# Patient Record
Sex: MALE | Race: WHITE | Employment: OTHER | URBAN - METROPOLITAN AREA
[De-identification: names, ages, dates, MRNs, and addresses within clinical notes are randomized per-mention and may not be internally consistent; named-entity substitution may affect disease eponyms.]

---

## 2017-02-01 ENCOUNTER — TRANSFERRED RECORDS (OUTPATIENT)
Dept: HEALTH INFORMATION MANAGEMENT | Facility: CLINIC | Age: 82
End: 2017-02-01

## 2017-04-02 ENCOUNTER — APPOINTMENT (OUTPATIENT)
Dept: GENERAL RADIOLOGY | Facility: CLINIC | Age: 82
DRG: 193 | End: 2017-04-02
Attending: EMERGENCY MEDICINE
Payer: MEDICARE

## 2017-04-02 ENCOUNTER — APPOINTMENT (OUTPATIENT)
Dept: CT IMAGING | Facility: CLINIC | Age: 82
DRG: 193 | End: 2017-04-02
Attending: EMERGENCY MEDICINE
Payer: MEDICARE

## 2017-04-02 ENCOUNTER — HOSPITAL ENCOUNTER (INPATIENT)
Facility: CLINIC | Age: 82
LOS: 4 days | Discharge: NURSING, REHAB OR LONG-TERM CARE | DRG: 193 | End: 2017-04-06
Attending: EMERGENCY MEDICINE | Admitting: HOSPITALIST
Payer: MEDICARE

## 2017-04-02 DIAGNOSIS — J18.9 PNEUMONIA OF BOTH LOWER LOBES DUE TO INFECTIOUS ORGANISM: Primary | ICD-10-CM

## 2017-04-02 DIAGNOSIS — N28.9 ACUTE RENAL INSUFFICIENCY: ICD-10-CM

## 2017-04-02 DIAGNOSIS — E87.6 HYPOKALEMIA: ICD-10-CM

## 2017-04-02 DIAGNOSIS — M62.81 GENERALIZED MUSCLE WEAKNESS: ICD-10-CM

## 2017-04-02 DIAGNOSIS — E86.0 DEHYDRATION: ICD-10-CM

## 2017-04-02 DIAGNOSIS — M25.571 ACUTE RIGHT ANKLE PAIN: ICD-10-CM

## 2017-04-02 DIAGNOSIS — R79.89 ELEVATED TROPONIN: ICD-10-CM

## 2017-04-02 LAB
ALBUMIN SERPL-MCNC: 2.8 G/DL (ref 3.4–5)
ALBUMIN UR-MCNC: 30 MG/DL
ALP SERPL-CCNC: 53 U/L (ref 40–150)
ALT SERPL W P-5'-P-CCNC: 25 U/L (ref 0–70)
ANION GAP SERPL CALCULATED.3IONS-SCNC: 9 MMOL/L (ref 3–14)
APPEARANCE UR: ABNORMAL
AST SERPL W P-5'-P-CCNC: 43 U/L (ref 0–45)
BASOPHILS # BLD AUTO: 0 10E9/L (ref 0–0.2)
BASOPHILS NFR BLD AUTO: 0.3 %
BILIRUB SERPL-MCNC: 0.9 MG/DL (ref 0.2–1.3)
BILIRUB UR QL STRIP: NEGATIVE
BUN SERPL-MCNC: 29 MG/DL (ref 7–30)
CALCIUM SERPL-MCNC: 8.9 MG/DL (ref 8.5–10.1)
CHLORIDE SERPL-SCNC: 96 MMOL/L (ref 94–109)
CK SERPL-CCNC: 383 U/L (ref 30–300)
CO2 SERPL-SCNC: 32 MMOL/L (ref 20–32)
COLOR UR AUTO: YELLOW
CREAT SERPL-MCNC: 1.39 MG/DL (ref 0.66–1.25)
DIFFERENTIAL METHOD BLD: ABNORMAL
EOSINOPHIL # BLD AUTO: 0 10E9/L (ref 0–0.7)
EOSINOPHIL NFR BLD AUTO: 0.1 %
ERYTHROCYTE [DISTWIDTH] IN BLOOD BY AUTOMATED COUNT: 13.8 % (ref 10–15)
GFR SERPL CREATININE-BSD FRML MDRD: 48 ML/MIN/1.7M2
GLUCOSE SERPL-MCNC: 106 MG/DL (ref 70–99)
GLUCOSE UR STRIP-MCNC: NEGATIVE MG/DL
HCT VFR BLD AUTO: 37.6 % (ref 40–53)
HGB BLD-MCNC: 12.4 G/DL (ref 13.3–17.7)
HGB UR QL STRIP: ABNORMAL
IMM GRANULOCYTES # BLD: 0.1 10E9/L (ref 0–0.4)
IMM GRANULOCYTES NFR BLD: 0.8 %
INTERPRETATION ECG - MUSE: NORMAL
KETONES UR STRIP-MCNC: 5 MG/DL
LACTATE BLD-SCNC: 2 MMOL/L (ref 0.7–2.1)
LEUKOCYTE ESTERASE UR QL STRIP: ABNORMAL
LYMPHOCYTES # BLD AUTO: 0.5 10E9/L (ref 0.8–5.3)
LYMPHOCYTES NFR BLD AUTO: 6.6 %
MCH RBC QN AUTO: 31.6 PG (ref 26.5–33)
MCHC RBC AUTO-ENTMCNC: 33 G/DL (ref 31.5–36.5)
MCV RBC AUTO: 96 FL (ref 78–100)
MONOCYTES # BLD AUTO: 1.1 10E9/L (ref 0–1.3)
MONOCYTES NFR BLD AUTO: 14 %
MUCOUS THREADS #/AREA URNS LPF: PRESENT /LPF
NEUTROPHILS # BLD AUTO: 6.3 10E9/L (ref 1.6–8.3)
NEUTROPHILS NFR BLD AUTO: 78.2 %
NITRATE UR QL: POSITIVE
NRBC # BLD AUTO: 0 10*3/UL
NRBC BLD AUTO-RTO: 0 /100
NT-PROBNP SERPL-MCNC: 2864 PG/ML (ref 0–1800)
PH UR STRIP: 7 PH (ref 5–7)
PLATELET # BLD AUTO: 275 10E9/L (ref 150–450)
PLATELET # BLD AUTO: 303 10E9/L (ref 150–450)
POTASSIUM SERPL-SCNC: 2.9 MMOL/L (ref 3.4–5.3)
PROCALCITONIN SERPL-MCNC: 0.14 NG/ML
PROT SERPL-MCNC: 7.2 G/DL (ref 6.8–8.8)
RBC # BLD AUTO: 3.93 10E12/L (ref 4.4–5.9)
RBC #/AREA URNS AUTO: 21 /HPF (ref 0–2)
SODIUM SERPL-SCNC: 137 MMOL/L (ref 133–144)
SP GR UR STRIP: 1.01 (ref 1–1.03)
T4 FREE SERPL-MCNC: 1.09 NG/DL (ref 0.76–1.46)
TROPONIN I BLD-MCNC: 0.06 UG/L (ref 0–0.1)
TROPONIN I SERPL-MCNC: 0.06 UG/L (ref 0–0.04)
TSH SERPL DL<=0.005 MIU/L-ACNC: 4.62 MU/L (ref 0.4–4)
URN SPEC COLLECT METH UR: ABNORMAL
UROBILINOGEN UR STRIP-MCNC: 0 MG/DL (ref 0–2)
WBC # BLD AUTO: 8 10E9/L (ref 4–11)
WBC #/AREA URNS AUTO: 7 /HPF (ref 0–2)

## 2017-04-02 PROCEDURE — 84145 PROCALCITONIN (PCT): CPT | Performed by: HOSPITALIST

## 2017-04-02 PROCEDURE — 99285 EMERGENCY DEPT VISIT HI MDM: CPT | Mod: 25

## 2017-04-02 PROCEDURE — 82550 ASSAY OF CK (CPK): CPT | Performed by: HOSPITALIST

## 2017-04-02 PROCEDURE — 84484 ASSAY OF TROPONIN QUANT: CPT

## 2017-04-02 PROCEDURE — 83880 ASSAY OF NATRIURETIC PEPTIDE: CPT | Performed by: EMERGENCY MEDICINE

## 2017-04-02 PROCEDURE — 83605 ASSAY OF LACTIC ACID: CPT | Performed by: HOSPITALIST

## 2017-04-02 PROCEDURE — 25000132 ZZH RX MED GY IP 250 OP 250 PS 637: Mod: GY | Performed by: HOSPITALIST

## 2017-04-02 PROCEDURE — 70450 CT HEAD/BRAIN W/O DYE: CPT

## 2017-04-02 PROCEDURE — 25000128 H RX IP 250 OP 636: Performed by: HOSPITALIST

## 2017-04-02 PROCEDURE — A9270 NON-COVERED ITEM OR SERVICE: HCPCS | Mod: GY | Performed by: EMERGENCY MEDICINE

## 2017-04-02 PROCEDURE — A9270 NON-COVERED ITEM OR SERVICE: HCPCS | Mod: GY | Performed by: HOSPITALIST

## 2017-04-02 PROCEDURE — 25000132 ZZH RX MED GY IP 250 OP 250 PS 637: Mod: GY | Performed by: EMERGENCY MEDICINE

## 2017-04-02 PROCEDURE — 93005 ELECTROCARDIOGRAM TRACING: CPT

## 2017-04-02 PROCEDURE — 73502 X-RAY EXAM HIP UNI 2-3 VIEWS: CPT

## 2017-04-02 PROCEDURE — 94640 AIRWAY INHALATION TREATMENT: CPT

## 2017-04-02 PROCEDURE — 73610 X-RAY EXAM OF ANKLE: CPT | Mod: RT

## 2017-04-02 PROCEDURE — 85025 COMPLETE CBC W/AUTO DIFF WBC: CPT | Performed by: EMERGENCY MEDICINE

## 2017-04-02 PROCEDURE — 87086 URINE CULTURE/COLONY COUNT: CPT | Performed by: HOSPITALIST

## 2017-04-02 PROCEDURE — 40000275 ZZH STATISTIC RCP TIME EA 10 MIN

## 2017-04-02 PROCEDURE — 84439 ASSAY OF FREE THYROXINE: CPT | Performed by: EMERGENCY MEDICINE

## 2017-04-02 PROCEDURE — 87040 BLOOD CULTURE FOR BACTERIA: CPT | Performed by: HOSPITALIST

## 2017-04-02 PROCEDURE — 85049 AUTOMATED PLATELET COUNT: CPT | Performed by: HOSPITALIST

## 2017-04-02 PROCEDURE — 25000132 ZZH RX MED GY IP 250 OP 250 PS 637: Mod: GY

## 2017-04-02 PROCEDURE — 96361 HYDRATE IV INFUSION ADD-ON: CPT

## 2017-04-02 PROCEDURE — 72100 X-RAY EXAM L-S SPINE 2/3 VWS: CPT

## 2017-04-02 PROCEDURE — 36415 COLL VENOUS BLD VENIPUNCTURE: CPT | Performed by: HOSPITALIST

## 2017-04-02 PROCEDURE — 81001 URINALYSIS AUTO W/SCOPE: CPT | Performed by: EMERGENCY MEDICINE

## 2017-04-02 PROCEDURE — 80053 COMPREHEN METABOLIC PANEL: CPT | Performed by: EMERGENCY MEDICINE

## 2017-04-02 PROCEDURE — 25000128 H RX IP 250 OP 636: Performed by: EMERGENCY MEDICINE

## 2017-04-02 PROCEDURE — 25000125 ZZHC RX 250: Performed by: EMERGENCY MEDICINE

## 2017-04-02 PROCEDURE — 96360 HYDRATION IV INFUSION INIT: CPT

## 2017-04-02 PROCEDURE — 12000007 ZZH R&B INTERMEDIATE

## 2017-04-02 PROCEDURE — 99223 1ST HOSP IP/OBS HIGH 75: CPT | Mod: AI | Performed by: HOSPITALIST

## 2017-04-02 PROCEDURE — 72125 CT NECK SPINE W/O DYE: CPT

## 2017-04-02 PROCEDURE — 71010 XR CHEST 1 VW: CPT

## 2017-04-02 PROCEDURE — 84443 ASSAY THYROID STIM HORMONE: CPT | Performed by: EMERGENCY MEDICINE

## 2017-04-02 PROCEDURE — 84484 ASSAY OF TROPONIN QUANT: CPT | Performed by: EMERGENCY MEDICINE

## 2017-04-02 PROCEDURE — A9270 NON-COVERED ITEM OR SERVICE: HCPCS | Mod: GY

## 2017-04-02 RX ORDER — SODIUM CHLORIDE 9 MG/ML
1000 INJECTION, SOLUTION INTRAVENOUS CONTINUOUS
Status: DISCONTINUED | OUTPATIENT
Start: 2017-04-02 | End: 2017-04-02

## 2017-04-02 RX ORDER — ACETAMINOPHEN 10 MG/ML
1000 INJECTION, SOLUTION INTRAVENOUS ONCE
Status: DISCONTINUED | OUTPATIENT
Start: 2017-04-02 | End: 2017-04-02

## 2017-04-02 RX ORDER — GABAPENTIN 100 MG/1
100 CAPSULE ORAL AT BEDTIME
Status: DISCONTINUED | OUTPATIENT
Start: 2017-04-02 | End: 2017-04-06 | Stop reason: HOSPADM

## 2017-04-02 RX ORDER — TAMSULOSIN HYDROCHLORIDE 0.4 MG/1
0.4 CAPSULE ORAL AT BEDTIME
Status: DISCONTINUED | OUTPATIENT
Start: 2017-04-02 | End: 2017-04-06 | Stop reason: HOSPADM

## 2017-04-02 RX ORDER — ACETAMINOPHEN 500 MG
TABLET ORAL
Status: COMPLETED
Start: 2017-04-02 | End: 2017-04-02

## 2017-04-02 RX ORDER — ONDANSETRON 2 MG/ML
4 INJECTION INTRAMUSCULAR; INTRAVENOUS EVERY 6 HOURS PRN
Status: DISCONTINUED | OUTPATIENT
Start: 2017-04-02 | End: 2017-04-06 | Stop reason: HOSPADM

## 2017-04-02 RX ORDER — LATANOPROST 50 UG/ML
1 SOLUTION/ DROPS OPHTHALMIC DAILY
Status: DISCONTINUED | OUTPATIENT
Start: 2017-04-02 | End: 2017-04-06 | Stop reason: HOSPADM

## 2017-04-02 RX ORDER — ASPIRIN 81 MG/1
324 TABLET, CHEWABLE ORAL ONCE
Status: COMPLETED | OUTPATIENT
Start: 2017-04-02 | End: 2017-04-02

## 2017-04-02 RX ORDER — ACETAMINOPHEN 325 MG/1
650 TABLET ORAL EVERY 4 HOURS PRN
Status: DISCONTINUED | OUTPATIENT
Start: 2017-04-02 | End: 2017-04-06 | Stop reason: HOSPADM

## 2017-04-02 RX ORDER — POTASSIUM CHLORIDE 1500 MG/1
20-40 TABLET, EXTENDED RELEASE ORAL
Status: DISCONTINUED | OUTPATIENT
Start: 2017-04-02 | End: 2017-04-06 | Stop reason: HOSPADM

## 2017-04-02 RX ORDER — FOLIC ACID 1 MG/1
1 TABLET ORAL DAILY
Status: DISCONTINUED | OUTPATIENT
Start: 2017-04-02 | End: 2017-04-06 | Stop reason: HOSPADM

## 2017-04-02 RX ORDER — MAGNESIUM SULFATE HEPTAHYDRATE 40 MG/ML
4 INJECTION, SOLUTION INTRAVENOUS EVERY 4 HOURS PRN
Status: DISCONTINUED | OUTPATIENT
Start: 2017-04-02 | End: 2017-04-06 | Stop reason: HOSPADM

## 2017-04-02 RX ORDER — LEVOTHYROXINE SODIUM 50 UG/1
50 TABLET ORAL DAILY
Status: DISCONTINUED | OUTPATIENT
Start: 2017-04-02 | End: 2017-04-06 | Stop reason: HOSPADM

## 2017-04-02 RX ORDER — LIDOCAINE 40 MG/G
CREAM TOPICAL
Status: DISCONTINUED | OUTPATIENT
Start: 2017-04-02 | End: 2017-04-06 | Stop reason: HOSPADM

## 2017-04-02 RX ORDER — ONDANSETRON 4 MG/1
4 TABLET, ORALLY DISINTEGRATING ORAL EVERY 6 HOURS PRN
Status: DISCONTINUED | OUTPATIENT
Start: 2017-04-02 | End: 2017-04-06 | Stop reason: HOSPADM

## 2017-04-02 RX ORDER — ASPIRIN 81 MG/1
81 TABLET ORAL DAILY
Status: DISCONTINUED | OUTPATIENT
Start: 2017-04-03 | End: 2017-04-06 | Stop reason: HOSPADM

## 2017-04-02 RX ORDER — HEPARIN SODIUM 5000 [USP'U]/.5ML
5000 INJECTION, SOLUTION INTRAVENOUS; SUBCUTANEOUS 2 TIMES DAILY
Status: DISCONTINUED | OUTPATIENT
Start: 2017-04-02 | End: 2017-04-06 | Stop reason: HOSPADM

## 2017-04-02 RX ORDER — AMOXICILLIN 250 MG
1 CAPSULE ORAL 2 TIMES DAILY PRN
Status: DISCONTINUED | OUTPATIENT
Start: 2017-04-02 | End: 2017-04-02

## 2017-04-02 RX ORDER — IPRATROPIUM BROMIDE AND ALBUTEROL SULFATE 2.5; .5 MG/3ML; MG/3ML
3 SOLUTION RESPIRATORY (INHALATION) ONCE
Status: COMPLETED | OUTPATIENT
Start: 2017-04-02 | End: 2017-04-02

## 2017-04-02 RX ORDER — NALOXONE HYDROCHLORIDE 0.4 MG/ML
.1-.4 INJECTION, SOLUTION INTRAMUSCULAR; INTRAVENOUS; SUBCUTANEOUS
Status: DISCONTINUED | OUTPATIENT
Start: 2017-04-02 | End: 2017-04-06 | Stop reason: HOSPADM

## 2017-04-02 RX ORDER — POTASSIUM CHLORIDE 7.45 MG/ML
10 INJECTION INTRAVENOUS
Status: DISCONTINUED | OUTPATIENT
Start: 2017-04-02 | End: 2017-04-06 | Stop reason: HOSPADM

## 2017-04-02 RX ORDER — ACETAMINOPHEN 500 MG
500 TABLET ORAL 4 TIMES DAILY PRN
Status: DISCONTINUED | OUTPATIENT
Start: 2017-04-02 | End: 2017-04-02

## 2017-04-02 RX ORDER — POTASSIUM CHLORIDE 1.5 G/1.58G
20-40 POWDER, FOR SOLUTION ORAL
Status: DISCONTINUED | OUTPATIENT
Start: 2017-04-02 | End: 2017-04-06 | Stop reason: HOSPADM

## 2017-04-02 RX ORDER — POTASSIUM CHLORIDE 29.8 MG/ML
20 INJECTION INTRAVENOUS
Status: DISCONTINUED | OUTPATIENT
Start: 2017-04-02 | End: 2017-04-06 | Stop reason: HOSPADM

## 2017-04-02 RX ORDER — AMOXICILLIN 250 MG
1-2 CAPSULE ORAL 2 TIMES DAILY PRN
Status: DISCONTINUED | OUTPATIENT
Start: 2017-04-02 | End: 2017-04-06 | Stop reason: HOSPADM

## 2017-04-02 RX ADMIN — METOPROLOL TARTRATE 12.5 MG: 25 TABLET, FILM COATED ORAL at 21:27

## 2017-04-02 RX ADMIN — TAMSULOSIN HYDROCHLORIDE 0.4 MG: 0.4 CAPSULE ORAL at 21:27

## 2017-04-02 RX ADMIN — HEPARIN SODIUM 5000 UNITS: 10000 INJECTION, SOLUTION INTRAVENOUS; SUBCUTANEOUS at 21:28

## 2017-04-02 RX ADMIN — IPRATROPIUM BROMIDE AND ALBUTEROL SULFATE 3 ML: .5; 3 SOLUTION RESPIRATORY (INHALATION) at 13:51

## 2017-04-02 RX ADMIN — GABAPENTIN 100 MG: 100 CAPSULE ORAL at 21:27

## 2017-04-02 RX ADMIN — ACETAMINOPHEN 1000 MG: 500 TABLET, FILM COATED ORAL at 12:27

## 2017-04-02 RX ADMIN — SODIUM CHLORIDE 1000 ML: 9 INJECTION, SOLUTION INTRAVENOUS at 19:28

## 2017-04-02 RX ADMIN — FOLIC ACID 1 MG: 1 TABLET ORAL at 19:24

## 2017-04-02 RX ADMIN — LEVOTHYROXINE SODIUM 50 MCG: 50 TABLET ORAL at 19:24

## 2017-04-02 RX ADMIN — SODIUM CHLORIDE 500 ML: 9 INJECTION, SOLUTION INTRAVENOUS at 12:27

## 2017-04-02 RX ADMIN — LATANOPROST 1 DROP: 50 SOLUTION/ DROPS OPHTHALMIC at 19:25

## 2017-04-02 RX ADMIN — ASPIRIN 81 MG CHEWABLE TABLET 324 MG: 81 TABLET CHEWABLE at 14:24

## 2017-04-02 ASSESSMENT — ENCOUNTER SYMPTOMS
DIARRHEA: 0
VOMITING: 0
ABDOMINAL PAIN: 0
COUGH: 0
HEADACHES: 0
NAUSEA: 0
FEVER: 0

## 2017-04-02 NOTE — IP AVS SNAPSHOT
"          [place] [address] MEDICAL SURGICAL: [phone]                                              INTERAGENCY TRANSFER FORM - LAB / IMAGING / EKG / EMG RESULTS   2017                    Hospital Admission Date: 2017  BENEDICTO BOLAÑOS   : 1929  Sex: Male        Attending Provider: Yolanda Hayes MD     Allergies:  Advil [Ibuprofen], Influenza Virus Vaccine H5n1, Orange Juice [Orange Oil]    Infection:  None   Service:  GENERAL MEDI    Ht:  1.88 m (6' 2\")   Wt:  92.4 kg (203 lb 12.8 oz)   Admission Wt:  93.2 kg (205 lb 8 oz)    BMI:  26.17 kg/m 2   BSA:  2.2 m 2            Patient PCP Information     Provider PCP Type    AYANNA CROWLEY MD General         Lab Results - 3 Days      Basic metabolic panel [[id number]] (Abnormal)  Resulted: 17 0930, Result status: Final result    Ordering provider: Yolanda Hayes MD  17 0001 Resulting lab: [facility]    Specimen Information    Type Source Collected On   Blood  17 0900          Components       Value Reference Range Flag Lab   Sodium 138 133 - 144 mmol/L  FrRdHs   Potassium 4.5 3.4 - 5.3 mmol/L  FrRdHs   Chloride 103 94 - 109 mmol/L  FrRdHs   Carbon Dioxide 25 20 - 32 mmol/L  FrRdHs   Anion Gap 10 3 - 14 mmol/L  FrRdHs   Glucose 130 70 - 99 mg/dL H FrRdHs   Urea Nitrogen 33 7 - 30 mg/dL H FrRdHs   Creatinine 1.20 0.66 - 1.25 mg/dL  FrRdHs   GFR Estimate 57 >60 mL/min/1.7m2 L FrRdHs   Comment:  Non  GFR Calc   GFR Estimate If Black 69 >60 mL/min/1.7m2  FrRdHs   Comment:  African American GFR Calc   Calcium 8.8 8.5 - 10.1 mg/dL  FrRdHs            CBC with platelets [[id number]] (Abnormal)  Resulted: 17 0915, Result status: Final result    Ordering provider: Yolanda Hayes MD  17 0001 Resulting lab: [facility]    Specimen Information    Type Source Collected On   Blood  17 0900          Components       Value Reference Range Flag Lab   WBC 8.5 4.0 - " 11.0 10e9/L  FrRdHs   RBC Count 3.74 4.4 - 5.9 10e12/L L FrRdHs   Hemoglobin 11.8 13.3 - 17.7 g/dL L FrRdHs   Hematocrit 36.6 40.0 - 53.0 % L FrRdHs   MCV 98 78 - 100 fl  FrRdHs   MCH 31.6 26.5 - 33.0 pg  FrRdHs   MCHC 32.2 31.5 - 36.5 g/dL  FrRdHs   RDW 14.1 10.0 - 15.0 %  FrRdHs   Platelet Count 325 150 - 450 10e9/L  FrRdHs            Blood culture [[id number]]  Resulted: 04/06/17 0555, Result status: Preliminary result    Ordering provider: Yolanda Hayes MD  04/02/17 1944 Resulting lab: [facility]    Specimen Information    Type Source Collected On   Blood  04/02/17 2040          Components       Value Reference Range Flag Lab   Specimen Description Blood Left Arm   75   Special Requests Aerobic and anaerobic bottles received   FrRdHs   Culture Micro No growth after 4 days   225   Micro Report Status Pending   225            Blood culture [[id number]]  Resulted: 04/06/17 0555, Result status: Preliminary result    Ordering provider: Yolanda Hayes MD  04/02/17 1944 Resulting lab: [facility]    Specimen Information    Type Source Collected On   Blood  04/02/17 2030          Components       Value Reference Range Flag Lab   Specimen Description Blood Right Arm   75   Special Requests Aerobic and anaerobic bottles received   FrRdHs   Culture Micro No growth after 4 days   225   Micro Report Status Pending   225            Legionella pneumonia antigen urine [[id number]]  Resulted: 04/05/17 1835, Result status: Final result    Ordering provider: Yolanda Hayes MD  04/05/17 1235 Resulting lab: [facility]    Specimen Information    Type Source Collected On   Urine Urine catheter 04/05/17 1620          Components       Value Reference Range Flag Lab   Specimen Description Urine   Rd   L Pneumo Urine Antigen --   75   Result:         Presumptive negative for Legionella pneumophilia serogroup 1 antigen in  urine,   suggesting no recent or current infection.  Infection due to Legionella cannot   be ruled out, since other serogroups and species may cause disease, antigen may   not be present in urine in early infection, and the level of antigen present in   the urine may be below detectable limits of the test.     Micro Report Status FINAL 04/05/2017   75   Result:              Influenza A and B and RSV PCR [[id number]]  Resulted: 04/05/17 1730, Result status: Final result    Ordering provider: Yolanda Hayes MD  04/05/17 1206 Resulting lab: [facility]    Specimen Information    Type Source Collected On    Nasal Swab 04/05/17 1300          Components       Value Reference Range Flag Lab   Specimen Description Nasopharyngeal   FrRdHs   Influenza A PCR -- NEG  75   Result:         Negative   Flu A target RNA not detected, presumed negative for Influenza A or the viral   load is below the limit of detection.     Influenza B PCR -- NEG  75   Result:         Negative   Flu B target RNA not detected, presumed negative for Influenza B or the viral   load is below the limit of detection.     Resp Syncytial Virus -- NEG  75   Result:         Negative   RSV target RNA not detected, presumed negative for Respiratory Syncitial Virus   or the viral load is below the limit of detection.   FDA approved assay performed using [employer] GeneXpert(R) real-time PCR.              Basic metabolic panel [[id number]] (Abnormal)  Resulted: 04/05/17 0734, Result status: Final result    Ordering provider: Yolanda Hayes MD  04/05/17 0000 Resulting lab: [facility]    Specimen Information    Type Source Collected On   Blood  04/05/17 0655          Components       Value Reference Range Flag Lab   Sodium 136 133 - 144 mmol/L  FrRdHs   Potassium 3.9 3.4 - 5.3 mmol/L  FrRdHs   Chloride 101 94 - 109 mmol/L  FrRdHs   Carbon Dioxide 27 20 - 32 mmol/L  FrRdHs   Anion Gap 8 3 - 14 mmol/L  FrRdHs    Glucose 122 70 - 99 mg/dL H FrRdHs   Urea Nitrogen 28 7 - 30 mg/dL  FrRdHs   Creatinine 1.21 0.66 - 1.25 mg/dL  FrRdHs   GFR Estimate 57 >60 mL/min/1.7m2 L FrRdHs   Comment:  Non  GFR Calc   GFR Estimate If Black 69 >60 mL/min/1.7m2  FrRdHs   Comment:  African American GFR Calc   Calcium 8.2 8.5 - 10.1 mg/dL L FrRdHs            Magnesium [[id number]]  Resulted: 04/05/17 0729, Result status: Final result    Ordering provider: Yolanda Hayes MD  04/05/17 0431 Resulting lab: [facility]    Specimen Information    Type Source Collected On   Blood  04/05/17 0655          Components       Value Reference Range Flag Lab   Magnesium 2.2 1.6 - 2.3 mg/dL  FrRdHs            CBC with platelets [[id number]] (Abnormal)  Resulted: 04/05/17 0713, Result status: Final result    Ordering provider: Yolanda Hayes MD  04/05/17 0000 Resulting lab: [facility]    Specimen Information    Type Source Collected On   Blood  04/05/17 0655          Components       Value Reference Range Flag Lab   WBC 7.6 4.0 - 11.0 10e9/L  FrRdHs   RBC Count 3.91 4.4 - 5.9 10e12/L L FrRdHs   Hemoglobin 12.4 13.3 - 17.7 g/dL L FrRdHs   Hematocrit 37.6 40.0 - 53.0 % L FrRdHs   MCV 96 78 - 100 fl  FrRdHs   MCH 31.7 26.5 - 33.0 pg  FrRdHs   MCHC 33.0 31.5 - 36.5 g/dL  FrRdHs   RDW 14.2 10.0 - 15.0 %  FrRdHs   Platelet Count 332 150 - 450 10e9/L  FrRdHs            Basic metabolic panel [[id number]] (Abnormal)  Resulted: 04/04/17 0727, Result status: Final result    Ordering provider: Yolanda Hayes MD  04/04/17 0001 Resulting lab: [facility]    Specimen Information    Type Source Collected On   Blood  04/04/17 0604          Components       Value Reference Range Flag Lab   Sodium 137 133 - 144 mmol/L  FrRdHs   Potassium 3.8 3.4 - 5.3 mmol/L  FrRdHs   Chloride 101 94 - 109 mmol/L  FrRdHs   Carbon Dioxide 27 20 - 32 mmol/L  FrRdHs   Anion Gap 9 3 - 14 mmol/L  FrRdHs   Glucose 112 70 -  99 mg/dL H FrRdHs   Urea Nitrogen 29 7 - 30 mg/dL  FrRdHs   Creatinine 1.20 0.66 - 1.25 mg/dL  FrRdHs   GFR Estimate 57 >60 mL/min/1.7m2 L FrRdHs   Comment:  Non  GFR Calc   GFR Estimate If Black 69 >60 mL/min/1.7m2  FrRdHs   Comment:  African American GFR Calc   Calcium 8.5 8.5 - 10.1 mg/dL  FrRdHs            CBC with platelets [[id number]] (Abnormal)  Resulted: 04/04/17 0712, Result status: Final result    Ordering provider: Yolanda Hayes MD  04/04/17 0001 Resulting lab: [facility]    Specimen Information    Type Source Collected On   Blood  04/04/17 0604          Components       Value Reference Range Flag Lab   WBC 6.7 4.0 - 11.0 10e9/L  FrRdHs   RBC Count 3.64 4.4 - 5.9 10e12/L L FrRdHs   Hemoglobin 11.3 13.3 - 17.7 g/dL L FrRdHs   Hematocrit 35.7 40.0 - 53.0 % L FrRdHs   MCV 98 78 - 100 fl  FrRdHs   MCH 31.0 26.5 - 33.0 pg  FrRdHs   MCHC 31.7 31.5 - 36.5 g/dL  FrRdHs   RDW 14.1 10.0 - 15.0 %  FrRdHs   Platelet Count 310 150 - 450 10e9/L  FrRdHs            Urine Culture Aerobic Bacterial [[id number]]  Resulted: 04/03/17 2104, Result status: Final result    Ordering provider: Yolanda Hayes MD  04/02/17 1941 Resulting lab: [facility]    Specimen Information    Type Source Collected On   Urine  04/02/17 1911          Components       Value Reference Range Flag Lab   Specimen Description Catheterized Urine   FrRdHs   Special Requests Specimen received in preservative   75   Culture Micro No growth   225   Micro Report Status FINAL 04/03/2017   225            Potassium [[id number]]  Resulted: 04/03/17 1724, Result status: Final result    Ordering provider: Yolanda Hayes MD  04/03/17 1344 Resulting lab: [facility]    Specimen Information    Type Source Collected On   Blood  04/03/17 1642          Components       Value Reference Range Flag Lab   Potassium 3.7 3.4 - 5.3 mmol/L  [facility]            Basic metabolic panel  [[id number]] (Abnormal)  Resulted: 04/03/17 0722, Result status: Final result    Ordering provider: Yolanda Hayes MD  04/03/17 0000 Resulting lab: [facility]    Specimen Information    Type Source Collected On   Blood  04/03/17 0651          Components       Value Reference Range Flag Lab   Sodium 139 133 - 144 mmol/L  FrRdHs   Potassium 3.2 3.4 - 5.3 mmol/L L FrRdHs   Chloride 100 94 - 109 mmol/L  FrRdHs   Carbon Dioxide 30 20 - 32 mmol/L  FrRdHs   Anion Gap 9 3 - 14 mmol/L  FrRdHs   Glucose 118 70 - 99 mg/dL H FrRdHs   Urea Nitrogen 27 7 - 30 mg/dL  FrRdHs   Creatinine 1.27 0.66 - 1.25 mg/dL H FrRdHs   GFR Estimate 54 >60 mL/min/1.7m2 L FrRdHs   Comment:  Non  GFR Calc   GFR Estimate If Black 65 >60 mL/min/1.7m2  FrRdHs   Comment:  African American GFR Calc   Calcium 8.6 8.5 - 10.1 mg/dL  FrRdHs            Hepatic panel [[id number]] (Abnormal)  Resulted: 04/03/17 0722, Result status: Final result    Ordering provider: Yolanda Hayes MD  04/03/17 0000 Resulting lab: [facility]    Specimen Information    Type Source Collected On   Blood  04/03/17 0651          Components       Value Reference Range Flag Lab   Bilirubin Direct 0.2 0.0 - 0.2 mg/dL  FrRdHs   Bilirubin Total 0.8 0.2 - 1.3 mg/dL  FrRdHs   Albumin 2.6 3.4 - 5.0 g/dL L FrRdHs   Protein Total 6.6 6.8 - 8.8 g/dL L FrRdHs   Alkaline Phosphatase 53 40 - 150 U/L  FrRdHs   ALT 23 0 - 70 U/L  FrRdHs   AST 37 0 - 45 U/L  FrRdHs            Magnesium [[id number]]  Resulted: 04/03/17 0722, Result status: Final result    Ordering provider: Yolanda Hayes MD  04/03/17 0253 Resulting lab: [facility]    Specimen Information    Type Source Collected On   Blood  04/03/17 0651          Components       Value Reference Range Flag Lab   Magnesium 2.3 1.6 - 2.3 mg/dL  [facility]            CBC with platelets differential [[id number]] (Abnormal)  Resulted: 04/03/17 0702, Result status: Final result     Ordering provider: Yolanda Hayes MD  04/03/17 0000 Resulting lab: [facility]    Specimen Information    Type Source Collected On   Blood  04/03/17 0651          Components       Value Reference Range Flag Lab   WBC 8.9 4.0 - 11.0 10e9/L  FrRdHs   RBC Count 3.91 4.4 - 5.9 10e12/L L FrRdHs   Hemoglobin 12.2 13.3 - 17.7 g/dL L FrRdHs   Hematocrit 37.8 40.0 - 53.0 % L FrRdHs   MCV 97 78 - 100 fl  FrRdHs   MCH 31.2 26.5 - 33.0 pg  FrRdHs   MCHC 32.3 31.5 - 36.5 g/dL  FrRdHs   RDW 14.1 10.0 - 15.0 %  FrRdHs   Platelet Count 293 150 - 450 10e9/L  FrRdHs   Diff Method Automated Method   FrRdHs   % Neutrophils 78.9 %  FrRdHs   % Lymphocytes 7.1 %  FrRdHs   % Monocytes 11.4 %  FrRdHs   % Eosinophils 1.5 %  FrRdHs   % Basophils 0.4 %  FrRdHs   % Immature Granulocytes 0.7 %  FrRdHs   Nucleated RBCs 0 0 /100  FrRdHs   Absolute Neutrophil 7.1 1.6 - 8.3 10e9/L  FrRdHs   Absolute Lymphocytes 0.6 0.8 - 5.3 10e9/L L FrRdHs   Absolute Monocytes 1.0 0.0 - 1.3 10e9/L  FrRdHs   Absolute Eosinophils 0.1 0.0 - 0.7 10e9/L  FrRdHs   Absolute Basophils 0.0 0.0 - 0.2 10e9/L  FrRdHs   Abs Immature Granulocytes 0.1 0 - 0.4 10e9/L  FrRdHs   Absolute Nucleated RBC 0.0   FrRdHs            Testing Performed By     Lab - Abbreviation Name Director Address Valid Date Range    12 - FrRdHs [facility] Unknown [address]  [facility] [address] 05/08/15 1057 - Present    75 - Unknown [facility] Unknown [address] [facility] [address] 01/15/15 1019 - Present    225 - Unknown [facility] Unknown [address] [facility] [address] 12/19/14 0954 - Present            Unresulted Labs (24h ago through future)    Start       Ordered    04/05/17 1700  Sputum Culture Aerobic Bacterial  (Sputum Culture)  ROUTINE,   Routine     Comments:  For EXPECTORATED SPUTUM, a Gram Stain ([id number]) must be ordered.    04/05/17 1654    04/05/17 1700  Gram stain   "(Sputum Culture)  ROUTINE,   Routine      04/05/17 1654    04/05/17 0600  Platelet count  (Pharmacological Prophylaxis- heparin (If CrCl less than 30 mL/min)- UU,UR,UA,SH,RH,PH,WY)  EVERY THREE DAYS,   Routine     Comments:  Repeat every 3 days while on VTE prophylaxis. If no result is listed, this lab has not been done the past 365 days. LATEST LAB RESULT: Platelet Count (10e9/L)       Date                     Value                 04/02/2017               303              ----------      04/02/17 1628    Unscheduled  Potassium  (Potassium Replacement - \"Standard\" - For K levels less than 3.4 mmol/L - UU,UR,UA,RH,SH,PH,WY )  CONDITIONAL (SPECIFY),   Routine     Comments:  Obtain Potassium Level for these conditions:  *IF no potassium result within 24 hours before initiation of order set, draw potassium level with next lab collect.    *2 HOURS AFTER last IV potassium replacement dose and 4 hours after an oral replacement dose.  *Next morning after potassium dose.     Repeat Potassium Replacement if necessary.    04/02/17 1628    Unscheduled  Magnesium  (Magnesium Replacement - Adult - \"High\" - Replacement for all levels less than or equal to 2 mg/dL)  CONDITIONAL (SPECIFY),   Routine     Comments:  Obtain Magnesium Level for these conditions:  *IF no magnesium result within 24 hrs before initiation of order set, draw magnesium level with next lab collect.    *2 HOURS AFTER last magnesium replacement dose when magnesium replacement given for level less than 1.6  *Next morning after magnesium dose.     Repeat Magnesium Replacement if necessary.    04/02/17 1628         Imaging Results - 3 Days      CT Chest w/o Contrast [[id number]]  Resulted: 04/05/17 1219, Result status: Final result    Ordering provider: Yolanda Hayes MD  04/05/17 0919 Resulted by: Martell Bailey MD    Performed: 04/05/17 1046 - 04/05/17 1105 Resulting lab: RADIOLOGY RESULTS    Narrative:       CT CHEST WITHOUT CONTRAST  4/5/2017 " 11:05 AM     HISTORY: Shortness of breath.    COMPARISON: Chest x-ray 4/2/2017. CT chest, abdomen and pelvis  11/16/2015.    TECHNIQUE: Axial images from the thoracic inlet to the lung bases are  performed with additional coronal reformatted images. No contrast is  utilized.  Radiation dose for this scan was reduced using automated  exposure control, adjustment of the mA and/or kV according to patient  size, or iterative reconstruction technique.    FINDINGS:  Calcified granulomas are noted within each lung. Other  areas of cystic change are noted throughout both lungs possibly  reflecting underlying emphysema. Mild groundglass opacities in the mid  to lower lungs could represent underlying alveolitis. Airways appear  patent. Findings could reflect underlying lymphangiomyomatosis as many  of these cysts appear well formed with a thin surrounding rim of  tissue. No wall nodularity is appreciated within the cysts. No pleural  or pericardial fluid. The heart is normal in size. Aorta demonstrates  calcified plaque without evidence of aneurysm. Coronary artery  calcification is also noted. The esophagus is unremarkable. Thyroid  gland appears normal in size. Small mediastinal and hilar lymph nodes  are likely reactive in nature. A larger subcarinal lymph node measures  3.0 x 1.6 cm on series 2, image 36. Limited images upper abdomen  demonstrate mild perihepatic fluid. Bone window examination is within  normal limits.      Impression:       IMPRESSION:  1. Persistent cystic changes throughout both lungs raising the  possibility of emphysema or underlying interstitial lung disease.  Groundglass opacities within the lungs is new since the prior exam  suggesting underlying acute alveolitis which may account for the  patient's shortness of breath. No discrete infiltrate or consolidation  to indicate typical pneumonia.  2. No significant pleural or pericardial fluid. Heart is normal in  size. Probable reactive lymph nodes  which are new since prior exam.    YSABEL CULP MD      Testing Performed By     Lab - Abbreviation Name Director Address Valid Date Range    104 - Rad Rslts RADIOLOGY RESULTS Unknown Unknown 05 1553 - Present               ECG/EMG Results      Echocardiogram Complete [[id number]]  Resulted: 17, Result status: Edited Result - FINAL    Ordering provider: Abad Dalton MD  17 08 Resulted by: Sky Philippe MD    Performed: 1747 - 17 Resulting lab: RADIOLOGY RESULTS    Narrative:       [id number]  [id number]  [id number]  [id number]^SHA^ABAD^PATRICIA           [facility]  Echocardiography Laboratory  [address] [place] [address]        Name: BENEDICTO BOLAÑOS  MRN: [id number]  : 1929  Study Date: 2017 09:12 AM  Age: 87 yrs  Gender: Male  Patient Location: [facility]  Reason For Study: CHF  Ordering Physician: ABAD DALTON  Referring Physician: AYANNA CROWLEY  Performed By: Paul Parker RDCS     BSA: 2.2 m2  Height: 74 in  Weight: 205 lb  HR: 85  BP: 123/61 mmHg  _____________________________________________________________________________  __        Procedure  Complete Portable Echo Adult. Contrast Lumason.  _____________________________________________________________________________  __        Interpretation Summary     The visual ejection fraction is estimated at 55-60%.  Normal left ventricular wall motion  The right ventricle is normal in size and function.  The mean AoV pressure gradient is 19mmHg.  There is sclerosis, calcification, and restriction of the aortic valve opening  compatible with mild aortic stenosis.  The ascending aorta is Mildly dilated.  There is no pericardial effusion.  The study was technically difficult. Contrast was used without apparent  complications.  _____________________________________________________________________________  __        Left Ventricle  The left ventricle is normal in size.  There is normal left ventricular wall  thickness. The visual ejection fraction is estimated at 55-60%. Grade I or  early diastolic dysfunction. E by E prime ratio is between 8 and 15, which is  indeterminate for assessment of left ventricular filling pressures. Normal  left ventricular wall motion.     Right Ventricle  The right ventricle is normal in size and function.     Atria  Normal left atrial size. Right atrial size is normal. There is no color  Doppler evidence of an atrial shunt.     Mitral Valve  Thickened mitral valve anterior leaflet. There is no mitral regurgitation  noted. There is no mitral valve stenosis.        Tricuspid Valve  The tricuspid valve is normal in structure and function. There is physiologic  tricuspid regurgitation. The right ventricular systolic pressure is  approximated at 28.9 mmHg plus the right atrial pressure. Small IVC (<1.5cm)  with normal respiratory collapse; right atrial pressure is estimated at 0-  5mmHg. Right ventricle systolic pressure estimate normal.     Aortic Valve  No aortic regurgitation is present. There is sclerosis, calcification, and  restriction of the aortic valve opening compatible with mild aortic stenosis.  The mean AoV pressure gradient is 19mmHg. Right coronary cusp leaflet very  thickened and immobile. Left and non-coronary cusp leaflets moderately  thickened and calcified with moderately restricted moition.     Pulmonic Valve  The pulmonic valve is not well visualized. There is no pulmonic valvular  regurgitation. Increased pulmonic valve velocity.     Vessels  The aortic root is normal size. The ascending aorta is Mildly dilated. The IVC  is normal in size and reactivity with respiration, suggesting normal central  venous pressure.     Pericardium  There is no pericardial effusion.        Rhythm  The rhythm was normal sinus.  _____________________________________________________________________________  __  MMode/2D Measurements & Calculations  IVSd:  0.94 cm     LVIDd: 5.0 cm  LVIDs: 2.9 cm  LVPWd: 0.99 cm  FS: 42.8 %  EDV(Teich): 119.5 ml  ESV(Teich): 31.5 ml  LV mass(C)d: 175.7 grams  Ao root diam: 3.5 cm  LA dimension: 4.4 cm  asc Aorta Diam: 3.9 cm  LA/Ao: 1.2  LVOT diam: 2.3 cm  LVOT area: 4.0 cm2  LA Volume (BP): 44.8 ml  LA Volume Index (BP): 20.4 ml/m2           Doppler Measurements & Calculations  MV E max malika: 70.6 cm/sec  MV A max malika: 96.3 cm/sec  MV E/A: 0.73  MV dec time: 0.30 sec  Ao V2 max: 285.0 cm/sec  Ao max P.0 mmHg  Ao V2 mean: 210.6 cm/sec  Ao mean P.4 mmHg  Ao V2 VTI: 62.3 cm  ULISSES(I,D): 1.3 cm2  ULISSES(V,D): 1.4 cm2  LV V1 max P.4 mmHg  LV V1 max: 99.5 cm/sec  LV V1 VTI: 19.9 cm  SV(LVOT): 80.6 ml  PA V2 max: 192.7 cm/sec  PA max P.9 mmHg  PA acc time: 0.13 sec  TR max malika: 268.8 cm/sec  TR max P.9 mmHg  ULISSES Index (cm2/m2): 0.59  Lateral E/e': 7.9  Medial E/e': 9.8              _____________________________________________________________________________  __        Report approved by: Ori Sotomayor 2017 11:11 AM       1    Type Source Collected On     17 0912          View Image (below)              Encounter-Level Documents:     There are no encounter-level documents.      Order-Level Documents:     There are no order-level documents.

## 2017-04-02 NOTE — ED PROVIDER NOTES
History     Chief Complaint:  Ankle Pain      HPI   Jacob Easton is a 87 year old male who presents with ankle and hip pain. In the morning yesterday, the patient was feeling generally week. In the afternoon, he was in his bedroom walking with his walker when he fell forward. The patient subsequently has had bilateral hip and ankle pain, but notes that it is worse on the right side. After the fall, the patient was unable to get up on his own and required the help of two neighbors to get into his bed. He has not taken any medications for his pain.  The patient has had slight shortness of breath recently but denies any fevers, cough, chest pain, abdominal pain, nausea, vomiting, diarrhea, or headaches.     Allergies:  Influenza Virus Vaccine H5n1     Medications:    Levothyroxine  Norvasc  Hydrochlorothiazide  Xalatan   Multiple vitamins  Gabapentin  Tylenol   Senna-Docusate   Lopressor   Methotrexate  Aspirin   Folvite   Fish oil  Flomax     Past Medical History:    Arthritis   Hypertension   Hypocalcemia  Neuromuscular disorder  Thyroid disease    Past Surgical History:    Right hip arthroplasty     Family History:    History reviewed. No pertinent family history.     Social History:  Marital Status:   Presents to the ED with wife  Tobacco Use: Never Used  Alcohol Use: No  PCP: AYANNA CROWLEY      Review of Systems   Constitutional: Negative for fever.   Respiratory: Negative for cough.    Gastrointestinal: Negative for abdominal pain, diarrhea, nausea and vomiting.   Musculoskeletal:        Positive for ankle pain  Positive for hip pain.    Neurological: Negative for headaches.   All other systems reviewed and are negative.      Physical Exam     Patient Vitals for the past 24 hrs:   BP Temp Temp src Heart Rate Resp SpO2   04/02/17 1415 120/67 - - 85 - 92 %   04/02/17 1400 123/63 - - 82 - 94 %   04/02/17 1351 - - - 84 18 97 %   04/02/17 1345 123/73 - - 87 - 96 %   04/02/17 1245 132/77 - - 94 - 97 %    04/02/17 1230 (!) 140/103 - - 94 - 99 %   04/02/17 1215 125/82 - - 101 - -   04/02/17 1145 148/74 - - 97 - 92 %   04/02/17 1130 139/78 - - 98 - 91 %   04/02/17 1119 151/78 98  F (36.7  C) Oral 100 - 91 %      Physical Exam  Nursing note and vitals reviewed.    Constitutional: Pleasant and well groomed.          HENT:    Ears: TM's clear   Mouth/Throat: Oropharynx is without swelling or erythema. Oral mucosa dry.    Eyes: Conjunctivae normal are normal. No scleral icterus.    Neck: Neck supple.   Cardiovascular: Normal rate, regular rhythm and intact distal pulses.    Pulmonary/Chest: Effort normal. Rales at the right base.   Abdominal: Soft.  No distension. There is no tenderness.   Musculoskeletal:  No edema, No calf tenderness. Lateral malleolar tenderness on the right. Proximal fifth metatarsal tenderness. No malleolar, fifth metatarsal or navicular tenderness on the left. No hip pain with internal or external rotation of bilateral lower extremities.   Genitourinary:Deferred.   Neurological:Alert and answering questions appropriately.  Coordination normal. Cranial nerves II- XII intact. Unable to flex his hips to lift legs independently but has 5/5 strength with knee flexion and extension. Upper extremity strength intact.   Skin: Skin is warm and dry.   Psychiatric: Normal mood and affect.      Emergency Department Course   ECG:  @ 1121  Indication: Fall  Vent. Rate 102 bpm. VT interval 184 ms. QRS duration 144 ms. QT/QTc 398/518 ms. P-R-T axis 74 -60 77.   Sinus tachycardia with occasional premature ventricular complexes. Left axis deviation. Left bundle branch block.  Abnormal ECG.  No significant change when compared to previous ECG from 12/20/15   Read @ 1135 by Dr. Young.     Imaging:  Head CT without contrast:   No evidence of acute intracranial hemorrhage or mass  effect. Cerebral atrophy and periventricular white matter disease.  Preliminary radiology read.       Cervical spine without contrast:   No  evidence of cervical spine fracture or subluxation.  Degenerative changes as above.  Preliminary radiology read.       Chest x-ray, 1 view:   Diffuse interstitial, somewhat nodular pattern is noted  within both lungs.  This is similar if not unchanged when given  differences in technique compared to 12/20/2015. No new consolidation  is identified. No apparent pleural effusion.  Preliminary radiology read.       Ankle x-ray, right, 3 views:   No apparent fracture. The ankle mortise appears congruent.  Small vessel arterial calcification is noted.  Preliminary radiology read.       Lumbar spine x-ray 2-3 views:   Vertebral body heights, sagittal alignment, and disc  heights appear within normal limits. Aortic calcification is noted.  Bilateral hip arthroplasty is also noted.  Preliminary radiology read.       Pelvis and hip right x-ray, 1 view:   Bilateral hip arthroplasties without apparent  complication. The osseous pelvis appears intact.  Preliminary radiology read.     Radiographic findings were communicated with the patient who voiced understanding of the findings.    Laboratory:  TSH 4.62 (H)   T4: 1.09  (1155) Troponin I: 0.062 (H)   (1208) Troponin POCT: 0.06  CMP: Potassium 2.0 (L), Glucose 106 (H), Creatinine 1.39 (H), GFR 48 (L), Albuterol 2.8 (L), otherwise WNL   CBC:  WBC 8.0, HGB 12.4 (L),     Interventions:  Normal Saline, 500 mL, IV bolus    Tylenol, 1000 mg, IV  Albuterol-Ipratropium inhalation solution, 2.5 mg-0.5 mg/3 ml; 3 mL, inhalation     Emergency Department Course:  Nursing notes and vitals reviewed.  I performed an exam of the patient as documented above.   EKG was done, interpretation as above.    A peripheral IV was established. Blood was drawn from the patient. This was sent for laboratory testing, findings above.    The patient was sent for a CT head, CT cervical spine, chest x-ray, pelvis x-ray, ankle x-ray, and lumbar spine x-ray while in the emergency department, findings above.     Findings and plan explained to the patient who consents to admission.    I discussed the patient with Dr. Esparza of the hospitalist service, who will admit the patient to a telemetry bed for further monitoring, evaluation, and treatment.   Dr. Chen has graciously agreed to follow up on Urine analysis once available.     Impression & Plan    Medical Decision Making:  Jacob Easton is a 87 year old male who presents with global weakness for the past couple of days and then a fall. He is here reporting ankle pain. Patient and his wife are not great historians. He appears to be short of breath, however, he is unable to provide a history of when he became short of breath and initially describes this in December. Differential diagnosis was broad and included but was not limited to dehydration, anemia, electrolyte abnormality, urinary tract infection, pneumonia, hip fracture, lumbar fracture, ankle fracture, intracranial hemorrhage. ED evaluation is as noted above. I had concern for pneumonia on lung exam, however, it appears that he has underlying process that is stable and not clearly explained through his records. The CT scan of his head and neck is negative for acute findings. His chest x-ray reveals chronic changes that have previously been described as well. He does have an elevated trop but does not endorse any symptoms specifically consistent with acute coronary syndrome. Labs are otherwise remarkable for some mild hypokalemia which it does seem that he has had in the past and may be related to his hydrochlorothiazide. We are still waiting for a urine at this point. He has refused a cath and did not have significant urine on bladder scan therefore we are adding additional gentle rehydration to the initial 500 ccs that was provided on arrival. I trialled a nebulizer treatment which seemed to improve the subtle increased work of breathing he reported. However there is no significant change in his lung exam. Plan  to admit for weakness and possible occult infection as we get a urine specimen back, serial troponin's to evaluate for acute coronary syndrome, and symptom management. I have spoken with Dr. Esparza who has accepted ongoing care of the patient. Dr. Chen will continue to care for the patient and follow up on the UA once available.     Diagnosis:    ICD-10-CM    1. Generalized muscle weakness M62.81    2. Acute renal insufficiency N28.9    3. Elevated troponin R79.89    4. Dehydration E86.0    5. Hypokalemia E87.6     mild   6. Acute right ankle pain M25.571        Disposition:  Admitted to Dr. Esparza of the Hospitalist Service.       I, Laurie Peña, am serving as a scribe on 4/2/2017 at 11:33 AM to personally document services performed by Dr. Missy Young based on my observations and the provider's statements to me.    4/2/2017   [facility] EMERGENCY DEPARTMENT       Missy Young MD  04/04/17 0252

## 2017-04-02 NOTE — IP AVS SNAPSHOT
"` `           [place] [address] MEDICAL SURGICAL: [phone]                                              INTERAGENCY TRANSFER FORM - NURSING   2017                    Hospital Admission Date: 2017  BENEDICTO BOLAÑOS   : 1929  Sex: Male        Attending Provider: Yolanda Hayes MD     Allergies:  Advil [Ibuprofen], Influenza Virus Vaccine H5n1, Orange Juice [Orange Oil]    Infection:  None   Service:  GENERAL MEDI    Ht:  1.88 m (6' 2\")   Wt:  92.4 kg (203 lb 12.8 oz)   Admission Wt:  93.2 kg (205 lb 8 oz)    BMI:  26.17 kg/m 2   BSA:  2.2 m 2            Patient PCP Information     Provider PCP Type    AYANNA CROWLEY MD General      Current Code Status     Date Active Code Status Order ID Comments User Context       Prior      Code Status History     Date Active Date Inactive Code Status Order ID Comments User Context    2017 11:46 AM  DNR/DNI [id number]  Frieda Beasley MD Outpatient    2017  4:28 PM 2017 11:46 AM DNR/DNI [id number]  Yolanda Hayes MD Inpatient    2015  9:59 AM 2017  4:28 PM Full Code [id number]  Elaina Boles DO Outpatient    2015 12:14 AM 2015  9:59 AM Full Code [id number]  Karina Reese MD ED    2015 12:58 PM 2015 12:14 AM DNR/DNI [id number]  Silvia Esparza MD Outpatient    2015 10:03 AM 2015 12:58 PM DNR [id number]  Kyree Ortiz MD Inpatient    2015  8:48 PM 2015 10:03 AM Full Code [id number]  Julio Cesar Osborn DO Inpatient    2015  9:24 PM 2015  8:52 PM Full Code [id number]  Blanca Vanessa PA-C ED    2015  3:22 AM 2015  6:45 PM DNR/DNI [id number]  Kodi Donato MD Inpatient    2012  1:43 PM 2012  6:49 PM Full Code [id number]  Shania Suresh MD Inpatient      Advance Directives        Does patient have a scanned Advance Directive/ACP document in EPIC?           No        Hospital Problems as of 2017     "          Priority Class Noted POA    Fall   2/19/2015 Yes      Non-Hospital Problems as of 4/6/2017              Priority Class Noted    Pain in joint, pelvic region and thigh   10/27/2010    Contusion of hip   10/27/2010    Jaw pain   6/30/2012    Weakness   2/16/2015    Hypokalemia due to hydrochlorothiazide   2/19/2015    Arthritis   2/22/2015    BPH (benign prostatic hyperplasia)   2/22/2015    HTN (hypertension)   2/22/2015    Physical deconditioning   2/25/2015    Neuropathic pain of both feet (H) Medium  11/1/2015    Pulmonary vascular congestion Medium  11/3/2015    Benign essential hypertension Medium  11/3/2015    Acquired hypothyroidism Medium  11/3/2015    Acute kidney failure (H) Medium  11/3/2015    Rheumatoid arthritis involving both hands (H) Medium  11/3/2015    Acute renal failure (ARF) (H) Medium  11/4/2015    Hypercalcemia Medium  11/9/2015    Dysphagia Medium  11/23/2015    Urinary tract infection without hematuria, site unspecified Medium  12/1/2015    Recurrent cold sores Medium  12/3/2015    Elevated temperature Medium  12/8/2015    TIA (transient ischemic attack) Medium  12/20/2015    Right wrist drop Medium  12/22/2015      Immunizations     None         END      ASSESSMENT     Discharge Profile Flowsheet     EXPECTED DISCHARGE     Resources List  Skilled Nursing Facility 02/17/15 1426    Expected Discharge Date  04/06/17 04/06/17 1403   PAS Number  [id number] 04/06/17 1323    DISCHARGE NEEDS ASSESSMENT     Senior Linkage Line Referral Placed  02/17/15 02/17/15 1426    Concerns To Be Addressed  discharge planning concerns 04/03/17 1542   SKIN      Patient/family verbalizes understanding of discharge plan recommendations?  Yes 04/06/17 1403   Inspection  Partial (identify areas NOT inspected) 04/06/17 0212    Equipment Currently Used at Home  shower chair;walker, rolling 04/04/17 1357   Skin areas NOT inspected  Buttock, left;Buttock, right;Sacrum;Coccyx 04/06/17 0212    Transportation  "Available  car;family or friend will provide 04/04/17 1357   Skin WDL  ex 04/06/17 1041    Equipment Used at Home  grab bar;walker, rolling 11/14/15 1155   Skin Temperature  warm 04/06/17 1041    GASTROINTESTINAL (ADULT,PEDIATRIC,OB)     Skin Moisture  dry 04/06/17 1041    GI WDL  WDL 04/06/17 1041   Skin Elasticity  quick return to original state 04/06/17 0212    Last Bowel Movement  -- (smear) 04/05/17 1050   Skin Integrity  bruise(s) 04/06/17 1041    Passing flatus  yes 04/06/17 1041   SAFETY      COMMUNICATION ASSESSMENT     Safety WDL  WDL;safety factors 04/06/17 1041    Patient's communication style  spoken language (English or Bilingual) 04/02/17 1116   Safety Factors  bed in low position;wheels locked;call light in reach;upper side rails raised x 2;ID band on 04/06/17 1041    FINAL RESOURCES                        Assessment WDL (Within Defined Limits) Definitions           Safety WDL     Effective: 09/28/15    Row Information: <b>WDL Definition:</b> Bed in low position, wheels locked; call light in reach; upper side rails up x 2; ID band on<br> <font color=\"gray\"><i>Item=AS safety wdl>>List=AS safety wdl>>Version=F14</i></font>      Skin WDL     Effective: 09/28/15    Row Information: <b>WDL Definition:</b> Warm; dry; intact; elastic; without discoloration; pressure points without redness<br> <font color=\"gray\"><i>Item=AS skin wdl>>List=AS skin wdl>>Version=F14</i></font>      Vitals     Vital Signs Flowsheet     VITAL SIGNS     Weight  92.4 kg (203 lb 12.8 oz) 04/06/17 0359    Temp  96.7  F (35.9  C) 04/06/17 0838   BSA (Calculated - sq m)  2.21 04/02/17 1643    Temp src  Oral 04/06/17 0838   BMI (Calculated)  26.44 04/02/17 1643    Resp  20 04/06/17 0838   EKG MONITORING      Heart Rate  69 04/06/17 0838   Cardiac Regularity  Regular 04/02/17 1604    Pulse/Heart Rate Source  Monitor 04/06/17 0838   Cardiac Rhythm  NSR (nsr with pvc) 04/02/17 1604    BP  133/64 04/06/17 0838   NADIA COMA SCALE      BP " "Location  Right arm 04/06/17 0838   Best Eye Response  4-->(E4) spontaneous 04/02/17 1251    OXYGEN THERAPY     Best Motor Response  6-->(M6) obeys commands 04/02/17 1251    SpO2  91 % 04/06/17 1341   Best Verbal Response  5-->(V5) oriented 04/02/17 1251    O2 Device  None (Room air) 04/06/17 1341   [place] Coma Scale Score  15 04/02/17 1251    FiO2 (%)  3 % 04/02/17 1421   POSITIONING      Oxygen Delivery  2 LPM 04/06/17 1014   Body Position  independently positioning 04/06/17 1041    PAIN/COMFORT     Head of Bed (HOB)  HOB at 20 degrees;HOB at 20-30 degrees 04/06/17 0647    Patient Currently in Pain  denies 04/06/17 0838   Chair  Upright in chair 04/03/17 1620    Preferred Pain Scale  number (Numeric Rating Pain Scale) 04/04/17 0124   DAILY CARE      Patient's Stated Pain Goal  No pain 04/04/17 0124   Activity Type  activity adjusted per tolerance;activity encouraged 04/06/17 1041    0-10 Pain Scale  0 04/06/17 0057   Activity Level of Assistance  assistance, 2 people 04/06/17 1041    HEIGHT AND WEIGHT     Activity Assistive Device  walker 04/06/17 1041    Height  1.88 m (6' 2\") 04/02/17 1643   Additional Documentation  Activity Device Assistance (Row);Ambulation Distance (Row) 04/03/17 0903            Patient Lines/Drains/Airways Status    Active LINES/DRAINS/AIRWAYS     Name: Placement date: Placement time: Site: Days: Last dressing change:    External Catheter 04/05/17 1400 04/05/17   1400    1     Peripheral IV 04/02/17 Left Upper forearm 04/02/17   1208   Upper forearm   4             Patient Lines/Drains/Airways Status    Active PICC/CVC     None            Intake/Output Detail Report     Date Intake     Output Net    Shift P.O. I.V. IV Piggyback Total Urine Total       Day 04/05/17 0700 - 04/05/17 1459 1080 250 -- 1330 -- -- 1330    Ronna 04/05/17 1500 - 04/05/17 2259 -- -- -- -- 650 650 -650    Noc 04/05/17 2300 - 04/06/17 0659 -- 6 -- 6 650 650 -644    Day 04/06/17 0700 - 04/06/17 1459 -- -- -- -- -- -- 0 "    Ronna 04/06/17 1500 - 04/06/17 2259 -- -- -- -- -- -- 0      Last Void/BM       Most Recent Value    Urine Occurrence 1 at 04/05/2017 1300    Stool Occurrence 1 at 04/05/2017 2134      Case Management/Discharge Planning     Case Management/Discharge Planning Flowsheet     REFERRAL INFORMATION     Support Assessment  Adequate family and caregiver support 04/03/17 1542    Did the Initial Social Work Assessment result in a Social Work Case?  Yes 04/03/17 1542   Quality of Family Relationships  supportive;involved;evident 04/03/17 1542    Admission Type  inpatient 04/03/17 1542   COPING/STRESS      Arrived From  home or self-care 04/03/17 1542   Major Change/Loss/Stressor  none 04/04/17 1944    Referral Source  physician;nursing 04/03/17 1542   Patient Personal Strengths  expressive of needs;self-reliant;strong support system 02/17/15 1426    New Steerage to  Clinics?  No 04/03/17 1542   EXPECTED DISCHARGE      Reason For Consult  discharge planning 04/03/17 1542   Expected Discharge Date  04/06/17 04/06/17 1403    Record Reviewed  clinical discipline documentation;history and physical 04/03/17 1542   ASSESSMENT/CONCERNS TO BE ADDRESSED       Assigned to Case  Carrie Avendano 04/03/17 1542   Concerns To Be Addressed  discharge planning concerns 04/03/17 1542    Primary Care Clinic Name  [facility] 04/03/17 1542   DISCHARGE PLANNING      Primary Care MD Name  Rob 04/03/17 1542   Patient/family verbalizes understanding of discharge plan recommendations?  Yes 04/06/17 1403    LIVING ENVIRONMENT     Transportation Available  car;family or friend will provide 04/04/17 1357    Lives With  spouse 04/04/17 1352   Equipment Used at Home  grab bar;walker, rolling 11/14/15 1155    Living Arrangements  house 04/04/17 1352   FINAL RESOURCES      Provides Primary Care For  no one, unable/limited ability to care for self 04/03/17 1542   Equipment Currently Used at Home  shower chair;walker,  rolling 04/04/17 1357    Primary Care Provided By  spouse/significant other 04/03/17 1542   Resources List  Skilled Nursing Facility 02/17/15 1426    Quality Of Family Relationships  supportive;helpful;involved 04/03/17 1542   PAS Number  [id number] 04/06/17 1323    Able to Return to Prior Living Arrangements  yes 04/03/17 1542   Senior Linkage Line Referral Placed  02/17/15 02/17/15 1426    HOME SAFETY     ABUSE RISK SCREEN      Patient Feels Safe Living in Home?  yes 04/03/17 1542   QUESTION TO PATIENT:  Has a member of your family or a partner(now or in the past) intimidated, hurt, manipulated, or controlled you in any way?  no 04/02/17 1118    ASSESSMENT OF FAMILY/SOCIAL SUPPORT     QUESTION TO PATIENT: Do you feel safe going back to the place where you are living?  yes 04/02/17 1118    Marital Status   04/03/17 1542   OBSERVATION: Is there reason to believe there has been maltreatment of a vulnerable adult (ie. Physical/Sexual/Emotional abuse, self neglect, lack of adequate food, shelter, medical care, or financial exploitation)?  no 04/02/17 1118    Who is your support system?  Wife 04/03/17 1542   (R) MENTAL HEALTH SUICIDE RISK      Spouse's Name  Aneta 04/03/17 1542   Are you depressed or being treated for depression?  No 04/04/17 1944    Description of Support System  Supportive;Involved 04/03/17 1542

## 2017-04-02 NOTE — IP AVS SNAPSHOT
"[place] [address] [facility]: [phone]                                              INTERAGENCY TRANSFER FORM - PHYSICIAN ORDERS   2017                    Hospital Admission Date: 2017  BENEDICTO BOLAÑOS   : 1929  Sex: Male        Attending Provider: Yolanda Hayes MD     Allergies:  Advil [Ibuprofen], Influenza Virus Vaccine H5n1, Orange Juice [Orange Oil]    Infection:  None   Service:  GENERAL MEDI    Ht:  1.88 m (6' 2\")   Wt:  92.4 kg (203 lb 12.8 oz)   Admission Wt:  93.2 kg (205 lb 8 oz)    BMI:  26.17 kg/m 2   BSA:  2.2 m 2            Patient PCP Information     Provider PCP Type    AYANNA CROWLEY MD General      ED Clinical Impression     Diagnosis Description Comment Added By Time Added    Generalized muscle weakness [M62.81] Generalized muscle weakness [M62.81]  Missy Young MD 2017  2:13 PM    Acute renal insufficiency [N28.9] Acute renal insufficiency [N28.9]  Missy Young MD 2017  2:14 PM    Elevated troponin [R79.89] Elevated troponin [R79.89]  Missy Young MD 2017  2:14 PM    Dehydration [E86.0] Dehydration [E86.0]  Missy Young MD 2017  2:14 PM    Hypokalemia [E87.6] Hypokalemia [E87.6] mild Missy Young MD 2017  2:21 PM    Acute right ankle pain [M25.571] Acute right ankle pain [M25.571]  Missy Young MD 2017  2:21 PM      Hospital Problems as of 2017              Priority Class Noted POA    Fall   2015 Yes      Non-Hospital Problems as of 2017              Priority Class Noted    Pain in joint, pelvic region and thigh   10/27/2010    Contusion of hip   10/27/2010    Jaw pain   2012    Weakness   2015    Hypokalemia due to hydrochlorothiazide   2015    Arthritis   2015    BPH (benign prostatic hyperplasia)   2015    HTN (hypertension)   2015    Physical deconditioning   2015    Neuropathic pain of both feet (H) " Medium  11/1/2015    Pulmonary vascular congestion Medium  11/3/2015    Benign essential hypertension Medium  11/3/2015    Acquired hypothyroidism Medium  11/3/2015    Acute kidney failure (H) Medium  11/3/2015    Rheumatoid arthritis involving both hands (H) Medium  11/3/2015    Acute renal failure (ARF) (H) Medium  11/4/2015    Hypercalcemia Medium  11/9/2015    Dysphagia Medium  11/23/2015    Urinary tract infection without hematuria, site unspecified Medium  12/1/2015    Recurrent cold sores Medium  12/3/2015    Elevated temperature Medium  12/8/2015    TIA (transient ischemic attack) Medium  12/20/2015    Right wrist drop Medium  12/22/2015      Code Status History     Date Active Date Inactive Code Status Order ID Comments User Context    4/6/2017 11:46 AM  DNR/DNI [id number]  Frieda Beasley MD Outpatient    4/2/2017  4:28 PM 4/6/2017 11:46 AM DNR/DNI [id number]  Yolanda Hayes MD Inpatient    12/21/2015  9:59 AM 4/2/2017  4:28 PM Full Code [id number]  Elaina Boles DO Outpatient    12/21/2015 12:14 AM 12/21/2015  9:59 AM Full Code [id number]  Karina Reese MD ED    11/20/2015 12:58 PM 12/21/2015 12:14 AM DNR/DNI [id number]  Silvia Esparza MD Outpatient    11/16/2015 10:03 AM 11/20/2015 12:58 PM DNR [id number]  Kyree Ortiz MD Inpatient    11/12/2015  8:48 PM 11/16/2015 10:03 AM Full Code [id number]  Julio Cesar Osborn DO Inpatient    11/1/2015  9:24 PM 11/2/2015  8:52 PM Full Code [id number]  Blanca Vanessa PA-C ED    2/16/2015  3:22 AM 2/17/2015  6:45 PM DNR/DNI [id number]  Kodi Donato MD Inpatient    6/30/2012  1:43 PM 7/1/2012  6:49 PM Full Code [id number]  Shania Suresh MD Inpatient         Medication Review      START taking        Dose / Directions Comments    azithromycin 250 MG tablet   Commonly known as:  ZITHROMAX   Indication:  Community Acquired Pneumonia   Used for:  Pneumonia of both lower lobes due to infectious organism         Dose:  250 mg   Start taking on:  4/7/2017   Take 1 tablet (250 mg) by mouth daily   Quantity:  3 tablet   Refills:  0        cefuroxime 500 MG tablet   Commonly known as:  CEFTIN   Indication:  Infection of Blood or Tissues affecting the Whole Body   Used for:  Pneumonia of both lower lobes due to infectious organism        Dose:  500 mg   Take 1 tablet (500 mg) by mouth every 12 hours   Quantity:  7 tablet   Refills:  0        ipratropium - albuterol 0.5 mg/2.5 mg/3 mL 0.5-2.5 (3) MG/3ML neb solution   Commonly known as:  DUONEB   Used for:  Pneumonia of both lower lobes due to infectious organism        Dose:  3 mL   Take 1 vial (3 mLs) by nebulization every 4 hours as needed for wheezing   Quantity:  360 mL   Refills:  0        predniSONE 20 MG tablet   Commonly known as:  DELTASONE   Used for:  Pneumonia of both lower lobes due to infectious organism        Dose:  40 mg   Start taking on:  4/7/2017   Take 2 tablets (40 mg) by mouth every morning   Quantity:  6 tablet   Refills:  0          CONTINUE these medications which have NOT CHANGED        Dose / Directions Comments    acetaminophen 500 MG tablet   Commonly known as:  TYLENOL        Dose:  500 mg   Take 500 mg by mouth 4 times daily as needed for mild pain   Refills:  0        APLISOL 5 UNIT/0.1ML injection   Generic drug:  tuberculin        Dose:  5 Units   Inject 5 Units into the skin once   Refills:  0        ASPIRIN EC PO        Dose:  81 mg   Take 81 mg by mouth daily.   Refills:  0        CERTAVITE SENIOR/ANTIOXIDANT Tabs        Dose:  1 tablet   Take 1 tablet by mouth daily   Refills:  0        ferrous sulfate 325 (65 FE) MG tablet   Commonly known as:  IRON        Dose:  325 mg   Take 325 mg by mouth daily (with breakfast)   Refills:  0        folic acid 1 MG tablet   Commonly known as:  FOLVITE        Dose:  1 mg   Take 1 mg by mouth daily.   Refills:  0        GABAPENTIN PO        Dose:  100 mg   Take 100 mg by mouth At Bedtime   Refills:  0         hydrochlorothiazide 25 MG tablet   Commonly known as:  HYDRODIURIL        Dose:  50 mg   Take 50 mg by mouth daily   Refills:  0        latanoprost 0.005 % ophthalmic solution   Commonly known as:  XALATAN        Dose:  1 drop   Place 1 drop into the right eye At Bedtime   Refills:  0        levothyroxine 50 MCG tablet   Commonly known as:  SYNTHROID/LEVOTHROID        Dose:  1 tablet   Take 1 tablet by mouth daily   Refills:  0        methotrexate 2.5 MG tablet CHEMO        Dose:  10 mg   Take 10 mg by mouth once a week. Takes on Mondays   Refills:  0        metoprolol 12.5 MG Tabs half-tab   Commonly known as:  LOPRESSOR        Dose:  25 mg   Take 25 mg by mouth 2 times daily If AP <60 or SBP <100, hold and call MD   Refills:  0        omega-3 fatty acids 1200 MG capsule        Dose:  1 capsule   Take 1 capsule by mouth daily.   Refills:  0        senna-docusate 8.6-50 MG per tablet   Commonly known as:  SENOKOT-S;PERICOLACE        Dose:  1 tablet   Take 1 tablet by mouth 2 times daily as needed for constipation   Refills:  0        tamsulosin 0.4 MG capsule   Commonly known as:  FLOMAX        Dose:  0.4 mg   Take 0.4 mg by mouth At Bedtime.   Refills:  0                  Further instructions from your care team       1. Please hold Methotrexate on 4/10/17.  Methotrexate can be resumed on 4/17/17 per regular scheduled.    Summary of Visit     Reason for your hospital stay       You were hospitalized for weakness and were found to have a pneumonia.             After Care     Activity - Up with nursing assistance           Advance Diet as Tolerated       Follow this diet upon discharge: Orders Placed This Encounter      Room Service      Combination Diet Low Saturated Fat Na <2400mg Diet, No Caffeine Diet       Fall precautions           General info for SNF       Length of Stay Estimate: Short Term Care: Estimated # of Days <30  Condition at Discharge: Improving  Level of care:skilled   Rehabilitation Potential:  Good  Admission H&P remains valid and up-to-date: Yes  Recent Chemotherapy: N/A  Use Nursing Home Standing Orders: Yes       Mantoux instructions       Give two-step Mantoux (PPD) Per Facility Policy Yes       Patient care order       Please hold Methotrexate on 4/10/17.  Methotrexate can be resumed on 4/17/17 per regular scheduled.             Procedures     Oxygen - Nasal cannula       2 Lpm by nasal cannula to keep O2 sats 92% or greater.  Wean as tolerated.             Referrals     Occupational Therapy Adult Consult       Evaluate and treat as clinically indicated.    Reason:  Deconditioning, diffuse weakness       Physical Therapy Adult Consult       Evaluate and treat as clinically indicated.    Reason:  Deconditioning, diffuse weakness             Your next 10 appointments already scheduled     Apr 07, 2017  7:30 AM CDT   Nursing Home with SUNDAY Craven CNP   [facility] ([facility])    [address] [place] [id number] [phone]              Follow-Up Appointment Instructions     Future Labs/Procedures    Follow Up and recommended labs and tests     Comments:    Follow up with primary care doctor within one week of discharge from rehab.      Follow-Up Appointment Instructions     Follow Up and recommended labs and tests       Follow up with primary care doctor within one week of discharge from rehab.             Statement of Approval     Ordered          04/06/17 1206  I have reviewed and agree with all the recommendations and orders detailed in this document.  EFFECTIVE NOW     Approved and electronically signed by:  Frieda Beasley MD

## 2017-04-02 NOTE — ED NOTES
Patient arrives via EMS for evaluation of right ankle pain that started after fall with walker. Patient had been in bed for 8 hours with urine filled depends per EMS. Lives with wife at residential house. Patient is AOX4, ABC's intact

## 2017-04-02 NOTE — IP AVS SNAPSHOT
MRN:[id number]                      After Visit Summary   4/2/2017    Jacob Easton    MRN: [id number]           Thank you!     Thank you for choosing [facility] for your care. Our goal is always to provide you with excellent care. Hearing back from our patients is one way we can continue to improve our services. Please take a few minutes to complete the written survey that you may receive in the mail after you visit. If you would like to speak to someone directly about your visit please contact Patient Relations at [phone]. Thank you!          Patient Information     Date Of Birth          5/16/1929        Designated Caregiver       Most Recent Value    Caregiver    Will someone help with your care after discharge? yes    Name of designated caregiver  Aneta Wife    Phone number of caregiver [phone]    Caregiver address [address] [place] [id number] [[address] [place] [address] ]      About your hospital stay     You were admitted on:  April 2, 2017 You last received care in the:  [place] [address] [facility]    You were discharged on:  April 6, 2017        Reason for your hospital stay       You were hospitalized for weakness and were found to have a pneumonia.                  Who to Call     For medical emergencies, please call 911.  For non-urgent questions about your medical care, please call your primary care provider or clinic, [phone]          Attending Provider     Provider Specialty    Missy Young MD Emergency Medicine    Yolanda Hayes MD Internal Medicine       Primary Care Provider Office Phone # Fax #    Bernabe Rivas [phone]       [facility] [address]  [facility] [address]        After Care Instructions     Activity - Up with nursing assistance           Advance Diet as Tolerated       Follow this diet upon discharge: Orders Placed This  Encounter      Room Service      Combination Diet Low Saturated Fat Na <2400mg Diet, No Caffeine Diet            Fall precautions           General info for SNF       Length of Stay Estimate: Short Term Care: Estimated # of Days <30  Condition at Discharge: Improving  Level of care:skilled   Rehabilitation Potential: Good  Admission H&P remains valid and up-to-date: Yes  Recent Chemotherapy: N/A  Use Nursing Home Standing Orders: Yes            Mantoux instructions       Give two-step Mantoux (PPD) Per Facility Policy Yes            Oxygen - Nasal cannula       2 Lpm by nasal cannula to keep O2 sats 92% or greater.  Wean as tolerated.            Patient care order       Please hold Methotrexate on 4/10/17.  Methotrexate can be resumed on 4/17/17 per regular scheduled.                  Follow-up Appointments     Follow Up and recommended labs and tests       Follow up with primary care doctor within one week of discharge from rehab.                  Additional Services     Occupational Therapy Adult Consult       Evaluate and treat as clinically indicated.    Reason:  Deconditioning, diffuse weakness            Physical Therapy Adult Consult       Evaluate and treat as clinically indicated.    Reason:  Deconditioning, diffuse weakness                  Further instructions from your care team       1. Please hold Methotrexate on 4/10/17.  Methotrexate can be resumed on 4/17/17 per regular scheduled.    Pending Results     Date and Time Order Name Status Description    4/2/2017 1944 Blood culture Preliminary     4/2/2017 1944 Blood culture Preliminary             Statement of Approval     Ordered          04/06/17 1206  I have reviewed and agree with all the recommendations and orders detailed in this document.  EFFECTIVE NOW     Approved and electronically signed by:  Frieda Beasley MD             Admission Information     Date & Time Provider Department Dept. Phone    4/2/2017 Yolanda Hayes MD [facility]  "[place] [address] [facility] [phone]      Your Vitals Were     Blood Pressure Temperature Respirations Height Weight Pulse Oximetry    133/64 (BP Location: Right arm) 96.7  F (35.9  C) (Oral) 20 1.88 m (6' 2\") 92.4 kg (203 lb 12.8 oz) 91%    BMI (Body Mass Index)                   26.17 kg/m2           [employer] Information     [employer] lets you send messages to your doctor, view your test results, renew your prescriptions, schedule appointments and more. To sign up, go to [email or web address] . Click on \"Log in\" on the left side of the screen, which will take you to the Welcome page. Then click on \"Sign up Now\" on the right side of the page.     You will be asked to enter the access code listed below, as well as some personal information. Please follow the directions to create your username and password.     Your access code is: [email or web address]  Expires: 2017  4:28 PM     Your access code will  in 90 days. If you need help or a new code, please call your [facility] clinic or [phone].        Care EveryWhere ID     This is your Care EveryWhere ID. This could be used by other organizations to access your [facility] medical records  [id number]           Review of your medicines      START taking        Dose / Directions    azithromycin 250 MG tablet   Commonly known as:  ZITHROMAX   Indication:  Community Acquired Pneumonia   Used for:  Pneumonia of both lower lobes due to infectious organism        Dose:  250 mg   Start taking on:  2017   Take 1 tablet (250 mg) by mouth daily   Quantity:  3 tablet   Refills:  0       cefuroxime 500 MG tablet   Commonly known as:  CEFTIN   Indication:  Infection of Blood or Tissues affecting the Whole Body   Used for:  Pneumonia of both lower lobes due to infectious organism        Dose:  500 mg   Take 1 tablet (500 mg) by mouth every 12 hours   Quantity:  7 tablet   Refills:  0       ipratropium - albuterol 0.5 mg/2.5 mg/3 mL 0.5-2.5 (3) MG/3ML neb solution   Commonly " known as:  DUONEB   Used for:  Pneumonia of both lower lobes due to infectious organism        Dose:  3 mL   Take 1 vial (3 mLs) by nebulization every 4 hours as needed for wheezing   Quantity:  360 mL   Refills:  0       predniSONE 20 MG tablet   Commonly known as:  DELTASONE   Used for:  Pneumonia of both lower lobes due to infectious organism        Dose:  40 mg   Start taking on:  4/7/2017   Take 2 tablets (40 mg) by mouth every morning   Quantity:  6 tablet   Refills:  0         CONTINUE these medicines which have NOT CHANGED        Dose / Directions    acetaminophen 500 MG tablet   Commonly known as:  TYLENOL        Dose:  500 mg   Take 500 mg by mouth 4 times daily as needed for mild pain   Refills:  0       APLISOL 5 UNIT/0.1ML injection   Generic drug:  tuberculin        Dose:  5 Units   Inject 5 Units into the skin once   Refills:  0       ASPIRIN EC PO        Dose:  81 mg   Take 81 mg by mouth daily.   Refills:  0       CERTAVITE SENIOR/ANTIOXIDANT Tabs        Dose:  1 tablet   Take 1 tablet by mouth daily   Refills:  0       ferrous sulfate 325 (65 FE) MG tablet   Commonly known as:  IRON        Dose:  325 mg   Take 325 mg by mouth daily (with breakfast)   Refills:  0       folic acid 1 MG tablet   Commonly known as:  FOLVITE        Dose:  1 mg   Take 1 mg by mouth daily.   Refills:  0       GABAPENTIN PO        Dose:  100 mg   Take 100 mg by mouth At Bedtime   Refills:  0       hydrochlorothiazide 25 MG tablet   Commonly known as:  HYDRODIURIL        Dose:  50 mg   Take 50 mg by mouth daily   Refills:  0       latanoprost 0.005 % ophthalmic solution   Commonly known as:  XALATAN        Dose:  1 drop   Place 1 drop into the right eye At Bedtime   Refills:  0       levothyroxine 50 MCG tablet   Commonly known as:  SYNTHROID/LEVOTHROID        Dose:  1 tablet   Take 1 tablet by mouth daily   Refills:  0       methotrexate 2.5 MG tablet CHEMO        Dose:  10 mg   Take 10 mg by mouth once a week. Takes on  Mondays   Refills:  0       metoprolol 12.5 MG Tabs half-tab   Commonly known as:  LOPRESSOR        Dose:  25 mg   Take 25 mg by mouth 2 times daily If AP <60 or SBP <100, hold and call MD   Refills:  0       omega-3 fatty acids 1200 MG capsule        Dose:  1 capsule   Take 1 capsule by mouth daily.   Refills:  0       senna-docusate 8.6-50 MG per tablet   Commonly known as:  SENOKOT-S;PERICOLACE        Dose:  1 tablet   Take 1 tablet by mouth 2 times daily as needed for constipation   Refills:  0       tamsulosin 0.4 MG capsule   Commonly known as:  FLOMAX        Dose:  0.4 mg   Take 0.4 mg by mouth At Bedtime.   Refills:  0            Where to get your medicines      Some of these will need a paper prescription and others can be bought over the counter. Ask your nurse if you have questions.     You don't need a prescription for these medications     azithromycin 250 MG tablet    cefuroxime 500 MG tablet    ipratropium - albuterol 0.5 mg/2.5 mg/3 mL 0.5-2.5 (3) MG/3ML neb solution    predniSONE 20 MG tablet                Protect others around you: Learn how to safely use, store and throw away your medicines at www.disposemymeds.org.             Medication List: This is a list of all your medications and when to take them. Check marks below indicate your daily home schedule. Keep this list as a reference.      Medications           Morning Afternoon Evening Bedtime As Needed    acetaminophen 500 MG tablet   Commonly known as:  TYLENOL   Take 500 mg by mouth 4 times daily as needed for mild pain   Last time this was given:  650 mg on 4/3/2017 12:44 PM                                   APLISOL 5 UNIT/0.1ML injection   Inject 5 Units into the skin once   Generic drug:  tuberculin                                ASPIRIN EC PO   Take 81 mg by mouth daily.   Last time this was given:  81 mg on 4/6/2017  8:49 AM            9am                       azithromycin 250 MG tablet   Commonly known as:  ZITHROMAX   Take 1 tablet  (250 mg) by mouth daily   Start taking on:  4/7/2017   Last time this was given:  250 mg on 4/6/2017  8:49 AM            9am                       cefuroxime 500 MG tablet   Commonly known as:  CEFTIN   Take 1 tablet (500 mg) by mouth every 12 hours   Last time this was given:  500 mg on 4/6/2017  8:49 AM            9am           9pm               CERTAVITE SENIOR/ANTIOXIDANT Tabs   Take 1 tablet by mouth daily            9am resume after discharge                       ferrous sulfate 325 (65 FE) MG tablet   Commonly known as:  IRON   Take 325 mg by mouth daily (with breakfast)            8am  Resume at dischg                       folic acid 1 MG tablet   Commonly known as:  FOLVITE   Take 1 mg by mouth daily.   Last time this was given:  1 mg on 4/6/2017  8:49 AM            9am                       GABAPENTIN PO   Take 100 mg by mouth At Bedtime   Last time this was given:  100 mg on 4/5/2017  9:10 PM                        10pm           hydrochlorothiazide 25 MG tablet   Commonly known as:  HYDRODIURIL   Take 50 mg by mouth daily            9am  Resume at dischg                       ipratropium - albuterol 0.5 mg/2.5 mg/3 mL 0.5-2.5 (3) MG/3ML neb solution   Commonly known as:  DUONEB   Take 1 vial (3 mLs) by nebulization every 4 hours as needed for wheezing   Last time this was given:  3 mLs on 4/2/2017  1:51 PM                                   latanoprost 0.005 % ophthalmic solution   Commonly known as:  XALATAN   Place 1 drop into the right eye At Bedtime   Last time this was given:  1 drop on 4/5/2017  9:10 PM                        10pm           levothyroxine 50 MCG tablet   Commonly known as:  SYNTHROID/LEVOTHROID   Take 1 tablet by mouth daily   Last time this was given:  50 mcg on 4/6/2017  8:49 AM            8am                       methotrexate 2.5 MG tablet CHEMO   Take 10 mg by mouth once a week. Takes on Mondays                                metoprolol 12.5 MG Tabs half-tab   Commonly known  as:  LOPRESSOR   Take 25 mg by mouth 2 times daily If AP <60 or SBP <100, hold and call MD   Last time this was given:  25 mg on 4/6/2017  8:49 AM            9am  As instructed           9pm  As instructed               omega-3 fatty acids 1200 MG capsule   Take 1 capsule by mouth daily.            9am  Resume at dischg                       predniSONE 20 MG tablet   Commonly known as:  DELTASONE   Take 2 tablets (40 mg) by mouth every morning   Start taking on:  4/7/2017   Last time this was given:  40 mg on 4/6/2017  8:49 AM            9am                       senna-docusate 8.6-50 MG per tablet   Commonly known as:  SENOKOT-S;PERICOLACE   Take 1 tablet by mouth 2 times daily as needed for constipation            9am  As instructed           9pm  As instructed               tamsulosin 0.4 MG capsule   Commonly known as:  FLOMAX   Take 0.4 mg by mouth At Bedtime.   Last time this was given:  0.4 mg on 4/5/2017  9:10 PM                    9pm

## 2017-04-02 NOTE — IP AVS SNAPSHOT
[place] [address] [facility]    [address]    [facility] [address]    Phone:  [phone]    Fax:  [phone]                                       After Visit Summary   4/2/2017    Jacob Easton    MRN: [id number]           After Visit Summary Signature Page     I have received my discharge instructions, and my questions have been answered. I have discussed any challenges I see with this plan with the nurse or doctor.    ..........................................................................................................................................  Patient/Patient Representative Signature      ..........................................................................................................................................  Patient Representative Print Name and Relationship to Patient    ..................................................               ................................................  Date                                            Time    ..........................................................................................................................................  Reviewed by Signature/Title    ...................................................              ..............................................  Date                                                            Time

## 2017-04-02 NOTE — IP AVS SNAPSHOT
` ` Patient Information     Patient Name Sex     Jacob Boalños ([id number]) Male 1929       Room Bed    0345 0345-01      Patient Demographics     Address Phone    [address] [address] [phone] (Home) *Preferred*  NONE (Work)  NONE (Mobile)      Patient Ethnicity & Race     Ethnic Group Patient Race    American White      Emergency Contact(s)     Name Relation Home Work Mobile    ANETA BOLAÑOS Spouse [phone] NONE NONE    DOMINIQUE CRISTINA Son [phone] NONE [phone]      Documents on File        Status Date Received Description       Documents for the Patient    Privacy Notice - [place] Received 10/27/10     Insurance Card Received 02/16/15 MEDICARE    External Medication Information Consent       Patient ID Received 12/20/15 exp 18    Consent for Services - Hospital/Clinic Received () 10/27/10     Insurance Card Received 10/27/10     Consent for Services - Hospital/Clinic Received () 11     Insurance Card Received 11     Consent for EHR Access  13 Copied from existing Consent for services - C/HOD collected on 2011    [facility] Specified Other       Insurance Card Received 02/16/15 MARSH    Consent for Services - Hospital/Clinic Received () 02/16/15     Occupational Therapy Certification Received 02/18/15 2/16/15 eval only    Physical Therapy Certification Received 11/05/15 11/2/15 eval only Michael Max    Advance Directives and Living Will Received 11/23/15 POLST 2015    Insurance Card Received 16 MEDICARE    Insurance Card Received 16 MARSH    External Medication Information Consent Accepted 16     Consent for Services/Privacy Notice - Hospital/Clinic Received 17        Documents for the Encounter    CMS IM for Patient Signature Received 17 spoke to Aneta VELAZQUEZ Point of Care Auth Received        Admission Information     Attending Provider Admitting Provider Admission  Type Admission Date/Time    Yolanda Hayes MD Sajwani, Rubina Abuali, MD Emergency 04/02/17  1112    Discharge Date Hospital Service Auth/Cert Status Service Area     General Medicine [facility]    Unit Room/Bed Admission Status        3 MEDICAL SURGICAL 0345/0345-01 Admission (Confirmed)       Admission     Complaint    Fall      Hospital Account     Name Acct ID Class Status Primary Coverage    Jacob Easton [id number] Inpatient Open MEDICARE - MEDICARE            Guarantor Account (for Hospital Account #[id number])     Name Relation to Pt Service Area Active? Acct Type    Jacob Easton Self FCS Yes Personal/Family    Address Phone          [address] [place] [address] [phone](H)  NONE(O)              Coverage Information (for Hospital Account #[id number])     1. MEDICARE/MEDICARE     F/O Payor/Plan Precert #    MEDICARE/MEDICARE     Subscriber Subscriber #    Jacob Easton [id number]    Address Phone    [employer]  [address]  [place] [address] [phone]          2. COMMERCIAL/OTHER     F/O Payor/Plan Precert #    COMMERCIAL/OTHER     Subscriber Subscriber #    Jacob Easton [id number]    Address Phone    [address] [address]  [facility] [address] [phone]

## 2017-04-02 NOTE — IP AVS SNAPSHOT
` `     [place] [address] MEDICAL SURGICAL: [phone]            Medication Administration Report for Jacob Easton as of 04/06/17 1654   Legend:    Given Hold Not Given Due Canceled Entry Other Actions    Time Time (Time) Time  Time-Action       Inactive    Active    Linked        Medications 03/31/17 04/01/17 04/02/17 04/03/17 04/04/17 04/05/17 04/06/17    acetaminophen (TYLENOL) tablet 650 mg  Dose: 650 mg Freq: EVERY 4 HOURS PRN Route: PO  PRN Reason: mild pain  Start: 04/02/17 1628   Admin Instructions: Alternate ibuprofen (if ordered) with acetaminophen.  Maximum acetaminophen dose from all sources = 75 mg/kg/day not to exceed 4 grams/day.        0046 (650 mg)-Given       1244 (650 mg)-Given              aspirin EC EC tablet 81 mg  Dose: 81 mg Freq: DAILY Route: PO  Start: 04/03/17 0900       0853 (81 mg)-Given        0815 (81 mg)-Given        0800 (81 mg)-Given        0849 (81 mg)-Given           azithromycin (ZITHROMAX) tablet 250 mg  Dose: 250 mg Freq: DAILY Route: PO  Indications of Use: COMMUNITY ACQUIRED PNEUMONIA  Start: 04/06/17 0900   End: 04/10/17 0859          0849 (250 mg)-Given           cefuroxime (CEFTIN) tablet 500 mg  Dose: 500 mg Freq: EVERY 12 HOURS SCHEDULED Route: PO  Indications of Use: SEPSIS  Start: 04/04/17 2000        2152 (500 mg)-Given        0800 (500 mg)-Given       2110 (500 mg)-Given        0849 (500 mg)-Given       [ ] 2000           folic acid (FOLVITE) tablet 1 mg  Dose: 1 mg Freq: DAILY Route: PO  Start: 04/02/17 1630      1924 (1 mg)-Given        0853 (1 mg)-Given        0815 (1 mg)-Given        0800 (1 mg)-Given        0849 (1 mg)-Given           gabapentin (NEURONTIN) capsule 100 mg  Dose: 100 mg Freq: AT BEDTIME Route: PO  Start: 04/02/17 2200      2127 (100 mg)-Given        2141 (100 mg)-Given        2152 (100 mg)-Given        2110 (100 mg)-Given        [ ] 2200           heparin sodium PF injection 5,000 Units  Dose: 5,000 Units Freq: 2 TIMES DAILY Route:  "SC  Start: 04/02/17 2100   Admin Instructions: HOLD heparin IF platelet count falls below 50% baseline or less than 100,000 /  L and notify provider. Use this product If CrCl less than 30 mL/min.       2128 (5,000 Units)-Given        0853 (5,000 Units)-Given       2142 (5,000 Units)-Given        0815 (5,000 Units)-Given       2151 (5,000 Units)-Given        0800 (5,000 Units)-Given       2109 (5,000 Units)-Given        0849 (5,000 Units)-Given       [ ] 2100           ipratropium - albuterol 0.5 mg/2.5 mg/3 mL (DUONEB) neb solution 3 mL  Dose: 3 mL Freq: EVERY 4 HOURS PRN Route: NEBULIZATION  PRN Reason: wheezing  Start: 04/03/17 0142              latanoprost (XALATAN) 0.005 % ophthalmic solution 1 drop  Dose: 1 drop Freq: DAILY Route: RIGHT EYE  Start: 04/02/17 1700   Admin Instructions: Refrigerated product.       1925 (1 drop)-Given        2142 (1 drop)-Given        2205 (1 drop)-Given        2110 (1 drop)-Given        [ ] 2100           levothyroxine (SYNTHROID/LEVOTHROID) tablet 50 mcg  Dose: 50 mcg Freq: DAILY Route: PO  Start: 04/02/17 1630      1924 (50 mcg)-Given        0853 (50 mcg)-Given        0816 (50 mcg)-Given        0800 (50 mcg)-Given        0849 (50 mcg)-Given           lidocaine (LMX4) kit  Freq: EVERY 1 HOUR PRN Route: Top  PRN Reason: pain  PRN Comment: with VAD insertion or accessing implanted port.  Start: 04/02/17 1201   Admin Instructions: Do NOT give if patient has a history of allergy to any local anesthetic or any \"viri\" product.   Apply 30 minutes prior to VAD insertion or port access.  MAX Dose:  2.5 g (  of 5 g tube)               lidocaine 1 % 1 mL  Dose: 1 mL Freq: EVERY 1 HOUR PRN Route: OTHER  PRN Comment: mild pain with VAD insertion or accessing implanted port  Start: 04/02/17 1201   Admin Instructions: Do NOT give if patient has a history of allergy to any local anesthetic or any \"viri\" product. MAX dose 1 mL subcutaneous OR intradermal in divided doses.               " magnesium sulfate 2 g in NS intermittent infusion ([employer]ar[employer] or [facility] Cmpd)  Dose: 2 g Freq: DAILY PRN Route: IV  PRN Reason: magnesium supplementation  Start: 04/02/17 1628   Admin Instructions: For Serum Mg++ 1.6 - 2 mg/dL  Give 2 g and recheck magnesium level next AM.               magnesium sulfate 4 g in 100 mL sterile water (premade)  Dose: 4 g Freq: EVERY 4 HOURS PRN Route: IV  PRN Reason: magnesium supplementation  Start: 04/02/17 1628   Admin Instructions: For serum Mg++ less than 1.6 mg/dL  Give 4 g and recheck magnesium level 2 hours after dose, and next AM.               metoprolol (LOPRESSOR) tablet 25 mg  Dose: 25 mg Freq: 2 TIMES DAILY Route: PO  Start: 04/03/17 2100       2142 (25 mg)-Given        0815 (25 mg)-Given       2152 (25 mg)-Given        0800 (25 mg)-Given       2110 (25 mg)-Given        0849 (25 mg)-Given       [ ] 2100           naloxone (NARCAN) injection 0.1-0.4 mg  Dose: 0.1-0.4 mg Freq: EVERY 2 MIN PRN Route: IV  PRN Reason: opioid reversal  Start: 04/02/17 1628   Admin Instructions: For respiratory rate LESS than or EQUAL to 8.  Partial reversal dose:  0.1 mg titrated q 2 minutes for Analgesia Side Effects Monitoring Sedation Level of 3 (frequently drowsy, arousable, drifts to sleep during conversation).Full reversal dose:  0.4 mg bolus for Analgesia Side Effects Monitoring Sedation Level of 4 (somnolent, minimal or no response to stimulation).               ondansetron (ZOFRAN-ODT) ODT tab 4 mg  Dose: 4 mg Freq: EVERY 6 HOURS PRN Route: PO  PRN Reason: nausea  Start: 04/02/17 1628   Admin Instructions: This is Step 1 of nausea and vomiting management.  If nausea not resolved in 15 minutes, go to Step 2 prochlorperazine (COMPAZINE). Do not push through foil backing. Peel back foil and gently remove. Place on tongue immediately. Administration with liquid unnecessary              Or  ondansetron (ZOFRAN) injection 4 mg  Dose: 4 mg Freq: EVERY 6 HOURS PRN Route: IV  PRN Reasons:  nausea,vomiting  Start: 04/02/17 1628   Admin Instructions: This is Step 1 of nausea and vomiting management.  If nausea not resolved in 15 minutes, go to Step 2 prochlorperazine (COMPAZINE).  Irritant.               potassium chloride (KLOR-CON) Packet 20-40 mEq  Dose: 20-40 mEq Freq: EVERY 2 HOURS PRN Route: ORAL OR FEED  PRN Reason: potassium supplementation  Start: 04/02/17 1628   Admin Instructions: Use if unable to tolerate tablets.  If Serum K+ 3.0-3.3, dose = 60 mEq po total dose (40 mEq x1 followed in 2 hours by 20 mEq x1). Recheck K+ level 4 hours after dose and the next AM.  If Serum K+ 2.5-2.9, dose = 80 mEq po total dose (40 mEq Q2H x2). Recheck K+ level 4 hours after dose and the next AM.  If Serum K+ less than 2.5, See IV order.  Dissolve packet contents in 4-8 ounces of cold water or juice.               potassium chloride 10 mEq in 100 mL intermittent infusion  Dose: 10 mEq Freq: EVERY 1 HOUR PRN Route: IV  PRN Reason: potassium supplementation  Start: 04/02/17 1628   Admin Instructions: Infuse via PERIPHERAL LINE or CENTRAL LINE. Use for central line replacement if patient weight less than 65 kg, if patient is on TPN with high potassium content or if unit does not stock 20 mEq bags.   If Serum K+ 3.0-3.3, dose = 10 mEq/hr x4 doses (40 mEq IV total dose). Recheck K+ level 2 hours after dose and the next AM.   If Serum K+ less than 3.0, dose = 10 mEq/hr x6 doses (60 mEq IV total dose). Recheck K+ level 2 hours after dose and the next AM.               potassium chloride 10 mEq in 100 mL intermittent infusion with 10 mg lidocaine  Dose: 10 mEq Freq: EVERY 1 HOUR PRN Route: IV  PRN Reason: potassium supplementation  Start: 04/02/17 1628   Admin Instructions: Infuse via PERIPHERAL LINE. Use potassium with lidocaine for pain with peripheral administration.  If Serum K+ 3.0-3.3, dose = 10 mEq/hr x4 doses (40 mEq IV total dose). Recheck K+ level 2 hours after dose and the next AM.  If Serum K+ less than  3.0, dose = 10 mEq/hr x6 doses (60 mEq IV total dose). Recheck K+ level 2 hours after dose and the next AM.               potassium chloride 20 mEq in 50 mL intermittent infusion  Dose: 20 mEq Freq: EVERY 1 HOUR PRN Route: IV  PRN Reason: potassium supplementation  Start: 04/02/17 1628   Admin Instructions: Infuse via CENTRAL LINE Only. May need EKG if less than 65 kg or on TPN - Max rate is 0.3 mEq/kg/hr for patients not on EKG monitoring.   If Serum K+ 3.0-3.3, dose = 20 mEq/hr x2 doses (40 mEq IV total dose). Recheck K+ level 2 hours after dose and the next AM.  If Serum K+ less than 3.0, dose = 20 mEq/hr x3 doses (60 mEq IV total dose). Recheck K+ level 2 hours after dose and the next AM.               potassium chloride SA (K-DUR/KLOR-CON M) CR tablet 20-40 mEq  Dose: 20-40 mEq Freq: EVERY 2 HOURS PRN Route: PO  PRN Reason: potassium supplementation  Start: 04/02/17 1628   Admin Instructions: Use if able to take PO.   If Serum K+ 3.0-3.3, dose = 60 mEq po total dose (40 mEq x1 followed in 2 hours by 20 mEq x1). Recheck K+ level 4 hours after dose and the next AM.  If Serum K+ 2.5-2.9, dose = 80 mEq po total dose (40 mEq Q2H x2). Recheck K+ level 4 hours after dose and the next AM.  If Serum K+ less than 2.5, See IV order.  DO NOT CRUSH        0853 (40 mEq)-Given       1343 (20 mEq)-Given              predniSONE (DELTASONE) tablet 40 mg  Dose: 40 mg Freq: DAILY Route: PO  Start: 04/05/17 1345         1430 (40 mg)-Given        0849 (40 mg)-Given           senna-docusate (SENOKOT-S;PERICOLACE) 8.6-50 MG per tablet 1-2 tablet  Dose: 1-2 tablet Freq: 2 TIMES DAILY PRN Route: PO  PRN Comment: constipation   Start: 04/02/17 1628   Admin Instructions: If no bowel movement in 24 hours, increase to 2 tablets PO BID.  Hold for loose stools.   This is the first step of a three step constipation treatment protocol.               sodium chloride (PF) 0.9% PF flush 3 mL  Dose: 3 mL Freq: EVERY 8 HOURS Route: IK  Start:  04/02/17 1204   Admin Instructions: And Q1H PRN, to lock peripheral IV dormant line.       (1932)-Not Given               0405 (3 mL)-Given       1142 (3 mL)-Given       1921 (3 mL)-Given        0125 (3 mL)-Given       0817 (3 mL)-Given       1612 (3 mL)-Given       2316 (3 mL)-Given        0801 (3 mL)-Given       1615 (3 mL)-Given        0127 (3 mL)-Given       0850 (3 mL)-Given       [ ] 1600           sodium chloride (PF) 0.9% PF flush 3 mL  Dose: 3 mL Freq: EVERY 1 HOUR PRN Route: IK  PRN Reason: line flush  PRN Comment: for peripheral IV flush post IV meds  Start: 04/02/17 1201              tamsulosin (FLOMAX) capsule 0.4 mg  Dose: 0.4 mg Freq: AT BEDTIME Route: PO  Start: 04/02/17 2200   Admin Instructions: Administer 30 minutes after the same meal each day.  Capsules should be swallowed whole; do not crush chew or open.       2127 (0.4 mg)-Given        2141 (0.4 mg)-Given        2152 (0.4 mg)-Given        2110 (0.4 mg)-Given        [ ] 2200          Completed Medications  Medications 03/31/17 04/01/17 04/02/17 04/03/17 04/04/17 04/05/17 04/06/17         Dose: 500 mg Freq: ONCE Route: IV  Indications of Use: COMMUNITY ACQUIRED PNEUMONIA  Last Dose: 500 mg (04/05/17 1408)  Start: 04/05/17 1245   End: 04/05/17 1508         1408 (500 mg)-New Bag              Dose: 20 mg Freq: ONCE Route: IV  Start: 04/05/17 0930   End: 04/05/17 1002         1002 (20 mg)-Given              Dose: 3 mL Freq: ONCE Route: IV  Start: 04/04/17 1000   End: 04/04/17 0946        0946 (3 mL)-Given            Discontinued Medications  Medications 03/31/17 04/01/17 04/02/17 04/03/17 04/04/17 04/05/17 04/06/17         Dose: 1 g Freq: EVERY 24 HOURS Route: IV  Indications of Use: SEPSIS  Last Dose: 1 g (04/04/17 0814)  Start: 04/03/17 0800   End: 04/04/17 1540       0839 (1 g)-New Bag        0814 (1 g)-New Bag       1540-Med Discontinued

## 2017-04-02 NOTE — IP AVS SNAPSHOT
` `     [facility] 3 MEDICAL SURGICAL: [phone]                 INTERAGENCY TRANSFER FORM - NOTES (H&P, Discharge Summary, Consults, Procedures, Therapies)   2017                    Hospital Admission Date: 2017  JACOB BOLAÑOS   : 1929  Sex: Male        Patient PCP Information     Provider PCP Type    AYANNA CROWLEY MD General         History & Physicals      H&P by Yolanda Hayes MD at 2017  7:51 PM     Author:  Yolanda Hayes MD Service:  Hospitalist Author Type:  Physician    Filed:  2017  7:52 PM Date of Service:  2017  7:51 PM Note Created:  2017  7:33 PM    Status:  Signed :  Yolanda Hayes MD (Physician)         [facility]  Hospitalist Admission Note  Name: Jacob Bolaños    MRN: [id number]  YOB: 1929    Age: 87 year old  Date of admission: 2017              Brief patient summary: Pt is a 87 yr old with h/o RA on mtx, CKD, HTN, dementia who presented on 2017 with weakness and fall.          Assessment and Plan:   Weakness and falls: unclear cause but he is weaker than his baseline for 1-2 days resulting in fall. He was unable to get up so his wife called neighbors who raised him to a bed however pt called EMS because he was unable to get out of bed. He denies any symptoms. CXR in the ER shows b/l infiltrates but these have been present for many years.  --UA pending, blood cx pending  --TTE in the AM to evaluate valves, EF, volm   --PT/OT eval    HTN: resume metoprolol , hctz, amlodipine     RA: hold MTX for now until infection is ruled out    Neuropathy: gabapentin     DVT Prophylaxis: Heparin SQ  Discharge Dispo: will likely need TCU  Estimated Disch Date / # of Days until Disch: 2-3 days  DNR / DNI    Chief Complaint: falls, weakness         History of Present Illness:   Discussed with ER physician : Dr Hannah Hardinsher Bolaños is a 87 year old male who presents with weakness x2 days and fall.     In the morning yesterday, the patient was feeling generally weak. In the afternoon, he was in his bedroom walking with his walker when he fell forward. The patient subsequently has had bilateral hip and ankle pain, but notes that it is worse on the right side. After the fall, the patient was unable to get up on his own and required the help of two neighbors to get into his bed. He has not taken any medications for his pain. The patient has had slight shortness of breath recently but denies any fevers, cough, chest pain, abdominal pain, nausea, vomiting, diarrhea, or headaches.            Past Medical History:     Patient Active Problem List   Diagnosis     Pain in joint, pelvic region and thigh     Contusion of hip     Jaw pain     Weakness     Hypokalemia due to hydrochlorothiazide     Fall     Arthritis     BPH (benign prostatic hyperplasia)     HTN (hypertension)     Physical deconditioning     Neuropathic pain of both feet (H)     Pulmonary vascular congestion     Benign essential hypertension     Acquired hypothyroidism     Acute kidney failure (H)     Rheumatoid arthritis involving both hands (H)     Acute renal failure (ARF) (H)     Hypercalcemia     Dysphagia     Urinary tract infection without hematuria, site unspecified     Recurrent cold sores     Elevated temperature     TIA (transient ischemic attack)     Right wrist drop      Past Medical History:   Diagnosis Date     Arthritis      Constipation      Hypertension      Hypocalcemia      Neuromuscular disorder (H)      Thyroid disease                 Past Surgical History:     Past Surgical History:   Procedure Laterality Date     C TOTAL HIP ARTHROPLASTY  9/24/11    RT               Home Medications:     Prior to Admission medications    Medication Sig Last Dose Taking? Auth Provider   levothyroxine (SYNTHROID, LEVOTHROID) 50 MCG tablet Take 1 tablet by mouth daily   Reported, Patient   amLODIPine (NORVASC) 5 MG tablet Take 1 tablet by mouth daily    Reported, Patient   hydrochlorothiazide (HYDRODIURIL) 25 MG tablet Take 1 tablet by mouth daily   Reported, Patient   latanoprost (XALATAN) 0.005 % ophthalmic solution Place 1 drop into the right eye daily   Reported, Patient   Multiple Vitamins-Minerals (CERTAVITE SENIOR/ANTIOXIDANT) TABS Take 1 tablet by mouth daily   Reported, Patient   tuberculin (APLISOL) 5 UNIT/0.1ML injection Inject 5 Units into the skin once   Reported, Patient   GABAPENTIN PO Take 100 mg by mouth At Bedtime   Reported, Patient   acetaminophen (TYLENOL) 500 MG tablet Take 500 mg by mouth 4 times daily as needed for mild pain   Reported, Patient   senna-docusate (SENOKOT-S;PERICOLACE) 8.6-50 MG per tablet Take 1 tablet by mouth 2 times daily as needed for constipation   Reported, Patient   metoprolol (LOPRESSOR) 12.5 MG TABS Take 12.5 mg by mouth 2 times daily If AP <60 or SBP <100, hold and call MD   Reported, Patient   methotrexate 2.5 MG tablet Take 10 mg by mouth once a week. Takes on Mondays    Unknown, Entered By History   ASPIRIN EC PO Take 81 mg by mouth daily.     Unknown, Entered By History   folic acid (FOLVITE) 1 MG tablet Take 1 mg by mouth daily.     Unknown, Entered By History   omega-3 fatty acids (FISH OIL) 1200 MG capsule Take 1 capsule by mouth daily.     Unknown, Entered By History   tamsulosin (FLOMAX) 0.4 MG 24 hr capsule Take 0.4 mg by mouth At Bedtime.     Unknown, Entered By History            Allergies:     Allergies   Allergen Reactions     Advil [Ibuprofen]      Doctor told him not to take     Influenza Virus Vaccine H5n1      Does not ever want to have a flu shot - also doesn't want pneumovax     Orange Juice [Orange Oil] Nausea            Social History:     Social History     Social History     Marital status:      Spouse name: N/A     Number of children: N/A     Years of education: N/A     Occupational History     Not on file.     Social History Main Topics     Smoking status: Never Smoker     Smokeless  "tobacco: Never Used     Alcohol use No     Drug use: Not on file     Sexual activity: Not on file     Other Topics Concern     Not on file     Social History Narrative    The patient is . He denies a history of smoking, drinking or drug use. He was a  for 46 years. Living at [facility] currently as he was suffering from falls at home.                  Family History:   reviewed and noncontributory             Review of Systems:   The 10 point Review of Systems is negative other than noted in the HPI.             Physical Exam:     Heart Rate: 78, Blood pressure 121/59, temperature 96.7  F (35.9  C), temperature source Oral, resp. rate 18, height 1.88 m (6' 2\"), weight 93.2 kg (205 lb 8 oz), SpO2 93 %.  205 lbs 8 oz    General: Alert, awake, no acute distress. Craig  HEENT: NC/AT, eyes anicteric, external occular movements intact, face symmetric.  Dentition WNL, MM moist.  Cardiac: RRR, S1, S2.  No murmurs appreciated.  Pulmonary: b/l crackles Normal chest rise, normal work of breathing.   Abdomen: soft, non-tender, non-distended.  Bowel Sounds Present.  No guarding.  Extremities: no deformities.  Warm, well perfused.  Skin: no rashes or lesions noted.  Warm and Dry.  Neuro: No focal deficits noted.  Speech clear.  Coordination and strength grossly normal.  Psych: Appropriate affect.         Data:   All new lab and imaging data was reviewed.[RS1.1]    Recent Labs  Lab 04/02/17  1818 04/02/17  1155   WBC  --  8.0   HGB  --  12.4*   HCT  --  37.6*   MCV  --  96    303       Recent Labs  Lab 04/02/17  1155      POTASSIUM 2.9*   CHLORIDE 96   CO2 32   ANIONGAP 9   *   BUN 29   CR 1.39*   GFRESTIMATED 48*   GFRESTBLACK 58*   MERCEDES 8.9[RS1.2]         Imaging:[RS1.1]  Results for orders placed or performed during the hospital encounter of 04/02/17   CT Head w/o Contrast    Narrative    CT HEAD WITHOUT CONTRAST  4/2/2017 1:04 PM    HISTORY: Fall. Weakness.     Radiation dose for this scan was " reduced using automated exposure  control, adjustment of the mA and/or kV according to patient size, or  iterative reconstruction technique.    FINDINGS: Prominent cerebral atrophy and periventricular white matter  hypoattenuation. There is no evidence of acute intracranial  hemorrhage. There is no mass effect or shift of the midline. There is  no definite CT evidence of acute stroke. The visualized portions of  the paranasal sinuses are clear. The osseous calvarium appears within  normal limits as imaged transversely.       Impression    IMPRESSION: No evidence of acute intracranial hemorrhage or mass  effect. Cerebral atrophy and periventricular white matter disease.    OMEGA MARTINEZ MD   CT Cervical Spine w/o Contrast    Narrative    CT CERVICAL SPINE WITHOUT CONTRAST 4/2/2017 1:06 PM     HISTORY: Neck pain after trauma.    Radiation dose for this scan was reduced using automated exposure  control, adjustment of the mA and/or kV according to patient size, or  iterative reconstruction technique.    FINDINGS: There is no CT evidence of acute cervical spine fracture or  subluxation. Degenerative posterior osteophytic spurring is noted at  C5-6. No osseous central stenosis is demonstrated. Mild apophyseal  joint degenerative changes are noted. Uncinate process spurring  results in mild to moderate foraminal stenosis bilaterally at C5-6 and  on the right at C6-7.      Impression    IMPRESSION: No evidence of cervical spine fracture or subluxation.  Degenerative changes as above.    OMEGA MARTINEZ MD   Ankle XR, G/E 3 views, right    Narrative    RIGHT ANKLE THREE OR MORE VIEWS  4/2/2017 1:34 PM     HISTORY: Pain.      Impression    IMPRESSION: No apparent fracture. The ankle mortise appears congruent.  Small vessel arterial calcification is noted.    OMEGA MARTINEZ MD   Lumbar spine XR, 2-3 views    Narrative    LUMBAR SPINE TWO TO THREE VIEWS 4/2/2017 1:34 PM     HISTORY: Pain      Impression    IMPRESSION:  Vertebral body heights, sagittal alignment, and disc  heights appear within normal limits. Aortic calcification is noted.  Bilateral hip arthroplasty is also noted.    OMEGA MARTINEZ MD   XR Pelvis and Hip Right 1 View    Narrative    PELVIS AND RIGHT HIP ONE VIEW  4/2/2017 1:34 PM     HISTORY: Pain. Fall.       Impression    IMPRESSION: Bilateral hip arthroplasties without apparent  complication. The osseous pelvis appears intact.    OMEGA MARTINEZ MD   XR Chest 1 View    Narrative    CHEST ONE VIEW  4/2/2017 1:33 PM     HISTORY: Weakness. Shortness of breath.      Impression    IMPRESSION: Diffuse interstitial, somewhat nodular pattern is noted  within both lungs.  This is similar if not unchanged when given  differences in technique compared to 12/20/2015. No new consolidation  is identified. No apparent pleural effusion.    OMEGA MARTINEZ MD[RS1.2]            Yolanda Hayes MD  Hospitalist  [facility] [address] [place] [address]  [phone][RS1.1]             Revision History        User Key Date/Time User Provider Type Action    > RS1.2 4/2/2017  7:52 PM Yolanda Hayes MD Physician Sign     RS1.1 4/2/2017  7:33 PM Yolanda Hayes MD Physician                      Discharge Summaries      Discharge Summaries by Frieda Beasley MD at 4/6/2017 12:06 PM     Author:  Frieda Beasley MD Service:  Hospitalist Author Type:  Physician    Filed:  4/6/2017 12:06 PM Date of Service:  4/6/2017 12:06 PM Note Created:  4/6/2017 11:46 AM    Status:  Signed :  Frieda Beasley MD (Physician)         [facility]  Discharge Summary  Name: Jacob Easton    MRN: [id number]  YOB: 1929    Age: 87 year old  Date of Discharge:  4/6/2017  Date of Admission: 4/2/2017  Primary Care Provider: Bernabe Rivas  Discharge Physician:  Frieda Beasley MD  Discharging Service:  Hospitalist      Discharge Diagnoses:  1. Weakness and Falls  2. Atrial  Fibrillation with RVR  3. Hypoxic Respiratory Failure with likely CAP and underlying alveolitis  4. Abnormal UA  5. HTN  6. RA  7. Neuropathy     Hospital Course:[AC1.1]  Jacob Easton[AC1.2] is an 87 yr old with h/o RA on Methotrexate, CKD, HTN, dementia who presented on 4/2/2017 with weakness and fall. He was apparently down for ~17 hrs prior to having neighbors called to put him up on the bed, then he was unable to move from the bed so EMS was called. He had extensive w/u with the ER was negative for infectious etiology however pt did develop LGF, new A fib w/ RVR and appeared to have dirty urine so was started on IV ceftriaxone for developing sepsis syndrome.  He developed worsening SOB, on 4/5 CT chest done showed groundglass opacities in the lungs (new from prior test) consistent with acute alveolitis.  Antibiotics changed to Cefepime and Azithromycin added for atypical coverage in addition to starting Prednisone.  Today respiratory status has improved significantly.  Will discharge to TCU for further rehab.    Weakness and falls: Patient was weaker than his baseline for 1-2 days PTA resulting in fall. He was unable to get up so his wife called neighbors who raised him to a bed however pt called EMS because he was unable to get out of bed. He initially denied any specific symptoms. CXR in the ER shows b/l infiltrates but these have been present for many years. TTE with no new findings except for mild AS. UA appeared infected and he developed fever after admission so was started on IV ceftriaxone with some improvement.  Subsequently he worsened and developed SOB so CT chest was done on 4/5 which showed groundglass opacities in the lungs (new from prior test) consistent with acute alveolitis in addition to cystic changes (not new).  He did have an abnormal CT even back in 2012 (care everywhere).  His antibiotics were changed from Ceftriaxone to Cefepime and Azithromycin was added.  See below for further  details.  His diffuse weakness is improving but he will need further PT/OT at TCU.    Acute Hypoxic Respiratory Failure: Patient developed fever and SOB which prompted CT which showed groundglass opacities in the lungs (new from prior test) consistent with acute alveolitis in addition to cystic changes (not new).  He did have an abnormal CT even back in 2012 (care everywhere).  His antibiotics were switched from Ceftriaxone to Cefepime and Azithromycin was added.  He was also started on Prednisone.  He has improved significantly from a respiratory standpoint, requiring minimal oxygen.  Expect that he will be able to wean from oxygen in the next day or two.  He will complete a 7 day course of antibiotics.  He will also complete 3 more days of Prednisone.  If he has recurrent respiratory symptoms it would be reasonable for the patient to see pulmonology given his abnormal CT results (possible ILD?).      A fib w/ RVR: no prior h/o A fib per chart review. He was given a dose of IV metoprolol and resumed on PTA PO metoprolol with good control. Possibly driven by underlying infectious process. He has high [id number] score however is also high fall risk so will avoid anticoagulation.       Abnormal UA: pt developed LGF, A fib w/ RVR, weakness and urine appeared dirty. Was On IV ceftriaxone initially. His UC was negative.     HTN: resumed metoprolol , HCTZ, amlodipine.       RA: hold MTX for now until infection is treated.  Will recommend holding on 4/10 and resuming on 4/17 per his regular schedule.    Neuropathy: gabapentin resumed.        Discharge Disposition:  Discharged to short-term care facility     Allergies:  Allergies   Allergen Reactions     Advil [Ibuprofen]      Doctor told him not to take     Influenza Virus Vaccine H5n1      Does not ever want to have a flu shot - also doesn't want pneumovax     Orange Juice [Orange Oil] Nausea        Condition on Discharge:  Discharge condition: Good   Discharge vitals:  "Blood pressure 133/64, temperature 96.7  F (35.9  C), temperature source Oral, resp. rate 20, height 1.88 m (6' 2\"), weight 92.4 kg (203 lb 12.8 oz), SpO2 93 %.   Code status on discharge: DNR / DNI     History of Illness:  See detailed admission note for full details.    Physical Exam:  Blood pressure 133/64, temperature 96.7  F (35.9  C), temperature source Oral, resp. rate 20, height 1.88 m (6' 2\"), weight 92.4 kg (203 lb 12.8 oz), SpO2 93 %.  Wt Readings from Last 1 Encounters:   04/06/17 92.4 kg (203 lb 12.8 oz)     General: Alert, awake, no acute distress.  Sitting up in a chair.  HEENT: Normocephalic, atraumatic, eyes anicteric and without scleral injection, EOMI, MMM.  Cardiac: RRR, normal S1, S2.  No m/g/r. No LE edema.  Pulmonary: Normal chest rise, normal work of breathing.  Lungs CTAB with very faint crackles in the bilateral lower lobes otherwise clear.  Abdomen: soft, non-tender, non-distended.  Normoactive BS.  No guarding or rebound tenderness.  Extremities: no deformities.  Warm, well perfused.  Skin: no rashes or lesions noted.  Warm and Dry.  Neuro: No focal deficits noted.  Speech clear.  Coordination and strength grossly normal but diffusely weak.  Psych: Appropriate affect. Alert and oriented x3.    Procedures other than Imaging:  IV antibiotics     Imaging:[AC1.1]  Results for orders placed or performed during the hospital encounter of 04/02/17   CT Head w/o Contrast    Narrative    CT HEAD WITHOUT CONTRAST  4/2/2017 1:04 PM    HISTORY: Fall. Weakness.     Radiation dose for this scan was reduced using automated exposure  control, adjustment of the mA and/or kV according to patient size, or  iterative reconstruction technique.    FINDINGS: Prominent cerebral atrophy and periventricular white matter  hypoattenuation. There is no evidence of acute intracranial  hemorrhage. There is no mass effect or shift of the midline. There is  no definite CT evidence of acute stroke. The visualized portions " of  the paranasal sinuses are clear. The osseous calvarium appears within  normal limits as imaged transversely.       Impression    IMPRESSION: No evidence of acute intracranial hemorrhage or mass  effect. Cerebral atrophy and periventricular white matter disease.    OMEGA MARTINEZ MD   CT Cervical Spine w/o Contrast    Narrative    CT CERVICAL SPINE WITHOUT CONTRAST 4/2/2017 1:06 PM     HISTORY: Neck pain after trauma.    Radiation dose for this scan was reduced using automated exposure  control, adjustment of the mA and/or kV according to patient size, or  iterative reconstruction technique.    FINDINGS: There is no CT evidence of acute cervical spine fracture or  subluxation. Degenerative posterior osteophytic spurring is noted at  C5-6. No osseous central stenosis is demonstrated. Mild apophyseal  joint degenerative changes are noted. Uncinate process spurring  results in mild to moderate foraminal stenosis bilaterally at C5-6 and  on the right at C6-7.      Impression    IMPRESSION: No evidence of cervical spine fracture or subluxation.  Degenerative changes as above.    OMEGA MARTINEZ MD   Ankle XR, G/E 3 views, right    Narrative    RIGHT ANKLE THREE OR MORE VIEWS  4/2/2017 1:34 PM     HISTORY: Pain.      Impression    IMPRESSION: No apparent fracture. The ankle mortise appears congruent.  Small vessel arterial calcification is noted.    OMEGA MARTINEZ MD   Lumbar spine XR, 2-3 views    Narrative    LUMBAR SPINE TWO TO THREE VIEWS 4/2/2017 1:34 PM     HISTORY: Pain      Impression    IMPRESSION: Vertebral body heights, sagittal alignment, and disc  heights appear within normal limits. Aortic calcification is noted.  Bilateral hip arthroplasty is also noted.    OMEGA MARTINEZ MD   XR Pelvis and Hip Right 1 View    Narrative    PELVIS AND RIGHT HIP ONE VIEW  4/2/2017 1:34 PM     HISTORY: Pain. Fall.       Impression    IMPRESSION: Bilateral hip arthroplasties without apparent  complication. The osseous pelvis  appears intact.    OMEGA MARTINEZ MD   XR Chest 1 View    Narrative    CHEST ONE VIEW  4/2/2017 1:33 PM     HISTORY: Weakness. Shortness of breath.      Impression    IMPRESSION: Diffuse interstitial, somewhat nodular pattern is noted  within both lungs.  This is similar if not unchanged when given  differences in technique compared to 12/20/2015. No new consolidation  is identified. No apparent pleural effusion.    OMEGA MARTINEZ MD   CT Chest w/o Contrast    Narrative    CT CHEST WITHOUT CONTRAST  4/5/2017 11:05 AM     HISTORY: Shortness of breath.    COMPARISON: Chest x-ray 4/2/2017. CT chest, abdomen and pelvis  11/16/2015.    TECHNIQUE: Axial images from the thoracic inlet to the lung bases are  performed with additional coronal reformatted images. No contrast is  utilized.  Radiation dose for this scan was reduced using automated  exposure control, adjustment of the mA and/or kV according to patient  size, or iterative reconstruction technique.    FINDINGS:  Calcified granulomas are noted within each lung. Other  areas of cystic change are noted throughout both lungs possibly  reflecting underlying emphysema. Mild groundglass opacities in the mid  to lower lungs could represent underlying alveolitis. Airways appear  patent. Findings could reflect underlying lymphangiomyomatosis as many  of these cysts appear well formed with a thin surrounding rim of  tissue. No wall nodularity is appreciated within the cysts. No pleural  or pericardial fluid. The heart is normal in size. Aorta demonstrates  calcified plaque without evidence of aneurysm. Coronary artery  calcification is also noted. The esophagus is unremarkable. Thyroid  gland appears normal in size. Small mediastinal and hilar lymph nodes  are likely reactive in nature. A larger subcarinal lymph node measures  3.0 x 1.6 cm on series 2, image 36. Limited images upper abdomen  demonstrate mild perihepatic fluid. Bone window examination is within  normal  limits.      Impression    IMPRESSION:  1. Persistent cystic changes throughout both lungs raising the  possibility of emphysema or underlying interstitial lung disease.  Groundglass opacities within the lungs is new since the prior exam  suggesting underlying acute alveolitis which may account for the  patient's shortness of breath. No discrete infiltrate or consolidation  to indicate typical pneumonia.  2. No significant pleural or pericardial fluid. Heart is normal in  size. Probable reactive lymph nodes which are new since prior exam.    YSABEL CULP MD[AC1.3]        Consultations:  Consultations This Hospital Stay   PHYSICAL THERAPY ADULT IP CONSULT  OCCUPATIONAL THERAPY ADULT IP CONSULT  SOCIAL WORK IP CONSULT  PHYSICAL THERAPY ADULT IP CONSULT  OCCUPATIONAL THERAPY ADULT IP CONSULT     Recent Lab Results:[AC1.1]    Recent Labs  Lab 04/06/17  0900 04/05/17  0655 04/04/17  0604   WBC 8.5 7.6 6.7   HGB 11.8* 12.4* 11.3*   HCT 36.6* 37.6* 35.7*   MCV 98 96 98    332 310       Recent Labs  Lab 04/06/17  0900 04/05/17  0655 04/04/17  0604    136 137   POTASSIUM 4.5 3.9 3.8   CHLORIDE 103 101 101   CO2 25 27 27   ANIONGAP 10 8 9   * 122* 112*   BUN 33* 28 29   CR 1.20 1.21 1.20   GFRESTIMATED 57* 57* 57*   GFRESTBLACK 69 69 69   MERCEDES 8.8 8.2* 8.5[AC1.4]          Pending Results:    Unresulted Labs Ordered in the Past 30 Days of this Admission     Date and Time Order Name Status Description    4/2/2017 1944 Blood culture Preliminary     4/2/2017 1944 Blood culture Preliminary            Discharge Instructions and Follow-Up:   Discharge Orders[AC1.1]     General info for SNF   Length of Stay Estimate: Short Term Care: Estimated # of Days <30  Condition at Discharge: Improving  Level of care:skilled   Rehabilitation Potential: Good  Admission H&P remains valid and up-to-date: Yes  Recent Chemotherapy: N/A  Use Nursing Home Standing Orders: Yes     Mantoux instructions   Give two-step Mantoux (PPD) Per  Facility Policy Yes     Reason for your hospital stay   You were hospitalized for weakness and were found to have a pneumonia.     Follow Up and recommended labs and tests   Follow up with primary care doctor within one week of discharge from rehab.     Activity - Up with nursing assistance     Patient care order   Please hold Methotrexate on 4/10/17.  Methotrexate can be resumed on 4/17/17 per regular scheduled.     DNR/DNI     Physical Therapy Adult Consult   Evaluate and treat as clinically indicated.    Reason:  Deconditioning, diffuse weakness     Occupational Therapy Adult Consult   Evaluate and treat as clinically indicated.    Reason:  Deconditioning, diffuse weakness     Oxygen - Nasal cannula   2 Lpm by nasal cannula to keep O2 sats 92% or greater.  Wean as tolerated.     Fall precautions     Advance Diet as Tolerated   Follow this diet upon discharge: Orders Placed This Encounter     Room Service     Combination Diet Low Saturated Fat Na <2400mg Diet, No Caffeine Diet[AC1.4]       Discharge Medications[AC1.1]   Current Discharge Medication List      START taking these medications    Details   ipratropium - albuterol 0.5 mg/2.5 mg/3 mL (DUONEB) 0.5-2.5 (3) MG/3ML neb solution Take 1 vial (3 mLs) by nebulization every 4 hours as needed for wheezing  Qty: 360 mL, Refills: 0    Associated Diagnoses: Pneumonia of both lower lobes due to infectious organism      cefuroxime (CEFTIN) 500 MG tablet Take 1 tablet (500 mg) by mouth every 12 hours  Qty: 7 tablet, Refills: 0    Associated Diagnoses: Pneumonia of both lower lobes due to infectious organism      predniSONE (DELTASONE) 20 MG tablet Take 2 tablets (40 mg) by mouth every morning  Qty: 6 tablet, Refills: 0    Associated Diagnoses: Pneumonia of both lower lobes due to infectious organism      azithromycin (ZITHROMAX) 250 MG tablet Take 1 tablet (250 mg) by mouth daily  Qty: 3 tablet, Refills: 0    Associated Diagnoses: Pneumonia of both lower lobes due to  infectious organism         CONTINUE these medications which have NOT CHANGED    Details   ferrous sulfate (IRON) 325 (65 FE) MG tablet Take 325 mg by mouth daily (with breakfast)      levothyroxine (SYNTHROID, LEVOTHROID) 50 MCG tablet Take 1 tablet by mouth daily      hydrochlorothiazide (HYDRODIURIL) 25 MG tablet Take 50 mg by mouth daily       latanoprost (XALATAN) 0.005 % ophthalmic solution Place 1 drop into the right eye At Bedtime       Multiple Vitamins-Minerals (CERTAVITE SENIOR/ANTIOXIDANT) TABS Take 1 tablet by mouth daily      GABAPENTIN PO Take 100 mg by mouth At Bedtime      acetaminophen (TYLENOL) 500 MG tablet Take 500 mg by mouth 4 times daily as needed for mild pain      senna-docusate (SENOKOT-S;PERICOLACE) 8.6-50 MG per tablet Take 1 tablet by mouth 2 times daily as needed for constipation      metoprolol (LOPRESSOR) 12.5 MG TABS Take 25 mg by mouth 2 times daily If AP <60 or SBP <100, hold and call MD      methotrexate 2.5 MG tablet Take 10 mg by mouth once a week. Takes on Mondays       ASPIRIN EC PO Take 81 mg by mouth daily.        folic acid (FOLVITE) 1 MG tablet Take 1 mg by mouth daily.        omega-3 fatty acids (FISH OIL) 1200 MG capsule Take 1 capsule by mouth daily.        tamsulosin (FLOMAX) 0.4 MG 24 hr capsule Take 0.4 mg by mouth At Bedtime.        tuberculin (APLISOL) 5 UNIT/0.1ML injection Inject 5 Units into the skin once[AC1.4]             Time Spent on this Encounter   I, Frieda Beasley, personally saw the patient today and spent greater than 30 minutes discharging this patient.    Frieda Beasley MD[AC1.1]       Revision History        User Key Date/Time User Provider Type Action    > AC1.4 4/6/2017 12:06 PM Frieda Beasley MD Physician Sign     AC1.3 4/6/2017 12:04 PM Frieda Beasley MD Physician      AC1.2 4/6/2017 11:53 AM Frieda Beasley MD Physician      AC1.1 4/6/2017 11:46 AM Frieda Beasley MD Physician                      Consult Notes      Consults by  Carrie Avendano LSW at 4/3/2017  3:53 PM     Author:  Carrie Avendano LSW Service:  (none) Author Type:      Filed:  4/3/2017  3:53 PM Date of Service:  4/3/2017  3:53 PM Note Created:  4/3/2017  3:42 PM    Status:  Signed :  Carrie Avendano LSW ()     Consult Orders:    1. Social Work IP Consult [[id number]] ordered by Yolanda Hayes MD at 04/03/17 0216                Care Transition Initial Assessment -   Reason For Consult: discharge planning  Spoke with wife[LH1.1]   Active Problems:    Fall[LH1.2]         DATA  Lives With: spouse  Living Arrangements: house  Description of Support System: Supportive, Involved  Who is your support system?: Wife  Support Assessment: Adequate family and caregiver support.     Quality Of Family Relationships: supportive, helpful, involved       ASSESSMENT  Concerns to be addressed: SW aware that pt may need TCU placement at [place]. SW spoke with pt wife who is agreeable to TCU and would like SW to send referral to [facility].  Wife reports that pt was at [facility] 2 years ago and that she has been caring for him at home since his dc.  SW sent TCU referral.     PLAN  Patient/family given options and choices for discharge: yes.  Patient/family is agreeable to the plan?  Yes   Patient/Family Goals and Preferences: Return Home.  Patient/Family anticipates discharging to:  TCU.[LH1.1]         Revision History        User Key Date/Time User Provider Type Action    > LH1.2 4/3/2017  3:53 PM Carrie Avendano LSW  Sign     LH1.1 4/3/2017  3:42 PM Carrie Avendano LSW                       Progress Notes - Physician (Notes from 04/03/17 through 04/06/17)      Progress Notes by Kath De La Cruz at 4/6/2017  1:22 PM     Author:  Kath De La Cruz Service:  Care Coordinator Author Type:      Filed:  4/6/2017  1:22 PM Date of Service:  4/6/2017  1:22 PM Note Created:  4/6/2017  1:22 PM    Status:  Signed :  Kath De La Cruz  ()         Your information has been submitted on April 06th, 2017 at 01:22:34 PM CDT. The confirmation number is [id number][NM1.1]       Revision History        User Key Date/Time User Provider Type Action    > NM1.1 4/6/2017  1:22 PM Kath De La Cruz  Sign            Progress Notes by Carrie Avendano LSW at 4/6/2017 12:16 PM     Author:  Carrie Avendano LSW Service:  (none) Author Type:      Filed:  4/6/2017 12:18 PM Date of Service:  4/6/2017 12:16 PM Note Created:  4/6/2017 12:16 PM    Status:  Signed :  Carrie Avendano LSW ()         SWS:  SW notified that pt can dc today. SW spoke with wife and she is requesting wc transport.Wife agreeable to private cost of transport.  Wife requesting private room and is aware and agreeable to private cost. SW contacted [facility] and they are able to transport at 5:00pm. RN, [facility] and wife aware.  Orders faxed.[LH1.1]       Revision History        User Key Date/Time User Provider Type Action    > LH1.1 4/6/2017 12:18 PM Carrie Avendano LSW  Sign            Progress Notes by Blanca Santiago RN at 4/6/2017  4:47 AM     Author:  Blanca Santiago RN Service:  (none) Author Type:  Registered Nurse    Filed:  4/6/2017  5:19 AM Date of Service:  4/6/2017  4:47 AM Note Created:  4/6/2017  4:47 AM    Status:  Addendum :  Blanca Santiago RN (Registered Nurse)         Pt a/o x 3. VSS, afebrile. Tele; SR[AM1.1] with 1st degree AV block, BBB[AM1.2]. Denies pain. Condom cath patent with good urine return. Incontinent of BM. Assist of 2 with lift. Addressed all questions and concerns. Possible d/c to TCU today.[AM1.1]     Revision History        User Key Date/Time User Provider Type Action    > AM1.2 4/6/2017  5:19 AM Mangowi, Blanca, RN Registered Nurse Addend     AM1.1 4/6/2017  4:52 AM Blanca Santiago RN Registered Nurse Sign            Progress Notes by Yolanda Hayes MD at 4/5/2017  4:53 PM      Author:  Yolanda Hayes MD Service:  Hospitalist Author Type:  Physician    Filed:  4/5/2017  9:32 PM Date of Service:  4/5/2017  4:53 PM Note Created:  4/5/2017  4:53 PM    Status:  Signed :  Yolanda Hayes MD (Physician)         [facility]  Hospitalist Progress Note  Patient Name: Jacob Easton    MRN: [id number]  Provider:  Yolanda Hayes MD  Date:04/05/2017      Initial presenting complaint/issue to hospital (Diagnosis): weakness, fall     Summary of Stay:Pt is a 87 yr old with h/o RA on mtx, CKD, HTN, dementia who presented on 4/2/2017 with weakness and fall. He was apparently down for ~17 hrs prior to having neighbours called to put him up on the bed then he was unable to move from the bed so EMS was called. extensive w/u with the ER was -ve for infectious etiology however pt did develop LGF, new A fib w/ RVR and appeared to have dirty urine so was started on IV ceftriaxone for developing sepsis syndrome.   Working with Pt/OT, will need to dc to TCU  Pt developed worsening SOB on 4/5-CT chest done showed acute alveolitis ?cause[RS1.1]-viral vs ILD[RS1.2]. Will need to discuss with pulmonology[RS1.1] tomorrow[RS1.2]. Started on prednisone[RS1.1] given no improvement with abx alone[RS1.2]          Assessment and Plan:      Weakness and falls: unclear cause but he is weaker than his baseline for 1-2 days PTA resulting in fall. He was unable to get up so his wife called neighbors who raised him to a bed however pt called EMS because he was unable to get out of bed. He denies any symptoms. CXR in the ER shows b/l infiltrates but these have been present for many years. TTe with no new findings except for mild AS.  UA appeared infected and he developed fever after admission so was started on IV ceftriaxone with[RS1.1] some[RS1.2] improvement[RS1.1] then worsened[RS1.2].[RS1.1] He developed SOB, CT chest on 4/5 showed acute alveolitis- ?viral vs ILD. Pt has had abnormal CT even  "back in 2012 (care everywhere)[RS1.2]   He is making progress with PT/OT but would need TCU on dc  --blood cx pending[RS1.1], sputum cx not collected yet[RS1.2]  --PT/OT eval  --change ceftriaxone to ceftin for UTI[RS1.1] and add azithromycin for atypical coverage  --start prednisone[RS1.2]      A fib w/ RVR: no prior h/o A fib per chart review. He was given a dose of IV metoprolol and resumed on PTA PO metoprolol with good control. Possibly driven by underlying infectious process. He has high zunsc4ctom score however is also high fall risk so will avoid anticoagulation.   --cont card monitoring  --cont PO metoprolol 25 mg BID  --treat underlying infection[RS1.1]    SOB:[RS1.2] Pt developed worsening SOB on 4/5-CT chest done showed acute alveolitis ?cause[RS1.1]-viral vs ILD[RS1.2].[RS1.1]Pt has had abnormal CT even back in 2012 (care everywhere)  --[RS1.2] Will need to discuss with pulmonology[RS1.1] tomorrow[RS1.2]. Started on prednisone[RS1.1] given no improvement with abx alone[RS1.2]    Possible UTI: pt developed LGF, A fib w/ RVR, weakness and urine appeared dirty.[RS1.1] Was[RS1.2] On IV ceftriaxone[RS1.1] now on ceftin and azithro to cover for UTI and PNA[RS1.2]    HTN: resume metoprolol , hctz, amlodipine      RA: hold MTX for now until infection is treated   Neuropathy: gabapentin     DVT Prophylaxis: heparin SQ  Code Status: DNR / DNI  Discharge Dispo: TCU  Estimated Disch Date / # of Days until Discharge: tomorrow to TCU        Interval History:      Feels better today     Per RN pt is needing less assist for movement     Data reviewed today: I reviewed all new labs and imaging reports over the last 24 hours. I personally reviewed no images or EKG's today.         Physical Exam:      Last Vital Signs:  /67 (BP Location: Right arm)  Temp 98.2  F (36.8  C) (Oral)  Resp 24  Ht 1.88 m (6' 2\")  Wt 93.1 kg (205 lb 4.8 oz)  SpO2 93%  BMI 26.36 kg/m2   GEN:  Alert, oriented x 2, appears comfortable, " NAD.  HEENT:  Normocephalic/atraumatic, no scleral icterus, no nasal discharge, mouth moist.  CV:  Regular rate and rhythm, no murmur or JVD.  S1 + S2 noted, no S3 or S4.  LUNGS:  Rales b/l  ABD:  Active bowel sounds, soft, non-tender/non-distended.  No rebound/guarding/rigidity.  EXT:  No edema.  No cyanosis.  No joint synovitis noted.  SKIN:  Dry to touch, no exanthems noted in the visualized areas.           Medications:      All current medications were reviewed.         Data:      All new lab and imaging data was reviewed.   Data       Recent Labs  Lab 04/05/17  0655 04/04/17  0604 04/03/17  0651   WBC 7.6 6.7 8.9   HGB 12.4* 11.3* 12.2*   HCT 37.6* 35.7* 37.8*   MCV 96 98 97    310 293       Recent Labs  Lab 04/05/17  0655 04/04/17  0604 04/03/17  1642 04/03/17  0651    137  --  139   POTASSIUM 3.9 3.8 3.7 3.2*   CHLORIDE 101 101  --  100   CO2 27 27  --  30   ANIONGAP 8 9  --  9   * 112*  --  118*   BUN 28 29  --  27   CR 1.21 1.20  --  1.27*   GFRESTIMATED 57* 57*  --  54*   GFRESTBLACK 69 69  --  65   MERCEDES 8.2* 8.5  --  8.6       Recent Results (from the past 24 hour(s))   CT Chest w/o Contrast    Narrative    CT CHEST WITHOUT CONTRAST  4/5/2017 11:05 AM     HISTORY: Shortness of breath.    COMPARISON: Chest x-ray 4/2/2017. CT chest, abdomen and pelvis  11/16/2015.    TECHNIQUE: Axial images from the thoracic inlet to the lung bases are  performed with additional coronal reformatted images. No contrast is  utilized.  Radiation dose for this scan was reduced using automated  exposure control, adjustment of the mA and/or kV according to patient  size, or iterative reconstruction technique.    FINDINGS:  Calcified granulomas are noted within each lung. Other  areas of cystic change are noted throughout both lungs possibly  reflecting underlying emphysema. Mild groundglass opacities in the mid  to lower lungs could represent underlying alveolitis. Airways appear  patent. Findings could  reflect underlying lymphangiomyomatosis as many  of these cysts appear well formed with a thin surrounding rim of  tissue. No wall nodularity is appreciated within the cysts. No pleural  or pericardial fluid. The heart is normal in size. Aorta demonstrates  calcified plaque without evidence of aneurysm. Coronary artery  calcification is also noted. The esophagus is unremarkable. Thyroid  gland appears normal in size. Small mediastinal and hilar lymph nodes  are likely reactive in nature. A larger subcarinal lymph node measures  3.0 x 1.6 cm on series 2, image 36. Limited images upper abdomen  demonstrate mild perihepatic fluid. Bone window examination is within  normal limits.      Impression    IMPRESSION:  1. Persistent cystic changes throughout both lungs raising the  possibility of emphysema or underlying interstitial lung disease.  Groundglass opacities within the lungs is new since the prior exam  suggesting underlying acute alveolitis which may account for the  patient's shortness of breath. No discrete infiltrate or consolidation  to indicate typical pneumonia.  2. No significant pleural or pericardial fluid. Heart is normal in  size. Probable reactive lymph nodes which are new since prior exam.    MD Yolanda SOLORIO MD  Hospitalist  [facility] [address] [place] [address][RS1.1]       Revision History        User Key Date/Time User Provider Type Action    > RS1.2 4/5/2017  9:32 PM Yolanda Hayes MD Physician Sign     RS1.1 4/5/2017  4:53 PM Yolanda Hayes MD Physician             Progress Notes by Blanca Santiago RN at 4/5/2017  5:08 AM     Author:  Blanca Santiago RN Service:  (none) Author Type:  Registered Nurse    Filed:  4/5/2017  5:12 AM Date of Service:  4/5/2017  5:08 AM Note Created:  4/5/2017  5:08 AM    Status:  Signed :  Blanca Santiago RN (Registered Nurse)         Pt is alert but forgetful. VSS, afebrile. Tele is  SR with 1st degree AV block, BBB. Denies pain. Right foot wrapped with ace bandage with 3 + pitting edema. Incontinent. Up with assist of 2 and walker. Plan is to d/c to [facility] today.[AM1.1]      Revision History        User Key Date/Time User Provider Type Action    > AM1.1 4/5/2017  5:12 AM Blanca Santiago, RN Registered Nurse Sign            ED Provider Notes by Missy Young MD at 4/2/2017 11:11 AM     Author:  Missy Young MD Service:  Emergency Medicine Author Type:  Physician    Filed:  4/4/2017 10:02 PM Date of Service:  4/2/2017 11:11 AM Note Created:  4/2/2017 11:33 AM    Status:  Signed :  Missy Young MD (Physician)           History     Chief Complaint:  Ankle Pain      SAM Easton is a 87 year old male who presents with ankle and hip pain. In the morning yesterday, the patient was feeling generally week. In the afternoon, he was in his bedroom walking with his walker when he fell forward. The patient subsequently has had bilateral hip and ankle pain, but notes that it is worse on the right side. After the fall, the patient was unable to get up on his own and required the help of two neighbors to get into his bed. He has not taken any medications for his pain.  The patient has had slight shortness of breath recently but denies any fevers, cough, chest pain, abdominal pain, nausea, vomiting, diarrhea, or headaches.     Allergies:  Influenza Virus Vaccine H5n1     Medications:    Levothyroxine  Norvasc  Hydrochlorothiazide  Xalatan   Multiple vitamins  Gabapentin  Tylenol   Senna-Docusate   Lopressor   Methotrexate  Aspirin   Folvite   Fish oil  Flomax     Past Medical History:    Arthritis   Hypertension   Hypocalcemia  Neuromuscular disorder  Thyroid disease    Past Surgical History:    Right hip arthroplasty     Family History:    History reviewed. No pertinent family history.     Social History:  Marital Status:   Presents to the ED  with wife  Tobacco Use: Never Used  Alcohol Use: No  PCP:[SE1.1] AYANNA CROWLEY[SE1.2]      Review of Systems   Constitutional: Negative for fever.   Respiratory: Negative for cough.    Gastrointestinal: Negative for abdominal pain, diarrhea, nausea and vomiting.   Musculoskeletal:        Positive for ankle pain  Positive for hip pain.    Neurological: Negative for headaches.   All other systems reviewed and are negative.      Physical Exam[SE1.1]     Patient Vitals for the past 24 hrs:   BP Temp Temp src Heart Rate Resp SpO2   04/02/17 1415 120/67 - - 85 - 92 %   04/02/17 1400 123/63 - - 82 - 94 %   04/02/17 1351 - - - 84 18 97 %   04/02/17 1345 123/73 - - 87 - 96 %   04/02/17 1245 132/77 - - 94 - 97 %   04/02/17 1230 (!) 140/103 - - 94 - 99 %   04/02/17 1215 125/82 - - 101 - -   04/02/17 1145 148/74 - - 97 - 92 %   04/02/17 1130 139/78 - - 98 - 91 %   04/02/17 1119 151/78 98  F (36.7  C) Oral 100 - 91 %[SE1.2]      Physical Exam  Nursing note and vitals reviewed.    Constitutional: Pleasant and well groomed.          HENT:    Ears: TM's clear   Mouth/Throat: Oropharynx is without swelling or erythema. Oral mucosa dry.    Eyes: Conjunctivae normal are normal. No scleral icterus.    Neck: Neck supple.   Cardiovascular: Normal rate, regular rhythm and intact distal pulses.    Pulmonary/Chest: Effort normal. Rales at the right base.   Abdominal: Soft.  No distension. There is no tenderness.   Musculoskeletal:  No edema, No calf tenderness. Lateral malleolar tenderness on the right. Proximal fifth metatarsal tenderness. No malleolar, fifth metatarsal or navicular tenderness on the left. No hip pain with internal or external rotation of bilateral lower extremities.   Genitourinary:[SE1.1]Deferred.[CK1.1]   Neurological:Aler[SE1.1]t and answering questions appropriately.[CK1.1]  Coordination normal. Cranial nerves II- XII intact[SE1.1].[CK1.1] Unable to flex his hips to lift legs independently but has 5/5 strength with  knee flexion and extension.[SE1.1] Upper extremity strength intact.[CK1.1]   Skin: Skin is warm and dry.   Psychiatric: Normal mood and affect.      Emergency Department Course   ECG:  @ 1121  Indication: Fall  Vent. Rate 102 bpm. MA interval 184 ms. QRS duration 144 ms. QT/QTc 398/518 ms. P-R-T axis 74 -60 77.   Sinus tachycardia with occasional premature ventricular complexes. Left axis deviation. Left bundle branch block.  Abnormal ECG.  No significant change when compared to previous ECG from 12/20/15   Read @ 1135 by Dr. Young.     Imaging:[SE1.1]  Head CT without contrast:   No evidence of acute intracranial hemorrhage or mass  effect. Cerebral atrophy and periventricular white matter disease.  Preliminary radiology read.       Cervical spine without contrast:   No evidence of cervical spine fracture or subluxation.  Degenerative changes as above.  Preliminary radiology read.       Chest x-ray, 1 view:   Diffuse interstitial, somewhat nodular pattern is noted  within both lungs.  This is similar if not unchanged when given  differences in technique compared to 12/20/2015. No new consolidation  is identified. No apparent pleural effusion.  Preliminary radiology read.       Ankle x-ray, right, 3 views:   No apparent fracture. The ankle mortise appears congruent.  Small vessel arterial calcification is noted.  Preliminary radiology read.       Lumbar spine x-ray 2-3 views:   Vertebral body heights, sagittal alignment, and disc  heights appear within normal limits. Aortic calcification is noted.  Bilateral hip arthroplasty is also noted.  Preliminary radiology read.       Pelvis and hip right x-ray, 1 view:   Bilateral hip arthroplasties without apparent  complication. The osseous pelvis appears intact.  Preliminary radiology read.     Radiographic findings were communicated with the patient who voiced understanding of the findings.[SE1.3]    Laboratory:[SE1.1]  TSH 4.62 (H)   T4: 1.09  (1155) Troponin I: 0.062  (H)   (1208) Troponin POCT: 0.06  CMP: Potassium 2.0 (L), Glucose 106 (H), Creatinine 1.39 (H), GFR 48 (L), Albuterol 2.8 (L), otherwise WNL   CBC:  WBC 8.0, HGB 12.4 (L), [SE1.3]    Interventions:[SE1.1]  Normal Saline, 500 mL, IV bolus    Tylenol, 1000 mg, IV  Albuterol-Ipratropium inhalation solution, 2.5 mg-0.5 mg/3 ml; 3 mL, inhalation[SE1.3]     Emergency Department Course:[SE1.1]  Nursing notes and vitals reviewed.  I performed an exam of the patient as documented above.   EKG was done, interpretation as above.    A peripheral IV was established. Blood was drawn from the patient. This was sent for laboratory testing, findings above.    The patient was sent for a CT head, CT cervical spine, chest x-ray, pelvis x-ray, ankle x-ray, and lumbar spine x-ray while in the emergency department, findings above.    Findings and plan explained to the patient who consents to admission.    I discussed the patient with [SE1.3] Isaias[CK1.1] of the hospitalist service, who will admit the patient to a telemetry bed for further monitoring, evaluation, and treatment.[SE1.3]   Dr. Chen has graciously agreed to follow up on Urine analysis once available.[CK1.1]     Impression & Plan    Medical Decision Making:[SE1.1]  Jacob Easton is a 87 year old male who presents with global weakness for the past couple of days and then a fall. He is here reporting ankle pain. Patient and his wife are not great historians. He appears to be short of breath, however, he is unable to provide a history of when he became short of breath and initially describes this in December. Differential diagnosis was broad and included but was not limited to dehydration, anemia, electrolyte abnormality, urinary tract infection, pneumonia, hip fracture, lumbar fracture, ankle fracture, intracranial hemorrhage. ED evaluation is as noted above. I had concern for pneumonia on lung exam, however, it appears that he has underlying process that is stable  and not clearly explained through his records. The CT scan of his head and neck is negative for acute findings. His chest x-ray reveals chronic changes that have previously been described as well. He does have an elevated trop but does not endorse any symptoms specifically consistent with acute coronary syndrome. Labs are otherwise remarkable for some mild hypokalemia which it does seem that he has had in the past and may be related to his hydrochlorothiazide. We are still waiting for a urine at this point. He has refused a cath and did not have significant urine on bladder scan therefore we are adding additional gentle rehydration to the initial 500 ccs that was provided on arrival. I trialled a nebulizer treatment which seemed to improve the subtle increased work of breathing he reported. However there is no significant change in his lung exam. Plan to admit for weakness and possible[SE1.3] occult[CK1.1] infection as we get a urine specimen back, serial troponin's to evaluate for acute coronary syndrome, and symptom management. I have spoken with [SE1.3] Isaias[CK1.1] who has accepted ongoing care of the patient.[SE1.3] Dr. Chen will continue to care for the patient and follow up on the UA once available.[CK1.1]     Diagnosis:    ICD-10-CM    1. Generalized muscle weakness M62.81    2. Acute renal insufficiency N28.9    3. Elevated troponin R79.89    4. Dehydration E86.0    5. Hypokalemia E87.6     mild   6. Acute right ankle pain M25.571[SE1.2]        Disposition:[SE1.1]  Admitted to[SE1.3] Dr. Esparza of the Hospitalist Service.[CK1.1]       I, Laurie Peña, am serving as a scribe on 4/2/2017 at 11:33 AM to personally document services performed by Dr. Missy Young based on my observations and the provider's statements to me.    4/2/2017   [facility] EMERGENCY DEPARTMENT[SE1.1]       Missy Young MD  04/04/17 1723  [CK1.2]     Revision History        User Key Date/Time  User Provider Type Action    > CK1.2 4/4/2017 10:02 PM Missy Young MD Physician Sign     CK1.1 4/4/2017  9:59 PM Missy Young MD Physician      SE1.2 4/2/2017  2:31 PM Eswine, Laurie Scribe Share     SE1.3 4/2/2017  2:25 PM Eswine, Laurie Scribe Share     SE1.1 4/2/2017 12:25 PM Eswine, Laurie Scribe Share            Progress Notes by Yolanda Hayes MD at 4/4/2017  3:39 PM     Author:  Yolanda Hayes MD Service:  Hospitalist Author Type:  Physician    Filed:  4/4/2017  3:43 PM Date of Service:  4/4/2017  3:39 PM Note Created:  4/4/2017  3:39 PM    Status:  Addendum :  Yolanda Hayes MD (Physician)         [facility]  Hospitalist Progress Note  Patient Name: Jacob Easton    MRN: [id number]  Provider:  Yolanda Hayse MD  Date:04/04/2017      Initial presenting complaint/issue to hospital (Diagnosis): weakness, fall     Summary of Stay:Pt is a 87 yr old with h/o RA on mtx, CKD, HTN, dementia who presented on 4/2/2017 with weakness and fall. He was apparently down for ~17 hrs prior to having neighbours called to put him up on the bed then he was unable to move from the bed so EMS was called. extensive w/u with the ER was -ve for infectious etiology however pt did develop LGF, new A fib w/ RVR and appeared to have dirty urine so was started on IV ceftriaxone for developing sepsis syndrome.   Working with Pt/OT, will likely need TCU on dc tomorrow          Assessment and Plan:      Weakness and falls: unclear cause but he is weaker than his baseline for 1-2 days PTA resulting in fall. He was unable to get up so his wife called neighbors who raised him to a bed however pt called EMS because he was unable to get out of bed. He denies any symptoms. CXR in the ER shows b/l infiltrates but these have been present for many years. TTe with no new findings except for mild AS.  UA appeared infected and he developed fever after admission so was  "started on IV ceftriaxone with improvement. He is making progress with PT/OT but would need TCU on dc  --blood cx pending  --PT/OT eval  --change ceftriaxone to ceftin for UTI     A fib w/ RVR: no prior h/o A fib per chart review. He was given a dose of IV metoprolol and resumed on PTA PO metoprolol with good control. Possibly driven by underlying infectious process. He has high [id number] score however is also high fall risk so will avoid anticoagulation.   --cont card monitoring  --cont PO metoprolol 25 mg BID  --treat underlying infection    Possible UTI: pt developed LGF, A fib w/ RVR, weakness and urine appeared dirty. On IV ceftriaxone until Ucx returns now changed to ceftin     HTN: resume metoprolol , hctz, amlodipine      RA: hold MTX for now until infection is treated   Neuropathy: gabapentin     DVT Prophylaxis: heparin SQ  Code Status: DNR / DNI  Discharge Dispo: TCU  Estimated Disch Date / # of Days until Discharge:[RS1.1] tomorrow to TCU[RS1.2]        Interval History:      Feels better today     Per RN pt is needing less assist for movement     Data reviewed today: I reviewed all new labs and imaging reports over the last 24 hours. I personally reviewed no images or EKG's today.         Physical Exam:      Last Vital Signs:  /73 (BP Location: Right arm)  Temp 97.6  F (36.4  C) (Oral)  Resp 20  Ht 1.88 m (6' 2\")  Wt 93.1 kg (205 lb 4.8 oz)  SpO2 94%  BMI 26.36 kg/m2   GEN:  Alert, oriented x 2, appears comfortable, NAD.  HEENT:  Normocephalic/atraumatic, no scleral icterus, no nasal discharge, mouth moist.  CV:  Regular rate and rhythm, no murmur or JVD.  S1 + S2 noted, no S3 or S4.  LUNGS:  Rales b/l  ABD:  Active bowel sounds, soft, non-tender/non-distended.  No rebound/guarding/rigidity.  EXT:  No edema.  No cyanosis.  No joint synovitis noted.  SKIN:  Dry to touch, no exanthems noted in the visualized areas.           Medications:      All current medications were reviewed.         " Data:      All new lab and imaging data was reviewed.   Data       Recent Labs  Lab 04/04/17  0604 04/03/17  0651 04/02/17  1818 04/02/17  1155   WBC 6.7 8.9  --  8.0   HGB 11.3* 12.2*  --  12.4*   HCT 35.7* 37.8*  --  37.6*   MCV 98 97  --  96    293 275 303       Recent Labs  Lab 04/04/17  0604 04/03/17  1642 04/03/17  0651 04/02/17  1155     --  139 137   POTASSIUM 3.8 3.7 3.2* 2.9*   CHLORIDE 101  --  100 96   CO2 27  --  30 32   ANIONGAP 9  --  9 9   *  --  118* 106*   BUN 29  --  27 29   CR 1.20  --  1.27* 1.39*   GFRESTIMATED 57*  --  54* 48*   GFRESTBLACK 69  --  65 58*   MERCEDES 8.5  --  8.6 8.9       No results found for this or any previous visit (from the past 24 hour(s)).    Yolanda Hayes MD  Hospitalist  [facility] [address] [place] [address][RS1.1]       Revision History        User Key Date/Time User Provider Type Action    > RS1.2 4/4/2017  3:43 PM Yolanda Hayes MD Physician Addend     RS1.1 4/4/2017  3:42 PM Yolanda Hayes MD Physician Sign            Progress Notes by Rehana Garcia PT at 4/4/2017  2:12 PM     Author:  Rehana Garcia PT Service:  (none) Author Type:  Physical Therapist    Filed:  4/4/2017  2:12 PM Date of Service:  4/4/2017  2:12 PM Note Created:  4/4/2017  2:12 PM    Status:  Signed :  Rehana Garcia PT (Physical Therapist)          04/04/17 1300   Quick Adds   Type of Visit Initial PT Evaluation   Living Environment   Lives With spouse   Living Arrangements house   Home Accessibility bed and bath are not on the first floor;stairs to enter home   Stair Railings at Home inside, present at both sides   Transportation Available car;family or friend will provide   Self-Care   Usual Activity Tolerance moderate   Current Activity Tolerance fair   Regular Exercise no   Equipment Currently Used at Home shower chair;walker, rolling   Activity/Exercise/Self-Care Comment Pt reported that he was independent with  "all ADL's, used a FWW to ambulate with, reports his wife does all the cooking, cleaning, grocery shopping   Functional Level Prior   Ambulation 1-->assistive equipment   Transferring 1-->assistive equipment   Toileting 1-->assistive equipment   Bathing 1-->assistive equipment   Dressing 0-->independent   Eating 0-->independent   Communication 0-->understands/communicates without difficulty   Swallowing 0-->swallows foods/liquids without difficulty   Cognition 1 - attention or memory deficits   Fall history within last six months yes   Number of times patient has fallen within last six months 1   Which of the above functional risks had a recent onset or change? ambulation;transferring;toileting;bathing;dressing   Prior Functional Level Comment Pt's wife not present, question pt's accuracy on his PLOF   General Information   Onset of Illness/Injury or Date of Surgery - Date 04/02/17   Referring Physician Yolanda Hayes   Patient/Family Goals Statement \"To get stronger so I can go home\"   Pertinent History of Current Problem (include personal factors and/or comorbidities that impact the POC) Pt is a 87 year old male admitted to hospital after fallling at home and with possible UTI. PMH includes RA, CKD, HTN, and dementia.    Precautions/Limitations fall precautions   General Observations Pt resting in bed upon arrival, agreeable to work with PT. Pt initially was very sleepy initiallly, kept eyes closed, did not become more alert until after sitting EOB.   General Info Comments Pt's SAO2 after PT session was 90 percent on RA, pt SOB.   Cognitive Status Examination   Orientation person;place   Level of Consciousness alert   Follows Commands and Answers Questions 100% of the time   Personal Safety and Judgment impaired   Memory impaired   Pain Assessment   Patient Currently in Pain (Pt reported 5/10 pain R ankle in stance)   Posture    Posture Forward head position;Protracted shoulders   Range of Motion (ROM)   ROM " "Comment Pt's B UE and B LE ROM WFL.   Strength   Strength Comments PT's B UE and B LE presented with grossly 4-/5 strength in all major muscle groups.   Bed Mobility   Bed Mobility Comments Pt required moderate assist of one person to transfer supine to sit at EOB wit HOB raised and use of bedrails.    Transfer Skills   Transfer Comments Pt required mod A x 2 for sit to and from stand transfer    Gait   Gait Comments Pt required mod A x 1 to ambulate 8 steps from bed to chair with use of FWW.   Balance   Balance Comments Pt has poor static and dynamic standing balance, required mod A x 2 to maintain upright due to very forward flexed trunk.   General Therapy Interventions   Planned Therapy Interventions balance training;bed mobility training;gait training;strengthening;transfer training;progressive activity/exercise   Clinical Impression   Criteria for Skilled Therapeutic Intervention yes, treatment indicated   PT Diagnosis difficulty with gait   Influenced by the following impairments weakness, decreased balance   Functional limitations due to impairments need for assist with all functional mobility and gait   Clinical Presentation Evolving/Changing   Clinical Presentation Rationale Pt is a 87 year old male s/p fall at home, demonstrates extremity weakness, decreased balance and requires 2 person mod assist with transfers out of bed and very short distance gait.    Clinical Decision Making (Complexity) Moderate complexity   Therapy Frequency` daily   Predicted Duration of Therapy Intervention (days/wks) 3 days   Anticipated Discharge Disposition Transitional Care Facility   Risk & Benefits of therapy have been explained Yes   Patient, Family & other staff in agreement with plan of care Yes   [facility] AM-PAC TM \"6 Clicks\"   2016, Trustees of [facility], under license to [employer].  All rights reserved.   6 Clicks Short Forms Basic Mobility Inpatient Short Form   [facility] AM-PAC  \"6 " "Clicks\" V.2 Basic Mobility Inpatient Short Form   1. Turning from your back to your side while in a flat bed without using bedrails? 3 - A Little   2. Moving from lying on your back to sitting on the side of a flat bed without using bedrails? 2 - A Lot   3. Moving to and from a bed to a chair (including a wheelchair)? 2 - A Lot   4. Standing up from a chair using your arms (e.g., wheelchair, or bedside chair)? 2 - A Lot   5. To walk in hospital room? 2 - A Lot   6. Climbing 3-5 steps with a railing? 1 - Total   Basic Mobility Raw Score (Score out of 24.Lower scores equate to lower levels of function) 12   Total Evaluation Time   Total Evaluation Time (Minutes) 10[KM1.1]        Revision History        User Key Date/Time User Provider Type Action    > KM1.1 4/4/2017  2:12 PM Rehana Garcia, PT Physical Therapist Sign            Progress Notes by Sujata Gillette RN at 4/4/2017 11:53 AM     Author:  Sujata Gillette, RN Service:  (none) Author Type:      Filed:  4/4/2017 11:54 AM Date of Service:  4/4/2017 11:53 AM Note Created:  4/4/2017 11:53 AM    Status:  Addendum :  Sujata Gillette, RN ()         Care Coordination:  Per MD pt will be ready for dc tomorrow to [facility]. Pt has been accepted at [facility].[AH1.1] [facility] updated on plan for dc tomorrow.[AH1.2]    Sujata Gillette RN CTS[AH1.1]     Revision History        User Key Date/Time User Provider Type Action    > [N/A] 4/4/2017 11:54 AM Sujata Gillette, RN Case Manager Addend     AH1.2 4/4/2017 11:54 AM Sujata Gillette, RN Case Manager Sign     AH1.1 4/4/2017 11:53 AM Sujata Gillette, RN Case Manager             Progress Notes by Taylor Castellon OT at 4/4/2017  9:14 AM     Author:  Taylor Castellon OT Service:  (none) Author Type:  Occupational Therapist    Filed:  4/4/2017  9:14 AM Date of Service:  4/4/2017  9:14 AM Note Created:  4/4/2017  9:14 AM    Status:  Signed :  Taylor Castellon OT (Occupational Therapist)          04/04/17 0901   Quick Adds   Type of " Visit Initial Occupational Therapy Evaluation   Living Environment   Lives With spouse   Living Arrangements house   Home Accessibility stairs to enter home;stairs within home;bed and bath are not on the first floor   Transportation Available car;family or friend will provide   Living Environment Comment Pt reported to live in 4 level home using rolling walker for mobility.  Wife does cooking, homemaking and driving.   Self-Care   Dominant Hand right   Usual Activity Tolerance moderate   Current Activity Tolerance fair   Regular Exercise no   Equipment Currently Used at Home walker, rolling;grab bar;raised toilet   Activity/Exercise/Self-Care Comment Pt reported to be independent in ADLs at baseline.   Functional Level Prior   Ambulation 1-->assistive equipment   Transferring 1-->assistive equipment   Toileting 1-->assistive equipment   Bathing 1-->assistive equipment   Dressing 1-->assistive equipment   Eating 1-->assistive equipment   Communication 0-->understands/communicates without difficulty   Swallowing 0-->swallows foods/liquids without difficulty   Cognition 1 - attention or memory deficits   Fall history within last six months yes   Which of the above functional risks had a recent onset or change? ambulation;transferring;toileting;bathing;fall history;cognition       Present no   General Information   Onset of Illness/Injury or Date of Surgery - Date 04/02/17   Referring Physician Dr. Yolanda Manjarrez   Patient/Family Goals Statement Pt would like to be stronger.   Additional Occupational Profile Info/Pertinent History of Current Problem Pt admitted s/p fall with weakness.  Pt found to be in A fib with RVR now converted to SR.  Possible UTI.  PMH: HTN, RA, neuropathy, CKD, dementia.   Precautions/Limitations fall precautions   General Info Comments activity:  up with assist   Cognitive Status Examination   Orientation orientation to person, place and time   Level of Consciousness alert    Able to Follow Commands WNL/WFL   Personal Safety (Cognitive) WNL/WFL   Memory impaired   Organization/Problem Solving Sequencing impaired;Problem solving impaired   Executive Function Working memory impaired, decreased storage of information for performing tasks;Self awareness/monitoring impaired   Sensory Examination   Sensory Quick Adds No deficits were identified   Pain Assessment   Patient Currently in Pain Yes, see Vital Sign flowsheet  (neck, B knees and ankles)   Integumentary/Edema   Integumentary/Edema no deficits were identifed   Posture   Posture forward head position   Range of Motion (ROM)   ROM Quick Adds No deficits were identified   ROM Comment B UE AROM WFL   Strength   Manual Muscle Testing Quick Adds Other   Strength Comments B UE strength grossly 4/5   Hand Strength   Hand Strength Comments WFL   Muscle Tone Assessment   Muscle Tone Quick Adds No deficits were identified   Coordination   Upper Extremity Coordination No deficits were identified   Mobility   Bed Mobility Comments max A   Transfer Skill: Bed to Chair/Chair to Bed   Level of [place]: Bed to Chair moderate assist (50% patients effort)   Physical Assist/Nonphysical Assist: Bed to Chair verbal cues   Assistive Device - Transfer Skill Bed to Chair Chair to Bed Rehab Eval rolling walker   Transfer Skill: Sit to Stand   Level of [place]: Sit/Stand moderate assist (50% patients effort)   Physical Assist/Nonphysical Assist: Sit/Stand verbal cues   Assistive Device for Transfer: Sit/Stand rolling walker   Lower Body Dressing   Level of [place]: Dress Lower Body maximum assist (25% patients effort)   Physical Assist/Nonphysical Assist: Dress Lower Body set-up required   Grooming   Level of [place]: Grooming minimum assist (75% patients effort)   Physical Assist/Nonphysical Assist: Grooming set-up required  (sitting up in chair)   Eating/Self Feeding   Level of [place]: Eating independent   Physical  "Assist/Nonphysical Assist: Eating set-up required   Instrumental Activities of Daily Living (IADL)   Previous Responsibilities finances   Activities of Daily Living Analysis   Impairments Contributing to Impaired Activities of Daily Living balance impaired;cognition impaired;pain;strength decreased   General Therapy Interventions   Planned Therapy Interventions ADL retraining;strengthening;transfer training   Clinical Impression   Criteria for Skilled Therapeutic Interventions Met yes, treatment indicated   OT Diagnosis decreased ADL performance   Influenced by the following impairments decreased strength, balance, cognition   Assessment of Occupational Performance 1-3 Performance Deficits   Identified Performance Deficits Pt with decreased ability to manage dressing, toileting, bathing   Clinical Decision Making (Complexity) Low complexity   Therapy Frequency daily   Predicted Duration of Therapy Intervention (days/wks) 3 days   Anticipated Discharge Disposition Transitional Care Facility   Risks and Benefits of Treatment have been explained. Yes   Patient, Family & other staff in agreement with plan of care Yes   [facility] TM \"6 Clicks\"   2016, Trustees of [facility], under license to [employer].  All rights reserved.   6 Clicks Short Forms Daily Activity Inpatient Short Form   [facility]  \"6 Clicks\" Daily Activity Inpatient Short Form   1. Putting on and taking off regular lower body clothing? 2 - A Lot   2. Bathing (including washing, rinsing, drying)? 2 - A Lot   3. Toileting, which includes using toilet, bedpan or urinal? 2 - A Lot   4. Putting on and taking off regular upper body clothing? 3 - A Little   5. Taking care of personal grooming such as brushing teeth? 3 - A Little   6. Eating meals? 4 - None   Daily Activity Raw Score (Score out of 24.Lower scores equate to lower levels of function) 16   Total Evaluation Time   Total Evaluation Time (Minutes) 10[KJ1.1]        " Revision History        User Key Date/Time User Provider Type Action    > KJ1.1 4/4/2017  9:14 AM Tayolr Castellon OT Occupational Therapist Sign            Progress Notes by Yolanda Hayes MD at 4/3/2017  1:46 PM     Author:  Yolanda Hayes MD Service:  Hospitalist Author Type:  Physician    Filed:  4/3/2017  2:40 PM Date of Service:  4/3/2017  1:46 PM Note Created:  4/3/2017  1:46 PM    Status:  Signed :  Yolanda Hayes MD (Physician)         [facility]  Hospitalist Progress Note  Patient Name: Jacob Easton    MRN: [id number]  Provider:  Yolanda Hayes MD  Date:04/03/2017      Initial presenting complaint/issue to hospital (Diagnosis): weakness, fall     Summary of Stay:Pt is a 87 yr old with h/o RA on mtx, CKD, HTN, dementia who presented on 4/2/2017 with weakness and fall.[RS1.1] He was apparently down for ~17 hrs prior to having neighbours called to put him up on the bed then he was unable to move from the bed so EMS was called.[RS1.2] extensive w/u with the ER was -ve for infectious etiology however pt did develop LGF, new A fib w/ RVR and appeared to have dirty urine so was started on IV ceftriaxone so developing sepsis syndrome.   Working with Pt/OT, will likely need TCU on dc[RS1.3]         Assessment and Plan:      Weakness and falls: unclear cause but he is weaker than his baseline for 1-2 days resulting in fall. He was unable to get up so his wife called neighbors who raised him to a bed however pt called EMS because he was unable to get out of bed. He denies any symptoms. CXR in the ER shows b/l infiltrates but these have been present for many years.  --blood cx pending  --TTE[RS1.1] today[RS1.2] to evaluate valves, EF, volm   --PT/OT eval[RS1.1]  --UA appears infected so started on IV rocephin[RS1.2]      A fib w/ RVR:[RS1.1] no prior h/o A fib per chart review. He was given a dose of IV metoprolol and resumed on PTA PO metoprolol with good control.  "Possibly driven by underlying infectious process  --cont card monitoring  --cont PO metoprolol  --treat underlying infection    Possible UTI: pt developed LGF, weakness and urine appeared dirty. On IV ceftriaxone until Ucx returns[RS1.2]     HTN: resume metoprolol , hctz, amlodipine      RA: hold MTX for now until infection is ruled out     Neuropathy: gabapentin     DVT Prophylaxis: heparin SQ  Code Status: DNR / DNI  Discharge Dispo: TCU  Estimated Disch Date / # of Days until Discharge: 1-2 more days         Interval History:      Feels better today[RS1.1]     Per RN pt is still needing significant assist with movement[RS1.2]     Data reviewed today: I reviewed all new labs and imaging reports over the last 24 hours. I personally reviewed[RS1.1] no images or EKG's today[RS1.2].         Physical Exam:      Last Vital Signs:  /69 (BP Location: Right arm)  Temp 96.5  F (35.8  C) (Oral)  Resp 20  Ht 1.88 m (6' 2\")  Wt 93.2 kg (205 lb 8 oz)  SpO2 92%  BMI 26.38 kg/m2[RS1.1]   GEN:  Alert, oriented x 2, appears comfortable, NAD.  HEENT:  Normocephalic/atraumatic, no scleral icterus, no nasal discharge, mouth moist.  CV:  Regular rate and rhythm, no murmur or JVD.  S1 + S2 noted, no S3 or S4.  LUNGS:  Rales b/l  ABD:  Active bowel sounds, soft, non-tender/non-distended.  No rebound/guarding/rigidity.  EXT:  No edema.  No cyanosis.  No joint synovitis noted.  SKIN:  Dry to touch, no exanthems noted in the visualized areas.[RS1.2]           Medications:      All current medications were reviewed.         Data:      All new lab and imaging data was reviewed.   Data[RS1.1]       Recent Labs  Lab 04/03/17  0651 04/02/17  1818 04/02/17  1155   WBC 8.9  --  8.0   HGB 12.2*  --  12.4*   HCT 37.8*  --  37.6*   MCV 97  --  96    275 303       Recent Labs  Lab 04/03/17  0651 04/02/17  1155    137   POTASSIUM 3.2* 2.9*   CHLORIDE 100 96   CO2 30 32   ANIONGAP 9 9   * 106*   BUN 27 29   CR 1.27* 1.39* " "  GFRESTIMATED 54* 48*   GFRESTBLACK 65 58*   MERCEDES 8.6 8.9[RS1.4]       No results found for this or any previous visit (from the past 24 hour(s)).    Yolanda Hayes MD  Hospitalist  [facility]  [address] [place] [address][RS1.1]       Revision History        User Key Date/Time User Provider Type Action    > RS1.3 4/3/2017  2:40 PM Yolanda Hayes MD Physician Sign     RS1.4 4/3/2017  2:35 PM Yolanda Hayes MD Physician      RS1.2 4/3/2017  2:31 PM Yolanda Hayes MD Physician      RS1.1 4/3/2017  1:46 PM Yolanda Hayes MD Physician             Progress Notes by Kaylen Lizama RT at 4/3/2017  1:39 AM     Author:  Kaylen Lizama RT Service:  Respiratory Therapy Author Type:  Respiratory Therapist    Filed:  4/3/2017  1:41 AM Date of Service:  4/3/2017  1:39 AM Note Created:  4/3/2017  1:39 AM    Status:  Signed :  Kaylen Lizama RT (Respiratory Therapist)         Date:[KT1.1] 4/3/2017[KT1.2]  Admission Dx: Weakness and falls, hypertension, RA  Pulmonary History: Pulmonary Vascular Congestion  Home Nebulizer/MDI Use: None  Home Oxygen: None  Acuity Level (RCAT flow sheet): Level 4/ prn    Aerosol Therapy initiated: Duoneb Q4 prn    Pulmonary Hygiene initiated: Deep breathe and cough    Volume Expansion initiated: Incentive Spirometer     Current Oxygen Requirements: 2LPM Nc    Current SpO2: 95%    Re-evaluation date: 6 April 2017    Patient Education: Patient informed of medication benefits and possible side effects.    See \"RT Assessments\" flow sheet for patient assessment scoring and Acuity Level Details.[KT1.1]       Kaylen Lizama[KT1.3] RRT[KT1.1]  4/3/2017[KT1.3]         Revision History        User Key Date/Time User Provider Type Action    > KT1.3 4/3/2017  1:41 AM Lizama, Kaylen E, RT Respiratory Therapist Sign     KT1.2 4/3/2017  1:40 AM Kaylen Lizama, RT Respiratory Therapist      KT1.1 4/3/2017  1:39 " Kaylen Kinsey, RT Respiratory Therapist                   Procedure Notes     No notes of this type exist for this encounter.         Progress Notes - Therapies (Notes from 04/03/17 through 04/06/17)      Progress Notes by Rehana Garcia PT at 4/4/2017  2:12 PM     Author:  Rehana Garcia PT Service:  (none) Author Type:  Physical Therapist    Filed:  4/4/2017  2:12 PM Date of Service:  4/4/2017  2:12 PM Note Created:  4/4/2017  2:12 PM    Status:  Signed :  Rehana Garcia PT (Physical Therapist)          04/04/17 1300   Quick Adds   Type of Visit Initial PT Evaluation   Living Environment   Lives With spouse   Living Arrangements house   Home Accessibility bed and bath are not on the first floor;stairs to enter home   Stair Railings at Home inside, present at both sides   Transportation Available car;family or friend will provide   Self-Care   Usual Activity Tolerance moderate   Current Activity Tolerance fair   Regular Exercise no   Equipment Currently Used at Home shower chair;walker, rolling   Activity/Exercise/Self-Care Comment Pt reported that he was independent with all ADL's, used a FWW to ambulate with, reports his wife does all the cooking, cleaning, grocery shopping   Functional Level Prior   Ambulation 1-->assistive equipment   Transferring 1-->assistive equipment   Toileting 1-->assistive equipment   Bathing 1-->assistive equipment   Dressing 0-->independent   Eating 0-->independent   Communication 0-->understands/communicates without difficulty   Swallowing 0-->swallows foods/liquids without difficulty   Cognition 1 - attention or memory deficits   Fall history within last six months yes   Number of times patient has fallen within last six months 1   Which of the above functional risks had a recent onset or change? ambulation;transferring;toileting;bathing;dressing   Prior Functional Level Comment Pt's wife not present, question pt's accuracy on his PLOF   General Information   Onset of  "Illness/Injury or Date of Surgery - Date 04/02/17   Referring Physician Yolanda Hayes   Patient/Family Goals Statement \"To get stronger so I can go home\"   Pertinent History of Current Problem (include personal factors and/or comorbidities that impact the POC) Pt is a 87 year old male admitted to hospital after fallling at home and with possible UTI. PMH includes RA, CKD, HTN, and dementia.    Precautions/Limitations fall precautions   General Observations Pt resting in bed upon arrival, agreeable to work with PT. Pt initially was very sleepy initiallly, kept eyes closed, did not become more alert until after sitting EOB.   General Info Comments Pt's SAO2 after PT session was 90 percent on RA, pt SOB.   Cognitive Status Examination   Orientation person;place   Level of Consciousness alert   Follows Commands and Answers Questions 100% of the time   Personal Safety and Judgment impaired   Memory impaired   Pain Assessment   Patient Currently in Pain (Pt reported 5/10 pain R ankle in stance)   Posture    Posture Forward head position;Protracted shoulders   Range of Motion (ROM)   ROM Comment Pt's B UE and B LE ROM WFL.   Strength   Strength Comments PT's B UE and B LE presented with grossly 4-/5 strength in all major muscle groups.   Bed Mobility   Bed Mobility Comments Pt required moderate assist of one person to transfer supine to sit at EOB wit HOB raised and use of bedrails.    Transfer Skills   Transfer Comments Pt required mod A x 2 for sit to and from stand transfer    Gait   Gait Comments Pt required mod A x 1 to ambulate 8 steps from bed to chair with use of FWW.   Balance   Balance Comments Pt has poor static and dynamic standing balance, required mod A x 2 to maintain upright due to very forward flexed trunk.   General Therapy Interventions   Planned Therapy Interventions balance training;bed mobility training;gait training;strengthening;transfer training;progressive activity/exercise   Clinical Impression " "  Criteria for Skilled Therapeutic Intervention yes, treatment indicated   PT Diagnosis difficulty with gait   Influenced by the following impairments weakness, decreased balance   Functional limitations due to impairments need for assist with all functional mobility and gait   Clinical Presentation Evolving/Changing   Clinical Presentation Rationale Pt is a 87 year old male s/p fall at home, demonstrates extremity weakness, decreased balance and requires 2 person mod assist with transfers out of bed and very short distance gait.    Clinical Decision Making (Complexity) Moderate complexity   Therapy Frequency` daily   Predicted Duration of Therapy Intervention (days/wks) 3 days   Anticipated Discharge Disposition Transitional Care Facility   Risk & Benefits of therapy have been explained Yes   Patient, Family & other staff in agreement with plan of care Yes   [facility] AM-PAC TM \"6 Clicks\"   2016, Trustees of [facility], under license to [employer].  All rights reserved.   6 Clicks Short Forms Basic Mobility Inpatient Short Form   [facility] AM-PAC  \"6 Clicks\" V.2 Basic Mobility Inpatient Short Form   1. Turning from your back to your side while in a flat bed without using bedrails? 3 - A Little   2. Moving from lying on your back to sitting on the side of a flat bed without using bedrails? 2 - A Lot   3. Moving to and from a bed to a chair (including a wheelchair)? 2 - A Lot   4. Standing up from a chair using your arms (e.g., wheelchair, or bedside chair)? 2 - A Lot   5. To walk in hospital room? 2 - A Lot   6. Climbing 3-5 steps with a railing? 1 - Total   Basic Mobility Raw Score (Score out of 24.Lower scores equate to lower levels of function) 12   Total Evaluation Time   Total Evaluation Time (Minutes) 10[KM1.1]        Revision History        User Key Date/Time User Provider Type Action    > KM1.1 4/4/2017  2:12 PM Rehana Garcia, PT Physical Therapist Sign            Progress Notes by " Taylor Castellon OT at 4/4/2017  9:14 AM     Author:  Taylor Castellon OT Service:  (none) Author Type:  Occupational Therapist    Filed:  4/4/2017  9:14 AM Date of Service:  4/4/2017  9:14 AM Note Created:  4/4/2017  9:14 AM    Status:  Signed :  Taylor Castellon OT (Occupational Therapist)          04/04/17 0901   Quick Adds   Type of Visit Initial Occupational Therapy Evaluation   Living Environment   Lives With spouse   Living Arrangements house   Home Accessibility stairs to enter home;stairs within home;bed and bath are not on the first floor   Transportation Available car;family or friend will provide   Living Environment Comment Pt reported to live in 4 level home using rolling walker for mobility.  Wife does cooking, homemaking and driving.   Self-Care   Dominant Hand right   Usual Activity Tolerance moderate   Current Activity Tolerance fair   Regular Exercise no   Equipment Currently Used at Home walker, rolling;grab bar;raised toilet   Activity/Exercise/Self-Care Comment Pt reported to be independent in ADLs at baseline.   Functional Level Prior   Ambulation 1-->assistive equipment   Transferring 1-->assistive equipment   Toileting 1-->assistive equipment   Bathing 1-->assistive equipment   Dressing 1-->assistive equipment   Eating 1-->assistive equipment   Communication 0-->understands/communicates without difficulty   Swallowing 0-->swallows foods/liquids without difficulty   Cognition 1 - attention or memory deficits   Fall history within last six months yes   Which of the above functional risks had a recent onset or change? ambulation;transferring;toileting;bathing;fall history;cognition       Present no   General Information   Onset of Illness/Injury or Date of Surgery - Date 04/02/17   Referring Physician Dr. Yolanda Manjarrez   Patient/Family Goals Statement Pt would like to be stronger.   Additional Occupational Profile Info/Pertinent History of Current Problem Pt admitted s/p  fall with weakness.  Pt found to be in A fib with RVR now converted to SR.  Possible UTI.  PMH: HTN, RA, neuropathy, CKD, dementia.   Precautions/Limitations fall precautions   General Info Comments activity:  up with assist   Cognitive Status Examination   Orientation orientation to person, place and time   Level of Consciousness alert   Able to Follow Commands WNL/WFL   Personal Safety (Cognitive) WNL/WFL   Memory impaired   Organization/Problem Solving Sequencing impaired;Problem solving impaired   Executive Function Working memory impaired, decreased storage of information for performing tasks;Self awareness/monitoring impaired   Sensory Examination   Sensory Quick Adds No deficits were identified   Pain Assessment   Patient Currently in Pain Yes, see Vital Sign flowsheet  (neck, B knees and ankles)   Integumentary/Edema   Integumentary/Edema no deficits were identifed   Posture   Posture forward head position   Range of Motion (ROM)   ROM Quick Adds No deficits were identified   ROM Comment B UE AROM WFL   Strength   Manual Muscle Testing Quick Adds Other   Strength Comments B UE strength grossly 4/5   Hand Strength   Hand Strength Comments WFL   Muscle Tone Assessment   Muscle Tone Quick Adds No deficits were identified   Coordination   Upper Extremity Coordination No deficits were identified   Mobility   Bed Mobility Comments max A   Transfer Skill: Bed to Chair/Chair to Bed   Level of [place]: Bed to Chair moderate assist (50% patients effort)   Physical Assist/Nonphysical Assist: Bed to Chair verbal cues   Assistive Device - Transfer Skill Bed to Chair Chair to Bed Rehab Eval rolling walker   Transfer Skill: Sit to Stand   Level of [place]: Sit/Stand moderate assist (50% patients effort)   Physical Assist/Nonphysical Assist: Sit/Stand verbal cues   Assistive Device for Transfer: Sit/Stand rolling walker   Lower Body Dressing   Level of [place]: Dress Lower Body maximum assist (25% patients  "effort)   Physical Assist/Nonphysical Assist: Dress Lower Body set-up required   Grooming   Level of [place]: Grooming minimum assist (75% patients effort)   Physical Assist/Nonphysical Assist: Grooming set-up required  (sitting up in chair)   Eating/Self Feeding   Level of [place]: Eating independent   Physical Assist/Nonphysical Assist: Eating set-up required   Instrumental Activities of Daily Living (IADL)   Previous Responsibilities finances   Activities of Daily Living Analysis   Impairments Contributing to Impaired Activities of Daily Living balance impaired;cognition impaired;pain;strength decreased   General Therapy Interventions   Planned Therapy Interventions ADL retraining;strengthening;transfer training   Clinical Impression   Criteria for Skilled Therapeutic Interventions Met yes, treatment indicated   OT Diagnosis decreased ADL performance   Influenced by the following impairments decreased strength, balance, cognition   Assessment of Occupational Performance 1-3 Performance Deficits   Identified Performance Deficits Pt with decreased ability to manage dressing, toileting, bathing   Clinical Decision Making (Complexity) Low complexity   Therapy Frequency daily   Predicted Duration of Therapy Intervention (days/wks) 3 days   Anticipated Discharge Disposition Transitional Care Facility   Risks and Benefits of Treatment have been explained. Yes   Patient, Family & other staff in agreement with plan of care Yes   [facility] TM \"6 Clicks\"   2016, Trustees of [facility], under license to [employer].  All rights reserved.   6 Clicks Short Forms Daily Activity Inpatient Short Form   [facility]-PAC  \"6 Clicks\" Daily Activity Inpatient Short Form   1. Putting on and taking off regular lower body clothing? 2 - A Lot   2. Bathing (including washing, rinsing, drying)? 2 - A Lot   3. Toileting, which includes using toilet, bedpan or urinal? 2 - A Lot   4. Putting on and " "taking off regular upper body clothing? 3 - A Little   5. Taking care of personal grooming such as brushing teeth? 3 - A Little   6. Eating meals? 4 - None   Daily Activity Raw Score (Score out of 24.Lower scores equate to lower levels of function) 16   Total Evaluation Time   Total Evaluation Time (Minutes) 10[KJ1.1]        Revision History        User Key Date/Time User Provider Type Action    > KJ1.1 4/4/2017  9:14 AM Taylor Castellon OT Occupational Therapist Sign            Progress Notes by Kaylen Lizama RT at 4/3/2017  1:39 AM     Author:  Kaylen Lizama RT Service:  Respiratory Therapy Author Type:  Respiratory Therapist    Filed:  4/3/2017  1:41 AM Date of Service:  4/3/2017  1:39 AM Note Created:  4/3/2017  1:39 AM    Status:  Signed :  Kaylen Lizama RT (Respiratory Therapist)         Date:[KT1.1] 4/3/2017[KT1.2]  Admission Dx: Weakness and falls, hypertension, RA  Pulmonary History: Pulmonary Vascular Congestion  Home Nebulizer/MDI Use: None  Home Oxygen: None  Acuity Level (RCAT flow sheet): Level 4/ prn    Aerosol Therapy initiated: Duoneb Q4 prn    Pulmonary Hygiene initiated: Deep breathe and cough    Volume Expansion initiated: Incentive Spirometer     Current Oxygen Requirements: 2LPM Nc    Current SpO2: 95%    Re-evaluation date: 6 April 2017    Patient Education: Patient informed of medication benefits and possible side effects.    See \"RT Assessments\" flow sheet for patient assessment scoring and Acuity Level Details.[KT1.1]       Kaylen Lizama[KT1.3] RRT[KT1.1]  4/3/2017[KT1.3]         Revision History        User Key Date/Time User Provider Type Action    > KT1.3 4/3/2017  1:41 AM Kaylen Lizama RT Respiratory Therapist Sign     KT1.2 4/3/2017  1:40 AM Kaylen Lizama RT Respiratory Therapist      KT1.1 4/3/2017  1:39 AM Kaylen Lizama RT Respiratory Therapist             "

## 2017-04-03 LAB
ALBUMIN SERPL-MCNC: 2.6 G/DL (ref 3.4–5)
ALP SERPL-CCNC: 53 U/L (ref 40–150)
ALT SERPL W P-5'-P-CCNC: 23 U/L (ref 0–70)
ANION GAP SERPL CALCULATED.3IONS-SCNC: 9 MMOL/L (ref 3–14)
AST SERPL W P-5'-P-CCNC: 37 U/L (ref 0–45)
BACTERIA SPEC CULT: NO GROWTH
BASOPHILS # BLD AUTO: 0 10E9/L (ref 0–0.2)
BASOPHILS NFR BLD AUTO: 0.4 %
BILIRUB DIRECT SERPL-MCNC: 0.2 MG/DL (ref 0–0.2)
BILIRUB SERPL-MCNC: 0.8 MG/DL (ref 0.2–1.3)
BUN SERPL-MCNC: 27 MG/DL (ref 7–30)
CALCIUM SERPL-MCNC: 8.6 MG/DL (ref 8.5–10.1)
CHLORIDE SERPL-SCNC: 100 MMOL/L (ref 94–109)
CO2 SERPL-SCNC: 30 MMOL/L (ref 20–32)
CREAT SERPL-MCNC: 1.27 MG/DL (ref 0.66–1.25)
DIFFERENTIAL METHOD BLD: ABNORMAL
EOSINOPHIL # BLD AUTO: 0.1 10E9/L (ref 0–0.7)
EOSINOPHIL NFR BLD AUTO: 1.5 %
ERYTHROCYTE [DISTWIDTH] IN BLOOD BY AUTOMATED COUNT: 14.1 % (ref 10–15)
FLUAV+FLUBV AG SPEC QL: NEGATIVE
FLUAV+FLUBV AG SPEC QL: NORMAL
GFR SERPL CREATININE-BSD FRML MDRD: 54 ML/MIN/1.7M2
GLUCOSE SERPL-MCNC: 118 MG/DL (ref 70–99)
HCT VFR BLD AUTO: 37.8 % (ref 40–53)
HGB BLD-MCNC: 12.2 G/DL (ref 13.3–17.7)
IMM GRANULOCYTES # BLD: 0.1 10E9/L (ref 0–0.4)
IMM GRANULOCYTES NFR BLD: 0.7 %
INTERPRETATION ECG - MUSE: NORMAL
INTERPRETATION ECG - MUSE: NORMAL
LYMPHOCYTES # BLD AUTO: 0.6 10E9/L (ref 0.8–5.3)
LYMPHOCYTES NFR BLD AUTO: 7.1 %
Lab: NORMAL
MAGNESIUM SERPL-MCNC: 2.3 MG/DL (ref 1.6–2.3)
MCH RBC QN AUTO: 31.2 PG (ref 26.5–33)
MCHC RBC AUTO-ENTMCNC: 32.3 G/DL (ref 31.5–36.5)
MCV RBC AUTO: 97 FL (ref 78–100)
MICRO REPORT STATUS: NORMAL
MONOCYTES # BLD AUTO: 1 10E9/L (ref 0–1.3)
MONOCYTES NFR BLD AUTO: 11.4 %
NEUTROPHILS # BLD AUTO: 7.1 10E9/L (ref 1.6–8.3)
NEUTROPHILS NFR BLD AUTO: 78.9 %
NRBC # BLD AUTO: 0 10*3/UL
NRBC BLD AUTO-RTO: 0 /100
PLATELET # BLD AUTO: 293 10E9/L (ref 150–450)
POTASSIUM SERPL-SCNC: 3.2 MMOL/L (ref 3.4–5.3)
POTASSIUM SERPL-SCNC: 3.7 MMOL/L (ref 3.4–5.3)
PROT SERPL-MCNC: 6.6 G/DL (ref 6.8–8.8)
RBC # BLD AUTO: 3.91 10E12/L (ref 4.4–5.9)
SODIUM SERPL-SCNC: 139 MMOL/L (ref 133–144)
SPECIMEN SOURCE: NORMAL
SPECIMEN SOURCE: NORMAL
WBC # BLD AUTO: 8.9 10E9/L (ref 4–11)

## 2017-04-03 PROCEDURE — 80048 BASIC METABOLIC PNL TOTAL CA: CPT | Performed by: HOSPITALIST

## 2017-04-03 PROCEDURE — 80076 HEPATIC FUNCTION PANEL: CPT | Performed by: HOSPITALIST

## 2017-04-03 PROCEDURE — 93010 ELECTROCARDIOGRAM REPORT: CPT | Mod: 76 | Performed by: INTERNAL MEDICINE

## 2017-04-03 PROCEDURE — 40000274 ZZH STATISTIC RCP CONSULT EA 30 MIN

## 2017-04-03 PROCEDURE — 83735 ASSAY OF MAGNESIUM: CPT | Performed by: HOSPITALIST

## 2017-04-03 PROCEDURE — 40000275 ZZH STATISTIC RCP TIME EA 10 MIN

## 2017-04-03 PROCEDURE — 99233 SBSQ HOSP IP/OBS HIGH 50: CPT | Performed by: HOSPITALIST

## 2017-04-03 PROCEDURE — A9270 NON-COVERED ITEM OR SERVICE: HCPCS | Mod: GY | Performed by: HOSPITALIST

## 2017-04-03 PROCEDURE — 25000132 ZZH RX MED GY IP 250 OP 250 PS 637: Mod: GY | Performed by: HOSPITALIST

## 2017-04-03 PROCEDURE — 36415 COLL VENOUS BLD VENIPUNCTURE: CPT | Performed by: HOSPITALIST

## 2017-04-03 PROCEDURE — 25000128 H RX IP 250 OP 636: Performed by: HOSPITALIST

## 2017-04-03 PROCEDURE — 25000125 ZZHC RX 250

## 2017-04-03 PROCEDURE — 87804 INFLUENZA ASSAY W/OPTIC: CPT | Performed by: HOSPITALIST

## 2017-04-03 PROCEDURE — 85025 COMPLETE CBC W/AUTO DIFF WBC: CPT | Performed by: HOSPITALIST

## 2017-04-03 PROCEDURE — 12000007 ZZH R&B INTERMEDIATE

## 2017-04-03 PROCEDURE — 93005 ELECTROCARDIOGRAM TRACING: CPT

## 2017-04-03 PROCEDURE — 84132 ASSAY OF SERUM POTASSIUM: CPT | Performed by: HOSPITALIST

## 2017-04-03 RX ORDER — IPRATROPIUM BROMIDE AND ALBUTEROL SULFATE 2.5; .5 MG/3ML; MG/3ML
3 SOLUTION RESPIRATORY (INHALATION)
Status: DISCONTINUED | OUTPATIENT
Start: 2017-04-03 | End: 2017-04-03

## 2017-04-03 RX ORDER — IPRATROPIUM BROMIDE AND ALBUTEROL SULFATE 2.5; .5 MG/3ML; MG/3ML
3 SOLUTION RESPIRATORY (INHALATION) EVERY 4 HOURS PRN
Status: DISCONTINUED | OUTPATIENT
Start: 2017-04-03 | End: 2017-04-06 | Stop reason: HOSPADM

## 2017-04-03 RX ORDER — CEFTRIAXONE 1 G/1
1 INJECTION, POWDER, FOR SOLUTION INTRAMUSCULAR; INTRAVENOUS EVERY 24 HOURS
Status: DISCONTINUED | OUTPATIENT
Start: 2017-04-03 | End: 2017-04-04

## 2017-04-03 RX ORDER — METOPROLOL TARTRATE 25 MG/1
25 TABLET, FILM COATED ORAL 2 TIMES DAILY
Status: DISCONTINUED | OUTPATIENT
Start: 2017-04-03 | End: 2017-04-06 | Stop reason: HOSPADM

## 2017-04-03 RX ORDER — FERROUS SULFATE 325(65) MG
325 TABLET ORAL
COMMUNITY
End: 2017-06-01

## 2017-04-03 RX ADMIN — HEPARIN SODIUM 5000 UNITS: 10000 INJECTION, SOLUTION INTRAVENOUS; SUBCUTANEOUS at 21:42

## 2017-04-03 RX ADMIN — ACETAMINOPHEN 650 MG: 325 TABLET, FILM COATED ORAL at 12:44

## 2017-04-03 RX ADMIN — ACETAMINOPHEN 650 MG: 325 TABLET, FILM COATED ORAL at 00:46

## 2017-04-03 RX ADMIN — HEPARIN SODIUM 5000 UNITS: 10000 INJECTION, SOLUTION INTRAVENOUS; SUBCUTANEOUS at 08:53

## 2017-04-03 RX ADMIN — METOPROLOL TARTRATE 12.5 MG: 25 TABLET, FILM COATED ORAL at 08:53

## 2017-04-03 RX ADMIN — METOPROLOL TARTRATE 25 MG: 25 TABLET ORAL at 21:42

## 2017-04-03 RX ADMIN — CEFTRIAXONE 1 G: 1 INJECTION, POWDER, FOR SOLUTION INTRAMUSCULAR; INTRAVENOUS at 08:39

## 2017-04-03 RX ADMIN — FOLIC ACID 1 MG: 1 TABLET ORAL at 08:53

## 2017-04-03 RX ADMIN — GABAPENTIN 100 MG: 100 CAPSULE ORAL at 21:41

## 2017-04-03 RX ADMIN — ASPIRIN 81 MG: 81 TABLET, COATED ORAL at 08:53

## 2017-04-03 RX ADMIN — POTASSIUM CHLORIDE 20 MEQ: 1500 TABLET, EXTENDED RELEASE ORAL at 13:43

## 2017-04-03 RX ADMIN — LEVOTHYROXINE SODIUM 50 MCG: 50 TABLET ORAL at 08:53

## 2017-04-03 RX ADMIN — TAMSULOSIN HYDROCHLORIDE 0.4 MG: 0.4 CAPSULE ORAL at 21:41

## 2017-04-03 RX ADMIN — LATANOPROST 1 DROP: 50 SOLUTION/ DROPS OPHTHALMIC at 21:42

## 2017-04-03 RX ADMIN — METOPROLOL TARTRATE 5 MG: 5 INJECTION INTRAVENOUS at 09:53

## 2017-04-03 RX ADMIN — POTASSIUM CHLORIDE 40 MEQ: 1500 TABLET, EXTENDED RELEASE ORAL at 08:53

## 2017-04-03 NOTE — H&P
[facility]  Hospitalist Admission Note  Name: Jacob Easton    MRN: [id number]  YOB: 1929    Age: 87 year old  Date of admission: 4/2/2017              Brief patient summary: Pt is a 87 yr old with h/o RA on mtx, CKD, HTN, dementia who presented on 4/2/2017 with weakness and fall.          Assessment and Plan:   Weakness and falls: unclear cause but he is weaker than his baseline for 1-2 days resulting in fall. He was unable to get up so his wife called neighbors who raised him to a bed however pt called EMS because he was unable to get out of bed. He denies any symptoms. CXR in the ER shows b/l infiltrates but these have been present for many years.  --UA pending, blood cx pending  --TTE in the AM to evaluate valves, EF, volm   --PT/OT eval    HTN: resume metoprolol , hctz, amlodipine     RA: hold MTX for now until infection is ruled out    Neuropathy: gabapentin     DVT Prophylaxis: Heparin SQ  Discharge Dispo: will likely need TCU  Estimated Disch Date / # of Days until Disch: 2-3 days  DNR / DNI    Chief Complaint: falls, weakness         History of Present Illness:   Discussed with ER physician : Dr Young    Jacob Easton is a 87 year old male who presents with weakness x2 days and fall.    In the morning yesterday, the patient was feeling generally weak. In the afternoon, he was in his bedroom walking with his walker when he fell forward. The patient subsequently has had bilateral hip and ankle pain, but notes that it is worse on the right side. After the fall, the patient was unable to get up on his own and required the help of two neighbors to get into his bed. He has not taken any medications for his pain. The patient has had slight shortness of breath recently but denies any fevers, cough, chest pain, abdominal pain, nausea, vomiting, diarrhea, or headaches.            Past Medical History:     Patient Active Problem List   Diagnosis     Pain in joint, pelvic region and  thigh     Contusion of hip     Jaw pain     Weakness     Hypokalemia due to hydrochlorothiazide     Fall     Arthritis     BPH (benign prostatic hyperplasia)     HTN (hypertension)     Physical deconditioning     Neuropathic pain of both feet (H)     Pulmonary vascular congestion     Benign essential hypertension     Acquired hypothyroidism     Acute kidney failure (H)     Rheumatoid arthritis involving both hands (H)     Acute renal failure (ARF) (H)     Hypercalcemia     Dysphagia     Urinary tract infection without hematuria, site unspecified     Recurrent cold sores     Elevated temperature     TIA (transient ischemic attack)     Right wrist drop      Past Medical History:   Diagnosis Date     Arthritis      Constipation      Hypertension      Hypocalcemia      Neuromuscular disorder (H)      Thyroid disease                 Past Surgical History:     Past Surgical History:   Procedure Laterality Date     C TOTAL HIP ARTHROPLASTY  9/24/11    RT               Home Medications:     Prior to Admission medications    Medication Sig Last Dose Taking? Auth Provider   levothyroxine (SYNTHROID, LEVOTHROID) 50 MCG tablet Take 1 tablet by mouth daily   Reported, Patient   amLODIPine (NORVASC) 5 MG tablet Take 1 tablet by mouth daily   Reported, Patient   hydrochlorothiazide (HYDRODIURIL) 25 MG tablet Take 1 tablet by mouth daily   Reported, Patient   latanoprost (XALATAN) 0.005 % ophthalmic solution Place 1 drop into the right eye daily   Reported, Patient   Multiple Vitamins-Minerals (CERTAVITE SENIOR/ANTIOXIDANT) TABS Take 1 tablet by mouth daily   Reported, Patient   tuberculin (APLISOL) 5 UNIT/0.1ML injection Inject 5 Units into the skin once   Reported, Patient   GABAPENTIN PO Take 100 mg by mouth At Bedtime   Reported, Patient   acetaminophen (TYLENOL) 500 MG tablet Take 500 mg by mouth 4 times daily as needed for mild pain   Reported, Patient   senna-docusate (SENOKOT-S;PERICOLACE) 8.6-50 MG per tablet Take 1 tablet  "by mouth 2 times daily as needed for constipation   Reported, Patient   metoprolol (LOPRESSOR) 12.5 MG TABS Take 12.5 mg by mouth 2 times daily If AP <60 or SBP <100, hold and call MD   Reported, Patient   methotrexate 2.5 MG tablet Take 10 mg by mouth once a week. Takes on Mondays    Unknown, Entered By History   ASPIRIN EC PO Take 81 mg by mouth daily.     Unknown, Entered By History   folic acid (FOLVITE) 1 MG tablet Take 1 mg by mouth daily.     Unknown, Entered By History   omega-3 fatty acids (FISH OIL) 1200 MG capsule Take 1 capsule by mouth daily.     Unknown, Entered By History   tamsulosin (FLOMAX) 0.4 MG 24 hr capsule Take 0.4 mg by mouth At Bedtime.     Unknown, Entered By History            Allergies:     Allergies   Allergen Reactions     Advil [Ibuprofen]      Doctor told him not to take     Influenza Virus Vaccine H5n1      Does not ever want to have a flu shot - also doesn't want pneumovax     Orange Juice [Orange Oil] Nausea            Social History:     Social History     Social History     Marital status:      Spouse name: N/A     Number of children: N/A     Years of education: N/A     Occupational History     Not on file.     Social History Main Topics     Smoking status: Never Smoker     Smokeless tobacco: Never Used     Alcohol use No     Drug use: Not on file     Sexual activity: Not on file     Other Topics Concern     Not on file     Social History Narrative    The patient is . He denies a history of smoking, drinking or drug use. He was a  for 46 years. Living at [facility] currently as he was suffering from falls at home.                  Family History:   reviewed and noncontributory             Review of Systems:   The 10 point Review of Systems is negative other than noted in the HPI.             Physical Exam:     Heart Rate: 78, Blood pressure 121/59, temperature 96.7  F (35.9  C), temperature source Oral, resp. rate 18, height 1.88 m (6' 2\"), weight 93.2 kg " (205 lb 8 oz), SpO2 93 %.  205 lbs 8 oz    General: Alert, awake, no acute distress. [place]  HEENT: NC/AT, eyes anicteric, external occular movements intact, face symmetric.  Dentition WNL, MM moist.  Cardiac: RRR, S1, S2.  No murmurs appreciated.  Pulmonary: b/l crackles Normal chest rise, normal work of breathing.   Abdomen: soft, non-tender, non-distended.  Bowel Sounds Present.  No guarding.  Extremities: no deformities.  Warm, well perfused.  Skin: no rashes or lesions noted.  Warm and Dry.  Neuro: No focal deficits noted.  Speech clear.  Coordination and strength grossly normal.  Psych: Appropriate affect.         Data:   All new lab and imaging data was reviewed.    Recent Labs  Lab 04/02/17  1818 04/02/17  1155   WBC  --  8.0   HGB  --  12.4*   HCT  --  37.6*   MCV  --  96    303       Recent Labs  Lab 04/02/17  1155      POTASSIUM 2.9*   CHLORIDE 96   CO2 32   ANIONGAP 9   *   BUN 29   CR 1.39*   GFRESTIMATED 48*   GFRESTBLACK 58*   MERCEDES 8.9         Imaging:  Results for orders placed or performed during the hospital encounter of 04/02/17   CT Head w/o Contrast    Narrative    CT HEAD WITHOUT CONTRAST  4/2/2017 1:04 PM    HISTORY: Fall. Weakness.     Radiation dose for this scan was reduced using automated exposure  control, adjustment of the mA and/or kV according to patient size, or  iterative reconstruction technique.    FINDINGS: Prominent cerebral atrophy and periventricular white matter  hypoattenuation. There is no evidence of acute intracranial  hemorrhage. There is no mass effect or shift of the midline. There is  no definite CT evidence of acute stroke. The visualized portions of  the paranasal sinuses are clear. The osseous calvarium appears within  normal limits as imaged transversely.       Impression    IMPRESSION: No evidence of acute intracranial hemorrhage or mass  effect. Cerebral atrophy and periventricular white matter disease.    OMEGA MARTINEZ MD   CT Cervical Spine w/o  Contrast    Narrative    CT CERVICAL SPINE WITHOUT CONTRAST 4/2/2017 1:06 PM     HISTORY: Neck pain after trauma.    Radiation dose for this scan was reduced using automated exposure  control, adjustment of the mA and/or kV according to patient size, or  iterative reconstruction technique.    FINDINGS: There is no CT evidence of acute cervical spine fracture or  subluxation. Degenerative posterior osteophytic spurring is noted at  C5-6. No osseous central stenosis is demonstrated. Mild apophyseal  joint degenerative changes are noted. Uncinate process spurring  results in mild to moderate foraminal stenosis bilaterally at C5-6 and  on the right at C6-7.      Impression    IMPRESSION: No evidence of cervical spine fracture or subluxation.  Degenerative changes as above.    OMEGA MARTINEZ MD   Ankle XR, G/E 3 views, right    Narrative    RIGHT ANKLE THREE OR MORE VIEWS  4/2/2017 1:34 PM     HISTORY: Pain.      Impression    IMPRESSION: No apparent fracture. The ankle mortise appears congruent.  Small vessel arterial calcification is noted.    OMEGA MARTINEZ MD   Lumbar spine XR, 2-3 views    Narrative    LUMBAR SPINE TWO TO THREE VIEWS 4/2/2017 1:34 PM     HISTORY: Pain      Impression    IMPRESSION: Vertebral body heights, sagittal alignment, and disc  heights appear within normal limits. Aortic calcification is noted.  Bilateral hip arthroplasty is also noted.    OMEGA MARTINEZ MD   XR Pelvis and Hip Right 1 View    Narrative    PELVIS AND RIGHT HIP ONE VIEW  4/2/2017 1:34 PM     HISTORY: Pain. Fall.       Impression    IMPRESSION: Bilateral hip arthroplasties without apparent  complication. The osseous pelvis appears intact.    OMEGA MARTINEZ MD   XR Chest 1 View    Narrative    CHEST ONE VIEW  4/2/2017 1:33 PM     HISTORY: Weakness. Shortness of breath.      Impression    IMPRESSION: Diffuse interstitial, somewhat nodular pattern is noted  within both lungs.  This is similar if not unchanged when  given  differences in technique compared to 12/20/2015. No new consolidation  is identified. No apparent pleural effusion.    MD Yolanda BLACK MD  Hospitalist  [facility] [address] [place] [address] [phone]

## 2017-04-03 NOTE — PLAN OF CARE
Problem: Goal Outcome Summary  Goal: Goal Outcome Summary  PT: Per RN hold therapy for today, will evaluate pt tomorrow.

## 2017-04-03 NOTE — PLAN OF CARE
Problem: Infection, Risk/Actual (Adult)  Goal: Identify Related Risk Factors and Signs and Symptoms  Related risk factors and signs and symptoms are identified upon initiation of Human Response Clinical Practice Guideline (CPG)  Outcome: Improving  VSS afebrile. Except tele Afib md KAREL aware. On PO metoprolol. Added x1 dose IV metoprolol, with HR improved 70-90's.  IV Rocephin started for UTI. Condom cath in place. Pt A&Ox2. Very weak, Falls Risk. Up with heavy a2,gait belt and walker. Will use [place] steady. PT/OT following. Pt will need TCU at [place]. K+3.2 on protocol. Metoprolol increased 25mg BID.

## 2017-04-03 NOTE — PLAN OF CARE
RN- VSS, afeb. Alert and oriented, but forgetful. Some pain when repositioning but otherwise denies pain. Straight cath X 1 for UA. Reliafit condom cath applied and intact. Small amount of urine from condom cath. NSR/BBB. Plan for PT/OT,ECHO tomorrow. Bed alarm in place. Continue current POC.

## 2017-04-03 NOTE — PLAN OF CARE
Problem: Goal Outcome Summary  Goal: Goal Outcome Summary  OT: Orders received. Per chart, pt admitted with weakness/falls. Pt lives with spouse in residential home. At this time, nursing requested OT hold on eval at this time due to RVR and check back later as schedule allows.

## 2017-04-03 NOTE — PLAN OF CARE
Problem: Goal Outcome Summary  Goal: Goal Outcome Summary  Outcome: No Change  Patient Progress:  No Change  Outcome Summary:  Temp: 99  F (37.2  C) Temp src: Temporal BP: 135/67   Heart Rate: 80 Resp: 20 SpO2: 92 % O2 Device: Nasal cannula Oxygen Delivery: 2 LPM     Low-grade temp, BP slightly elevated, all other VSS. Fever reduction interventions in place. Pt received tylenol, room temp decreased and one blanket removed. RR was high at 36 during the night. LS expiratory wheezes. Pt has frequent dry nonproductive cough. MD paged and influenza test ordered which resulted as negative. MD also ordered duonebs. Pt A&O to self, and up with assist of 1; however, Pt did not get out of bed during the evening and night shifts. Pt receiving heparin injection. Condom catheter was placed as patient is incontinent. Cath is intact and patent. Tele is SR with 1st degree AVB and BBB.

## 2017-04-03 NOTE — PROVIDER NOTIFICATION
Page to hospitalist:    RK room 351, Patient has a fever, is coughing and wheezing. Can you order an influenza test please?

## 2017-04-03 NOTE — PROGRESS NOTES
[facility]  Hospitalist Progress Note  Patient Name: Jacob Easton    MRN: [id number]  Provider:  Yolanda Hayes MD  Date:04/03/2017      Initial presenting complaint/issue to hospital (Diagnosis): weakness, fall     Summary of Stay:Pt is a 87 yr old with h/o RA on mtx, CKD, HTN, dementia who presented on 4/2/2017 with weakness and fall. He was apparently down for ~17 hrs prior to having neighbours called to put him up on the bed then he was unable to move from the bed so EMS was called. extensive w/u with the ER was -ve for infectious etiology however pt did develop LGF, new A fib w/ RVR and appeared to have dirty urine so was started on IV ceftriaxone so developing sepsis syndrome.   Working with Pt/OT, will likely need TCU on dc         Assessment and Plan:      Weakness and falls: unclear cause but he is weaker than his baseline for 1-2 days resulting in fall. He was unable to get up so his wife called neighbors who raised him to a bed however pt called EMS because he was unable to get out of bed. He denies any symptoms. CXR in the ER shows b/l infiltrates but these have been present for many years.  --blood cx pending  --TTE today to evaluate valves, EF, volm   --PT/OT eval  --UA appears infected so started on IV rocephin      A fib w/ RVR: no prior h/o A fib per chart review. He was given a dose of IV metoprolol and resumed on PTA PO metoprolol with good control. Possibly driven by underlying infectious process  --cont card monitoring  --cont PO metoprolol  --treat underlying infection    Possible UTI: pt developed LGF, weakness and urine appeared dirty. On IV ceftriaxone until Ucx returns     HTN: resume metoprolol , hctz, amlodipine      RA: hold MTX for now until infection is ruled out     Neuropathy: gabapentin     DVT Prophylaxis: heparin SQ  Code Status: DNR / DNI  Discharge Dispo: TCU  Estimated Disch Date / # of Days until Discharge: 1-2 more days         Interval History:      Feels  "better today     Per RN pt is still needing significant assist with movement     Data reviewed today: I reviewed all new labs and imaging reports over the last 24 hours. I personally reviewed no images or EKG's today.         Physical Exam:      Last Vital Signs:  /69 (BP Location: Right arm)  Temp 96.5  F (35.8  C) (Oral)  Resp 20  Ht 1.88 m (6' 2\")  Wt 93.2 kg (205 lb 8 oz)  SpO2 92%  BMI 26.38 kg/m2   GEN:  Alert, oriented x 2, appears comfortable, NAD.  HEENT:  Normocephalic/atraumatic, no scleral icterus, no nasal discharge, mouth moist.  CV:  Regular rate and rhythm, no murmur or JVD.  S1 + S2 noted, no S3 or S4.  LUNGS:  Rales b/l  ABD:  Active bowel sounds, soft, non-tender/non-distended.  No rebound/guarding/rigidity.  EXT:  No edema.  No cyanosis.  No joint synovitis noted.  SKIN:  Dry to touch, no exanthems noted in the visualized areas.           Medications:      All current medications were reviewed.         Data:      All new lab and imaging data was reviewed.   Data       Recent Labs  Lab 04/03/17  0651 04/02/17  1818 04/02/17  1155   WBC 8.9  --  8.0   HGB 12.2*  --  12.4*   HCT 37.8*  --  37.6*   MCV 97  --  96    275 303       Recent Labs  Lab 04/03/17  0651 04/02/17  1155    137   POTASSIUM 3.2* 2.9*   CHLORIDE 100 96   CO2 30 32   ANIONGAP 9 9   * 106*   BUN 27 29   CR 1.27* 1.39*   GFRESTIMATED 54* 48*   GFRESTBLACK 65 58*   MERCEDES 8.6 8.9       No results found for this or any previous visit (from the past 24 hour(s)).    Yolanda Hayes MD  Hospitalist  [facility] [address] [place] [address]    "

## 2017-04-03 NOTE — PHARMACY-ADMISSION MEDICATION HISTORY
Admission medication history interview status for this patient is complete. See [facility] admission navigator for allergy information, prior to admission medications and immunization status.     Medication history interview source(s): Pharmacy   Medication history resources (including written lists, pill bottles, clinic record):medication list  Primary pharmacy:[facility] in [place]    Changes made to PTA medication list:  Added: Ferrous Sulfate 325 mg po once daily  Deleted: Amlodipine  Changed:   1. HCTZ to 50 mg once daily  2. Metoprolol increased to 25 mg twice daily    Actions taken by pharmacist (provider contacted, etc): Paged MD about changes     Additional medication history information: Per pharmacy, patient had not had amlodipine filled since 2014.  The patient also wasn't sure if he was still on Amlodipine    Medication reconciliation/reorder completed by provider prior to medication history? Yes      Prior to Admission medications    Medication Sig Last Dose Taking? Auth Provider   ferrous sulfate (IRON) 325 (65 FE) MG tablet Take 325 mg by mouth daily (with breakfast) Past Week at Unknown time Yes Unknown, Entered By History   levothyroxine (SYNTHROID, LEVOTHROID) 50 MCG tablet Take 1 tablet by mouth daily 4/2/2017 at Unknown time Yes Reported, Patient   hydrochlorothiazide (HYDRODIURIL) 25 MG tablet Take 50 mg by mouth daily  4/2/2017 at Unknown time Yes Reported, Patient   latanoprost (XALATAN) 0.005 % ophthalmic solution Place 1 drop into the right eye At Bedtime  4/2/2017 at Unknown time Yes Reported, Patient   Multiple Vitamins-Minerals (CERTAVITE SENIOR/ANTIOXIDANT) TABS Take 1 tablet by mouth daily 4/2/2017 at Unknown time Yes Reported, Patient   GABAPENTIN PO Take 100 mg by mouth At Bedtime 4/2/2017 at Unknown time Yes Reported, Patient   acetaminophen (TYLENOL) 500 MG tablet Take 500 mg by mouth 4 times daily as needed for mild pain Past Week at Unknown time Yes Reported, Patient    senna-docusate (SENOKOT-S;PERICOLACE) 8.6-50 MG per tablet Take 1 tablet by mouth 2 times daily as needed for constipation  Yes Reported, Patient   metoprolol (LOPRESSOR) 12.5 MG TABS Take 25 mg by mouth 2 times daily If AP <60 or SBP <100, hold and call MD 4/2/2017 at Unknown time Yes Reported, Patient   methotrexate 2.5 MG tablet Take 10 mg by mouth once a week. Takes on Mondays  3/27/2017 Yes Unknown, Entered By History   ASPIRIN EC PO Take 81 mg by mouth daily.   4/2/2017 at Unknown time Yes Unknown, Entered By History   folic acid (FOLVITE) 1 MG tablet Take 1 mg by mouth daily.   4/2/2017 at Unknown time Yes Unknown, Entered By History   omega-3 fatty acids (FISH OIL) 1200 MG capsule Take 1 capsule by mouth daily.   4/2/2017 at Unknown time Yes Unknown, Entered By History   tamsulosin (FLOMAX) 0.4 MG 24 hr capsule Take 0.4 mg by mouth At Bedtime.   4/2/2017 at Unknown time Yes Unknown, Entered By History   tuberculin (APLISOL) 5 UNIT/0.1ML injection Inject 5 Units into the skin once Unknown at Unknown time  Reported, Patient     Blanca Coulter, PharmD

## 2017-04-04 ENCOUNTER — APPOINTMENT (OUTPATIENT)
Dept: OCCUPATIONAL THERAPY | Facility: CLINIC | Age: 82
DRG: 193 | End: 2017-04-04
Payer: MEDICARE

## 2017-04-04 ENCOUNTER — APPOINTMENT (OUTPATIENT)
Dept: CARDIOLOGY | Facility: CLINIC | Age: 82
DRG: 193 | End: 2017-04-04
Attending: HOSPITALIST
Payer: MEDICARE

## 2017-04-04 ENCOUNTER — APPOINTMENT (OUTPATIENT)
Dept: PHYSICAL THERAPY | Facility: CLINIC | Age: 82
DRG: 193 | End: 2017-04-04
Attending: HOSPITALIST
Payer: MEDICARE

## 2017-04-04 LAB
ANION GAP SERPL CALCULATED.3IONS-SCNC: 9 MMOL/L (ref 3–14)
BUN SERPL-MCNC: 29 MG/DL (ref 7–30)
CALCIUM SERPL-MCNC: 8.5 MG/DL (ref 8.5–10.1)
CHLORIDE SERPL-SCNC: 101 MMOL/L (ref 94–109)
CO2 SERPL-SCNC: 27 MMOL/L (ref 20–32)
CREAT SERPL-MCNC: 1.2 MG/DL (ref 0.66–1.25)
ERYTHROCYTE [DISTWIDTH] IN BLOOD BY AUTOMATED COUNT: 14.1 % (ref 10–15)
GFR SERPL CREATININE-BSD FRML MDRD: 57 ML/MIN/1.7M2
GLUCOSE SERPL-MCNC: 112 MG/DL (ref 70–99)
HCT VFR BLD AUTO: 35.7 % (ref 40–53)
HGB BLD-MCNC: 11.3 G/DL (ref 13.3–17.7)
MCH RBC QN AUTO: 31 PG (ref 26.5–33)
MCHC RBC AUTO-ENTMCNC: 31.7 G/DL (ref 31.5–36.5)
MCV RBC AUTO: 98 FL (ref 78–100)
PLATELET # BLD AUTO: 310 10E9/L (ref 150–450)
POTASSIUM SERPL-SCNC: 3.8 MMOL/L (ref 3.4–5.3)
RBC # BLD AUTO: 3.64 10E12/L (ref 4.4–5.9)
SODIUM SERPL-SCNC: 137 MMOL/L (ref 133–144)
WBC # BLD AUTO: 6.7 10E9/L (ref 4–11)

## 2017-04-04 PROCEDURE — A9270 NON-COVERED ITEM OR SERVICE: HCPCS | Mod: GY | Performed by: HOSPITALIST

## 2017-04-04 PROCEDURE — 93306 TTE W/DOPPLER COMPLETE: CPT | Mod: 26 | Performed by: INTERNAL MEDICINE

## 2017-04-04 PROCEDURE — 25000132 ZZH RX MED GY IP 250 OP 250 PS 637: Mod: GY | Performed by: HOSPITALIST

## 2017-04-04 PROCEDURE — 97162 PT EVAL MOD COMPLEX 30 MIN: CPT | Mod: GP

## 2017-04-04 PROCEDURE — 12000007 ZZH R&B INTERMEDIATE

## 2017-04-04 PROCEDURE — 40000133 ZZH STATISTIC OT WARD VISIT: Performed by: REHABILITATION PRACTITIONER

## 2017-04-04 PROCEDURE — 85027 COMPLETE CBC AUTOMATED: CPT | Performed by: HOSPITALIST

## 2017-04-04 PROCEDURE — 25000128 H RX IP 250 OP 636: Performed by: HOSPITALIST

## 2017-04-04 PROCEDURE — 97535 SELF CARE MNGMENT TRAINING: CPT | Mod: GO | Performed by: REHABILITATION PRACTITIONER

## 2017-04-04 PROCEDURE — 93306 TTE W/DOPPLER COMPLETE: CPT

## 2017-04-04 PROCEDURE — 25500064 ZZH RX 255 OP 636: Performed by: HOSPITALIST

## 2017-04-04 PROCEDURE — 97110 THERAPEUTIC EXERCISES: CPT | Mod: GP

## 2017-04-04 PROCEDURE — 40000193 ZZH STATISTIC PT WARD VISIT

## 2017-04-04 PROCEDURE — 99233 SBSQ HOSP IP/OBS HIGH 50: CPT | Performed by: HOSPITALIST

## 2017-04-04 PROCEDURE — 36415 COLL VENOUS BLD VENIPUNCTURE: CPT | Performed by: HOSPITALIST

## 2017-04-04 PROCEDURE — 80048 BASIC METABOLIC PNL TOTAL CA: CPT | Performed by: HOSPITALIST

## 2017-04-04 PROCEDURE — 97530 THERAPEUTIC ACTIVITIES: CPT | Mod: GP

## 2017-04-04 PROCEDURE — 97165 OT EVAL LOW COMPLEX 30 MIN: CPT | Mod: GO | Performed by: REHABILITATION PRACTITIONER

## 2017-04-04 RX ORDER — CEFUROXIME AXETIL 250 MG/1
500 TABLET ORAL EVERY 12 HOURS SCHEDULED
Status: DISCONTINUED | OUTPATIENT
Start: 2017-04-04 | End: 2017-04-06 | Stop reason: HOSPADM

## 2017-04-04 RX ADMIN — FOLIC ACID 1 MG: 1 TABLET ORAL at 08:15

## 2017-04-04 RX ADMIN — CEFUROXIME AXETIL 500 MG: 250 TABLET ORAL at 21:52

## 2017-04-04 RX ADMIN — SULFUR HEXAFLUORIDE 3 ML: KIT at 09:46

## 2017-04-04 RX ADMIN — GABAPENTIN 100 MG: 100 CAPSULE ORAL at 21:52

## 2017-04-04 RX ADMIN — ASPIRIN 81 MG: 81 TABLET, COATED ORAL at 08:15

## 2017-04-04 RX ADMIN — METOPROLOL TARTRATE 25 MG: 25 TABLET ORAL at 21:52

## 2017-04-04 RX ADMIN — LATANOPROST 1 DROP: 50 SOLUTION/ DROPS OPHTHALMIC at 22:05

## 2017-04-04 RX ADMIN — HEPARIN SODIUM 5000 UNITS: 10000 INJECTION, SOLUTION INTRAVENOUS; SUBCUTANEOUS at 08:15

## 2017-04-04 RX ADMIN — TAMSULOSIN HYDROCHLORIDE 0.4 MG: 0.4 CAPSULE ORAL at 21:52

## 2017-04-04 RX ADMIN — METOPROLOL TARTRATE 25 MG: 25 TABLET ORAL at 08:15

## 2017-04-04 RX ADMIN — CEFTRIAXONE 1 G: 1 INJECTION, POWDER, FOR SOLUTION INTRAMUSCULAR; INTRAVENOUS at 08:14

## 2017-04-04 RX ADMIN — LEVOTHYROXINE SODIUM 50 MCG: 50 TABLET ORAL at 08:16

## 2017-04-04 RX ADMIN — HEPARIN SODIUM 5000 UNITS: 10000 INJECTION, SOLUTION INTRAVENOUS; SUBCUTANEOUS at 21:51

## 2017-04-04 ASSESSMENT — ACTIVITIES OF DAILY LIVING (ADL): PREVIOUS_RESPONSIBILITIES: FINANCES

## 2017-04-04 NOTE — PROGRESS NOTES
" 04/04/17 1300   Quick Adds   Type of Visit Initial PT Evaluation   Living Environment   Lives With spouse   Living Arrangements house   Home Accessibility bed and bath are not on the first floor;stairs to enter home   Stair Railings at Home inside, present at both sides   Transportation Available car;family or friend will provide   Self-Care   Usual Activity Tolerance moderate   Current Activity Tolerance fair   Regular Exercise no   Equipment Currently Used at Home shower chair;walker, rolling   Activity/Exercise/Self-Care Comment Pt reported that he was independent with all ADL's, used a FWW to ambulate with, reports his wife does all the cooking, cleaning, grocery shopping   Functional Level Prior   Ambulation 1-->assistive equipment   Transferring 1-->assistive equipment   Toileting 1-->assistive equipment   Bathing 1-->assistive equipment   Dressing 0-->independent   Eating 0-->independent   Communication 0-->understands/communicates without difficulty   Swallowing 0-->swallows foods/liquids without difficulty   Cognition 1 - attention or memory deficits   Fall history within last six months yes   Number of times patient has fallen within last six months 1   Which of the above functional risks had a recent onset or change? ambulation;transferring;toileting;bathing;dressing   Prior Functional Level Comment Pt's wife not present, question pt's accuracy on his PLOF   General Information   Onset of Illness/Injury or Date of Surgery - Date 04/02/17   Referring Physician Yolanda Hayes   Patient/Family Goals Statement \"To get stronger so I can go home\"   Pertinent History of Current Problem (include personal factors and/or comorbidities that impact the POC) Pt is a 87 year old male admitted to hospital after fallling at home and with possible UTI. PMH includes RA, CKD, HTN, and dementia.    Precautions/Limitations fall precautions   General Observations Pt resting in bed upon arrival, agreeable to work with PT. Pt " initially was very sleepy initiallly, kept eyes closed, did not become more alert until after sitting EOB.   General Info Comments Pt's SAO2 after PT session was 90 percent on RA, pt SOB.   Cognitive Status Examination   Orientation person;place   Level of Consciousness alert   Follows Commands and Answers Questions 100% of the time   Personal Safety and Judgment impaired   Memory impaired   Pain Assessment   Patient Currently in Pain (Pt reported 5/10 pain R ankle in stance)   Posture    Posture Forward head position;Protracted shoulders   Range of Motion (ROM)   ROM Comment Pt's B UE and B LE ROM WFL.   Strength   Strength Comments PT's B UE and B LE presented with grossly 4-/5 strength in all major muscle groups.   Bed Mobility   Bed Mobility Comments Pt required moderate assist of one person to transfer supine to sit at EOB wit HOB raised and use of bedrails.    Transfer Skills   Transfer Comments Pt required mod A x 2 for sit to and from stand transfer    Gait   Gait Comments Pt required mod A x 1 to ambulate 8 steps from bed to chair with use of FWW.   Balance   Balance Comments Pt has poor static and dynamic standing balance, required mod A x 2 to maintain upright due to very forward flexed trunk.   General Therapy Interventions   Planned Therapy Interventions balance training;bed mobility training;gait training;strengthening;transfer training;progressive activity/exercise   Clinical Impression   Criteria for Skilled Therapeutic Intervention yes, treatment indicated   PT Diagnosis difficulty with gait   Influenced by the following impairments weakness, decreased balance   Functional limitations due to impairments need for assist with all functional mobility and gait   Clinical Presentation Evolving/Changing   Clinical Presentation Rationale Pt is a 87 year old male s/p fall at home, demonstrates extremity weakness, decreased balance and requires 2 person mod assist with transfers out of bed and very short  "distance gait.    Clinical Decision Making (Complexity) Moderate complexity   Therapy Frequency` daily   Predicted Duration of Therapy Intervention (days/wks) 3 days   Anticipated Discharge Disposition Transitional Care Facility   Risk & Benefits of therapy have been explained Yes   Patient, Family & other staff in agreement with plan of care Yes   [facility] TM \"6 Clicks\"   2016, Trustees of [facility], under license to [employer].  All rights reserved.   6 Clicks Short Forms Basic Mobility Inpatient Short Form   [facility]  \"6 Clicks\" V.2 Basic Mobility Inpatient Short Form   1. Turning from your back to your side while in a flat bed without using bedrails? 3 - A Little   2. Moving from lying on your back to sitting on the side of a flat bed without using bedrails? 2 - A Lot   3. Moving to and from a bed to a chair (including a wheelchair)? 2 - A Lot   4. Standing up from a chair using your arms (e.g., wheelchair, or bedside chair)? 2 - A Lot   5. To walk in hospital room? 2 - A Lot   6. Climbing 3-5 steps with a railing? 1 - Total   Basic Mobility Raw Score (Score out of 24.Lower scores equate to lower levels of function) 12   Total Evaluation Time   Total Evaluation Time (Minutes) 10     "

## 2017-04-04 NOTE — PLAN OF CARE
Problem: Goal Outcome Summary  Goal: Goal Outcome Summary  PT: PT eval ordered, chart reviewed, eval completed, please see PT eval for details. Pt is a 87 year old male admitted s/p fall at home.  Pt presents with moderate extremity weakness, required mod A x 1 for bed mobility, 2 person moderate assist to transfer sit to stand and to take 8 small shuffling steps from the bed to a chair with use of a FWW. Pt lived with his wife in a multi-level home with steps to enter prior to admit. Pt will required further rehab at the TCU setting prior to return home with his wife as prior.

## 2017-04-04 NOTE — PLAN OF CARE
Problem: Goal Outcome Summary  Goal: Goal Outcome Summary  OT:  eval complete and treatment initiated.  Pt admitted s/p fall with weakness. Pt found to be in A fib with RVR now converted to SR. Possible UTI. PMH: HTN, RA, neuropathy, CKD, dementia. He reported to be independent in ADLs and mobility using FWW at baseline.  Wife does cooking, homemaking and driving.  On this date Pt alert, oriented and able to follow commands.  Supine>sit with max A for sit<>stand and bed>chair with mod A using FWW.  Pt required extra time and cueing to initiate, sequence and follow through.  Grooming/hygiene completed while sitting up in chair with set-up and min A.  Given level of weakness and amount of assist required for transfers and ADLs, recommend TCU upon D/C.

## 2017-04-04 NOTE — PROGRESS NOTES
04/04/17 0901   Quick Adds   Type of Visit Initial Occupational Therapy Evaluation   Living Environment   Lives With spouse   Living Arrangements house   Home Accessibility stairs to enter home;stairs within home;bed and bath are not on the first floor   Transportation Available car;family or friend will provide   Living Environment Comment Pt reported to live in 4 level home using rolling walker for mobility.  Wife does cooking, homemaking and driving.   Self-Care   Dominant Hand right   Usual Activity Tolerance moderate   Current Activity Tolerance fair   Regular Exercise no   Equipment Currently Used at Home walker, rolling;grab bar;raised toilet   Activity/Exercise/Self-Care Comment Pt reported to be independent in ADLs at baseline.   Functional Level Prior   Ambulation 1-->assistive equipment   Transferring 1-->assistive equipment   Toileting 1-->assistive equipment   Bathing 1-->assistive equipment   Dressing 1-->assistive equipment   Eating 1-->assistive equipment   Communication 0-->understands/communicates without difficulty   Swallowing 0-->swallows foods/liquids without difficulty   Cognition 1 - attention or memory deficits   Fall history within last six months yes   Which of the above functional risks had a recent onset or change? ambulation;transferring;toileting;bathing;fall history;cognition       Present no   General Information   Onset of Illness/Injury or Date of Surgery - Date 04/02/17   Referring Physician Dr. Yolanda Manjarrez   Patient/Family Goals Statement Pt would like to be stronger.   Additional Occupational Profile Info/Pertinent History of Current Problem Pt admitted s/p fall with weakness.  Pt found to be in A fib with RVR now converted to SR.  Possible UTI.  PMH: HTN, RA, neuropathy, CKD, dementia.   Precautions/Limitations fall precautions   General Info Comments activity:  up with assist   Cognitive Status Examination   Orientation orientation to person, place  and time   Level of Consciousness alert   Able to Follow Commands WNL/WFL   Personal Safety (Cognitive) WNL/WFL   Memory impaired   Organization/Problem Solving Sequencing impaired;Problem solving impaired   Executive Function Working memory impaired, decreased storage of information for performing tasks;Self awareness/monitoring impaired   Sensory Examination   Sensory Quick Adds No deficits were identified   Pain Assessment   Patient Currently in Pain Yes, see Vital Sign flowsheet  (neck, B knees and ankles)   Integumentary/Edema   Integumentary/Edema no deficits were identifed   Posture   Posture forward head position   Range of Motion (ROM)   ROM Quick Adds No deficits were identified   ROM Comment B UE AROM WFL   Strength   Manual Muscle Testing Quick Adds Other   Strength Comments B UE strength grossly 4/5   Hand Strength   Hand Strength Comments WFL   Muscle Tone Assessment   Muscle Tone Quick Adds No deficits were identified   Coordination   Upper Extremity Coordination No deficits were identified   Mobility   Bed Mobility Comments max A   Transfer Skill: Bed to Chair/Chair to Bed   Level of Lohn: Bed to Chair moderate assist (50% patients effort)   Physical Assist/Nonphysical Assist: Bed to Chair verbal cues   Assistive Device - Transfer Skill Bed to Chair Chair to Bed Rehab Eval rolling walker   Transfer Skill: Sit to Stand   Level of Lohn: Sit/Stand moderate assist (50% patients effort)   Physical Assist/Nonphysical Assist: Sit/Stand verbal cues   Assistive Device for Transfer: Sit/Stand rolling walker   Lower Body Dressing   Level of Lohn: Dress Lower Body maximum assist (25% patients effort)   Physical Assist/Nonphysical Assist: Dress Lower Body set-up required   Grooming   Level of Lohn: Grooming minimum assist (75% patients effort)   Physical Assist/Nonphysical Assist: Grooming set-up required  (sitting up in chair)   Eating/Self Feeding   Level of Lohn: Eating  "independent   Physical Assist/Nonphysical Assist: Eating set-up required   Instrumental Activities of Daily Living (IADL)   Previous Responsibilities finances   Activities of Daily Living Analysis   Impairments Contributing to Impaired Activities of Daily Living balance impaired;cognition impaired;pain;strength decreased   General Therapy Interventions   Planned Therapy Interventions ADL retraining;strengthening;transfer training   Clinical Impression   Criteria for Skilled Therapeutic Interventions Met yes, treatment indicated   OT Diagnosis decreased ADL performance   Influenced by the following impairments decreased strength, balance, cognition   Assessment of Occupational Performance 1-3 Performance Deficits   Identified Performance Deficits Pt with decreased ability to manage dressing, toileting, bathing   Clinical Decision Making (Complexity) Low complexity   Therapy Frequency daily   Predicted Duration of Therapy Intervention (days/wks) 3 days   Anticipated Discharge Disposition Transitional Care Facility   Risks and Benefits of Treatment have been explained. Yes   Patient, Family & other staff in agreement with plan of care Yes   [facility] TM \"6 Clicks\"   2016, Trustees of [facility], under license to [employer].  All rights reserved.   6 Clicks Short Forms Daily Activity Inpatient Short Form   [facility]  \"6 Clicks\" Daily Activity Inpatient Short Form   1. Putting on and taking off regular lower body clothing? 2 - A Lot   2. Bathing (including washing, rinsing, drying)? 2 - A Lot   3. Toileting, which includes using toilet, bedpan or urinal? 2 - A Lot   4. Putting on and taking off regular upper body clothing? 3 - A Little   5. Taking care of personal grooming such as brushing teeth? 3 - A Little   6. Eating meals? 4 - None   Daily Activity Raw Score (Score out of 24.Lower scores equate to lower levels of function) 16   Total Evaluation Time   Total Evaluation Time " (Minutes) 10

## 2017-04-04 NOTE — PLAN OF CARE
Problem: Goal Outcome Summary  Goal: Goal Outcome Summary  Outcome: No Change  Pt A&O, forgetful at times. VSS, afebrile, O2 2lpm via NC. Transfers with arturo rowan.  Abx- Rocephin for UTI. PO metoprolol for Afib hx. Tele- SR. Incontinent of bowel and bladder. K and mag protocol. Plan for d/c 2-3 days to TCU.

## 2017-04-04 NOTE — PLAN OF CARE
"Problem: Goal Outcome Summary  Goal: Goal Outcome Summary  Outcome: No Change  /73 (BP Location: Right arm)  Temp 98.7  F (37.1  C) (Oral)  Resp 20  Ht 1.88 m (6' 2\")  Wt 93.2 kg (205 lb 8 oz)  SpO2 95%  BMI 26.38 kg/m2  Neuro: A&O, some forgetfulness noted  Pain: denies pain at rest, c/o ome pain when transferring  Resp: LS diminished, continues on 2L O2, sating low 90s  Cardiac: WDL, started the shift in A fib CVC then spontaneously converted to SR at 5:26pm and remained for the duration of the shift.  GI/: bowel sounds positive, incontinent of bowel and bladder  Diet: good appetite, ate 100% of dinner  Skin/mobility: bruising to arm, ACE wrap on right ankle, up with arturo steady.  PT/OT following, plan for TCU at discharge.  Will continue to monitor and provide supportive care.             "

## 2017-04-04 NOTE — PROGRESS NOTES
[facility]  Hospitalist Progress Note  Patient Name: Jacob Easton    MRN: [id number]  Provider:  Yolanda Hayes MD  Date:04/04/2017      Initial presenting complaint/issue to hospital (Diagnosis): weakness, fall     Summary of Stay:Pt is a 87 yr old with h/o RA on mtx, CKD, HTN, dementia who presented on 4/2/2017 with weakness and fall. He was apparently down for ~17 hrs prior to having neighbours called to put him up on the bed then he was unable to move from the bed so EMS was called. extensive w/u with the ER was -ve for infectious etiology however pt did develop LGF, new A fib w/ RVR and appeared to have dirty urine so was started on IV ceftriaxone for developing sepsis syndrome.   Working with Pt/OT, will likely need TCU on dc tomorrow          Assessment and Plan:      Weakness and falls: unclear cause but he is weaker than his baseline for 1-2 days PTA resulting in fall. He was unable to get up so his wife called neighbors who raised him to a bed however pt called EMS because he was unable to get out of bed. He denies any symptoms. CXR in the ER shows b/l infiltrates but these have been present for many years. TTe with no new findings except for mild AS.  UA appeared infected and he developed fever after admission so was started on IV ceftriaxone with improvement. He is making progress with PT/OT but would need TCU on dc  --blood cx pending  --PT/OT eval  --change ceftriaxone to ceftin for UTI     A fib w/ RVR: no prior h/o A fib per chart review. He was given a dose of IV metoprolol and resumed on PTA PO metoprolol with good control. Possibly driven by underlying infectious process. He has high [id number] score however is also high fall risk so will avoid anticoagulation.   --cont card monitoring  --cont PO metoprolol 25 mg BID  --treat underlying infection    Possible UTI: pt developed LGF, A fib w/ RVR, weakness and urine appeared dirty. On IV ceftriaxone until Ucx returns now changed to  "ceftin     HTN: resume metoprolol , hctz, amlodipine      RA: hold MTX for now until infection is treated   Neuropathy: gabapentin     DVT Prophylaxis: heparin SQ  Code Status: DNR / DNI  Discharge Dispo: TCU  Estimated Disch Date / # of Days until Discharge: tomorrow to TCU        Interval History:      Feels better today     Per RN pt is needing less assist for movement     Data reviewed today: I reviewed all new labs and imaging reports over the last 24 hours. I personally reviewed no images or EKG's today.         Physical Exam:      Last Vital Signs:  /73 (BP Location: Right arm)  Temp 97.6  F (36.4  C) (Oral)  Resp 20  Ht 1.88 m (6' 2\")  Wt 93.1 kg (205 lb 4.8 oz)  SpO2 94%  BMI 26.36 kg/m2   GEN:  Alert, oriented x 2, appears comfortable, NAD.  HEENT:  Normocephalic/atraumatic, no scleral icterus, no nasal discharge, mouth moist.  CV:  Regular rate and rhythm, no murmur or JVD.  S1 + S2 noted, no S3 or S4.  LUNGS:  Rales b/l  ABD:  Active bowel sounds, soft, non-tender/non-distended.  No rebound/guarding/rigidity.  EXT:  No edema.  No cyanosis.  No joint synovitis noted.  SKIN:  Dry to touch, no exanthems noted in the visualized areas.           Medications:      All current medications were reviewed.         Data:      All new lab and imaging data was reviewed.   Data       Recent Labs  Lab 04/04/17  0604 04/03/17  0651 04/02/17  1818 04/02/17  1155   WBC 6.7 8.9  --  8.0   HGB 11.3* 12.2*  --  12.4*   HCT 35.7* 37.8*  --  37.6*   MCV 98 97  --  96    293 275 303       Recent Labs  Lab 04/04/17  0604 04/03/17  1642 04/03/17  0651 04/02/17  1155     --  139 137   POTASSIUM 3.8 3.7 3.2* 2.9*   CHLORIDE 101  --  100 96   CO2 27  --  30 32   ANIONGAP 9  --  9 9   *  --  118* 106*   BUN 29  --  27 29   CR 1.20  --  1.27* 1.39*   GFRESTIMATED 57*  --  54* 48*   GFRESTBLACK 69  --  65 58*   MERCEDES 8.5  --  8.6 8.9       No results found for this or any previous visit (from the past 24 " hour(s)).    Yolanda Hayes MD  Hospitalist  [facility] [address] [place] [address]

## 2017-04-04 NOTE — PLAN OF CARE
Problem: Infection, Risk/Actual (Adult)  Goal: Identify Related Risk Factors and Signs and Symptoms  Related risk factors and signs and symptoms are identified upon initiation of Human Response Clinical Practice Guideline (CPG)   Outcome: Improving  VSS afebrile. Pt's tele SR, spontaneously went back into SR yesterday evenings. On Po metoprolol, which was increased yesterday. Echo one, results pending. On IV Rocephin for UTI. Pt had fall at home, Right ankle brace air brace in place. RLE edema, painful with movement.,PRN meds. Pt up with A1-2,  PT/OT following.  EF 55-60% see report for further details.

## 2017-04-04 NOTE — PROGRESS NOTES
Care Coordination:  Per MD pt will be ready for dc tomorrow to TCU. Pt has been accepted at [facility]. [facility] updated on plan for dc tomorrow.    Sujata Gillette RN CTS

## 2017-04-04 NOTE — PLAN OF CARE
Problem: Infection, Risk/Actual (Adult)  Goal: Identify Related Risk Factors and Signs and Symptoms  Related risk factors and signs and symptoms are identified upon initiation of Human Response Clinical Practice Guideline (CPG)   Outcome: Improving  VS elevated, on BP meds. Pt felling better this morning. Nausea resolved. Pt more interactive today. On IV Rocephin for UTI. On IVF's. Up with Mechanical lift. Pt left side flaccid from previous stroke. PT/OT following.

## 2017-04-04 NOTE — PLAN OF CARE
Problem: Individualization  Goal: Patient Preferences  Outcome: Improving  Up in chair with 2 assist and walker. Inc of urine. Denies pain. Dyspnea with exertion. Lungs diminished. Oxygen saturation is 92% on room air. Tele SR with BBB. Bilateral lower extremity edema +2. Heels elevated.   Denies pain. D/c tomorrow to TCU.

## 2017-04-05 ENCOUNTER — APPOINTMENT (OUTPATIENT)
Dept: CT IMAGING | Facility: CLINIC | Age: 82
DRG: 193 | End: 2017-04-05
Attending: HOSPITALIST
Payer: MEDICARE

## 2017-04-05 LAB
ANION GAP SERPL CALCULATED.3IONS-SCNC: 8 MMOL/L (ref 3–14)
BUN SERPL-MCNC: 28 MG/DL (ref 7–30)
CALCIUM SERPL-MCNC: 8.2 MG/DL (ref 8.5–10.1)
CHLORIDE SERPL-SCNC: 101 MMOL/L (ref 94–109)
CO2 SERPL-SCNC: 27 MMOL/L (ref 20–32)
CREAT SERPL-MCNC: 1.21 MG/DL (ref 0.66–1.25)
ERYTHROCYTE [DISTWIDTH] IN BLOOD BY AUTOMATED COUNT: 14.2 % (ref 10–15)
FLUAV+FLUBV RNA SPEC QL NAA+PROBE: NORMAL
FLUAV+FLUBV RNA SPEC QL NAA+PROBE: NORMAL
GFR SERPL CREATININE-BSD FRML MDRD: 57 ML/MIN/1.7M2
GLUCOSE SERPL-MCNC: 122 MG/DL (ref 70–99)
HCT VFR BLD AUTO: 37.6 % (ref 40–53)
HGB BLD-MCNC: 12.4 G/DL (ref 13.3–17.7)
L PNEUMO1 AG UR QL IA: NORMAL
MAGNESIUM SERPL-MCNC: 2.2 MG/DL (ref 1.6–2.3)
MCH RBC QN AUTO: 31.7 PG (ref 26.5–33)
MCHC RBC AUTO-ENTMCNC: 33 G/DL (ref 31.5–36.5)
MCV RBC AUTO: 96 FL (ref 78–100)
MICRO REPORT STATUS: NORMAL
PLATELET # BLD AUTO: 332 10E9/L (ref 150–450)
POTASSIUM SERPL-SCNC: 3.9 MMOL/L (ref 3.4–5.3)
RBC # BLD AUTO: 3.91 10E12/L (ref 4.4–5.9)
RSV RNA SPEC NAA+PROBE: NORMAL
SODIUM SERPL-SCNC: 136 MMOL/L (ref 133–144)
SPECIMEN SOURCE: NORMAL
SPECIMEN SOURCE: NORMAL
WBC # BLD AUTO: 7.6 10E9/L (ref 4–11)

## 2017-04-05 PROCEDURE — 71250 CT THORAX DX C-: CPT

## 2017-04-05 PROCEDURE — 80048 BASIC METABOLIC PNL TOTAL CA: CPT | Performed by: HOSPITALIST

## 2017-04-05 PROCEDURE — 25000128 H RX IP 250 OP 636: Performed by: HOSPITALIST

## 2017-04-05 PROCEDURE — 85027 COMPLETE CBC AUTOMATED: CPT | Performed by: HOSPITALIST

## 2017-04-05 PROCEDURE — 99233 SBSQ HOSP IP/OBS HIGH 50: CPT | Performed by: HOSPITALIST

## 2017-04-05 PROCEDURE — 25000132 ZZH RX MED GY IP 250 OP 250 PS 637: Mod: GY | Performed by: HOSPITALIST

## 2017-04-05 PROCEDURE — 83735 ASSAY OF MAGNESIUM: CPT | Performed by: HOSPITALIST

## 2017-04-05 PROCEDURE — A9270 NON-COVERED ITEM OR SERVICE: HCPCS | Mod: GY | Performed by: HOSPITALIST

## 2017-04-05 PROCEDURE — 87899 AGENT NOS ASSAY W/OPTIC: CPT | Performed by: HOSPITALIST

## 2017-04-05 PROCEDURE — 36415 COLL VENOUS BLD VENIPUNCTURE: CPT | Performed by: HOSPITALIST

## 2017-04-05 PROCEDURE — 25000125 ZZHC RX 250: Performed by: HOSPITALIST

## 2017-04-05 PROCEDURE — 12000007 ZZH R&B INTERMEDIATE

## 2017-04-05 PROCEDURE — 87631 RESP VIRUS 3-5 TARGETS: CPT | Performed by: HOSPITALIST

## 2017-04-05 RX ORDER — FUROSEMIDE 10 MG/ML
20 INJECTION INTRAMUSCULAR; INTRAVENOUS ONCE
Status: COMPLETED | OUTPATIENT
Start: 2017-04-05 | End: 2017-04-05

## 2017-04-05 RX ORDER — PREDNISONE 20 MG/1
40 TABLET ORAL DAILY
Status: DISCONTINUED | OUTPATIENT
Start: 2017-04-05 | End: 2017-04-06 | Stop reason: HOSPADM

## 2017-04-05 RX ORDER — AZITHROMYCIN 250 MG/1
250 TABLET, FILM COATED ORAL DAILY
Status: DISCONTINUED | OUTPATIENT
Start: 2017-04-06 | End: 2017-04-06 | Stop reason: HOSPADM

## 2017-04-05 RX ADMIN — TAMSULOSIN HYDROCHLORIDE 0.4 MG: 0.4 CAPSULE ORAL at 21:10

## 2017-04-05 RX ADMIN — FOLIC ACID 1 MG: 1 TABLET ORAL at 08:00

## 2017-04-05 RX ADMIN — METOPROLOL TARTRATE 25 MG: 25 TABLET ORAL at 21:10

## 2017-04-05 RX ADMIN — HEPARIN SODIUM 5000 UNITS: 10000 INJECTION, SOLUTION INTRAVENOUS; SUBCUTANEOUS at 21:09

## 2017-04-05 RX ADMIN — PREDNISONE 40 MG: 20 TABLET ORAL at 14:30

## 2017-04-05 RX ADMIN — GABAPENTIN 100 MG: 100 CAPSULE ORAL at 21:10

## 2017-04-05 RX ADMIN — CEFUROXIME AXETIL 500 MG: 250 TABLET ORAL at 21:10

## 2017-04-05 RX ADMIN — LATANOPROST 1 DROP: 50 SOLUTION/ DROPS OPHTHALMIC at 21:10

## 2017-04-05 RX ADMIN — LEVOTHYROXINE SODIUM 50 MCG: 50 TABLET ORAL at 08:00

## 2017-04-05 RX ADMIN — HEPARIN SODIUM 5000 UNITS: 10000 INJECTION, SOLUTION INTRAVENOUS; SUBCUTANEOUS at 08:00

## 2017-04-05 RX ADMIN — CEFUROXIME AXETIL 500 MG: 250 TABLET ORAL at 08:00

## 2017-04-05 RX ADMIN — METOPROLOL TARTRATE 25 MG: 25 TABLET ORAL at 08:00

## 2017-04-05 RX ADMIN — AZITHROMYCIN MONOHYDRATE 500 MG: 500 INJECTION, POWDER, LYOPHILIZED, FOR SOLUTION INTRAVENOUS at 14:08

## 2017-04-05 RX ADMIN — FUROSEMIDE 20 MG: 10 INJECTION, SOLUTION INTRAVENOUS at 10:02

## 2017-04-05 RX ADMIN — ASPIRIN 81 MG: 81 TABLET, COATED ORAL at 08:00

## 2017-04-05 NOTE — PLAN OF CARE
Problem: Goal Outcome Summary  Goal: Goal Outcome Summary  Outcome: No Change  1720 pm notified pt's wife Aneta [phone] regarding moving pt to new room and given pt's new room # [id number].     VSS  Neuro: AOX3, forgetful  Respiratory: LS diminished on RA  Cardiac: SR  GI:  BM this shift not observed, incontinent  : condom cath   Skin: bruises  Plan: continue POC, abx, PT/OT, possible d/c to TCU tomorrow  Pt calls appropriately, up with A X 2, transferred to lift room, bed alarm on for safety, slept on and off during the shift

## 2017-04-05 NOTE — PROGRESS NOTES
[facility]  Hospitalist Progress Note  Patient Name: Jacob Easton    MRN: [id number]  Provider:  Yolanda Hayes MD  Date:04/05/2017      Initial presenting complaint/issue to hospital (Diagnosis): weakness, fall     Summary of Stay:Pt is a 87 yr old with h/o RA on mtx, CKD, HTN, dementia who presented on 4/2/2017 with weakness and fall. He was apparently down for ~17 hrs prior to having neighbours called to put him up on the bed then he was unable to move from the bed so EMS was called. extensive w/u with the ER was -ve for infectious etiology however pt did develop LGF, new A fib w/ RVR and appeared to have dirty urine so was started on IV ceftriaxone for developing sepsis syndrome.   Working with Pt/OT, will need to dc to TCU  Pt developed worsening SOB on 4/5-CT chest done showed acute alveolitis ?cause-viral vs ILD. Will need to discuss with pulmonology tomorrow. Started on prednisone given no improvement with abx alone          Assessment and Plan:      Weakness and falls: unclear cause but he is weaker than his baseline for 1-2 days PTA resulting in fall. He was unable to get up so his wife called neighbors who raised him to a bed however pt called EMS because he was unable to get out of bed. He denies any symptoms. CXR in the ER shows b/l infiltrates but these have been present for many years. TTe with no new findings except for mild AS.  UA appeared infected and he developed fever after admission so was started on IV ceftriaxone with some improvement then worsened. He developed SOB, CT chest on 4/5 showed acute alveolitis- ?viral vs ILD. Pt has had abnormal CT even back in 2012 (care everywhere)   He is making progress with PT/OT but would need TCU on dc  --blood cx pending, sputum cx not collected yet  --PT/OT eval  --change ceftriaxone to ceftin for UTI and add azithromycin for atypical coverage  --start prednisone      A fib w/ RVR: no prior h/o A fib per chart review. He was given a dose  "of IV metoprolol and resumed on PTA PO metoprolol with good control. Possibly driven by underlying infectious process. He has high [id number] score however is also high fall risk so will avoid anticoagulation.   --cont card monitoring  --cont PO metoprolol 25 mg BID  --treat underlying infection    SOB: Pt developed worsening SOB on 4/5-CT chest done showed acute alveolitis ?cause-viral vs ILD.Pt has had abnormal CT even back in 2012 (care everywhere)  -- Will need to discuss with pulmonology tomorrow. Started on prednisone given no improvement with abx alone    Possible UTI: pt developed LGF, A fib w/ RVR, weakness and urine appeared dirty. Was On IV ceftriaxone now on ceftin and azithro to cover for UTI and PNA    HTN: resume metoprolol , hctz, amlodipine      RA: hold MTX for now until infection is treated   Neuropathy: gabapentin     DVT Prophylaxis: heparin SQ  Code Status: DNR / DNI  Discharge Dispo: TCU  Estimated Disch Date / # of Days until Discharge: tomorrow to TCU        Interval History:      Feels better today     Per RN pt is needing less assist for movement     Data reviewed today: I reviewed all new labs and imaging reports over the last 24 hours. I personally reviewed no images or EKG's today.         Physical Exam:      Last Vital Signs:  /67 (BP Location: Right arm)  Temp 98.2  F (36.8  C) (Oral)  Resp 24  Ht 1.88 m (6' 2\")  Wt 93.1 kg (205 lb 4.8 oz)  SpO2 93%  BMI 26.36 kg/m2   GEN:  Alert, oriented x 2, appears comfortable, NAD.  HEENT:  Normocephalic/atraumatic, no scleral icterus, no nasal discharge, mouth moist.  CV:  Regular rate and rhythm, no murmur or JVD.  S1 + S2 noted, no S3 or S4.  LUNGS:  Rales b/l  ABD:  Active bowel sounds, soft, non-tender/non-distended.  No rebound/guarding/rigidity.  EXT:  No edema.  No cyanosis.  No joint synovitis noted.  SKIN:  Dry to touch, no exanthems noted in the visualized areas.           Medications:      All current medications were " reviewed.         Data:      All new lab and imaging data was reviewed.   Data       Recent Labs  Lab 04/05/17  0655 04/04/17  0604 04/03/17  0651   WBC 7.6 6.7 8.9   HGB 12.4* 11.3* 12.2*   HCT 37.6* 35.7* 37.8*   MCV 96 98 97    310 293       Recent Labs  Lab 04/05/17  0655 04/04/17  0604 04/03/17  1642 04/03/17  0651    137  --  139   POTASSIUM 3.9 3.8 3.7 3.2*   CHLORIDE 101 101  --  100   CO2 27 27  --  30   ANIONGAP 8 9  --  9   * 112*  --  118*   BUN 28 29  --  27   CR 1.21 1.20  --  1.27*   GFRESTIMATED 57* 57*  --  54*   GFRESTBLACK 69 69  --  65   MERCEDES 8.2* 8.5  --  8.6       Recent Results (from the past 24 hour(s))   CT Chest w/o Contrast    Narrative    CT CHEST WITHOUT CONTRAST  4/5/2017 11:05 AM     HISTORY: Shortness of breath.    COMPARISON: Chest x-ray 4/2/2017. CT chest, abdomen and pelvis  11/16/2015.    TECHNIQUE: Axial images from the thoracic inlet to the lung bases are  performed with additional coronal reformatted images. No contrast is  utilized.  Radiation dose for this scan was reduced using automated  exposure control, adjustment of the mA and/or kV according to patient  size, or iterative reconstruction technique.    FINDINGS:  Calcified granulomas are noted within each lung. Other  areas of cystic change are noted throughout both lungs possibly  reflecting underlying emphysema. Mild groundglass opacities in the mid  to lower lungs could represent underlying alveolitis. Airways appear  patent. Findings could reflect underlying lymphangiomyomatosis as many  of these cysts appear well formed with a thin surrounding rim of  tissue. No wall nodularity is appreciated within the cysts. No pleural  or pericardial fluid. The heart is normal in size. Aorta demonstrates  calcified plaque without evidence of aneurysm. Coronary artery  calcification is also noted. The esophagus is unremarkable. Thyroid  gland appears normal in size. Small mediastinal and hilar lymph nodes  are  likely reactive in nature. A larger subcarinal lymph node measures  3.0 x 1.6 cm on series 2, image 36. Limited images upper abdomen  demonstrate mild perihepatic fluid. Bone window examination is within  normal limits.      Impression    IMPRESSION:  1. Persistent cystic changes throughout both lungs raising the  possibility of emphysema or underlying interstitial lung disease.  Groundglass opacities within the lungs is new since the prior exam  suggesting underlying acute alveolitis which may account for the  patient's shortness of breath. No discrete infiltrate or consolidation  to indicate typical pneumonia.  2. No significant pleural or pericardial fluid. Heart is normal in  size. Probable reactive lymph nodes which are new since prior exam.    MD Yolanda SOLORIO MD  Hospitalist  [facility] [address] [place] [address]

## 2017-04-05 NOTE — PLAN OF CARE
Problem: Goal Outcome Summary  Goal: Goal Outcome Summary  PT - Nursing reports pt just returned from CT and now is ordering his lunch.  Pt does not want to get up until lunch arrives. Will attempt to see later today for PT.

## 2017-04-05 NOTE — PROGRESS NOTES
Pt is alert but forgetful. VSS, afebrile. Tele is SR with 1st degree AV block, BBB. Denies pain. Right foot wrapped with ace bandage with 3 + pitting edema. Incontinent. Up with assist of 2 and walker. Plan is to d/c to [facility] today.

## 2017-04-05 NOTE — PLAN OF CARE
Problem: Goal Outcome Summary  Goal: Goal Outcome Summary  Outcome: No Change  Alert & oriented. The patient has been in afib since start of shift. R base crackles. RR 20-26 at times. CT chest complete, pending. Nasal swab for influenza pending. Start IV azithromycin. Condom cath placed for frequent incontinence & sample needed. Small soft stool, incontinent. BLE edema +2 w/R ankle ace wrap & soreness per pt.  Heavy assist of 2 to get into chair in a.m., Bethany Steady used to return to bed.  PT following. Lift room recommended. IV lasix x1 given this morning. Written/verbal education provided for afib & common side effects meds. Plan TCU at dc

## 2017-04-06 VITALS
DIASTOLIC BLOOD PRESSURE: 64 MMHG | OXYGEN SATURATION: 91 % | TEMPERATURE: 96.7 F | BODY MASS INDEX: 26.15 KG/M2 | RESPIRATION RATE: 20 BRPM | SYSTOLIC BLOOD PRESSURE: 133 MMHG | HEIGHT: 74 IN | WEIGHT: 203.8 LBS

## 2017-04-06 LAB
ANION GAP SERPL CALCULATED.3IONS-SCNC: 10 MMOL/L (ref 3–14)
BUN SERPL-MCNC: 33 MG/DL (ref 7–30)
CALCIUM SERPL-MCNC: 8.8 MG/DL (ref 8.5–10.1)
CHLORIDE SERPL-SCNC: 103 MMOL/L (ref 94–109)
CO2 SERPL-SCNC: 25 MMOL/L (ref 20–32)
CREAT SERPL-MCNC: 1.2 MG/DL (ref 0.66–1.25)
ERYTHROCYTE [DISTWIDTH] IN BLOOD BY AUTOMATED COUNT: 14.1 % (ref 10–15)
GFR SERPL CREATININE-BSD FRML MDRD: 57 ML/MIN/1.7M2
GLUCOSE SERPL-MCNC: 130 MG/DL (ref 70–99)
HCT VFR BLD AUTO: 36.6 % (ref 40–53)
HGB BLD-MCNC: 11.8 G/DL (ref 13.3–17.7)
MCH RBC QN AUTO: 31.6 PG (ref 26.5–33)
MCHC RBC AUTO-ENTMCNC: 32.2 G/DL (ref 31.5–36.5)
MCV RBC AUTO: 98 FL (ref 78–100)
PLATELET # BLD AUTO: 325 10E9/L (ref 150–450)
POTASSIUM SERPL-SCNC: 4.5 MMOL/L (ref 3.4–5.3)
RBC # BLD AUTO: 3.74 10E12/L (ref 4.4–5.9)
SODIUM SERPL-SCNC: 138 MMOL/L (ref 133–144)
WBC # BLD AUTO: 8.5 10E9/L (ref 4–11)

## 2017-04-06 PROCEDURE — 99239 HOSP IP/OBS DSCHRG MGMT >30: CPT | Performed by: INTERNAL MEDICINE

## 2017-04-06 PROCEDURE — 36415 COLL VENOUS BLD VENIPUNCTURE: CPT | Performed by: HOSPITALIST

## 2017-04-06 PROCEDURE — 25000132 ZZH RX MED GY IP 250 OP 250 PS 637: Mod: GY | Performed by: HOSPITALIST

## 2017-04-06 PROCEDURE — A9270 NON-COVERED ITEM OR SERVICE: HCPCS | Mod: GY | Performed by: HOSPITALIST

## 2017-04-06 PROCEDURE — 85027 COMPLETE CBC AUTOMATED: CPT | Performed by: HOSPITALIST

## 2017-04-06 PROCEDURE — 25000128 H RX IP 250 OP 636: Performed by: HOSPITALIST

## 2017-04-06 PROCEDURE — 80048 BASIC METABOLIC PNL TOTAL CA: CPT | Performed by: HOSPITALIST

## 2017-04-06 PROCEDURE — 25000125 ZZHC RX 250: Performed by: HOSPITALIST

## 2017-04-06 RX ORDER — AZITHROMYCIN 250 MG/1
250 TABLET, FILM COATED ORAL DAILY
Qty: 3 TABLET | Refills: 0 | DISCHARGE
Start: 2017-04-07 | End: 2017-04-10

## 2017-04-06 RX ORDER — PREDNISONE 20 MG/1
40 TABLET ORAL EVERY MORNING
Qty: 6 TABLET | Refills: 0 | DISCHARGE
Start: 2017-04-07 | End: 2017-04-10

## 2017-04-06 RX ORDER — IPRATROPIUM BROMIDE AND ALBUTEROL SULFATE 2.5; .5 MG/3ML; MG/3ML
3 SOLUTION RESPIRATORY (INHALATION) EVERY 4 HOURS PRN
Qty: 360 ML | Refills: 0 | DISCHARGE
Start: 2017-04-06 | End: 2017-05-09

## 2017-04-06 RX ORDER — CEFUROXIME AXETIL 500 MG/1
500 TABLET ORAL EVERY 12 HOURS
Qty: 7 TABLET | Refills: 0 | DISCHARGE
Start: 2017-04-06 | End: 2017-04-07

## 2017-04-06 RX ADMIN — ASPIRIN 81 MG: 81 TABLET, COATED ORAL at 08:49

## 2017-04-06 RX ADMIN — PREDNISONE 40 MG: 20 TABLET ORAL at 08:49

## 2017-04-06 RX ADMIN — METOPROLOL TARTRATE 25 MG: 25 TABLET ORAL at 08:49

## 2017-04-06 RX ADMIN — CEFUROXIME AXETIL 500 MG: 250 TABLET ORAL at 08:49

## 2017-04-06 RX ADMIN — LEVOTHYROXINE SODIUM 50 MCG: 50 TABLET ORAL at 08:49

## 2017-04-06 RX ADMIN — AZITHROMYCIN 250 MG: 250 TABLET, FILM COATED ORAL at 08:49

## 2017-04-06 RX ADMIN — HEPARIN SODIUM 5000 UNITS: 10000 INJECTION, SOLUTION INTRAVENOUS; SUBCUTANEOUS at 08:49

## 2017-04-06 RX ADMIN — FOLIC ACID 1 MG: 1 TABLET ORAL at 08:49

## 2017-04-06 NOTE — PLAN OF CARE
Problem: Goal Outcome Summary  Goal: Goal Outcome Summary  Occupational Therapy Discharge Summary     Reason for therapy discharge:    Discharged to transitional care facility.     Progress towards therapy goal(s). See goals on Care Plan in [facility] health record for goal details.  Goals not met.  Barriers to achieving goals:   discharge from facility.     Therapy recommendation(s):    Continued therapy is recommended.  Rationale/Recommendations:  Rec.continued OT to maximize safety/indep.w/ADl's/safe transfers. [facility] ..

## 2017-04-06 NOTE — PLAN OF CARE
Problem: Goal Outcome Summary  Goal: Goal Outcome Summary  OT:Attempted intervention--pt.declined. Noted pt. discharging later today to TCU. See discharge summary.

## 2017-04-06 NOTE — PROGRESS NOTES
Your information has been submitted on April 06th, 2017 at 01:22:34 PM CDT. The confirmation number is [id number]

## 2017-04-06 NOTE — DISCHARGE INSTRUCTIONS
1. Please hold Methotrexate on 4/10/17.  Methotrexate can be resumed on 4/17/17 per regular scheduled.

## 2017-04-06 NOTE — PROGRESS NOTES
17:03 pm HE transport at bedside, attempted to do d/c instructions with pt, he declined stated nursing staff at [facility] will review. Copy of AVS given to pt.Reminded to hold Methotrixiate on 4/10/2017 until 04/17/2017. Pt changed and brittanie care done, Mepilex dsg CDI on bottom, pt refused for condom cath to be d/c'd, wants it on for transport. Pt d/c'd with all personal  belongings.

## 2017-04-06 NOTE — PROGRESS NOTES
SWS:  SW notified that pt can dc today. SW spoke with wife and she is requesting wc transport.Wife agreeable to private cost of transport.  Wife requesting private room and is aware and agreeable to private cost. SW contacted [facility] and they are able to transport at 5:00pm. RN, [facility] and wife aware.  Orders faxed.

## 2017-04-06 NOTE — PROGRESS NOTES
Pt a/o x 3. VSS, afebrile. Tele; SR with 1st degree AV block, BBB. Denies pain. Condom cath patent with good urine return. Incontinent of BM. Assist of 2 with lift. Addressed all questions and concerns. Possible d/c to TCU today.

## 2017-04-06 NOTE — PLAN OF CARE
Plan for [facility] today with transport via w/c at 5pm..  Lungs clear but a bit decreased with IS given and encouraged.  Gets up to 1000.  Continue to use condom cath.  Surrounding skin normal, no edema or redness.  Pevits to chair with 2+walker with a lot of cues and support  Rt ankle aced and splinted for sprain.  Appetite good.  Incontinent x2 stool x2

## 2017-04-06 NOTE — DISCHARGE SUMMARY
[facility]  Discharge Summary  Name: Jacob Easton    MRN: [id number]  YOB: 1929    Age: 87 year old  Date of Discharge:  4/6/2017  Date of Admission: 4/2/2017  Primary Care Provider: Bernabe Rivas  Discharge Physician:  Frieda Beasley MD  Discharging Service:  Hospitalist      Discharge Diagnoses:  1. Weakness and Falls  2. Atrial Fibrillation with RVR  3. Hypoxic Respiratory Failure with likely CAP and underlying alveolitis  4. Abnormal UA  5. HTN  6. RA  7. Neuropathy     Hospital Course:  Jacob Easton is an 87 yr old with h/o RA on Methotrexate, CKD, HTN, dementia who presented on 4/2/2017 with weakness and fall. He was apparently down for ~17 hrs prior to having neighbors called to put him up on the bed, then he was unable to move from the bed so EMS was called. He had extensive w/u with the ER was negative for infectious etiology however pt did develop LGF, new A fib w/ RVR and appeared to have dirty urine so was started on IV ceftriaxone for developing sepsis syndrome.  He developed worsening SOB, on 4/5 CT chest done showed groundglass opacities in the lungs (new from prior test) consistent with acute alveolitis.  Antibiotics changed to Cefepime and Azithromycin added for atypical coverage in addition to starting Prednisone.  Today respiratory status has improved significantly.  Will discharge to TCU for further rehab.    Weakness and falls: Patient was weaker than his baseline for 1-2 days PTA resulting in fall. He was unable to get up so his wife called neighbors who raised him to a bed however pt called EMS because he was unable to get out of bed. He initially denied any specific symptoms. CXR in the ER shows b/l infiltrates but these have been present for many years. TTE with no new findings except for mild AS. UA appeared infected and he developed fever after admission so was started on IV ceftriaxone with some improvement.  Subsequently he worsened and developed SOB so CT  chest was done on 4/5 which showed groundglass opacities in the lungs (new from prior test) consistent with acute alveolitis in addition to cystic changes (not new).  He did have an abnormal CT even back in 2012 (care everywhere).  His antibiotics were changed from Ceftriaxone to Cefepime and Azithromycin was added.  See below for further details.  His diffuse weakness is improving but he will need further PT/OT at TCU.    Acute Hypoxic Respiratory Failure: Patient developed fever and SOB which prompted CT which showed groundglass opacities in the lungs (new from prior test) consistent with acute alveolitis in addition to cystic changes (not new).  He did have an abnormal CT even back in 2012 (care everywhere).  His antibiotics were switched from Ceftriaxone to Cefepime and Azithromycin was added.  He was also started on Prednisone.  He has improved significantly from a respiratory standpoint, requiring minimal oxygen.  Expect that he will be able to wean from oxygen in the next day or two.  He will complete a 7 day course of antibiotics.  He will also complete 3 more days of Prednisone.  If he has recurrent respiratory symptoms it would be reasonable for the patient to see pulmonology given his abnormal CT results (possible ILD?).      A fib w/ RVR: no prior h/o A fib per chart review. He was given a dose of IV metoprolol and resumed on PTA PO metoprolol with good control. Possibly driven by underlying infectious process. He has high [id number] score however is also high fall risk so will avoid anticoagulation.       Abnormal UA: pt developed LGF, A fib w/ RVR, weakness and urine appeared dirty. Was On IV ceftriaxone initially. His UC was negative.     HTN: resumed metoprolol , HCTZ, amlodipine.       RA: hold MTX for now until infection is treated.  Will recommend holding on 4/10 and resuming on 4/17 per his regular schedule.    Neuropathy: gabapentin resumed.        Discharge Disposition:  Discharged to  "short-term care facility     Allergies:  Allergies   Allergen Reactions     Advil [Ibuprofen]      Doctor told him not to take     Influenza Virus Vaccine [id number]      Does not ever want to have a flu shot - also doesn't want pneumovax     Orange Juice [Orange Oil] Nausea        Condition on Discharge:  Discharge condition: Good   Discharge vitals: Blood pressure 133/64, temperature 96.7  F (35.9  C), temperature source Oral, resp. rate 20, height 1.88 m (6' 2\"), weight 92.4 kg (203 lb 12.8 oz), SpO2 93 %.   Code status on discharge: DNR / DNI     History of Illness:  See detailed admission note for full details.    Physical Exam:  Blood pressure 133/64, temperature 96.7  F (35.9  C), temperature source Oral, resp. rate 20, height 1.88 m (6' 2\"), weight 92.4 kg (203 lb 12.8 oz), SpO2 93 %.  Wt Readings from Last 1 Encounters:   04/06/17 92.4 kg (203 lb 12.8 oz)     General: Alert, awake, no acute distress.  Sitting up in a chair.  HEENT: Normocephalic, atraumatic, eyes anicteric and without scleral injection, EOMI, MMM.  Cardiac: RRR, normal S1, S2.  No m/g/r. No LE edema.  Pulmonary: Normal chest rise, normal work of breathing.  Lungs CTAB with very faint crackles in the bilateral lower lobes otherwise clear.  Abdomen: soft, non-tender, non-distended.  Normoactive BS.  No guarding or rebound tenderness.  Extremities: no deformities.  Warm, well perfused.  Skin: no rashes or lesions noted.  Warm and Dry.  Neuro: No focal deficits noted.  Speech clear.  Coordination and strength grossly normal but diffusely weak.  Psych: Appropriate affect. Alert and oriented x3.    Procedures other than Imaging:  IV antibiotics     Imaging:  Results for orders placed or performed during the hospital encounter of 04/02/17   CT Head w/o Contrast    Narrative    CT HEAD WITHOUT CONTRAST  4/2/2017 1:04 PM    HISTORY: Fall. Weakness.     Radiation dose for this scan was reduced using automated exposure  control, adjustment of the mA and/or " kV according to patient size, or  iterative reconstruction technique.    FINDINGS: Prominent cerebral atrophy and periventricular white matter  hypoattenuation. There is no evidence of acute intracranial  hemorrhage. There is no mass effect or shift of the midline. There is  no definite CT evidence of acute stroke. The visualized portions of  the paranasal sinuses are clear. The osseous calvarium appears within  normal limits as imaged transversely.       Impression    IMPRESSION: No evidence of acute intracranial hemorrhage or mass  effect. Cerebral atrophy and periventricular white matter disease.    OMEGA MARTINEZ MD   CT Cervical Spine w/o Contrast    Narrative    CT CERVICAL SPINE WITHOUT CONTRAST 4/2/2017 1:06 PM     HISTORY: Neck pain after trauma.    Radiation dose for this scan was reduced using automated exposure  control, adjustment of the mA and/or kV according to patient size, or  iterative reconstruction technique.    FINDINGS: There is no CT evidence of acute cervical spine fracture or  subluxation. Degenerative posterior osteophytic spurring is noted at  C5-6. No osseous central stenosis is demonstrated. Mild apophyseal  joint degenerative changes are noted. Uncinate process spurring  results in mild to moderate foraminal stenosis bilaterally at C5-6 and  on the right at C6-7.      Impression    IMPRESSION: No evidence of cervical spine fracture or subluxation.  Degenerative changes as above.    OMEGA MARTINEZ MD   Ankle XR, G/E 3 views, right    Narrative    RIGHT ANKLE THREE OR MORE VIEWS  4/2/2017 1:34 PM     HISTORY: Pain.      Impression    IMPRESSION: No apparent fracture. The ankle mortise appears congruent.  Small vessel arterial calcification is noted.    OMEGA MARTINEZ MD   Lumbar spine XR, 2-3 views    Narrative    LUMBAR SPINE TWO TO THREE VIEWS 4/2/2017 1:34 PM     HISTORY: Pain      Impression    IMPRESSION: Vertebral body heights, sagittal alignment, and disc  heights appear within  normal limits. Aortic calcification is noted.  Bilateral hip arthroplasty is also noted.    OMEGA MARTINEZ MD   XR Pelvis and Hip Right 1 View    Narrative    PELVIS AND RIGHT HIP ONE VIEW  4/2/2017 1:34 PM     HISTORY: Pain. Fall.       Impression    IMPRESSION: Bilateral hip arthroplasties without apparent  complication. The osseous pelvis appears intact.    OMEGA MARTINEZ MD   XR Chest 1 View    Narrative    CHEST ONE VIEW  4/2/2017 1:33 PM     HISTORY: Weakness. Shortness of breath.      Impression    IMPRESSION: Diffuse interstitial, somewhat nodular pattern is noted  within both lungs.  This is similar if not unchanged when given  differences in technique compared to 12/20/2015. No new consolidation  is identified. No apparent pleural effusion.    OMEGA MARTINEZ MD   CT Chest w/o Contrast    Narrative    CT CHEST WITHOUT CONTRAST  4/5/2017 11:05 AM     HISTORY: Shortness of breath.    COMPARISON: Chest x-ray 4/2/2017. CT chest, abdomen and pelvis  11/16/2015.    TECHNIQUE: Axial images from the thoracic inlet to the lung bases are  performed with additional coronal reformatted images. No contrast is  utilized.  Radiation dose for this scan was reduced using automated  exposure control, adjustment of the mA and/or kV according to patient  size, or iterative reconstruction technique.    FINDINGS:  Calcified granulomas are noted within each lung. Other  areas of cystic change are noted throughout both lungs possibly  reflecting underlying emphysema. Mild groundglass opacities in the mid  to lower lungs could represent underlying alveolitis. Airways appear  patent. Findings could reflect underlying lymphangiomyomatosis as many  of these cysts appear well formed with a thin surrounding rim of  tissue. No wall nodularity is appreciated within the cysts. No pleural  or pericardial fluid. The heart is normal in size. Aorta demonstrates  calcified plaque without evidence of aneurysm. Coronary artery  calcification is  also noted. The esophagus is unremarkable. Thyroid  gland appears normal in size. Small mediastinal and hilar lymph nodes  are likely reactive in nature. A larger subcarinal lymph node measures  3.0 x 1.6 cm on series 2, image 36. Limited images upper abdomen  demonstrate mild perihepatic fluid. Bone window examination is within  normal limits.      Impression    IMPRESSION:  1. Persistent cystic changes throughout both lungs raising the  possibility of emphysema or underlying interstitial lung disease.  Groundglass opacities within the lungs is new since the prior exam  suggesting underlying acute alveolitis which may account for the  patient's shortness of breath. No discrete infiltrate or consolidation  to indicate typical pneumonia.  2. No significant pleural or pericardial fluid. Heart is normal in  size. Probable reactive lymph nodes which are new since prior exam.    YSABEL CULP MD        Consultations:  Consultations This Hospital Stay   PHYSICAL THERAPY ADULT IP CONSULT  OCCUPATIONAL THERAPY ADULT IP CONSULT  SOCIAL WORK IP CONSULT  PHYSICAL THERAPY ADULT IP CONSULT  OCCUPATIONAL THERAPY ADULT IP CONSULT     Recent Lab Results:    Recent Labs  Lab 04/06/17  0900 04/05/17  0655 04/04/17  0604   WBC 8.5 7.6 6.7   HGB 11.8* 12.4* 11.3*   HCT 36.6* 37.6* 35.7*   MCV 98 96 98    332 310       Recent Labs  Lab 04/06/17  0900 04/05/17  0655 04/04/17  0604    136 137   POTASSIUM 4.5 3.9 3.8   CHLORIDE 103 101 101   CO2 25 27 27   ANIONGAP 10 8 9   * 122* 112*   BUN 33* 28 29   CR 1.20 1.21 1.20   GFRESTIMATED 57* 57* 57*   GFRESTBLACK 69 69 69   MERCEDES 8.8 8.2* 8.5          Pending Results:    Unresulted Labs Ordered in the Past 30 Days of this Admission     Date and Time Order Name Status Description    4/2/2017 1944 Blood culture Preliminary     4/2/2017 1944 Blood culture Preliminary            Discharge Instructions and Follow-Up:   Discharge Orders     General info for SNF   Length of Stay  Estimate: Short Term Care: Estimated # of Days <30  Condition at Discharge: Improving  Level of care:skilled   Rehabilitation Potential: Good  Admission H&P remains valid and up-to-date: Yes  Recent Chemotherapy: N/A  Use Nursing Home Standing Orders: Yes     Mantoux instructions   Give two-step Mantoux (PPD) Per Facility Policy Yes     Reason for your hospital stay   You were hospitalized for weakness and were found to have a pneumonia.     Follow Up and recommended labs and tests   Follow up with primary care doctor within one week of discharge from rehab.     Activity - Up with nursing assistance     Patient care order   Please hold Methotrexate on 4/10/17.  Methotrexate can be resumed on 4/17/17 per regular scheduled.     DNR/DNI     Physical Therapy Adult Consult   Evaluate and treat as clinically indicated.    Reason:  Deconditioning, diffuse weakness     Occupational Therapy Adult Consult   Evaluate and treat as clinically indicated.    Reason:  Deconditioning, diffuse weakness     Oxygen - Nasal cannula   2 Lpm by nasal cannula to keep O2 sats 92% or greater.  Wean as tolerated.     Fall precautions     Advance Diet as Tolerated   Follow this diet upon discharge: Orders Placed This Encounter     Room Service     Combination Diet Low Saturated Fat Na <2400mg Diet, No Caffeine Diet       Discharge Medications   Current Discharge Medication List      START taking these medications    Details   ipratropium - albuterol 0.5 mg/2.5 mg/3 mL (DUONEB) 0.5-2.5 (3) MG/3ML neb solution Take 1 vial (3 mLs) by nebulization every 4 hours as needed for wheezing  Qty: 360 mL, Refills: 0    Associated Diagnoses: Pneumonia of both lower lobes due to infectious organism      cefuroxime (CEFTIN) 500 MG tablet Take 1 tablet (500 mg) by mouth every 12 hours  Qty: 7 tablet, Refills: 0    Associated Diagnoses: Pneumonia of both lower lobes due to infectious organism      predniSONE (DELTASONE) 20 MG tablet Take 2 tablets (40 mg) by  mouth every morning  Qty: 6 tablet, Refills: 0    Associated Diagnoses: Pneumonia of both lower lobes due to infectious organism      azithromycin (ZITHROMAX) 250 MG tablet Take 1 tablet (250 mg) by mouth daily  Qty: 3 tablet, Refills: 0    Associated Diagnoses: Pneumonia of both lower lobes due to infectious organism         CONTINUE these medications which have NOT CHANGED    Details   ferrous sulfate (IRON) 325 (65 FE) MG tablet Take 325 mg by mouth daily (with breakfast)      levothyroxine (SYNTHROID, LEVOTHROID) 50 MCG tablet Take 1 tablet by mouth daily      hydrochlorothiazide (HYDRODIURIL) 25 MG tablet Take 50 mg by mouth daily       latanoprost (XALATAN) 0.005 % ophthalmic solution Place 1 drop into the right eye At Bedtime       Multiple Vitamins-Minerals (CERTAVITE SENIOR/ANTIOXIDANT) TABS Take 1 tablet by mouth daily      GABAPENTIN PO Take 100 mg by mouth At Bedtime      acetaminophen (TYLENOL) 500 MG tablet Take 500 mg by mouth 4 times daily as needed for mild pain      senna-docusate (SENOKOT-S;PERICOLACE) 8.6-50 MG per tablet Take 1 tablet by mouth 2 times daily as needed for constipation      metoprolol (LOPRESSOR) 12.5 MG TABS Take 25 mg by mouth 2 times daily If AP <60 or SBP <100, hold and call MD      methotrexate 2.5 MG tablet Take 10 mg by mouth once a week. Takes on Mondays       ASPIRIN EC PO Take 81 mg by mouth daily.        folic acid (FOLVITE) 1 MG tablet Take 1 mg by mouth daily.        omega-3 fatty acids (FISH OIL) 1200 MG capsule Take 1 capsule by mouth daily.        tamsulosin (FLOMAX) 0.4 MG 24 hr capsule Take 0.4 mg by mouth At Bedtime.        tuberculin (APLISOL) 5 UNIT/0.1ML injection Inject 5 Units into the skin once             Time Spent on this Encounter   I, Frieda Beasley, personally saw the patient today and spent greater than 30 minutes discharging this patient.    Frieda Beasley MD

## 2017-04-07 ENCOUNTER — NURSING HOME VISIT (OUTPATIENT)
Dept: GERIATRICS | Facility: CLINIC | Age: 82
End: 2017-04-07
Payer: MEDICARE

## 2017-04-07 VITALS
SYSTOLIC BLOOD PRESSURE: 130 MMHG | HEART RATE: 74 BPM | DIASTOLIC BLOOD PRESSURE: 75 MMHG | WEIGHT: 196.1 LBS | HEIGHT: 74 IN | OXYGEN SATURATION: 92 % | BODY MASS INDEX: 25.17 KG/M2 | TEMPERATURE: 97.8 F | RESPIRATION RATE: 14 BRPM

## 2017-04-07 DIAGNOSIS — R53.81 PHYSICAL DECONDITIONING: ICD-10-CM

## 2017-04-07 DIAGNOSIS — I48.91 ATRIAL FIBRILLATION, UNSPECIFIED TYPE (H): ICD-10-CM

## 2017-04-07 DIAGNOSIS — N18.30 CKD (CHRONIC KIDNEY DISEASE) STAGE 3, GFR 30-59 ML/MIN (H): ICD-10-CM

## 2017-04-07 DIAGNOSIS — J18.9 CAP (COMMUNITY ACQUIRED PNEUMONIA): ICD-10-CM

## 2017-04-07 DIAGNOSIS — R53.1 WEAKNESS: Primary | ICD-10-CM

## 2017-04-07 DIAGNOSIS — R82.90 ABNORMAL URINALYSIS: ICD-10-CM

## 2017-04-07 DIAGNOSIS — M06.9 RHEUMATOID ARTHRITIS INVOLVING BOTH HANDS, UNSPECIFIED RHEUMATOID FACTOR PRESENCE: ICD-10-CM

## 2017-04-07 DIAGNOSIS — N40.0 BENIGN PROSTATIC HYPERPLASIA, PRESENCE OF LOWER URINARY TRACT SYMPTOMS UNSPECIFIED, UNSPECIFIED MORPHOLOGY: ICD-10-CM

## 2017-04-07 DIAGNOSIS — G57.93 NEUROPATHIC PAIN OF BOTH FEET: ICD-10-CM

## 2017-04-07 DIAGNOSIS — E03.9 HYPOTHYROIDISM, UNSPECIFIED TYPE: ICD-10-CM

## 2017-04-07 DIAGNOSIS — J67.9 PULMONARY ALVEOLITIS (H): ICD-10-CM

## 2017-04-07 DIAGNOSIS — N39.45 CONTINUOUS LEAKAGE OF URINE: ICD-10-CM
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
## 2017-04-07 DIAGNOSIS — I10 BENIGN ESSENTIAL HYPERTENSION: ICD-10-CM

## 2017-04-07 PROCEDURE — 99207 ZZC CDG-CORRECTLY CODED, REVIEWED AND AGREE: CPT | Performed by: NURSE PRACTITIONER

## 2017-04-07 PROCEDURE — 99310 SBSQ NF CARE HIGH MDM 45: CPT | Performed by: NURSE PRACTITIONER

## 2017-04-07 RX ORDER — CEFUROXIME AXETIL 500 MG/1
500 TABLET ORAL EVERY 12 HOURS
COMMUNITY
End: 2017-04-10

## 2017-04-07 NOTE — PROGRESS NOTES
[place] [facility]  PRIMARY CARE PROVIDER AND CLINIC:  Bernabe Rivas [facility] [id number] [address] / [facility]  Chief Complaint   Patient presents with     Hospital F/U       HPI:    Jacob Easton is a 87 year old  (5/16/1929),admitted to the [facility]  [facility].  Hospital stay 4/2/17 through 4/6/17.  Admitted to this facility for  rehab, medical management and nursing care. Current issues are:        Jacob Easton is an 87 yr old with h/o RA on Methotrexate, CKD, HTN, dementia who presented on 4/2/2017 with weakness and fall at home. He called neighbors to help him up, and assist him to bed. He spent about 17 hours in bed as he was unable to get up , and then called EMS. Had CXR in ED which showed bilat infiltrates, but these are chronic and have been present for many years. UA was abnormal, so he was started on ceftriaxone, however UC eventually came back negative. He had TTE which showed mild aortic stenosis.  He developed increased SOB, and CT was done on 4/5 showing ground glass opacities consistent with acute alveolitis. He was changed to cefepime and azithromycin. Prednisone was also started. He had improvement in respiratory status. He was evaluated by PT and OT and they recommended discharge to TCU for further rehab.    Weakness  Fall, subsequent encounter  Had fall at home. Denies LOC, but reports generalized weakness 1-2 days prior to fall. Initial infectious work up was negative, but he did become increasingly SOB, and CT scan showed ground glass opacities consistent with acute alveolitis as well as chronic cystic changes (previous on CT in 2012). His abx were changed and he had improvement in his weakness, but was not strong enough to go home. He is working with PT and OT in TCU. Does use a walker at baseline.      CAP (community acquired pneumonia)  Pulmonary alveolitis (H)  Does have chronic bilat  infiltrates on CXR. Did develop low grade fever and SOB requiring supplemental oxygen, and chest CT was done showing ground glass opacities consistent with acute alveolitis as well as chronic cystic changes (previous on CT in 2012). His abx were switched from ceftriaxone to cefepime and azithromycin. He was also started on prednisone. His respiratory status improve and he is currently on RA in TCU. He reports occasional cough that produces phelgm, which is not new. Denies any SOB, fevers, or chills. His last day of prednisone and abx is 4/9.    Abnormal urinalysis  UA positive for nitrates, LE, WBC, and RBC. He did have low grade fever, and was started on ceftriaxone. However UC had no growth, and abx were changed due to concerns for pneumonia.     Atrial fibrillation, unspecified type (H)  No previous history noted, but had episode of Afib with RVR. He was given a does of IV metoprolol and PTA PO metoprolol continued with good control of rate. He was noted to be in NSR on Echo. He does have high [id number] score, but due to falls risk, decided against anticoagulation. Is on ASA 81mg daily.     Rheumatoid arthritis involving both hands, unspecified rheumatoid factor presence (H)  Uses MTX for control. However, this was placed on hold until infection resolved. He takes weekly on Monday, and will resume on 4/17. He denies any joint pain on exam today.     Neuropathic pain of both feet (H)  On PTA gabapentin. Reports symptoms are currently at baseline.     Benign essential hypertension   Echo on 4/4 showed Ef 55-60%, mild aortic stenosis. Currently on PTA metoprolol, HCTZ, amlodipine. Denies any headaches, lightheadedness, dizziness, chest pain, or SOB.   Last 3 BPs:128/72, 132/74, 126/70.  Admission Weight: 203lbs    Benign prostatic hyperplasia, presence of lower urinary tract symptoms unspecified, unspecified morphology  Continuous leakage of urine  Has issues with chronic urinary leakage. He is on PTA Tamsulosin.  He uses Depends at home, but did use a condom catheter while IP, and is requesting to use one in TCU. Denies any hematuria, dysuria, or increased in incontinence.    CKD (chronic kidney disease) stage 3, GFR 30-59 ml/min  [facility] creatinine appears to be 1.2-1.3, and was stable during more recent hospitalization. Denies any fatigue. Has had good UOP.     Hypothyroidism, unspecified type  Currently on PTA levothyroxine. Most recent TSH slightly elevated at 4.62    Physical deconditioning  Had generalized weakness resulting in fall at home. He lives with his spouse in a house with stairs. He does use a walker at baseline. He does have some memory loss and history of dementia. He is is working with PT and OT while in TCU.       CODE STATUS/ADVANCE DIRECTIVES DISCUSSION:   DNR / DNI  Patient's living condition: lives with spouse    ALLERGIES:Advil [ibuprofen]; Influenza virus vaccine h5n1; and Orange juice [orange oil]  PAST MEDICAL HISTORY:  has a past medical history of Arthritis; Constipation; Hypertension; Hypocalcemia; Neuromuscular disorder (H); and Thyroid disease.  PAST SURGICAL HISTORY:  has a past surgical history that includes TOTAL HIP ARTHROPLASTY (9/24/11).  FAMILY HISTORY: family history is not on file.  SOCIAL HISTORY:  reports that he has never smoked. He has never used smokeless tobacco. He reports that he does not drink alcohol.    Post Discharge Medication Reconciliation Status: discharge medications reconciled, continue medications without change.  Current Outpatient Prescriptions   Medication Sig Dispense Refill     cefuroxime (CEFTIN) 500 MG tablet Take 500 mg by mouth every 12 hours       ipratropium - albuterol 0.5 mg/2.5 mg/3 mL (DUONEB) 0.5-2.5 (3) MG/3ML neb solution Take 1 vial (3 mLs) by nebulization every 4 hours as needed for wheezing 360 mL 0     predniSONE (DELTASONE) 20 MG tablet Take 2 tablets (40 mg) by mouth every morning 6 tablet 0     azithromycin (ZITHROMAX) 250 MG tablet Take  "1 tablet (250 mg) by mouth daily 3 tablet 0     ferrous sulfate (IRON) 325 (65 FE) MG tablet Take 325 mg by mouth daily (with breakfast)       levothyroxine (SYNTHROID, LEVOTHROID) 50 MCG tablet Take 1 tablet by mouth daily       hydrochlorothiazide (HYDRODIURIL) 25 MG tablet Take 50 mg by mouth daily        latanoprost (XALATAN) 0.005 % ophthalmic solution Place 1 drop into the right eye At Bedtime        tuberculin (APLISOL) 5 UNIT/0.1ML injection Inject 5 Units into the skin once       GABAPENTIN PO Take 100 mg by mouth At Bedtime       acetaminophen (TYLENOL) 500 MG tablet Take 500 mg by mouth 4 times daily as needed for mild pain       senna-docusate (SENOKOT-S;PERICOLACE) 8.6-50 MG per tablet Take 1 tablet by mouth 2 times daily as needed for constipation       metoprolol (LOPRESSOR) 12.5 MG TABS Take 25 mg by mouth 2 times daily If AP <60 or SBP <100, hold and call MD       methotrexate 2.5 MG tablet Take 10 mg by mouth once a week. Takes on Mondays        ASPIRIN EC PO Take 81 mg by mouth daily.         folic acid (FOLVITE) 1 MG tablet Take 1 mg by mouth daily.         tamsulosin (FLOMAX) 0.4 MG 24 hr capsule Take 0.4 mg by mouth At Bedtime.           ROS:  10 point ROS of systems including Constitutional, Eyes, Respiratory, Cardiovascular, Gastroenterology, Genitourinary, Integumentary, Muscularskeletal, Psychiatric were all negative except for pertinent positives noted in my HPI.    Exam:  /75  Pulse 74  Temp 97.8  F (36.6  C)  Resp 14  Ht 6' 2\" (1.88 m)  Wt 196 lb 1.6 oz (89 kg)  SpO2 92%  BMI 25.18 kg/m2  GENERAL APPEARANCE:  Alert, in no distress, cooperative  ENT:  Mouth and posterior oropharynx normal, moist mucous membranes, [place]  EYES:  EOM, conjunctivae, lids, pupils and irises normal, PERRL  NECK:  No adenopathy,masses or thyromegaly  RESP:  respiratory effort and palpation of chest normal, lungs clear to auscultation , no respiratory distress, diminished breath sounds bilat bases  CV: "  Palpation and auscultation of heart done , regular rate and rhythm, no murmur, rub, or gallop, +2 pedal pulses, peripheral edema 1+ in BLE  ABDOMEN:  normal bowel sounds, soft, nontender, no hepatosplenomegaly or other masses, no guarding or rebound  M/S:   Gait and station abnormal generalized weakness, ambulates with walker, BLE strength 4/5  SKIN:  Inspection of skin and subcutaneous tissue baseline, Palpation of skin and subcutaneous tissue baseline, no rashes or wounds noted  NEURO:   Cranial nerves 2-12 are normal tested and grossly at patient's baseline, Examination of sensation by touch normal  PSYCH:  oriented X 3, insight and judgement impaired, memory impaired , affect and mood normal    Lab/Diagnostic data:     CBC RESULTS:   Recent Labs   Lab Test  04/06/17   0900  04/05/17   0655   WBC  8.5  7.6   RBC  3.74*  3.91*   HGB  11.8*  12.4*   HCT  36.6*  37.6*   MCV  98  96   MCH  31.6  31.7   MCHC  32.2  33.0   RDW  14.1  14.2   PLT  325  332       Last Basic Metabolic Panel:  Recent Labs   Lab Test  04/06/17   0900  04/05/17   0655   NA  138  136   POTASSIUM  4.5  3.9   CHLORIDE  103  101   MERCEDES  8.8  8.2*   CO2  25  27   BUN  33*  28   CR  1.20  1.21   GLC  130*  122*       Liver Function Studies -   Recent Labs   Lab Test  04/03/17   0651  04/02/17   1155   PROTTOTAL  6.6*  7.2   ALBUMIN  2.6*  2.8*   BILITOTAL  0.8  0.9   ALKPHOS  53  53   AST  37  43   ALT  23  25       TSH   Date Value Ref Range Status   04/02/2017 4.62 (H) 0.40 - 4.00 mU/L Final   01/05/2016 5.47 (H) 0.40 - 4.00 mU/L Final     ASSESSMENT/PLAN:  (R53.1) Weakness  (primary encounter diagnosis)  (W19.XXXD) Fall, subsequent encounter  Comment: Improved with treatment of PNA, but remains weaker than baseline  Plan: PT/OT, falls precautions, complete ABX, safety checks.     (M06.9) Rheumatoid arthritis involving both hands, unspecified rheumatoid factor presence (H)  Comment: On MTX, pain controlled  Plan: Resume MTX on 4/17, PRN APAP for  pain. Continue folic acid. Monitor and adjust as needed.    (G62.9) Neuropathic pain of both feet (H)  Comment:At baseline symptoms  Plan: Continue gabapentin. Monitor.     (I10) Benign essential hypertension  Comment: Controlled, asymptomatic. To avoid risk of hypotension, falls, dizziness and tissue hypoperfusion, recommended BP goal is < 140/80mmHg in patients with CKD.   Plan: Continue HCTZ, metoprolol, amlodipine, ASA. Monitor VS and adjust as needed.    (N40.0) Benign prostatic hyperplasia, presence of lower urinary tract symptoms unspecified, unspecified morphology  (N39.45) Continuous leakage of urine  Comment: Chronic, at baseline symptoms  Plan: Condom catheter per patient request, change daily and PRN, continue tamsulosin, monitor,     (J18.9) CAP (community acquired pneumonia)  (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)  Comment: Improving, on RA, denies symptoms  Plan: Complete abx and steroids, CBC & BMP on 4/11. Oxygen PRN, Monitor VS, PRN duonebs.    (R82.90) Abnormal urinalysis  Comment: UC negative, baseline urinary symptoms.   Plan: Monitor    (I48.91) Atrial fibrillation, unspecified type (H)  Comment: Rate controlled, asymptomatic. No anticoagulation.   Plan: Continue metoprolol, ASA. Monitor VS and adjust as needed.    (N18.3) CKD (chronic kidney disease) stage 3, GFR 30-59 ml/min  Comment: Stable, see labs  Plan: Control BP. Avoid nephrotoxic medications. BMP on 4/11    (E03.9) Hypothyroidism, unspecified type  Comment: TSH slightly elevated but checked during acute illness  Plan: Continue levothyroxine at current dose, recommend follow up with PCP for recheck TSH    (R53.81) Physical deconditioning  Comment: R/t fall, acute illness. Goal is to discharge home.   Plan: PT/OT eval and treat. Discharge planning per their recommendation. Care conference with IDT to discuss goals of care and discharge planning.           Information reviewed:  Medications, vital signs, orders, nursing notes, problem list,  hospital information. Total time spent with patient visit was 45 min including patient visit and review of past records. Greater than 50% of total time spent with counseling and coordinating care.    Electronically signed by:  SUNDAY Jenkins CNP

## 2017-04-08 LAB
BACTERIA SPEC CULT: NO GROWTH
BACTERIA SPEC CULT: NO GROWTH
Lab: NORMAL
Lab: NORMAL
MICRO REPORT STATUS: NORMAL
MICRO REPORT STATUS: NORMAL
SPECIMEN SOURCE: NORMAL
SPECIMEN SOURCE: NORMAL

## 2017-04-10 VITALS
SYSTOLIC BLOOD PRESSURE: 158 MMHG | WEIGHT: 198.2 LBS | OXYGEN SATURATION: 93 % | RESPIRATION RATE: 16 BRPM | BODY MASS INDEX: 25.44 KG/M2 | TEMPERATURE: 98.8 F | HEIGHT: 74 IN | DIASTOLIC BLOOD PRESSURE: 83 MMHG | HEART RATE: 84 BPM

## 2017-04-11 ENCOUNTER — NURSING HOME VISIT (OUTPATIENT)
Dept: GERIATRICS | Facility: CLINIC | Age: 82
End: 2017-04-11
Payer: MEDICARE

## 2017-04-11 ENCOUNTER — TRANSFERRED RECORDS (OUTPATIENT)
Dept: HEALTH INFORMATION MANAGEMENT | Facility: CLINIC | Age: 82
End: 2017-04-11

## 2017-04-11 DIAGNOSIS — J18.9 CAP (COMMUNITY ACQUIRED PNEUMONIA): ICD-10-CM

## 2017-04-11 DIAGNOSIS — N18.30 CKD (CHRONIC KIDNEY DISEASE) STAGE 3, GFR 30-59 ML/MIN (H): ICD-10-CM

## 2017-04-11 DIAGNOSIS — I10 BENIGN ESSENTIAL HYPERTENSION: ICD-10-CM

## 2017-04-11 DIAGNOSIS — M06.9 RHEUMATOID ARTHRITIS INVOLVING BOTH HANDS, UNSPECIFIED RHEUMATOID FACTOR PRESENCE: ICD-10-CM

## 2017-04-11 DIAGNOSIS — J67.9 PULMONARY ALVEOLITIS (H): ICD-10-CM

## 2017-04-11 DIAGNOSIS — R53.1 WEAKNESS: Primary | ICD-10-CM

## 2017-04-11 DIAGNOSIS — R53.81 PHYSICAL DECONDITIONING: ICD-10-CM

## 2017-04-11 LAB
ANION GAP SERPL CALCULATED.3IONS-SCNC: 10 MMOL/L (ref 5–18)
BUN SERPL-MCNC: 36 MG/DL (ref 8–28)
CALCIUM SERPL-MCNC: 9.2 MG/DL (ref 8.5–10.5)
CHLORIDE SERPLBLD-SCNC: 100 MMOL/L (ref 98–107)
CO2 SERPL-SCNC: 25 MMOL/L (ref 22–31)
CREAT SERPL-MCNC: 1.37 MG/DL (ref 0.7–1.3)
ERYTHROCYTE [DISTWIDTH] IN BLOOD BY AUTOMATED COUNT: 14.5 % (ref 11–14.5)
GFR SERPL CREATININE-BSD FRML MDRD: 49 ML/MIN/1.73M2
GLUCOSE SERPL-MCNC: 109 MG/DL (ref 70–125)
HCT VFR BLD AUTO: 40.3 % (ref 40–54)
HEMOGLOBIN: 12.8 G/DL (ref 14–18)
MCH RBC QN AUTO: 31.4 PG (ref 27–34)
MCHC RBC AUTO-ENTMCNC: 31.8 G/DL (ref 32–36)
MCV RBC AUTO: 99 FL (ref 80–100)
PLATELET # BLD AUTO: 395 THOU/UL (ref 140–440)
POTASSIUM SERPL-SCNC: 3.7 MMOL/L (ref 3.5–5)
RBC # BLD AUTO: 4.08 10^12/L (ref 4.4–5.2)
SODIUM SERPL-SCNC: 135 MMOL/L (ref 136–145)
WBC # BLD AUTO: 9.1 THOU/UL (ref 4–11)

## 2017-04-11 PROCEDURE — 99207 ZZC CDG-CORRECTLY CODED, REVIEWED AND AGREE: CPT | Performed by: NURSE PRACTITIONER

## 2017-04-11 PROCEDURE — 99309 SBSQ NF CARE MODERATE MDM 30: CPT | Performed by: NURSE PRACTITIONER

## 2017-04-11 NOTE — PROGRESS NOTES
[place] [facility]    Chief Complaint   Patient presents with     Nursing Home Acute       HPI:    Jacob Easton is a 87 year old  (5/16/1929), who is being seen today for an episodic care visit at [facility]. Today's concern is:  Weakness  Physical deconditioning  S/p fall at home and had significant weakness and was unable to get. He was found to have pneumonia and alveolitis and was treated with antibiotics and weakness improve, but he remained deconditioned and was discharged to TCU. He is currently working with PT and OT. He does use a walker at baseline. He lives in a house with steps with his spouse.    CAP (community acquired pneumonia)  Pulmonary alveolitis (H)  Does have chronic bilat infiltrates on CXR. Developed low grade fever and SOB requiring supplemental oxygen, and chest CT was done showing ground glass opacities consistent with acute alveolitis as well as chronic cystic changes (previous on CT in 2012). His abx were switched from ceftriaxone to cefepime and azithromycin. He was also started on prednisone. He is currently on RA. Cough is at baseline. Denies any SOB, fevers, or chills. His last day of prednisone and abx was 4/9.    Rheumatoid arthritis involving both hands, unspecified rheumatoid factor presence (H)  Uses MTX for control. However, this was placed on hold until infection resolved. He takes weekly on Monday, and will resume on 4/17. He denies any joint pain on exam today. No joint edema or tenderness noted.    Benign essential hypertension  Echo on 4/4 showed Ef 55-60%, mild aortic stenosis. Currently on PTA metoprolol, HCTZ, amlodipine. Denies any headaches, lightheadedness, dizziness, chest pain, or SOB.   Last 3 BPs:144/64, 154/80, 132/71.  Admission Weight: 198.2lbs  Current Weight: 198.2lbs    CKD (chronic kidney disease) stage 3, GFR 30-59 ml/min  Baseline creatinine appears to be 1.2-1.3, and was stable during more recent  hospitalization. Denies any fatigue. Has had good UOP, using a condom catheter. Creatinine on recheck on was 1.37, BUN 36, K 3.7.    ALLERGIES: Advil [ibuprofen]; Influenza virus vaccine h5n1; and Orange juice [orange oil]  Past Medical, Surgical, Family and Social History reviewed and updated in [facility].    Current Outpatient Prescriptions   Medication Sig Dispense Refill     ipratropium - albuterol 0.5 mg/2.5 mg/3 mL (DUONEB) 0.5-2.5 (3) MG/3ML neb solution Take 1 vial (3 mLs) by nebulization every 4 hours as needed for wheezing 360 mL 0     ferrous sulfate (IRON) 325 (65 FE) MG tablet Take 325 mg by mouth daily (with breakfast)       levothyroxine (SYNTHROID, LEVOTHROID) 50 MCG tablet Take 1 tablet by mouth daily       hydrochlorothiazide (HYDRODIURIL) 25 MG tablet Take 50 mg by mouth daily        latanoprost (XALATAN) 0.005 % ophthalmic solution Place 1 drop into the right eye At Bedtime        tuberculin (APLISOL) 5 UNIT/0.1ML injection Inject 5 Units into the skin once       GABAPENTIN PO Take 100 mg by mouth At Bedtime       acetaminophen (TYLENOL) 500 MG tablet Take 500 mg by mouth 4 times daily as needed for mild pain       senna-docusate (SENOKOT-S;PERICOLACE) 8.6-50 MG per tablet Take 1 tablet by mouth 2 times daily as needed for constipation       metoprolol (LOPRESSOR) 12.5 MG TABS Take 25 mg by mouth 2 times daily If AP <60 or SBP <100, hold and call MD       methotrexate 2.5 MG tablet Take 10 mg by mouth once a week. Takes on Mondays        ASPIRIN EC PO Take 81 mg by mouth daily.         folic acid (FOLVITE) 1 MG tablet Take 1 mg by mouth daily.         tamsulosin (FLOMAX) 0.4 MG 24 hr capsule Take 0.4 mg by mouth At Bedtime.         Medications reviewed:  Medications reconciled to facility chart and changes were made to reflect current medications as identified as above med list. Below are the changes that were made:   Medications stopped since last EPIC medication reconciliation:   Medications  "Discontinued During This Encounter   Medication Reason     azithromycin (ZITHROMAX) 250 MG tablet Medication Reconciliation Clean Up     cefuroxime (CEFTIN) 500 MG tablet Medication Reconciliation Clean Up     predniSONE (DELTASONE) 20 MG tablet Medication Reconciliation Clean Up       Medications started since last [facility] medication reconciliation:  No orders of the defined types were placed in this encounter.        REVIEW OF SYSTEMS:  10 point ROS of systems including Constitutional, Eyes, Respiratory, Cardiovascular, Gastroenterology, Genitourinary, Integumentary, Muscularskeletal, Psychiatric were all negative except for pertinent positives noted in my HPI.    Physical Exam:  /83  Pulse 84  Temp 98.8  F (37.1  C)  Resp 16  Ht 6' 2\" (1.88 m)  Wt 198 lb 3.2 oz (89.9 kg)  SpO2 93%  BMI 25.45 kg/m2  GENERAL APPEARANCE:  Alert, in no distress, cooperative  ENT:  Mouth and posterior oropharynx normal, moist mucous membranes, [place]  EYES:  EOM, conjunctivae, lids, pupils and irises normal, PERRL  NECK:  No adenopathy,masses or thyromegaly  RESP:  respiratory effort and palpation of chest normal, lungs clear to auscultation , no respiratory distress, diminished breath sounds bilat bases, cough absent  CV:  Palpation and auscultation of heart done , regular rate and rhythm, no murmur, rub, or gallop, +2 pedal pulses, peripheral edema 1+ in bilat ankles  ABDOMEN:  normal bowel sounds, soft, nontender, no hepatosplenomegaly or other masses, no guarding or rebound  M/S:   Gait and station abnormal generalized weakness, ambulates with walker, BLE strength 4/5, BUE 4/5  SKIN:  Inspection of skin and subcutaneous tissue baseline, Palpation of skin and subcutaneous tissue baseline, no rashes or wounds noted  NEURO:   Cranial nerves 2-12 are normal tested and grossly at patient's baseline, Examination of sensation by touch normal  PSYCH:  oriented X 3, insight and judgement impaired, memory impaired , affect and mood " normal    Recent Labs:    CBC RESULTS:   Recent Labs   Lab Test  04/06/17   0900  04/05/17   0655   WBC  8.5  7.6   RBC  3.74*  3.91*   HGB  11.8*  12.4*   HCT  36.6*  37.6*   MCV  98  96   MCH  31.6  31.7   MCHC  32.2  33.0   RDW  14.1  14.2   PLT  325  332       Last Basic Metabolic Panel:  Recent Labs   Lab Test  04/06/17   0900  04/05/17   0655   NA  138  136   POTASSIUM  4.5  3.9   CHLORIDE  103  101   MERCEDES  8.8  8.2*   CO2  25  27   BUN  33*  28   CR  1.20  1.21   GLC  130*  122*       Liver Function Studies -   Recent Labs   Lab Test  04/03/17   0651  04/02/17   1155   PROTTOTAL  6.6*  7.2   ALBUMIN  2.6*  2.8*   BILITOTAL  0.8  0.9   ALKPHOS  53  53   AST  37  43   ALT  23  25       TSH   Date Value Ref Range Status   04/02/2017 4.62 (H) 0.40 - 4.00 mU/L Final   01/05/2016 5.47 (H) 0.40 - 4.00 mU/L Final       Assessment/Plan:  (R53.1) Weakness  (primary encounter diagnosis)  (R53.81) Physical deconditioning  Comment: Improved with treatment of PNA, but remains weaker than baseline  Plan: PT/OT, discharge planning per their evaluation,  falls precautions, safety checks.     (J18.9) CAP (community acquired pneumonia)  (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)  Comment: Improving, on RA, denies symptoms, Completed abx and steroids,   Plan: CBC & BMP PRN. Oxygen PRN, Monitor VS, PRN duonebs. Consider follow up with pulmonary if respiratory status worsens.    (M06.9) Rheumatoid arthritis involving both hands, unspecified rheumatoid factor presence (H)  Comment: MTX on hold, pain controlled  Plan: Resume MTX on 4/17, PRN APAP for pain. Continue folic acid. Monitor and adjust as needed.    (I10) Benign essential hypertension  Comment: Controlled, asymptomatic. To avoid risk of hypotension, falls, dizziness and tissue hypoperfusion, recommended BP goal is < 140/80mmHg in patients with CKD.   Plan: Continue HCTZ, metoprolol, amlodipine, ASA. Monitor VS and adjust as needed.    (N18.3) CKD (chronic kidney disease) stage 3, GFR  30-59 ml/min  Comment: Stable, see labs. Good UOP  Plan: Control BP. Avoid nephrotoxic medications. BMP PRN      Electronically signed by  SUNDAY Jenkins CNP

## 2017-04-12 ENCOUNTER — NURSING HOME VISIT (OUTPATIENT)
Dept: GERIATRICS | Facility: CLINIC | Age: 82
End: 2017-04-12
Payer: MEDICARE

## 2017-04-12 VITALS
HEIGHT: 74 IN | TEMPERATURE: 97 F | HEART RATE: 91 BPM | SYSTOLIC BLOOD PRESSURE: 143 MMHG | WEIGHT: 198.2 LBS | BODY MASS INDEX: 25.44 KG/M2 | RESPIRATION RATE: 16 BRPM | OXYGEN SATURATION: 92 % | DIASTOLIC BLOOD PRESSURE: 78 MMHG

## 2017-04-12 DIAGNOSIS — I10 BENIGN ESSENTIAL HYPERTENSION: ICD-10-CM

## 2017-04-12 DIAGNOSIS — E03.9 ACQUIRED HYPOTHYROIDISM: ICD-10-CM

## 2017-04-12 DIAGNOSIS — M06.9 RHEUMATOID ARTHRITIS INVOLVING BOTH HANDS, UNSPECIFIED RHEUMATOID FACTOR PRESENCE: ICD-10-CM

## 2017-04-12 DIAGNOSIS — J67.9 PULMONARY ALVEOLITIS (H): Primary | ICD-10-CM

## 2017-04-12 DIAGNOSIS — R53.81 PHYSICAL DECONDITIONING: ICD-10-CM

## 2017-04-12 PROCEDURE — 99306 1ST NF CARE HIGH MDM 50: CPT | Performed by: INTERNAL MEDICINE

## 2017-04-12 PROCEDURE — 99207 ZZC CDG-CORRECTLY CODED, REVIEWED AND AGREE: CPT | Performed by: INTERNAL MEDICINE

## 2017-04-12 NOTE — PROGRESS NOTES
PRIMARY CARE PROVIDER AND CLINIC RESPONSIBLE:  Bernabe Rivas, [facility] [id number] / [place]        ADMISSION HISTORY AND PHYSICAL EXAMINATION     No chief complaint on file.        HISTORY OF PRESENT ILLNESS:  87 year old male, (5/16/1929), [facility] of medical care and rehab.    Pt admitted [facility] 4/2 to 4/6 for weakness and fall. Noted to have bilateral infiltrates. CT chest showed acute alveolitis. Rx with Abx and prednisone.    Pt feels weak. No cough/worsening SOB/fever/chills.    Patient is seen and examined by me with Puja Dumont CNP. Please see Puja Dumont 's admit noted dated 4/7  for details of admission, past medical history, family history, allergies, medication list, social history and other details pertinent with this admission. Hospital admission and dc summary reviewed.      Past Medical History:   Diagnosis Date     Arthritis      Constipation      Hypertension      Hypocalcemia      Neuromuscular disorder (H)      Thyroid disease        Past Surgical History:   Procedure Laterality Date     C TOTAL HIP ARTHROPLASTY  9/24/11    RT       Current Outpatient Prescriptions   Medication Sig     ipratropium - albuterol 0.5 mg/2.5 mg/3 mL (DUONEB) 0.5-2.5 (3) MG/3ML neb solution Take 1 vial (3 mLs) by nebulization every 4 hours as needed for wheezing     ferrous sulfate (IRON) 325 (65 FE) MG tablet Take 325 mg by mouth daily (with breakfast)     levothyroxine (SYNTHROID, LEVOTHROID) 50 MCG tablet Take 1 tablet by mouth daily     hydrochlorothiazide (HYDRODIURIL) 25 MG tablet Take 50 mg by mouth daily      latanoprost (XALATAN) 0.005 % ophthalmic solution Place 1 drop into the right eye At Bedtime      tuberculin (APLISOL) 5 UNIT/0.1ML injection Inject 5 Units into the skin once     GABAPENTIN PO Take 100 mg by mouth At Bedtime     acetaminophen (TYLENOL) 500 MG tablet Take 500 mg by mouth 4 times daily as needed for mild pain  "    senna-docusate (SENOKOT-S;PERICOLACE) 8.6-50 MG per tablet Take 1 tablet by mouth 2 times daily as needed for constipation     metoprolol (LOPRESSOR) 12.5 MG TABS Take 25 mg by mouth 2 times daily If AP <60 or SBP <100, hold and call MD     methotrexate 2.5 MG tablet Take 10 mg by mouth once a week. Takes on Mondays      ASPIRIN EC PO Take 81 mg by mouth daily.       folic acid (FOLVITE) 1 MG tablet Take 1 mg by mouth daily.       tamsulosin (FLOMAX) 0.4 MG 24 hr capsule Take 0.4 mg by mouth At Bedtime.       No current facility-administered medications for this visit.        Allergies   Allergen Reactions     Advil [Ibuprofen]      Doctor told him not to take     Influenza Virus Vaccine H5n1      Does not ever want to have a flu shot - also doesn't want pneumovax     Orange Juice [Orange Oil] Nausea       Social History     Social History     Marital status:      Spouse name: N/A     Number of children: N/A     Years of education: N/A     Occupational History     Not on file.     Social History Main Topics     Smoking status: Never Smoker     Smokeless tobacco: Never Used     Alcohol use No     Drug use: Not on file     Sexual activity: Not on file     Other Topics Concern     Not on file     Social History Narrative    The patient is . He denies a history of smoking, drinking or drug use. He was a  for 46 years. Living at [facility] currently as he was suffering from falls at home.           Information reviewed:  Medications, vital signs, orders, nursing notes, problem list, hospital information.     ROS: All 10 point review of system completed, those pertinent positive, please see H&P, the remaining ROS is negative.    /78  Pulse 91  Temp 97  F (36.1  C)  Resp 16  Ht 6' 2\" (1.88 m)  Wt 198 lb 3.2 oz (89.9 kg)  SpO2 92%  BMI 25.45 kg/m2    PHYSICAL EXAMINATION:   GENERAL:  No acute distress. Sitting in a chair, wife visiting.  SKIN:  Dry and warm.  There is no rash, lesions, " ulcers or juandice at area of skin examined.  HEENT:  Head without trauma.  Pupils round, reactive. Exam of conjunctiva and lids are normal. Sclera without icterus. There is no oral thrush.  NECK:  Supple.  There is no cervical adenopathy, no thyromegaly. No jugular venous distension.  CHEST: No reproducible chest tenderness.   LUNGS:  Normal respiratory effort. Diminished BS on the L.  HEART:  Regular rate and rhythm.  No murmur, gallops or rubs auscultated.  ABDOMEN:  Soft, bowel sounds positive.  There is no tenderness or guarding.   EXTREMITIES: No edema.   NEUROLOGIC:  Alert and oriented to self and follows commands.    Lab/Diagnostic data:  Reviewed    Lab Results   Component Value Date    WBC 9.1 04/11/2017     Lab Results   Component Value Date    RBC 4.08 04/11/2017     Lab Results   Component Value Date    HGB 12.8 04/11/2017     Lab Results   Component Value Date    HCT 40.3 04/11/2017     Lab Results   Component Value Date    MCV 99 04/11/2017     Lab Results   Component Value Date    MCH 31.4 04/11/2017     Lab Results   Component Value Date    MCHC 31.8 04/11/2017     Lab Results   Component Value Date    RDW 14.5 04/11/2017     Lab Results   Component Value Date     04/11/2017       Last Basic Metabolic Panel:  Lab Results   Component Value Date     04/11/2017      Lab Results   Component Value Date    POTASSIUM 3.7 04/11/2017     Lab Results   Component Value Date    CHLORIDE 100 04/11/2017     Lab Results   Component Value Date    MERCEDES 9.2 04/11/2017     Lab Results   Component Value Date    CO2 25 04/11/2017     Lab Results   Component Value Date    BUN 36 04/11/2017     Lab Results   Component Value Date    CR 1.37 04/11/2017     Lab Results   Component Value Date     04/11/2017           ASSESSMENT / PLAN:     Pulmonary alveolitis (H)  - CT chest 4/2017 showed ILD/emphysema and GGO suggestive of acute alveolitis.  - Treated with cefepime, rocephin, azithromycin and  prednisone.  - MTX could cause ILD, hence, hold MTX.  - Should f/u with rheumatology and pulmonology regarding lung findings.    Rheumatoid arthritis involving both hands, unspecified rheumatoid factor presence (H)  - NO active synovitis/joint stiffness.  - May use prednisone for now as needed.    Acquired hypothyroidism  - On synthroid.    Benign essential hypertension  - On HCT and metoprolol.    BPH.  - On flomax.    Physical deconditioning  -Plan: PT/OT, fall precautions. Care conference with patient and family for the progress of rehab and disposition issues will be discussed as planned. Rehab evaluation and other evaluations including CPT are at rehab logs, to be reviewed separately.  Fall risk assessment as well as cognitive evaluation will be formed during rehab stay if indicated.      Other problems with same care. Primary care doctor and other specialists to address those chronic problems in next clinic appointment to be scheduled upon discharge from the TCU.    Total time spent with patient visit was 45 min including patient visit, review of past records, patients counseling and coordinating care.

## 2017-04-13 ENCOUNTER — NURSING HOME VISIT (OUTPATIENT)
Dept: GERIATRICS | Facility: CLINIC | Age: 82
End: 2017-04-13
Payer: MEDICARE

## 2017-04-13 VITALS
OXYGEN SATURATION: 92 % | TEMPERATURE: 97.9 F | SYSTOLIC BLOOD PRESSURE: 139 MMHG | BODY MASS INDEX: 25.44 KG/M2 | HEART RATE: 83 BPM | HEIGHT: 74 IN | RESPIRATION RATE: 16 BRPM | DIASTOLIC BLOOD PRESSURE: 76 MMHG | WEIGHT: 198.2 LBS

## 2017-04-13 DIAGNOSIS — R53.81 PHYSICAL DECONDITIONING: ICD-10-CM

## 2017-04-13 DIAGNOSIS — J67.9 PULMONARY ALVEOLITIS (H): Primary | ICD-10-CM

## 2017-04-13 DIAGNOSIS — M06.9 RHEUMATOID ARTHRITIS INVOLVING BOTH HANDS, UNSPECIFIED RHEUMATOID FACTOR PRESENCE: ICD-10-CM

## 2017-04-13 DIAGNOSIS — R53.1 WEAKNESS: ICD-10-CM

## 2017-04-13 PROCEDURE — 99309 SBSQ NF CARE MODERATE MDM 30: CPT | Performed by: NURSE PRACTITIONER

## 2017-04-13 NOTE — PROGRESS NOTES
[facility]    Chief Complaint   Patient presents with     Nursing Home Acute       HPI:    Jacob Easton is a 87 year old  (5/16/1929), who is being seen today for an episodic care visit at [facility]. Today's concern is:    Pulmonary alveolitis (H)  Does have chronic bilat infiltrates on CXR. Developed low grade fever and SOB requiring supplemental oxygen, and chest CT was done showing ground glass opacities consistent with acute alveolitis as well as chronic cystic changes (previously seen on CT in 2012). His abx were switched from ceftriaxone to cefepime and azithromycin. He was also started on prednisone, which were all completed on 4/9. He is currently on RA. Cough is at baseline. Does have occasional SOB, which responds well to duoneb. CT was reviewed by Dr. Stevenson, and due to concerns for interstitial lung disease he recommends continuing to hold methotrexate and following up with pulmonology. This was discussed with both patient and spouse and they were agreeable.     Rheumatoid arthritis involving both hands, unspecified rheumatoid factor presence (H)  Has been using MTX for control. He takes it once weekly on Mondays. It was placed on hold during hospitalization. Due to alveolitis and ILD seen on CT scan, there are concerns that his MTX could be causing lung injury. Discussed this with patient and spouse and they were agreeable to discontinuing MTX and following up with rheumatology and pulmonology for further treatment.  He denies any joint pain on exam today. No joint edema, warmth, or tenderness noted.    Physical Deconditioning  Weakness  S/p fall at home and had significant weakness and was unable to get. He was found to have pneumonia and alveolitis and was treated with antibiotics and weakness improved, but he remained deconditioned and was discharged to TCU. He is currently working with PT and OT. He does use a walker at baseline. He lives in a  house with steps with his spouse. He is currently SBA/min assist with UB dressing, max assist with LE dressing, toileting; mod/max assist with bed mobility, transfers with CGA, ambulates 25-55ft with 2WW and CGA. SLUMS 18/30, CPT pending.      Last 3 BPs:139/76, 145/75, 143/78.  Admission Weight:198.2lbs  Current Weight: 198.2lbs    ALLERGIES: Advil [ibuprofen]; Influenza virus vaccine h5n1; and Orange juice [orange oil]  Past Medical, Surgical, Family and Social History reviewed and updated in [facility].    Current Outpatient Prescriptions   Medication Sig Dispense Refill     ipratropium - albuterol 0.5 mg/2.5 mg/3 mL (DUONEB) 0.5-2.5 (3) MG/3ML neb solution Take 1 vial (3 mLs) by nebulization every 4 hours as needed for wheezing 360 mL 0     ferrous sulfate (IRON) 325 (65 FE) MG tablet Take 325 mg by mouth daily (with breakfast)       levothyroxine (SYNTHROID, LEVOTHROID) 50 MCG tablet Take 1 tablet by mouth daily       hydrochlorothiazide (HYDRODIURIL) 25 MG tablet Take 50 mg by mouth daily        latanoprost (XALATAN) 0.005 % ophthalmic solution Place 1 drop into the right eye At Bedtime        tuberculin (APLISOL) 5 UNIT/0.1ML injection Inject 5 Units into the skin once       GABAPENTIN PO Take 100 mg by mouth At Bedtime       acetaminophen (TYLENOL) 500 MG tablet Take 500 mg by mouth 4 times daily as needed for mild pain       senna-docusate (SENOKOT-S;PERICOLACE) 8.6-50 MG per tablet Take 1 tablet by mouth 2 times daily as needed for constipation       metoprolol (LOPRESSOR) 12.5 MG TABS Take 25 mg by mouth 2 times daily If AP <60 or SBP <100, hold and call MD       methotrexate 2.5 MG tablet Take 10 mg by mouth once a week. Takes on Mondays        ASPIRIN EC PO Take 81 mg by mouth daily.         folic acid (FOLVITE) 1 MG tablet Take 1 mg by mouth daily.         tamsulosin (FLOMAX) 0.4 MG 24 hr capsule Take 0.4 mg by mouth At Bedtime.         Medications reviewed:  Medications reconciled to facility chart and  "changes were made to reflect current medications as identified as above med list. Below are the changes that were made:   Medications stopped since last EPIC medication reconciliation:   There are no discontinued medications.    Medications started since last [facility] medication reconciliation:  No orders of the defined types were placed in this encounter.      REVIEW OF SYSTEMS:  10 point ROS of systems including Constitutional, Eyes, Respiratory, Cardiovascular, Gastroenterology, Genitourinary, Integumentary, Muscularskeletal, Psychiatric were all negative except for pertinent positives noted in my HPI.    Physical Exam:  /76  Pulse 83  Temp 97.9  F (36.6  C)  Resp 16  Ht 6' 2\" (1.88 m)  Wt 198 lb 3.2 oz (89.9 kg)  SpO2 92%  BMI 25.45 kg/m2  GENERAL APPEARANCE:  Alert, in no distress, cooperative  ENT:  Mouth and posterior oropharynx normal, moist mucous membranes, [place], moderate amount of clear, thin nasal drainage  EYES:  EOM, conjunctivae, lids, pupils and irises normal, PERRL  NECK:  No adenopathy,masses or thyromegaly  RESP:  respiratory effort and palpation of chest normal, lungs clear to auscultation , no respiratory distress, diminished breath sounds left base, cough absent  CV:  Palpation and auscultation of heart done , regular rate and rhythm, no murmur, rub, or gallop, +2 pedal pulses, peripheral edema trace in bilat ankles  ABDOMEN:  normal bowel sounds, soft, nontender, no hepatosplenomegaly or other masses, no guarding or rebound  M/S:   Gait and station abnormal generalized weakness, ambulates with walker, BLE strength 4/5, BUE 4/5  SKIN:  Inspection of skin and subcutaneous tissue baseline, Palpation of skin and subcutaneous tissue baseline, no rashes or wounds noted  NEURO:   Cranial nerves 2-12 are normal tested and grossly at patient's baseline, Examination of sensation by touch normal  PSYCH:  oriented X 3, insight and judgement impaired, memory impaired , affect and mood " normal    Recent Labs:    CBC RESULTS:   Recent Labs   Lab Test 04/11/17 04/06/17   0900   WBC  9.1  8.5   RBC  4.08*  3.74*   HGB  12.8*  11.8*   HCT  40.3  36.6*   MCV  99  98   MCH  31.4  31.6   MCHC  31.8*  32.2   RDW  14.5  14.1   PLT  395  325       Last Basic Metabolic Panel:  Recent Labs   Lab Test 04/11/17 04/06/17   0900   NA  135*  138   POTASSIUM  3.7  4.5   CHLORIDE  100  103   MERCEDES  9.2  8.8   CO2  25  25   BUN  36*  33*   CR  1.37*  1.20   GLC  109  130*       Liver Function Studies -   Recent Labs   Lab Test  04/03/17   0651  04/02/17   1155   PROTTOTAL  6.6*  7.2   ALBUMIN  2.6*  2.8*   BILITOTAL  0.8  0.9   ALKPHOS  53  53   AST  37  43   ALT  23  25       TSH   Date Value Ref Range Status   04/02/2017 4.62 (H) 0.40 - 4.00 mU/L Final   01/05/2016 5.47 (H) 0.40 - 4.00 mU/L Final       Assessment/Plan:  (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)  (primary encounter diagnosis)  Comment: Respiratory status improved, on RA  Plan: CBC & BMP PRN. Oxygen PRN, Monitor VS, PRN duonebs. Discontinue MTC, schedule follow up with pulmonology.    (M06.9) Rheumatoid arthritis involving both hands, unspecified rheumatoid factor presence (H)  Comment: No s/s of flare-up currently  Plan: Discontinue MTX, schedule f/u with rheumatology (arthritis and rheumatology consultants, [place] office), prednisone PRN if concerns of flare-up    (R53.81) Physical deconditioning  (R53.1) Weakness  Comment: Improving but remains weaker than baseline. Goal is discharge back home with spouse.   Plan: PT/OT, discharge planning per their evaluation,  falls precautions, safety checks.    Electronically signed by  SUNDAY Jenkins CNP

## 2017-04-18 ENCOUNTER — NURSING HOME VISIT (OUTPATIENT)
Dept: GERIATRICS | Facility: CLINIC | Age: 82
End: 2017-04-18
Payer: MEDICARE

## 2017-04-18 VITALS
SYSTOLIC BLOOD PRESSURE: 139 MMHG | DIASTOLIC BLOOD PRESSURE: 73 MMHG | OXYGEN SATURATION: 94 % | RESPIRATION RATE: 18 BRPM | HEART RATE: 75 BPM | BODY MASS INDEX: 24.38 KG/M2 | TEMPERATURE: 97.7 F | HEIGHT: 74 IN | WEIGHT: 190 LBS

## 2017-04-18 DIAGNOSIS — I10 BENIGN ESSENTIAL HYPERTENSION: ICD-10-CM

## 2017-04-18 DIAGNOSIS — M06.9 RHEUMATOID ARTHRITIS INVOLVING BOTH HANDS, UNSPECIFIED RHEUMATOID FACTOR PRESENCE: ICD-10-CM

## 2017-04-18 DIAGNOSIS — R53.1 WEAKNESS: ICD-10-CM

## 2017-04-18 DIAGNOSIS — R53.81 PHYSICAL DECONDITIONING: ICD-10-CM

## 2017-04-18 DIAGNOSIS — J67.9 PULMONARY ALVEOLITIS (H): Primary | ICD-10-CM

## 2017-04-18 PROCEDURE — 99309 SBSQ NF CARE MODERATE MDM 30: CPT | Performed by: NURSE PRACTITIONER

## 2017-04-18 NOTE — PROGRESS NOTES
[place]    Chief Complaint   Patient presents with     Nursing Home Acute       HPI:    Jacob Easton is a 87 year old  (5/16/1929), who is being seen today for an episodic care visit at [facility]. Today's concern is:  Pulmonary alveolitis (H)  Does have chronic bilat infiltrates on CXR. Developed low grade fever and SOB requiring supplemental oxygen, and chest CT was done showing ground glass opacities consistent with acute alveolitis as well as chronic cystic changes (previously seen on CT in 2012). His abx were switched from ceftriaxone to cefepime and azithromycin. He was also started on prednisone, which were all completed on 4/9. He is currently on RA. Cough is at baseline. Continues to have occasional SOB, which responds well to duoneb. Methotrexate on hold due to concerns of ILD. He has apt with pulmonology in May.     Rheumatoid arthritis involving both hands, unspecified rheumatoid factor presence (H)  Has been using MTX for control. He takes it once weekly on Mondays. It was placed on hold during hospitalization. Due to alveolitis and ILD seen on CT scan, there are concerns that his MTX could be causing lung injury. He currently denies any joint pain, and no swelling, or warmth to suggest flare up. Has used PRN tylenol for chronic low back pain. Has follow up with rheumatology 8/3/2017, but will attempt to reschedule apt to closer date as have stopped MTX therapy.    Benign essential hypertension  Echo on 4/4 showed Ef 55-60%, mild aortic stenosis. Currently on PTA metoprolol, HCTZ, ASA. BP slightly elevated on admission to TCU, but has improved. Denies any headaches, lightheadedness, dizziness, chest pain, or SOB.   Last 3 BPs:139/73, 145/85, 126/67.  Admission Weight: 198.2lbs  Current Weight: 190.0lbs    Weakness  Physical deconditioning  S/p fall at home and had significant weakness and was unable to get. He was found to have pneumonia and  alveolitis and was treated with antibiotics and weakness improved, but he remained deconditioned and was discharged to TCU. He is currently working with PT and OT. He does use a walker at baseline. He lives in a house with steps with his spouse, though he reports that wife approached him today and theyhave decided to look into moving into [facility]. He report he continues to fatigue easily. He is currently SBA/min assist with UB dressing, max assist with LE dressing, toileting; mod/max assist with bed mobility, transfers with CGA, ambulates 25-55ft with 2WW and CGA. SLUMS 18/30, CPT pending.        ALLERGIES: Advil [ibuprofen]; Influenza virus vaccine h5n1; and Orange juice [orange oil]  Past Medical, Surgical, Family and Social History reviewed and updated in [facility].    Current Outpatient Prescriptions   Medication Sig Dispense Refill     ipratropium - albuterol 0.5 mg/2.5 mg/3 mL (DUONEB) 0.5-2.5 (3) MG/3ML neb solution Take 1 vial (3 mLs) by nebulization every 4 hours as needed for wheezing 360 mL 0     ferrous sulfate (IRON) 325 (65 FE) MG tablet Take 325 mg by mouth daily (with breakfast)       levothyroxine (SYNTHROID, LEVOTHROID) 50 MCG tablet Take 1 tablet by mouth daily       hydrochlorothiazide (HYDRODIURIL) 25 MG tablet Take 50 mg by mouth daily        latanoprost (XALATAN) 0.005 % ophthalmic solution Place 1 drop into the right eye At Bedtime        GABAPENTIN PO Take 100 mg by mouth At Bedtime       acetaminophen (TYLENOL) 500 MG tablet Take 500 mg by mouth 4 times daily as needed for mild pain       senna-docusate (SENOKOT-S;PERICOLACE) 8.6-50 MG per tablet Take 1 tablet by mouth 2 times daily as needed for constipation       metoprolol (LOPRESSOR) 12.5 MG TABS Take 25 mg by mouth 2 times daily If AP <60 or SBP <100, hold and call MD       ASPIRIN EC PO Take 81 mg by mouth daily.         folic acid (FOLVITE) 1 MG tablet Take 1 mg by mouth daily.         tamsulosin (FLOMAX) 0.4 MG 24 hr capsule Take 0.4 mg by  "mouth At Bedtime.         Medications reviewed:  Medications reconciled to facility chart and changes were made to reflect current medications as identified as above med list. Below are the changes that were made:   Medications stopped since last EPIC medication reconciliation:   There are no discontinued medications.    Medications started since last [facility] medication reconciliation:  No orders of the defined types were placed in this encounter.        REVIEW OF SYSTEMS:  10 point ROS of systems including Constitutional, Eyes, Respiratory, Cardiovascular, Gastroenterology, Genitourinary, Integumentary, Muscularskeletal, Psychiatric were all negative except for pertinent positives noted in my HPI.    Physical Exam:  /73  Pulse 75  Temp 97.7  F (36.5  C)  Resp 18  Ht 6' 2\" (1.88 m)  Wt 190 lb (86.2 kg)  SpO2 94%  BMI 24.39 kg/m2  GENERAL APPEARANCE:  Alert, in no distress, cooperative, pale  ENT:  Mouth and posterior oropharynx normal, moist mucous membranes, [place]  EYES:  EOM, conjunctivae, lids, pupils and irises normal, PERRL  NECK:  No adenopathy,masses or thyromegaly  RESP:  respiratory effort and palpation of chest normal, lungs clear to auscultation , no respiratory distress, diminished breath sounds bilat bases  CV:  Palpation and auscultation of heart done , regular rate and rhythm, no murmur, rub, or gallop, no edema, +2 pedal pulses  ABDOMEN:  normal bowel sounds, soft, nontender, no hepatosplenomegaly or other masses, no guarding or rebound  M/S:   Gait and station abnormal generalized weakness, ambulates with walker, no joint tendnerness or swelling  SKIN:  no rashes or wounds noted, but exam limited as patient is fully clothed  NEURO:   Cranial nerves 2-12 are normal tested and grossly at patient's baseline, Examination of sensation by touch normal, clear speech,  PSYCH:  oriented X 3, insight and judgement impaired, memory impaired , affect and mood normal, forgetful, repetitive " stories    Recent Labs:    CBC RESULTS:   Recent Labs   Lab Test 04/11/17 04/06/17   0900   WBC  9.1  8.5   RBC  4.08*  3.74*   HGB  12.8*  11.8*   HCT  40.3  36.6*   MCV  99  98   MCH  31.4  31.6   MCHC  31.8*  32.2   RDW  14.5  14.1   PLT  395  325       Last Basic Metabolic Panel:  Recent Labs   Lab Test 04/11/17 04/06/17   0900   NA  135*  138   POTASSIUM  3.7  4.5   CHLORIDE  100  103   MERCEDES  9.2  8.8   CO2  25  25   BUN  36*  33*   CR  1.37*  1.20   GLC  109  130*       Liver Function Studies -   Recent Labs   Lab Test  04/03/17   0651  04/02/17   1155   PROTTOTAL  6.6*  7.2   ALBUMIN  2.6*  2.8*   BILITOTAL  0.8  0.9   ALKPHOS  53  53   AST  37  43   ALT  23  25       TSH   Date Value Ref Range Status   04/02/2017 4.62 (H) 0.40 - 4.00 mU/L Final   01/05/2016 5.47 (H) 0.40 - 4.00 mU/L Final     Assessment/Plan:  (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)  (primary encounter diagnosis)  Comment: Respiratory status improved, on RA  Plan: CBC & BMP 4/19. Oxygen PRN, Monitor VS, PRN duonebs. Discontinue MTC, s follow up with pulmonology 5/12.    (M06.9) Rheumatoid arthritis involving both hands, unspecified rheumatoid factor presence (H)  Comment: No s/s of flare-up currently  Plan: Discontinue MTX, reschedule f/u with rheumatology (arthritis and rheumatology consultants, [place] office), prednisone PRN if concerns of flare-up    (I10) Benign essential hypertension  Comment: Controlled  Plan: Continue with current plan of care with no change. Adjust as needed.    (R53.1) Weakness  (R53.81) Physical deconditioning  Comment: R/t recent acute illness in the setting of chronic deconditioning. Will likely discharge to [facility].   Plan: PT/OT eval and treat. Discharge planning per their recommendation.       Electronically signed by  SUNDAY Jenkins CNP

## 2017-04-19 ENCOUNTER — TRANSFERRED RECORDS (OUTPATIENT)
Dept: HEALTH INFORMATION MANAGEMENT | Facility: CLINIC | Age: 82
End: 2017-04-19

## 2017-04-19 LAB
ANION GAP SERPL CALCULATED.3IONS-SCNC: 10 MMOL/L (ref 5–18)
BUN SERPL-MCNC: 27 MG/DL (ref 8–28)
CALCIUM SERPL-MCNC: 9 MG/DL (ref 8.5–10.5)
CHLORIDE SERPLBLD-SCNC: 98 MMOL/L (ref 98–107)
CO2 SERPL-SCNC: 27 MMOL/L (ref 22–31)
CREAT SERPL-MCNC: 1.27 MG/DL (ref 0.7–1.3)
ERYTHROCYTE [DISTWIDTH] IN BLOOD BY AUTOMATED COUNT: 14.6 % (ref 11–14.5)
GFR SERPL CREATININE-BSD FRML MDRD: 54 ML/MIN/1.73M2
GLUCOSE SERPL-MCNC: 99 MG/DL (ref 70–125)
HCT VFR BLD AUTO: 38.2 % (ref 40–54)
HEMOGLOBIN: 12.3 G/DL (ref 14–18)
MCH RBC QN AUTO: 31.4 PG (ref 27–34)
MCHC RBC AUTO-ENTMCNC: 32.2 G/DL (ref 32–36)
MCV RBC AUTO: 97 FL (ref 80–100)
PLATELET # BLD AUTO: 299 THOU/UL (ref 140–440)
POTASSIUM SERPL-SCNC: 2.6 MMOL/L (ref 3.5–5)
RBC # BLD AUTO: 3.92 MILL/UL (ref 4.4–6.2)
SODIUM SERPL-SCNC: 135 MMOL/L (ref 136–145)
WBC # BLD AUTO: 7 THOU/UL (ref 4–11)

## 2017-04-20 ENCOUNTER — TRANSFERRED RECORDS (OUTPATIENT)
Dept: HEALTH INFORMATION MANAGEMENT | Facility: CLINIC | Age: 82
End: 2017-04-20

## 2017-04-20 ENCOUNTER — NURSING HOME VISIT (OUTPATIENT)
Dept: GERIATRICS | Facility: CLINIC | Age: 82
End: 2017-04-20
Payer: MEDICARE

## 2017-04-20 VITALS
DIASTOLIC BLOOD PRESSURE: 70 MMHG | RESPIRATION RATE: 16 BRPM | TEMPERATURE: 96.4 F | HEIGHT: 74 IN | BODY MASS INDEX: 24.38 KG/M2 | OXYGEN SATURATION: 95 % | WEIGHT: 190 LBS | HEART RATE: 83 BPM | SYSTOLIC BLOOD PRESSURE: 119 MMHG

## 2017-04-20 DIAGNOSIS — R53.1 WEAKNESS: ICD-10-CM

## 2017-04-20 DIAGNOSIS — I10 BENIGN ESSENTIAL HYPERTENSION: ICD-10-CM

## 2017-04-20 DIAGNOSIS — J67.9 PULMONARY ALVEOLITIS (H): Primary | ICD-10-CM

## 2017-04-20 DIAGNOSIS — R53.81 PHYSICAL DECONDITIONING: ICD-10-CM

## 2017-04-20 DIAGNOSIS — M06.9 RHEUMATOID ARTHRITIS INVOLVING BOTH HANDS, UNSPECIFIED RHEUMATOID FACTOR PRESENCE: ICD-10-CM

## 2017-04-20 DIAGNOSIS — E87.6 HYPOKALEMIA: ICD-10-CM

## 2017-04-20 LAB — POTASSIUM SERPL-SCNC: 3.7 MMOL/L (ref 3.5–5)

## 2017-04-20 PROCEDURE — 99309 SBSQ NF CARE MODERATE MDM 30: CPT | Performed by: NURSE PRACTITIONER

## 2017-04-20 RX ORDER — NYSTATIN 100000 [USP'U]/G
POWDER TOPICAL 2 TIMES DAILY
COMMUNITY
End: 2020-01-01 | Stop reason: CLARIF

## 2017-04-20 NOTE — PROGRESS NOTES
[facility]    Chief Complaint   Patient presents with     Nursing Home Acute       HPI:    Jacob Easton is a 87 year old  (5/16/1929), who is being seen today for an episodic care visit at [facility]. Today's concern is:  Pulmonary alveolitis (H)  Does have chronic bilat infiltrates on CXR. Developed low grade fever and SOB requiring supplemental oxygen, and chest CT was done showing ground glass opacities consistent with acute alveolitis as well as chronic cystic changes (previously seen on CT in 2012). His was treated with abx and prednisone, all completed on 4/9. He is currently on RA. Cough is at baseline. Continues to have occasional SOB, which responds well to duoneb. Methotrexate on hold due to concerns of ILD. He has apt with pulmonology in May.     Rheumatoid arthritis involving both hands, unspecified rheumatoid factor presence (H)  Has been using MTX for control. He takes it once weekly on Mondays. It was placed on hold during hospitalization. Due to alveolitis and ILD seen on CT scan, there are concerns that his MTX could be causing lung injury so did not resume in TCU. He currently denies any joint pain, and no swelling, or warmth to suggest flare up. Has used PRN tylenol for chronic low back pain. Has follow up with rheumatology 8/3/2017, but will attempt to reschedule apt to closer date as have stopped MTX therapy.    Benign essential hypertension  Echo on 4/4 showed Ef 55-60%, mild aortic stenosis. Currently on PTA metoprolol, HCTZ, ASA. BP has been fairly stable. Denies any headaches, lightheadedness, dizziness, chest pain, or SOB  Last 3 BPs:135/73, 130/76, 128/83.  Admission Weight: 198.2lbs  Current Weight: 190 lbs    Hypokalemia  Patient had critically low K+ of 2.6 on 4/19. He was given K-dur 40meq x3. K+ recheck today and was 3.7. He is not on any diuretics. Denies any palpitations, racing racing or abnormal heart beats.  VSS    Weakness  Physical deconditioning  S/p fall at home and had significant weakness and was unable to get. He is currently working with PT and OT. He does use a walker at baseline. He lives in a house with steps with his spouse, but is planning to move to [facility] after discharge from TCU. He report he continues to fatigue easily. He is currently SBA/min assist with UB dressing, mod/max assist with LE dressing, max assist with toileting for clothing management; mod/max assist with bed mobility, transfers with CGA, ambulates 45-50ft with 2WW with SBA/CGA ad wheelchair follow. SLUMS 18/30, CPT 4.5      ALLERGIES: Advil [ibuprofen]; Influenza virus vaccine h5n1; and Orange juice [orange oil]  Past Medical, Surgical, Family and Social History reviewed and updated in [facility].    Current Outpatient Prescriptions   Medication Sig Dispense Refill     nystatin (MYCOSTATIN) 232623 UNIT/GM POWD Apply topically 2 times daily       ipratropium - albuterol 0.5 mg/2.5 mg/3 mL (DUONEB) 0.5-2.5 (3) MG/3ML neb solution Take 1 vial (3 mLs) by nebulization every 4 hours as needed for wheezing 360 mL 0     ferrous sulfate (IRON) 325 (65 FE) MG tablet Take 325 mg by mouth daily (with breakfast)       levothyroxine (SYNTHROID, LEVOTHROID) 50 MCG tablet Take 1 tablet by mouth daily       hydrochlorothiazide (HYDRODIURIL) 25 MG tablet Take 50 mg by mouth daily        latanoprost (XALATAN) 0.005 % ophthalmic solution Place 1 drop into the right eye At Bedtime        GABAPENTIN PO Take 100 mg by mouth At Bedtime       acetaminophen (TYLENOL) 500 MG tablet Take 500 mg by mouth 4 times daily as needed for mild pain       senna-docusate (SENOKOT-S;PERICOLACE) 8.6-50 MG per tablet Take 1 tablet by mouth 2 times daily as needed for constipation       metoprolol (LOPRESSOR) 12.5 MG TABS Take 25 mg by mouth 2 times daily If AP <60 or SBP <100, hold and call MD       ASPIRIN EC PO Take 81 mg by mouth daily.         folic acid (FOLVITE) 1 MG tablet Take 1  "mg by mouth daily.         tamsulosin (FLOMAX) 0.4 MG 24 hr capsule Take 0.4 mg by mouth At Bedtime.         Medications reviewed:  Medications reconciled to facility chart and changes were made to reflect current medications as identified as above med list. Below are the changes that were made:   Medications stopped since last EPIC medication reconciliation:   There are no discontinued medications.    Medications started since last [facility] medication reconciliation:  Orders Placed This Encounter   Medications     nystatin (MYCOSTATIN) [id number] UNIT/GM POWD     Sig: Apply topically 2 times daily       REVIEW OF SYSTEMS:  10 point ROS of systems including Constitutional, Eyes, Respiratory, Cardiovascular, Gastroenterology, Genitourinary, Integumentary, Muscularskeletal, Psychiatric were all negative except for pertinent positives noted in my HPI.    Physical Exam:  /70  Pulse 83  Temp 96.4  F (35.8  C)  Resp 16  Ht 6' 2\" (1.88 m)  Wt 190 lb (86.2 kg)  SpO2 95%  BMI 24.39 kg/m2  GENERAL APPEARANCE:  Alert, in no distress, cooperative, pale  ENT:  Mouth and posterior oropharynx normal, moist mucous membranes, [place]  EYES:  EOM, conjunctivae, lids, pupils and irises normal, PERRL  NECK:  No adenopathy,masses or thyromegaly  RESP:  respiratory effort and palpation of chest normal, lungs clear to auscultation , no respiratory distress, diminished breath sounds bilat bases  CV:  Palpation and auscultation of heart done, regular rate and rhythm, no murmur, rub, or gallop, trace edema BLE, +2 pedal pulses  ABDOMEN:  normal bowel sounds, soft, nontender, no hepatosplenomegaly or other masses, no guarding or rebound  M/S:   Gait and station abnormal generalized weakness, ambulates with walker, no joint tendnerness or swelling  SKIN:  no rashes or wounds noted, but exam limited as patient is fully clothed, staff reports rash to groin condom catheter in place  NEURO:   Cranial nerves 2-12 are normal tested and grossly at " patient's baseline, Examination of sensation by touch normal, clear speech,  PSYCH:  oriented X 3, insight and judgement impaired, memory impaired , affect and mood normal, forgetful, repetitive stories    Recent Labs:    CBC RESULTS:   Recent Labs   Lab Test 04/19/17 04/11/17   WBC  7.0  9.1   RBC  3.92*  4.08*   HGB  12.3*  12.8*   HCT  38.2*  40.3   MCV  97  99   MCH  31.4  31.4   MCHC  32.2  31.8*   RDW  14.6*  14.5   PLT  299  395       Last Basic Metabolic Panel:  Recent Labs   Lab Test 04/19/17 04/11/17   NA  135*  135*   POTASSIUM  2.6*  3.7   CHLORIDE  98  100   MERCEDES  9.0  9.2   CO2  27  25   BUN  27  36*   CR  1.27  1.37*   GLC  99  109       Liver Function Studies -   Recent Labs   Lab Test  04/03/17   0651  04/02/17   1155   PROTTOTAL  6.6*  7.2   ALBUMIN  2.6*  2.8*   BILITOTAL  0.8  0.9   ALKPHOS  53  53   AST  37  43   ALT  23  25       TSH   Date Value Ref Range Status   04/02/2017 4.62 (H) 0.40 - 4.00 mU/L Final   01/05/2016 5.47 (H) 0.40 - 4.00 mU/L Final     Assessment/Plan:  (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)  (primary encounter diagnosis)  Comment: Respiratory status improved, on RA  Plan:Oxygen PRN, Monitor VS, PRN duonebs. Discontinue MTX follow up with pulmonology 5/12, staff to assist patient on getting copy of CT scan.    (M06.9) Rheumatoid arthritis involving both hands, unspecified rheumatoid factor presence (H)  Comment: No s/s of flare-up currently  Plan: Discontinue MTX, reschedule f/u with rheumatology ([facility], [place] office), prednisone PRN if concerns of flare-up    (I10) Benign essential hypertension  Comment: Controlled  Plan: Continue with current plan of care with no change. Adjust as needed.    (R53.1) Weakness  (R53.81) Physical deconditioning  Comment: R/t recent acute illness in the setting of chronic deconditioning. Will likely discharge to [facility].   Plan: PT/OT eval and treat. Discharge planning per their recommendation.     Electronically signed  by  SUNDAY Jenikns CNP

## 2017-04-25 ENCOUNTER — NURSING HOME VISIT (OUTPATIENT)
Dept: GERIATRICS | Facility: CLINIC | Age: 82
End: 2017-04-25
Payer: MEDICARE

## 2017-04-25 ENCOUNTER — TRANSFERRED RECORDS (OUTPATIENT)
Dept: HEALTH INFORMATION MANAGEMENT | Facility: CLINIC | Age: 82
End: 2017-04-25

## 2017-04-25 VITALS
HEIGHT: 74 IN | RESPIRATION RATE: 16 BRPM | SYSTOLIC BLOOD PRESSURE: 116 MMHG | TEMPERATURE: 97 F | BODY MASS INDEX: 24.2 KG/M2 | WEIGHT: 188.6 LBS | HEART RATE: 83 BPM | DIASTOLIC BLOOD PRESSURE: 75 MMHG | OXYGEN SATURATION: 93 %

## 2017-04-25 DIAGNOSIS — N18.9 ACUTE KIDNEY INJURY SUPERIMPOSED ON CKD (H): ICD-10-CM

## 2017-04-25 DIAGNOSIS — I10 BENIGN ESSENTIAL HYPERTENSION: ICD-10-CM

## 2017-04-25 DIAGNOSIS — E87.6 HYPOKALEMIA: ICD-10-CM

## 2017-04-25 DIAGNOSIS — R53.81 PHYSICAL DECONDITIONING: ICD-10-CM

## 2017-04-25 DIAGNOSIS — N17.9 ACUTE KIDNEY INJURY SUPERIMPOSED ON CKD (H): ICD-10-CM

## 2017-04-25 DIAGNOSIS — J67.9 PULMONARY ALVEOLITIS (H): Primary | ICD-10-CM

## 2017-04-25 LAB
ANION GAP SERPL CALCULATED.3IONS-SCNC: 11 MMOL/L (ref 5–18)
BUN SERPL-MCNC: 35 MG/DL (ref 8–28)
CALCIUM SERPL-MCNC: 8.9 MG/DL (ref 8.5–10.5)
CHLORIDE SERPLBLD-SCNC: 99 MMOL/L (ref 98–107)
CO2 SERPL-SCNC: 27 MMOL/L (ref 22–31)
CREAT SERPL-MCNC: 1.55 MG/DL (ref 0.7–1.3)
GFR SERPL CREATININE-BSD FRML MDRD: 43 ML/MIN/1.73M2
GLUCOSE SERPL-MCNC: 111 MG/DL (ref 70–125)
POTASSIUM SERPL-SCNC: 2.8 MMOL/L (ref 3.5–5)
SODIUM SERPL-SCNC: 137 MMOL/L (ref 136–145)

## 2017-04-25 PROCEDURE — 99309 SBSQ NF CARE MODERATE MDM 30: CPT | Performed by: NURSE PRACTITIONER

## 2017-04-25 NOTE — PROGRESS NOTES
[facility]    Chief Complaint   Patient presents with     Nursing Home Acute       HPI:    Jacob Easton is a 87 year old  (5/16/1929), who is being seen today for an episodic care visit at [facility]. Today's concern is:  Pulmonary alveolitis (H)  Does have chronic bilat infiltrates on CXR. Developed low grade fever and SOB requiring supplemental oxygen, and chest CT was done showing ground glass opacities consistent with acute alveolitis as well as chronic cystic changes (previously seen on CT in 2012). His was treated with abx and prednisone, all completed on 4/9. He is currently on RA. Cough is at baseline.  Methotrexate on hold due to concerns of ILD. He has apt with pulmonology in May. Continues to have occasional SOB, which responds well to duoneb. Overall feels that his breathing is improved.    Hypokalemia  K+ low again today at 2.8. Suspect may be related to HCTZ use. Denies any palpitations, chest pain, lightheadedness or dizziness.     Acute kidney injury superimposed on CKD  Baseline creatinine appears to be 1.2-1.3, and was stable during more recent hospitalization. Increased today at 1.55. He is on HCTZ 50mg daily.  Denies any fatigue. Has had good UOP.       Benign essential hypertension  Echo on 4/4 showed Ef 55-60%, mild aortic stenosis. Currently on PTA metoprolol, HCTZ, ASA. BP has been fairly stable, however creatinine is elevated today and suspect related to HCTZ use. Denies any headaches, lightheadedness, dizziness, chest pain, or SOB  Last 3 BPs:111/69, 111/69, 124/74.  Admission Weight: 198.2lbs  Current Weight: 188.6lbs    Physical deconditioning  S/p fall at home and had significant weakness and was unable to get. He is currently working with PT and OT. He does use a walker at baseline. He lives in a house with steps with his spouse, but is planning to move to [facility] after discharge from TCU. He report he continues to fatigue easily, but he  feels that he is getting stronger. He is currently SBA/min assist with UB dressing, mod/max assist with LE dressing, max assist with toileting for clothing management; mod/max assist with bed mobility, transfers with CGA, ambulates 45-50ft with 2WW with SBA/CGA ad wheelchair follow. SLUMS 18/30, CPT 4.5        ALLERGIES: Advil [ibuprofen]; Influenza virus vaccine h5n1; and Orange juice [orange oil]  Past Medical, Surgical, Family and Social History reviewed and updated in [facility].    Current Outpatient Prescriptions   Medication Sig Dispense Refill     nystatin (MYCOSTATIN) [id number] UNIT/GM POWD Apply topically 2 times daily       ipratropium - albuterol 0.5 mg/2.5 mg/3 mL (DUONEB) 0.5-2.5 (3) MG/3ML neb solution Take 1 vial (3 mLs) by nebulization every 4 hours as needed for wheezing 360 mL 0     ferrous sulfate (IRON) 325 (65 FE) MG tablet Take 325 mg by mouth daily (with breakfast)       levothyroxine (SYNTHROID, LEVOTHROID) 50 MCG tablet Take 1 tablet by mouth daily       latanoprost (XALATAN) 0.005 % ophthalmic solution Place 1 drop into the right eye At Bedtime        GABAPENTIN PO Take 100 mg by mouth At Bedtime       acetaminophen (TYLENOL) 500 MG tablet Take 500 mg by mouth 4 times daily as needed for mild pain       senna-docusate (SENOKOT-S;PERICOLACE) 8.6-50 MG per tablet Take 1 tablet by mouth 2 times daily as needed for constipation       metoprolol (LOPRESSOR) 12.5 MG TABS Take 25 mg by mouth 2 times daily If AP <60 or SBP <100, hold and call MD       ASPIRIN EC PO Take 81 mg by mouth daily.         folic acid (FOLVITE) 1 MG tablet Take 1 mg by mouth daily.         tamsulosin (FLOMAX) 0.4 MG 24 hr capsule Take 0.4 mg by mouth At Bedtime.         Medications reviewed:  Medications reconciled to facility chart and changes were made to reflect current medications as identified as above med list. Below are the changes that were made:   Medications stopped since last EPIC medication reconciliation:   There are  "no discontinued medications.    Medications started since last [facility] medication reconciliation:  No orders of the defined types were placed in this encounter.        REVIEW OF SYSTEMS:  10 point ROS of systems including Constitutional, Eyes, Respiratory, Cardiovascular, Gastroenterology, Genitourinary, Integumentary, Muscularskeletal, Psychiatric were all negative except for pertinent positives noted in my HPI.    Physical Exam:  /75  Pulse 83  Temp 97  F (36.1  C)  Resp 16  Ht 6' 2\" (1.88 m)  Wt 188 lb 9.6 oz (85.5 kg)  SpO2 93%  BMI 24.21 kg/m2  GENERAL APPEARANCE:  Alert, in no distress, cooperative, pale  ENT:  Mouth and posterior oropharynx normal, moist mucous membranes, [place]  EYES:  EOM, conjunctivae, lids, pupils and irises normal, PERRL  NECK:  No adenopathy,masses or thyromegaly  RESP:  respiratory effort and palpation of chest normal, lungs clear to auscultation , no respiratory distress, diminished breath sounds bilat bases  CV:  Palpation and auscultation of heart done, regular rate and rhythm, no murmur, rub, or gallop, 1+ edema bilat ankles, +2 pedal pulses  ABDOMEN:  normal bowel sounds, soft, nontender, no hepatosplenomegaly or other masses, no guarding or rebound  M/S:   Gait and station abnormal generalized weakness, small steps with ambulation ambulates with walker, no joint tendnerness or swelling  SKIN:  no rashes or wounds noted, but exam limited as patient is fully clothed, condom catheter in place  NEURO:   Cranial nerves 2-12 are normal tested and grossly at patient's baseline, Examination of sensation by touch normal, clear speech,  PSYCH:  oriented X 3, insight and judgement impaired, memory impaired , affect and mood normal, forgetful, repetitive stories    Recent Labs:    CBC RESULTS:   Recent Labs   Lab Test 04/19/17 04/11/17   WBC  7.0  9.1   RBC  3.92*  4.08*   HGB  12.3*  12.8*   HCT  38.2*  40.3   MCV  97  99   MCH  31.4  31.4   MCHC  32.2  31.8*   RDW  14.6*  14.5   PLT "  299  395       Last Basic Metabolic Panel:  Recent Labs   Lab Test 04/20/17 04/19/17 04/11/17   NA   --   135*  135*   POTASSIUM  3.7  2.6*  3.7   CHLORIDE   --   98  100   MERCEDES   --   9.0  9.2   CO2   --   27  25   BUN   --   27  36*   CR   --   1.27  1.37*   GLC   --   99  109       Liver Function Studies -   Recent Labs   Lab Test  04/03/17   0651  04/02/17   1155   PROTTOTAL  6.6*  7.2   ALBUMIN  2.6*  2.8*   BILITOTAL  0.8  0.9   ALKPHOS  53  53   AST  37  43   ALT  23  25       TSH   Date Value Ref Range Status   04/02/2017 4.62 (H) 0.40 - 4.00 mU/L Final   01/05/2016 5.47 (H) 0.40 - 4.00 mU/L Final       Assessment/Plan:  (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)  (primary encounter diagnosis)  Comment: Respiratory status improved, on RA  Plan:Oxygen PRN, Monitor VS, PRN duonebs. Continue to hold MTX follow up with pulmonology 5/12, staff to assist patient on getting copy of CT scan    (E87.6) Hypokalemia  Comment: Suspect related to HCTZ use  Plan: K-dur 40meq q4hr x3, then 20 Meq tomorrow. Recheck K on 4/26. Discontinue HCTZ due to HAILEY    (N17.9,  N18.9) Acute kidney injury superimposed on CKD  Comment: Suspected r/t HCTZ use,   Plan: D/C HCTZ, recheck BMP on 4/27 Control BP. Avoid nephrotoxic medications.      (I10) Benign essential hypertension  Comment: BP well controlled, To avoid risk of hypotension, falls, dizziness and tissue hypoperfusion, recommend BP goal is < 150/80mmHg.   Plan: DC HCTZ due HAILEY, continue metoprolol, ASA. Monitor and adjust as needed. If BP remains at goal will not resume HCTZ,    (R53.81) Physical deconditioning  Comment: R/t recent acute illness in the setting of chronic deconditioning. Will likely discharge to [facility].   Plan: PT/OT eval and treat. Discharge planning per their recommendation.       Electronically signed by  SUNDAY Jenkins CNP

## 2017-04-26 ENCOUNTER — TRANSFERRED RECORDS (OUTPATIENT)
Dept: HEALTH INFORMATION MANAGEMENT | Facility: CLINIC | Age: 82
End: 2017-04-26

## 2017-04-26 LAB — POTASSIUM SERPL-SCNC: 3.7 MMOL/L (ref 3.5–6)

## 2017-04-27 ENCOUNTER — TRANSFERRED RECORDS (OUTPATIENT)
Dept: HEALTH INFORMATION MANAGEMENT | Facility: CLINIC | Age: 82
End: 2017-04-27

## 2017-04-27 LAB
ANION GAP SERPL CALCULATED.3IONS-SCNC: 10 MMOL/L (ref 5–18)
BUN SERPL-MCNC: 33 MG/DL (ref 8–28)
CALCIUM SERPL-MCNC: 9.2 MG/DL (ref 8.5–10.5)
CHLORIDE SERPLBLD-SCNC: 101 MMOL/L (ref 98–107)
CO2 SERPL-SCNC: 28 MMOL/L (ref 22–31)
CREAT SERPL-MCNC: 1.36 MG/DL (ref 0.7–1.3)
ERYTHROCYTE [DISTWIDTH] IN BLOOD BY AUTOMATED COUNT: 14.5 % (ref 11–14.5)
GFR SERPL CREATININE-BSD FRML MDRD: 50 ML/MIN/1.73M2
GLUCOSE SERPL-MCNC: 101 MG/DL (ref 70–125)
HCT VFR BLD AUTO: 41.6 % (ref 40–54)
HEMOGLOBIN: 12.7 G/DL (ref 14–18)
MCH RBC QN AUTO: 30.8 PG (ref 27–34)
MCHC RBC AUTO-ENTMCNC: 30.5 G/DL (ref 32–36)
MCV RBC AUTO: 101 FL (ref 80–100)
PLATELET # BLD AUTO: 262 THOU/UL (ref 140–440)
POTASSIUM SERPL-SCNC: 3.4 MMOL/L (ref 3.5–5)
RBC # BLD AUTO: 4.13 MILL/UL (ref 4.4–6.2)
SODIUM SERPL-SCNC: 139 MMOL/L (ref 136–145)
WBC # BLD AUTO: 5.6 THOU/UL (ref 4–11)

## 2017-04-28 ENCOUNTER — NURSING HOME VISIT (OUTPATIENT)
Dept: GERIATRICS | Facility: CLINIC | Age: 82
End: 2017-04-28
Payer: MEDICARE

## 2017-04-28 ENCOUNTER — TRANSFERRED RECORDS (OUTPATIENT)
Dept: HEALTH INFORMATION MANAGEMENT | Facility: CLINIC | Age: 82
End: 2017-04-28

## 2017-04-28 VITALS
BODY MASS INDEX: 24.05 KG/M2 | SYSTOLIC BLOOD PRESSURE: 121 MMHG | HEART RATE: 76 BPM | DIASTOLIC BLOOD PRESSURE: 74 MMHG | TEMPERATURE: 98.4 F | RESPIRATION RATE: 16 BRPM | WEIGHT: 187.4 LBS | HEIGHT: 74 IN | OXYGEN SATURATION: 95 %
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
## 2017-04-28 DIAGNOSIS — E87.6 HYPOKALEMIA: ICD-10-CM

## 2017-04-28 DIAGNOSIS — J67.9 PULMONARY ALVEOLITIS (H): ICD-10-CM

## 2017-04-28 DIAGNOSIS — N18.30 CKD (CHRONIC KIDNEY DISEASE) STAGE 3, GFR 30-59 ML/MIN (H): ICD-10-CM

## 2017-04-28 DIAGNOSIS — R53.1 WEAKNESS: ICD-10-CM

## 2017-04-28 DIAGNOSIS — R53.81 PHYSICAL DECONDITIONING: ICD-10-CM

## 2017-04-28 DIAGNOSIS — M06.9 RHEUMATOID ARTHRITIS INVOLVING BOTH HANDS, UNSPECIFIED RHEUMATOID FACTOR PRESENCE: Primary | ICD-10-CM

## 2017-04-28 LAB — POTASSIUM SERPL-SCNC: 3.6 MMOL/L (ref 3.5–5)

## 2017-04-28 PROCEDURE — 99309 SBSQ NF CARE MODERATE MDM 30: CPT | Performed by: NURSE PRACTITIONER

## 2017-04-28 NOTE — PROGRESS NOTES
[place] [facility]    Chief Complaint   Patient presents with     Nursing Home Acute       HPI:    Jacob Easton is a 87 year old  (5/16/1929), who is being seen today for an episodic care visit at [facility]. Today's concern is:  Rheumatoid arthritis involving both hands, unspecified rheumatoid factor presence (H)  Has been using MTX for control. He takes it once weekly on Mondays. It was placed on hold during hospitalization. Due to alveolitis and ILD seen on CT scan, there are concerns that his MTX could be causing lung injury so did not resume in TCU. He currently denies any joint pain, and no swelling, or warmth to suggest flare up. Has used PRN tylenol for chronic low back pain. Has follow up with rheumatology 8/3/2017. This provider spoke with Rheumatologist Dr Mercado on 4/27. Unless there are concerns of a flare up, no need to move up apt. If flare up occurs plan to treat with steroids. Would likely not restart MTX given concern for lung injury. He did request that records from upcoming pulmonology apt be sent to his office if patient agreeable.     Pulmonary alveolitis (H)  Continues to have some SOB, worse with activity. Remains on RA. Does get some relief with nebulizer. Occasional cough which is productive, but patient feels it is close to baseline. No fevers, chills or night sweats.     Hypokalemia  Multiple episodes of hypokalemia. Suspect may be related to HCTZ use. Was replaced with K-dur and started daily supplement, HCTZ stopped due to elevated creatinine. K+ was 3.4 yesterday K-dur 20 meq given x2, recheck today still pending. Denies any palpitations, chest pain, lightheadedness or dizziness.    CKD (chronic kidney disease) stage 3, GFR 30-59 ml/min  Creatinine increased to 1.55, HCTZ was stopped. BP has been stable, creatinine improved to 1.36. No increase in edema, no SOB.     Weakness  Physical deconditioning  Fall, subsequent encounter  S/p fall at  home and had significant weakness and was unable to get. He is currently working with PT and OT. He does use a walker at baseline. He lives in a house with steps with his spouse, but is planning to move to [facility] after discharge from TCU. He report he continues to fatigue easily, but he feels that he is getting stronger. After discussion with therapy and evaluation of [facility], he would benefit from hospital bed and wheelchair.  He is currently SBA/min assist with UB dressing, mod assist with LE dressing, CGA with toileting, mod/max assist for clothing management; mod/max assist with bed mobility, transfers with CGA, ambulates 50-125ft with 2WW with CGA and wheelchair follow. SLUMS 18/30, CPT 4.5    Last 3 BPs:121/74, 131/77, 110/68.  Admission Weight: 198.2lbs  Current Weight: 187.4lbs      ALLERGIES: Advil [ibuprofen]; Influenza virus vaccine h5n1; and Orange juice [orange oil]  Past Medical, Surgical, Family and Social History reviewed and updated in [employer].    Current Outpatient Prescriptions   Medication Sig Dispense Refill     HYDRALAZINE HCL PO Take 10 mg by mouth every 8 hours as needed for high blood pressure SBP>160, DBP>100       potassium chloride kyle er (K-DUR) Take 20 mEq by mouth daily       nystatin (MYCOSTATIN) 824496 UNIT/GM POWD Apply topically 2 times daily       ipratropium - albuterol 0.5 mg/2.5 mg/3 mL (DUONEB) 0.5-2.5 (3) MG/3ML neb solution Take 1 vial (3 mLs) by nebulization every 4 hours as needed for wheezing 360 mL 0     ferrous sulfate (IRON) 325 (65 FE) MG tablet Take 325 mg by mouth daily (with breakfast)       levothyroxine (SYNTHROID, LEVOTHROID) 50 MCG tablet Take 1 tablet by mouth daily       latanoprost (XALATAN) 0.005 % ophthalmic solution Place 1 drop into the right eye At Bedtime        GABAPENTIN PO Take 100 mg by mouth At Bedtime       acetaminophen (TYLENOL) 500 MG tablet Take 500 mg by mouth 4 times daily as needed for mild pain       senna-docusate (SENOKOT-S;PERICOLACE) 8.6-50 MG  "per tablet Take 1 tablet by mouth 2 times daily as needed for constipation       metoprolol (LOPRESSOR) 12.5 MG TABS Take 25 mg by mouth 2 times daily If AP <60 or SBP <100, hold and call MD       ASPIRIN EC PO Take 81 mg by mouth daily.         folic acid (FOLVITE) 1 MG tablet Take 1 mg by mouth daily.         tamsulosin (FLOMAX) 0.4 MG 24 hr capsule Take 0.4 mg by mouth At Bedtime.         Medications reviewed:  Medications reconciled to facility chart and changes were made to reflect current medications as identified as above med list. Below are the changes that were made:   Medications stopped since last EPIC medication reconciliation:   There are no discontinued medications.    Medications started since last [facility] medication reconciliation:  Orders Placed This Encounter   Medications     HYDRALAZINE HCL PO     Sig: Take 10 mg by mouth every 8 hours as needed for high blood pressure SBP>160, DBP>100     potassium chloride kyle er (K-DUR)     Sig: Take 20 mEq by mouth daily         REVIEW OF SYSTEMS:  10 point ROS of systems including Constitutional, Eyes, Respiratory, Cardiovascular, Gastroenterology, Genitourinary, Integumentary, Muscularskeletal, Psychiatric were all negative except for pertinent positives noted in my HPI.    Physical Exam:  /74  Pulse 76  Temp 98.4  F (36.9  C)  Resp 16  Ht 6' 2\" (1.88 m)  Wt 187 lb 6.4 oz (85 kg)  SpO2 95%  BMI 24.06 kg/m2  GENERAL APPEARANCE: Alert, in no distress, cooperative, pale  ENT: Mouth and posterior oropharynx normal, moist mucous membranes, [place]  EYES: EOM, conjunctivae, lids, pupils and irises normal, PERRL  NECK: No adenopathy,masses or thyromegaly  RESP: respiratory effort and palpation of chest normal, lungs clear to auscultation , no respiratory distress, diminished breath sounds bilat bases, intermittent cough produces clear thin sputum  CV: Palpation and auscultation of heart done, regular rate and rhythm, no murmur, rub, or gallop, 1+ edema " bilat ankles, +2 pedal pulses  ABDOMEN: normal bowel sounds, soft, nontender, no hepatosplenomegaly or other masses, no guarding or rebound  M/S: Gait and station abnormal generalized weakness, small steps with ambulation ambulates with walker, no joint tendnerness or swelling  SKIN: no rashes or wounds noted, but exam limited as patient is fully clothed, condom catheter in place  NEURO: Cranial nerves 2-12 are normal tested and grossly at patient's baseline, Examination of sensation by touch normal, clear speech,  PSYCH: oriented X 3, insight and judgement impaired, memory impaired , affect and mood normal, forgetful, repetitive stories    Recent Labs:    CBC RESULTS:   Recent Labs   Lab Test 04/27/17 04/19/17   WBC  5.6  7.0   RBC  4.13*  3.92*   HGB  12.7*  12.3*   HCT  41.6  38.2*   MCV  101*  97   MCH  30.8  31.4   MCHC  30.5*  32.2   RDW  14.5  14.6*   PLT  262  299       Last Basic Metabolic Panel:  Recent Labs   Lab Test 04/27/17 04/26/17 04/25/17   NA  139   --   137   POTASSIUM  3.4*  3.7  2.8*   CHLORIDE  101   --   99   MERCEDES  9.2   --   8.9   CO2  28   --   27   BUN  33*   --   35*   CR  1.36*   --   1.55*   GLC  101   --   111       Liver Function Studies -   Recent Labs   Lab Test  04/03/17   0651  04/02/17   1155   PROTTOTAL  6.6*  7.2   ALBUMIN  2.6*  2.8*   BILITOTAL  0.8  0.9   ALKPHOS  53  53   AST  37  43   ALT  23  25       TSH   Date Value Ref Range Status   04/02/2017 4.62 (H) 0.40 - 4.00 mU/L Final   01/05/2016 5.47 (H) 0.40 - 4.00 mU/L Final       Assessment/Plan:  (M06.9) Rheumatoid arthritis involving both hands, unspecified rheumatoid factor presence (H)  (primary encounter diagnosis)  Comment: No exacerbation currently  Plan: F/u with rheumatology on 8/3, treat any flare-ups with steroids if agreeable have patient send records to rheumatology office after pulmonology apt.     (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)  Comment: Respiratory status improving  Plan: Continue PRN nebs, follow up with  pulmonology on .     (E87.6) Hypokalemia  Comment: Improving, but remains low off HCTZ  Plan: K level today still pending, continue daily K-dur supplement, BMP on     (N18.3) CKD (chronic kidney disease) stage 3, GFR 30-59 ml/min  Comment: Improving, see labs  Plan: Control BP. Avoid nephrotoxic medications. BMP       (R53.1) Weakness  (R53.81) Physical deconditioning  (W19.XXXD) Fall, subsequent encounter  Comment: R/t recent acute illness in the setting of chronic deconditioning. Will likely discharge to [facility].   Plan: PT/OT eval and treat. Discharge planning per their recommendation. Ordering hospital bed and wheelchair        Electronically signed by  SUNDAY Jenkins CNP          MEDICAL NECESSITY STATEMENT FOR DME    Demographic Information on Jacob Easton:    Jacob Easton  Gender: male  : 1929  [address] [facility] [address] [phone] (home) NONE (work)    Medical Record: [id number]  Social Security Number: [id number]  Primary Care Provider: Bernabe Rivas  Insurance: Payor: MEDICARE / Plan: MEDICARE / Product Type: Medicare /       Rheumatoid arthritis involving both hands, unspecified rheumatoid factor presence (H) [M06.9]  R53.1 Weakness  R29.6 Frequent Falls    DME:  WHEELCHAIR: 18, Long Arm Rest, standard foot rest and pedals (TYPE, CUSHION)   Standard foot rest Yes   Elevating leg rest NO   Dose patient use oxygen No   Able to propel w/c YES (if no why? And is there someone who can?)   Mobility related ADL that are affected in the home WEAKNESS, FALLS, ALTERED GAIT    The wheelchair is suitable and necessary for use in the patient's home and the     Reason why a cane or walker will not meet the patient's needs.ie: balance, Tolerance, level of assistance: POOR BALANCE, POOR  ACTIVITY TOLERANCE   Home/rooms can accommodate the wheelchair YES   The patient has expressed willingness to use the wheelchair in the home and    Does have the physical and  "cognitive ability to maneuver the equipment or has a    Caregiver who is available, willing, and able to provide assistance in the home    With the wheelchair. YES    HOSPITAL BED: Variable height manual bed   Does the patient require positioning of the body in ways no feasible with an ordinary bed due to the medical condition that is expected to last at least 1 month? Yes  Does the patient require for the alleviation of pain, positioning of the body in ways not feasible with an ordinary bed? No  Does the patient require the head of the bed to be elevated more than 30 degrees most of the time due to CHF, chronic pulmonary disease or aspiration? Yes  Does the patient require traction that can only be attached to a hospital bed? No  Does the patient require a bed height different than a fixed height hospital bed to permit transfers to a chair, wheelchair, or standing position? Yes  Does the patient require frequent changes in body position and/or have an immediate need for change in body position? No   With railings: Yes      VITAL SIGNS:  Vitals: /74  Pulse 76  Temp 98.4  F (36.9  C)  Resp 16  Ht 6' 2\" (1.88 m)  Wt 187 lb 6.4 oz (85 kg)  SpO2 95%  BMI 24.06 kg/m2  BMI= Body mass index is 24.06 kg/(m^2).         MEDICAL NECESSITY STATEMENT (describe reason to support DME here including length of need)  My patient requires body positioning not feasible in an ordinary bed due alleviate shortness of breath and requires HOB to be elevated >30 degress due to pulmonary alveolits (J67.9) as well as variable height adjustments to assist with transfers from bed to chair and would benefit from variable height manual bed.    Due to weakness and falls the patient has the inability to perform MRADL's and cannot walk with assistive device such as a walker. His home has adequate space between rooms for maneuvering a manual wheelchair between, and the patient and caregiver are able to safely use a manual wheelchair, and " patient will use the wheelchair daily and it will improve participation in MRADL's.      R53.1 Weakness  R29.6 Frequent Falls    ELECTRONICALLY SIGNED BY ALICIA CERTIFIED PROVIDER:  SUNDAY Jenkins CNP   NPI: [id number]  [facility]  [address] [place] [address], [address]  [place] [address]

## 2017-05-01 ENCOUNTER — TRANSFERRED RECORDS (OUTPATIENT)
Dept: HEALTH INFORMATION MANAGEMENT | Facility: CLINIC | Age: 82
End: 2017-05-01

## 2017-05-01 LAB
ANION GAP SERPL CALCULATED.3IONS-SCNC: 9 MMOL/L (ref 5–18)
BUN SERPL-MCNC: 24 MG/DL (ref 8–28)
CALCIUM SERPL-MCNC: 9 MG/DL (ref 8.5–10.5)
CHLORIDE SERPLBLD-SCNC: 106 MMOL/L (ref 98–107)
CO2 SERPL-SCNC: 25 MMOL/L (ref 22–31)
CREAT SERPL-MCNC: 1.24 MG/DL (ref 0.7–1.3)
GFR SERPL CREATININE-BSD FRML MDRD: 55 ML/MIN/1.73M2
GLUCOSE SERPL-MCNC: 96 MG/DL (ref 70–125)
POTASSIUM SERPL-SCNC: 4 MMOL/L (ref 3.5–5)
SODIUM SERPL-SCNC: 140 MMOL/L (ref 136–145)

## 2017-05-03 ENCOUNTER — NURSING HOME VISIT (OUTPATIENT)
Dept: GERIATRICS | Facility: CLINIC | Age: 82
End: 2017-05-03
Payer: MEDICARE

## 2017-05-03 ENCOUNTER — TRANSFERRED RECORDS (OUTPATIENT)
Dept: HEALTH INFORMATION MANAGEMENT | Facility: CLINIC | Age: 82
End: 2017-05-03

## 2017-05-03 VITALS
DIASTOLIC BLOOD PRESSURE: 84 MMHG | HEIGHT: 74 IN | RESPIRATION RATE: 16 BRPM | OXYGEN SATURATION: 93 % | SYSTOLIC BLOOD PRESSURE: 153 MMHG | TEMPERATURE: 97.2 F | WEIGHT: 188.8 LBS | BODY MASS INDEX: 24.23 KG/M2 | HEART RATE: 89 BPM

## 2017-05-03 DIAGNOSIS — I10 BENIGN ESSENTIAL HYPERTENSION: ICD-10-CM

## 2017-05-03 DIAGNOSIS — E87.6 HYPOKALEMIA: ICD-10-CM

## 2017-05-03 DIAGNOSIS — R53.81 PHYSICAL DECONDITIONING: ICD-10-CM

## 2017-05-03 DIAGNOSIS — J67.9 PULMONARY ALVEOLITIS (H): Primary | ICD-10-CM

## 2017-05-03 DIAGNOSIS — N18.30 CKD (CHRONIC KIDNEY DISEASE) STAGE 3, GFR 30-59 ML/MIN (H): ICD-10-CM

## 2017-05-03 LAB — POTASSIUM SERPL-SCNC: 3.9 MMOL/L (ref 3.5–5)

## 2017-05-03 PROCEDURE — 99207 ZZC CDG-CORRECTLY CODED, REVIEWED AND AGREE: CPT | Performed by: NURSE PRACTITIONER

## 2017-05-03 PROCEDURE — 99309 SBSQ NF CARE MODERATE MDM 30: CPT | Performed by: NURSE PRACTITIONER

## 2017-05-03 NOTE — PROGRESS NOTES
[place] [facility]    Chief Complaint   Patient presents with     Nursing Home Acute       HPI:    Jacob Easton is a 87 year old  (5/16/1929), who is being seen today for an episodic care visit at [facility]. Today's concern is:  Pulmonary alveolitis (H)  Does have history of chronic bilat infiltrates on CXR. Developed low grade fever and SOB requiring supplemental oxygen. Chest CT was done showing ground glass opacities consistent with acute alveolitis as well as chronic cystic changes (previously seen on CT in 2012). His was treated with abx and prednisone, all completed on 4/9. Respiratory status improved and He was weaned  RA. Cough is at baseline. Methotrexate discontinued due to concerns of ILD. He has apt with pulmonology in May. Continues to have occasional SOB, which responds well to duoneb, which he is requiring less frequently. Feels breathing is close to baseline. Overall feels that his breathing is improved. No fevers, chills or night sweats    Hypokalemia  Multiple episodes of hypokalemia. Suspected may be related to HCTZ use. Was replaced with K-dur and started daily supplement, HCTZ stopped due to elevated creatinine. Continued K-dur as K+ was 3.4, now stable at 3.9.     CKD (chronic kidney disease) stage 3, GFR 30-59 ml/min  Creatinine increased to 1.55, HCTZ was stopped. BP has been stable, creatinine improved to 1.24. BLE edema remains at baseline.     Benign essential hypertension  Echo on 4/4 showed EF 55-60%, mild aortic stenosis. Currently on PTA metoprolol, ASA.  HCTZ stopped due to elevated creatinine. Blood pressure has been fairly well controlled.  Denies any headaches, lightheadedness, dizziness, chest pain, or SOB  Last 3 BPs:153/84, 131/74, 139/88.  Admission Weight: 198.7lbs  Current Weight: 188.8lbs    Physical deconditioning  S/p fall at home and had significant weakness and was unable to get. He is currently working with PT and OT. He  does use a walker at baseline. He lives in a house with steps with his spouse, but is planning to move to [facility] after discharge from TCU. They will evaluate him next week. He is currently SBA/min assist with UB dressing, mod assist with LE dressing, CGA with toileting, mod/max assist for clothing management; mod/max assist with bed mobility, transfers with CGA, ambulates 50-125ft with 2WW with CGA and wheelchair follow. SLUMS 18/30, CPT 4.5        ALLERGIES: Advil [ibuprofen]; Influenza virus vaccine h5n1; and Orange juice [orange oil]  Past Medical, Surgical, Family and Social History reviewed and updated in [facility].    Current Outpatient Prescriptions   Medication Sig Dispense Refill     HYDRALAZINE HCL PO Take 10 mg by mouth every 8 hours as needed for high blood pressure SBP>160, DBP>100       potassium chloride kyle er (K-DUR) Take 20 mEq by mouth daily       nystatin (MYCOSTATIN) 912079 UNIT/GM POWD Apply topically 2 times daily       ipratropium - albuterol 0.5 mg/2.5 mg/3 mL (DUONEB) 0.5-2.5 (3) MG/3ML neb solution Take 1 vial (3 mLs) by nebulization every 4 hours as needed for wheezing 360 mL 0     ferrous sulfate (IRON) 325 (65 FE) MG tablet Take 325 mg by mouth daily (with breakfast)       levothyroxine (SYNTHROID, LEVOTHROID) 50 MCG tablet Take 1 tablet by mouth daily       latanoprost (XALATAN) 0.005 % ophthalmic solution Place 1 drop into the right eye At Bedtime        GABAPENTIN PO Take 100 mg by mouth At Bedtime       acetaminophen (TYLENOL) 500 MG tablet Take 500 mg by mouth 4 times daily as needed for mild pain       senna-docusate (SENOKOT-S;PERICOLACE) 8.6-50 MG per tablet Take 1 tablet by mouth 2 times daily as needed for constipation       metoprolol (LOPRESSOR) 12.5 MG TABS Take 25 mg by mouth 2 times daily If AP <60 or SBP <100, hold and call MD       ASPIRIN EC PO Take 81 mg by mouth daily.         folic acid (FOLVITE) 1 MG tablet Take 1 mg by mouth daily.         tamsulosin (FLOMAX) 0.4 MG 24  "hr capsule Take 0.4 mg by mouth At Bedtime.         Medications reviewed:  Medications reconciled to facility chart and changes were made to reflect current medications as identified as above med list. Below are the changes that were made:   Medications stopped since last EPIC medication reconciliation:   There are no discontinued medications.    Medications started since last [facility] medication reconciliation:  No orders of the defined types were placed in this encounter.        REVIEW OF SYSTEMS:  10 point ROS of systems including Constitutional, Eyes, Respiratory, Cardiovascular, Gastroenterology, Genitourinary, Integumentary, Muscularskeletal, Psychiatric were all negative except for pertinent positives noted in my HPI.    Physical Exam:  /84  Pulse 89  Temp 97.2  F (36.2  C)  Resp 16  Ht 6' 2\" (1.88 m)  Wt 188 lb 12.8 oz (85.6 kg)  SpO2 93%  BMI 24.24 kg/m2  GENERAL APPEARANCE: Alert, in no distress, cooperative, pale  ENT: Mouth and posterior oropharynx normal, moist mucous membranes, [place]  EYES: EOM, conjunctivae, lids, pupils and irises normal, PERRL  NECK: No adenopathy,masses or thyromegaly  RESP: respiratory effort and palpation of chest normal, lungs clear to auscultation , no respiratory distress, diminished breath sounds bilat bases, intermittent cough produces clear thin sputum  CV: Palpation and auscultation of heart done, regular rate and rhythm, no murmur, rub, or gallop, 1+ edema bilat ankles, +2 pedal pulses  ABDOMEN: normal bowel sounds, soft, nontender, no hepatosplenomegaly or other masses, no guarding or rebound  M/S: Gait and station abnormal generalized weakness, small steps with ambulation ambulates with walker, no joint tendnerness or swelling  SKIN: no rashes or wounds noted, but exam limited as patient is fully clothed, condom catheter in place  NEURO: Cranial nerves 2-12 are normal tested and grossly at patient's baseline, Examination of sensation by touch normal, clear " speech,  PSYCH: oriented X 3, insight and judgement impaired, memory impaired , affect and mood normal, forgetful    Recent Labs:    CBC RESULTS:   Recent Labs   Lab Test 04/27/17 04/19/17   WBC  5.6  7.0   RBC  4.13*  3.92*   HGB  12.7*  12.3*   HCT  41.6  38.2*   MCV  101*  97   MCH  30.8  31.4   MCHC  30.5*  32.2   RDW  14.5  14.6*   PLT  262  299       Last Basic Metabolic Panel:  Recent Labs   Lab Test 05/01/17 04/28/17 04/27/17   NA  140   --   139   POTASSIUM  4.0  3.6  3.4*   CHLORIDE  106   --   101   MERCEDES  9.0   --   9.2   CO2  25   --   28   BUN  24   --   33*   CR  1.24   --   1.36*   GLC  96   --   101       Liver Function Studies -   Recent Labs   Lab Test  04/03/17   0651  04/02/17   1155   PROTTOTAL  6.6*  7.2   ALBUMIN  2.6*  2.8*   BILITOTAL  0.8  0.9   ALKPHOS  53  53   AST  37  43   ALT  23  25       TSH   Date Value Ref Range Status   04/02/2017 4.62 (H) 0.40 - 4.00 mU/L Final   01/05/2016 5.47 (H) 0.40 - 4.00 mU/L Final       Assessment/Plan:  (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)  (primary encounter diagnosis)  Comment: Respiratory status improving  Plan: Continue PRN nebs, follow up with pulmonology on 5/12. Monitor respiratory status.    (E87.6) Hypokalemia  Comment: Stable, see labs  Plan: Continue K-dur supplement, will not resume HCTZ, BMP PRN    (N18.3) CKD (chronic kidney disease) stage 3, GFR 30-59 ml/min  Comment: Improving, see labs  Plan: Control BP. Avoid nephrotoxic medications. BMP PRN    (I10) Benign essential hypertension  Comment: Controlled, asymptomatic  Plan: Continue metoprolol, ASA. Monitor VS and adjust as needed    (R53.81) Physical deconditioning  Comment: R/t recent acute illness in the setting of chronic deconditioning. Will likely discharge to [facility].   Plan: PT/OT eval and treat. Discharge planning per their recommendation. [facility] evaluation next week.       Electronically signed by  SUNDAY Jenkins CNP

## 2017-05-04 PROBLEM — J67.9: Status: ACTIVE | Noted: 2017-05-04

## 2017-05-09 ENCOUNTER — DISCHARGE SUMMARY NURSING HOME (OUTPATIENT)
Dept: GERIATRICS | Facility: CLINIC | Age: 82
End: 2017-05-09
Payer: MEDICARE

## 2017-05-09 VITALS
HEIGHT: 74 IN | SYSTOLIC BLOOD PRESSURE: 130 MMHG | DIASTOLIC BLOOD PRESSURE: 75 MMHG | HEART RATE: 77 BPM | BODY MASS INDEX: 25.46 KG/M2 | TEMPERATURE: 98.1 F | OXYGEN SATURATION: 93 % | WEIGHT: 198.4 LBS | RESPIRATION RATE: 18 BRPM

## 2017-05-09 DIAGNOSIS — I10 BENIGN ESSENTIAL HYPERTENSION: ICD-10-CM

## 2017-05-09 DIAGNOSIS — N18.30 CKD (CHRONIC KIDNEY DISEASE) STAGE 3, GFR 30-59 ML/MIN (H): ICD-10-CM

## 2017-05-09 DIAGNOSIS — M06.9 RHEUMATOID ARTHRITIS INVOLVING BOTH HANDS, UNSPECIFIED RHEUMATOID FACTOR PRESENCE: ICD-10-CM

## 2017-05-09 DIAGNOSIS — N40.0 BENIGN PROSTATIC HYPERPLASIA, PRESENCE OF LOWER URINARY TRACT SYMPTOMS UNSPECIFIED, UNSPECIFIED MORPHOLOGY: ICD-10-CM
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
## 2017-05-09 DIAGNOSIS — R82.90 ABNORMAL URINALYSIS: ICD-10-CM

## 2017-05-09 DIAGNOSIS — R53.81 PHYSICAL DECONDITIONING: ICD-10-CM

## 2017-05-09 DIAGNOSIS — R53.1 WEAKNESS: ICD-10-CM

## 2017-05-09 DIAGNOSIS — J67.9 PULMONARY ALVEOLITIS (H): Primary | ICD-10-CM

## 2017-05-09 DIAGNOSIS — I48.91 ATRIAL FIBRILLATION, UNSPECIFIED TYPE (H): ICD-10-CM

## 2017-05-09 DIAGNOSIS — E87.6 HYPOKALEMIA: ICD-10-CM

## 2017-05-09 PROCEDURE — 99309 SBSQ NF CARE MODERATE MDM 30: CPT | Performed by: NURSE PRACTITIONER

## 2017-05-09 PROCEDURE — 99207 ZZC CDG-CORRECTLY CODED, REVIEWED AND AGREE: CPT | Performed by: NURSE PRACTITIONER

## 2017-05-09 RX ORDER — ALBUTEROL SULFATE 90 UG/1
2 AEROSOL, METERED RESPIRATORY (INHALATION) EVERY 4 HOURS PRN
COMMUNITY
End: 2018-11-09

## 2017-05-09 NOTE — PROGRESS NOTES
[place] [facility] DISCHARGE SUMMARY    PATIENT'S NAME: Jacob Easton  YOB: 1929  MEDICAL RECORD NUMBER:  [id number]    PRIMARY CARE PROVIDER AND CLINIC RESPONSIBLE AFTER TRANSFER: [facility] [id number] / [facility]    CODE STATUS/ADVANCE DIRECTIVES DISCUSSION:   DNR / DNI       Allergies   Allergen Reactions     Advil [Ibuprofen]      Doctor told him not to take     Influenza Virus Vaccine H5n1      Does not ever want to have a flu shot - also doesn't want pneumovax     Orange Juice [Orange Oil] Nausea       TRANSFERRING PROVIDERS: SUNDAY Jenkins CNP, Dr. Alisia Stevenson MD  DATE OF SNF ADMISSION:    DATE OF SNF (anticipated) DISCHARGE: May / 11 / 2017  DISCHARGE DISPOSITION: [facility]   Nursing Facility: [facility] Hospital stay 17 to 17.     Condition on Discharge:  Improving.  Function:  UE Drsg: min   LE Drsg:min-mod   Bed Mobility: min-mod  Transfers: cga  Walkin-55 tww cga   Toileting: mod   Cognitive Scores: SLUMS 18/30 and CPT 4.5/5.6    Equipment: walker, wheelchair and hospital bed    DISCHARGE DIAGNOSIS:   1. Pulmonary alveolitis (H)    2. Hypokalemia    3. CKD (chronic kidney disease) stage 3, GFR 30-59 ml/min    4. Benign essential hypertension    5. Rheumatoid arthritis involving both hands, unspecified rheumatoid factor presence (H)    6. Benign prostatic hyperplasia, presence of lower urinary tract symptoms unspecified, unspecified morphology    7. Abnormal urinalysis    8. Atrial fibrillation, unspecified type (H)    9. Weakness    10. Fall, subsequent encounter    11. Physical deconditioning        [facility] Nursing Facility Course:  Jacob Easton is an 87 yr old with h/o RA on Methotrexate, CKD, HTN, dementia who presented on 2017 with weakness and fall at home. He called neighbors to help him up, and assist him to bed. He  spent about 17 hours in bed as he was unable to get up , and then called EMS. Had CXR in ED which showed bilat infiltrates, but these are chronic and have been present for many years. UA was abnormal, so he was started on ceftriaxone, however UC eventually came back negative. He had TTE which showed mild aortic stenosis. He developed increased SOB, and CT was done on 4/5 showing ground glass opacities consistent with acute alveolitis. He was changed to cefepime and azithromycin. Prednisone was also started. He had improvement in respiratory status. He was evaluated by PT and OT and they recommended discharge to TCU for further rehab.    TCU stay fairly uncomplicated. Respiratory status improved, but due to concerns that MTX was contributing to lung injury it was not resumed. Has f/u with pulmonology on 5/12. Did have episodes of hypokalemia, thought likely related to HCTZ use. This was discontinued, and K remained low, continued on K-dur supplement. He had improvement with mobilty and strength, but continues to require increased cares and wife did not feel she could care for him at home. He is discharging to an [facility] with Am and PM cares, safety checks, toileting, med administration, and meals. He will also have home PT, OT, RN and HHA through [facility].    Pulmonary alveolitis (H)  Does have history of chronic bilat infiltrates on CXR. Developed low grade fever and SOB requiring supplemental oxygen. Chest CT was done showing ground glass opacities consistent with acute alveolitis as well as chronic cystic changes (previously seen on CT in 2012). His was treated with abx and prednisone, all completed on 4/9. Respiratory status improved and he was weaned  RA. Cough is at baseline. Methotrexate discontinued due to concerns of ILD. He has apt with pulmonology on May 12. Continues to have occasional SOB, has not required duonebs recently so will DC and discharge home no albuterol inhaler.   - HOME CARE NURSING  REFERRAL    Hypokalemia  Multiple episodes of hypokalemia during TCU stay, as low as 2.7. Suspected may be related to HCTZ use. Was replaced with K-dur and started daily supplement, HCTZ stopped due to elevated creatinine. However, continued to have low potassium after medication stopped, so will discharge home on daily K-dur supplement.     CKD (chronic kidney disease) stage 3, GFR 30-59 ml/min  Creatinine increased to 1.55, HCTZ was stopped. BP has been stable, creatinine improved to 1.24. BLE edema remains at baseline.    Benign essential hypertension  Echo on 4/4 showed EF 55-60%, mild aortic stenosis. Currently on PTA metoprolol, ASA. HCTZ stopped due to elevated creatinine. Blood pressure has been fairly well controlled with SBP typically <140.  - HOME CARE NURSING REFERRAL  Last 3 BPs:130/75, 148/82, 151/82, 133/76  Admission Weight: 190lbs  Current Weight: 198.4lbs    Rheumatoid arthritis involving both hands, unspecified rheumatoid factor presence (H)  Has been using MTX for control. He takes it once weekly on Mondays. It was placed on hold during hospitalization. Due to alveolitis and ILD seen on CT scan, there are concerns that his MTX could be causing lung injury so did not resume in TCU. No concerns of flare up during TCU stay.  Discussed case with Rheumatologist Dr Mercado on 4/27. Unless there are concerns of a flare up, no need to move up apt scheduled for 8/3. If flare up occurs, plan to treat with steroids. Would likely not restart MTX given concern for lung injury. He did request that records from upcoming pulmonology apt be sent to his office if patient agreeable.     Benign prostatic hyperplasia, presence of lower urinary tract symptoms unspecified, unspecified morphology  Has issues with chronic urinary leakage. He is on PTA Tamsulosin. He uses Depends at home, but did use condom catheter frequently during TCU stay.   - HOME CARE NURSING REFERRAL    Abnormal urinalysis  UA positive for nitrates,  LE, WBC, and RBC while IP. He did have low grade fever, and was started on ceftriaxone. However UC had no growth, and abx were changed due to concerns for pneumonia. No urinary concerns during TCU stay.     Atrial fibrillation, unspecified type (H)  No previous history noted, but had episode of Afib with RVR while IP. He was given a does of IV metoprolol and PTA PO metoprolol continued with good control of rate. He was noted to be in NSR on Echo. He does have high [id number] score, but due to falls risk, decided against anticoagulation. Is on ASA 81mg daily.     Weakness  Fall, subsequent encounter  Physical deconditioning  Admitted to the hospital after falls and weakness at home. He has had improvement with mobility, but continues to require significant care assistance and wife does not feel she can safely care for him at home, so will be discharging to [facility] with services. He will also have home PT, OT, RN, and HHA.   - HOME CARE NURSING REFERRAL    PAST MEDICAL HISTORY:  has a past medical history of Arthritis; Constipation; Hypertension; Hypocalcemia; Neuromuscular disorder (H); and Thyroid disease.    DISCHARGE MEDICATIONS:  Current Outpatient Prescriptions   Medication Sig Dispense Refill     albuterol (PROAIR HFA/PROVENTIL HFA/VENTOLIN HFA) 108 (90 BASE) MCG/ACT Inhaler Inhale 2 puffs into the lungs every 4 hours as needed for shortness of breath / dyspnea or wheezing       potassium chloride kyle er (K-DUR) Take 20 mEq by mouth daily       nystatin (MYCOSTATIN) 484546 UNIT/GM POWD Apply topically 2 times daily       ferrous sulfate (IRON) 325 (65 FE) MG tablet Take 325 mg by mouth daily (with breakfast)       levothyroxine (SYNTHROID, LEVOTHROID) 50 MCG tablet Take 1 tablet by mouth daily       latanoprost (XALATAN) 0.005 % ophthalmic solution Place 1 drop into the right eye At Bedtime        GABAPENTIN PO Take 100 mg by mouth At Bedtime       acetaminophen (TYLENOL) 500 MG tablet Take 500 mg by mouth 4 times  "daily as needed for mild pain       senna-docusate (SENOKOT-S;PERICOLACE) 8.6-50 MG per tablet Take 1 tablet by mouth 2 times daily as needed for constipation       metoprolol (LOPRESSOR) 12.5 MG TABS Take 25 mg by mouth 2 times daily If AP <60 or SBP <100, hold and call MD       ASPIRIN EC PO Take 81 mg by mouth daily.         folic acid (FOLVITE) 1 MG tablet Take 1 mg by mouth daily.         tamsulosin (FLOMAX) 0.4 MG 24 hr capsule Take 0.4 mg by mouth At Bedtime.           MEDICATION CHANGES/RATIONALE:   HCTZ discontinued due to elevated creatinine  K-dur started for hypokalemia.       Controlled medications sent with patient: not applicable/none     ROS:    10 point ROS of systems including Constitutional, Eyes, Respiratory, Cardiovascular, Gastroenterology, Genitourinary, Integumentary, Muscularskeletal, Psychiatric were all negative except for pertinent positives noted in my HPI.    Physical Exam:   Vitals: /75  Pulse 77  Temp 98.1  F (36.7  C)  Resp 18  Ht 6' 2\" (1.88 m)  Wt 198 lb 6.4 oz (90 kg)  SpO2 93%  BMI 25.47 kg/m2  BMI= Body mass index is 25.47 kg/(m^2).  GENERAL APPEARANCE: Alert, in no distress, cooperative, pale  ENT: Mouth and posterior oropharynx normal, moist mucous membranes, [place]  EYES: EOM, conjunctivae, lids, pupils and irises normal, PERRL  NECK: No adenopathy,masses or thyromegaly  RESP: respiratory effort and palpation of chest normal, lungs clear to auscultation , no respiratory distress, diminished breath sounds bilat bases, intermittent cough produces clear thin sputum  CV: Palpation and auscultation of heart done, regular rate and rhythm, no murmur, rub, or gallop, 1+ edema bilat ankles, +2 pedal pulses  ABDOMEN: normal bowel sounds, soft, nontender, no hepatosplenomegaly or other masses, no guarding or rebound  M/S: Gait and station abnormal generalized weakness, small steps with ambulation ambulates with walker, no joint tendnerness or swelling, BLE strength 4/5, BUE " 4/5  SKIN: no rashes or wounds noted, but exam limited as patient is fully clothed, condom catheter in place  NEURO: Cranial nerves 2-12 are normal tested and grossly at patient's baseline, Examination of sensation by touch normal, clear speech,  PSYCH: oriented X 3, insight and judgement impaired, memory impaired , affect and mood normal,       DISCHARGE PLAN:  Occupational Therapy, Physical Therapy, Registered Nurse, Home Health Aide and From:  [facility]  Patient instructed to follow-up with:  PCP in 7 days      Current [facility] scheduled appointments:  No future appointments.    MTM referral needed and placed by this provider: No    Pending labs: None  SNF labs   CBC RESULTS:   Recent Labs   Lab Test 04/27/17 04/19/17   WBC  5.6  7.0   RBC  4.13*  3.92*   HGB  12.7*  12.3*   HCT  41.6  38.2*   MCV  101*  97   MCH  30.8  31.4   MCHC  30.5*  32.2   RDW  14.5  14.6*   PLT  262  299       Last Basic Metabolic Panel:  Recent Labs   Lab Test 05/03/17 05/01/17 04/27/17   NA   --   140   --   139   POTASSIUM  3.9  4.0   < >  3.4*   CHLORIDE   --   106   --   101   MERCEDES   --   9.0   --   9.2   CO2   --   25   --   28   BUN   --   24   --   33*   CR   --   1.24   --   1.36*   GLC   --   96   --   101    < > = values in this interval not displayed.       Liver Function Studies -   Recent Labs   Lab Test  04/03/17   0651  04/02/17   1155   PROTTOTAL  6.6*  7.2   ALBUMIN  2.6*  2.8*   BILITOTAL  0.8  0.9   ALKPHOS  53  53   AST  37  43   ALT  23  25       TSH   Date Value Ref Range Status   04/02/2017 4.62 (H) 0.40 - 4.00 mU/L Final   01/05/2016 5.47 (H) 0.40 - 4.00 mU/L Final     Discharge Treatments:  Services per [facility]  Home PT, OT, RN, HHA through [facility]  Follow up with PCP in 7-10 days    TOTAL DISCHARGE TIME:   Greater than 30 minutes  Electronically signed by:  SUNDAY Jenkins CNP

## 2017-05-18 ENCOUNTER — ASSISTED LIVING VISIT (OUTPATIENT)
Dept: GERIATRICS | Facility: CLINIC | Age: 82
End: 2017-05-18
Payer: MEDICARE

## 2017-05-18 DIAGNOSIS — N40.1 BENIGN PROSTATIC HYPERPLASIA WITH LOWER URINARY TRACT SYMPTOMS, UNSPECIFIED MORPHOLOGY: ICD-10-CM

## 2017-05-18 DIAGNOSIS — E87.6 HYPOKALEMIA: ICD-10-CM

## 2017-05-18 DIAGNOSIS — I10 BENIGN ESSENTIAL HYPERTENSION: ICD-10-CM

## 2017-05-18 DIAGNOSIS — R53.81 PHYSICAL DECONDITIONING: ICD-10-CM

## 2017-05-18 DIAGNOSIS — I48.91 ATRIAL FIBRILLATION, UNSPECIFIED TYPE (H): ICD-10-CM

## 2017-05-18 DIAGNOSIS — F02.80 ALZHEIMER'S DISEASE OF OTHER ONSET: ICD-10-CM

## 2017-05-18 DIAGNOSIS — E03.9 ACQUIRED HYPOTHYROIDISM: ICD-10-CM

## 2017-05-18 DIAGNOSIS — G57.93 NEUROPATHIC PAIN OF BOTH FEET: ICD-10-CM

## 2017-05-18 DIAGNOSIS — N18.30 CKD (CHRONIC KIDNEY DISEASE) STAGE 3, GFR 30-59 ML/MIN (H): ICD-10-CM

## 2017-05-18 DIAGNOSIS — M06.9 RHEUMATOID ARTHRITIS INVOLVING BOTH HANDS, UNSPECIFIED RHEUMATOID FACTOR PRESENCE: ICD-10-CM

## 2017-05-18 DIAGNOSIS — R09.89 PULMONARY VASCULAR CONGESTION: ICD-10-CM

## 2017-05-18 DIAGNOSIS — G30.8 ALZHEIMER'S DISEASE OF OTHER ONSET: ICD-10-CM

## 2017-05-18 DIAGNOSIS — J67.9 PULMONARY ALVEOLITIS (H): Primary | ICD-10-CM

## 2017-05-18 PROCEDURE — 99207 ZZC CDG-CODE CATEGORY CHANGED: CPT | Performed by: NURSE PRACTITIONER

## 2017-05-18 NOTE — PROGRESS NOTES
[place] [facility]  PRIMARY CARE PROVIDER AND CLINIC:  [facility] [place] [facility] [address] / [place] *  Chief Complaint   Patient presents with     [facility] Care       HPI:    Jacob Easton is a 88 year old  (5/16/1929),admitted to the [facility] from [facility], stay 4/6/17 through 5/11/17.  Admitted to [facility] for  medical management and nursing care. Current issues are:        Pulmonary Alveolitis  Pulmonary Vascular congestion  Hx of chronic bilateral infiltrates on CXR. Follows with pulmonology last appointment was  on 5/15/17. Was on methotrexate for RA but med was discontinued as possible contributor to lung injury. Today, no complaints of SOB. Use of albuterol prn    Atrial fibrillation, unspecified type (H)  Benign essential hypertension   Echo on 4/4 showed EF 55-60%, mild aortic stenosis. No previous hx of Afib but had an episode earlier this month in TCU of Afib with RVR. Good results with IV metoprolol.  Currently on po metoprolol, ASA. HCTZ stopped due to elevated creatinine.     CKD (chronic kidney disease) stage 3, GFR 30-59 ml/min  BLE at baseline in feet and ankles. Creatinine baseline at 1.2 +. On 5/1/17 was 1.24.    Hypokalemia  Even with discontinuing HCTZ continued to have low K+ so potassium supplement 20 mEq daily.     Benign prostatic hyperplasia, presence of lower urinary symptoms  Hx of condition and takes tamsulosin 0.4mg daily. Began use of condom catheter at TCU and continues to use for chronic leakage issues.    Rheumatoid arthritis involving both hands, unspecified rheumatoid factor presence (H)  Followed by rheumatologist Dr. Mercado. Next visit is scheduled for 8/3/17. Current plan of care is to treat RA flares with steroids since methotrexate has been stopped. Today has no s/s of flare or pain. Has tylenol 500 mg po prn daily.    Acquired hypothyroidism  TSH elevated at 4.62 but T4 is 1.09. Takes  levothyroxine 50 mcg daily    Dementia  SLUMS score of 18/30. He continues to discuss that his wife is back at their large home while he is stuck here. Conversation about his recent birthday and his children help to redirect our visit in a more positive direction.    Neuropathic pain of both feet (H)  Physical deconditioning  No complaints today but preoccupied with attendance to mens prayer meeting on the third floor. Gabapentin 100 mg at HS. Evaluation and treatment plan with [facility]. Admission to hospital began with increased weakness and fall. Admission to AL as wife did not feel she could safely care for her  in the home. He believes that with time returning to their home is a possibility. Witnessed assist of one for bed to wheelchair transfer. Has a walker but mostly relies currently on wheelchair.      CODE STATUS/ADVANCE DIRECTIVES DISCUSSION:   DNR / DNI  Patient's living condition: lives in Assisted Living    ALLERGIES:Advil [ibuprofen]; Influenza virus vaccine h5n1; and Orange juice [orange oil]  PAST MEDICAL HISTORY:  has a past medical history of Arthritis; Constipation; Hypertension; Hypocalcemia; Neuromuscular disorder (H); and Thyroid disease.  PAST SURGICAL HISTORY:  has a past surgical history that includes TOTAL HIP ARTHROPLASTY (9/24/11).  FAMILY HISTORY: family history is not on file.  SOCIAL HISTORY:  reports that he has never smoked. He has never used smokeless tobacco. He reports that he does not drink alcohol.    Post Discharge Medication Reconciliation Status: discharge medications reconciled, continue medications without change.  Current Outpatient Prescriptions   Medication Sig Dispense Refill     albuterol (PROAIR HFA/PROVENTIL HFA/VENTOLIN HFA) 108 (90 BASE) MCG/ACT Inhaler Inhale 2 puffs into the lungs every 4 hours as needed for shortness of breath / dyspnea or wheezing       potassium chloride kyle er (K-DUR) Take 20 mEq by mouth daily       nystatin (MYCOSTATIN) 946016 UNIT/GM  POWD Apply topically 2 times daily       ferrous sulfate (IRON) 325 (65 FE) MG tablet Take 325 mg by mouth daily (with breakfast)       levothyroxine (SYNTHROID, LEVOTHROID) 50 MCG tablet Take 1 tablet by mouth daily       latanoprost (XALATAN) 0.005 % ophthalmic solution Place 1 drop into the right eye At Bedtime        GABAPENTIN PO Take 100 mg by mouth At Bedtime       acetaminophen (TYLENOL) 500 MG tablet Take 500 mg by mouth 4 times daily as needed for mild pain       senna-docusate (SENOKOT-S;PERICOLACE) 8.6-50 MG per tablet Take 1 tablet by mouth 2 times daily as needed for constipation       metoprolol (LOPRESSOR) 12.5 MG TABS Take 25 mg by mouth 2 times daily If AP <60 or SBP <100, hold and call MD       ASPIRIN EC PO Take 81 mg by mouth daily.         folic acid (FOLVITE) 1 MG tablet Take 1 mg by mouth daily.         tamsulosin (FLOMAX) 0.4 MG 24 hr capsule Take 0.4 mg by mouth At Bedtime.         Patient Active Problem List   Diagnosis     Pain in joint, pelvic region and thigh     Contusion of hip     Jaw pain     Weakness     Hypokalemia due to hydrochlorothiazide     Fall     Arthritis     BPH (benign prostatic hyperplasia)     Physical deconditioning     Neuropathic pain of both feet (H)     Pulmonary vascular congestion     Benign essential hypertension     Acquired hypothyroidism     Acute kidney failure (H)     Rheumatoid arthritis involving both hands (H)     Acute renal failure (ARF) (H)     Hypercalcemia     Dysphagia     Urinary tract infection without hematuria, site unspecified     Recurrent cold sores     TIA (transient ischemic attack)     Right wrist drop     Pulmonary alveolitis (H)     Atrial fibrillation, unspecified type (H)     CKD (chronic kidney disease) stage 3, GFR 30-59 ml/min     Alzheimer's disease of other onset       ROS:  4 point ROS including Respiratory, CV, GI and , other than that noted in the HPI,  is negative    Exam:  /80  Pulse 74  Temp 99.2  F (37.3  C)   SpO2 99%  GENERAL APPEARANCE:  Alert, in no distress  ENT:  Mouth and posterior oropharynx normal, moist mucous membranes, [place]  EYES:  EOM, conjunctivae, lids, pupils and irises normal  NECK:  No adenopathy,masses or thyromegaly  RESP:  respiratory effort and palpation of chest normal, lungs clear to auscultation   CV:  Palpation and auscultation of heart done , regular rate and rhythm, no murmur, rub, or gallop, peripheral edema 1+ in ankles and feet  ABDOMEN:  normal bowel sounds, soft, nontender, no hepatosplenomegaly or other masses  M/S:   Gait and station abnormal using wheelchair currently, generalized weakness  SKIN:  Inspection of skin and subcutaneous tissue baseline  NEURO:   Examination of sensation by touch normal  PSYCH:  insight and judgement impaired, memory impaired     Lab/Diagnostic data:  CBC RESULTS:   Recent Labs   Lab Test 04/27/17 04/19/17   WBC  5.6  7.0   RBC  4.13*  3.92*   HGB  12.7*  12.3*   HCT  41.6  38.2*   MCV  101*  97   MCH  30.8  31.4   MCHC  30.5*  32.2   RDW  14.5  14.6*   PLT  262  299       Last Basic Metabolic Panel:  Recent Labs   Lab Test 05/03/17 05/01/17 04/27/17   NA   --   140   --   139   POTASSIUM  3.9  4.0   < >  3.4*   CHLORIDE   --   106   --   101   MERCEDES   --   9.0   --   9.2   CO2   --   25   --   28   BUN   --   24   --   33*   CR   --   1.24   --   1.36*   GLC   --   96   --   101    < > = values in this interval not displayed.       Liver Function Studies -   Recent Labs   Lab Test  04/03/17   0651  04/02/17   1155   PROTTOTAL  6.6*  7.2   ALBUMIN  2.6*  2.8*   BILITOTAL  0.8  0.9   ALKPHOS  53  53   AST  37  43   ALT  23  25       TSH   Date Value Ref Range Status   04/02/2017 4.62 (H) 0.40 - 4.00 mU/L Final   01/05/2016 5.47 (H) 0.40 - 4.00 mU/L Final     ASSESSMENT/PLAN:  (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)  (primary encounter diagnosis)  (R09.89) Pulmonary vascular congestion  Comment: Chronic  Plan: Monitor O2 sats with visits and per facility protocol,  monitor for SOB. Continue with albuterol prn. Obtain notes from pulmonology.    (I48.91) Atrial fibrillation, unspecified type (H)  (I10) Benign essential hypertension  Comment: Acute on Chronic  Plan: Monitor vital signs with visits and during acute changes. Continue with current medication changes and monitor effectiveness.     (N18.3) CKD (chronic kidney disease) stage 3, GFR 30-59 ml/min  Comment: Chronic  Plan: Monitor with labs. Avoid nephrotoxic medications.     (E87.6) Hypokalemia  Comment: Ongoing  Plan: Continue with K+ supplementation and monitor with BMP.     (N40.1) Benign prostatic hyperplasia with lower urinary tract symptoms, unspecified morphology  Comment: Chronic  Plan: Continue with tamsulosin and condom catheters. Tamsulosin increases risk for falls so monitor for benefits versus cons of medication therapy.    (M06.9) Rheumatoid arthritis involving both hands, unspecified rheumatoid factor presence (H)  Comment: Chronic  Plan: Will treat flares with prednisone. Methotrexate is not currently being used as it was thought to contribute to pulmonary issues.     (E03.9) Acquired hypothyroidism  Comment: Chronic  Plan: Continue current plan of care and monitor with labs    (G30.8,  F02.80) Alzheimer's disease of other onset  Comment: Ongoing  Plan: Continue with AL. This is appropriate level of care.    (G62.9) Neuropathic pain of both feet (H)  (R53.81) Physical deconditioning  Comment: Ongoing  Plan: Continue with home care. Gabapentin at HS. Assess ongoing status for generalized weakness.     Information reviewed:  Medications, vital signs, orders, nursing notes, problem list, hospital information. Total time spent with patient visit was 45 including patient visit and review of past records. Greater than 50% of total time spent with counseling and coordinating care.    Electronically signed by:  SUNDAY Lee CNP

## 2017-05-20 VITALS
DIASTOLIC BLOOD PRESSURE: 80 MMHG | HEART RATE: 74 BPM | SYSTOLIC BLOOD PRESSURE: 140 MMHG | TEMPERATURE: 99.2 F | OXYGEN SATURATION: 99 %

## 2017-05-20 PROBLEM — N18.30 CKD (CHRONIC KIDNEY DISEASE) STAGE 3, GFR 30-59 ML/MIN (H): Status: ACTIVE | Noted: 2017-05-20

## 2017-05-20 PROBLEM — I10 ESSENTIAL HYPERTENSION: Status: RESOLVED | Noted: 2017-05-20 | Resolved: 2017-05-20

## 2017-05-20 PROBLEM — F02.80 ALZHEIMER'S DISEASE OF OTHER ONSET: Status: ACTIVE | Noted: 2017-05-20

## 2017-05-20 PROBLEM — G30.8 ALZHEIMER'S DISEASE OF OTHER ONSET: Status: ACTIVE | Noted: 2017-05-20

## 2017-05-20 PROBLEM — I48.91 ATRIAL FIBRILLATION, UNSPECIFIED TYPE (H): Status: ACTIVE | Noted: 2017-05-20

## 2017-05-20 PROBLEM — I10 ESSENTIAL HYPERTENSION: Status: ACTIVE | Noted: 2017-05-20

## 2017-05-22 ENCOUNTER — DOCUMENTATION ONLY (OUTPATIENT)
Dept: CARE COORDINATION | Facility: CLINIC | Age: 82
End: 2017-05-22

## 2017-05-22 NOTE — PROGRESS NOTES
[facility] now requests orders and shares plan of care/discharge summaries for some patients through [employer].  Please REPLY TO THIS MESSAGE in order to give authorization for orders when needed.  This is considered a verbal order, you will still receive a faxed copy of orders for signature.  Thank you for your assistance in improving collaboration for our patients.    Pt. Is at a high risk for falls,  has sig. weakness in B LEs L worse than R, and has been primarily chair bound.  Pt. Can benefit from PT follow up.    ORDER    PT 1W1, 2W3, 1W1 for instruction with gait/transfers and HEP for strenghening/balance. CGer training to DC pt. To walking program.      Ana Villareal, PT  [email or web address]  [phone]

## 2017-05-23 ENCOUNTER — ASSISTED LIVING VISIT (OUTPATIENT)
Dept: GERIATRICS | Facility: CLINIC | Age: 82
End: 2017-05-23
Payer: MEDICARE

## 2017-05-23 VITALS
OXYGEN SATURATION: 98 % | DIASTOLIC BLOOD PRESSURE: 80 MMHG | HEART RATE: 77 BPM | TEMPERATURE: 97.7 F | SYSTOLIC BLOOD PRESSURE: 140 MMHG | RESPIRATION RATE: 18 BRPM

## 2017-05-23 DIAGNOSIS — R21 PERINEAL RASH IN MALE: ICD-10-CM

## 2017-05-23 DIAGNOSIS — L98.491 ULCER OF EXTERNAL EAR, LIMITED TO BREAKDOWN OF SKIN (H): Primary | ICD-10-CM

## 2017-05-23 DIAGNOSIS — Z46.6 FITTING AND ADJUSTMENT OF URINARY DEVICE: ICD-10-CM

## 2017-05-23 DIAGNOSIS — R53.81 PHYSICAL DECONDITIONING: ICD-10-CM

## 2017-05-23 PROCEDURE — 99207 ZZC CDG-CORRECTLY CODED, REVIEWED AND AGREE: CPT | Performed by: NURSE PRACTITIONER

## 2017-05-23 NOTE — PROGRESS NOTES
"[facility]    Chief Complaint   Patient presents with     RECHECK       HPI:    Jacob Easton is a 88 year old  (5/16/1929), who is being seen today for an episodic care visit at [facility].  HPI information obtained from: facility chart records.Today's concern is:    Ulcer of external ear, limited to breakdown of skin (H)  Right ear has scabbed area 0.25 cm on posterior superior edge of helix. Resident and wife report it has been \"there for a long time\".    Fitting and adjustment of urinary device  Staff having a difficult time placing external condom catheter on resident. Resident began using external catheter for incontinence during TCU stay after hospitalization and continues to use. Urine leakage around catheter is common. Adhesive is used on resident to hold in place.     Perineal rash in male  Reports of reddened brittanie area surrounding groin and groin skin folds    Physical deconditioning  Resident reports he is \"walking by himself to the elevator\". Witnessed strong assist of one for wheelchair to toilet transfer.      ALLERGIES: Advil [ibuprofen]; Influenza virus vaccine h5n1; and Orange juice [orange oil]  Past Medical, Surgical, Family and Social History reviewed and updated in [facility].    Current Outpatient Prescriptions   Medication Sig Dispense Refill     albuterol (PROAIR HFA/PROVENTIL HFA/VENTOLIN HFA) 108 (90 BASE) MCG/ACT Inhaler Inhale 2 puffs into the lungs every 4 hours as needed for shortness of breath / dyspnea or wheezing       potassium chloride kyle er (K-DUR) Take 20 mEq by mouth daily       nystatin (MYCOSTATIN) 453400 UNIT/GM POWD Apply topically 2 times daily       ferrous sulfate (IRON) 325 (65 FE) MG tablet Take 325 mg by mouth daily (with breakfast)       levothyroxine (SYNTHROID, LEVOTHROID) 50 MCG tablet Take 1 tablet by mouth daily       latanoprost (XALATAN) 0.005 % ophthalmic solution Place 1 drop into the right eye At Bedtime        GABAPENTIN PO Take 100 mg by " mouth At Bedtime       acetaminophen (TYLENOL) 500 MG tablet Take 500 mg by mouth 4 times daily as needed for mild pain       senna-docusate (SENOKOT-S;PERICOLACE) 8.6-50 MG per tablet Take 1 tablet by mouth 2 times daily as needed for constipation       metoprolol (LOPRESSOR) 12.5 MG TABS Take 25 mg by mouth 2 times daily If AP <60 or SBP <100, hold and call MD       ASPIRIN EC PO Take 81 mg by mouth daily.         folic acid (FOLVITE) 1 MG tablet Take 1 mg by mouth daily.         tamsulosin (FLOMAX) 0.4 MG 24 hr capsule Take 0.4 mg by mouth At Bedtime.         Medications reviewed:  Medications reconciled to facility chart and changes were made to reflect current medications as identified as above med list. Below are the changes that were made:   Medications stopped since last EPIC medication reconciliation:   There are no discontinued medications.    Medications started since last [facility] medication reconciliation:  No orders of the defined types were placed in this encounter.    REVIEW OF SYSTEMS:  4 point ROS including Respiratory, CV, GI and , other than that noted in the HPI,  is negative    Physical Exam:  /80  Pulse 77  Temp 97.7  F (36.5  C)  Resp 18  SpO2 98%  GENERAL APPEARANCE:  Alert, in no distress, anxious  ENT:  Mouth and posterior oropharynx normal, moist mucous membranes, [place]  EYES:  EOM, conjunctivae, lids, pupils and irises normal  NECK:  No adenopathy,masses or thyromegaly  RESP:  respiratory effort and palpation of chest normal, lungs clear to auscultation   CV:  Palpation and auscultation of heart done , regular rate and rhythm, no murmur, rub, or gallop, no edema  ABDOMEN:  normal bowel sounds, soft, nontender, no hepatosplenomegaly or other masses  :    reddened brittanie area, reddened penis, condom cath in place  M/S:   Gait and station unsteady, generalized weakness  SKIN:  wound healing well, no signs of infection on right ear  NEURO:   Cranial nerves 2-12 are normal tested and  grossly at patient's baseline  PSYCH:  anxious    Recent Labs:   CBC RESULTS:   Recent Labs   Lab Test 17   WBC  5.6  7.0   RBC  4.13*  3.92*   HGB  12.7*  12.3*   HCT  41.6  38.2*   MCV  101*  97   MCH  30.8  31.4   MCHC  30.5*  32.2   RDW  14.5  14.6*   PLT  262  299       Last Basic Metabolic Panel:  Recent Labs   Lab Test 17   NA   --   140   --   139   POTASSIUM  3.9  4.0   < >  3.4*   CHLORIDE   --   106   --   101   MERCEDES   --   9.0   --   9.2   CO2   --   25   --   28   BUN   --   24   --   33*   CR   --   1.24   --   1.36*   GLC   --   96   --   101    < > = values in this interval not displayed.       Liver Function Studies -   Recent Labs   Lab Test  17   0651  17   1155   PROTTOTAL  6.6*  7.2   ALBUMIN  2.6*  2.8*   BILITOTAL  0.8  0.9   ALKPHOS  53  53   AST  37  43   ALT  23  25       Assessment/Plan:  (L98.491) Ulcer of external ear, limited to breakdown of skin (H)  (primary encounter diagnosis)  Comment: Acute and Chronic  Plan: Will continue to monitor healing process. Scab in place but would like to assess when scab is gone. Unclear if acute or ongoing skin ulcer.    (Z46.6) Fitting and adjustment of urinary device  Comment: Ongoing  Plan: Resident is full assist for application and removal of device. Urine leakage on skin is causing a rash. Discussed in length men incontinence undergarments without use of condom catheter as he is incontinent of both bowel and bladder. Discussed risk of balantitis and possible skin breakdown.    (R21) Perineal rash in male  Comment: Acute  Plan: Beginning nystatin cream BID and during incontinent episodes to reddened brittanie area.    (R53.81) Physical deconditioning  Comment: Ongoing  Plan: Continue to work with PT/OT for strength and endurance building.      Total time with an established patient visit in the assisted livin minutes including discussions about the POC and care coordination with the patient,  family, Aneta, first contact, and son. Greater than 50% of total time spent with counseling and coordinating care due to ongoing chronic disease management, adjustment to AL, review of [facility].and provider care onsite.    Electronically signed by  SUNDAY Lee CNP

## 2017-05-24 ENCOUNTER — TRANSFERRED RECORDS (OUTPATIENT)
Dept: HEALTH INFORMATION MANAGEMENT | Facility: CLINIC | Age: 82
End: 2017-05-24

## 2017-06-01 DIAGNOSIS — G60.9 IDIOPATHIC PERIPHERAL NEUROPATHY: ICD-10-CM

## 2017-06-01 DIAGNOSIS — E03.8 OTHER SPECIFIED HYPOTHYROIDISM: ICD-10-CM

## 2017-06-01 DIAGNOSIS — E87.6 POTASSIUM (K) DEFICIENCY: ICD-10-CM

## 2017-06-01 DIAGNOSIS — D50.8 OTHER IRON DEFICIENCY ANEMIA: ICD-10-CM

## 2017-06-01 DIAGNOSIS — N40.1 BENIGN NODULAR PROSTATIC HYPERPLASIA WITH LOWER URINARY TRACT SYMPTOMS: ICD-10-CM

## 2017-06-01 DIAGNOSIS — I10 ESSENTIAL HYPERTENSION: ICD-10-CM

## 2017-06-01 DIAGNOSIS — I48.91 ATRIAL FIBRILLATION, UNSPECIFIED TYPE (H): Primary | ICD-10-CM

## 2017-06-01 RX ORDER — LEVOTHYROXINE SODIUM 50 UG/1
50 TABLET ORAL DAILY
Qty: 31 TABLET | Status: SHIPPED | OUTPATIENT
Start: 2017-06-01 | End: 2019-02-10

## 2017-06-01 RX ORDER — TAMSULOSIN HYDROCHLORIDE 0.4 MG/1
0.4 CAPSULE ORAL DAILY
Qty: 31 CAPSULE | Status: SHIPPED | OUTPATIENT
Start: 2017-06-01 | End: 2019-03-12

## 2017-06-01 RX ORDER — GABAPENTIN 100 MG/1
100 CAPSULE ORAL DAILY
Qty: 31 CAPSULE | Status: SHIPPED | OUTPATIENT
Start: 2017-06-01 | End: 2018-09-25

## 2017-06-01 RX ORDER — POTASSIUM CHLORIDE 1500 MG/1
20 TABLET, EXTENDED RELEASE ORAL DAILY
Qty: 31 TABLET | Status: SHIPPED | OUTPATIENT
Start: 2017-06-01 | End: 2018-06-12

## 2017-06-01 RX ORDER — METOPROLOL TARTRATE 25 MG/1
25 TABLET, FILM COATED ORAL 2 TIMES DAILY
Qty: 62 TABLET | Status: SHIPPED | OUTPATIENT
Start: 2017-06-01 | End: 2019-03-12

## 2017-06-01 RX ORDER — ASPIRIN 81 MG/1
81 TABLET ORAL DAILY
Qty: 31 TABLET | Status: SHIPPED | OUTPATIENT
Start: 2017-06-01 | End: 2019-03-12

## 2017-06-01 RX ORDER — FERROUS SULFATE 325(65) MG
325 TABLET ORAL DAILY
Qty: 31 TABLET | Status: SHIPPED | OUTPATIENT
Start: 2017-06-01 | End: 2018-02-12 | Stop reason: ALTCHOICE

## 2017-06-07 ENCOUNTER — TRANSFERRED RECORDS (OUTPATIENT)
Dept: HEALTH INFORMATION MANAGEMENT | Facility: CLINIC | Age: 82
End: 2017-06-07

## 2017-06-07 LAB
ANION GAP SERPL CALCULATED.3IONS-SCNC: 8 MMOL/L (ref 3–14)
BUN SERPL-MCNC: 19 MG/DL (ref 7–30)
CALCIUM SERPL-MCNC: 9.1 MG/DL (ref 8.5–10.1)
CHLORIDE SERPLBLD-SCNC: 99 MMOL/L (ref 94–109)
CO2 SERPL-SCNC: 29 MMOL/L (ref 20–32)
CREAT SERPL-MCNC: 1.23 MG/DL (ref 0.66–1.25)
GFR SERPL CREATININE-BSD FRML MDRD: 56 ML/MIN/1.73M2
GLUCOSE SERPL-MCNC: 111 MG/DL (ref 70–99)
POTASSIUM SERPL-SCNC: 3.3 MMOL/L (ref 3.4–5.3)
SODIUM SERPL-SCNC: 136 MMOL/L (ref 133–144)

## 2017-06-20 ENCOUNTER — ASSISTED LIVING VISIT (OUTPATIENT)
Dept: GERIATRICS | Facility: CLINIC | Age: 82
End: 2017-06-20
Payer: MEDICARE

## 2017-06-20 VITALS
RESPIRATION RATE: 16 BRPM | DIASTOLIC BLOOD PRESSURE: 79 MMHG | OXYGEN SATURATION: 93 % | SYSTOLIC BLOOD PRESSURE: 140 MMHG | TEMPERATURE: 99.3 F | HEART RATE: 77 BPM

## 2017-06-20 DIAGNOSIS — R60.0 BILATERAL EDEMA OF LOWER EXTREMITY: ICD-10-CM

## 2017-06-20 DIAGNOSIS — I48.91 ATRIAL FIBRILLATION, UNSPECIFIED TYPE (H): Primary | ICD-10-CM

## 2017-06-20 DIAGNOSIS — I10 BENIGN ESSENTIAL HYPERTENSION: ICD-10-CM

## 2017-06-20 DIAGNOSIS — N40.1 BENIGN PROSTATIC HYPERPLASIA WITH LOWER URINARY TRACT SYMPTOMS, UNSPECIFIED MORPHOLOGY: ICD-10-CM

## 2017-06-20 DIAGNOSIS — N18.30 CKD (CHRONIC KIDNEY DISEASE) STAGE 3, GFR 30-59 ML/MIN (H): ICD-10-CM

## 2017-06-20 DIAGNOSIS — E87.6 HYPOKALEMIA: ICD-10-CM

## 2017-06-20 DIAGNOSIS — Z75.8: ICD-10-CM

## 2017-06-20 PROCEDURE — 99207 ZZC CDG-UP CODE -MED NECESSITY: CPT | Performed by: NURSE PRACTITIONER

## 2017-06-20 NOTE — PROGRESS NOTES
[facility]    Chief Complaint   Patient presents with     RECHECK     Discharge Summary       HPI:    Jacob Easton is a 88 year old  (5/16/1929), who is being seen today for an episodic care visit at [facility].  HPI information obtained from: facility chart records, patient report and [facility] chart review.Today's concern is:    Atrial fibrillation, unspecified type (H)  Benign essential hypertension  Bilateral edema of lower extremity  Echo on 4/4 showed EF 55-60%, mild aortic stenosis. No previous hx of Afib but had an episode earlier this month in TCU of Afib with RVR. Good results with IV metoprolol.  Currently on po metoprolol, ASA. HCTZ stopped due to elevated creatinine. Hx of bilateral LE edema. Improving with use of Chang hose.    Hypokalemia due to hydrochlorothiazide  Continue to have subtherapeutic K+ level but stopped taking potassium at some point is the AL.     Benign prostatic hyperplasia with lower urinary tract symptoms, unspecified morphology  Wears incontinent brief. Tamsulosin 0.4 mg po daily.    CKD (chronic kidney disease) stage 3, GFR 30-59 ml/min  Creatinine baseline at 1.2.    Care Plan  Ongoing issue with medication management and provider care. At today's visit reports that he has had the same MD for years and would like to stay with him.       ALLERGIES: Advil [ibuprofen]; Influenza virus vaccine h5n1; and Orange juice [orange oil]  Past Medical, Surgical, Family and Social History reviewed and updated in [facility].    Current Outpatient Prescriptions   Medication Sig Dispense Refill     aspirin EC 81 MG EC tablet Take 1 tablet (81 mg) by mouth daily 31 tablet PRN     ferrous sulfate (IRON) 325 (65 FE) MG tablet Take 1 tablet (325 mg) by mouth daily 31 tablet PRN     gabapentin (NEURONTIN) 100 MG capsule Take 1 capsule (100 mg) by mouth daily 31 capsule PRN     levothyroxine (SYNTHROID/LEVOTHROID) 50 MCG tablet Take 1 tablet (50 mcg) by mouth daily 31 tablet PRN      metoprolol (LOPRESSOR) 25 MG tablet Take 1 tablet (25 mg) by mouth 2 times daily 62 tablet PRN     potassium chloride SA (K-DUR/KLOR-CON M) 20 MEQ CR tablet Take 1 tablet (20 mEq) by mouth daily 31 tablet PRN     tamsulosin (FLOMAX) 0.4 MG capsule Take 1 capsule (0.4 mg) by mouth daily 31 capsule PRN     albuterol (PROAIR HFA/PROVENTIL HFA/VENTOLIN HFA) 108 (90 BASE) MCG/ACT Inhaler Inhale 2 puffs into the lungs every 4 hours as needed for shortness of breath / dyspnea or wheezing       nystatin (MYCOSTATIN) 117834 UNIT/GM POWD Apply topically 2 times daily as needed        latanoprost (XALATAN) 0.005 % ophthalmic solution Place 1 drop into the right eye At Bedtime        acetaminophen (TYLENOL) 500 MG tablet Take 500 mg by mouth as needed for mild pain        senna-docusate (SENOKOT-S;PERICOLACE) 8.6-50 MG per tablet Take 1 tablet by mouth 2 times daily as needed for constipation       folic acid (FOLVITE) 1 MG tablet Take 1 mg by mouth daily.         [DISCONTINUED] metoprolol (LOPRESSOR) 12.5 MG TABS Take 25 mg by mouth 2 times daily If AP <60 or SBP <100, hold and call MD       Medications reviewed:  Medications reconciled to facility chart and changes were made to reflect current medications as identified as above med list. Below are the changes that were made:   Medications stopped since last EPIC medication reconciliation:   Medications Discontinued During This Encounter   Medication Reason     metoprolol (LOPRESSOR) 12.5 MG TABS Medication Reconciliation Clean Up     Medications started since last [facility] medication reconciliation:  No orders of the defined types were placed in this encounter.    REVIEW OF SYSTEMS:  4 point ROS including Respiratory, CV, GI and , other than that noted in the HPI,  is negative    Physical Exam:  /79  Pulse 77  Temp 99.3  F (37.4  C)  Resp 16  SpO2 93%  GENERAL APPEARANCE:  Alert, in no distress  ENT:  Mouth and posterior oropharynx normal, moist mucous  membranes  EYES:  EOM, conjunctivae, lids, pupils and irises normal  NECK:  No adenopathy,masses or thyromegaly  RESP:  respiratory effort and palpation of chest normal, lungs clear to auscultation   CV:  Palpation and auscultation of heart done , regular rate and rhythm, no murmur, rub, or gallop, peripheral edema 1+ in ankles and feet  ABDOMEN:  normal bowel sounds, soft, nontender, no hepatosplenomegaly   M/S:   Digits and nails abnormal seeing podiatry today, generalized weakness  SKIN:  Inspection of skin and subcutaneous tissue baseline  NEURO:   Cranial nerves 2-12 are normal tested and grossly at patient's baseline  PSYCH:  oriented X 3, forgetful     Recent Labs:    CBC RESULTS:   Recent Labs   Lab Test 04/27/17 04/19/17   WBC  5.6  7.0   RBC  4.13*  3.92*   HGB  12.7*  12.3*   HCT  41.6  38.2*   MCV  101*  97   MCH  30.8  31.4   MCHC  30.5*  32.2   RDW  14.5  14.6*   PLT  262  299       Last Basic Metabolic Panel:  Recent Labs   Lab Test 06/07/17 05/03/17 05/01/17   NA  136   --   140   POTASSIUM  3.3*  3.9  4.0   CHLORIDE  99   --   106   MERCEDES  9.1   --   9.0   CO2  29   --   25   BUN  19   --   24   CR  1.23   --   1.24   GLC  111*   --   96       Liver Function Studies -   Recent Labs   Lab Test  04/03/17   0651  04/02/17   1155   PROTTOTAL  6.6*  7.2   ALBUMIN  2.6*  2.8*   BILITOTAL  0.8  0.9   ALKPHOS  53  53   AST  37  43   ALT  23  25       TSH   Date Value Ref Range Status   04/02/2017 4.62 (H) 0.40 - 4.00 mU/L Final   01/05/2016 5.47 (H) 0.40 - 4.00 mU/L Final     T4 Free   Date Value Ref Range Status   04/02/2017 1.09 0.76 - 1.46 ng/dL Final     Assessment/Plan:  (I48.91) Atrial fibrillation, unspecified type (H)  (primary encounter diagnosis)  (I10) Benign essential hypertension  (R60.0) Bilateral edema of lower extremity  Comment: Chronic   Plan: Continue plan of care, monitor with labs, amy dangelo bilateral to lower extremities    (E87.6) Hypokalemia due to hydrochlorothiazide  Comment:  Ongoing  Plan: Will begin K+ supplementation again     (N40.1) Benign prostatic hyperplasia with lower urinary tract symptoms, unspecified morphology  Comment: Chronic  Plan: Continue plan of care    (N18.3) CKD (chronic kidney disease) stage 3, GFR 30-59 ml/min  Comment: Chronic  Plan: Avoid nephrotoxic medications and renal dose adjustment of medications. Monitor with labs.    (Z53.29) Does not agree with care plan  Comment: Ongoing  Plan: Resident and wife want to manage his medications and this is not the standard of medical practice for this provider from [facility]. Additionally resident wants to continue care with his primary provider, Dr. Bernabe Rivas. Reviewed that he can not have two primary providers, that this provider does not work for [employer] and is an employee of [employer]. Plan is to discharge resident from services with [facility] and he will continue with Dr. Rivas for his PCP.      Electronically signed by  SUNDAY Lee CNP

## 2017-09-06 ENCOUNTER — DOCUMENTATION ONLY (OUTPATIENT)
Dept: OTHER | Facility: CLINIC | Age: 82
End: 2017-09-06

## 2017-09-06 PROBLEM — Z71.89 ADVANCED DIRECTIVES, COUNSELING/DISCUSSION: Chronic | Status: ACTIVE | Noted: 2017-09-06

## 2018-01-25 ENCOUNTER — ASSISTED LIVING VISIT (OUTPATIENT)
Dept: GERIATRICS | Facility: CLINIC | Age: 83
End: 2018-01-25
Payer: MEDICARE

## 2018-01-25 VITALS
HEART RATE: 53 BPM | RESPIRATION RATE: 16 BRPM | DIASTOLIC BLOOD PRESSURE: 70 MMHG | SYSTOLIC BLOOD PRESSURE: 139 MMHG | OXYGEN SATURATION: 97 % | TEMPERATURE: 98.5 F

## 2018-01-25 DIAGNOSIS — M06.9 RHEUMATOID ARTHRITIS INVOLVING BOTH HANDS, UNSPECIFIED RHEUMATOID FACTOR PRESENCE: ICD-10-CM

## 2018-01-25 DIAGNOSIS — N40.1 BENIGN PROSTATIC HYPERPLASIA WITH LOWER URINARY TRACT SYMPTOMS, SYMPTOM DETAILS UNSPECIFIED: ICD-10-CM

## 2018-01-25 DIAGNOSIS — I10 BENIGN ESSENTIAL HYPERTENSION: ICD-10-CM

## 2018-01-25 DIAGNOSIS — N18.30 CKD (CHRONIC KIDNEY DISEASE) STAGE 3, GFR 30-59 ML/MIN (H): ICD-10-CM

## 2018-01-25 DIAGNOSIS — R09.89 PULMONARY VASCULAR CONGESTION: ICD-10-CM

## 2018-01-25 DIAGNOSIS — E03.9 ACQUIRED HYPOTHYROIDISM: ICD-10-CM

## 2018-01-25 DIAGNOSIS — E87.6 HYPOKALEMIA: ICD-10-CM

## 2018-01-25 DIAGNOSIS — I48.91 ATRIAL FIBRILLATION, UNSPECIFIED TYPE (H): ICD-10-CM

## 2018-01-25 DIAGNOSIS — J67.9 PULMONARY ALVEOLITIS (H): Primary | ICD-10-CM

## 2018-01-25 DIAGNOSIS — R53.81 PHYSICAL DECONDITIONING: ICD-10-CM

## 2018-01-25 RX ORDER — VITAMIN E 268 MG
1 CAPSULE ORAL DAILY
COMMUNITY
End: 2018-02-12 | Stop reason: ALTCHOICE

## 2018-01-25 RX ORDER — MULTIPLE VITAMINS W/ MINERALS TAB 9MG-400MCG
1 TAB ORAL DAILY
COMMUNITY
End: 2018-02-12 | Stop reason: ALTCHOICE

## 2018-01-25 NOTE — PROGRESS NOTES
[facility]  PRIMARY CARE PROVIDER AND CLINIC:  PRIMARY CARE PROVIDER:  Orestes Clement [address] / [place] [address]    Chief Complaint   Patient presents with     [facility]       HPI:    Jacob Easton is a 88 year old  (5/16/1929),[facility]/2017. He was briefly followed by onsite [facility] but wanted to continue with his PCP of over 32 years Dr. Bernabe Rivas. Unfortunately it has become to difficult for both himself and his wife to commute for medical services and so he will return to onsite medical care from [facility]. HPI information obtained from: facility chart records.  Current issues are:        Pulmonary alveolitis (H)  Pulmonary vascular congestion  Hx of chronic bilateral infiltrates on CXR. Previously followed with pulmonology. Today denies SOB. Methotrexate was d/c'd as possible contributor to lung disease but appears he is back to taking the medication.    Benign essential hypertension  Atrial fibrillation, unspecified type (H)  Echo on 4/4/17 showed EF 55-60% and mild aortic stenosis. No previous hx of Afib but episode last year with TCU admission of Afib with RVR. Denies chest pain and SOB. LE edema is present.    Hypokalemia due to hydrochlorothiazide  Currently taking along with potassium supplement.     CKD (chronic kidney disease) stage 3, GFR 30-59 ml/min  Creatinine 1.24-->1.23 GFR 56.    Acquired hypothyroidism  Levothyroxine 50 mcg po daily. TSH is within goal range for age.    Rheumatoid arthritis involving both hands, unspecified rheumatoid factor presence (H)  Methotrexate has been resumed. No complaints of hand pain today. Followed with rheumatologist Dr. Mercado. Previous plan of care was to treat RA flares with steroids.    Benign prostatic hyperplasia with lower urinary tract symptoms, symptom details unspecified  Incontinent of urine and frequently wet with chronic leakage issues.  Has failed use of condom catheters in the past. Concern for skin breakdown.     Physical deconditioning  Mostly ambulates in wheelchair. SBA for ambulation in halls from staff.       CODE STATUS/ADVANCE DIRECTIVES DISCUSSION:   DNR / DNI  Patient's living condition: lives in an assisted living facility    ALLERGIES:Advil [ibuprofen]; Influenza virus vaccine h5n1; and Orange juice [orange oil]  PAST MEDICAL HISTORY:  has a past medical history of Arthritis; Constipation; Hypertension; Hypocalcemia; Neuromuscular disorder (H); and Thyroid disease.  PAST SURGICAL HISTORY:  has a past surgical history that includes TOTAL HIP ARTHROPLASTY (9/24/11).  FAMILY HISTORY: family history is not on file.  SOCIAL HISTORY:  reports that he has never smoked. He has never used smokeless tobacco. He reports that he does not drink alcohol.      Current Outpatient Prescriptions   Medication Sig Dispense Refill     AMLODIPINE BESYLATE PO Take 10 mg by mouth daily       Multiple Vitamins-Minerals (EYE VITAMINS & MINERALS) TABS Take 1 tablet by mouth daily       Flaxseed, Linseed, (FLAXSEED OIL) 1000 MG CAPS Take 1 capsule by mouth daily       HYDROCHLOROTHIAZIDE PO Take 50 mg by mouth daily       METHOTREXATE PO Take 10 mg by mouth once a week on Fridays       multivitamin, therapeutic with minerals (MULTI-VITAMIN) TABS tablet Take 1 tablet by mouth daily       aspirin EC 81 MG EC tablet Take 1 tablet (81 mg) by mouth daily 31 tablet PRN     ferrous sulfate (IRON) 325 (65 FE) MG tablet Take 1 tablet (325 mg) by mouth daily 31 tablet PRN     gabapentin (NEURONTIN) 100 MG capsule Take 1 capsule (100 mg) by mouth daily 31 capsule PRN     levothyroxine (SYNTHROID/LEVOTHROID) 50 MCG tablet Take 1 tablet (50 mcg) by mouth daily 31 tablet PRN     metoprolol (LOPRESSOR) 25 MG tablet Take 1 tablet (25 mg) by mouth 2 times daily 62 tablet PRN     potassium chloride SA (K-DUR/KLOR-CON M) 20 MEQ CR tablet Take 1 tablet (20 mEq) by mouth daily 31  tablet PRN     tamsulosin (FLOMAX) 0.4 MG capsule Take 1 capsule (0.4 mg) by mouth daily 31 capsule PRN     albuterol (PROAIR HFA/PROVENTIL HFA/VENTOLIN HFA) 108 (90 BASE) MCG/ACT Inhaler Inhale 2 puffs into the lungs every 4 hours as needed for shortness of breath / dyspnea or wheezing       nystatin (MYCOSTATIN) 174634 UNIT/GM POWD Apply topically 2 times daily as needed        latanoprost (XALATAN) 0.005 % ophthalmic solution Place 1 drop into the right eye At Bedtime        acetaminophen (TYLENOL) 500 MG tablet Take 500 mg by mouth 4 times daily as needed for mild pain        senna-docusate (SENOKOT-S;PERICOLACE) 8.6-50 MG per tablet Take 1 tablet by mouth 2 times daily as needed for constipation       folic acid (FOLVITE) 1 MG tablet Take 1 mg by mouth daily.           ROS:  4 point ROS including Respiratory, CV, GI and , other than that noted in the HPI,  is negative    Exam:  /70  Pulse 53  Temp 98.5  F (36.9  C)  Resp 16  SpO2 97%  GENERAL APPEARANCE:  Alert, in no distress  ENT:  Mouth and posterior oropharynx normal, moist mucous membranes, [place]  EYES:  EOM, conjunctivae, lids, pupils and irises normal  NECK:  No adenopathy,masses or thyromegaly  RESP:  respiratory effort and palpation of chest normal, lungs clear to auscultation   CV:  Palpation and auscultation of heart done, bradycardic and irregular rhythm, no murmur, rub, or gallop, peripheral edema 1+ in ankles and feet. Chang Hose on.  ABDOMEN:  normal bowel sounds, soft, nontender, no hepatosplenomegaly   M/S:   Gait and station abnormal using wheelchair currently, generalized weakness  SKIN:  Inspection of skin and subcutaneous tissue baseline  NEURO:   Examination of sensation by touch normal  PSYCH:  insight and judgement impaired, memory impaired        Lab/Diagnostic data:    CBC RESULTS:   Recent Labs   Lab Test 04/27/17 04/19/17   WBC  5.6  7.0   RBC  4.13*  3.92*   HGB  12.7*  12.3*   HCT  41.6  38.2*   MCV  101*  97   MCH  30.8   31.4   MCHC  30.5*  32.2   RDW  14.5  14.6*   PLT  262  299       Last Basic Metabolic Panel:  Recent Labs   Lab Test 06/07/17 05/03/17 05/01/17   NA  136   --   140   POTASSIUM  3.3*  3.9  4.0   CHLORIDE  99   --   106   MERCEDES  9.1   --   9.0   CO2  29   --   25   BUN  19   --   24   CR  1.23   --   1.24   GLC  111*   --   96       Liver Function Studies -   Recent Labs   Lab Test  04/03/17   0651  04/02/17   1155   PROTTOTAL  6.6*  7.2   ALBUMIN  2.6*  2.8*   BILITOTAL  0.8  0.9   ALKPHOS  53  53   AST  37  43   ALT  23  25       TSH   Date Value Ref Range Status   04/02/2017 4.62 (H) 0.40 - 4.00 mU/L Final   01/05/2016 5.47 (H) 0.40 - 4.00 mU/L Final       ASSESSMENT/PLAN:  (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)  (primary encounter diagnosis)  (R09.89) Pulmonary vascular congestion  Comment: Chronic  Plan:   -Albuterol prn  -Monitor O2 sats with visits  -Lasix for vascular congestion    (I10) Benign essential hypertension  (I48.91) Atrial fibrillation, unspecified type (H)  Comment: Ongoing  Plan:   -Norvasc 10 mg po daily  -HCTZ 50 mg po daily  -ASA 81 mg po daily  -Metoprolol 25 mg po BID  -BMP    (E87.6) Hypokalemia due to hydrochlorothiazide  Comment: Ongoing problem  Plan:  -Potassium 20 mg po daily    (N18.3) CKD (chronic kidney disease) stage 3, GFR 30-59 ml/min  Comment: Ongoing  Plan:  -Avoid nephrotoxic medications   -Renal dose medications  -CBC    (E03.9) Acquired hypothyroidism  Comment: Ongoing  Plan:   -levothyroxine 50 mcg po daily  -TSH annually     (M06.9) Rheumatoid arthritis involving both hands, unspecified rheumatoid factor presence (H)  Comment: Chronic  Plan:   -Methotrexate 10 mg po daily    (N40.1) Benign prostatic hyperplasia with lower urinary tract symptoms, symptom details unspecified  Comment: Chronic  Plan:   -Flomax 0.4 mg po daily    (R53.81) Physical deconditioning  Comment: Generalized   Plan:   -Encouraged to walker daily increasing distance weekly        Electronically signed  by:  SUNDAY Lee CNP

## 2018-01-25 NOTE — LETTER
1/25/2018        RE: Jacob Boalños  [facility]  [address]  [facility] [address]        [facility]  PRIMARY CARE PROVIDER AND CLINIC:  PRIMARY CARE PROVIDER:  Orestes Clement [address] [facility] [address] / [place] [address]    Chief Complaint   Patient presents with     Establish Care       HPI:    Jacob Bolaños is a 88 year old  (5/16/1929),admitted to the [facility] from the community last year 5/2017. He was briefly followed by onsite [facility] but wanted to continue with his PCP of over 32 years Dr. Bernabe Rvias. Unfortunately it has become to difficult for both himself and his wife to commute for medical services and so he will return to onsite medical care from [facility]. HPI information obtained from: facility chart records.  Current issues are:        Pulmonary alveolitis (H)  Pulmonary vascular congestion  Hx of chronic bilateral infiltrates on CXR. Previously followed with pulmonology. Today denies SOB. Methotrexate was d/c'd as possible contributor to lung disease but appears he is back to taking the medication.    Benign essential hypertension  Atrial fibrillation, unspecified type (H)  Echo on 4/4/17 showed EF 55-60% and mild aortic stenosis. No previous hx of Afib but episode last year with TCU admission of Afib with RVR. Denies chest pain and SOB. LE edema is present.    Hypokalemia due to hydrochlorothiazide  Currently taking along with potassium supplement.     CKD (chronic kidney disease) stage 3, GFR 30-59 ml/min  Creatinine 1.24-->1.23 GFR 56.    Acquired hypothyroidism  Levothyroxine 50 mcg po daily. TSH is within goal range for age.    Rheumatoid arthritis involving both hands, unspecified rheumatoid factor presence (H)  Methotrexate has been resumed. No complaints of hand pain today. Followed with rheumatologist Dr. Mercado. Previous plan of care was to treat RA flares with steroids.    Benign prostatic hyperplasia with lower  urinary tract symptoms, symptom details unspecified  Incontinent of urine and frequently wet with chronic leakage issues. Has failed use of condom catheters in the past. Concern for skin breakdown.     Physical deconditioning  Mostly ambulates in wheelchair. SBA for ambulation in halls from staff.       CODE STATUS/ADVANCE DIRECTIVES DISCUSSION:   DNR / DNI  Patient's living condition: lives in an assisted living facility    ALLERGIES:Advil [ibuprofen]; Influenza virus vaccine h5n1; and Orange juice [orange oil]  PAST MEDICAL HISTORY:  has a past medical history of Arthritis; Constipation; Hypertension; Hypocalcemia; Neuromuscular disorder (H); and Thyroid disease.  PAST SURGICAL HISTORY:  has a past surgical history that includes TOTAL HIP ARTHROPLASTY (9/24/11).  FAMILY HISTORY: family history is not on file.  SOCIAL HISTORY:  reports that he has never smoked. He has never used smokeless tobacco. He reports that he does not drink alcohol.      Current Outpatient Prescriptions   Medication Sig Dispense Refill     AMLODIPINE BESYLATE PO Take 10 mg by mouth daily       Multiple Vitamins-Minerals (EYE VITAMINS & MINERALS) TABS Take 1 tablet by mouth daily       Flaxseed, Linseed, (FLAXSEED OIL) 1000 MG CAPS Take 1 capsule by mouth daily       HYDROCHLOROTHIAZIDE PO Take 50 mg by mouth daily       METHOTREXATE PO Take 10 mg by mouth once a week on Fridays       multivitamin, therapeutic with minerals (MULTI-VITAMIN) TABS tablet Take 1 tablet by mouth daily       aspirin EC 81 MG EC tablet Take 1 tablet (81 mg) by mouth daily 31 tablet PRN     ferrous sulfate (IRON) 325 (65 FE) MG tablet Take 1 tablet (325 mg) by mouth daily 31 tablet PRN     gabapentin (NEURONTIN) 100 MG capsule Take 1 capsule (100 mg) by mouth daily 31 capsule PRN     levothyroxine (SYNTHROID/LEVOTHROID) 50 MCG tablet Take 1 tablet (50 mcg) by mouth daily 31 tablet PRN     metoprolol (LOPRESSOR) 25 MG tablet Take 1 tablet (25 mg) by mouth 2 times  daily 62 tablet PRN     potassium chloride SA (K-DUR/KLOR-CON M) 20 MEQ CR tablet Take 1 tablet (20 mEq) by mouth daily 31 tablet PRN     tamsulosin (FLOMAX) 0.4 MG capsule Take 1 capsule (0.4 mg) by mouth daily 31 capsule PRN     albuterol (PROAIR HFA/PROVENTIL HFA/VENTOLIN HFA) 108 (90 BASE) MCG/ACT Inhaler Inhale 2 puffs into the lungs every 4 hours as needed for shortness of breath / dyspnea or wheezing       nystatin (MYCOSTATIN) 278975 UNIT/GM POWD Apply topically 2 times daily as needed        latanoprost (XALATAN) 0.005 % ophthalmic solution Place 1 drop into the right eye At Bedtime        acetaminophen (TYLENOL) 500 MG tablet Take 500 mg by mouth 4 times daily as needed for mild pain        senna-docusate (SENOKOT-S;PERICOLACE) 8.6-50 MG per tablet Take 1 tablet by mouth 2 times daily as needed for constipation       folic acid (FOLVITE) 1 MG tablet Take 1 mg by mouth daily.           ROS:  4 point ROS including Respiratory, CV, GI and , other than that noted in the HPI,  is negative    Exam:  /70  Pulse 53  Temp 98.5  F (36.9  C)  Resp 16  SpO2 97%  GENERAL APPEARANCE:  Alert, in no distress  ENT:  Mouth and posterior oropharynx normal, moist mucous membranes, [place] De[place]  EYES:  EOM, conjunctivae, lids, pupils and irises normal  NECK:  No adenopathy,masses or thyromegaly  RESP:  respiratory effort and palpation of chest normal, lungs clear to auscultation   CV:  Palpation and auscultation of heart done, bradycardic and irregular rhythm, no murmur, rub, or gallop, peripheral edema 1+ in ankles and feet. Chang Hose on.  ABDOMEN:  normal bowel sounds, soft, nontender, no hepatosplenomegaly   M/S:   Gait and station abnormal using wheelchair currently, generalized weakness  SKIN:  Inspection of skin and subcutaneous tissue baseline  NEURO:   Examination of sensation by touch normal  PSYCH:  insight and judgement impaired, memory impaired        Lab/Diagnostic data:    CBC RESULTS:   Recent Labs   Lab Test  04/27/17 04/19/17   WBC  5.6  7.0   RBC  4.13*  3.92*   HGB  12.7*  12.3*   HCT  41.6  38.2*   MCV  101*  97   MCH  30.8  31.4   MCHC  30.5*  32.2   RDW  14.5  14.6*   PLT  262  299       Last Basic Metabolic Panel:  Recent Labs   Lab Test 06/07/17 05/03/17 05/01/17   NA  136   --   140   POTASSIUM  3.3*  3.9  4.0   CHLORIDE  99   --   106   MERCEDES  9.1   --   9.0   CO2  29   --   25   BUN  19   --   24   CR  1.23   --   1.24   GLC  111*   --   96       Liver Function Studies -   Recent Labs   Lab Test  04/03/17   0651  04/02/17   1155   PROTTOTAL  6.6*  7.2   ALBUMIN  2.6*  2.8*   BILITOTAL  0.8  0.9   ALKPHOS  53  53   AST  37  43   ALT  23  25       TSH   Date Value Ref Range Status   04/02/2017 4.62 (H) 0.40 - 4.00 mU/L Final   01/05/2016 5.47 (H) 0.40 - 4.00 mU/L Final       ASSESSMENT/PLAN:  (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)  (primary encounter diagnosis)  (R09.89) Pulmonary vascular congestion  Comment: Chronic  Plan:   -Albuterol prn  -Monitor O2 sats with visits  -Lasix for vascular congestion    (I10) Benign essential hypertension  (I48.91) Atrial fibrillation, unspecified type (H)  Comment: Ongoing  Plan:   -Norvasc 10 mg po daily  -HCTZ 50 mg po daily  -ASA 81 mg po daily  -Metoprolol 25 mg po BID  -BMP    (E87.6) Hypokalemia due to hydrochlorothiazide  Comment: Ongoing problem  Plan:  -Potassium 20 mg po daily    (N18.3) CKD (chronic kidney disease) stage 3, GFR 30-59 ml/min  Comment: Ongoing  Plan:  -Avoid nephrotoxic medications   -Renal dose medications  -CBC    (E03.9) Acquired hypothyroidism  Comment: Ongoing  Plan:   -levothyroxine 50 mcg po daily  -TSH annually     (M06.9) Rheumatoid arthritis involving both hands, unspecified rheumatoid factor presence (H)  Comment: Chronic  Plan:   -Methotrexate 10 mg po daily    (N40.1) Benign prostatic hyperplasia with lower urinary tract symptoms, symptom details unspecified  Comment: Chronic  Plan:   -Flomax 0.4 mg po daily    (R53.81) Physical  deconditioning  Comment: Generalized   Plan:   -Encouraged to walker daily increasing distance weekly        Electronically signed by:  SUNDAY Lee CNP                    Sincerely,        SUNDAY Lee CNP

## 2018-01-25 NOTE — LETTER
1/25/2018        RE: Jacob Bolaños  [facility]  [address]  [facility] [address]        [facility]  PRIMARY CARE PROVIDER AND CLINIC:  PRIMARY CARE PROVIDER:  Orestes Clement [address] [facility] [address] / [place] [address]    Chief Complaint   Patient presents with     Establish Care       HPI:    Jacob Bolaños is a 88 year old  (5/16/1929),admitted to the [facility] from the community last year 5/2017. He was briefly followed by onsite [facility] but wanted to continue with his PCP of over 32 years Dr. Bernabe Rivas. Unfortunately it has become to difficult for both himself and his wife to commute for medical services and so he will return to onsite medical care from [facility]. HPI information obtained from: facility chart records.  Current issues are:        Pulmonary alveolitis (H)  Pulmonary vascular congestion  Hx of chronic bilateral infiltrates on CXR. Previously followed with pulmonology. Today denies SOB. Methotrexate was d/c'd as possible contributor to lung disease but appears he is back to taking the medication.    Benign essential hypertension  Atrial fibrillation, unspecified type (H)  Echo on 4/4/17 showed EF 55-60% and mild aortic stenosis. No previous hx of Afib but episode last year with TCU admission of Afib with RVR. Denies chest pain and SOB. LE edema is present.    Hypokalemia due to hydrochlorothiazide  Currently taking along with potassium supplement.     CKD (chronic kidney disease) stage 3, GFR 30-59 ml/min  Creatinine 1.24-->1.23 GFR 56.    Acquired hypothyroidism  Levothyroxine 50 mcg po daily. TSH is within goal range for age.    Rheumatoid arthritis involving both hands, unspecified rheumatoid factor presence (H)  Methotrexate has been resumed. No complaints of hand pain today. Followed with rheumatologist Dr. Mercado. Previous plan of care was to treat RA flares with steroids.    Benign prostatic hyperplasia with lower  urinary tract symptoms, symptom details unspecified  Incontinent of urine and frequently wet with chronic leakage issues. Has failed use of condom catheters in the past. Concern for skin breakdown.     Physical deconditioning  Mostly ambulates in wheelchair. SBA for ambulation in halls from staff.       CODE STATUS/ADVANCE DIRECTIVES DISCUSSION:   DNR / DNI  Patient's living condition: lives in an assisted living facility    ALLERGIES:Advil [ibuprofen]; Influenza virus vaccine h5n1; and Orange juice [orange oil]  PAST MEDICAL HISTORY:  has a past medical history of Arthritis; Constipation; Hypertension; Hypocalcemia; Neuromuscular disorder (H); and Thyroid disease.  PAST SURGICAL HISTORY:  has a past surgical history that includes TOTAL HIP ARTHROPLASTY (9/24/11).  FAMILY HISTORY: family history is not on file.  SOCIAL HISTORY:  reports that he has never smoked. He has never used smokeless tobacco. He reports that he does not drink alcohol.      Current Outpatient Prescriptions   Medication Sig Dispense Refill     AMLODIPINE BESYLATE PO Take 10 mg by mouth daily       Multiple Vitamins-Minerals (EYE VITAMINS & MINERALS) TABS Take 1 tablet by mouth daily       Flaxseed, Linseed, (FLAXSEED OIL) 1000 MG CAPS Take 1 capsule by mouth daily       HYDROCHLOROTHIAZIDE PO Take 50 mg by mouth daily       METHOTREXATE PO Take 10 mg by mouth once a week on Fridays       multivitamin, therapeutic with minerals (MULTI-VITAMIN) TABS tablet Take 1 tablet by mouth daily       aspirin EC 81 MG EC tablet Take 1 tablet (81 mg) by mouth daily 31 tablet PRN     ferrous sulfate (IRON) 325 (65 FE) MG tablet Take 1 tablet (325 mg) by mouth daily 31 tablet PRN     gabapentin (NEURONTIN) 100 MG capsule Take 1 capsule (100 mg) by mouth daily 31 capsule PRN     levothyroxine (SYNTHROID/LEVOTHROID) 50 MCG tablet Take 1 tablet (50 mcg) by mouth daily 31 tablet PRN     metoprolol (LOPRESSOR) 25 MG tablet Take 1 tablet (25 mg) by mouth 2 times  daily 62 tablet PRN     potassium chloride SA (K-DUR/KLOR-CON M) 20 MEQ CR tablet Take 1 tablet (20 mEq) by mouth daily 31 tablet PRN     tamsulosin (FLOMAX) 0.4 MG capsule Take 1 capsule (0.4 mg) by mouth daily 31 capsule PRN     albuterol (PROAIR HFA/PROVENTIL HFA/VENTOLIN HFA) 108 (90 BASE) MCG/ACT Inhaler Inhale 2 puffs into the lungs every 4 hours as needed for shortness of breath / dyspnea or wheezing       nystatin (MYCOSTATIN) 727005 UNIT/GM POWD Apply topically 2 times daily as needed        latanoprost (XALATAN) 0.005 % ophthalmic solution Place 1 drop into the right eye At Bedtime        acetaminophen (TYLENOL) 500 MG tablet Take 500 mg by mouth 4 times daily as needed for mild pain        senna-docusate (SENOKOT-S;PERICOLACE) 8.6-50 MG per tablet Take 1 tablet by mouth 2 times daily as needed for constipation       folic acid (FOLVITE) 1 MG tablet Take 1 mg by mouth daily.           ROS:  4 point ROS including Respiratory, CV, GI and , other than that noted in the HPI,  is negative    Exam:  /70  Pulse 53  Temp 98.5  F (36.9  C)  Resp 16  SpO2 97%  GENERAL APPEARANCE:  Alert, in no distress  ENT:  Mouth and posterior oropharynx normal, moist mucous membranes, [place]  EYES:  EOM, conjunctivae, lids, pupils and irises normal  NECK:  No adenopathy,masses or thyromegaly  RESP:  respiratory effort and palpation of chest normal, lungs clear to auscultation   CV:  Palpation and auscultation of heart done, bradycardic and irregular rhythm, no murmur, rub, or gallop, peripheral edema 1+ in ankles and feet. Chang Hose on.  ABDOMEN:  normal bowel sounds, soft, nontender, no hepatosplenomegaly   M/S:   Gait and station abnormal using wheelchair currently, generalized weakness  SKIN:  Inspection of skin and subcutaneous tissue baseline  NEURO:   Examination of sensation by touch normal  PSYCH:  insight and judgement impaired, memory impaired        Lab/Diagnostic data:    CBC RESULTS:   Recent Labs   Lab Test  04/27/17 04/19/17   WBC  5.6  7.0   RBC  4.13*  3.92*   HGB  12.7*  12.3*   HCT  41.6  38.2*   MCV  101*  97   MCH  30.8  31.4   MCHC  30.5*  32.2   RDW  14.5  14.6*   PLT  262  299       Last Basic Metabolic Panel:  Recent Labs   Lab Test 06/07/17 05/03/17 05/01/17   NA  136   --   140   POTASSIUM  3.3*  3.9  4.0   CHLORIDE  99   --   106   MERCEDES  9.1   --   9.0   CO2  29   --   25   BUN  19   --   24   CR  1.23   --   1.24   GLC  111*   --   96       Liver Function Studies -   Recent Labs   Lab Test  04/03/17   0651  04/02/17   1155   PROTTOTAL  6.6*  7.2   ALBUMIN  2.6*  2.8*   BILITOTAL  0.8  0.9   ALKPHOS  53  53   AST  37  43   ALT  23  25       TSH   Date Value Ref Range Status   04/02/2017 4.62 (H) 0.40 - 4.00 mU/L Final   01/05/2016 5.47 (H) 0.40 - 4.00 mU/L Final       ASSESSMENT/PLAN:  (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)  (primary encounter diagnosis)  (R09.89) Pulmonary vascular congestion  Comment: Chronic  Plan:   -Albuterol prn  -Monitor O2 sats with visits  -Lasix for vascular congestion    (I10) Benign essential hypertension  (I48.91) Atrial fibrillation, unspecified type (H)  Comment: Ongoing  Plan:   -Norvasc 10 mg po daily  -HCTZ 50 mg po daily  -ASA 81 mg po daily  -Metoprolol 25 mg po BID  -BMP    (E87.6) Hypokalemia due to hydrochlorothiazide  Comment: ***  Plan:  -Potassium 20 mg po daily    (N18.3) CKD (chronic kidney disease) stage 3, GFR 30-59 ml/min  Comment: Ongoing  Plan:  -Avoid nephrotoxic medications   -Renal dose medications  -CBC    (E03.9) Acquired hypothyroidism  Comment: Ongoing  Plan:   -levothyroxine 50 mcg po daily  -TSH annually     (M06.9) Rheumatoid arthritis involving both hands, unspecified rheumatoid factor presence (H)  Comment: Chronic  Plan:   -Methotrexate 10 mg po daily    (N40.1) Benign prostatic hyperplasia with lower urinary tract symptoms, symptom details unspecified  Comment: Chronic  Plan:   -Flomax 0.4 mg po daily    (R53.81) Physical deconditioning  Comment:  Generalized   Plan:   -Encouraged to walker daily increasing distance weekly        Electronically signed by:  SUNDAY Lee CNP                    Sincerely,        USNDAY Lee CNP

## 2018-02-07 ENCOUNTER — ASSISTED LIVING VISIT (OUTPATIENT)
Dept: GERIATRICS | Facility: CLINIC | Age: 83
End: 2018-02-07
Payer: MEDICARE

## 2018-02-07 DIAGNOSIS — I48.91 ATRIAL FIBRILLATION, UNSPECIFIED TYPE (H): ICD-10-CM

## 2018-02-07 DIAGNOSIS — I10 BENIGN ESSENTIAL HYPERTENSION: ICD-10-CM

## 2018-02-07 DIAGNOSIS — N40.0 BENIGN PROSTATIC HYPERPLASIA, UNSPECIFIED WHETHER LOWER URINARY TRACT SYMPTOMS PRESENT: ICD-10-CM

## 2018-02-07 DIAGNOSIS — J67.9 PULMONARY ALVEOLITIS (H): Primary | ICD-10-CM

## 2018-02-07 DIAGNOSIS — Z79.899 POLYPHARMACY: ICD-10-CM

## 2018-02-07 DIAGNOSIS — N18.30 CKD (CHRONIC KIDNEY DISEASE) STAGE 3, GFR 30-59 ML/MIN (H): ICD-10-CM

## 2018-02-07 DIAGNOSIS — E03.9 HYPOTHYROIDISM, UNSPECIFIED TYPE: ICD-10-CM

## 2018-02-07 DIAGNOSIS — M06.09 RHEUMATOID ARTHRITIS OF MULTIPLE SITES WITHOUT RHEUMATOID FACTOR (H): ICD-10-CM

## 2018-02-07 NOTE — PROGRESS NOTES
Jacob Easton is a 88 year old male seen February 7, 2018 at [facility] where he has resided for 9 months (admit 5/2017) seen to follow up pulmonary alveolitis, HTN and atrial fib.    Patient is seen in his apartment, up to .   Reports he is feeling well, pleasant and conversational, able to give accurate history and rest obtained from chart review.   He has longstanding RA, has been on MTX for 10 years.   It was stopped in last hospitalization because of respiratory changes with CT showing ground glass opacities c/w acute alveolitis and persistent cystic changes throughout both lungs.   Sometime in the intervening months MTX was restarted, cannot find documentation related to that    Patient is no longer on supplemental O2, uses nebs just prn.       Patient had multiple hospitalizations and TCU stays over the past 3 years, with falls and weakness, unable to get up.    Finally became more than his wife could manage at home and he moved [place] for permanent placement.      Past Medical History:   Diagnosis Date     Arthritis      Constipation      Hypertension      Hypocalcemia      Neuromuscular disorder (H)      Thyroid disease    Pulmonary alveolitis  CKD stage 3  RA  BPH  Atrial fib  LE weakness    Past Surgical History:   Procedure Laterality Date     C TOTAL HIP ARTHROPLASTY  9/24/11    RT     Social History   Substance Use Topics     Smoking status: Never Smoker     Smokeless tobacco: Never Used     Alcohol use No      SH:  Retired [employer] .   His wife Aneta lives in their house in [place].     Review Of Systems  Skin: negative   Eyes: impaired vision, glasses  Ears/Nose/Throat: hearing loss  Respiratory: as above  Cardiovascular: dyspnea on exertion, lower extremity edema and exercise intolerance  Gastrointestinal: negative  Genitourinary: negative  Musculoskeletal: LE weakness, walks in hallways daily with nursing staff assistance, min-mod assist with ADLs, and transfer to  which he uses  for all destinations.    Neurologic: SLUMS 18/30   CPT 4.5    Psychiatric: negative  Hematologic/Lymphatic/Immunologic: negative  Endocrine: negative      GENERAL APPEARANCE: alert and no distress  /70  Pulse 53  Temp 98.5  F (36.9  C)  Resp 16  SpO2 97%  HEENT: normocephalic, no lesion or abnormalities  NECK: no adenopathy, no asymmetry, masses, or scars and thyroid normal to palpation  RESP: decreased BS, fine rales in the periphery bilaterally  CV: regular rate and rhythm, normal S1 S2, 2/6SEM  ABDOMEN:  soft, nontender, no HSM or masses and bowel sounds normal  MS:  no evidence of inflammation in joints, FROM in all extremities.  1-2+ LE edema  SKIN: no suspicious lesions or rashes  NEURO: Normal strength and tone, sensory exam grossly normal, and speech normal  PSYCH: affect okay  LYMPHATICS: No cervical,  or supraclavicular nodes    LAB 2/1/2018  WBC 7.0   hgb 13.8   MCV 97  plts 240k  Na 137   K 3.4  BUN/Cr 31/1.5  GFR 44 glucose 101     Ca 9.2  TSH   Date Value Ref Range Status   04/02/2017 4.62 (H) 0.40 - 4.00 mU/L Final      ECHO 4/2017 Interpretation Summary  The visual ejection fraction is estimated at 55-60%.  Normal left ventricular wall motion  The right ventricle is normal in size and function.  The mean AoV pressure gradient is 19mmHg.  There is sclerosis, calcification, and restriction of the aortic valve opening  compatible with mild aortic stenosis.  The ascending aorta is Mildly dilated.  There is no pericardial effusion.    IMP/PLAN:   (M06.09) 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 (H)  Comment: with very limited mobility     Plan: patient follows with Rheumatologist, but do not have access to those notes.   He is back on MTX 10 mg/week.   Need to follow labs q3-4 months while he is on MTX; change folic acid to Monday-Thursday only.         (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)   Comment: and chronic cystic changes, dyspnea, hypoxia     Plan: improved symptoms,  follow up Pulmonology    Nebs prn.      (N18.3) CKD (chronic kidney disease) stage 3, GFR 30-59 ml/min  Comment: secondary to HTN     Plan: follow BMP, consider decrease in HCTZ dose.      (I10) Benign essential hypertension  Comment:   BP Readings from Last 3 Encounters:   01/25/18 139/70   06/20/17 140/79   05/23/17 140/80      Plan: remains on amlodipine, HCTZ and metoprolol    (N40.0) Benign prostatic hyperplasia, unspecified whether lower urinary tract symptoms present  Comment: longstanding  Plan: continue tamsulosin       (E03.9) Hypothyroidism, unspecified type  Comment: on replacement  Plan: yearly TSH, goal 3-5       (I48.91) Atrial fibrillation, unspecified type (H)  Comment: controlled VR on metoprolol  Plan: daily ASA.   Not otherwise anticoagulated secondary to number of falls he has had        Polypharmacy  Comment: patient requests shorter med list  Plan: d/c flaxseed oil, MVI and Fe (hgb 13.8)   He also takes gabapentin for an ill-defined peripheral neuropathy.       AD: DNR/DNI    Tiffani Vigil MD

## 2018-02-13 PROBLEM — G30.8 ALZHEIMER'S DISEASE OF OTHER ONSET: Status: RESOLVED | Noted: 2017-05-20 | Resolved: 2018-02-13

## 2018-02-13 PROBLEM — F02.80 ALZHEIMER'S DISEASE OF OTHER ONSET: Status: RESOLVED | Noted: 2017-05-20 | Resolved: 2018-02-13

## 2018-02-13 PROBLEM — E03.9 HYPOTHYROIDISM, UNSPECIFIED TYPE: Status: ACTIVE | Noted: 2018-02-13

## 2018-04-17 ENCOUNTER — ASSISTED LIVING VISIT (OUTPATIENT)
Dept: GERIATRICS | Facility: CLINIC | Age: 83
End: 2018-04-17
Payer: MEDICARE

## 2018-04-17 VITALS
DIASTOLIC BLOOD PRESSURE: 79 MMHG | OXYGEN SATURATION: 97 % | HEART RATE: 60 BPM | SYSTOLIC BLOOD PRESSURE: 140 MMHG | TEMPERATURE: 97.9 F | RESPIRATION RATE: 16 BRPM

## 2018-04-17 DIAGNOSIS — J67.9 PULMONARY ALVEOLITIS (H): ICD-10-CM

## 2018-04-17 DIAGNOSIS — I48.91 ATRIAL FIBRILLATION, UNSPECIFIED TYPE (H): ICD-10-CM

## 2018-04-17 DIAGNOSIS — M62.81 GENERALIZED MUSCLE WEAKNESS: ICD-10-CM

## 2018-04-17 DIAGNOSIS — N40.0 BENIGN PROSTATIC HYPERPLASIA, UNSPECIFIED WHETHER LOWER URINARY TRACT SYMPTOMS PRESENT: Primary | ICD-10-CM

## 2018-04-17 DIAGNOSIS — H61.23 BILATERAL IMPACTED CERUMEN: ICD-10-CM

## 2018-04-17 DIAGNOSIS — I10 BENIGN ESSENTIAL HYPERTENSION: ICD-10-CM

## 2018-04-17 DIAGNOSIS — M06.09 RHEUMATOID ARTHRITIS OF MULTIPLE SITES WITHOUT RHEUMATOID FACTOR (H): ICD-10-CM

## 2018-04-17 DIAGNOSIS — R41.89 COGNITIVE DECLINE: ICD-10-CM

## 2018-04-17 DIAGNOSIS — R53.81 DECLINING FUNCTIONAL STATUS: ICD-10-CM

## 2018-04-17 DIAGNOSIS — E03.9 HYPOTHYROIDISM, UNSPECIFIED TYPE: ICD-10-CM

## 2018-04-17 PROCEDURE — 69210 REMOVE IMPACTED EAR WAX UNI: CPT | Performed by: NURSE PRACTITIONER

## 2018-04-17 NOTE — LETTER
4/17/2018        RE: Jacob Easton  [id number]    [facility] [address]        [place] [facility]  Chief Complaint   Patient presents with     Annual Comprehensive Exam Assisted Living       HPI:    Jacob Easton is a 88 year old  (5/16/1929), who is being seen today for an annual comprehensive visit at [facility].  HPI information obtained from: facility chart records.  Today's concerns are:    Benign prostatic hyperplasia, unspecified whether lower urinary tract symptoms present  Denies s/s today.     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 presence (H)  Unclear if he will continue to follow with Rheumatology. Denies pain or swelling in hands today.    Atrial fibrillation, unspecified type (H)  Benign essential hypertension  Not on coumadin related to falls. Denies chest pain or shortness of breath. BP average is 132/74 HR 78.     Hypothyroidism, unspecified type  Continues on replacement.    Cognitive decline  Repetitive in statement and forgetful. Pills are set up in med strip and laying on bed. Additional medications in dresser draw.      Bilateral impacted cerumen  He is self treating today with drops and ear wax removal kit. In looking down both ear canals they are impacted with wax L>R.      Based on JNC-8 goals,  patients age of 88 year old, presence of diabetes or CKD, and goals of care goal BP is  <140/90 mm Hg. Patient is stable with current plan of care and routine assessment..      ALLERGIES: Advil [ibuprofen]; Influenza virus vaccine h5n1; and Orange juice [orange oil]  PROBLEM LIST:  Patient Active Problem List   Diagnosis     Pain in joint, pelvic region and thigh     Hypokalemia due to hydrochlorothiazide     BPH (benign prostatic hyperplasia)     Physical deconditioning     Neuropathic pain of both feet     Pulmonary vascular congestion     Benign essential hypertension     Acquired hypothyroidism     Acute kidney failure (H)     Rheumatoid  arthritis involving both hands (H)     Acute renal failure (ARF) (H)     Hypercalcemia     Dysphagia     Urinary tract infection without hematuria, site unspecified     Recurrent cold sores     TIA (transient ischemic attack)     Right wrist drop     Pulmonary alveolitis (H)     Atrial fibrillation, unspecified type (H)     CKD (chronic kidney disease) stage 3, GFR 30-59 ml/min     Advance Care Planning     Hypothyroidism, unspecified type     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 (H)     PAST MEDICAL HISTORY:  has a past medical history of Arthritis; Constipation; Hypertension; Hypocalcemia; Neuromuscular disorder (H); and Thyroid disease.  PAST SURGICAL HISTORY:  has a past surgical history that includes TOTAL HIP ARTHROPLASTY (9/24/11).  FAMILY HISTORY: family history is not on file.  SOCIAL HISTORY:  reports that he has never smoked. He has never used smokeless tobacco. He reports that he does not drink alcohol.  IMMUNIZATIONS:  There is no immunization history for the selected administration types on file for this patient.   Above immunizations pulled from [employer]. [facility] and facility records also reconciled. Outstanding information sent to  to update [employer].  Future immunizations: unclear. Will contact prior PCP. Not in Care Everywhere.  MEDICATIONS:  Current Outpatient Prescriptions   Medication Sig Dispense Refill     traMADol-acetaminophen (ULTRACET) 37.5-325 MG per tablet Take 1 tablet by mouth every 6 hours as needed for severe pain       AMLODIPINE BESYLATE PO Take 10 mg by mouth daily       Multiple Vitamins-Minerals (EYE VITAMINS & MINERALS) TABS Take 1 tablet by mouth daily       HYDROCHLOROTHIAZIDE PO Take 50 mg by mouth daily       METHOTREXATE PO Take 10 mg by mouth once a week on Fridays       MELATONIN PO Take 5 mg by mouth At Bedtime        aspirin EC 81 MG EC tablet Take 1 tablet (81 mg) by mouth daily 31 tablet PRN     gabapentin  (NEURONTIN) 100 MG capsule Take 1 capsule (100 mg) by mouth daily 31 capsule PRN     levothyroxine (SYNTHROID/LEVOTHROID) 50 MCG tablet Take 1 tablet (50 mcg) by mouth daily 31 tablet PRN     metoprolol (LOPRESSOR) 25 MG tablet Take 1 tablet (25 mg) by mouth 2 times daily 62 tablet PRN     potassium chloride SA (K-DUR/KLOR-CON M) 20 MEQ CR tablet Take 1 tablet (20 mEq) by mouth daily 31 tablet PRN     tamsulosin (FLOMAX) 0.4 MG capsule Take 1 capsule (0.4 mg) by mouth daily 31 capsule PRN     albuterol (PROAIR HFA/PROVENTIL HFA/VENTOLIN HFA) 108 (90 BASE) MCG/ACT Inhaler Inhale 2 puffs into the lungs every 4 hours as needed for shortness of breath / dyspnea or wheezing       nystatin (MYCOSTATIN) 660929 UNIT/GM POWD Apply topically 2 times daily as needed        latanoprost (XALATAN) 0.005 % ophthalmic solution Place 1 drop into the right eye At Bedtime        acetaminophen (TYLENOL) 500 MG tablet Take 650 mg by mouth 2 times daily as needed for mild pain        senna-docusate (SENOKOT-S;PERICOLACE) 8.6-50 MG per tablet Take 1 tablet by mouth 2 times daily as needed for constipation       folic acid (FOLVITE) 1 MG tablet Take 1 mg by mouth four times a week        Medications reviewed:  Medications reconciled to facility chart and changes were made to reflect current medications as identified as above med list. Below are the changes that were made:   Medications stopped since last EPIC medication reconciliation:   There are no discontinued medications.    Medications started since last [facility] medication reconciliation:  Orders Placed This Encounter   Medications     traMADol-acetaminophen (ULTRACET) 37.5-325 MG per tablet     Sig: Take 1 tablet by mouth every 6 hours as needed for severe pain     Case Management:   I have reviewed the Assisted Living care plan, current immunizations and preventive care/cancer screening..Future cancer screening is not clinically indicated secondary to age/goals of care Patient's desire  to return to the community is present, but is not able due to care needs . Current Level of Care is appropriate.    Advance Directive Discussion:    I reviewed the current advanced directives as reflected in EPIC, the POLST and the facility chart, and verified the congruency of orders 4/17/18. I contacted the first party the resident and discussed the plan of Care.  I did review the advance directives with the resident as he continues to make his own needs for healthcare.    Team Discussion:  I communicated with the appropriate disciplines involved with the Plan of Care:   Nursing      Patient Goal:  Patient's goal is pain control and comfort.    Information reviewed:  Medications, vital signs, orders, and nursing notes.    ROS:  4 point ROS including Respiratory, CV, GI and , other than that noted in the HPI,  is negative    Exam:  /79  Pulse 60  Temp 97.9  F (36.6  C)  Resp 16  SpO2 97%  GENERAL APPEARANCE:  Alert, in no distress  ENT:  Mouth and posterior oropharynx normal, moist mucous membranes, [place], bilateral cerumen impaction   EYES:  EOM, conjunctivae, lids, pupils and irises normal  NECK:  No adenopathy,masses or thyromegaly  RESP:  respiratory effort and palpation of chest normal, lungs clear to auscultation   CV:  Palpation and auscultation of heart done, bradycardic and irregular rhythm, no murmur, rub, or gallop, peripheral edema 1+ in ankles and feet. Chang Hose on.  ABDOMEN:  normal bowel sounds, soft, nontender, no hepatosplenomegaly   M/S:   Gait and station abnormal using wheelchair currently, generalized weakness  SKIN:  Inspection of skin and subcutaneous tissue baseline  NEURO:   Examination of sensation by touch normal  PSYCH:  insight and judgement impaired, memory impaired        Lab/Diagnostic data: BMP and CBC from 2/1/18 not uploaded to Epic    Last Basic Metabolic Panel:  Recent Labs   Lab Test 06/07/17 05/03/17 05/01/17   NA  136   --   140   POTASSIUM  3.3*  3.9  4.0    CHLORIDE  99   --   106   MERCEDES  9.1   --   9.0   CO2  29   --   25   BUN  19   --   24   CR  1.23   --   1.24   GLC  111*   --   96       ASSESSMENT/PLAN  (N40.0) Benign prostatic hyperplasia, unspecified whether lower urinary tract symptoms present  (primary encounter diagnosis)  Comment: Chronic and stable  Plan:   -Flomax 0.4 mg po daily    (M06.09) 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 (H)  Comment: Chronic and stable  Plan:   -Methotrexate 10 mg once weekly  -Folic acid 1 mg 4 times weekly  -Follow labs every 3-4 months  -CBC w/differential, BMP, LFTs    (I48.91) Atrial fibrillation, unspecified type (H)  (I10) Benign essential hypertension  Comment: Chronic and Stable  Plan:   -Norvasc 10 mg po daily  -HCTZ 50 mg po daily (consider to reduce)  -ASA 81 mg po daily  -Metoprolol 25 mg po BID  -Potassium 20 mEq po daily  -VS with visits,weights monthly by facility  -Chang Hose on am and off HS    (E03.9) Hypothyroidism, unspecified type  Comment: Chronic and stable  Plan:   -levothyroxine 50 mcg po daily  -TSH    (R41.89) Cognitive decline  Comment: Ongoing  Plan:   -Monitor for added assistance with ADLs  -Concern for medication administration in med strip for him to administer    (H61.23) Bilateral impacted cerumen  Comment: Hx of and ongoing issue  Plan:  -Wax removed from left ear. Information given on ENT across from [place] per request. Will assist with wax removal of right ear but declined today.        Electronically signed by:  SUNDAY Lee CNP      Sincerely,        SUNDAY Lee CNP

## 2018-04-17 NOTE — PROGRESS NOTES
"[place] [facility]  Chief Complaint   Patient presents with     Annual Comprehensive Exam Assisted Living       HPI:    Jacob Easton is a 88 year old  (5/16/1929), who is being seen today for an annual comprehensive visit at [facility].  HPI information obtained from: facility chart records.  Today's concerns are:    Benign prostatic hyperplasia, unspecified whether lower urinary tract symptoms present  Denies s/s today.     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 presence (H)  Unclear if he will continue to follow with Rheumatology. Denies pain or swelling in hands today.    Atrial fibrillation, unspecified type (H)  Benign essential hypertension  Not on coumadin related to falls. Denies chest pain or shortness of breath. BP average is 132/74 HR 78.     Hypothyroidism, unspecified type  Continues on replacement.    Cognitive decline  Repetitive in statement and forgetful. Pills are set up in med strip and laying on bed. Additional medications in dresser draw.      Bilateral impacted cerumen  He is self treating today with drops and ear wax removal kit. In looking down both ear canals they are impacted with wax L>R.    Pulmonary Alveolitis (H)  Functional decline  Generalized Weakness  Hx of chronic and progressing bilateral infiltrates on CXR. Previously followed by pulmonology. Does become short of breath at times and does now require the head of bed to be elevated more than 30 degrees most of the time due to pulmonary alveolitis.  Overall functional decline and mostly ambulating in wheelchair. Having more difficulty with repositioning himself in bed and needing frequent changes in body position to alleviate shortness of breath with pulmonary alveolitis. His overall functional decline has lead to generalized weakness and he will now need a fully electric bed with variable height adjustments to assist with transfers from bed to wheelchair. He is 6'2\" and weights 203 lbs so will " need a bed extenders.    Based on JNC-8 goals,  patients age of 88 year old, presence of diabetes or CKD, and goals of care goal BP is  <140/90 mm Hg. Patient is stable with current plan of care and routine assessment..      ALLERGIES: Advil [ibuprofen]; Influenza virus vaccine h5n1; and Orange juice [orange oil]  PROBLEM LIST:  Patient Active Problem List   Diagnosis     Pain in joint, pelvic region and thigh     Hypokalemia due to hydrochlorothiazide     BPH (benign prostatic hyperplasia)     Physical deconditioning     Neuropathic pain of both feet     Pulmonary vascular congestion     Benign essential hypertension     Acquired hypothyroidism     Acute kidney failure (H)     Rheumatoid arthritis involving both hands (H)     Acute renal failure (ARF) (H)     Hypercalcemia     Dysphagia     Urinary tract infection without hematuria, site unspecified     Recurrent cold sores     TIA (transient ischemic attack)     Right wrist drop     Pulmonary alveolitis (H)     Atrial fibrillation, unspecified type (H)     CKD (chronic kidney disease) stage 3, GFR 30-59 ml/min     Advance Care Planning     Hypothyroidism, unspecified type     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 (H)     PAST MEDICAL HISTORY:  has a past medical history of Arthritis; Constipation; Hypertension; Hypocalcemia; Neuromuscular disorder (H); and Thyroid disease.  PAST SURGICAL HISTORY:  has a past surgical history that includes TOTAL HIP ARTHROPLASTY (9/24/11).  FAMILY HISTORY: family history is not on file.  SOCIAL HISTORY:  reports that he has never smoked. He has never used smokeless tobacco. He reports that he does not drink alcohol.  IMMUNIZATIONS:  There is no immunization history for the selected administration types on file for this patient.   Above immunizations pulled from [employer]. [facility]IC and facility records also reconciled. Outstanding information sent to  to update [employer].  Future  immunizations: unclear. Will contact prior PCP. Not in Care Everywhere.  MEDICATIONS:  Current Outpatient Prescriptions   Medication Sig Dispense Refill     traMADol-acetaminophen (ULTRACET) 37.5-325 MG per tablet Take 1 tablet by mouth every 6 hours as needed for severe pain       AMLODIPINE BESYLATE PO Take 10 mg by mouth daily       Multiple Vitamins-Minerals (EYE VITAMINS & MINERALS) TABS Take 1 tablet by mouth daily       HYDROCHLOROTHIAZIDE PO Take 50 mg by mouth daily       METHOTREXATE PO Take 10 mg by mouth once a week on Fridays       MELATONIN PO Take 5 mg by mouth At Bedtime        aspirin EC 81 MG EC tablet Take 1 tablet (81 mg) by mouth daily 31 tablet PRN     gabapentin (NEURONTIN) 100 MG capsule Take 1 capsule (100 mg) by mouth daily 31 capsule PRN     levothyroxine (SYNTHROID/LEVOTHROID) 50 MCG tablet Take 1 tablet (50 mcg) by mouth daily 31 tablet PRN     metoprolol (LOPRESSOR) 25 MG tablet Take 1 tablet (25 mg) by mouth 2 times daily 62 tablet PRN     potassium chloride SA (K-DUR/KLOR-CON M) 20 MEQ CR tablet Take 1 tablet (20 mEq) by mouth daily 31 tablet PRN     tamsulosin (FLOMAX) 0.4 MG capsule Take 1 capsule (0.4 mg) by mouth daily 31 capsule PRN     albuterol (PROAIR HFA/PROVENTIL HFA/VENTOLIN HFA) 108 (90 BASE) MCG/ACT Inhaler Inhale 2 puffs into the lungs every 4 hours as needed for shortness of breath / dyspnea or wheezing       nystatin (MYCOSTATIN) 675654 UNIT/GM POWD Apply topically 2 times daily as needed        latanoprost (XALATAN) 0.005 % ophthalmic solution Place 1 drop into the right eye At Bedtime        acetaminophen (TYLENOL) 500 MG tablet Take 650 mg by mouth 2 times daily as needed for mild pain        senna-docusate (SENOKOT-S;PERICOLACE) 8.6-50 MG per tablet Take 1 tablet by mouth 2 times daily as needed for constipation       folic acid (FOLVITE) 1 MG tablet Take 1 mg by mouth four times a week        Medications reviewed:  Medications reconciled to facility chart and  changes were made to reflect current medications as identified as above med list. Below are the changes that were made:   Medications stopped since last EPIC medication reconciliation:   There are no discontinued medications.    Medications started since last [facility] medication reconciliation:  Orders Placed This Encounter   Medications     traMADol-acetaminophen (ULTRACET) 37.5-325 MG per tablet     Sig: Take 1 tablet by mouth every 6 hours as needed for severe pain     Case Management:   I have reviewed the Assisted Living care plan, current immunizations and preventive care/cancer screening..Future cancer screening is not clinically indicated secondary to age/goals of care Patient's desire to return to the community is present, but is not able due to care needs . Current Level of Care is appropriate.    Advance Directive Discussion:    I reviewed the current advanced directives as reflected in EPIC, the POLST and the facility chart, and verified the congruency of orders 4/17/18. I contacted the first party the resident and discussed the plan of Care.  I did review the advance directives with the resident as he continues to make his own needs for healthcare.    Team Discussion:  I communicated with the appropriate disciplines involved with the Plan of Care:   Nursing      Patient Goal:  Patient's goal is pain control and comfort.    Information reviewed:  Medications, vital signs, orders, and nursing notes.    ROS:  4 point ROS including Respiratory, CV, GI and , other than that noted in the HPI,  is negative    Exam:  /79  Pulse 60  Temp 97.9  F (36.6  C)  Resp 16  SpO2 97%  GENERAL APPEARANCE:  Alert, in no distress  ENT:  Mouth and posterior oropharynx normal, moist mucous membranes, [place], bilateral cerumen impaction   EYES:  EOM, conjunctivae, lids, pupils and irises normal  NECK:  No adenopathy,masses or thyromegaly  RESP:  respiratory effort and palpation of chest normal, lungs clear to auscultation    CV:  Palpation and auscultation of heart done, bradycardic and irregular rhythm, no murmur, rub, or gallop, peripheral edema 1+ in ankles and feet. Chang De Oliveirae on.  ABDOMEN:  normal bowel sounds, soft, nontender, no hepatosplenomegaly   M/S:   Gait and station abnormal using wheelchair currently, generalized weakness  SKIN:  Inspection of skin and subcutaneous tissue baseline  NEURO:   Examination of sensation by touch normal  PSYCH:  insight and judgement impaired, memory impaired        Lab/Diagnostic data: BMP and CBC from 2/1/18 not uploaded to Epic    Last Basic Metabolic Panel:  Recent Labs   Lab Test 06/07/17 05/03/17 05/01/17   NA  136   --   140   POTASSIUM  3.3*  3.9  4.0   CHLORIDE  99   --   106   MERCEDES  9.1   --   9.0   CO2  29   --   25   BUN  19   --   24   CR  1.23   --   1.24   GLC  111*   --   96       ASSESSMENT/PLAN  (N40.0) Benign prostatic hyperplasia, unspecified whether lower urinary tract symptoms present  (primary encounter diagnosis)  Comment: Chronic and stable  Plan:   -Flomax 0.4 mg po daily    (M06.09) 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 (H)  Comment: Chronic and stable  Plan:   -Methotrexate 10 mg once weekly  -Folic acid 1 mg 4 times weekly  -Follow labs every 3-4 months  -CBC w/differential, BMP, LFTs    (I48.91) Atrial fibrillation, unspecified type (H)  (I10) Benign essential hypertension  Comment: Chronic and Stable  Plan:   -Norvasc 10 mg po daily  -HCTZ 50 mg po daily (consider to reduce)  -ASA 81 mg po daily  -Metoprolol 25 mg po BID  -Potassium 20 mEq po daily  -VS with visits,weights monthly by facility  -Chang De Oliveirae on am and off HS    (E03.9) Hypothyroidism, unspecified type  Comment: Chronic and stable  Plan:   -levothyroxine 50 mcg po daily  -TSH    (R41.89) Cognitive decline  Comment: Ongoing  Plan:   -Monitor for added assistance with ADLs  -Concern for medication administration in med strip for him to administer    (H61.23) Bilateral impacted  amarilys  Comment: Hx of and ongoing issue  Plan:  -Wax removed from left ear. Use of otoscope and lighted curette for procedure. Information given on ENT across from [place] per request. Will assist with wax removal of right ear but declined today.    (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)  (R53.81) Declining functional status  (M62.81) Generalized muscle weakness  Comment: Progressing  Plan:  -DME order for fully electric bed with extenders related to height  -Ongoing assessment for increased assistance with ADLs        Electronically signed by:  SUNDAY Lee CNP

## 2018-04-19 ENCOUNTER — TRANSFERRED RECORDS (OUTPATIENT)
Dept: HEALTH INFORMATION MANAGEMENT | Facility: CLINIC | Age: 83
End: 2018-04-19

## 2018-04-19 LAB
ALBUMIN SERPL-MCNC: 3.4 G/DL (ref 3.4–5)
ALP SERPL-CCNC: 78 U/L (ref 40–150)
ALT SERPL-CCNC: 23 U/L (ref 0–70)
ANION GAP SERPL CALCULATED.3IONS-SCNC: 6 MMOL/L (ref 3–14)
AST SERPL-CCNC: 19 U/L (ref 0–45)
BILIRUB SERPL-MCNC: 0.6 MG/DL (ref 0.2–1.3)
BILIRUBIN DIRECT: 0.2 MG/DL (ref 0–0.2)
BUN SERPL-MCNC: 28 MG/DL (ref 7–30)
CALCIUM SERPL-MCNC: 8.9 MG/DL (ref 8.5–10.1)
CHLORIDE SERPLBLD-SCNC: 99 MMOL/L (ref 94–109)
CO2 SERPL-SCNC: 32 MMOL/L (ref 20–32)
CREAT SERPL-MCNC: 1.45 MG/DL (ref 0.66–1.25)
DIFFERENTIAL: ABNORMAL
ERYTHROCYTE [DISTWIDTH] IN BLOOD BY AUTOMATED COUNT: 14.4 % (ref 10–15)
GFR SERPL CREATININE-BSD FRML MDRD: 46 ML/MIN/1.73M2
GLUCOSE SERPL-MCNC: 93 MG/DL (ref 70–99)
HCT VFR BLD AUTO: 40.4 % (ref 40–53)
HEMOGLOBIN: 13 G/DL (ref 13.3–17.7)
MCH RBC QN AUTO: 31.3 PG (ref 26.5–33)
MCHC RBC AUTO-ENTMCNC: 32.2 G/DL (ref 31.5–36.5)
MCV RBC AUTO: 97 FL (ref 78–100)
PLATELET # BLD AUTO: 226 10E9/L (ref 150–450)
POTASSIUM SERPL-SCNC: 3.3 MMOL/L (ref 3.4–5.3)
PROT SERPL-MCNC: 7.9 G/DL (ref 6.8–8.8)
RBC # BLD AUTO: 4.16 10E12/L (ref 4.4–5.9)
SODIUM SERPL-SCNC: 137 MMOL/L (ref 133–144)
TSH SERPL-ACNC: 5.05 MU/L (ref 0.4–4)
WBC # BLD AUTO: 7 10E9/L (ref 4–11)

## 2018-05-01 ENCOUNTER — DOCUMENTATION ONLY (OUTPATIENT)
Dept: OTHER | Facility: CLINIC | Age: 83
End: 2018-05-01

## 2018-06-12 ENCOUNTER — ASSISTED LIVING VISIT (OUTPATIENT)
Dept: GERIATRICS | Facility: CLINIC | Age: 83
End: 2018-06-12
Payer: MEDICARE

## 2018-06-12 VITALS
TEMPERATURE: 98.4 F | DIASTOLIC BLOOD PRESSURE: 78 MMHG | RESPIRATION RATE: 16 BRPM | OXYGEN SATURATION: 97 % | SYSTOLIC BLOOD PRESSURE: 138 MMHG | HEART RATE: 58 BPM

## 2018-06-12 DIAGNOSIS — I48.91 ATRIAL FIBRILLATION, UNSPECIFIED TYPE (H): ICD-10-CM

## 2018-06-12 DIAGNOSIS — E03.9 ACQUIRED HYPOTHYROIDISM: ICD-10-CM

## 2018-06-12 DIAGNOSIS — J67.9 PULMONARY ALVEOLITIS (H): ICD-10-CM

## 2018-06-12 DIAGNOSIS — M06.9 RHEUMATOID ARTHRITIS INVOLVING BOTH HANDS, UNSPECIFIED RHEUMATOID FACTOR PRESENCE: Primary | ICD-10-CM

## 2018-06-12 DIAGNOSIS — I10 BENIGN ESSENTIAL HYPERTENSION: ICD-10-CM

## 2018-06-12 DIAGNOSIS — R53.81 PHYSICAL DECONDITIONING: ICD-10-CM

## 2018-06-12 RX ORDER — POTASSIUM CHLORIDE 750 MG/1
30 CAPSULE, EXTENDED RELEASE ORAL DAILY
COMMUNITY
End: 2018-08-27

## 2018-06-12 NOTE — LETTER
6/12/2018        RE: Jacob Easton  [address]  [address]  [facility] [address]        [place] [facility]    Chief Complaint   Patient presents with     RECHECK       [facility] Medical Record Number:  [id number]    HPI:    Jacob Easton is a 89 year old  (5/16/1929), who is being seen today for an episodic care visit at [facility].  HPI information obtained from: facility chart records.Today's concern is:    Rheumatoid arthritis involving both hands, unspecified rheumatoid factor presence (H)  Denies pain or swelling in hands today. Wear gloves to help with wheelchair ambulation which were removed for exam. Good hand/ strength.     Acquired hypothyroidism  On replacement therapy.    Atrial fibrillation, unspecified type (H)  Benign essential hypertension  Not on anticoagulation related to falls risk. Denies chest pain or shortness of breath. Baseline lower extremity edema in legs, ankles and feet. Chang Hose on. Need updated weight and by appearance he does look to be gaining.    BP Readings from Last 3 Encounters:   06/12/18 138/78   04/17/18 140/79   01/25/18 139/70     Pulse Readings from Last 4 Encounters:   06/12/18 58   04/17/18 60   01/25/18 53   06/20/17 77       Physical deconditioning  Reports to walking daily. Not witnessed today. 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 Previously followed by pulmonology. Does become short of breath at times and does now require the head of bed to be elevated more than 30 degrees most of the time due to pulmonary alveolitis.       ALLERGIES: Advil [ibuprofen]; Influenza virus vaccine h5n1; and Orange juice [orange oil]  Past Medical, Surgical, Family and Social History reviewed and updated in [facility].    Current Outpatient Prescriptions   Medication Sig Dispense Refill     acetaminophen (TYLENOL) 500 MG tablet Take 650 mg by mouth 2 times daily as  needed for mild pain        albuterol (PROAIR HFA/PROVENTIL HFA/VENTOLIN HFA) 108 (90 BASE) MCG/ACT Inhaler Inhale 2 puffs into the lungs every 4 hours as needed for shortness of breath / dyspnea or wheezing       AMLODIPINE BESYLATE PO Take 10 mg by mouth daily       aspirin EC 81 MG EC tablet Take 1 tablet (81 mg) by mouth daily 31 tablet PRN     folic acid (FOLVITE) 1 MG tablet Take 1 mg by mouth four times a week        gabapentin (NEURONTIN) 100 MG capsule Take 1 capsule (100 mg) by mouth daily 31 capsule PRN     HYDROCHLOROTHIAZIDE PO Take 50 mg by mouth daily       latanoprost (XALATAN) 0.005 % ophthalmic solution Place 1 drop into the right eye At Bedtime        levothyroxine (SYNTHROID/LEVOTHROID) 50 MCG tablet Take 1 tablet (50 mcg) by mouth daily 31 tablet PRN     MELATONIN PO Take 5 mg by mouth At Bedtime        METHOTREXATE PO Take 10 mg by mouth once a week on Fridays       metoprolol (LOPRESSOR) 25 MG tablet Take 1 tablet (25 mg) by mouth 2 times daily 62 tablet PRN     Multiple Vitamins-Minerals (EYE VITAMINS & MINERALS) TABS Take 1 tablet by mouth daily       nystatin (MYCOSTATIN) 739388 UNIT/GM POWD Apply topically 2 times daily as needed        potassium chloride (MICRO-K) 10 MEQ CR capsule Take 30 mEq by mouth daily       senna-docusate (SENOKOT-S;PERICOLACE) 8.6-50 MG per tablet Take 1 tablet by mouth 2 times daily as needed for constipation       tamsulosin (FLOMAX) 0.4 MG capsule Take 1 capsule (0.4 mg) by mouth daily 31 capsule PRN     Patient Active Problem List   Diagnosis     Pain in joint, pelvic region and thigh     Hypokalemia due to hydrochlorothiazide     BPH (benign prostatic hyperplasia)     Physical deconditioning     Neuropathic pain of both feet     Pulmonary vascular congestion     Benign essential hypertension     Acquired hypothyroidism     Acute kidney failure (H)     Rheumatoid arthritis involving both hands (H)     Acute renal failure (ARF) (H)     Hypercalcemia      Dysphagia     Urinary tract infection without hematuria, site unspecified     Recurrent cold sores     TIA (transient ischemic attack)     Right wrist drop     Pulmonary alveolitis (H)     Atrial fibrillation, unspecified type (H)     CKD (chronic kidney disease) stage 3, GFR 30-59 ml/min     Advance Care Planning     Hypothyroidism, unspecified type     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 (H)       Medications reviewed:  Medications reconciled to facility chart and changes were made to reflect current medications as identified as above med list. Below are the changes that were made:   Medications stopped since last EPIC medication reconciliation:   Medications Discontinued During This Encounter   Medication Reason     potassium chloride SA (K-DUR/KLOR-CON M) 20 MEQ CR tablet Discontinued by another Health Care Provider     traMADol-acetaminophen (ULTRACET) 37.5-325 MG per tablet Discontinued by another Health Care Provider       Medications started since last [facility] medication reconciliation:  Orders Placed This Encounter   Medications     potassium chloride (MICRO-K) 10 MEQ CR capsule     Sig: Take 30 mEq by mouth daily       REVIEW OF SYSTEMS:  4 point ROS including Respiratory, CV, GI and , other than that noted in the HPI,  is negative    Physical Exam:  /78  Pulse 58  Temp 98.4  F (36.9  C)  Resp 16  SpO2 97%  GENERAL APPEARANCE:  Alert, in no distress  ENT:  Mouth and posterior oropharynx normal, moist mucous membranes, [place], bilateral cerumen impaction ongoing  EYES:  EOM, conjunctivae, lids, pupils and irises normal-glasses  NECK:  No adenopathy,masses or thyromegaly  RESP:  respiratory effort and palpation of chest normal, lungs clear to auscultation   CV:  Palpation and auscultation of heart done, bradycardic and irregular rhythm, no murmur, rub, or gallop, peripheral edema 1+ in ankles and feet. Chang Hose on.  ABDOMEN:  normal bowel sounds, soft, nontender, no  hepatosplenomegaly   M/S:   Gait and station abnormal using wheelchair currently, generalized weakness  SKIN:  Inspection of skin and subcutaneous tissue baseline  NEURO:   Examination of sensation by touch normal  PSYCH:  insight and judgement impaired, memory impaired     Recent Labs:     CBC RESULTS:   Recent Labs   Lab Test 04/19/18 04/27/17   WBC  7.0  5.6   RBC  4.16*  4.13*   HGB  13.0*  12.7*   HCT  40.4  41.6   MCV  97  101*   MCH  31.3  30.8   MCHC  32.2  30.5*   RDW  14.4  14.5   PLT  226  262       Last Basic Metabolic Panel:  Recent Labs   Lab Test 04/19/18 06/07/17   NA  137  136   POTASSIUM  3.3*  3.3*   CHLORIDE  99  99   MERCEDES  8.9  9.1   CO2  32  29   BUN  28  19   CR  1.45*  1.23   GLC  93  111*       Liver Function Studies -   Recent Labs   Lab Test 04/19/18 04/03/17   0651   PROTTOTAL  7.9  6.6*   ALBUMIN  3.4  2.6*   BILITOTAL  0.6  0.8   ALKPHOS  78  53   AST  19  37   ALT  23  23       TSH   Date Value Ref Range Status   04/19/2018 5.05 (H) 0.40 - 4.00 mU/L Final   04/02/2017 4.62 (H) 0.40 - 4.00 mU/L Final     Assessment/Plan:  (M06.9) Rheumatoid arthritis involving both hands, unspecified rheumatoid factor presence (H)  (primary encounter diagnosis)  Comment: Ongoing and back on MTX  Plan:   -Methotrexate 10 mg po once weekly  -Folic acid 1 mg po 4 times weekly  -Follow labs every 3-4 months (CBC w/ diff, BMP LFT)    (E03.9) Acquired hypothyroidism  Comment: Ongoing slightly out high with goal of 3-5  Plan:   -Obtain TSH with next set of labs  -levothyroxine 50 mcg po daily    (I48.91) Atrial fibrillation, unspecified type (H)  (I10) Benign essential hypertension  Comment: Chronic  Plan:   -Norvasc 10 mg po daily  -HCTZ 50 mg po daily (consider to reduce)  -ASA 81 mg po daily  -Metoprolol 25 mg po BID  -Potassium 30 mEq po daily  -VS with visits,weights monthly by facility  -Chang Hose on am and off HS    (R53.81) Physical deconditioning  Comment: Ongoing  Plan:   -Walks daily with  staff  -Continue exercise class twice weekly    (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)  Comment: Hx of   Plan:   -Now with fully electric bed and improvement with sleep and bed mobility        Electronically signed by  SUNDAY Lee CNP                      Sincerely,        SUNDAY Lee CNP

## 2018-06-12 NOTE — PROGRESS NOTES
[place] [facility]    Chief Complaint   Patient presents with     ADDIE       [place] Medical Record Number:  [id number]    HPI:    Jacob Easton is a 89 year old  (5/16/1929), who is being seen today for an episodic care visit at [facility].  HPI information obtained from: facility chart records.Today's concern is:    Rheumatoid arthritis involving both hands, unspecified rheumatoid factor presence (H)  Denies pain or swelling in hands today. Wear gloves to help with wheelchair ambulation which were removed for exam. Good hand/ strength.     Acquired hypothyroidism  On replacement therapy.    Atrial fibrillation, unspecified type (H)  Benign essential hypertension  Not on anticoagulation related to falls risk. Denies chest pain or shortness of breath. Baseline lower extremity edema in legs, ankles and feet. Chang Hose on. Need updated weight and by appearance he does look to be gaining.    BP Readings from Last 3 Encounters:   06/12/18 138/78   04/17/18 140/79   01/25/18 139/70     Pulse Readings from Last 4 Encounters:   06/12/18 58   04/17/18 60   01/25/18 53   06/20/17 77       Physical deconditioning  Reports to walking daily. Not witnessed today. 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 Previously followed by pulmonology. Does become short of breath at times and does now require the head of bed to be elevated more than 30 degrees most of the time due to pulmonary alveolitis.       ALLERGIES: Advil [ibuprofen]; Influenza virus vaccine h5n1; and Orange juice [orange oil]  Past Medical, Surgical, Family and Social History reviewed and updated in [facility].    Current Outpatient Prescriptions   Medication Sig Dispense Refill     acetaminophen (TYLENOL) 500 MG tablet Take 650 mg by mouth 2 times daily as needed for mild pain        albuterol (PROAIR HFA/PROVENTIL HFA/VENTOLIN HFA) 108 (90 BASE) MCG/ACT  Inhaler Inhale 2 puffs into the lungs every 4 hours as needed for shortness of breath / dyspnea or wheezing       AMLODIPINE BESYLATE PO Take 10 mg by mouth daily       aspirin EC 81 MG EC tablet Take 1 tablet (81 mg) by mouth daily 31 tablet PRN     folic acid (FOLVITE) 1 MG tablet Take 1 mg by mouth four times a week        gabapentin (NEURONTIN) 100 MG capsule Take 1 capsule (100 mg) by mouth daily 31 capsule PRN     HYDROCHLOROTHIAZIDE PO Take 50 mg by mouth daily       latanoprost (XALATAN) 0.005 % ophthalmic solution Place 1 drop into the right eye At Bedtime        levothyroxine (SYNTHROID/LEVOTHROID) 50 MCG tablet Take 1 tablet (50 mcg) by mouth daily 31 tablet PRN     MELATONIN PO Take 5 mg by mouth At Bedtime        METHOTREXATE PO Take 10 mg by mouth once a week on Fridays       metoprolol (LOPRESSOR) 25 MG tablet Take 1 tablet (25 mg) by mouth 2 times daily 62 tablet PRN     Multiple Vitamins-Minerals (EYE VITAMINS & MINERALS) TABS Take 1 tablet by mouth daily       nystatin (MYCOSTATIN) 283172 UNIT/GM POWD Apply topically 2 times daily as needed        potassium chloride (MICRO-K) 10 MEQ CR capsule Take 30 mEq by mouth daily       senna-docusate (SENOKOT-S;PERICOLACE) 8.6-50 MG per tablet Take 1 tablet by mouth 2 times daily as needed for constipation       tamsulosin (FLOMAX) 0.4 MG capsule Take 1 capsule (0.4 mg) by mouth daily 31 capsule PRN     Patient Active Problem List   Diagnosis     Pain in joint, pelvic region and thigh     Hypokalemia due to hydrochlorothiazide     BPH (benign prostatic hyperplasia)     Physical deconditioning     Neuropathic pain of both feet     Pulmonary vascular congestion     Benign essential hypertension     Acquired hypothyroidism     Acute kidney failure (H)     Rheumatoid arthritis involving both hands (H)     Acute renal failure (ARF) (H)     Hypercalcemia     Dysphagia     Urinary tract infection without hematuria, site unspecified     Recurrent cold sores     TIA  (transient ischemic attack)     Right wrist drop     Pulmonary alveolitis (H)     Atrial fibrillation, unspecified type (H)     CKD (chronic kidney disease) stage 3, GFR 30-59 ml/min     Advance Care Planning     Hypothyroidism, unspecified type     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 (H)       Medications reviewed:  Medications reconciled to facility chart and changes were made to reflect current medications as identified as above med list. Below are the changes that were made:   Medications stopped since last EPIC medication reconciliation:   Medications Discontinued During This Encounter   Medication Reason     potassium chloride SA (K-DUR/KLOR-CON M) 20 MEQ CR tablet Discontinued by another Health Care Provider     traMADol-acetaminophen (ULTRACET) 37.5-325 MG per tablet Discontinued by another Health Care Provider       Medications started since last [facility] medication reconciliation:  Orders Placed This Encounter   Medications     potassium chloride (MICRO-K) 10 MEQ CR capsule     Sig: Take 30 mEq by mouth daily       REVIEW OF SYSTEMS:  4 point ROS including Respiratory, CV, GI and , other than that noted in the HPI,  is negative    Physical Exam:  /78  Pulse 58  Temp 98.4  F (36.9  C)  Resp 16  SpO2 97%  GENERAL APPEARANCE:  Alert, in no distress  ENT:  Mouth and posterior oropharynx normal, moist mucous membranes, [place], bilateral cerumen impaction ongoing  EYES:  EOM, conjunctivae, lids, pupils and irises normal-glasses  NECK:  No adenopathy,masses or thyromegaly  RESP:  respiratory effort and palpation of chest normal, lungs clear to auscultation   CV:  Palpation and auscultation of heart done, bradycardic and irregular rhythm, no murmur, rub, or gallop, peripheral edema 1+ in ankles and feet. Chang Hose on.  ABDOMEN:  normal bowel sounds, soft, nontender, no hepatosplenomegaly   M/S:   Gait and station abnormal using wheelchair currently, generalized weakness  SKIN:   Inspection of skin and subcutaneous tissue baseline  NEURO:   Examination of sensation by touch normal  PSYCH:  insight and judgement impaired, memory impaired     Recent Labs:     CBC RESULTS:   Recent Labs   Lab Test 04/19/18 04/27/17   WBC  7.0  5.6   RBC  4.16*  4.13*   HGB  13.0*  12.7*   HCT  40.4  41.6   MCV  97  101*   MCH  31.3  30.8   MCHC  32.2  30.5*   RDW  14.4  14.5   PLT  226  262       Last Basic Metabolic Panel:  Recent Labs   Lab Test 04/19/18 06/07/17   NA  137  136   POTASSIUM  3.3*  3.3*   CHLORIDE  99  99   MERCEDES  8.9  9.1   CO2  32  29   BUN  28  19   CR  1.45*  1.23   GLC  93  111*       Liver Function Studies -   Recent Labs   Lab Test 04/19/18 04/03/17   0651   PROTTOTAL  7.9  6.6*   ALBUMIN  3.4  2.6*   BILITOTAL  0.6  0.8   ALKPHOS  78  53   AST  19  37   ALT  23  23       TSH   Date Value Ref Range Status   04/19/2018 5.05 (H) 0.40 - 4.00 mU/L Final   04/02/2017 4.62 (H) 0.40 - 4.00 mU/L Final     Assessment/Plan:  (M06.9) Rheumatoid arthritis involving both hands, unspecified rheumatoid factor presence (H)  (primary encounter diagnosis)  Comment: Ongoing and back on MTX  Plan:   -Methotrexate 10 mg po once weekly  -Folic acid 1 mg po 4 times weekly  -Follow labs every 3-4 months (CBC w/ diff, BMP LFT)    (E03.9) Acquired hypothyroidism  Comment: Ongoing slightly out high with goal of 3-5  Plan:   -Obtain TSH with next set of labs  -levothyroxine 50 mcg po daily    (I48.91) Atrial fibrillation, unspecified type (H)  (I10) Benign essential hypertension  Comment: Chronic  Plan:   -Norvasc 10 mg po daily  -HCTZ 50 mg po daily (consider to reduce)  -ASA 81 mg po daily  -Metoprolol 25 mg po BID  -Potassium 30 mEq po daily  -VS with visits,weights monthly by facility  -Chang Hose on am and off HS    (R53.81) Physical deconditioning  Comment: Ongoing  Plan:   -Walks daily with staff  -Continue exercise class twice weekly    (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)  Comment: Hx of   Plan:   -Now with  fully electric bed and improvement with sleep and bed mobility        Electronically signed by  SUNDAY Lee CNP

## 2018-06-25 DIAGNOSIS — Z78.9 TAKES DIETARY SUPPLEMENTS: ICD-10-CM

## 2018-08-28 ENCOUNTER — ASSISTED LIVING VISIT (OUTPATIENT)
Dept: GERIATRICS | Facility: CLINIC | Age: 83
End: 2018-08-28
Payer: MEDICARE

## 2018-08-28 DIAGNOSIS — N18.30 CKD (CHRONIC KIDNEY DISEASE) STAGE 3, GFR 30-59 ML/MIN (H): ICD-10-CM

## 2018-08-28 DIAGNOSIS — I10 BENIGN ESSENTIAL HYPERTENSION: ICD-10-CM

## 2018-08-28 DIAGNOSIS — M62.81 GENERALIZED MUSCLE WEAKNESS: Primary | ICD-10-CM

## 2018-08-28 DIAGNOSIS — E03.9 ACQUIRED HYPOTHYROIDISM: ICD-10-CM

## 2018-08-28 DIAGNOSIS — G57.93 NEUROPATHIC PAIN OF BOTH FEET: ICD-10-CM

## 2018-08-28 DIAGNOSIS — M06.09 RHEUMATOID ARTHRITIS OF MULTIPLE SITES WITHOUT RHEUMATOID FACTOR (H): ICD-10-CM

## 2018-08-28 NOTE — LETTER
"    8/28/2018        RE: Jacob Easton  [address]  [address]  [facility] [address]        Jacob Easton is a 89 year old male seen August 28, 2018 at [facility] where he has resided for one year (admit 5/2017) seen to follow up HTN and atrial fib   Patient is seen in his apartment, resting abed mid-afternoon.  He is discouraged because of trouble with his mobility  \"I keep sliding off this bed\"   Has new hospital bed to raise HOB for better breathing, but doesn't seem to be working for him.         He has longstanding RA, has been on MTX for 10 years.   It was stopped in last hospitalization because of respiratory changes with CT showing ground glass opacities c/w acute alveolitis and persistent cystic changes throughout both lungs.  MTX was restarted at some point, cannot find documentation related to that    Patient is no longer on supplemental O2, uses nebs just prn.       Patient had multiple hospitalizations and TCU stays over the past 3 years, with falls and weakness, unable to get up.    Finally became more than his wife could manage at home and he moved to [place] for permanent placement.      Past Medical History:   Diagnosis Date     Arthritis      Constipation      Hypertension      Hypocalcemia      Neuromuscular disorder (H)      Thyroid disease    Pulmonary alveolitis  CKD stage 3  RA  BPH  Atrial fib  LE weakness    SH:  Retired Muslim .   His wife Aneta lives in their house in [place].     Review Of Systems  :  LE weakness, walks in hallways daily with nursing staff assistance, min-mod assist with ADLs, and transfer to  which he uses for all destinations.     SLUMS 18/30   CPT 4.5      EXAM:  Lying abed, NAD  /78  Pulse 58  Temp 98.4  F (36.9  C)  Resp 16  SpO2 97%  Neck supple without adenopathy  Lungs clear bilaterally with fair air movement  Heart RRR s1s2, 2/6 ELENA  Abd soft, NT, no distention, +BS  Ext 1+ LE edema to knees    Neuro: generalized weakness  Psych: " affect flat.      Last Comprehensive Metabolic Panel:  Sodium   Date Value Ref Range Status   04/19/2018 137 133 - 144 mmol/L Final     Potassium   Date Value Ref Range Status   04/19/2018 3.3 (L) 3.4 - 5.3 mmol/L Final     Chloride   Date Value Ref Range Status   04/19/2018 99 94 - 109 mmol/L Final     Carbon Dioxide   Date Value Ref Range Status   04/19/2018 32 20 - 32 mmol/L Final     Anion Gap   Date Value Ref Range Status   04/19/2018 6 3 - 14 mmol/L Final     Glucose   Date Value Ref Range Status   04/19/2018 93 70 - 99 mg/dL Final     Urea Nitrogen   Date Value Ref Range Status   04/19/2018 28 7 - 30 mg/dL Final     Creatinine   Date Value Ref Range Status   04/19/2018 1.45 (H) 0.66 - 1.25 mg/dL Final     GFR Estimate   Date Value Ref Range Status   04/19/2018 46 (L) >60 ml/min/1.73m2 Final     Calcium   Date Value Ref Range Status   04/19/2018 8.9 8.5 - 10.1 mg/dL Final         IMP/PLAN:   (M62.81) Generalized muscle weakness  (primary encounter diagnosis)  Comment: falling from bed as above     Plan: [facility] PHYSICAL THERAPY / OCCUPATIONAL THERAPY for strengthening and mobility         (M06.09) 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 (H)  Comment: with very limited mobility     Plan: patient follows with Rheumatologist, but do not have access to those notes.   He is on MTX 10 mg/week.   Need to follow labs while he is on MTX; change folic acid to Monday-Thursday only.         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      (N18.3) CKD (chronic kidney disease) stage 3, GFR 30-59 ml/min  Comment: secondary to HTN    GFR 46   Plan: follow BMP, consider decrease in HCTZ dose if further decrease in renal function.      (I10) Benign essential  hypertension  Comment:   BP Readings from Last 3 Encounters:   06/12/18 138/78   04/17/18 140/79   01/25/18 139/70   Plan: remains on amlodipine, HCTZ and metoprolol    (N40.0) Benign prostatic hyperplasia, unspecified whether lower urinary tract symptoms present  Comment: longstanding  Plan: continue tamsulosin to avoid urinary retention      (E03.9) Hypothyroidism, unspecified type  Comment:   TSH   Date Value Ref Range Status   04/19/2018 5.05 (H) 0.40 - 4.00 mU/L Final    Plan: would continue same dose.        (I48.91) Atrial fibrillation, unspecified type (H)  Comment: controlled VR on metoprolol  HR 53-77     Plan: daily ASA.   Not otherwise anticoagulated secondary to number of falls he has had       AD: DNR/DNI    Tiffani Vigil MD         Sincerely,        Tiffani Vigil MD

## 2018-09-09 NOTE — PROGRESS NOTES
"Jacob Easton is a 89 year old male seen August 28, 2018 at [facility] where he has resided for one year (admit 5/2017) seen to follow up HTN and atrial fib   Patient is seen in his apartment, resting abed mid-afternoon.  He is discouraged because of trouble with his mobility  \"I keep sliding off this bed\"   Has new hospital bed to raise HOB for better breathing, but doesn't seem to be working for him.         He has longstanding RA, has been on MTX for 10 years.   It was stopped in last hospitalization because of respiratory changes with CT showing ground glass opacities c/w acute alveolitis and persistent cystic changes throughout both lungs.  MTX was restarted at some point, cannot find documentation related to that    Patient is no longer on supplemental O2, uses nebs just prn.       Patient had multiple hospitalizations and TCU stays over the past 3 years, with falls and weakness, unable to get up.    Finally became more than his wife could manage at home and [place] moved [place] placement.      Past Medical History:   Diagnosis Date     Arthritis      Constipation      Hypertension      Hypocalcemia      Neuromuscular disorder (H)      Thyroid disease    Pulmonary alveolitis  CKD stage 3  RA  BPH  Atrial fib  LE weakness    SH:  Retired [employer] .   His wife Aneta lives in their house in [place].     Review Of Systems  :  LE weakness, walks in hallways daily with nursing staff assistance, min-mod assist with ADLs, and transfer to  which he uses for all destinations.     [facility] 18/30   CPT 4.5      EXAM:  Lying abed, NAD  /78  Pulse 58  Temp 98.4  F (36.9  C)  Resp 16  SpO2 97%  Neck supple without adenopathy  Lungs clear bilaterally with fair air movement  Heart RRR s1s2, 2/6 ELENA  Abd soft, NT, no distention, +BS  Ext 1+ LE edema to knees    Neuro: generalized weakness  Psych: affect flat.      Last Comprehensive Metabolic Panel:  Sodium   Date Value Ref Range Status "   04/19/2018 137 133 - 144 mmol/L Final     Potassium   Date Value Ref Range Status   04/19/2018 3.3 (L) 3.4 - 5.3 mmol/L Final     Chloride   Date Value Ref Range Status   04/19/2018 99 94 - 109 mmol/L Final     Carbon Dioxide   Date Value Ref Range Status   04/19/2018 32 20 - 32 mmol/L Final     Anion Gap   Date Value Ref Range Status   04/19/2018 6 3 - 14 mmol/L Final     Glucose   Date Value Ref Range Status   04/19/2018 93 70 - 99 mg/dL Final     Urea Nitrogen   Date Value Ref Range Status   04/19/2018 28 7 - 30 mg/dL Final     Creatinine   Date Value Ref Range Status   04/19/2018 1.45 (H) 0.66 - 1.25 mg/dL Final     GFR Estimate   Date Value Ref Range Status   04/19/2018 46 (L) >60 ml/min/1.73m2 Final     Calcium   Date Value Ref Range Status   04/19/2018 8.9 8.5 - 10.1 mg/dL Final         IMP/PLAN:   (M62.81) Generalized muscle weakness  (primary encounter diagnosis)  Comment: falling from bed as above     Plan: [facility] PHYSICAL THERAPY / OCCUPATIONAL THERAPY for strengthening and mobility         (M06.09) 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 (H)  Comment: with very limited mobility     Plan: patient follows with Rheumatologist, but do not have access to those notes.   He is on MTX 10 mg/week.   Need to follow labs while he is on MTX; change folic acid to Monday-Thursday only.         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      (N18.3) CKD (chronic kidney disease) stage 3, GFR 30-59 ml/min  Comment: secondary to HTN    GFR 46   Plan: follow BMP, consider decrease in HCTZ dose if further decrease in renal function.      (I10) Benign essential hypertension  Comment:   BP Readings from Last 3 Encounters:   06/12/18 138/78   04/17/18 140/79    01/25/18 139/70   Plan: remains on amlodipine, HCTZ and metoprolol    (N40.0) Benign prostatic hyperplasia, unspecified whether lower urinary tract symptoms present  Comment: longstanding  Plan: continue tamsulosin to avoid urinary retention      (E03.9) Hypothyroidism, unspecified type  Comment:   TSH   Date Value Ref Range Status   04/19/2018 5.05 (H) 0.40 - 4.00 mU/L Final    Plan: would continue same dose.        (I48.91) Atrial fibrillation, unspecified type (H)  Comment: controlled VR on metoprolol  HR 53-77     Plan: daily ASA.   Not otherwise anticoagulated secondary to number of falls he has had       AD: DNR/DNI    Tiffani Vigil MD

## 2018-09-13 ENCOUNTER — TRANSFERRED RECORDS (OUTPATIENT)
Dept: HEALTH INFORMATION MANAGEMENT | Facility: CLINIC | Age: 83
End: 2018-09-13

## 2018-09-13 LAB
ALT SERPL-CCNC: 19 U/L (ref 0–70)
AST SERPL-CCNC: 20 U/L (ref 0–45)
CREAT SERPL-MCNC: 1.35 MG/DL (ref 0.66–1.25)
GFR SERPL CREATININE-BSD FRML MDRD: 50 ML/MIN/1.73M2
GLUCOSE SERPL-MCNC: 137 MG/DL (ref 70–99)
POTASSIUM SERPL-SCNC: 3.5 MMOL/L (ref 3.4–5.3)
TSH SERPL-ACNC: 3.45 MU/L (ref 0.4–4)

## 2018-09-14 ENCOUNTER — TRANSFERRED RECORDS (OUTPATIENT)
Dept: HEALTH INFORMATION MANAGEMENT | Facility: CLINIC | Age: 83
End: 2018-09-14

## 2018-09-18 ENCOUNTER — ASSISTED LIVING VISIT (OUTPATIENT)
Dept: GERIATRICS | Facility: CLINIC | Age: 83
End: 2018-09-18
Payer: MEDICARE

## 2018-09-18 VITALS
DIASTOLIC BLOOD PRESSURE: 70 MMHG | SYSTOLIC BLOOD PRESSURE: 120 MMHG | RESPIRATION RATE: 18 BRPM | OXYGEN SATURATION: 91 % | WEIGHT: 211 LBS | TEMPERATURE: 99.1 F | HEART RATE: 73 BPM | BODY MASS INDEX: 27.09 KG/M2

## 2018-09-18 DIAGNOSIS — R06.09 DYSPNEA ON EXERTION: Primary | ICD-10-CM

## 2018-09-18 NOTE — LETTER
9/18/2018        RE: Jacob Easton  [address]  [address]  [facility] [address]        [facility]    Chief Complaint   Patient presents with     MCFP Acute       [facility] Medical Record Number:  [id number]    HPI:    Jacob Easton is a 89 year old  (5/16/1929), who is being seen today for an episodic care visit at [facility] .  HPI information obtained from: facility chart records.Today's concern is:    Dyspnea on exertion  With lower oxygen sats, febrile, SOB above baseline, increased weakness and increase need of staff and equipment for transfers.CXR order 9/14 with impression showing interstitial densities in the lung fields may indicate chronic lung changes. Increased opacities in the costophrenic angles may represent mild atelectasis or pneumonia. A 5.3 cm bulla in the left lung base.  Negative for leucocytosis and BMP mostly within limits. He endorses SOB above baseline, reports he is drinking more fluids and continuing to eat all meals.He is dressed today and participating in regular activities.       ALLERGIES: Advil [ibuprofen]; Influenza virus vaccine h5n1; and Orange juice [orange oil]  Past Medical, Surgical, Family and Social History reviewed and updated in [employer].    Current Outpatient Prescriptions   Medication Sig Dispense Refill     acetaminophen (TYLENOL) 500 MG tablet Take 650 mg by mouth 2 times daily as needed for mild pain        albuterol (PROAIR HFA/PROVENTIL HFA/VENTOLIN HFA) 108 (90 BASE) MCG/ACT Inhaler Inhale 2 puffs into the lungs every 4 hours as needed for shortness of breath / dyspnea or wheezing       AMLODIPINE BESYLATE PO Take 10 mg by mouth daily       aspirin EC 81 MG EC tablet Take 1 tablet (81 mg) by mouth daily 31 tablet PRN     folic acid (FOLVITE) 1 MG tablet Take 1 mg by mouth four times a week        gabapentin (NEURONTIN) 100 MG capsule Take 1 capsule (100 mg) by mouth daily 31 capsule PRN      HYDROCHLOROTHIAZIDE PO Take 50 mg by mouth daily       latanoprost (XALATAN) 0.005 % ophthalmic solution Place 1 drop into the right eye At Bedtime        LEVOFLOXACIN PO Take 750 mg by mouth daily       levothyroxine (SYNTHROID/LEVOTHROID) 50 MCG tablet Take 1 tablet (50 mcg) by mouth daily 31 tablet PRN     MELATONIN PO Take 5 mg by mouth At Bedtime        METHOTREXATE PO Take 10 mg by mouth once a week on Fridays       metoprolol (LOPRESSOR) 25 MG tablet Take 1 tablet (25 mg) by mouth 2 times daily 62 tablet PRN     Multiple Vitamins-Minerals (CENTRUM SILVER ULTRA MENS) TABS Take 1 tablet by mouth daily       Multiple Vitamins-Minerals (EYE VITAMINS & MINERALS) TABS Take 1 tablet by mouth daily 60 tablet 11     nystatin (MYCOSTATIN) 354388 UNIT/GM POWD Apply topically 2 times daily as needed        potassium chloride SA (K-DUR/KLOR-CON M) 10 MEQ CR tablet TAKE THREE TABLETS (30 MEQ) BY MOUTH ONCE DAILY  180 tablet 11     senna-docusate (SENOKOT-S;PERICOLACE) 8.6-50 MG per tablet Take 1 tablet by mouth 2 times daily as needed for constipation       tamsulosin (FLOMAX) 0.4 MG capsule Take 1 capsule (0.4 mg) by mouth daily 31 capsule PRN     Patient Active Problem List   Diagnosis     Pain in joint, pelvic region and thigh     Hypokalemia due to hydrochlorothiazide     BPH (benign prostatic hyperplasia)     Physical deconditioning     Neuropathic pain of both feet     Pulmonary vascular congestion     Benign essential hypertension     Acquired hypothyroidism     Acute kidney failure (H)     Rheumatoid arthritis involving both hands (H)     Acute renal failure (ARF) (H)     Hypercalcemia     Dysphagia     Urinary tract infection without hematuria, site unspecified     Recurrent cold sores     TIA (transient ischemic attack)     Right wrist drop     Pulmonary alveolitis (H)     Atrial fibrillation, unspecified type (H)     CKD (chronic kidney disease) stage 3, GFR 30-59 ml/min     Advance Care Planning      Hypothyroidism, unspecified type     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 (H)       Medications reviewed:  Medications reconciled to facility chart and changes were made to reflect current medications as identified as above med list. Below are the changes that were made:   Medications stopped since last EPIC medication reconciliation:   There are no discontinued medications.    Medications started since last [facility] medication reconciliation:  Orders Placed This Encounter   Medications     LEVOFLOXACIN PO     Sig: Take 750 mg by mouth daily       REVIEW OF SYSTEMS:  4 point ROS including Respiratory, CV, GI and , other than that noted in the HPI,  is negative     Physical Exam:  /70  Pulse 73  Temp 99.1  F (37.3  C)  Resp 18  Wt 211 lb (95.7 kg)  SpO2 91%  BMI 27.09 kg/m2  GENERAL APPEARANCE:  Alert, in no distress  ENT:  Mouth and posterior oropharynx normal, moist mucous membranes, [place], bilateral cerumen impaction ongoing  EYES:  EOM, conjunctivae, lids, pupils and irises normal-glasses  RESP:  respiratory effort and palpation of chest normal, lungs diminished to auscultation, no expiratory wheezes   CV:  Palpation and auscultation of heart done, bradycardic and irregular rhythm, no murmur, rub, or gallop, peripheral edema 1+ in ankles and feet. [place] Hose on.  ABDOMEN:  normal bowel sounds, soft, nontender, no hepatosplenomegaly   M/S:   Gait and station abnormal using wheelchair currently, generalized weakness  SKIN:  Inspection of skin and subcutaneous tissue baseline- hypertrophic toenail great toe of left foot  NEURO:   Examination of sensation by touch normal  PSYCH:  insight and judgement impaired, memory impaired     Recent Labs: Labs of 9/13/18 not uploaded for this note    CBC RESULTS:   Recent Labs   Lab Test 04/19/18 04/27/17   WBC  7.0  5.6   RBC  4.16*  4.13*   HGB  13.0*  12.7*   HCT  40.4  41.6   MCV  97  101*   MCH  31.3  30.8   MCHC  32.2  30.5*   RDW  14.4   14.5   PLT  226  262       Last Basic Metabolic Panel:  Recent Labs   Lab Test 04/19/18 06/07/17   NA  137  136   POTASSIUM  3.3*  3.3*   CHLORIDE  99  99   MERCEDES  8.9  9.1   CO2  32  29   BUN  28  19   CR  1.45*  1.23   GLC  93  111*       Liver Function Studies -   Recent Labs   Lab Test 04/19/18 04/03/17   0651   PROTTOTAL  7.9  6.6*   ALBUMIN  3.4  2.6*   BILITOTAL  0.6  0.8   ALKPHOS  78  53   AST  19  37   ALT  23  23       TSH   Date Value Ref Range Status   04/19/2018 5.05 (H) 0.40 - 4.00 mU/L Final   04/02/2017 4.62 (H) 0.40 - 4.00 mU/L Final       Assessment/Plan:  (R06.09) Dyspnea on exertion  (primary encounter diagnosis)  Comment: Acute Change in Condition  Plan:   -CXR STAT (AP & Lateral) only received 1 view  -CBC w/ diff, BMP STAT  -Levofloxacin 750 mg po qd x 5 days for pneumonia (delay in medication therapy)   -Encourage oral intake   - VS and O2 sats with change in condition or s/s of worsening.   - Use his prn Tylenol for fever  -OK to keep beside Ventolin  (90 Base) MCG/ACT AERS 2 puffs PO PRN for SOB      Electronically signed by  SUNDAY Lee CNP                    Sincerely,        SUNDAY Lee CNP

## 2018-09-18 NOTE — LETTER
9/18/2018        RE: Jacob Easton  [address]  [address]  [facility] [address]        [facility]    Chief Complaint   Patient presents with     correction Acute       [facility] Medical Record Number:  [id number]    HPI:    Jacob Easton is a 89 year old  (5/16/1929), who is being seen today for an episodic care visit at [facility] .  HPI information obtained from: facility chart records.Today's concern is:    Dyspnea on exertion  With lower oxygen sats, febrile, SOB above baseline, increased weakness and increase need of staff and equipment for transfers.CXR order 9/14 with impression showing interstitial densities in the lung fields may indicate chronic lung changes. Increased opacities in the costophrenic angles may represent mild atelectasis or pneumonia. A 5.3 cm bulla in the left lung base.  Negative for leucocytosis and BMP mostly within limits. He endorses SOB above baseline, reports he is drinking more fluids and continuing to eat all meals.He is dressed today and participating in regular activities.       ALLERGIES: Advil [ibuprofen]; Influenza virus vaccine h5n1; and Orange juice [orange oil]  Past Medical, Surgical, Family and Social History reviewed and updated in [employer].    Current Outpatient Prescriptions   Medication Sig Dispense Refill     acetaminophen (TYLENOL) 500 MG tablet Take 650 mg by mouth 2 times daily as needed for mild pain        albuterol (PROAIR HFA/PROVENTIL HFA/VENTOLIN HFA) 108 (90 BASE) MCG/ACT Inhaler Inhale 2 puffs into the lungs every 4 hours as needed for shortness of breath / dyspnea or wheezing       AMLODIPINE BESYLATE PO Take 10 mg by mouth daily       aspirin EC 81 MG EC tablet Take 1 tablet (81 mg) by mouth daily 31 tablet PRN     folic acid (FOLVITE) 1 MG tablet Take 1 mg by mouth four times a week        gabapentin (NEURONTIN) 100 MG capsule Take 1 capsule (100 mg) by mouth daily 31 capsule PRN      HYDROCHLOROTHIAZIDE PO Take 50 mg by mouth daily       latanoprost (XALATAN) 0.005 % ophthalmic solution Place 1 drop into the right eye At Bedtime        LEVOFLOXACIN PO Take 750 mg by mouth daily       levothyroxine (SYNTHROID/LEVOTHROID) 50 MCG tablet Take 1 tablet (50 mcg) by mouth daily 31 tablet PRN     MELATONIN PO Take 5 mg by mouth At Bedtime        METHOTREXATE PO Take 10 mg by mouth once a week on Fridays       metoprolol (LOPRESSOR) 25 MG tablet Take 1 tablet (25 mg) by mouth 2 times daily 62 tablet PRN     Multiple Vitamins-Minerals (CENTRUM SILVER ULTRA MENS) TABS Take 1 tablet by mouth daily       Multiple Vitamins-Minerals (EYE VITAMINS & MINERALS) TABS Take 1 tablet by mouth daily 60 tablet 11     nystatin (MYCOSTATIN) 914540 UNIT/GM POWD Apply topically 2 times daily as needed        potassium chloride SA (K-DUR/KLOR-CON M) 10 MEQ CR tablet TAKE THREE TABLETS (30 MEQ) BY MOUTH ONCE DAILY ***NOTE DOSAGE/STRENGTH*** 180 tablet 11     senna-docusate (SENOKOT-S;PERICOLACE) 8.6-50 MG per tablet Take 1 tablet by mouth 2 times daily as needed for constipation       tamsulosin (FLOMAX) 0.4 MG capsule Take 1 capsule (0.4 mg) by mouth daily 31 capsule PRN     Patient Active Problem List   Diagnosis     Pain in joint, pelvic region and thigh     Hypokalemia due to hydrochlorothiazide     BPH (benign prostatic hyperplasia)     Physical deconditioning     Neuropathic pain of both feet     Pulmonary vascular congestion     Benign essential hypertension     Acquired hypothyroidism     Acute kidney failure (H)     Rheumatoid arthritis involving both hands (H)     Acute renal failure (ARF) (H)     Hypercalcemia     Dysphagia     Urinary tract infection without hematuria, site unspecified     Recurrent cold sores     TIA (transient ischemic attack)     Right wrist drop     Pulmonary alveolitis (H)     Atrial fibrillation, unspecified type (H)     CKD (chronic kidney disease) stage 3, GFR 30-59 ml/min     Advance  Care Planning     Hypothyroidism, unspecified type     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 (H)       Medications reviewed:  Medications reconciled to facility chart and changes were made to reflect current medications as identified as above med list. Below are the changes that were made:   Medications stopped since last EPIC medication reconciliation:   There are no discontinued medications.    Medications started since last [facility] medication reconciliation:  Orders Placed This Encounter   Medications     LEVOFLOXACIN PO     Sig: Take 750 mg by mouth daily       REVIEW OF SYSTEMS:  4 point ROS including Respiratory, CV, GI and , other than that noted in the HPI,  is negative     Physical Exam:  /70  Pulse 73  Temp 99.1  F (37.3  C)  Resp 18  Wt 211 lb (95.7 kg)  SpO2 91%  BMI 27.09 kg/m2  GENERAL APPEARANCE:  Alert, in no distress  ENT:  Mouth and posterior oropharynx normal, moist mucous membranes, [place], bilateral cerumen impaction ongoing  EYES:  EOM, conjunctivae, lids, pupils and irises normal-glasses  RESP:  respiratory effort and palpation of chest normal, lungs diminished to auscultation, no expiratory wheezes   CV:  Palpation and auscultation of heart done, bradycardic and irregular rhythm, no murmur, rub, or gallop, peripheral edema 1+ in ankles and feet. [place] Hose on.  ABDOMEN:  normal bowel sounds, soft, nontender, no hepatosplenomegaly   M/S:   Gait and station abnormal using wheelchair currently, generalized weakness  SKIN:  Inspection of skin and subcutaneous tissue baseline- hypertrophic toenail great toe of left foot  NEURO:   Examination of sensation by touch normal  PSYCH:  insight and judgement impaired, memory impaired     Recent Labs: Labs of 9/13/18 not uploaded for this note    CBC RESULTS:   Recent Labs   Lab Test 04/19/18 04/27/17   WBC  7.0  5.6   RBC  4.16*  4.13*   HGB  13.0*  12.7*   HCT  40.4  41.6   MCV  97  101*   MCH  31.3  30.8   MCHC  32.2   30.5*   RDW  14.4  14.5   PLT  226  262       Last Basic Metabolic Panel:  Recent Labs   Lab Test 04/19/18 06/07/17   NA  137  136   POTASSIUM  3.3*  3.3*   CHLORIDE  99  99   MERCEDES  8.9  9.1   CO2  32  29   BUN  28  19   CR  1.45*  1.23   GLC  93  111*       Liver Function Studies -   Recent Labs   Lab Test 04/19/18 04/03/17   0651   PROTTOTAL  7.9  6.6*   ALBUMIN  3.4  2.6*   BILITOTAL  0.6  0.8   ALKPHOS  78  53   AST  19  37   ALT  23  23       TSH   Date Value Ref Range Status   04/19/2018 5.05 (H) 0.40 - 4.00 mU/L Final   04/02/2017 4.62 (H) 0.40 - 4.00 mU/L Final       Assessment/Plan:  (R06.09) Dyspnea on exertion  (primary encounter diagnosis)  Comment: Acute Change in Condition  Plan:   -CXR STAT (AP & Lateral) only received 1 view  -CBC w/ diff, BMP STAT  -Levofloxacin 750 mg po qd x 5 days for pneumonia (delay in medication therapy)   -Encourage oral intake   - VS and O2 sats with change in condition or s/s of worsening.   - Use his prn Tylenol for fever  -OK to keep beside Ventolin  (90 Base) MCG/ACT AERS 2 puffs PO PRN for SOB      Electronically signed by  SUNDAY Lee CNP                    Sincerely,        SUNDAY Lee CNP

## 2018-09-25 ENCOUNTER — ASSISTED LIVING VISIT (OUTPATIENT)
Dept: GERIATRICS | Facility: CLINIC | Age: 83
End: 2018-09-25
Payer: MEDICARE

## 2018-09-25 DIAGNOSIS — R09.89 PULMONARY VASCULAR CONGESTION: ICD-10-CM

## 2018-09-25 DIAGNOSIS — I48.91 ATRIAL FIBRILLATION, UNSPECIFIED TYPE (H): ICD-10-CM

## 2018-09-25 DIAGNOSIS — E03.9 HYPOTHYROIDISM, UNSPECIFIED TYPE: ICD-10-CM

## 2018-09-25 DIAGNOSIS — N18.30 CKD (CHRONIC KIDNEY DISEASE) STAGE 3, GFR 30-59 ML/MIN (H): ICD-10-CM

## 2018-09-25 DIAGNOSIS — I10 BENIGN ESSENTIAL HYPERTENSION: ICD-10-CM

## 2018-09-25 DIAGNOSIS — R09.02 HYPOXIA: ICD-10-CM

## 2018-09-25 DIAGNOSIS — M62.81 GENERALIZED MUSCLE WEAKNESS: Primary | ICD-10-CM

## 2018-09-25 DIAGNOSIS — M06.9 RHEUMATOID ARTHRITIS INVOLVING BOTH HANDS, UNSPECIFIED RHEUMATOID FACTOR PRESENCE: ICD-10-CM

## 2018-09-25 RX ORDER — POTASSIUM CHLORIDE 1.5 G/1.58G
40 POWDER, FOR SOLUTION ORAL DAILY
COMMUNITY
End: 2019-04-09

## 2018-09-25 NOTE — PROGRESS NOTES
"Jacob Easton is a 89 year old male seen September 25, 2018 at [facility] where he has resided for one year (admit 5/2017) seen to follow up recent decline with increased lethargy and weakness.      [place] nursing staff reports he is needing more assist for transfers, is weaker and in bed more.    Patient is seen in his apartment, resting abed mid-afternoon.     He states \"I'm fine.  Don't listen to those girls.\"    Last week patient was treated with levofloxacin for pneumonia, and started on furosemide with K+ replacement.   Gabapentin and hydrochlorothiazide were discontinued.        He has longstanding RA, has been on MTX for 10 years.   It was stopped because of respiratory changes with CT showing ground glass opacities c/w acute alveolitis and persistent cystic changes throughout both lungs.    Patient had multiple hospitalizations and TCU stays over the past 3 years, with falls and weakness, unable to get up.    Finally became more than his wife could manage at home and he moved [place] for [place] placement.      Past Medical History:   Diagnosis Date     Arthritis      Constipation      Hypertension      Hypocalcemia      Neuromuscular disorder (H)      Thyroid disease    Pulmonary alveolitis  CKD stage 3  RA  BPH  Atrial fib  LE weakness    SH:  Retired [employer] .   His wife Aneta lives in their house in [place].     Review Of Systems  :  LE weakness, walks in hallways daily with nursing staff assistance, min-mod assist with ADLs, and transfer to  which he uses for all destinations.     [facility] 18/30   CPT 4.5      EXAM:  Lying abed, NAD  /78  Pulse 83  Temp 98.0  F (36.9  C)  Resp 16  SpO2 91% on room air  Neck supple without adenopathy  Lungs decreased BS, few scattered crackles, no wheeze  Heart RRR s1s2, 2/6 ELENA  Abd soft, NT, no distention, +BS  Ext trace LE edema to knees    Neuro: generalized weakness  Psych: affect flat.      Labs reviewed     IMP/PLAN:   (M62.81) Generalized " muscle weakness  (primary encounter diagnosis)  Comment: multifactorial and related to recent illness, deconditioning, meds    Plan: agree with above changes.   Will recheck labs and follow another week.        (M06.09) 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 (H)  Comment: with very limited mobility     Plan: supportive care, assist with transfers.   Acetaminophen helpful for any pain.            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  Furosemide 40 mg/day and follow     (N18.3) CKD (chronic kidney disease) stage 3, GFR 30-59 ml/min  Comment: secondary to HTN    GFR 50  Plan: follow BMP now off hydrochlorothiazide, and on furosemide.       (I10) Benign essential hypertension  Comment:   BP Readings from Last 3 Encounters:   09/18/18 120/70   06/12/18 138/78   04/17/18 140/79   Plan: remains on amlodipine and metoprolol    (N40.0) Benign prostatic hyperplasia, unspecified whether lower urinary tract symptoms present  Comment: longstanding  Plan: continue tamsulosin to avoid urinary retention      (E03.9) Hypothyroidism, unspecified type  Comment:    TSH   Date Value Ref Range Status   09/13/2018 3.45 0.40 - 4.00 mU/L Final    Plan: continue same dose.        (I48.91) Atrial fibrillation, unspecified type (H)  Comment: controlled VR on metoprolol    Plan: daily ASA.   Not otherwise anticoagulated secondary to number of falls he has had       AD: DNR/DNI    Tiffani Vigil MD

## 2018-09-25 NOTE — LETTER
"    9/25/2018        RE: Jacob Easton  [address]  [address]  [facility] [address]        Jacob Easton is a 89 year old male seen September 25, 2018 at formerly [facility] where he has resided for one year (admit 5/2017) seen to follow up recent decline with increased lethargy and weakness.      [place] nursing staff reports he is needing more assist for transfers, is weaker and in bed more.    Patient is seen in his apartment, resting abed mid-afternoon.     He states \"I'm fine.  Don't listen to those girls.\"    Last week patient was treated with levofloxacin for pneumonia, and started on furosemide with K+ replacement.   Gabapentin and hydrochlorothiazide were discontinued.        He has longstanding RA, has been on MTX for 10 years.   It was stopped because of respiratory changes with CT showing ground glass opacities c/w acute alveolitis and persistent cystic changes throughout both lungs.    Patient had multiple hospitalizations and TCU stays over the past 3 years, with falls and weakness, unable to get up.    Finally became more than his wife could manage at home and he moved to [place] for permanent placement.      Past Medical History:   Diagnosis Date     Arthritis      Constipation      Hypertension      Hypocalcemia      Neuromuscular disorder (H)      Thyroid disease    Pulmonary alveolitis  CKD stage 3  RA  BPH  Atrial fib  LE weakness    SH:  [employer] .   His wife Aneta lives in their house in [place].     Review Of Systems  :  LE weakness, walks in hallways daily with nursing staff assistance, min-mod assist with ADLs, and transfer to  which he uses for all destinations.     [facility] 18/30   CPT 4.5      EXAM:  Lying abed, NAD  /78  Pulse 83  Temp 98.0  F (36.9  C)  Resp 16  SpO2 91% on room air  Neck supple without adenopathy  Lungs decreased BS, few scattered crackles, no wheeze  Heart RRR s1s2, 2/6 ELENA  Abd soft, NT, no distention, +BS  Ext trace LE edema to knees  "   Neuro: generalized weakness  Psych: affect flat.      Labs reviewed     IMP/PLAN:   (M62.81) Generalized muscle weakness  (primary encounter diagnosis)  Comment: multifactorial and related to recent illness, deconditioning, meds    Plan: agree with above changes.   Will recheck labs and follow another week.        (M06.09) 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 (H)  Comment: with very limited mobility     Plan: supportive care, assist with transfers.   Acetaminophen helpful for any pain.            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  Furosemide 40 mg/day and follow     (N18.3) CKD (chronic kidney disease) stage 3, GFR 30-59 ml/min  Comment: secondary to HTN    GFR 50  Plan: follow BMP now off hydrochlorothiazide, and on furosemide.       (I10) Benign essential hypertension  Comment:   BP Readings from Last 3 Encounters:   09/18/18 120/70   06/12/18 138/78   04/17/18 140/79   Plan: remains on amlodipine and metoprolol    (N40.0) Benign prostatic hyperplasia, unspecified whether lower urinary tract symptoms present  Comment: longstanding  Plan: continue tamsulosin to avoid urinary retention      (E03.9) Hypothyroidism, unspecified type  Comment:    TSH   Date Value Ref Range Status   09/13/2018 3.45 0.40 - 4.00 mU/L Final    Plan: continue same dose.        (I48.91) Atrial fibrillation, unspecified type (H)  Comment: controlled VR on metoprolol    Plan: daily ASA.   Not otherwise anticoagulated secondary to number of falls he has had       AD: DNR/DNI    Tiffani Vigil MD         Sincerely,        Tiffani Vigil MD

## 2018-09-27 ENCOUNTER — TRANSFERRED RECORDS (OUTPATIENT)
Dept: HEALTH INFORMATION MANAGEMENT | Facility: CLINIC | Age: 83
End: 2018-09-27

## 2018-09-27 LAB
ANION GAP SERPL CALCULATED.3IONS-SCNC: 9 MMOL/L (ref 3–14)
BUN SERPL-MCNC: 27 MG/DL (ref 7–30)
CALCIUM SERPL-MCNC: 9.4 MG/DL (ref 8.5–10.1)
CHLORIDE SERPLBLD-SCNC: 97 MMOL/L (ref 94–109)
CO2 SERPL-SCNC: 29 MMOL/L (ref 20–32)
CREAT SERPL-MCNC: 1.34 MG/DL (ref 0.66–1.25)
ERYTHROCYTE [DISTWIDTH] IN BLOOD BY AUTOMATED COUNT: 14.8 % (ref 10–15)
GFR SERPL CREATININE-BSD FRML MDRD: 50 ML/MIN/1.73M2
GLUCOSE SERPL-MCNC: 148 MG/DL (ref 70–99)
HCT VFR BLD AUTO: 39 % (ref 40–53)
HEMOGLOBIN: 13 G/DL (ref 13.3–17.7)
MCH RBC QN AUTO: 31.4 PG (ref 26.5–33)
MCHC RBC AUTO-ENTMCNC: 33.3 G/DL (ref 31.5–36.5)
MCV RBC AUTO: 94 FL (ref 78–100)
PLATELET # BLD AUTO: 347 10^9/L (ref 150–450)
POTASSIUM SERPL-SCNC: 3.9 MMOL/L (ref 3.4–5.3)
RBC # BLD AUTO: 4.14 10^12/L (ref 4.4–5.9)
SODIUM SERPL-SCNC: 135 MMOL/L (ref 133–144)
WBC # BLD AUTO: 6.9 10^9/L (ref 4–11)

## 2018-10-02 ENCOUNTER — NURSING HOME VISIT (OUTPATIENT)
Dept: GERIATRICS | Facility: CLINIC | Age: 83
End: 2018-10-02
Payer: MEDICARE

## 2018-10-02 VITALS
DIASTOLIC BLOOD PRESSURE: 70 MMHG | TEMPERATURE: 99.4 F | HEART RATE: 88 BPM | WEIGHT: 210 LBS | OXYGEN SATURATION: 93 % | BODY MASS INDEX: 26.96 KG/M2 | SYSTOLIC BLOOD PRESSURE: 130 MMHG | RESPIRATION RATE: 18 BRPM

## 2018-10-02 DIAGNOSIS — H61.23 BILATERAL IMPACTED CERUMEN: ICD-10-CM

## 2018-10-02 DIAGNOSIS — J67.9 PULMONARY ALVEOLITIS (H): Primary | ICD-10-CM

## 2018-10-02 DIAGNOSIS — R06.09 DYSPNEA ON EXERTION: ICD-10-CM

## 2018-10-02 DIAGNOSIS — I10 BENIGN ESSENTIAL HYPERTENSION: ICD-10-CM

## 2018-10-02 DIAGNOSIS — R09.89 PULMONARY VASCULAR CONGESTION: ICD-10-CM

## 2018-10-02 DIAGNOSIS — N18.30 CKD (CHRONIC KIDNEY DISEASE) STAGE 3, GFR 30-59 ML/MIN (H): ICD-10-CM

## 2018-10-02 PROBLEM — R09.02 HYPOXIA: Status: ACTIVE | Noted: 2018-10-02

## 2018-10-02 PROCEDURE — 69210 REMOVE IMPACTED EAR WAX UNI: CPT | Performed by: NURSE PRACTITIONER

## 2018-10-02 RX ORDER — PREDNISONE 5 MG/1
10 TABLET ORAL DAILY
COMMUNITY
End: 2018-10-30

## 2018-10-02 NOTE — LETTER
"    10/2/2018        RE: Jacob Easton  [address]  [address]  [facility] [address]        [place] [facility]    Chief Complaint   Patient presents with     assisted Acute       [place] Medical Record Number:  [id number]    HPI:    Jacob Easton is a 89 year old  (1929), who is being seen today for an episodic care visit at [facility] .  HPI information obtained from: facility chart records. Today's concerns are:    Pulmonary alveolitis (H)  Dyspnea on exertion  Pulmonary vascular congestion  HTN  Chronic cystic changes worsening s/s. States he is feeling \"ok\" but staff maintains ongoing functional decline.With lower oxygen sats, febrile, SOB above baseline, increased weakness and increase need of staff and equipment for transfers.CXR order  with impression showing interstitial densities in the lung fields may indicate chronic lung changes. Increased opacities in the costophrenic angles may represent mild atelectasis or pneumonia. A 5.3 cm bulla in the left lung base.  Negative for leucocytosis and BMP mostly within limits. He endorses SOB above baseline,   GRE[place] but reports to walking daily with PT. Feels that additional of prednisone burst/taper has helped overall status. Need updated weight. Also is refusing influenza vaccine. Reports \"his neighbors wife got very sick from it and I will never have that shot\".     BP Readin/60  130/70  143/77    HR:  71-96    CKD (chronic kidney disease) stage 3, GFR 30-59 ml/min (H)  Following BMP with change from HCTZ to lasix. History of hypokalemia from HCTZ on K+.    Bilateral impacted cerumen  Viewed with otoscope today. Unable to view entire ear canals bilateral.       ALLERGIES: Advil [ibuprofen]; Influenza virus vaccine h5n1; and Orange juice [orange oil]  Past Medical, Surgical, Family and Social History reviewed and updated in [facility].    Current Outpatient Prescriptions   Medication Sig Dispense Refill     " acetaminophen (TYLENOL) 500 MG tablet Take 650 mg by mouth 2 times daily as needed for mild pain        albuterol (PROAIR HFA/PROVENTIL HFA/VENTOLIN HFA) 108 (90 BASE) MCG/ACT Inhaler Inhale 2 puffs into the lungs every 4 hours as needed for shortness of breath / dyspnea or wheezing       AMLODIPINE BESYLATE PO Take 10 mg by mouth daily       aspirin EC 81 MG EC tablet Take 1 tablet (81 mg) by mouth daily 31 tablet PRN     folic acid (FOLVITE) 1 MG tablet Take 1 mg by mouth four times a week        gabapentin (NEURONTIN) 100 MG capsule Take 1 capsule (100 mg) by mouth daily 31 capsule PRN     HYDROCHLOROTHIAZIDE PO Take 50 mg by mouth daily       latanoprost (XALATAN) 0.005 % ophthalmic solution Place 1 drop into the right eye At Bedtime        LEVOFLOXACIN PO Take 750 mg by mouth daily       levothyroxine (SYNTHROID/LEVOTHROID) 50 MCG tablet Take 1 tablet (50 mcg) by mouth daily 31 tablet PRN     MELATONIN PO Take 5 mg by mouth At Bedtime        METHOTREXATE PO Take 10 mg by mouth once a week on Fridays       metoprolol (LOPRESSOR) 25 MG tablet Take 1 tablet (25 mg) by mouth 2 times daily 62 tablet PRN     Multiple Vitamins-Minerals (CENTRUM SILVER ULTRA MENS) TABS Take 1 tablet by mouth daily       Multiple Vitamins-Minerals (EYE VITAMINS & MINERALS) TABS Take 1 tablet by mouth daily 60 tablet 11     nystatin (MYCOSTATIN) 957875 UNIT/GM POWD Apply topically 2 times daily as needed        potassium chloride SA (K-DUR/KLOR-CON M) 10 MEQ CR tablet TAKE THREE TABLETS (30 MEQ) BY MOUTH ONCE DAILY  180 tablet 11     senna-docusate (SENOKOT-S;PERICOLACE) 8.6-50 MG per tablet Take 1 tablet by mouth 2 times daily as needed for constipation       tamsulosin (FLOMAX) 0.4 MG capsule Take 1 capsule (0.4 mg) by mouth daily 31 capsule PRN     Patient Active Problem List   Diagnosis     Pain in joint, pelvic region and thigh     Hypokalemia due to hydrochlorothiazide     BPH (benign prostatic hyperplasia)     Physical  deconditioning     Neuropathic pain of both feet     Pulmonary vascular congestion     Benign essential hypertension     Acquired hypothyroidism     Acute kidney failure (H)     Rheumatoid arthritis involving both hands (H)     Acute renal failure (ARF) (H)     Hypercalcemia     Dysphagia     Urinary tract infection without hematuria, site unspecified     Recurrent cold sores     TIA (transient ischemic attack)     Right wrist drop     Pulmonary alveolitis (H)     Atrial fibrillation, unspecified type (H)     CKD (chronic kidney disease) stage 3, GFR 30-59 ml/min (H)     Advance Care Planning     Hypothyroidism, unspecified type     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 (H)     Medications reconciled to facility chart and changes were made to reflect current medications as identified as above med list. Below are the changes that were made:   Medications stopped since last EPIC medication reconciliation:   There are no discontinued medications.    Medications started since last [facility] medication reconciliation:  Orders Placed This Encounter   Medications     predniSONE (DELTASONE) 5 MG tablet     Sig: Take 10 mg by mouth daily        REVIEW OF SYSTEMS:  4 point ROS including Respiratory, CV, GI and , other than that noted in the HPI,  is negative     Physical Exam:  /70  Pulse 88  Temp 99.4  F (37.4  C)  Resp 18  Wt 210 lb (95.3 kg)  SpO2 93%  BMI 26.96 kg/m2  GENERAL APPEARANCE:  Alert, in no distress  ENT:  Mouth and posterior oropharynx normal, moist mucous membranes, [place], bilateral cerumen impaction ongoing  EYES:  EOM, conjunctivae, lids, pupils and irises normal-glasses  RESP:  respiratory effort and palpation of chest normal, lungs diminished to auscultation, no expiratory wheezes   CV:  Palpation and auscultation of heart done, bradycardic (not today) and irregular rhythm, no murmur, rub, or gallop, peripheral edema 1+ in ankles and feet. Chang Hose on.  ABDOMEN:  normal  bowel sounds, soft, nontender, no hepatosplenomegaly   M/S:   Gait and station abnormal using wheelchair currently, generalized weakness  SKIN:  Inspection of skin and subcutaneous tissue baseline- hypertrophic toenail great toe not examined today  NEURO:   Examination of sensation by touch normal  PSYCH:  insight and judgement impaired, memory impaired     CBC RESULTS:   Recent Labs   Lab Test 09/27/18 04/19/18   WBC  6.9  7.0   RBC  4.14*  4.16*   HGB  13.0*  13.0*   HCT  39.0*  40.4   MCV  94  97   MCH  31.4  31.3   MCHC  33.3  32.2   RDW  14.8  14.4   PLT  347  226       Last Basic Metabolic Panel:  Recent Labs   Lab Test 09/27/18 09/13/18 04/19/18   NA  135   --   137   POTASSIUM  3.9  3.5  3.3*   CHLORIDE  97   --   99   MERCEDES  9.4   --   8.9   CO2  29   --   32   BUN  27   --   28   CR  1.34*  1.35*  1.45*   GLC  148*  137*  93       Liver Function Studies -   Recent Labs   Lab Test 09/13/18 04/19/18 04/03/17   0651   PROTTOTAL   --   7.9  6.6*   ALBUMIN   --   3.4  2.6*   BILITOTAL   --   0.6  0.8   ALKPHOS   --   78  53   AST  20  19  37   ALT  19  23  23       TSH   Date Value Ref Range Status   09/13/2018 3.45 0.40 - 4.00 mU/L Final   04/19/2018 5.05 (H) 0.40 - 4.00 mU/L Final       ASSESSMENT/PLAN:    (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 Will inquire for order and use  -consider low dose prednisone ongoing for comfort     (N18.3) CKD (chronic kidney disease) stage 3, GFR 30-59 ml/min (H)  Comment:  Chronic  Plan:   -Follow BMP  -Renal dose meds and avoid nephrotoxins     (H61.23) Bilateral impacted cerumen  Comment: Ongoing management- much cerumen removed today  Plan:   -cerumen removal with lighted curette to both ear canals             Electronically signed by  SUNDAY Lee CNP            Sincerely,        SUNDAY Lee CNP

## 2018-10-02 NOTE — PROGRESS NOTES
"[place] [facility]    Chief Complaint   Patient presents with     custodial Acute       [place] Medical Record Number:  [id number]    HPI:    Jacob Easton is a 89 year old  (1929), who is being seen today for an episodic care visit at [facility] .  HPI information obtained from: facility chart records. Today's concerns are:    Pulmonary alveolitis (H)  Dyspnea on exertion  Pulmonary vascular congestion  HTN  Chronic cystic changes worsening s/s. States he is feeling \"ok\" but staff maintains ongoing functional decline.With lower oxygen sats, febrile, SOB above baseline, increased weakness and increase need of staff and equipment for transfers.CXR order  with impression showing interstitial densities in the lung fields may indicate chronic lung changes. Increased opacities in the costophrenic angles may represent mild atelectasis or pneumonia. A 5.3 cm bulla in the left lung base.  Negative for leucocytosis and BMP mostly within limits. He endorses SOB above baseline,   [place] but reports to walking daily with PT. Feels that additional of prednisone burst/taper has helped overall status. Need updated weight. Also is refusing influenza vaccine. Reports \"his neighbors wife got very sick from it and I will never have that shot\".     BP Readin/60  130/70  143/77    HR:  71-96    CKD (chronic kidney disease) stage 3, GFR 30-59 ml/min (H)  Following BMP with change from HCTZ to lasix. History of hypokalemia from HCTZ on K+.    Bilateral impacted cerumen  Viewed with otoscope today. Unable to view entire ear canals bilateral.       ALLERGIES: Advil [ibuprofen]; Influenza virus vaccine h5n1; and Orange juice [orange oil]  Past Medical, Surgical, Family and Social History reviewed and updated in [facility].    Current Outpatient Prescriptions   Medication Sig Dispense Refill     acetaminophen (TYLENOL) 500 MG tablet Take 650 mg by mouth 2 times daily as needed for mild pain        " albuterol (PROAIR HFA/PROVENTIL HFA/VENTOLIN HFA) 108 (90 BASE) MCG/ACT Inhaler Inhale 2 puffs into the lungs every 4 hours as needed for shortness of breath / dyspnea or wheezing       AMLODIPINE BESYLATE PO Take 10 mg by mouth daily       aspirin EC 81 MG EC tablet Take 1 tablet (81 mg) by mouth daily 31 tablet PRN     folic acid (FOLVITE) 1 MG tablet Take 1 mg by mouth four times a week        gabapentin (NEURONTIN) 100 MG capsule Take 1 capsule (100 mg) by mouth daily 31 capsule PRN     HYDROCHLOROTHIAZIDE PO Take 50 mg by mouth daily       latanoprost (XALATAN) 0.005 % ophthalmic solution Place 1 drop into the right eye At Bedtime        LEVOFLOXACIN PO Take 750 mg by mouth daily       levothyroxine (SYNTHROID/LEVOTHROID) 50 MCG tablet Take 1 tablet (50 mcg) by mouth daily 31 tablet PRN     MELATONIN PO Take 5 mg by mouth At Bedtime        METHOTREXATE PO Take 10 mg by mouth once a week on Fridays       metoprolol (LOPRESSOR) 25 MG tablet Take 1 tablet (25 mg) by mouth 2 times daily 62 tablet PRN     Multiple Vitamins-Minerals (CENTRUM SILVER ULTRA MENS) TABS Take 1 tablet by mouth daily       Multiple Vitamins-Minerals (EYE VITAMINS & MINERALS) TABS Take 1 tablet by mouth daily 60 tablet 11     nystatin (MYCOSTATIN) 220952 UNIT/GM POWD Apply topically 2 times daily as needed        potassium chloride SA (K-DUR/KLOR-CON M) 10 MEQ CR tablet TAKE THREE TABLETS (30 MEQ) BY MOUTH ONCE DAILY  180 tablet 11     senna-docusate (SENOKOT-S;PERICOLACE) 8.6-50 MG per tablet Take 1 tablet by mouth 2 times daily as needed for constipation       tamsulosin (FLOMAX) 0.4 MG capsule Take 1 capsule (0.4 mg) by mouth daily 31 capsule PRN     Patient Active Problem List   Diagnosis     Pain in joint, pelvic region and thigh     Hypokalemia due to hydrochlorothiazide     BPH (benign prostatic hyperplasia)     Physical deconditioning     Neuropathic pain of both feet     Pulmonary vascular congestion     Benign essential  hypertension     Acquired hypothyroidism     Acute kidney failure (H)     Rheumatoid arthritis involving both hands (H)     Acute renal failure (ARF) (H)     Hypercalcemia     Dysphagia     Urinary tract infection without hematuria, site unspecified     Recurrent cold sores     TIA (transient ischemic attack)     Right wrist drop     Pulmonary alveolitis (H)     Atrial fibrillation, unspecified type (H)     CKD (chronic kidney disease) stage 3, GFR 30-59 ml/min (H)     Advance Care Planning     Hypothyroidism, unspecified type     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 (H)     Medications reconciled to facility chart and changes were made to reflect current medications as identified as above med list. Below are the changes that were made:   Medications stopped since last EPIC medication reconciliation:   There are no discontinued medications.    Medications started since last [facility] medication reconciliation:  Orders Placed This Encounter   Medications     predniSONE (DELTASONE) 5 MG tablet     Sig: Take 10 mg by mouth daily        REVIEW OF SYSTEMS:  4 point ROS including Respiratory, CV, GI and , other than that noted in the HPI,  is negative     Physical Exam:  /70  Pulse 88  Temp 99.4  F (37.4  C)  Resp 18  Wt 210 lb (95.3 kg)  SpO2 93%  BMI 26.96 kg/m2  GENERAL APPEARANCE:  Alert, in no distress  ENT:  Mouth and posterior oropharynx normal, moist mucous membranes, [place], bilateral cerumen impaction ongoing  EYES:  EOM, conjunctivae, lids, pupils and irises normal-glasses  RESP:  respiratory effort and palpation of chest normal, lungs diminished to auscultation, no expiratory wheezes   CV:  Palpation and auscultation of heart done, bradycardic (not today) and irregular rhythm, no murmur, rub, or gallop, peripheral edema 1+ in ankles and feet. Chang Hose on.  ABDOMEN:  normal bowel sounds, soft, nontender, no hepatosplenomegaly   M/S:   Gait and station abnormal using wheelchair  currently, generalized weakness  SKIN:  Inspection of skin and subcutaneous tissue baseline- hypertrophic toenail great toe not examined today  NEURO:   Examination of sensation by touch normal  PSYCH:  insight and judgement impaired, memory impaired     CBC RESULTS:   Recent Labs   Lab Test 09/27/18 04/19/18   WBC  6.9  7.0   RBC  4.14*  4.16*   HGB  13.0*  13.0*   HCT  39.0*  40.4   MCV  94  97   MCH  31.4  31.3   MCHC  33.3  32.2   RDW  14.8  14.4   PLT  347  226       Last Basic Metabolic Panel:  Recent Labs   Lab Test 09/27/18 09/13/18 04/19/18   NA  135   --   137   POTASSIUM  3.9  3.5  3.3*   CHLORIDE  97   --   99   MERCEDES  9.4   --   8.9   CO2  29   --   32   BUN  27   --   28   CR  1.34*  1.35*  1.45*   GLC  148*  137*  93       Liver Function Studies -   Recent Labs   Lab Test 09/13/18 04/19/18 04/03/17   0651   PROTTOTAL   --   7.9  6.6*   ALBUMIN   --   3.4  2.6*   BILITOTAL   --   0.6  0.8   ALKPHOS   --   78  53   AST  20  19  37   ALT  19  23  23       TSH   Date Value Ref Range Status   09/13/2018 3.45 0.40 - 4.00 mU/L Final   04/19/2018 5.05 (H) 0.40 - 4.00 mU/L Final       ASSESSMENT/PLAN:    (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 Will inquire for order and use  -consider low dose prednisone ongoing for comfort     (N18.3) CKD (chronic kidney disease) stage 3, GFR 30-59 ml/min (H)  Comment: Chronic  Plan:   -Follow BMP  -Renal dose meds and avoid nephrotoxins     (H61.23) Bilateral impacted  cerumen  Comment: Ongoing management- much cerumen removed today  Plan:   -cerumen removal with lighted curette to both ear canals             Electronically signed by  SUNDAY Lee CNP

## 2018-10-12 ENCOUNTER — TRANSFERRED RECORDS (OUTPATIENT)
Dept: HEALTH INFORMATION MANAGEMENT | Facility: CLINIC | Age: 83
End: 2018-10-12

## 2018-10-12 LAB
ANION GAP SERPL CALCULATED.3IONS-SCNC: 5 MMOL/L (ref 3–14)
BUN SERPL-MCNC: 24 MG/DL (ref 7–30)
CALCIUM SERPL-MCNC: 8.9 MG/DL (ref 8.5–10.1)
CHLORIDE SERPLBLD-SCNC: 99 MMOL/L (ref 94–109)
CO2 SERPL-SCNC: 31 MMOL/L (ref 20–32)
CREAT SERPL-MCNC: 1.41 MG/DL (ref 0.66–1.25)
ERYTHROCYTE [DISTWIDTH] IN BLOOD BY AUTOMATED COUNT: 15.4 % (ref 10–15)
GFR SERPL CREATININE-BSD FRML MDRD: 47 ML/MIN/1.73M2
GLUCOSE SERPL-MCNC: 109 MG/DL (ref 70–99)
HCT VFR BLD AUTO: 41.2 % (ref 40–53)
HEMOGLOBIN: 13.1 G/DL (ref 13.3–17.7)
MCH RBC QN AUTO: 31 PG (ref 26.5–33)
MCHC RBC AUTO-ENTMCNC: 31.8 G/DL (ref 31.5–36.5)
MCV RBC AUTO: 97 FL (ref 78–100)
PLATELET # BLD AUTO: 372 10^9/L (ref 150–450)
POTASSIUM SERPL-SCNC: 4 MMOL/L (ref 3.4–5.3)
RBC # BLD AUTO: 4.23 10^12/L (ref 4.4–5.9)
SODIUM SERPL-SCNC: 135 MMOL/L (ref 133–144)
WBC # BLD AUTO: 9.2 10^9/L (ref 4–11)

## 2018-10-28 ENCOUNTER — TRANSFERRED RECORDS (OUTPATIENT)
Dept: HEALTH INFORMATION MANAGEMENT | Facility: CLINIC | Age: 83
End: 2018-10-28

## 2018-10-28 ENCOUNTER — TELEPHONE (OUTPATIENT)
Dept: GERIATRICS | Facility: CLINIC | Age: 83
End: 2018-10-28

## 2018-10-28 NOTE — TELEPHONE ENCOUNTER
Call regarding incontinence and diarrhea. Patient reports he has had sx for 2 weeks. T 99.4 other VSS. Nursing have over to collect UA UC but unable to get clean catch.  Plan: str cath UA uc and update NP in am  LUIS Green  Cell: [phone]

## 2018-10-30 ENCOUNTER — ASSISTED LIVING VISIT (OUTPATIENT)
Dept: GERIATRICS | Facility: CLINIC | Age: 83
End: 2018-10-30
Payer: MEDICARE

## 2018-10-30 VITALS
DIASTOLIC BLOOD PRESSURE: 70 MMHG | SYSTOLIC BLOOD PRESSURE: 115 MMHG | OXYGEN SATURATION: 97 % | TEMPERATURE: 98.4 F | RESPIRATION RATE: 18 BRPM | HEART RATE: 63 BPM

## 2018-10-30 DIAGNOSIS — J47.9 ADULT BRONCHIECTASIS (H): ICD-10-CM

## 2018-10-30 DIAGNOSIS — N30.00 ACUTE CYSTITIS WITHOUT HEMATURIA: Primary | ICD-10-CM

## 2018-10-30 DIAGNOSIS — R21 RASH AND NONSPECIFIC SKIN ERUPTION: ICD-10-CM

## 2018-10-30 NOTE — PROGRESS NOTES
[place] [facility]    Chief Complaint   Patient presents with     [facility] Acute       [place] Medical Record Number:  [id number]  Place of Service where encounter took place:  [facility] [[id number]]    HPI:    Jacob Easton is a 89 year old  (5/16/1929), who is being seen today for an episodic care visit.  HPI information obtained from: facility chart records.Today's concern is:    Acute cystitis without hematuria  Rash and nonspecific skin eruption  Resident with urinary s/s beginning 10/28 (Sunday). Orders for UA/UC. UA appears gross positive and with ongoing pain during urination- 1 gram of Rocephin given 10/29 while cultures pending. Preliminary results are 10,000-50,000 colonies pseudomonas aeruginosa. Incontinent at baseline and pain with urination could be related to urine stasis on reddened skin. Muna area and skin folds with rash.     Adult bronchiectasis (H)  Chronic and ongoing/worsening with dyspnea on exertion, pulmonary alveolitis, SOB. Did respond to prednisone burst and taper but that has ended. Appears did not ever follow up with Pulmonary for scheduled visit (5/12/17)   Does use Proair q4H prn. Audible expiratory wheezing heard with exam. Has benefited from duoneb treatment in past with flare and with ongoing disease progression would like to add for treatment plan.      ALLERGIES: Advil [ibuprofen]; Influenza virus vaccine h5n1; and Orange juice [orange oil]  Past Medical, Surgical, Family and Social History reviewed and updated in [employer].    Current Outpatient Prescriptions   Medication Sig Dispense Refill     acetaminophen (TYLENOL) 500 MG tablet Take 650 mg by mouth 2 times daily as needed for mild pain        albuterol (PROAIR HFA/PROVENTIL HFA/VENTOLIN HFA) 108 (90 BASE) MCG/ACT Inhaler Inhale 2 puffs into the lungs every 4 hours as needed for shortness of breath / dyspnea or wheezing       AMLODIPINE BESYLATE PO Take 10 mg by mouth daily       aspirin  EC 81 MG EC tablet Take 1 tablet (81 mg) by mouth daily 31 tablet PRN     FUROSEMIDE PO Take 40 mg by mouth daily       latanoprost (XALATAN) 0.005 % ophthalmic solution Place 1 drop into the right eye At Bedtime        levothyroxine (SYNTHROID/LEVOTHROID) 50 MCG tablet Take 1 tablet (50 mcg) by mouth daily 31 tablet PRN     MELATONIN PO Take 5 mg by mouth At Bedtime        metoprolol (LOPRESSOR) 25 MG tablet Take 1 tablet (25 mg) by mouth 2 times daily 62 tablet PRN     Multiple Vitamins-Minerals (CENTRUM SILVER ULTRA MENS) TABS Take 1 tablet by mouth daily       Multiple Vitamins-Minerals (EYE VITAMINS & MINERALS) TABS Take 1 tablet by mouth daily 60 tablet 11     nystatin (MYCOSTATIN) 044542 UNIT/GM POWD Apply topically 2 times daily as needed        potassium chloride (KLOR-CON) 20 MEQ Packet Take 40 mEq by mouth daily       senna-docusate (SENOKOT-S;PERICOLACE) 8.6-50 MG per tablet Take 1 tablet by mouth 2 times daily as needed for constipation       tamsulosin (FLOMAX) 0.4 MG capsule Take 1 capsule (0.4 mg) by mouth daily 31 capsule PRN     Medications reviewed:  Medications reconciled to facility chart and changes were made to reflect current medications as identified as above med list. Below are the changes that were made:   Medications stopped since last EPIC medication reconciliation:   Medications Discontinued During This Encounter   Medication Reason     predniSONE (DELTASONE) 5 MG tablet Discontinued by another Health Care Provider       Medications started since last [facility] medication reconciliation:  No orders of the defined types were placed in this encounter.    REVIEW OF SYSTEMS:  4 point ROS including Respiratory, CV, GI and , other than that noted in the HPI,  is negative      Physical Exam:  /70  Pulse 63  Temp 98.4  F (36.9  C)  Resp 18  SpO2 97%  GENERAL APPEARANCE:  Alert, in no distress  ENT:  Mouth and posterior oropharynx normal, moist mucous membranes, [place], history of bilateral  cerumen impaction   EYES:  EOM, conjunctivae, lids, pupils and irises normal-glasses  RESP:  respiratory effort and palpation of chest increased, lungs diminished to auscultation, expiratory wheezes   CV:  Palpation and auscultation of heart done, bradycardic (not today) and irregular rhythm, no murmur, rub, or gallop, peripheral edema 1+ in ankles and feet. [place] Hose on.  ABDOMEN:  normal bowel sounds, soft, nontender, no hepatosplenomegaly   M/S:   Gait and station abnormal using wheelchair currently, generalized weakness  SKIN:  Inspection of skin and subcutaneous tissue baseline- hypertrophic toenail great toe not examined today, reddened brittanie rash   NEURO:   Examination of sensation by touch normal  PSYCH:  insight and judgement impaired, memory impaired      CBC RESULTS:   Recent Labs:     CBC RESULTS:   Recent Labs   Lab Test 10/12/18 09/27/18   WBC  9.2  6.9   RBC  4.23*  4.14*   HGB  13.1*  13.0*   HCT  41.2  39.0*   MCV  97  94   MCH  31.0  31.4   MCHC  31.8  33.3   RDW  15.4*  14.8   PLT  372  347       Last Basic Metabolic Panel:  Recent Labs   Lab Test 10/12/18 09/27/18   NA  135  135   POTASSIUM  4.0  3.9   CHLORIDE  99  97   MERCEDES  8.9  9.4   CO2  31  29   BUN  24  27   CR  1.41*  1.34*   GLC  109*  148*       Liver Function Studies -   Recent Labs   Lab Test 09/13/18 04/19/18 04/03/17   0651   PROTTOTAL   --   7.9  6.6*   ALBUMIN   --   3.4  2.6*   BILITOTAL   --   0.6  0.8   ALKPHOS   --   78  53   AST  20  19  37   ALT  19  23  23       TSH   Date Value Ref Range Status   09/13/2018 3.45 0.40 - 4.00 mU/L Final   04/19/2018 5.05 (H) 0.40 - 4.00 mU/L Final       Assessment/Plan:  (N30.00) Acute cystitis without hematuria  (primary encounter diagnosis)  (R21) Rash and nonspecific skin eruption  Comment: Acute with ongoing skin breakdown and rash  Plan:  -Additional dose of Rocephin 1 gm IM with cultures pending  -Skin care orders for rash and breakdown    (J47.9) Adult bronchiectasis (H)  Comment:  Chronic   Plan:   -Scheduling Proair BID and q4Hprn  -Duonebs BID once nebulizer machine in place  -Consider ongoing Prednisone  -Appears no pulmonary follow up        Electronically signed by  SUNDAY Lee CNP

## 2018-10-30 NOTE — LETTER
10/30/2018        RE: Jacob Easton  [address]  [address]  [facility] [address]        [place] [facility]    Chief Complaint   Patient presents with     [facility] Acute       [place] Medical Record Number:  [id number]  Place of Service where encounter took place:  [facility] [[id number]]    HPI:    Jacob Easton is a 89 year old  (5/16/1929), who is being seen today for an episodic care visit.  HPI information obtained from: facility chart records.Today's concern is:    Acute cystitis without hematuria  Rash and nonspecific skin eruption  Resident with urinary s/s beginning 10/28 (Sunday). Orders for UA/UC. UA appears gross positive and with ongoing pain during urination- 1 gram of Rocephin given 10/29 while cultures pending. Preliminary results are 10,000-50,000 colonies pseudomonas aeruginosa. Incontinent at baseline and pain with urination could be related to urine stasis on reddened skin. Muna area and skin folds with rash.     Adult bronchiectasis (H)  Chronic and ongoing/worsening with dyspnea on exertion, pulmonary alveolitis, SOB. Did respond to prednisone burst and taper but that has ended. Appears did not ever follow up with Pulmonary for scheduled visit (5/12/17)   Does use Proair q4H prn. Audible expiratory wheezing heard with exam. Has benefited from duoneb treatment in past with flare and with ongoing disease progression would like to add for treatment plan.      ALLERGIES: Advil [ibuprofen]; Influenza virus vaccine h5n1; and Orange juice [orange oil]  Past Medical, Surgical, Family and Social History reviewed and updated in [facility].    Current Outpatient Prescriptions   Medication Sig Dispense Refill     acetaminophen (TYLENOL) 500 MG tablet Take 650 mg by mouth 2 times daily as needed for mild pain        albuterol (PROAIR HFA/PROVENTIL HFA/VENTOLIN HFA) 108 (90 BASE) MCG/ACT Inhaler Inhale 2 puffs into the lungs every 4 hours as needed for shortness  of breath / dyspnea or wheezing       AMLODIPINE BESYLATE PO Take 10 mg by mouth daily       aspirin EC 81 MG EC tablet Take 1 tablet (81 mg) by mouth daily 31 tablet PRN     FUROSEMIDE PO Take 40 mg by mouth daily       latanoprost (XALATAN) 0.005 % ophthalmic solution Place 1 drop into the right eye At Bedtime        levothyroxine (SYNTHROID/LEVOTHROID) 50 MCG tablet Take 1 tablet (50 mcg) by mouth daily 31 tablet PRN     MELATONIN PO Take 5 mg by mouth At Bedtime        metoprolol (LOPRESSOR) 25 MG tablet Take 1 tablet (25 mg) by mouth 2 times daily 62 tablet PRN     Multiple Vitamins-Minerals (CENTRUM SILVER ULTRA MENS) TABS Take 1 tablet by mouth daily       Multiple Vitamins-Minerals (EYE VITAMINS & MINERALS) TABS Take 1 tablet by mouth daily 60 tablet 11     nystatin (MYCOSTATIN) 904131 UNIT/GM POWD Apply topically 2 times daily as needed        potassium chloride (KLOR-CON) 20 MEQ Packet Take 40 mEq by mouth daily       senna-docusate (SENOKOT-S;PERICOLACE) 8.6-50 MG per tablet Take 1 tablet by mouth 2 times daily as needed for constipation       tamsulosin (FLOMAX) 0.4 MG capsule Take 1 capsule (0.4 mg) by mouth daily 31 capsule PRN     Medications reviewed:  Medications reconciled to facility chart and changes were made to reflect current medications as identified as above med list. Below are the changes that were made:   Medications stopped since last EPIC medication reconciliation:   Medications Discontinued During This Encounter   Medication Reason     predniSONE (DELTASONE) 5 MG tablet Discontinued by another Health Care Provider       Medications started since last [facility] medication reconciliation:  No orders of the defined types were placed in this encounter.    REVIEW OF SYSTEMS:  4 point ROS including Respiratory, CV, GI and , other than that noted in the HPI,  is negative      Physical Exam:  /70  Pulse 63  Temp 98.4  F (36.9  C)  Resp 18  SpO2 97%  GENERAL APPEARANCE:  Alert, in no  distress  ENT:  Mouth and posterior oropharynx normal, moist mucous membranes, [place], history of bilateral cerumen impaction   EYES:  EOM, conjunctivae, lids, pupils and irises normal-glasses  RESP:  respiratory effort and palpation of chest increased, lungs diminished to auscultation, expiratory wheezes   CV:  Palpation and auscultation of heart done, bradycardic (not today) and irregular rhythm, no murmur, rub, or gallop, peripheral edema 1+ in ankles and feet. Chang Hose on.  ABDOMEN:  normal bowel sounds, soft, nontender, no hepatosplenomegaly   M/S:   Gait and station abnormal using wheelchair currently, generalized weakness  SKIN:  Inspection of skin and subcutaneous tissue baseline- hypertrophic toenail great toe not examined today, reddened brittanie rash   NEURO:   Examination of sensation by touch normal  PSYCH:  insight and judgement impaired, memory impaired      CBC RESULTS:   Recent Labs:     CBC RESULTS:   Recent Labs   Lab Test 10/12/18 09/27/18   WBC  9.2  6.9   RBC  4.23*  4.14*   HGB  13.1*  13.0*   HCT  41.2  39.0*   MCV  97  94   MCH  31.0  31.4   MCHC  31.8  33.3   RDW  15.4*  14.8   PLT  372  347       Last Basic Metabolic Panel:  Recent Labs   Lab Test 10/12/18 09/27/18   NA  135  135   POTASSIUM  4.0  3.9   CHLORIDE  99  97   MERCEDES  8.9  9.4   CO2  31  29   BUN  24  27   CR  1.41*  1.34*   GLC  109*  148*       Liver Function Studies -   Recent Labs   Lab Test 09/13/18 04/19/18 04/03/17   0651   PROTTOTAL   --   7.9  6.6*   ALBUMIN   --   3.4  2.6*   BILITOTAL   --   0.6  0.8   ALKPHOS   --   78  53   AST  20  19  37   ALT  19  23  23       TSH   Date Value Ref Range Status   09/13/2018 3.45 0.40 - 4.00 mU/L Final   04/19/2018 5.05 (H) 0.40 - 4.00 mU/L Final       Assessment/Plan:  (N30.00) Acute cystitis without hematuria  (primary encounter diagnosis)  (R21) Rash and nonspecific skin eruption  Comment: Acute with ongoing skin breakdown and rash  Plan:  -Additional dose of Rocephin 1 gm IM with  cultures pending  -Skin care orders for rash and breakdown    (J47.9) Adult bronchiectasis (H)  Comment: Chronic   Plan:   -Scheduling Proair BID and q4Hprn  -Duonebs BID once nebulizer machine in place  -Consider ongoing Prednisone  -Appears no pulmonary follow up        Electronically signed by  SUNDAY Lee CNP                      Sincerely,        SUNDAY Lee CNP

## 2018-11-09 DIAGNOSIS — J84.10 PULMONARY FIBROSIS (H): Primary | ICD-10-CM

## 2018-11-09 DIAGNOSIS — H40.9 GLAUCOMA OF BOTH EYES, UNSPECIFIED GLAUCOMA TYPE: ICD-10-CM

## 2018-11-09 RX ORDER — LATANOPROST 50 UG/ML
SOLUTION/ DROPS OPHTHALMIC
Qty: 2.5 ML | Refills: 98 | Status: SHIPPED | OUTPATIENT
Start: 2018-11-09 | End: 2019-12-03

## 2018-11-09 RX ORDER — ALBUTEROL SULFATE 90 UG/1
AEROSOL, METERED RESPIRATORY (INHALATION)
Qty: 18 G | Refills: 98 | Status: SHIPPED | OUTPATIENT
Start: 2018-11-09 | End: 2019-07-30

## 2018-11-13 ENCOUNTER — ASSISTED LIVING VISIT (OUTPATIENT)
Dept: GERIATRICS | Facility: CLINIC | Age: 83
End: 2018-11-13
Payer: MEDICARE

## 2018-11-13 VITALS
SYSTOLIC BLOOD PRESSURE: 138 MMHG | OXYGEN SATURATION: 96 % | DIASTOLIC BLOOD PRESSURE: 70 MMHG | HEART RATE: 89 BPM | RESPIRATION RATE: 18 BRPM | TEMPERATURE: 98.8 F

## 2018-11-13 DIAGNOSIS — M62.81 GENERALIZED MUSCLE WEAKNESS: ICD-10-CM

## 2018-11-13 DIAGNOSIS — Z91.89 AT RISK FOR ASPIRATION: ICD-10-CM

## 2018-11-13 DIAGNOSIS — Z71.89 ACP (ADVANCE CARE PLANNING): ICD-10-CM

## 2018-11-13 DIAGNOSIS — R06.09 DOE (DYSPNEA ON EXERTION): Primary | ICD-10-CM

## 2018-11-13 PROCEDURE — 99358 PROLONG SERVICE W/O CONTACT: CPT | Performed by: NURSE PRACTITIONER

## 2018-11-13 RX ORDER — IPRATROPIUM BROMIDE AND ALBUTEROL SULFATE 2.5; .5 MG/3ML; MG/3ML
1 SOLUTION RESPIRATORY (INHALATION) 3 TIMES DAILY
COMMUNITY
End: 2019-06-17

## 2018-11-13 NOTE — LETTER
11/13/2018        RE: Jacob Easton  [address]  [address]  [facility] [address]        [place] [facility]    Chief Complaint   Patient presents with     RECHECK       [place] Medical Record Number:  [id number]  Place of Service where encounter took place:  [facility] [[id number]]    HPI:    Jacob Easton is a 89 year old  (5/16/1929), who is being seen today for an episodic care visit.  HPI information obtained from: facility chart records.Today's concern is:    GREENE (dyspnea on exertion)  Ongoing and chronic. History of pulmonary alveolitis, bronchiectasis, chronic cystic changes, 5.3 cm bulla in left lung base. History of responding to prednisone burst/taper but unclear if using lower dose ongoing would be an option. Currently some improvement with duonebs BID also family has provided Mucinex Allergy BID. Proair bedside.     At risk for aspiration  Difficultly with morning pills and did aspirate at today's exam. Has worked with speech. Is now agreeable to crushing pills. Ongoing risk. Treated for pneumonia 10/31.    Generalized muscle weakness  Multifactorial with overall decline in functional status and family is considering hospice eval. Needing more assistance in AL. Becoming more forgetful and lacks awareness of cognitive decline. Recent SLUMS was 16/30. Tylenol bottle bedside. Unclear use on daily basis.    ACP (advance care planning)  His daughter Justin is in town helping with both parents. Care conference including director of nursing to review options including move to memory care, hospice, medications, review of POLST, review of traditional medication therapy and holistic treatments preferred by some family members.       ALLERGIES: Advil [ibuprofen]; Influenza virus vaccine h5n1; and Orange juice [orange oil]  Past Medical, Surgical, Family and Social History reviewed and updated in [facility].    Current Outpatient Prescriptions   Medication Sig Dispense Refill      acetaminophen (TYLENOL) 500 MG tablet Take 650 mg by mouth 2 times daily as needed for mild pain        AMLODIPINE BESYLATE PO Take 10 mg by mouth daily       aspirin EC 81 MG EC tablet Take 1 tablet (81 mg) by mouth daily 31 tablet PRN     Coloplast barrier cream CREA Apply topically 2 times daily and daily PRN       Fexofenadine HCl (MUCINEX ALLERGY PO) Take 1,200 mg by mouth 2 times daily       FUROSEMIDE PO Take 40 mg by mouth daily       ipratropium - albuterol 0.5 mg/2.5 mg/3 mL (DUONEB) 0.5-2.5 (3) MG/3ML neb solution Take 1 vial by nebulization 2 times daily AND 2 times daily PRN       latanoprost (XALATAN) 0.005 % ophthalmic solution INSTILL ONE DROP IN THE RIGHT EYE EVERY EVENING 2.5 mL 98     levothyroxine (SYNTHROID/LEVOTHROID) 50 MCG tablet Take 1 tablet (50 mcg) by mouth daily 31 tablet PRN     MELATONIN PO Take 5 mg by mouth At Bedtime        metoprolol (LOPRESSOR) 25 MG tablet Take 1 tablet (25 mg) by mouth 2 times daily 62 tablet PRN     Multiple Vitamins-Minerals (CENTRUM SILVER ULTRA MENS) TABS Take 1 tablet by mouth daily       Multiple Vitamins-Minerals (EYE VITAMINS & MINERALS) TABS Take 1 tablet by mouth daily 60 tablet 11     nystatin (MYCOSTATIN) 523501 UNIT/GM POWD Apply topically 2 times daily AND daily PRN       potassium chloride (KLOR-CON) 20 MEQ Packet Take 40 mEq by mouth daily       senna-docusate (SENOKOT-S;PERICOLACE) 8.6-50 MG per tablet Take 1 tablet by mouth 2 times daily as needed for constipation       tamsulosin (FLOMAX) 0.4 MG capsule Take 1 capsule (0.4 mg) by mouth daily 31 capsule PRN     VENTOLIN  (90 Base) MCG/ACT inhaler INHALE 2 PUFFS INTO THE LUNGS TWICE DAILY; & INHALE 2 PUFFS INTO THE LUNGS EVERY 4 HOURS AS NEEDED 18 g 98     Patient Active Problem List   Diagnosis     Pain in joint, pelvic region and thigh     Hypokalemia due to hydrochlorothiazide     BPH (benign prostatic hyperplasia)     Physical deconditioning     Neuropathic pain of both feet      Pulmonary vascular congestion     Benign essential hypertension     Acquired hypothyroidism     Acute kidney failure (H)     Rheumatoid arthritis involving both hands (H)     Acute renal failure (ARF) (H)     Hypercalcemia     Dysphagia     Urinary tract infection without hematuria, site unspecified     Recurrent cold sores     TIA (transient ischemic attack)     Right wrist drop     Pulmonary alveolitis (H)     Atrial fibrillation, unspecified type (H)     CKD (chronic kidney disease) stage 3, GFR 30-59 ml/min (H)     Advance Care Planning     Hypothyroidism, unspecified type     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 (H)     Hypoxia       Medications reviewed:  Medications reconciled to facility chart and changes were made to reflect current medications as identified as above med list. Below are the changes that were made:   Medications stopped since last EPIC medication reconciliation:   There are no discontinued medications.    Medications started since last [facility] medication reconciliation:  Orders Placed This Encounter   Medications     Coloplast barrier cream CREA     Sig: Apply topically 2 times daily and daily PRN     ipratropium - albuterol 0.5 mg/2.5 mg/3 mL (DUONEB) 0.5-2.5 (3) MG/3ML neb solution     Sig: Take 1 vial by nebulization 2 times daily AND 2 times daily PRN     Fexofenadine HCl (MUCINEX ALLERGY PO)     Sig: Take 1,200 mg by mouth 2 times daily       REVIEW OF SYSTEMS:  4 point ROS including Respiratory, CV, GI and , other than that noted in the HPI,  is negative      Physical Exam:  /70  Pulse 89  Temp 98.8  F (37.1  C)  Resp 18  SpO2 96%  GENERAL APPEARANCE:  Alert, in no acute distress  ENT:  Mouth and posterior oropharynx normal, moist mucous membranes, [place], history of bilateral cerumen impaction   EYES:  EOM, conjunctivae, lids, pupils and irises normal-glasses  RESP:  respiratory effort and palpation of chest increased, lungs diminished to  auscultation, expiratory wheezes, distant in posterior fields   CV:  Palpation and auscultation of heart done, bradycardic (not today) and irregular rhythm, no murmur, rub, or gallop, peripheral edema 1+ in ankles and feet. Chang Hose on.  ABDOMEN:  normal bowel sounds, soft, nontender, no hepatosplenomegaly   M/S:   Gait and station abnormal using wheelchair currently, generalized weakness  SKIN:  Inspection of skin and subcutaneous tissue baseline  NEURO:   Examination of sensation by touch normal  PSYCH:  insight and judgement impaired, memory impaired     Recent Labs:     CBC RESULTS:   Recent Labs   Lab Test 10/12/18 09/27/18   WBC  9.2  6.9   RBC  4.23*  4.14*   HGB  13.1*  13.0*   HCT  41.2  39.0*   MCV  97  94   MCH  31.0  31.4   MCHC  31.8  33.3   RDW  15.4*  14.8   PLT  372  347       Last Basic Metabolic Panel:  Recent Labs   Lab Test 10/12/18 09/27/18   NA  135  135   POTASSIUM  4.0  3.9   CHLORIDE  99  97   MERCEDES  8.9  9.4   CO2  31  29   BUN  24  27   CR  1.41*  1.34*   GLC  109*  148*       Liver Function Studies -   Recent Labs   Lab Test 09/13/18 04/19/18 04/03/17   0651   PROTTOTAL   --   7.9  6.6*   ALBUMIN   --   3.4  2.6*   BILITOTAL   --   0.6  0.8   ALKPHOS   --   78  53   AST  20  19  37   ALT  19  23  23       TSH   Date Value Ref Range Status   09/13/2018 3.45 0.40 - 4.00 mU/L Final   04/19/2018 5.05 (H) 0.40 - 4.00 mU/L Final       Assessment/Plan:  (R06.09) GREENE (dyspnea on exertion)  (primary encounter diagnosis)  Comment: Ongoing   Plan:   -Continuing Duonebs BID  -Mucinex 600 mg po every day   -Proair     (Z91.89) At risk for aspiration  Comment: Ongoing  Plan:   -Order to crush pills  -monitor for aspiration related respiratory illness    (M62.81) Generalized muscle weakness  Comment: Ongoing  Plan:   -Has Tylenol bedside so will discontinue prn dosing to reduce overuse  -Staff assist for SBA    (Z71.89) ACP (advance care planning)  Comment: Ongoing  Plan:   -care conference  today    [place] [facility]  NON-FACE TO FACE PROLONGED SERVICE    Jacob Easton 5/16/1929    Date of Related Face to Face Service: 11/13/18    INTENT OF SERVICE  This is an extended evaluation of patient's specific problem.  The service relates to a recent or future E/M visit.  Documentation supports medical necessity, relates to ongoing patient management and documents start times and stop times.    Regarding the following diagnoses:     GREENE (dyspnea on exertion)  At risk for aspiration  Generalized muscle weakness  ACP (advance care planning),     * I discussed with Justin who is daughter of the patient.  (Start time 1300  Stop time 1400)    ASSESSMENT/PLAN- see above        TIME  Total time spent with Non-Face to Face prolonged service 60 minutes.                   Electronically signed by  SUNDAY Lee CNP                      Sincerely,        SUNDAY Lee CNP

## 2018-11-13 NOTE — PROGRESS NOTES
[facility]    Chief Complaint   Patient presents with     ADDIE       [facility] Medical Record Number:  [id number]  Place of Service where encounter took place:  [facility] [[id number]]    HPI:    Jacob Easton is a 89 year old  (5/16/1929), who is being seen today for an episodic care visit.  HPI information obtained from: facility chart records.Today's concern is:    GREENE (dyspnea on exertion)  Ongoing and chronic. History of pulmonary alveolitis, bronchiectasis, chronic cystic changes, 5.3 cm bulla in left lung base. History of responding to prednisone burst/taper but unclear if using lower dose ongoing would be an option. Currently some improvement with duonebs BID also family has provided Mucinex Allergy BID. Proair bedside.     At risk for aspiration  Difficultly with morning pills and did aspirate at today's exam. Has worked with speech. Is now agreeable to crushing pills. Ongoing risk. Treated for pneumonia 10/31.    Generalized muscle weakness  Multifactorial with overall decline in functional status and family is considering hospice eval. Needing more assistance in AL. Becoming more forgetful and lacks awareness of cognitive decline. Recent SLUMS was 16/30. Tylenol bottle bedside. Unclear use on daily basis.    ACP (advance care planning)  His daughter Justin is in town helping with both parents. Care conference including director of nursing to review options including move to memory care, hospice, medications, review of POLST, review of traditional medication therapy and holistic treatments preferred by some family members.       ALLERGIES: Advil [ibuprofen]; Influenza virus vaccine h5n1; and Orange juice [orange oil]  Past Medical, Surgical, Family and Social History reviewed and updated in [facility].    Current Outpatient Prescriptions   Medication Sig Dispense Refill     acetaminophen (TYLENOL) 500 MG tablet Take 650 mg by mouth 2 times daily as needed for mild pain         AMLODIPINE BESYLATE PO Take 10 mg by mouth daily       aspirin EC 81 MG EC tablet Take 1 tablet (81 mg) by mouth daily 31 tablet PRN     Coloplast barrier cream CREA Apply topically 2 times daily and daily PRN       Fexofenadine HCl (MUCINEX ALLERGY PO) Take 1,200 mg by mouth 2 times daily       FUROSEMIDE PO Take 40 mg by mouth daily       ipratropium - albuterol 0.5 mg/2.5 mg/3 mL (DUONEB) 0.5-2.5 (3) MG/3ML neb solution Take 1 vial by nebulization 2 times daily AND 2 times daily PRN       latanoprost (XALATAN) 0.005 % ophthalmic solution INSTILL ONE DROP IN THE RIGHT EYE EVERY EVENING 2.5 mL 98     levothyroxine (SYNTHROID/LEVOTHROID) 50 MCG tablet Take 1 tablet (50 mcg) by mouth daily 31 tablet PRN     MELATONIN PO Take 5 mg by mouth At Bedtime        metoprolol (LOPRESSOR) 25 MG tablet Take 1 tablet (25 mg) by mouth 2 times daily 62 tablet PRN     Multiple Vitamins-Minerals (CENTRUM SILVER ULTRA MENS) TABS Take 1 tablet by mouth daily       Multiple Vitamins-Minerals (EYE VITAMINS & MINERALS) TABS Take 1 tablet by mouth daily 60 tablet 11     nystatin (MYCOSTATIN) 356065 UNIT/GM POWD Apply topically 2 times daily AND daily PRN       potassium chloride (KLOR-CON) 20 MEQ Packet Take 40 mEq by mouth daily       senna-docusate (SENOKOT-S;PERICOLACE) 8.6-50 MG per tablet Take 1 tablet by mouth 2 times daily as needed for constipation       tamsulosin (FLOMAX) 0.4 MG capsule Take 1 capsule (0.4 mg) by mouth daily 31 capsule PRN     VENTOLIN  (90 Base) MCG/ACT inhaler INHALE 2 PUFFS INTO THE LUNGS TWICE DAILY; & INHALE 2 PUFFS INTO THE LUNGS EVERY 4 HOURS AS NEEDED 18 g 98     Patient Active Problem List   Diagnosis     Pain in joint, pelvic region and thigh     Hypokalemia due to hydrochlorothiazide     BPH (benign prostatic hyperplasia)     Physical deconditioning     Neuropathic pain of both feet     Pulmonary vascular congestion     Benign essential hypertension     Acquired hypothyroidism     Acute  kidney failure (H)     Rheumatoid arthritis involving both hands (H)     Acute renal failure (ARF) (H)     Hypercalcemia     Dysphagia     Urinary tract infection without hematuria, site unspecified     Recurrent cold sores     TIA (transient ischemic attack)     Right wrist drop     Pulmonary alveolitis (H)     Atrial fibrillation, unspecified type (H)     CKD (chronic kidney disease) stage 3, GFR 30-59 ml/min (H)     Advance Care Planning     Hypothyroidism, unspecified type     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 (H)     Hypoxia       Medications reviewed:  Medications reconciled to facility chart and changes were made to reflect current medications as identified as above med list. Below are the changes that were made:   Medications stopped since last EPIC medication reconciliation:   There are no discontinued medications.    Medications started since last [facility] medication reconciliation:  Orders Placed This Encounter   Medications     Coloplast barrier cream CREA     Sig: Apply topically 2 times daily and daily PRN     ipratropium - albuterol 0.5 mg/2.5 mg/3 mL (DUONEB) 0.5-2.5 (3) MG/3ML neb solution     Sig: Take 1 vial by nebulization 2 times daily AND 2 times daily PRN     Fexofenadine HCl (MUCINEX ALLERGY PO)     Sig: Take 1,200 mg by mouth 2 times daily       REVIEW OF SYSTEMS:  4 point ROS including Respiratory, CV, GI and , other than that noted in the HPI,  is negative      Physical Exam:  /70  Pulse 89  Temp 98.8  F (37.1  C)  Resp 18  SpO2 96%  GENERAL APPEARANCE:  Alert, in no acute distress  ENT:  Mouth and posterior oropharynx normal, moist mucous membranes, [place], history of bilateral cerumen impaction   EYES:  EOM, conjunctivae, lids, pupils and irises normal-glasses  RESP:  respiratory effort and palpation of chest increased, lungs diminished to auscultation, expiratory wheezes, distant in posterior fields   CV:  Palpation and auscultation of heart done,  bradycardic (not today) and irregular rhythm, no murmur, rub, or gallop, peripheral edema 1+ in ankles and feet. Chang Hose on.  ABDOMEN:  normal bowel sounds, soft, nontender, no hepatosplenomegaly   M/S:   Gait and station abnormal using wheelchair currently, generalized weakness  SKIN:  Inspection of skin and subcutaneous tissue baseline  NEURO:   Examination of sensation by touch normal  PSYCH:  insight and judgement impaired, memory impaired     Recent Labs:     CBC RESULTS:   Recent Labs   Lab Test 10/12/18 09/27/18   WBC  9.2  6.9   RBC  4.23*  4.14*   HGB  13.1*  13.0*   HCT  41.2  39.0*   MCV  97  94   MCH  31.0  31.4   MCHC  31.8  33.3   RDW  15.4*  14.8   PLT  372  347       Last Basic Metabolic Panel:  Recent Labs   Lab Test 10/12/18 09/27/18   NA  135  135   POTASSIUM  4.0  3.9   CHLORIDE  99  97   MERCEDES  8.9  9.4   CO2  31  29   BUN  24  27   CR  1.41*  1.34*   GLC  109*  148*       Liver Function Studies -   Recent Labs   Lab Test 09/13/18 04/19/18 04/03/17   0651   PROTTOTAL   --   7.9  6.6*   ALBUMIN   --   3.4  2.6*   BILITOTAL   --   0.6  0.8   ALKPHOS   --   78  53   AST  20  19  37   ALT  19  23  23       TSH   Date Value Ref Range Status   09/13/2018 3.45 0.40 - 4.00 mU/L Final   04/19/2018 5.05 (H) 0.40 - 4.00 mU/L Final       Assessment/Plan:  (R06.09) GREENE (dyspnea on exertion)  (primary encounter diagnosis)  Comment: Ongoing   Plan:   -Continuing Duonebs BID  -Mucinex 600 mg po every day   -Proair     (Z91.89) At risk for aspiration  Comment: Ongoing  Plan:   -Order to crush pills  -monitor for aspiration related respiratory illness    (M62.81) Generalized muscle weakness  Comment: Ongoing  Plan:   -Has Tylenol bedside so will discontinue prn dosing to reduce overuse  -Staff assist for SBA    (Z71.89) ACP (advance care planning)  Comment: Ongoing  Plan:   -care conference today    [facility]  NON-FACE TO FACE PROLONGED SERVICE    Jacob Easton 5/16/1929    Date of Related Face  to Face Service: 11/13/18    INTENT OF SERVICE  This is an extended evaluation of patient's specific problem.  The service relates to a recent or future E/M visit.  Documentation supports medical necessity, relates to ongoing patient management and documents start times and stop times.    Regarding the following diagnoses:     GREENE (dyspnea on exertion)  At risk for aspiration  Generalized muscle weakness  ACP (advance care planning),     * I discussed with Justin who is daughter of the patient.  (Start time 1300  Stop time 1400)    ASSESSMENT/PLAN- see above        TIME  Total time spent with Non-Face to Face prolonged service 60 minutes.                   Electronically signed by  SUNDAY Lee CNP

## 2018-11-18 DIAGNOSIS — R09.89 CHEST CONGESTION: Primary | ICD-10-CM

## 2018-12-04 ENCOUNTER — DOCUMENTATION ONLY (OUTPATIENT)
Dept: OTHER | Facility: CLINIC | Age: 83
End: 2018-12-04

## 2018-12-04 ENCOUNTER — ASSISTED LIVING VISIT (OUTPATIENT)
Dept: GERIATRICS | Facility: CLINIC | Age: 83
End: 2018-12-04
Payer: MEDICARE

## 2018-12-04 DIAGNOSIS — H61.23 BILATERAL IMPACTED CERUMEN: Primary | ICD-10-CM

## 2018-12-04 PROBLEM — Z71.89 ADVANCED DIRECTIVES, COUNSELING/DISCUSSION: Chronic | Status: RESOLVED | Noted: 2017-09-06 | Resolved: 2018-12-04

## 2018-12-04 RX ORDER — MENTHOL AND ZINC OXIDE .44; 20.625 G/100G; G/100G
OINTMENT TOPICAL 2 TIMES DAILY
COMMUNITY
End: 2019-02-21

## 2018-12-04 NOTE — PROGRESS NOTES
[facility]    Chief Complaint   Patient presents with     residential Acute       [facility] Medical Record Number:  [id number]  Place of Service where encounter took place:  [facility] [[id number]]    HPI:    Jacob Easton is a 89 year old  (5/16/1929), who is being seen today for an episodic care visit.  HPI information obtained from: facility chart records.Today's concern is:    Bilateral impacted cerumen  Wax build-up in both ear canals seen with otoscope. He did have appointment last week to have removal at ENT but transportation issue prevented appointment. Prefers removal with micro suction vacuum.      ALLERGIES: Advil [ibuprofen]; Influenza virus vaccine h5n1; and Orange juice [orange oil]  Past Medical, Surgical, Family and Social History reviewed and updated in [email or web address].    Current Outpatient Prescriptions   Medication Sig Dispense Refill     AMLODIPINE BESYLATE PO Take 10 mg by mouth daily       aspirin EC 81 MG EC tablet Take 1 tablet (81 mg) by mouth daily 31 tablet PRN     Coloplast barrier cream CREA Apply topically 2 times daily and daily PRN       FUROSEMIDE PO Take 40 mg by mouth daily       ipratropium - albuterol 0.5 mg/2.5 mg/3 mL (DUONEB) 0.5-2.5 (3) MG/3ML neb solution Take 1 vial by nebulization 2 times daily AND 2 times daily PRN       latanoprost (XALATAN) 0.005 % ophthalmic solution INSTILL ONE DROP IN THE RIGHT EYE EVERY EVENING 2.5 mL 98     levothyroxine (SYNTHROID/LEVOTHROID) 50 MCG tablet Take 1 tablet (50 mcg) by mouth daily 31 tablet PRN     MELATONIN PO Take 5 mg by mouth At Bedtime        menthol-zinc oxide (CALMOSEPTINE) 0.44-20.625 % OINT ointment Apply topically 2 times daily       metoprolol (LOPRESSOR) 25 MG tablet Take 1 tablet (25 mg) by mouth 2 times daily 62 tablet PRN     MUCINEX 600 MG 12 hr tablet TAKE 1 TABLET BY MOUTH ONCE DAILY 31 tablet 98     Multiple Vitamins-Minerals (CENTRUM SILVER ULTRA MENS) TABS Take 1 tablet by mouth  daily       Multiple Vitamins-Minerals (EYE VITAMINS & MINERALS) TABS Take 1 tablet by mouth daily 60 tablet 11     nystatin (MYCOSTATIN) 915796 UNIT/GM POWD Apply topically 2 times daily AND daily PRN       potassium chloride (KLOR-CON) 20 MEQ Packet Take 40 mEq by mouth daily       senna-docusate (SENOKOT-S;PERICOLACE) 8.6-50 MG per tablet Take 1 tablet by mouth 2 times daily as needed for constipation       tamsulosin (FLOMAX) 0.4 MG capsule Take 1 capsule (0.4 mg) by mouth daily 31 capsule PRN     VENTOLIN  (90 Base) MCG/ACT inhaler INHALE 2 PUFFS INTO THE LUNGS TWICE DAILY; & INHALE 2 PUFFS INTO THE LUNGS EVERY 4 HOURS AS NEEDED 18 g 98     Medications reviewed:  Medications reconciled to facility chart and changes were made to reflect current medications as identified as above med list. Below are the changes that were made:   Medications stopped since last EPIC medication reconciliation:   Medications Discontinued During This Encounter   Medication Reason     acetaminophen (TYLENOL) 500 MG tablet Medication Reconciliation Clean Up     Fexofenadine HCl (MUCINEX ALLERGY PO) Medication Reconciliation Clean Up       Medications started since last [facility] medication reconciliation:  Orders Placed This Encounter   Medications     menthol-zinc oxide (CALMOSEPTINE) 0.44-20.625 % OINT ointment     Sig: Apply topically 2 times daily       REVIEW OF SYSTEMS:  4 point ROS including Respiratory, CV, GI and , other than that noted in the HPI,  is negative    Physical Exam:  Vitals: HR 78 Oxygen Sats 97%  GENERAL APPEARANCE:  Alert,  Some loose cough noted  ENT:  Mouth and posterior oropharynx normal, moist mucous membranes, [place], history of bilateral cerumen impaction   EYES:  EOM, conjunctivae, lids, pupils and irises normal-glasses  RESP:  respiratory effort and palpation of chest increased, lungs diminished to auscultation, expiratory wheezes, distant in posterior fields       Assessment/Plan:  (H61.23) Bilateral  impacted cerumen  (primary encounter diagnosis)  Comment: Ongoing  Plan:   -cerumen removal with lighted curette to both ear canals           Electronically signed by  SUNDAY Lee CNP

## 2018-12-13 ENCOUNTER — ASSISTED LIVING VISIT (OUTPATIENT)
Dept: GERIATRICS | Facility: CLINIC | Age: 83
End: 2018-12-13
Payer: MEDICARE

## 2018-12-13 VITALS
HEART RATE: 75 BPM | SYSTOLIC BLOOD PRESSURE: 120 MMHG | TEMPERATURE: 98.3 F | OXYGEN SATURATION: 96 % | RESPIRATION RATE: 16 BRPM | DIASTOLIC BLOOD PRESSURE: 71 MMHG

## 2018-12-13 DIAGNOSIS — L21.9 SEBORRHEA: ICD-10-CM

## 2018-12-13 DIAGNOSIS — Z71.89 ACP (ADVANCE CARE PLANNING): ICD-10-CM

## 2018-12-13 DIAGNOSIS — R09.89 CHEST CONGESTION: Primary | ICD-10-CM

## 2018-12-13 NOTE — LETTER
12/13/2018        RE: Jacob Easton  [address]  [address]  [facility] [address]        [facility]    Chief Complaint   Patient presents with     group home Acute       [facility] Medical Record Number:  [id number]  Place of Service where encounter took place:  [facility] [[id number]]    HPI:    Jacob Easton is a 89 year old  (5/16/1929), who is being seen today for an episodic care visit.  HPI information obtained from: facility chart records.Today's concern is:    Chest congestion  Staff reports increased cough with mucus and congestion at times. At risk for aspiration, pills are now crushed. He denies pain, chest pain or SOB. Reports cough with mucus at times. Ongoing and chronic. History of pulmonary alveolitis, bronchiectasis, chronic cystic changes, 5.3 cm bulla in left lung base. History of responding to prednisone burst/taper but unclear if using lower dose ongoing would be an option. Currently some improvement with duonebs BID also family has provided Mucinex Allergy BID. Proair bedside.     Seborrhea  Ongoing and his family is concerned. Doesn't appear to bother him.    ACP  Meeting with his daughter and facility nurse. Noted that with goals of care and ongoing general decline would avoid hospitalizations with acute illness and desires eval to hospice next year and when meeting criteria.    ALLERGIES: Advil [ibuprofen]; Influenza virus vaccine h5n1; and Orange juice [orange oil]  Past Medical, Surgical, Family and Social History reviewed and updated in [employer].    Current Outpatient Medications   Medication Sig Dispense Refill     AMLODIPINE BESYLATE PO Take 10 mg by mouth daily       aspirin EC 81 MG EC tablet Take 1 tablet (81 mg) by mouth daily 31 tablet PRN     FUROSEMIDE PO Take 40 mg by mouth daily       ipratropium - albuterol 0.5 mg/2.5 mg/3 mL (DUONEB) 0.5-2.5 (3) MG/3ML neb solution Take 1 vial by nebulization 3 times daily AND once daily PRN        latanoprost (XALATAN) 0.005 % ophthalmic solution INSTILL ONE DROP IN THE RIGHT EYE EVERY EVENING 2.5 mL 98     levothyroxine (SYNTHROID/LEVOTHROID) 50 MCG tablet Take 1 tablet (50 mcg) by mouth daily 31 tablet PRN     MELATONIN PO Take 5 mg by mouth At Bedtime        menthol-zinc oxide (CALMOSEPTINE) 0.44-20.625 % OINT ointment Apply topically 2 times daily AND daily PRN       metoprolol (LOPRESSOR) 25 MG tablet Take 1 tablet (25 mg) by mouth 2 times daily 62 tablet PRN     MUCINEX 600 MG 12 hr tablet TAKE 1 TABLET BY MOUTH ONCE DAILY (Patient taking differently: TAKE 1 TABLET BY MOUTH TWICE DAILY) 31 tablet 98     Multiple Vitamins-Minerals (CENTRUM SILVER ULTRA MENS) TABS Take 1 tablet by mouth daily       Multiple Vitamins-Minerals (EYE VITAMINS & MINERALS) TABS Take 1 tablet by mouth daily 60 tablet 11     nystatin (MYCOSTATIN) 383184 UNIT/GM POWD Apply topically 2 times daily AND daily PRN       potassium chloride (KLOR-CON) 20 MEQ Packet Take 40 mEq by mouth daily       Probiotic Product (PROBIOTIC-10) CHEW Take 2 chew tab by mouth daily       senna-docusate (SENOKOT-S;PERICOLACE) 8.6-50 MG per tablet Take 1 tablet by mouth 2 times daily as needed for constipation       tamsulosin (FLOMAX) 0.4 MG capsule Take 1 capsule (0.4 mg) by mouth daily 31 capsule PRN     VENTOLIN  (90 Base) MCG/ACT inhaler INHALE 2 PUFFS INTO THE LUNGS TWICE DAILY; & INHALE 2 PUFFS INTO THE LUNGS EVERY 4 HOURS AS NEEDED 18 g 98     Patient Active Problem List   Diagnosis     Pain in joint, pelvic region and thigh     Hypokalemia due to hydrochlorothiazide     BPH (benign prostatic hyperplasia)     Physical deconditioning     Neuropathic pain of both feet     Pulmonary vascular congestion     Benign essential hypertension     Acquired hypothyroidism     Acute kidney failure (H)     Rheumatoid arthritis involving both hands (H)     Acute renal failure (ARF) (H)     Hypercalcemia     Dysphagia     Urinary tract infection  without hematuria, site unspecified     Recurrent cold sores     TIA (transient ischemic attack)     Right wrist drop     Pulmonary alveolitis (H)     Atrial fibrillation, unspecified type (H)     CKD (chronic kidney disease) stage 3, GFR 30-59 ml/min (H)     Hypothyroidism, unspecified type     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 (H)     Hypoxia     Seborrhea     Medications reviewed:  Medications reconciled to facility chart and changes were made to reflect current medications as identified as above med list. Below are the changes that were made:   Medications stopped since last EPIC medication reconciliation:   Medications Discontinued During This Encounter   Medication Reason     Coloplast barrier cream CREA        Medications started since last [facility] medication reconciliation:  No orders of the defined types were placed in this encounter.    REVIEW OF SYSTEMS:  4 point ROS including Respiratory, CV, GI and , other than that noted in the HPI,  is negative      Physical Exam:  /71   Pulse 75   Temp 98.3  F (36.8  C)   Resp 16   SpO2 96%   GENERAL APPEARANCE:  Alert, in no acute distress  ENT:  Mouth and posterior oropharynx normal, moist mucous membranes, [place], history of bilateral cerumen impaction- minimal today  EYES:  EOM, conjunctivae, lids, pupils and irises normal-glasses  RESP:  respiratory effort and palpation of chest increased, lungs diminished to auscultation, no expiratory wheezes, distant in posterior fields   CV:  Palpation and auscultation of heart done, bradycardic (not today) and irregular rhythm, no murmur, rub, or gallop, peripheral edema 1+ in ankles and feet. Chang Hose on.  ABDOMEN:  normal bowel sounds, soft, nontender  M/S:   Gait and station abnormal using wheelchair for ambulation, generalized weakness  SKIN:  Inspection of skin and subcutaneous tissue baseline  NEURO:   Examination of sensation by touch normal  PSYCH:  insight and judgement  impaired, memory impaired     Recent Labs:     CBC RESULTS:   Recent Labs   Lab Test 10/12/18 09/27/18   WBC 9.2 6.9   RBC 4.23* 4.14*   HGB 13.1* 13.0*   HCT 41.2 39.0*   MCV 97 94   MCH 31.0 31.4   MCHC 31.8 33.3   RDW 15.4* 14.8    347       Last Basic Metabolic Panel:  Recent Labs   Lab Test 10/12/18 09/27/18    135   POTASSIUM 4.0 3.9   CHLORIDE 99 97   MERCEDES 8.9 9.4   CO2 31 29   BUN 24 27   CR 1.41* 1.34*   * 148*       Liver Function Studies -   Recent Labs   Lab Test 09/13/18 04/19/18 04/03/17  0651   PROTTOTAL  --  7.9 6.6*   ALBUMIN  --  3.4 2.6*   BILITOTAL  --  0.6 0.8   ALKPHOS  --  78 53   AST 20 19 37   ALT 19 23 23       TSH   Date Value Ref Range Status   09/13/2018 3.45 0.40 - 4.00 mU/L Final   04/19/2018 5.05 (H) 0.40 - 4.00 mU/L Final     Assessment/Plan:  (R09.89) Chest congestion  (primary encounter diagnosis)  Comment: Chronic  Plan:   -Increasing Duonebs to TID and will monitor for improvement  -Mucinex 600 mg po daily  -Ventolin BID and q4H prn    (L21.9) Seborrhea  Comment: Ongoing  Plan:   -Family to supply facial cleanser for resident to use BID (cetaphil or like)    (Z71.89) ACP (advance care planning)  Comment: Ongoing  Plan:   -Hospice eval next year     Family would like daily probiotic 20 billion        Electronically signed by  SUNDAY Lee CNP                      Sincerely,        SUNDAY Lee CNP

## 2018-12-13 NOTE — PROGRESS NOTES
[facility]    Chief Complaint   Patient presents with     [facility] Acute       [facility] Record Number:  [id number]  Place of Service where encounter took place:  [facility] [[id number]]    HPI:    Jacob Easton is a 89 year old  (5/16/1929), who is being seen today for an episodic care visit.  HPI information obtained from: facility chart records.Today's concern is:    Chest congestion  Staff reports increased cough with mucus and congestion at times. At risk for aspiration, pills are now crushed. He denies pain, chest pain or SOB. Reports cough with mucus at times. Ongoing and chronic. History of pulmonary alveolitis, bronchiectasis, chronic cystic changes, 5.3 cm bulla in left lung base. History of responding to prednisone burst/taper but unclear if using lower dose ongoing would be an option. Currently some improvement with duonebs BID also family has provided Mucinex Allergy BID. Proair bedside.     Seborrhea  Ongoing and his family is concerned. Doesn't appear to bother him.    ACP  Meeting with his daughter and facility nurse. Noted that with goals of care and ongoing general decline would avoid hospitalizations with acute illness and desires eval to hospice next year and when meeting criteria.    ALLERGIES: Advil [ibuprofen]; Influenza virus vaccine h5n1; and Orange juice [orange oil]  Past Medical, Surgical, Family and Social History reviewed and updated in [employer].    Current Outpatient Medications   Medication Sig Dispense Refill     AMLODIPINE BESYLATE PO Take 10 mg by mouth daily       aspirin EC 81 MG EC tablet Take 1 tablet (81 mg) by mouth daily 31 tablet PRN     FUROSEMIDE PO Take 40 mg by mouth daily       ipratropium - albuterol 0.5 mg/2.5 mg/3 mL (DUONEB) 0.5-2.5 (3) MG/3ML neb solution Take 1 vial by nebulization 3 times daily AND once daily PRN       latanoprost (XALATAN) 0.005 % ophthalmic solution INSTILL ONE DROP IN THE RIGHT EYE EVERY  EVENING 2.5 mL 98     levothyroxine (SYNTHROID/LEVOTHROID) 50 MCG tablet Take 1 tablet (50 mcg) by mouth daily 31 tablet PRN     MELATONIN PO Take 5 mg by mouth At Bedtime        menthol-zinc oxide (CALMOSEPTINE) 0.44-20.625 % OINT ointment Apply topically 2 times daily AND daily PRN       metoprolol (LOPRESSOR) 25 MG tablet Take 1 tablet (25 mg) by mouth 2 times daily 62 tablet PRN     MUCINEX 600 MG 12 hr tablet TAKE 1 TABLET BY MOUTH ONCE DAILY (Patient taking differently: TAKE 1 TABLET BY MOUTH TWICE DAILY) 31 tablet 98     Multiple Vitamins-Minerals (CENTRUM SILVER ULTRA MENS) TABS Take 1 tablet by mouth daily       Multiple Vitamins-Minerals (EYE VITAMINS & MINERALS) TABS Take 1 tablet by mouth daily 60 tablet 11     nystatin (MYCOSTATIN) 024693 UNIT/GM POWD Apply topically 2 times daily AND daily PRN       potassium chloride (KLOR-CON) 20 MEQ Packet Take 40 mEq by mouth daily       Probiotic Product (PROBIOTIC-10) CHEW Take 2 chew tab by mouth daily       senna-docusate (SENOKOT-S;PERICOLACE) 8.6-50 MG per tablet Take 1 tablet by mouth 2 times daily as needed for constipation       tamsulosin (FLOMAX) 0.4 MG capsule Take 1 capsule (0.4 mg) by mouth daily 31 capsule PRN     VENTOLIN  (90 Base) MCG/ACT inhaler INHALE 2 PUFFS INTO THE LUNGS TWICE DAILY; & INHALE 2 PUFFS INTO THE LUNGS EVERY 4 HOURS AS NEEDED 18 g 98     Patient Active Problem List   Diagnosis     Pain in joint, pelvic region and thigh     Hypokalemia due to hydrochlorothiazide     BPH (benign prostatic hyperplasia)     Physical deconditioning     Neuropathic pain of both feet     Pulmonary vascular congestion     Benign essential hypertension     Acquired hypothyroidism     Acute kidney failure (H)     Rheumatoid arthritis involving both hands (H)     Acute renal failure (ARF) (H)     Hypercalcemia     Dysphagia     Urinary tract infection without hematuria, site unspecified     Recurrent cold sores     TIA (transient ischemic attack)      Right wrist drop     Pulmonary alveolitis (H)     Atrial fibrillation, unspecified type (H)     CKD (chronic kidney disease) stage 3, GFR 30-59 ml/min (H)     Hypothyroidism, unspecified type     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 (H)     Hypoxia     Seborrhea     Medications reviewed:  Medications reconciled to facility chart and changes were made to reflect current medications as identified as above med list. Below are the changes that were made:   Medications stopped since last EPIC medication reconciliation:   Medications Discontinued During This Encounter   Medication Reason     Coloplast barrier cream CREA        Medications started since last [facility] medication reconciliation:  No orders of the defined types were placed in this encounter.    REVIEW OF SYSTEMS:  4 point ROS including Respiratory, CV, GI and , other than that noted in the HPI,  is negative      Physical Exam:  /71   Pulse 75   Temp 98.3  F (36.8  C)   Resp 16   SpO2 96%   GENERAL APPEARANCE:  Alert, in no acute distress  ENT:  Mouth and posterior oropharynx normal, moist mucous membranes, [place], history of bilateral cerumen impaction- minimal today  EYES:  EOM, conjunctivae, lids, pupils and irises normal-glasses  RESP:  respiratory effort and palpation of chest increased, lungs diminished to auscultation, no expiratory wheezes, distant in posterior fields   CV:  Palpation and auscultation of heart done, bradycardic (not today) and irregular rhythm, no murmur, rub, or gallop, peripheral edema 1+ in ankles and feet. Chang Hose on.  ABDOMEN:  normal bowel sounds, soft, nontender  M/S:   Gait and station abnormal using wheelchair for ambulation, generalized weakness  SKIN:  Inspection of skin and subcutaneous tissue baseline  NEURO:   Examination of sensation by touch normal  PSYCH:  insight and judgement impaired, memory impaired     Recent Labs:     CBC RESULTS:   Recent Labs   Lab Test 10/12/18 09/27/18   WBC  9.2 6.9   RBC 4.23* 4.14*   HGB 13.1* 13.0*   HCT 41.2 39.0*   MCV 97 94   MCH 31.0 31.4   MCHC 31.8 33.3   RDW 15.4* 14.8    347       Last Basic Metabolic Panel:  Recent Labs   Lab Test 10/12/18 09/27/18    135   POTASSIUM 4.0 3.9   CHLORIDE 99 97   MERCEDES 8.9 9.4   CO2 31 29   BUN 24 27   CR 1.41* 1.34*   * 148*       Liver Function Studies -   Recent Labs   Lab Test 09/13/18 04/19/18 04/03/17  0651   PROTTOTAL  --  7.9 6.6*   ALBUMIN  --  3.4 2.6*   BILITOTAL  --  0.6 0.8   ALKPHOS  --  78 53   AST 20 19 37   ALT 19 23 23       TSH   Date Value Ref Range Status   09/13/2018 3.45 0.40 - 4.00 mU/L Final   04/19/2018 5.05 (H) 0.40 - 4.00 mU/L Final     Assessment/Plan:  (R09.89) Chest congestion  (primary encounter diagnosis)  Comment: Chronic  Plan:   -Increasing Duonebs to TID and will monitor for improvement  -Mucinex 600 mg po daily  -Ventolin BID and q4H prn    (L21.9) Seborrhea  Comment: Ongoing  Plan:   -Family to supply facial cleanser for resident to use BID (cetaphil or like)    (Z71.89) ACP (advance care planning)  Comment: Ongoing  Plan:   -Hospice eval next year     Family would like daily probiotic 20 billion        Electronically signed by  SUNDAY Lee CNP

## 2018-12-26 DIAGNOSIS — Z78.9 TAKES DIETARY SUPPLEMENTS: Primary | ICD-10-CM

## 2018-12-31 RX ORDER — LACTOBACILLUS RHAMNOSUS GG 10B CELL
CAPSULE ORAL
Qty: 60 TABLET | Refills: 97 | Status: SHIPPED | OUTPATIENT
Start: 2018-12-31 | End: 2019-01-10

## 2019-01-10 ENCOUNTER — ASSISTED LIVING VISIT (OUTPATIENT)
Dept: GERIATRICS | Facility: CLINIC | Age: 84
End: 2019-01-10
Payer: COMMERCIAL

## 2019-01-10 VITALS
OXYGEN SATURATION: 90 % | SYSTOLIC BLOOD PRESSURE: 140 MMHG | RESPIRATION RATE: 18 BRPM | DIASTOLIC BLOOD PRESSURE: 70 MMHG | TEMPERATURE: 99.2 F | HEART RATE: 92 BPM

## 2019-01-10 DIAGNOSIS — H61.23 BILATERAL IMPACTED CERUMEN: ICD-10-CM

## 2019-01-10 DIAGNOSIS — I48.91 ATRIAL FIBRILLATION, UNSPECIFIED TYPE (H): ICD-10-CM

## 2019-01-10 DIAGNOSIS — M06.9 RHEUMATOID ARTHRITIS INVOLVING BOTH HANDS, UNSPECIFIED RHEUMATOID FACTOR PRESENCE: ICD-10-CM

## 2019-01-10 DIAGNOSIS — R09.89 CHEST CONGESTION: ICD-10-CM

## 2019-01-10 DIAGNOSIS — L98.491 ULCER OF EXTERNAL EAR, LIMITED TO BREAKDOWN OF SKIN (H): ICD-10-CM

## 2019-01-10 DIAGNOSIS — J47.9 ADULT BRONCHIECTASIS (H): Primary | ICD-10-CM

## 2019-01-10 DIAGNOSIS — R53.81 PHYSICAL DECONDITIONING: ICD-10-CM

## 2019-01-10 PROCEDURE — 69210 REMOVE IMPACTED EAR WAX UNI: CPT | Mod: 50 | Performed by: NURSE PRACTITIONER

## 2019-01-10 PROCEDURE — 99358 PROLONG SERVICE W/O CONTACT: CPT | Performed by: NURSE PRACTITIONER

## 2019-01-10 RX ORDER — LACTOBACILLUS RHAMNOSUS GG 10B CELL
1 CAPSULE ORAL DAILY
COMMUNITY
End: 2019-08-08

## 2019-01-10 RX ORDER — PREDNISONE 10 MG/1
TABLET ORAL
Qty: 40 TABLET | Refills: 0 | Status: SHIPPED | OUTPATIENT
Start: 2019-01-10 | End: 2019-02-21

## 2019-01-10 RX ORDER — PREDNISONE 5 MG/1
5 TABLET ORAL DAILY
Qty: 30 TABLET | Refills: 1 | Status: SHIPPED | OUTPATIENT
Start: 2019-01-27 | End: 2019-02-10

## 2019-01-10 NOTE — PROGRESS NOTES
[facility]    Chief Complaint   Patient presents with     retirement Acute       [facility] Record Number:  [id number]  Place of Service where encounter took place:  [facility] [[id number]]    HPI:    Jacob Easton is a 89 year old  (5/16/1929), who is being seen today for an episodic care visit.  HPI information obtained from: facility chart records.Today's concern is:    Adult bronchiectasis (H)  Chest Congestion  Auditory expiratory wheezes prior to nebulizer treatment and intermittent cough with some chest congestion. Less cough with pill administration as now crushed and appears tolerating better. Denies pain, chest pain or shortness of breath. History of pulmonary alveolitis, bronchiectasis, chronic cystic changes, 5.3 cm bulla in left lung base. History of responding to prednisone burst/taper and family is now willing to consider low dose ongoing for comfort. He is adamant about treatment, has ongoing functional decline. Some improvement with duonebs BID now increased to TID. Mucinex daily and Proair bedside.     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 Reddened. He denies pain or itch and is not interested in treatment.    Rheumatoid arthritis involving both hands, unspecified rheumatoid factor presence (H)  No recent flares. Was on methotrexate (10 years) which was discontinued related to respiratory changes with CT showing ground glass opacities c/w acute alveolitis and persistent cystic changes throughout both lungs.     Atrial fibrillation, unspecified type (H)  RRR today. No previous history noted, but had episode of Afib with RVR (4/2017). He was given a does of IV metoprolol and PTA PO metoprolol continued with good control of rate. He was noted to be in NSR on Echo. He does have high [id number] score, but due to falls risk, decided against anticoagulation. Is on ASA 81mg daily.     Bilateral  Cerumen Impaction  Cerumen in both ear canals L>R viewed with otoscope.    Physical Deconditioning   Ongoing functional decline. Staff reporting difficultly with transfers to shower. Previously was on walking program now struggling with pivot transfers.        ALLERGIES: Advil [ibuprofen]; Influenza virus vaccine h5n1; and Orange juice [orange oil]  Past Medical, Surgical, Family and Social History reviewed and [facility] in [facility].    Current Outpatient Medications   Medication Sig Dispense Refill     AMLODIPINE BESYLATE PO Take 10 mg by mouth daily       aspirin EC 81 MG EC tablet Take 1 tablet (81 mg) by mouth daily 31 tablet PRN     FUROSEMIDE PO Take 40 mg by mouth daily       ipratropium - albuterol 0.5 mg/2.5 mg/3 mL (DUONEB) 0.5-2.5 (3) MG/3ML neb solution Take 1 vial by nebulization 3 times daily AND once daily PRN       lactobacillus rhamnosus, GG, (CULTURELL) capsule Take 1 capsule by mouth daily       latanoprost (XALATAN) 0.005 % ophthalmic solution INSTILL ONE DROP IN THE RIGHT EYE EVERY EVENING 2.5 mL 98     levothyroxine (SYNTHROID/LEVOTHROID) 50 MCG tablet Take 1 tablet (50 mcg) by mouth daily 31 tablet PRN     MELATONIN PO Take 5 mg by mouth At Bedtime        menthol-zinc oxide (CALMOSEPTINE) 0.44-20.625 % OINT ointment Apply topically 2 times daily AND daily PRN       metoprolol (LOPRESSOR) 25 MG tablet Take 1 tablet (25 mg) by mouth 2 times daily 62 tablet PRN     MUCINEX 600 MG 12 hr tablet TAKE 1 TABLET BY MOUTH ONCE DAILY 31 tablet 98     Multiple Vitamins-Minerals (CENTRUM SILVER ULTRA MENS) TABS Take 1 tablet by mouth daily       Multiple Vitamins-Minerals (EYE VITAMINS & MINERALS) TABS Take 1 tablet by mouth daily 60 tablet 11     nystatin (MYCOSTATIN) 356227 UNIT/GM POWD Apply topically 2 times daily AND daily PRN       potassium chloride (KLOR-CON) 20 MEQ Packet Take 40 mEq by mouth daily       senna-docusate (SENOKOT-S;PERICOLACE) 8.6-50 MG per tablet Take 1 tablet by mouth 2 times daily as  needed for constipation       tamsulosin (FLOMAX) 0.4 MG capsule Take 1 capsule (0.4 mg) by mouth daily 31 capsule PRN     VENTOLIN  (90 Base) MCG/ACT inhaler INHALE 2 PUFFS INTO THE LUNGS TWICE DAILY; & INHALE 2 PUFFS INTO THE LUNGS EVERY 4 HOURS AS NEEDED 18 g 98     Patient Active Problem List   Diagnosis     Pain in joint, pelvic region and thigh     Hypokalemia due to hydrochlorothiazide     BPH (benign prostatic hyperplasia)     Physical deconditioning     Neuropathic pain of both feet     Pulmonary vascular congestion     Benign essential hypertension     Acquired hypothyroidism     Acute kidney failure (H)     Rheumatoid arthritis involving both hands (H)     Acute renal failure (ARF) (H)     Hypercalcemia     Dysphagia     Urinary tract infection without hematuria, site unspecified     Recurrent cold sores     TIA (transient ischemic attack)     Right wrist drop     Pulmonary alveolitis (H)     Atrial fibrillation, unspecified type (H)     CKD (chronic kidney disease) stage 3, GFR 30-59 ml/min (H)     Hypothyroidism, unspecified type     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 (H)     Hypoxia     Seborrhea     Ulcer of external ear, limited to breakdown of skin (H)         Medications reviewed:  Medications reconciled to facility chart and changes were made to reflect current medications as identified as above med list. Below are the changes that were made:   Medications stopped since last EPIC medication reconciliation:   Medications Discontinued During This Encounter   Medication Reason     Lactobacillus-Inulin (CULTURELLE DIGESTIVE HEALTH) CHEW Medication Reconciliation Clean Up     Probiotic Product (PROBIOTIC-10) CHEW Medication Reconciliation Clean Up       Medications started since last [facility] medication reconciliation:  Orders Placed This Encounter   Medications     lactobacillus rhamnosus, GG, (CULTURELL) capsule     Sig: Take 1 capsule by mouth daily     REVIEW OF  SYSTEMS:  4 point ROS including Respiratory, CV, GI and , other than that noted in the HPI,  is negative        Physical Exam:  /70   Pulse 92   Temp 99.2  F (37.3  C)   Resp 18   SpO2 90%   GENERAL APPEARANCE:  Alert, in no acute distress  ENT:  Mouth and posterior oropharynx normal, moist mucous membranes, [place], history of bilateral cerumen impaction-wax viewed today  EYES:  EOM, conjunctivae, lids, pupils and irises normal-glasses  RESP:  respiratory effort and palpation of chest increased, lungs diminished to auscultation, no expiratory wheezes, distant in posterior fields   CV:  Palpation and auscultation of heart done, bradycardic (not today) and regular rhythm, 3/6 murmur- not heard on previous exam,  peripheral edema 1+ in ankles and feet. Chang Hose on.  ABDOMEN:  normal bowel sounds, soft, nontender  M/S:   Gait and station abnormal using wheelchair for ambulation, generalized weakness  SKIN:  Inspection of skin and subcutaneous tissue baseline to visualized areas  NEURO:   Examination of sensation by touch normal  PSYCH:  insight and judgement impaired, memory impaired      Recent Labs:     CBC RESULTS:   Recent Labs   Lab Test 10/12/18 09/27/18   WBC 9.2 6.9   RBC 4.23* 4.14*   HGB 13.1* 13.0*   HCT 41.2 39.0*   MCV 97 94   MCH 31.0 31.4   MCHC 31.8 33.3   RDW 15.4* 14.8    347       Last Basic Metabolic Panel:  Recent Labs   Lab Test 10/12/18 09/27/18    135   POTASSIUM 4.0 3.9   CHLORIDE 99 97   MERCEDES 8.9 9.4   CO2 31 29   BUN 24 27   CR 1.41* 1.34*   * 148*       Liver Function Studies -   Recent Labs   Lab Test 09/13/18 04/19/18 04/03/17  0651   PROTTOTAL  --  7.9 6.6*   ALBUMIN  --  3.4 2.6*   BILITOTAL  --  0.6 0.8   ALKPHOS  --  78 53   AST 20 19 37   ALT 19 23 23       TSH   Date Value Ref Range Status   09/13/2018 3.45 0.40 - 4.00 mU/L Final   04/19/2018 5.05 (H) 0.40 - 4.00 mU/L Final       Assessment/Plan:  (J47.9) Adult bronchiectasis (H)  (R09.89) Chest  congestion  Comment: Acute on Chronic  Plan:   -CXR to rule out acute illness  -Prednisone burst/ taper will continue on trial of 5 mg po daily after taper  -Duonebs TID and once daily prn  -Mucinex 600 mg po daily  -Ventolin BID and q4H prn    (L98.491) Ulcer of external ear, 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     (I48.91) Atrial fibrillation, unspecified type (H)  Comment: History of with aortic stenosis  Plan:   -ASA 81 mg po daily  -BMP,CBC next lab day 1/17/19    (H61.23) Bilateral Impacted Cerumen  Comment: Ongoing issue  Plan:  --cerumen removal with lighted curette to both ear canals     (R53.81) Physical deconditioning  Comment: Ongoing functional decline  Plan:  -[facility] PT/OT to eval and treat    [facility]  NON-FACE TO FACE PROLONGED SERVICE    Jacob Easton 5/16/1929    Date of Related Face to Face Service: 1/10/19    INTENT OF SERVICE  This is an extended evaluation of patient's specific problem.  The service relates to a recent or future E/M visit.  Documentation supports medical necessity, relates to ongoing patient management and documents start times and stop times.    Regarding the following diagnoses:     Adult bronchiectasis (H)  Chest congestion  Ulcer of external ear, limited to breakdown of skin (H)  Rheumatoid arthritis involving both hands, unspecified rheumatoid factor presence (H)  Atrial fibrillation, unspecified type (H)  Bilateral impacted cerumen  Physical deconditioning,     * I discussed with Cuca who is daughter of the patient.  (Start time 1640  Stop time 1727).     ASSESSMENT/PLAN    Stated above      TIME  Total time spent with Non-Face to Face prolonged service 47 minutes.     Electronically signed by:  SUNDAY Lee CNP

## 2019-01-10 NOTE — LETTER
1/10/2019        RE: Jacob Easton  [address]  [address]  [facility] [address]        [place] [facility]    Chief Complaint   Patient presents with     skilled nursing Acute       [place] Medical Record Number:  [id number]  Place of Service where encounter took place:  [facility] [[id number]]    HPI:    Jacob Easton is a 89 year old  (5/16/1929), who is being seen today for an episodic care visit.  HPI information obtained from: facility chart records.Today's concern is:    Adult bronchiectasis (H)  Chest Congestion  Auditory expiratory wheezes prior to nebulizer treatment and intermittent cough with some chest congestion. Less cough with pill administration as now crushed and appears tolerating better. Denies pain, chest pain or shortness of breath. History of pulmonary alveolitis, bronchiectasis, chronic cystic changes, 5.3 cm bulla in left lung base. History of responding to prednisone burst/taper and family is now willing to consider low dose ongoing for comfort. He is adamant about treatment, has ongoing functional decline. Some improvement with duonebs BID now increased to TID. Mucinex daily and Proair bedside.     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 Reddened. He denies pain or itch and is not interested in treatment.    Rheumatoid arthritis involving both hands, unspecified rheumatoid factor presence (H)  No recent flares. Was on methotrexate (10 years) which was discontinued related to respiratory changes with CT showing ground glass opacities c/w acute alveolitis and persistent cystic changes throughout both lungs.     Atrial fibrillation, unspecified type (H)  RRR today. No previous history noted, but had episode of Afib with RVR (4/2017). He was given a does of IV metoprolol and PTA PO metoprolol continued with good control of rate. He was noted to be in NSR on Echo. He does have high  viqzb4isty score, but due to falls risk, decided against anticoagulation. Is on ASA 81mg daily.     Bilateral Cerumen Impaction  Cerumen in both ear canals L>R viewed with otoscope.    Physical Deconditioning   Ongoing functional decline. Staff reporting difficultly with transfers to shower. Previously was on walking program now struggling with pivot transfers.        ALLERGIES: Advil [ibuprofen]; Influenza virus vaccine h5n1; and Orange juice [orange oil]  Past Medical, Surgical, Family and Social History reviewed and updated in [facility].    Current Outpatient Medications   Medication Sig Dispense Refill     AMLODIPINE BESYLATE PO Take 10 mg by mouth daily       aspirin EC 81 MG EC tablet Take 1 tablet (81 mg) by mouth daily 31 tablet PRN     FUROSEMIDE PO Take 40 mg by mouth daily       ipratropium - albuterol 0.5 mg/2.5 mg/3 mL (DUONEB) 0.5-2.5 (3) MG/3ML neb solution Take 1 vial by nebulization 3 times daily AND once daily PRN       lactobacillus rhamnosus, GG, (CULTURELL) capsule Take 1 capsule by mouth daily       latanoprost (XALATAN) 0.005 % ophthalmic solution INSTILL ONE DROP IN THE RIGHT EYE EVERY EVENING 2.5 mL 98     levothyroxine (SYNTHROID/LEVOTHROID) 50 MCG tablet Take 1 tablet (50 mcg) by mouth daily 31 tablet PRN     MELATONIN PO Take 5 mg by mouth At Bedtime        menthol-zinc oxide (CALMOSEPTINE) 0.44-20.625 % OINT ointment Apply topically 2 times daily AND daily PRN       metoprolol (LOPRESSOR) 25 MG tablet Take 1 tablet (25 mg) by mouth 2 times daily 62 tablet PRN     MUCINEX 600 MG 12 hr tablet TAKE 1 TABLET BY MOUTH ONCE DAILY 31 tablet 98     Multiple Vitamins-Minerals (CENTRUM SILVER ULTRA MENS) TABS Take 1 tablet by mouth daily       Multiple Vitamins-Minerals (EYE VITAMINS & MINERALS) TABS Take 1 tablet by mouth daily 60 tablet 11     nystatin (MYCOSTATIN) 656928 UNIT/GM POWD Apply topically 2 times daily AND daily PRN       potassium chloride (KLOR-CON) 20 MEQ Packet Take 40 mEq by mouth  daily       senna-docusate (SENOKOT-S;PERICOLACE) 8.6-50 MG per tablet Take 1 tablet by mouth 2 times daily as needed for constipation       tamsulosin (FLOMAX) 0.4 MG capsule Take 1 capsule (0.4 mg) by mouth daily 31 capsule PRN     VENTOLIN  (90 Base) MCG/ACT inhaler INHALE 2 PUFFS INTO THE LUNGS TWICE DAILY; & INHALE 2 PUFFS INTO THE LUNGS EVERY 4 HOURS AS NEEDED 18 g 98     Patient Active Problem List   Diagnosis     Pain in joint, pelvic region and thigh     Hypokalemia due to hydrochlorothiazide     BPH (benign prostatic hyperplasia)     Physical deconditioning     Neuropathic pain of both feet     Pulmonary vascular congestion     Benign essential hypertension     Acquired hypothyroidism     Acute kidney failure (H)     Rheumatoid arthritis involving both hands (H)     Acute renal failure (ARF) (H)     Hypercalcemia     Dysphagia     Urinary tract infection without hematuria, site unspecified     Recurrent cold sores     TIA (transient ischemic attack)     Right wrist drop     Pulmonary alveolitis (H)     Atrial fibrillation, unspecified type (H)     CKD (chronic kidney disease) stage 3, GFR 30-59 ml/min (H)     Hypothyroidism, unspecified type     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 (H)     Hypoxia     Seborrhea     Ulcer of external ear, limited to breakdown of skin (H)         Medications reviewed:  Medications reconciled to facility chart and changes were made to reflect current medications as identified as above med list. Below are the changes that were made:   Medications stopped since last EPIC medication reconciliation:   Medications Discontinued During This Encounter   Medication Reason     Lactobacillus-Inulin ([employer] HEALTH) CHEW Medication Reconciliation Clean Up     Probiotic Product (PROBIOTIC-10) CHEW Medication Reconciliation Clean Up       Medications started since last [facility] medication reconciliation:  Orders Placed This Encounter    Medications     lactobacillus rhamnosus, GG, (CULTURELL) capsule     Sig: Take 1 capsule by mouth daily     REVIEW OF SYSTEMS:  4 point ROS including Respiratory, CV, GI and , other than that noted in the HPI,  is negative        Physical Exam:  /70   Pulse 92   Temp 99.2  F (37.3  C)   Resp 18   SpO2 90%   GENERAL APPEARANCE:  Alert, in no acute distress  ENT:  Mouth and posterior oropharynx normal, moist mucous membranes, [place], history of bilateral cerumen impaction-wax viewed today  EYES:  EOM, conjunctivae, lids, pupils and irises normal-glasses  RESP:  respiratory effort and palpation of chest increased, lungs diminished to auscultation, no expiratory wheezes, distant in posterior fields   CV:  Palpation and auscultation of heart done, bradycardic (not today) and regular rhythm, 3/6 murmur- not heard on previous exam,  peripheral edema 1+ in ankles and feet. Chang Hose on.  ABDOMEN:  normal bowel sounds, soft, nontender  M/S:   Gait and station abnormal using wheelchair for ambulation, generalized weakness  SKIN:  Inspection of skin and subcutaneous tissue baseline to visualized areas  NEURO:   Examination of sensation by touch normal  PSYCH:  insight and judgement impaired, memory impaired      Recent Labs:     CBC RESULTS:   Recent Labs   Lab Test 10/12/18 09/27/18   WBC 9.2 6.9   RBC 4.23* 4.14*   HGB 13.1* 13.0*   HCT 41.2 39.0*   MCV 97 94   MCH 31.0 31.4   MCHC 31.8 33.3   RDW 15.4* 14.8    347       Last Basic Metabolic Panel:  Recent Labs   Lab Test 10/12/18 09/27/18    135   POTASSIUM 4.0 3.9   CHLORIDE 99 97   MERCEDES 8.9 9.4   CO2 31 29   BUN 24 27   CR 1.41* 1.34*   * 148*       Liver Function Studies -   Recent Labs   Lab Test 09/13/18 04/19/18 04/03/17  0651   PROTTOTAL  --  7.9 6.6*   ALBUMIN  --  3.4 2.6*   BILITOTAL  --  0.6 0.8   ALKPHOS  --  78 53   AST 20 19 37   ALT 19 23 23       TSH   Date Value Ref Range Status   09/13/2018 3.45 0.40 - 4.00 mU/L Final    04/19/2018 5.05 (H) 0.40 - 4.00 mU/L Final       Assessment/Plan:  (J47.9) Adult bronchiectasis (H)  (R09.89) Chest congestion  Comment: Acute on Chronic  Plan:   -CXR to rule out acute illness  -Prednisone burst/ taper will continue on trial of 5 mg po daily after taper  -Duonebs TID and once daily prn  -Mucinex 600 mg po daily  -Ventolin BID and q4H prn    (L98.491) Ulcer of external ear, 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     (I48.91) Atrial fibrillation, unspecified type (H)  Comment: History of with aortic stenosis  Plan:   -ASA 81 mg po daily  -BMP,CBC next lab day 1/17/19    (H61.23) Bilateral Impacted Cerumen  Comment: Ongoing issue  Plan:  --cerumen removal with lighted curette to both ear canals     (R53.81) Physical deconditioning  Comment: Ongoing functional decline  Plan:  -[facility] PT[facility] to eval and treat    [place] [facility]  NON-FACE TO FACE PROLONGED SERVICE    Jacob Easton 5/16/1929    Date of Related Face to Face Service: 1/10/19    INTENT OF SERVICE  This is an extended evaluation of patient's specific problem.  The service relates to a recent or future E/M visit.  Documentation supports medical necessity, relates to ongoing patient management and documents start times and stop times.    Regarding the following diagnoses:     Adult bronchiectasis (H)  Chest congestion  Ulcer of external ear, limited to breakdown of skin (H)  Rheumatoid arthritis involving both hands, unspecified rheumatoid factor presence (H)  Atrial fibrillation, unspecified type (H)  Bilateral impacted cerumen  Physical deconditioning,     * I discussed with Cuca who is daughter of the patient.  (Start time 1640  Stop time 1727).     ASSESSMENT/PLAN    Stated above      TIME  Total time spent with Non-Face to Face  prolonged service 47 minutes.     Electronically signed by:  SUNDAY Lee CNP                                    Sincerely,        SUNDAY Lee CNP

## 2019-01-14 ENCOUNTER — DOCUMENTATION ONLY (OUTPATIENT)
Dept: GERIATRICS | Facility: CLINIC | Age: 84
End: 2019-01-14

## 2019-01-14 NOTE — PROGRESS NOTES
Dear Orestes Clement,  Medicare Home Health regulations requires [facility] to provide an initial assessment visit either within 48 hours of the patient's return home, or on the physician ordered Start of Care date.    There will be a delay in the Initial Assessment for Jacob Easton; MRN [id number]  We anticipate the Start of care will be 1/15/19      Sincerely [facility]  Liseth Newton  [phone]

## 2019-01-15 DIAGNOSIS — R21 RASH AND NONSPECIFIC SKIN ERUPTION: Primary | ICD-10-CM

## 2019-01-15 RX ORDER — ANORECTAL OINTMENT 15.7; .44; 24; 20.6 G/100G; G/100G; G/100G; G/100G
OINTMENT TOPICAL
Qty: 113 G | Refills: 10 | Status: SHIPPED | OUTPATIENT
Start: 2019-01-15 | End: 2019-05-15

## 2019-01-17 ENCOUNTER — TRANSFERRED RECORDS (OUTPATIENT)
Dept: HEALTH INFORMATION MANAGEMENT | Facility: CLINIC | Age: 84
End: 2019-01-17

## 2019-01-17 LAB
ANION GAP SERPL CALCULATED.3IONS-SCNC: NORMAL MMOL/L
BUN SERPL-MCNC: NORMAL MG/DL
CALCIUM SERPL-MCNC: NORMAL MG/DL
CHLORIDE SERPLBLD-SCNC: NORMAL MMOL/L
CO2 SERPL-SCNC: NORMAL MMOL/L
CREAT SERPL-MCNC: NORMAL MG/DL
ERYTHROCYTE [DISTWIDTH] IN BLOOD BY AUTOMATED COUNT: 13.9 % (ref 10–15)
GFR SERPL CREATININE-BSD FRML MDRD: NORMAL ML/MIN/1.73M2
GLUCOSE SERPL-MCNC: NORMAL MG/DL (ref 70–99)
HCT VFR BLD AUTO: 39.9 % (ref 40–53)
HEMOGLOBIN: 12.8 G/DL (ref 13.3–17.7)
MCH RBC QN AUTO: 29.2 PG (ref 26.5–33)
MCHC RBC AUTO-ENTMCNC: 32.1 G/DL (ref 31.5–36.5)
MCV RBC AUTO: 91 FL (ref 78–100)
PLATELET # BLD AUTO: 447 10E9/L (ref 150–450)
POTASSIUM SERPL-SCNC: NORMAL MMOL/L
RBC # BLD AUTO: 4.38 10E12/L (ref 4.4–5.9)
SODIUM SERPL-SCNC: NORMAL MMOL/L
WBC # BLD AUTO: 16.4 10E9/L (ref 4–11)

## 2019-01-22 ENCOUNTER — ASSISTED LIVING VISIT (OUTPATIENT)
Dept: GERIATRICS | Facility: CLINIC | Age: 84
End: 2019-01-22
Payer: COMMERCIAL

## 2019-01-22 VITALS
OXYGEN SATURATION: 95 % | RESPIRATION RATE: 16 BRPM | SYSTOLIC BLOOD PRESSURE: 115 MMHG | HEART RATE: 66 BPM | DIASTOLIC BLOOD PRESSURE: 69 MMHG | TEMPERATURE: 97.8 F

## 2019-01-22 DIAGNOSIS — J47.9 ADULT BRONCHIECTASIS (H): Primary | ICD-10-CM

## 2019-01-22 DIAGNOSIS — R09.89 CHEST CONGESTION: ICD-10-CM

## 2019-01-22 NOTE — LETTER
1/22/2019        RE: Jacob Easton  [address]  [address]  [facility] [address]        [place] [facility]    Chief Complaint   Patient presents with     senior living Acute       [place] Medical Record Number:  [id number]  Place of Service where encounter took place:  [facility] [[id number]]    HPI:    Jacob Easton is a 89 year old  (5/16/1929), who is being seen today for an episodic care visit.  HPI information obtained from: facility chart records.Today's concern is:    Adult bronchiectasis (H)  Chest Congestion  Auditory expiratory wheezes prior to nebulizer treatment and intermittent cough with some chest congestion is much improved. Less cough with pill administration as now crushed and appears tolerating better. Denies pain, chest pain or shortness of breath. History of pulmonary alveolitis, bronchiectasis, chronic cystic changes, 5.3 cm bulla in left lung base, chronic lung infiltrates so did not start antibiotics with most recent CXR. WBC elevated but prednisone in use and he is afebrile w/o s/s of infection. History of responding to prednisone burst/taper and family is now willing to consider low dose ongoing for comfort. Currently use of prednisone.He is adamant about treatment onsite and no hospitalizations, has ongoing functional decline. Some improvement with duonebs BID now increased to TID. Mucinex daily and Proair bedside      ALLERGIES: Advil [ibuprofen]; Influenza virus vaccine h5n1; and Orange juice [orange oil]  Past Medical, Surgical, Family and Social History reviewed and updated in [facility].    Current Outpatient Medications   Medication Sig Dispense Refill     AMLODIPINE BESYLATE PO Take 10 mg by mouth daily       aspirin EC 81 MG EC tablet Take 1 tablet (81 mg) by mouth daily 31 tablet PRN     CALMOSEPTINE 0.44-20.6 % OINT ointment APPLY TO CLEANSED REDDENED CHARISSA SKIN FOLDS  AND RASH WITH BREAKDOWN DURING MORNING AND BEDTIME CARES AND INCONTINENT  CHANGES OR MEDICATION P 113 g 10     FUROSEMIDE PO Take 40 mg by mouth daily       ipratropium - albuterol 0.5 mg/2.5 mg/3 mL (DUONEB) 0.5-2.5 (3) MG/3ML neb solution Take 1 vial by nebulization 3 times daily AND once daily PRN       lactobacillus rhamnosus, GG, (CULTURELL) capsule Take 1 capsule by mouth daily       latanoprost (XALATAN) 0.005 % ophthalmic solution INSTILL ONE DROP IN THE RIGHT EYE EVERY EVENING 2.5 mL 98     levothyroxine (SYNTHROID/LEVOTHROID) 50 MCG tablet Take 1 tablet (50 mcg) by mouth daily 31 tablet PRN     MELATONIN PO Take 5 mg by mouth At Bedtime        menthol-zinc oxide (CALMOSEPTINE) 0.44-20.625 % OINT ointment Apply topically 2 times daily AND daily PRN       metoprolol (LOPRESSOR) 25 MG tablet Take 1 tablet (25 mg) by mouth 2 times daily 62 tablet PRN     MUCINEX 600 MG 12 hr tablet TAKE 1 TABLET BY MOUTH ONCE DAILY 31 tablet 98     Multiple Vitamins-Minerals (CENTRUM SILVER ULTRA MENS) TABS Take 1 tablet by mouth daily       Multiple Vitamins-Minerals (EYE VITAMINS & MINERALS) TABS Take 1 tablet by mouth daily 60 tablet 11     nystatin (MYCOSTATIN) 428100 UNIT/GM POWD Apply topically 2 times daily AND daily PRN       potassium chloride (KLOR-CON) 20 MEQ Packet Take 40 mEq by mouth daily       predniSONE (DELTASONE) 10 MG tablet Take 40 mg by mouth daily for 4 days, THEN 30 mg daily for 4 days, THEN 20 mg daily for 4 days, THEN 10 mg daily for 4 days. 40 tablet 0     senna-docusate (SENOKOT-S;PERICOLACE) 8.6-50 MG per tablet Take 1 tablet by mouth 2 times daily as needed for constipation       tamsulosin (FLOMAX) 0.4 MG capsule Take 1 capsule (0.4 mg) by mouth daily 31 capsule PRN     VENTOLIN  (90 Base) MCG/ACT inhaler INHALE 2 PUFFS INTO THE LUNGS TWICE DAILY; & INHALE 2 PUFFS INTO THE LUNGS EVERY 4 HOURS AS NEEDED 18 g 98     [START ON 1/27/2019] predniSONE (DELTASONE) 5 MG tablet Take 5 mg by mouth daily. (Patient not taking: Reported on 1/10/2019.) 30 tablet 1     Patient  Active Problem List   Diagnosis     Pain in joint, pelvic region and thigh     Hypokalemia due to hydrochlorothiazide     BPH (benign prostatic hyperplasia)     Physical deconditioning     Neuropathic pain of both feet     Pulmonary vascular congestion     Benign essential hypertension     Acquired hypothyroidism     Acute kidney failure (H)     Rheumatoid arthritis involving both hands (H)     Acute renal failure (ARF) (H)     Hypercalcemia     Dysphagia     Urinary tract infection without hematuria, site unspecified     Recurrent cold sores     TIA (transient ischemic attack)     Right wrist drop     Pulmonary alveolitis (H)     Atrial fibrillation, unspecified type (H)     CKD (chronic kidney disease) stage 3, GFR 30-59 ml/min (H)     Hypothyroidism, unspecified type     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 (H)     Hypoxia     Seborrhea     Ulcer of external ear, limited to breakdown of skin (H)     Bilateral impacted cerumen       Medications reviewed:  Medications reconciled to facility chart and changes were made to reflect current medications as identified as above med list. Below are the changes that were made:   Medications stopped since last EPIC medication reconciliation:   There are no discontinued medications.    Medications started since last [facility] medication reconciliation:  No orders of the defined types were placed in this encounter.    REVIEW OF SYSTEMS:  4 point ROS including Respiratory, CV, GI and , other than that noted in the HPI,  is negative      Physical Exam:  /69   Pulse 66   Temp 97.8  F (36.6  C)   Resp 16   SpO2 95%   GENERAL APPEARANCE:  Alert, in no acute distress  ENT:  Mouth and posterior oropharynx normal, moist mucous membranes, [place], history of bilateral cerumen impaction-not viewed today  EYES:  EOM, conjunctivae, lids, pupils and irises normal-glasses  RESP:  respiratory effort and palpation of chest increased, lungs diminished to  auscultation, no expiratory wheezes, distant in posterior fields   CV:  Palpation and auscultation of heart done, bradycardic (not today) and regular rhythm, 3/6 murmur- not heard on previous exam,  peripheral edema 1+ in ankles and feet. Chang Hose on.  ABDOMEN:  normal bowel sounds, soft, nontender  M/S:   Gait and station abnormal using wheelchair for ambulation, generalized weakness  SKIN:  Inspection of skin and subcutaneous tissue baseline to visualized areas  NEURO:   Examination of sensation by touch normal  PSYCH:  insight and judgement impaired, memory impaired      Recent Labs:     CBC RESULTS:   Recent Labs   Lab Test 01/17/19 10/12/18   WBC 16.4* 9.2   RBC 4.38* 4.23*   HGB 12.8* 13.1*   HCT 39.9* 41.2   MCV 91 97   MCH 29.2 31.0   MCHC 32.1 31.8   RDW 13.9 15.4*    372       Last Basic Metabolic Panel:  Recent Labs   Lab Test 10/12/18 09/27/18    135   POTASSIUM 4.0 3.9   CHLORIDE 99 97   MERCEDES 8.9 9.4   CO2 31 29   BUN 24 27   CR 1.41* 1.34*   * 148*       Liver Function Studies -   Recent Labs   Lab Test 09/13/18 04/19/18 04/03/17  0651   PROTTOTAL  --  7.9 6.6*   ALBUMIN  --  3.4 2.6*   BILITOTAL  --  0.6 0.8   ALKPHOS  --  78 53   AST 20 19 37   ALT 19 23 23       TSH   Date Value Ref Range Status   09/13/2018 3.45 0.40 - 4.00 mU/L Final   04/19/2018 5.05 (H) 0.40 - 4.00 mU/L Final     Assessment/Plan:  (J47.9) Adult bronchiectasis (H)  (primary encounter diagnosis)  (R09.89) Chest congestion  Comment: Improving  Plan:   -Prednisone burst/ taper will continue on 5 mg po daily after taper could need 10 mg daily  -Duonebs TID and once daily prn  -Mucinex 600 mg po daily  -Ventolin BID and q4H prn      Electronically signed by  SUNDAY Lee CNP                      Sincerely,        SUNDAY Lee CNP

## 2019-01-22 NOTE — PROGRESS NOTES
[place] [facility]    Chief Complaint   Patient presents with     custodial Acute       [place] Medical Record Number:  [id number]  Place of Service where encounter took place:  [facility] [[id number]]    HPI:    Jacob Easton is a 89 year old  (5/16/1929), who is being seen today for an episodic care visit.  HPI information obtained from: facility chart records.Today's concern is:    Adult bronchiectasis (H)  Chest Congestion  Auditory expiratory wheezes prior to nebulizer treatment and intermittent cough with some chest congestion is much improved. Less cough with pill administration as now crushed and appears tolerating better. Denies pain, chest pain or shortness of breath. History of pulmonary alveolitis, bronchiectasis, chronic cystic changes, 5.3 cm bulla in left lung base, chronic lung infiltrates so did not start antibiotics with most recent CXR. WBC elevated but prednisone in use and he is afebrile w/o s/s of infection. History of responding to prednisone burst/taper and family is now willing to consider low dose ongoing for comfort. Currently use of prednisone.He is adamant about treatment onsite and no hospitalizations, has ongoing functional decline. Some improvement with duonebs BID now increased to TID. Mucinex daily and Proair bedside      ALLERGIES: Advil [ibuprofen]; Influenza virus vaccine h5n1; and Orange juice [orange oil]  Past Medical, Surgical, Family and Social History reviewed and updated in [facility].    Current Outpatient Medications   Medication Sig Dispense Refill     AMLODIPINE BESYLATE PO Take 10 mg by mouth daily       aspirin EC 81 MG EC tablet Take 1 tablet (81 mg) by mouth daily 31 tablet PRN     CALMOSEPTINE 0.44-20.6 % OINT ointment APPLY TO CLEANSED REDDENED CHARISSA SKIN FOLDS  AND RASH WITH BREAKDOWN DURING MORNING AND BEDTIME CARES AND INCONTINENT CHANGES OR MEDICATION P 113 g 10     FUROSEMIDE PO Take 40 mg by mouth daily       ipratropium -  albuterol 0.5 mg/2.5 mg/3 mL (DUONEB) 0.5-2.5 (3) MG/3ML neb solution Take 1 vial by nebulization 3 times daily AND once daily PRN       lactobacillus rhamnosus, GG, (CULTURELL) capsule Take 1 capsule by mouth daily       latanoprost (XALATAN) 0.005 % ophthalmic solution INSTILL ONE DROP IN THE RIGHT EYE EVERY EVENING 2.5 mL 98     levothyroxine (SYNTHROID/LEVOTHROID) 50 MCG tablet Take 1 tablet (50 mcg) by mouth daily 31 tablet PRN     MELATONIN PO Take 5 mg by mouth At Bedtime        menthol-zinc oxide (CALMOSEPTINE) 0.44-20.625 % OINT ointment Apply topically 2 times daily AND daily PRN       metoprolol (LOPRESSOR) 25 MG tablet Take 1 tablet (25 mg) by mouth 2 times daily 62 tablet PRN     MUCINEX 600 MG 12 hr tablet TAKE 1 TABLET BY MOUTH ONCE DAILY 31 tablet 98     Multiple Vitamins-Minerals (CENTRUM SILVER ULTRA MENS) TABS Take 1 tablet by mouth daily       Multiple Vitamins-Minerals (EYE VITAMINS & MINERALS) TABS Take 1 tablet by mouth daily 60 tablet 11     nystatin (MYCOSTATIN) 896925 UNIT/GM POWD Apply topically 2 times daily AND daily PRN       potassium chloride (KLOR-CON) 20 MEQ Packet Take 40 mEq by mouth daily       predniSONE (DELTASONE) 10 MG tablet Take 40 mg by mouth daily for 4 days, THEN 30 mg daily for 4 days, THEN 20 mg daily for 4 days, THEN 10 mg daily for 4 days. 40 tablet 0     senna-docusate (SENOKOT-S;PERICOLACE) 8.6-50 MG per tablet Take 1 tablet by mouth 2 times daily as needed for constipation       tamsulosin (FLOMAX) 0.4 MG capsule Take 1 capsule (0.4 mg) by mouth daily 31 capsule PRN     VENTOLIN  (90 Base) MCG/ACT inhaler INHALE 2 PUFFS INTO THE LUNGS TWICE DAILY; & INHALE 2 PUFFS INTO THE LUNGS EVERY 4 HOURS AS NEEDED 18 g 98     [START ON 1/27/2019] predniSONE (DELTASONE) 5 MG tablet Take 5 mg by mouth daily. (Patient not taking: Reported on 1/10/2019.) 30 tablet 1     Patient Active Problem List   Diagnosis     Pain in joint, pelvic region and thigh     Hypokalemia due  to hydrochlorothiazide     BPH (benign prostatic hyperplasia)     Physical deconditioning     Neuropathic pain of both feet     Pulmonary vascular congestion     Benign essential hypertension     Acquired hypothyroidism     Acute kidney failure (H)     Rheumatoid arthritis involving both hands (H)     Acute renal failure (ARF) (H)     Hypercalcemia     Dysphagia     Urinary tract infection without hematuria, site unspecified     Recurrent cold sores     TIA (transient ischemic attack)     Right wrist drop     Pulmonary alveolitis (H)     Atrial fibrillation, unspecified type (H)     CKD (chronic kidney disease) stage 3, GFR 30-59 ml/min (H)     Hypothyroidism, unspecified type     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 (H)     Hypoxia     Seborrhea     Ulcer of external ear, limited to breakdown of skin (H)     Bilateral impacted cerumen       Medications reviewed:  Medications reconciled to facility chart and changes were made to reflect current medications as identified as above med list. Below are the changes that were made:   Medications stopped since last EPIC medication reconciliation:   There are no discontinued medications.    Medications started since last [facility] medication reconciliation:  No orders of the defined types were placed in this encounter.    REVIEW OF SYSTEMS:  4 point ROS including Respiratory, CV, GI and , other than that noted in the HPI,  is negative      Physical Exam:  /69   Pulse 66   Temp 97.8  F (36.6  C)   Resp 16   SpO2 95%   GENERAL APPEARANCE:  Alert, in no acute distress  ENT:  Mouth and posterior oropharynx normal, moist mucous membranes, [place], history of bilateral cerumen impaction-not viewed today  EYES:  EOM, conjunctivae, lids, pupils and irises normal-glasses  RESP:  respiratory effort and palpation of chest increased, lungs diminished to auscultation, no expiratory wheezes, distant in posterior fields   CV:  Palpation and auscultation of  heart done, bradycardic (not today) and regular rhythm, 3/6 murmur- not heard on previous exam,  peripheral edema 1+ in ankles and feet. Chang Hose on.  ABDOMEN:  normal bowel sounds, soft, nontender  M/S:   Gait and station abnormal using wheelchair for ambulation, generalized weakness  SKIN:  Inspection of skin and subcutaneous tissue baseline to visualized areas  NEURO:   Examination of sensation by touch normal  PSYCH:  insight and judgement impaired, memory impaired      Recent Labs:     CBC RESULTS:   Recent Labs   Lab Test 01/17/19 10/12/18   WBC 16.4* 9.2   RBC 4.38* 4.23*   HGB 12.8* 13.1*   HCT 39.9* 41.2   MCV 91 97   MCH 29.2 31.0   MCHC 32.1 31.8   RDW 13.9 15.4*    372       Last Basic Metabolic Panel:  Recent Labs   Lab Test 10/12/18 09/27/18    135   POTASSIUM 4.0 3.9   CHLORIDE 99 97   MERCEDES 8.9 9.4   CO2 31 29   BUN 24 27   CR 1.41* 1.34*   * 148*       Liver Function Studies -   Recent Labs   Lab Test 09/13/18 04/19/18 04/03/17  0651   PROTTOTAL  --  7.9 6.6*   ALBUMIN  --  3.4 2.6*   BILITOTAL  --  0.6 0.8   ALKPHOS  --  78 53   AST 20 19 37   ALT 19 23 23       TSH   Date Value Ref Range Status   09/13/2018 3.45 0.40 - 4.00 mU/L Final   04/19/2018 5.05 (H) 0.40 - 4.00 mU/L Final     Assessment/Plan:  (J47.9) Adult bronchiectasis (H)  (primary encounter diagnosis)  (R09.89) Chest congestion  Comment: Improving  Plan:   -Prednisone burst/ taper will continue on 5 mg po daily after taper could need 10 mg daily  -Duonebs TID and once daily prn  -Mucinex 600 mg po daily  -Ventolin BID and q4H prn      Electronically signed by  Orestes Clement, SUNDAY CNP

## 2019-01-26 ENCOUNTER — TELEPHONE (OUTPATIENT)
Dept: GERIATRICS | Facility: CLINIC | Age: 84
End: 2019-01-26

## 2019-01-26 NOTE — TELEPHONE ENCOUNTER
Patient with temp 101, nausea/emesis and diarrhea. Four other residents with similar symptoms.     PLAN  Monitor status - update provider if declines.   Tylenol 650 mg PO every 4 hrs PRN fever  Zofran 4 mg PO TID PRN nausea  Push fluids.     Electronically signed by SUNDAY Bedoya GNP

## 2019-02-10 DIAGNOSIS — E03.8 OTHER SPECIFIED HYPOTHYROIDISM: ICD-10-CM

## 2019-02-10 DIAGNOSIS — G47.00 INSOMNIA, UNSPECIFIED TYPE: Primary | ICD-10-CM

## 2019-02-10 DIAGNOSIS — J47.9 ADULT BRONCHIECTASIS (H): ICD-10-CM

## 2019-02-10 RX ORDER — LEVOTHYROXINE SODIUM 50 UG/1
TABLET ORAL
Qty: 31 TABLET | Refills: 98 | Status: SHIPPED | OUTPATIENT
Start: 2019-02-10 | End: 2020-02-17

## 2019-02-10 RX ORDER — PREDNISONE 5 MG/1
TABLET ORAL
Qty: 31 TABLET | Refills: 98 | Status: SHIPPED | OUTPATIENT
Start: 2019-02-10 | End: 2019-05-15

## 2019-02-10 RX ORDER — PSYLLIUM HUSK 0.4 G
CAPSULE ORAL
Qty: 31 TABLET | Refills: 98 | Status: SHIPPED | OUTPATIENT
Start: 2019-02-10 | End: 2020-02-17

## 2019-02-19 ENCOUNTER — TRANSFERRED RECORDS (OUTPATIENT)
Dept: HEALTH INFORMATION MANAGEMENT | Facility: CLINIC | Age: 84
End: 2019-02-19

## 2019-02-21 ENCOUNTER — ASSISTED LIVING VISIT (OUTPATIENT)
Dept: GERIATRICS | Facility: CLINIC | Age: 84
End: 2019-02-21
Payer: COMMERCIAL

## 2019-02-21 VITALS
SYSTOLIC BLOOD PRESSURE: 119 MMHG | DIASTOLIC BLOOD PRESSURE: 79 MMHG | OXYGEN SATURATION: 95 % | HEART RATE: 87 BPM | RESPIRATION RATE: 18 BRPM | TEMPERATURE: 98.3 F

## 2019-02-21 DIAGNOSIS — M62.81 GENERALIZED MUSCLE WEAKNESS: ICD-10-CM

## 2019-02-21 DIAGNOSIS — R06.09 DYSPNEA ON EXERTION: ICD-10-CM

## 2019-02-21 DIAGNOSIS — J67.9 PULMONARY ALVEOLITIS (H): Primary | ICD-10-CM

## 2019-02-21 RX ORDER — ONDANSETRON 4 MG/1
4 TABLET, FILM COATED ORAL 3 TIMES DAILY PRN
COMMUNITY
End: 2019-07-30

## 2019-02-21 RX ORDER — ACETAMINOPHEN 325 MG/1
650 TABLET ORAL EVERY 4 HOURS PRN
COMMUNITY
End: 2019-04-14

## 2019-02-21 NOTE — LETTER
"    2/21/2019        RE: Jacob Easton  [address]  [address]  [facility] [address]        [place] [facility]    Chief Complaint   Patient presents with     half-way Acute       [place] Medical Record Number:  [id number]  Place of Service where encounter took place:  [facility] [[id number]]    HPI:    Jacob Easton is a 89 year old  (5/16/1929), who is being seen today for an episodic care visit.  HPI information obtained from: facility chart records, facility staff and [facility] chart review.Today's concern is:    Pulmonary alveolitis (H)  Dyspnea on exertion   Generalized muscle weakness  History of pulmonary alveolitis, bronchiectasis, chronic cystic changes, 5.3 cm bulla in left lung base, chronic lung infiltrates has responded well to prednisone burst. Now on prednisone 5 mg po daily and staff reporting increase in SOB on exertion, auditory expiratory wheezes on 5 versus 10 mg po daily. He is adamant about treatment onsite and no hospitalizations, has ongoing functional decline but did improve. Finishing with PT/OT he is walking the halls again- shorter distances per staff. Seen today he denies pain, chest pain feel overall \"I am doing great\" but staff has noticed decline in functional status with lower dose of prednisone.      ALLERGIES: Advil [ibuprofen]; Influenza virus vaccine h5n1; and Orange juice [orange oil]  Past Medical, Surgical, Family and Social History reviewed and updated in [facility].    Current Outpatient Medications   Medication Sig Dispense Refill     acetaminophen (TYLENOL) 325 MG tablet Take 650 mg by mouth every 4 hours as needed for mild pain       AMLODIPINE BESYLATE PO Take 10 mg by mouth daily       aspirin EC 81 MG EC tablet Take 1 tablet (81 mg) by mouth daily 31 tablet PRN     CALMOSEPTINE 0.44-20.6 % OINT ointment APPLY TO CLEANSED REDDENED CHARISSA SKIN FOLDS  AND RASH WITH BREAKDOWN DURING MORNING AND BEDTIME CARES AND INCONTINENT " CHANGES OR MEDICATION P 113 g 10     FUROSEMIDE PO Take 40 mg by mouth daily       ipratropium - albuterol 0.5 mg/2.5 mg/3 mL (DUONEB) 0.5-2.5 (3) MG/3ML neb solution Take 1 vial by nebulization 3 times daily AND once daily PRN       lactobacillus rhamnosus, GG, (CULTURELL) capsule Take 1 capsule by mouth daily       latanoprost (XALATAN) 0.005 % ophthalmic solution INSTILL ONE DROP IN THE RIGHT EYE EVERY EVENING 2.5 mL 98     levothyroxine (SYNTHROID/LEVOTHROID) 50 MCG tablet TAKE 1 TABLET BY MOUTH ONCE DAILY 31 tablet 98     MELATONIN MAXIMUM STRENGTH 5 MG tablet TAKE 1 TABLET BY MOUTH ONCE DAILY 31 tablet 98     metoprolol (LOPRESSOR) 25 MG tablet Take 1 tablet (25 mg) by mouth 2 times daily 62 tablet PRN     MUCINEX 600 MG 12 hr tablet TAKE 1 TABLET BY MOUTH ONCE DAILY 31 tablet 98     Multiple Vitamins-Minerals (CENTRUM SILVER ULTRA MENS) TABS Take 1 tablet by mouth daily       Multiple Vitamins-Minerals (EYE VITAMINS & MINERALS) TABS Take 1 tablet by mouth daily 60 tablet 11     nystatin (MYCOSTATIN) 377601 UNIT/GM POWD Apply topically 2 times daily AND daily PRN       ondansetron (ZOFRAN) 4 MG tablet Take 4 mg by mouth 3 times daily as needed for nausea       potassium chloride (KLOR-CON) 20 MEQ Packet Take 40 mEq by mouth daily       predniSONE (DELTASONE) 5 MG tablet TAKE 1 TABLET BY MOUTH ONCE DAILY 31 tablet 98     senna-docusate (SENOKOT-S;PERICOLACE) 8.6-50 MG per tablet Take 1 tablet by mouth 2 times daily as needed for constipation       tamsulosin (FLOMAX) 0.4 MG capsule Take 1 capsule (0.4 mg) by mouth daily 31 capsule PRN     VENTOLIN  (90 Base) MCG/ACT inhaler INHALE 2 PUFFS INTO THE LUNGS TWICE DAILY; & INHALE 2 PUFFS INTO THE LUNGS EVERY 4 HOURS AS NEEDED 18 g 98     Patient Active Problem List   Diagnosis     Pain in joint, pelvic region and thigh     Hypokalemia due to hydrochlorothiazide     BPH (benign prostatic hyperplasia)     Physical deconditioning     Neuropathic pain of both  feet     Pulmonary vascular congestion     Benign essential hypertension     Acquired hypothyroidism     Acute kidney failure (H)     Rheumatoid arthritis involving both hands (H)     Acute renal failure (ARF) (H)     Hypercalcemia     Dysphagia     Urinary tract infection without hematuria, site unspecified     Recurrent cold sores     TIA (transient ischemic attack)     Right wrist drop     Pulmonary alveolitis (H)     Atrial fibrillation, unspecified type (H)     CKD (chronic kidney disease) stage 3, GFR 30-59 ml/min (H)     Hypothyroidism, unspecified type     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 (H)     Hypoxia     Seborrhea     Ulcer of external ear, limited to breakdown of skin (H)     Bilateral impacted cerumen       Medications reviewed:  Medications reconciled to facility chart and changes were made to reflect current medications as identified as above med list. Below are the changes that were made:   Medications stopped since last EPIC medication reconciliation:   Medications Discontinued During This Encounter   Medication Reason     menthol-zinc oxide (CALMOSEPTINE) 0.44-20.625 % OINT ointment Discontinued by another Health Care Provider     predniSONE (DELTASONE) 10 MG tablet Discontinued by another Health Care Provider     MELATONIN PO Discontinued by another Health Care Provider       Medications started since last [facility] medication reconciliation:  Orders Placed This Encounter   Medications     acetaminophen (TYLENOL) 325 MG tablet     Sig: Take 650 mg by mouth every 4 hours as needed for mild pain     ondansetron (ZOFRAN) 4 MG tablet     Sig: Take 4 mg by mouth 3 times daily as needed for nausea     REVIEW OF SYSTEMS:  4 point ROS including Respiratory, CV, GI and , other than that noted in the HPI,  is negative    Physical Exam:  /79   Pulse 87   Temp 98.3  F (36.8  C)   Resp 18   SpO2 95%   GENERAL APPEARANCE:  Alert, in no acute distress  ENT:  Mouth and  posterior oropharynx normal, moist mucous membranes, [place], history of bilateral cerumen impaction  EYES:  EOM, conjunctivae, lids, pupils and irises normal-glasses  RESP:  respiratory effort and palpation of chest increased, lungs diminished to auscultation, now with expiratory wheezes greater in posterior fields, fine crackles   CV:  Palpation and auscultation of heart done, bradycardic (not today) and regular rhythm, 3/6 murmur- not heard on previous exam,  peripheral edema 1+ in ankles and feet. Chang Hose on.  ABDOMEN:  normal bowel sounds, soft, nontender  M/S:   Gait and station improved using wheelchair for ambulation, generalized weakness, can walker with SBA of 1 for short distances   SKIN:  Inspection of skin and subcutaneous tissue baseline to visualized areas  NEURO:   Examination of sensation by touch normal  PSYCH:  insight and judgement impaired, memory impaired     Recent Labs:     CBC RESULTS: 1/17/19 was on prednisone burst    Recent Labs   Lab Test 01/17/19 10/12/18   WBC 16.4* 9.2   RBC 4.38* 4.23*   HGB 12.8* 13.1*   HCT 39.9* 41.2   MCV 91 97   MCH 29.2 31.0   MCHC 32.1 31.8   RDW 13.9 15.4*    372       Last Basic Metabolic Panel:  Recent Labs   Lab Test 10/12/18 09/27/18    135   POTASSIUM 4.0 3.9   CHLORIDE 99 97   MERCEDES 8.9 9.4   CO2 31 29   BUN 24 27   CR 1.41* 1.34*   * 148*       Liver Function Studies -   Recent Labs   Lab Test 09/13/18 04/19/18 04/03/17  0651   PROTTOTAL  --  7.9 6.6*   ALBUMIN  --  3.4 2.6*   BILITOTAL  --  0.6 0.8   ALKPHOS  --  78 53   AST 20 19 37   ALT 19 23 23       TSH   Date Value Ref Range Status   09/13/2018 3.45 0.40 - 4.00 mU/L Final   04/19/2018 5.05 (H) 0.40 - 4.00 mU/L Final     Assessment/Plan:  (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)  (primary encounter diagnosis)  (R06.09) Dyspnea on exertion  (M62.81) Generalized muscle weakness  Comment: Some decline on taper (lower) prednisone dose  Plan:   -Ongoing review with family and aware of  benefits/adverse reactions to chronic prednisone use 5 versus 10 mg po daily  -Duonebs TID and once daily prn  -Mucinex 600 mg po daily  -Ventolin BID and q4H prn  -consider montelukast nightly        Electronically signed by  SUNDAY Lee CNP                      Sincerely,        SUNDAY Lee CNP

## 2019-02-21 NOTE — PROGRESS NOTES
"[place] [facility]    Chief Complaint   Patient presents with     detention Acute       [place] Medical Record Number:  [id number]  Place of Service where encounter took place:  [facility] [[id number]]    HPI:    Jacob Easton is a 89 year old  (5/16/1929), who is being seen today for an episodic care visit.  HPI information obtained from: facility chart records, facility staff and [facility] chart review.Today's concern is:    Pulmonary alveolitis (H)  Dyspnea on exertion   Generalized muscle weakness  History of pulmonary alveolitis, bronchiectasis, chronic cystic changes, 5.3 cm bulla in left lung base, chronic lung infiltrates has responded well to prednisone burst. Now on prednisone 5 mg po daily and staff reporting increase in SOB on exertion, auditory expiratory wheezes on 5 versus 10 mg po daily. He is adamant about treatment onsite and no hospitalizations, has ongoing functional decline but did improve. Finishing with PT/OT he is walking the halls again- shorter distances per staff. Seen today he denies pain, chest pain feel overall \"I am doing great\" but staff has noticed decline in functional status with lower dose of prednisone.      ALLERGIES: Advil [ibuprofen]; Influenza virus vaccine h5n1; and Orange juice [orange oil]  Past Medical, Surgical, Family and Social History reviewed and updated in [facility].    Current Outpatient Medications   Medication Sig Dispense Refill     acetaminophen (TYLENOL) 325 MG tablet Take 650 mg by mouth every 4 hours as needed for mild pain       AMLODIPINE BESYLATE PO Take 10 mg by mouth daily       aspirin EC 81 MG EC tablet Take 1 tablet (81 mg) by mouth daily 31 tablet PRN     CALMOSEPTINE 0.44-20.6 % OINT ointment APPLY TO CLEANSED REDDENED CHARISSA SKIN FOLDS  AND RASH WITH BREAKDOWN DURING MORNING AND BEDTIME CARES AND INCONTINENT CHANGES OR MEDICATION P 113 g 10     FUROSEMIDE PO Take 40 mg by mouth daily       ipratropium - " albuterol 0.5 mg/2.5 mg/3 mL (DUONEB) 0.5-2.5 (3) MG/3ML neb solution Take 1 vial by nebulization 3 times daily AND once daily PRN       lactobacillus rhamnosus, GG, (CULTURELL) capsule Take 1 capsule by mouth daily       latanoprost (XALATAN) 0.005 % ophthalmic solution INSTILL ONE DROP IN THE RIGHT EYE EVERY EVENING 2.5 mL 98     levothyroxine (SYNTHROID/LEVOTHROID) 50 MCG tablet TAKE 1 TABLET BY MOUTH ONCE DAILY 31 tablet 98     MELATONIN MAXIMUM STRENGTH 5 MG tablet TAKE 1 TABLET BY MOUTH ONCE DAILY 31 tablet 98     metoprolol (LOPRESSOR) 25 MG tablet Take 1 tablet (25 mg) by mouth 2 times daily 62 tablet PRN     MUCINEX 600 MG 12 hr tablet TAKE 1 TABLET BY MOUTH ONCE DAILY 31 tablet 98     Multiple Vitamins-Minerals (CENTRUM SILVER ULTRA MENS) TABS Take 1 tablet by mouth daily       Multiple Vitamins-Minerals (EYE VITAMINS & MINERALS) TABS Take 1 tablet by mouth daily 60 tablet 11     nystatin (MYCOSTATIN) 017554 UNIT/GM POWD Apply topically 2 times daily AND daily PRN       ondansetron (ZOFRAN) 4 MG tablet Take 4 mg by mouth 3 times daily as needed for nausea       potassium chloride (KLOR-CON) 20 MEQ Packet Take 40 mEq by mouth daily       predniSONE (DELTASONE) 5 MG tablet TAKE 1 TABLET BY MOUTH ONCE DAILY 31 tablet 98     senna-docusate (SENOKOT-S;PERICOLACE) 8.6-50 MG per tablet Take 1 tablet by mouth 2 times daily as needed for constipation       tamsulosin (FLOMAX) 0.4 MG capsule Take 1 capsule (0.4 mg) by mouth daily 31 capsule PRN     VENTOLIN  (90 Base) MCG/ACT inhaler INHALE 2 PUFFS INTO THE LUNGS TWICE DAILY; & INHALE 2 PUFFS INTO THE LUNGS EVERY 4 HOURS AS NEEDED 18 g 98     Patient Active Problem List   Diagnosis     Pain in joint, pelvic region and thigh     Hypokalemia due to hydrochlorothiazide     BPH (benign prostatic hyperplasia)     Physical deconditioning     Neuropathic pain of both feet     Pulmonary vascular congestion     Benign essential hypertension     Acquired hypothyroidism      Acute kidney failure (H)     Rheumatoid arthritis involving both hands (H)     Acute renal failure (ARF) (H)     Hypercalcemia     Dysphagia     Urinary tract infection without hematuria, site unspecified     Recurrent cold sores     TIA (transient ischemic attack)     Right wrist drop     Pulmonary alveolitis (H)     Atrial fibrillation, unspecified type (H)     CKD (chronic kidney disease) stage 3, GFR 30-59 ml/min (H)     Hypothyroidism, unspecified type     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 (H)     Hypoxia     Seborrhea     Ulcer of external ear, limited to breakdown of skin (H)     Bilateral impacted cerumen       Medications reviewed:  Medications reconciled to facility chart and changes were made to reflect current medications as identified as above med list. Below are the changes that were made:   Medications stopped since last EPIC medication reconciliation:   Medications Discontinued During This Encounter   Medication Reason     menthol-zinc oxide (CALMOSEPTINE) 0.44-20.625 % OINT ointment Discontinued by another Health Care Provider     predniSONE (DELTASONE) 10 MG tablet Discontinued by another Health Care Provider     MELATONIN PO Discontinued by another Health Care Provider       Medications started since last [facility] medication reconciliation:  Orders Placed This Encounter   Medications     acetaminophen (TYLENOL) 325 MG tablet     Sig: Take 650 mg by mouth every 4 hours as needed for mild pain     ondansetron (ZOFRAN) 4 MG tablet     Sig: Take 4 mg by mouth 3 times daily as needed for nausea     REVIEW OF SYSTEMS:  4 point ROS including Respiratory, CV, GI and , other than that noted in the HPI,  is negative    Physical Exam:  /79   Pulse 87   Temp 98.3  F (36.8  C)   Resp 18   SpO2 95%   GENERAL APPEARANCE:  Alert, in no acute distress  ENT:  Mouth and posterior oropharynx normal, moist mucous membranes, [place], history of bilateral cerumen impaction  EYES:   EOM, conjunctivae, lids, pupils and irises normal-glasses  RESP:  respiratory effort and palpation of chest increased, lungs diminished to auscultation, now with expiratory wheezes greater in posterior fields, fine crackles   CV:  Palpation and auscultation of heart done, bradycardic (not today) and regular rhythm, 3/6 murmur- not heard on previous exam,  peripheral edema 1+ in ankles and feet. Chang Hose on.  ABDOMEN:  normal bowel sounds, soft, nontender  M/S:   Gait and station improved using wheelchair for ambulation, generalized weakness, can walker with SBA of 1 for short distances   SKIN:  Inspection of skin and subcutaneous tissue baseline to visualized areas  NEURO:   Examination of sensation by touch normal  PSYCH:  insight and judgement impaired, memory impaired     Recent Labs:     CBC RESULTS: 1/17/19 was on prednisone burst    Recent Labs   Lab Test 01/17/19 10/12/18   WBC 16.4* 9.2   RBC 4.38* 4.23*   HGB 12.8* 13.1*   HCT 39.9* 41.2   MCV 91 97   MCH 29.2 31.0   MCHC 32.1 31.8   RDW 13.9 15.4*    372       Last Basic Metabolic Panel:  Recent Labs   Lab Test 10/12/18 09/27/18    135   POTASSIUM 4.0 3.9   CHLORIDE 99 97   MERCEDES 8.9 9.4   CO2 31 29   BUN 24 27   CR 1.41* 1.34*   * 148*       Liver Function Studies -   Recent Labs   Lab Test 09/13/18 04/19/18 04/03/17  0651   PROTTOTAL  --  7.9 6.6*   ALBUMIN  --  3.4 2.6*   BILITOTAL  --  0.6 0.8   ALKPHOS  --  78 53   AST 20 19 37   ALT 19 23 23       TSH   Date Value Ref Range Status   09/13/2018 3.45 0.40 - 4.00 mU/L Final   04/19/2018 5.05 (H) 0.40 - 4.00 mU/L Final     Assessment/Plan:  (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)  (primary encounter diagnosis)  (R06.09) Dyspnea on exertion  (M62.81) Generalized muscle weakness  Comment: Some decline on taper (lower) prednisone dose  Plan:   -Ongoing review with family and aware of benefits/adverse reactions to chronic prednisone use 5 versus 10 mg po daily  -Duonebs TID and once daily  prn  -Mucinex 600 mg po daily  -Ventolin BID and q4H prn  -consider montelukast nightly        Electronically signed by  SUNDAY Lee CNP

## 2019-03-12 DIAGNOSIS — I48.91 ATRIAL FIBRILLATION, UNSPECIFIED TYPE (H): ICD-10-CM

## 2019-03-12 DIAGNOSIS — N40.1 BENIGN NODULAR PROSTATIC HYPERPLASIA WITH LOWER URINARY TRACT SYMPTOMS: ICD-10-CM

## 2019-03-12 DIAGNOSIS — I10 ESSENTIAL HYPERTENSION: ICD-10-CM

## 2019-03-12 RX ORDER — AMLODIPINE BESYLATE 10 MG/1
TABLET ORAL
Qty: 28 TABLET | Refills: 98 | Status: SHIPPED | OUTPATIENT
Start: 2019-03-12 | End: 2020-03-16

## 2019-03-12 RX ORDER — METOPROLOL TARTRATE 25 MG/1
TABLET, FILM COATED ORAL
Qty: 56 TABLET | Refills: 98 | Status: SHIPPED | OUTPATIENT
Start: 2019-03-12 | End: 2020-03-16

## 2019-03-12 RX ORDER — ASPIRIN 81 MG/1
TABLET ORAL
Qty: 28 TABLET | Refills: 98 | Status: SHIPPED | OUTPATIENT
Start: 2019-03-12 | End: 2020-03-16

## 2019-03-12 RX ORDER — TAMSULOSIN HYDROCHLORIDE 0.4 MG/1
CAPSULE ORAL
Qty: 28 CAPSULE | Refills: 98 | Status: SHIPPED | OUTPATIENT
Start: 2019-03-12 | End: 2020-03-16

## 2019-03-25 DIAGNOSIS — E87.6 HYPOKALEMIA: Primary | ICD-10-CM

## 2019-03-25 DIAGNOSIS — E87.6 HYPOKALEMIA: ICD-10-CM

## 2019-03-25 RX ORDER — POTASSIUM CHLORIDE 1500 MG/1
TABLET, EXTENDED RELEASE ORAL
Qty: 56 TABLET | Refills: 97 | Status: SHIPPED | OUTPATIENT
Start: 2019-03-25 | End: 2019-08-09

## 2019-04-01 ENCOUNTER — PATIENT OUTREACH (OUTPATIENT)
Dept: GERIATRIC MEDICINE | Facility: CLINIC | Age: 84
End: 2019-04-01

## 2019-04-09 ENCOUNTER — ASSISTED LIVING VISIT (OUTPATIENT)
Dept: GERIATRICS | Facility: CLINIC | Age: 84
End: 2019-04-09
Payer: COMMERCIAL

## 2019-04-09 VITALS
TEMPERATURE: 98.5 F | DIASTOLIC BLOOD PRESSURE: 60 MMHG | RESPIRATION RATE: 18 BRPM | HEART RATE: 63 BPM | OXYGEN SATURATION: 96 % | SYSTOLIC BLOOD PRESSURE: 138 MMHG

## 2019-04-09 DIAGNOSIS — N18.30 CKD (CHRONIC KIDNEY DISEASE) STAGE 3, GFR 30-59 ML/MIN (H): ICD-10-CM

## 2019-04-09 DIAGNOSIS — R06.09 DOE (DYSPNEA ON EXERTION): ICD-10-CM

## 2019-04-09 DIAGNOSIS — N40.0 BENIGN PROSTATIC HYPERPLASIA WITHOUT LOWER URINARY TRACT SYMPTOMS: ICD-10-CM

## 2019-04-09 DIAGNOSIS — M06.9 RHEUMATOID ARTHRITIS INVOLVING BOTH HANDS, UNSPECIFIED RHEUMATOID FACTOR PRESENCE: ICD-10-CM

## 2019-04-09 DIAGNOSIS — R60.0 LOWER EXTREMITY EDEMA: ICD-10-CM

## 2019-04-09 DIAGNOSIS — E03.9 HYPOTHYROIDISM, UNSPECIFIED TYPE: ICD-10-CM

## 2019-04-09 DIAGNOSIS — J84.10 PULMONARY FIBROSIS (H): Primary | ICD-10-CM

## 2019-04-09 DIAGNOSIS — H61.23 BILATERAL IMPACTED CERUMEN: ICD-10-CM

## 2019-04-09 DIAGNOSIS — I10 ESSENTIAL HYPERTENSION: ICD-10-CM

## 2019-04-09 PROCEDURE — 69210 REMOVE IMPACTED EAR WAX UNI: CPT | Performed by: NURSE PRACTITIONER

## 2019-04-09 PROCEDURE — 99318 ZZC ANNUAL NURSING FAC ASSESSMNT, STABLE: CPT | Mod: 25 | Performed by: NURSE PRACTITIONER

## 2019-04-09 PROCEDURE — 99207 ZZC CDG-CODE CATEGORY CHANGED: CPT | Performed by: NURSE PRACTITIONER

## 2019-04-09 NOTE — LETTER
4/9/2019        RE: Jacob Wagoner  [address]  [place] [facility] [address]        [place] [facility]  Chief Complaint   Patient presents with     Annual Comprehensive Exam Assisted Living     [facility] Record Number:  [id number]  Place of Service where encounter took place:  [facility] [[id number]]    HPI:    Jacob Easton  is a 89 year old  (5/16/1929), who is being seen today for an annual comprehensive visit. HPI information obtained from: facility chart records, patient report and [facility] chart review.  Today's concerns are:    Continues to have SOB on exertion. O2 sats within normal limits but is walking less per staff. Pulmonary chronic cystic changes, chronic lung infiltrates has responded well to prednisone burst/taper and he continues on low dose. BP is within limits, denies chest pain or lightheadedness. Lower extremity at baseline and continues to wear Chang Hose. Denies pain, Tylenol prn. Has history of RA with no recent flares. Mood ok considering recent loss of his wife.    Based on JNC-8 goals,  patients age of 89 year old, presence of diabetes or CKD, and goals of care goal BP is  <140/90 mm Hg. Patient is stable with current plan of care and routine assessment..    ALLERGIES: Advil [ibuprofen]; Influenza virus vaccine h5n1; and Orange juice [orange oil]  PAST MEDICAL HISTORY:  has a past medical history of Arthritis, Constipation, Hypertension, Hypocalcemia, Neuromuscular disorder (H), and Thyroid disease.  PAST SURGICAL HISTORY:  has a past surgical history that includes TOTAL HIP ARTHROPLASTY (9/24/11).  IMMUNIZATIONS: Refused all immunizations  There is no immunization history for the selected administration types on file for this patient.  Above immunizations pulled from [facility] [employer]. [facility] and facility records also reconciled. Outstanding information sent to  to update [facility]  Future immunizations are  deferred as: refusal     Current Outpatient Medications   Medication Sig Dispense Refill     acetaminophen (TYLENOL) 325 MG tablet Take 650 mg by mouth every 4 hours as needed for mild pain       amLODIPine (NORVASC) 10 MG tablet TAKE 1 TABLET BY MOUTH ONCE DAILY 28 tablet 98     ASPIRIN ADULT LOW STRENGTH 81 MG EC tablet TAKE 1 TABLET BY MOUTH ONCE DAILY 28 tablet 98     CALMOSEPTINE 0.44-20.6 % OINT ointment APPLY TO CLEANSED REDDENED CHARISSA SKIN FOLDS  AND RASH WITH BREAKDOWN DURING MORNING AND BEDTIME CARES AND INCONTINENT CHANGES OR MEDICATION P 113 g 10     FUROSEMIDE PO Take 40 mg by mouth daily       ipratropium - albuterol 0.5 mg/2.5 mg/3 mL (DUONEB) 0.5-2.5 (3) MG/3ML neb solution Take 1 vial by nebulization 3 times daily AND once daily PRN       lactobacillus rhamnosus, GG, (CULTURELL) capsule Take 1 capsule by mouth daily       latanoprost (XALATAN) 0.005 % ophthalmic solution INSTILL ONE DROP IN THE RIGHT EYE EVERY EVENING 2.5 mL 98     levothyroxine (SYNTHROID/LEVOTHROID) 50 MCG tablet TAKE 1 TABLET BY MOUTH ONCE DAILY 31 tablet 98     MELATONIN MAXIMUM STRENGTH 5 MG tablet TAKE 1 TABLET BY MOUTH ONCE DAILY 31 tablet 98     metoprolol tartrate (LOPRESSOR) 25 MG tablet TAKE 1 TABLET BY MOUTH TWICE DAILY 56 tablet 98     MUCINEX 600 MG 12 hr tablet TAKE 1 TABLET BY MOUTH ONCE DAILY 31 tablet 98     Multiple Vitamins-Minerals (CENTRUM SILVER ULTRA MENS) TABS Take 1 tablet by mouth daily       Multiple Vitamins-Minerals (EYE VITAMINS & MINERALS) TABS Take 1 tablet by mouth daily 60 tablet 11     nystatin (MYCOSTATIN) 745640 UNIT/GM POWD Apply topically 2 times daily AND daily PRN       ondansetron (ZOFRAN) 4 MG tablet Take 4 mg by mouth 3 times daily as needed for nausea       potassium chloride ER (K-DUR/KLOR-CON M) 20 MEQ CR tablet TAKE TWO TABLETS (40 MEQ) BY MOUTH ONCE DAILY ***NOTE DOSAGE/STRENGTH*** 56 tablet 97     predniSONE (DELTASONE) 5 MG tablet TAKE 1 TABLET BY MOUTH ONCE DAILY 31 tablet 98      senna-docusate (SENOKOT-S;PERICOLACE) 8.6-50 MG per tablet Take 1 tablet by mouth 2 times daily as needed for constipation       tamsulosin (FLOMAX) 0.4 MG capsule TAKE 1 CAPSULE BY MOUTH ONCE DAILY 28 capsule 98     VENTOLIN  (90 Base) MCG/ACT inhaler INHALE 2 PUFFS INTO THE LUNGS TWICE DAILY; & INHALE 2 PUFFS INTO THE LUNGS EVERY 4 HOURS AS NEEDED 18 g 98     AMLODIPINE BESYLATE PO Take 10 mg by mouth daily       potassium chloride (KLOR-CON) 20 MEQ Packet Take 40 mEq by mouth daily         Case Management:  I have reviewed the Assisted Living care plan, current immunizations and preventive care/cancer screening. .Future cancer screening is not clinically indicated secondary to age/goals of care Patient's desire to return to the community is present, but is not able due to care needs . Current Level of Care is appropriate.    Advance Directive Discussion:    I reviewed the current advanced directives as reflected in EPIC, the POLST and the facility chart, and verified the congruency of orders 4/9/19. I contacted the first party [facility]  and discussed the plan of Care.  I did review the advance directives with the resident.     Team Discussion:  I communicated with the appropriate disciplines involved with the Plan of Care:   Nursing    Patient's goal is pain control and comfort. Treat reversible conditions in place  Information reviewed:  Medications, vital signs, orders, and nursing notes.    ROS:  4 point ROS including Respiratory, CV, GI and , other than that noted in the HPI,  is negative    Vitals:  /60   Pulse 63   Temp 98.5  F (36.9  C)   Resp 18   SpO2 96%    Exam:  GENERAL APPEARANCE:  Alert, in no distress sitting in WC  ENT:  Mouth and posterior oropharynx normal, moist mucous membranes, [place], history of bilateral cerumen impaction- wax in both ear canals viewed with otoscope  EYES:  EOM, conjunctivae, lids, pupils and irises normal-glasses  RESP:  respiratory effort and palpation of  chest increased, lungs diminished to auscultation posterior > anterior fields, expiratory wheezes greater in posterior fields, fine crackles less present today  CV:  Palpation and auscultation of heart done, bradycardic (not today) and regular rhythm, 3/6 murmur- not heard on previous exam,  peripheral edema 1+ in legs ankles and feet. Chang Hose on.  ABDOMEN:  normal bowel sounds, soft, nontender  M/S:   Gait and station using wheelchair for ambulation, generalized weakness, can walker with SBA of 1 for short distances   SKIN:  Inspection of skin and subcutaneous tissue baseline to visualized areas, skin Chondrodermatitis nodule on superior helix right ear  NEURO:   Examination of sensation by touch normal  PSYCH:  insight and judgement impaired, memory impaired    Lab/Diagnostic data:   Reviewed in Epic    ASSESSMENT/PLAN  (J84.10) Pulmonary fibrosis (H)  (primary encounter diagnosis)  (R06.09) GREENE (dyspnea on exertion)  Comment: Chronic   Plan:   -Duonebs TID and daily prn  -budesonide nebs BID if family agreeable   -prednisone 5 mg po daily  -mucinex 600 mg po daily  -Ventolin BID and q4H prn    (M06.9) Rheumatoid arthritis involving both hands, unspecified rheumatoid factor presence (H)  Comment: Chronic  Plan:   -monitor for flare    (I10) Essential hypertension  (R60.0) Lower extremity edema  Comment: Stable  Plan:   -Chang Hose on am and off HS  -ASA daily for secondary prevention with hx of TIA, no longer on statin  -Amlodipine 10 mg po daily  -metoprolol 25 mg po BID  -updated weight is needed  -lasix 40 mg po daily with K+ 40 mEq po daily  -BMP    (N18.3) CKD (chronic kidney disease) stage 3, GFR 30-59 ml/min (H)  Comment: Chronic  Plan:   -Follow BMP,CBC   -Renal dose medications and avoid nephrotoxins     (N40.0) Benign prostatic hyperplasia without lower urinary tract symptoms  Comment: No recent s/s  Plan:   -Flomax at HS    (E03.9) Hypothyroidism, unspecified type  Comment: Chronic last TSH within  limits  Plan:   -Annual TSH  -Levothyroxine 50 mcg po daily    (H61.23) Bilateral impacted cerumen  Comment: Removal with most visits  Plan:   --cerumen removal with lighted curette to both ear canals     Reviewed with family- not agreeable to any medication changes at this time. Would like labs mid April, not before.    BMP,CBC,TSH LFTs on 4/25/19      Electronically signed by:  SUNDAY Lee CNP               Sincerely,        SUNDAY Lee CNP

## 2019-04-09 NOTE — LETTER
4/9/2019        RE: Jacob Wagoner  [address]  [place] [address]        [place] [facility]  Chief Complaint   Patient presents with     Annual Comprehensive Exam Assisted Living     [place] Record Number:  [id number]  Place of Service where encounter took place:  [facility] ([facility] [[id number]]    HPI:    Jacob Easton  is a 89 year old  (5/16/1929), who is being seen today for an annual comprehensive visit. HPI information obtained from: facility chart records, patient report and [facility] chart review.  Today's concerns are:    Continues to have SOB on exertion. O2 sats within normal limits but is walking less per staff. Pulmonary chronic cystic changes, chronic lung infiltrates has responded well to prednisone burst/taper and he continues on low dose. BP is within limits, denies chest pain or lightheadedness. Lower extremity at baseline and continues to wear Chang Hose. Denies pain, Tylenol prn. Has history of RA with no recent flares. Mood ok considering recent loss of his wife.    Based on JNC-8 goals,  patients age of 89 year old, presence of diabetes or CKD, and goals of care goal BP is  <140/90 mm Hg. Patient is stable with current plan of care and routine assessment..    ALLERGIES: Advil [ibuprofen]; Influenza virus vaccine h5n1; and Orange juice [orange oil]  PAST MEDICAL HISTORY:  has a past medical history of Arthritis, Constipation, Hypertension, Hypocalcemia, Neuromuscular disorder (H), and Thyroid disease.  PAST SURGICAL HISTORY:  has a past surgical history that includes TOTAL HIP ARTHROPLASTY (9/24/11).  IMMUNIZATIONS: Refused all immunizations  There is no immunization history for the selected administration types on file for this patient.  Above immunizations pulled from [facility]. [facility] and facility records also reconciled. Outstanding information sent to  to update [facility]  Future immunizations are  deferred as: refusal     Current Outpatient Medications   Medication Sig Dispense Refill     acetaminophen (TYLENOL) 325 MG tablet Take 650 mg by mouth every 4 hours as needed for mild pain       amLODIPine (NORVASC) 10 MG tablet TAKE 1 TABLET BY MOUTH ONCE DAILY 28 tablet 98     ASPIRIN ADULT LOW STRENGTH 81 MG EC tablet TAKE 1 TABLET BY MOUTH ONCE DAILY 28 tablet 98     CALMOSEPTINE 0.44-20.6 % OINT ointment APPLY TO CLEANSED REDDENED CHARISSA SKIN FOLDS  AND RASH WITH BREAKDOWN DURING MORNING AND BEDTIME CARES AND INCONTINENT CHANGES OR MEDICATION P 113 g 10     FUROSEMIDE PO Take 40 mg by mouth daily       ipratropium - albuterol 0.5 mg/2.5 mg/3 mL (DUONEB) 0.5-2.5 (3) MG/3ML neb solution Take 1 vial by nebulization 3 times daily AND once daily PRN       lactobacillus rhamnosus, GG, (CULTURELL) capsule Take 1 capsule by mouth daily       latanoprost (XALATAN) 0.005 % ophthalmic solution INSTILL ONE DROP IN THE RIGHT EYE EVERY EVENING 2.5 mL 98     levothyroxine (SYNTHROID/LEVOTHROID) 50 MCG tablet TAKE 1 TABLET BY MOUTH ONCE DAILY 31 tablet 98     MELATONIN MAXIMUM STRENGTH 5 MG tablet TAKE 1 TABLET BY MOUTH ONCE DAILY 31 tablet 98     metoprolol tartrate (LOPRESSOR) 25 MG tablet TAKE 1 TABLET BY MOUTH TWICE DAILY 56 tablet 98     MUCINEX 600 MG 12 hr tablet TAKE 1 TABLET BY MOUTH ONCE DAILY 31 tablet 98     Multiple Vitamins-Minerals (CENTRUM SILVER ULTRA MENS) TABS Take 1 tablet by mouth daily       Multiple Vitamins-Minerals (EYE VITAMINS & MINERALS) TABS Take 1 tablet by mouth daily 60 tablet 11     nystatin (MYCOSTATIN) 991073 UNIT/GM POWD Apply topically 2 times daily AND daily PRN       ondansetron (ZOFRAN) 4 MG tablet Take 4 mg by mouth 3 times daily as needed for nausea       potassium chloride ER (K-DUR/KLOR-CON M) 20 MEQ CR tablet TAKE TWO TABLETS (40 MEQ) BY MOUTH ONCE DAILY NOTE DOSAGE/STRENGTH 56 tablet 97     predniSONE (DELTASONE) 5 MG tablet TAKE 1 TABLET BY MOUTH ONCE DAILY 31 tablet 98      senna-docusate (SENOKOT-S;PERICOLACE) 8.6-50 MG per tablet Take 1 tablet by mouth 2 times daily as needed for constipation       tamsulosin (FLOMAX) 0.4 MG capsule TAKE 1 CAPSULE BY MOUTH ONCE DAILY 28 capsule 98     VENTOLIN  (90 Base) MCG/ACT inhaler INHALE 2 PUFFS INTO THE LUNGS TWICE DAILY; & INHALE 2 PUFFS INTO THE LUNGS EVERY 4 HOURS AS NEEDED 18 g 98     AMLODIPINE BESYLATE PO Take 10 mg by mouth daily       potassium chloride (KLOR-CON) 20 MEQ Packet Take 40 mEq by mouth daily         Case Management:  I have reviewed the Assisted Living care plan, current immunizations and preventive care/cancer screening. .Future cancer screening is not clinically indicated secondary to age/goals of care Patient's desire to return to the community is present, but is not able due to care needs . Current Level of Care is appropriate.    Advance Directive Discussion:    I reviewed the current advanced directives as reflected in EPIC, the POLST and the facility chart, and verified the congruency of orders 4/9/19. I contacted the first party [facility]  and discussed the plan of Care.  I did review the advance directives with the resident.     Team Discussion:  I communicated with the appropriate disciplines involved with the Plan of Care:   Nursing    Patient's goal is pain control and comfort. Treat reversible conditions in place  Information reviewed:  Medications, vital signs, orders, and nursing notes.    ROS:  4 point ROS including Respiratory, CV, GI and , other than that noted in the HPI,  is negative    Vitals:  /60   Pulse 63   Temp 98.5  F (36.9  C)   Resp 18   SpO2 96%    Exam:  GENERAL APPEARANCE:  Alert, in no distress sitting in WC  ENT:  Mouth and posterior oropharynx normal, moist mucous membranes, [place], history of bilateral cerumen impaction- wax in both ear canals viewed with otoscope  EYES:  EOM, conjunctivae, lids, pupils and irises normal-glasses  RESP:  respiratory effort and palpation of  chest increased, lungs diminished to auscultation posterior > anterior fields, expiratory wheezes greater in posterior fields, fine crackles less present today  CV:  Palpation and auscultation of heart done, bradycardic (not today) and regular rhythm, 3/6 murmur- not heard on previous exam,  peripheral edema 1+ in legs ankles and feet. Chang Hose on.  ABDOMEN:  normal bowel sounds, soft, nontender  M/S:   Gait and station using wheelchair for ambulation, generalized weakness, can walker with SBA of 1 for short distances   SKIN:  Inspection of skin and subcutaneous tissue baseline to visualized areas, skin Chondrodermatitis nodule on superior helix right ear  NEURO:   Examination of sensation by touch normal  PSYCH:  insight and judgement impaired, memory impaired    Lab/Diagnostic data:   Reviewed in Epic    ASSESSMENT/PLAN  (J84.10) Pulmonary fibrosis (H)  (primary encounter diagnosis)  (R06.09) GREENE (dyspnea on exertion)  Comment: Chronic   Plan:   -Duonebs TID and daily prn  -budesonide nebs BID if family agreeable   -prednisone 5 mg po daily  -mucinex 600 mg po daily  -Ventolin BID and q4H prn    (M06.9) Rheumatoid arthritis involving both hands, unspecified rheumatoid factor presence (H)  Comment: Chronic  Plan:   -monitor for flare    (I10) Essential hypertension  (R60.0) Lower extremity edema  Comment: Stable  Plan:   -Chang Hose on am and off HS  -ASA daily for secondary prevention with hx of TIA, no longer on statin  -Amlodipine 10 mg po daily  -metoprolol 25 mg po BID  -updated weight is needed  -lasix 40 mg po daily with K+ 40 mEq po daily  -BMP    (N18.3) CKD (chronic kidney disease) stage 3, GFR 30-59 ml/min (H)  Comment: Chronic  Plan:   -Follow BMP,CBC   -Renal dose medications and avoid nephrotoxins     (N40.0) Benign prostatic hyperplasia without lower urinary tract symptoms  Comment: No recent s/s  Plan:   -Flomax at HS    (E03.9) Hypothyroidism, unspecified type  Comment: Chronic last TSH within  limits  Plan:   -Annual TSH  -Levothyroxine 50 mcg po daily    (H61.23) Bilateral impacted cerumen  Comment: Removal with most visits  Plan:   --cerumen removal with lighted curette to both ear canals     Reviewed with family- not agreeable to any medication changes at this time. Would like labs mid April, not before.    BMP,CBC,TSH LFTs on 4/25/19      Electronically signed by:  SUNDAY Lee CNP               Sincerely,        SUNDAY Lee CNP

## 2019-04-09 NOTE — LETTER
4/9/2019        RE: Jacob Wagoner  [address]  [facility] [address]        [place] [facility]  Chief Complaint   Patient presents with     Annual Comprehensive Exam Assisted Living     [facility] Record Number:  [id number]  Place of Service where encounter took place:  [facility] ([facility] [[id number]]    HPI:    Jacob Easton  is a 89 year old  (5/16/1929), who is being seen today for an annual comprehensive visit. HPI information obtained from: facility chart records, patient report and [facility] chart review.  Today's concerns are:    Continues to have SOB on exertion. O2 sats within normal limits but is walking less per staff. Pulmonary chronic cystic changes, chronic lung infiltrates has responded well to prednisone burst/taper and he continues on low dose. BP is within limits, denies chest pain or lightheadedness. Lower extremity at baseline and continues to wear Chang Hose. Denies pain, Tylenol prn. Has history of RA with no recent flares. Mood ok considering recent loss of his wife.    Based on JNC-8 goals,  patients age of 89 year old, presence of diabetes or CKD, and goals of care goal BP is  <140/90 mm Hg. Patient is stable with current plan of care and routine assessment..    ALLERGIES: Advil [ibuprofen]; Influenza virus vaccine h5n1; and Orange juice [orange oil]  PAST MEDICAL HISTORY:  has a past medical history of Arthritis, Constipation, Hypertension, Hypocalcemia, Neuromuscular disorder (H), and Thyroid disease.  PAST SURGICAL HISTORY:  has a past surgical history that includes TOTAL HIP ARTHROPLASTY (9/24/11).  IMMUNIZATIONS: Refused all immunizations  There is no immunization history for the selected administration types on file for this patient.  Above immunizations pulled from [facility] [employer]. [facility] and facility records also reconciled. Outstanding information sent to  to update [facility] [employer]  Future immunizations are  deferred as: refusal     Current Outpatient Medications   Medication Sig Dispense Refill     acetaminophen (TYLENOL) 325 MG tablet Take 650 mg by mouth every 4 hours as needed for mild pain       amLODIPine (NORVASC) 10 MG tablet TAKE 1 TABLET BY MOUTH ONCE DAILY 28 tablet 98     ASPIRIN ADULT LOW STRENGTH 81 MG EC tablet TAKE 1 TABLET BY MOUTH ONCE DAILY 28 tablet 98     CALMOSEPTINE 0.44-20.6 % OINT ointment APPLY TO CLEANSED REDDENED CHARISSA SKIN FOLDS  AND RASH WITH BREAKDOWN DURING MORNING AND BEDTIME CARES AND INCONTINENT CHANGES OR MEDICATION P 113 g 10     FUROSEMIDE PO Take 40 mg by mouth daily       ipratropium - albuterol 0.5 mg/2.5 mg/3 mL (DUONEB) 0.5-2.5 (3) MG/3ML neb solution Take 1 vial by nebulization 3 times daily AND once daily PRN       lactobacillus rhamnosus, GG, (CULTURELL) capsule Take 1 capsule by mouth daily       latanoprost (XALATAN) 0.005 % ophthalmic solution INSTILL ONE DROP IN THE RIGHT EYE EVERY EVENING 2.5 mL 98     levothyroxine (SYNTHROID/LEVOTHROID) 50 MCG tablet TAKE 1 TABLET BY MOUTH ONCE DAILY 31 tablet 98     MELATONIN MAXIMUM STRENGTH 5 MG tablet TAKE 1 TABLET BY MOUTH ONCE DAILY 31 tablet 98     metoprolol tartrate (LOPRESSOR) 25 MG tablet TAKE 1 TABLET BY MOUTH TWICE DAILY 56 tablet 98     MUCINEX 600 MG 12 hr tablet TAKE 1 TABLET BY MOUTH ONCE DAILY 31 tablet 98     Multiple Vitamins-Minerals (CENTRUM SILVER ULTRA MENS) TABS Take 1 tablet by mouth daily       Multiple Vitamins-Minerals (EYE VITAMINS & MINERALS) TABS Take 1 tablet by mouth daily 60 tablet 11     nystatin (MYCOSTATIN) 799188 UNIT/GM POWD Apply topically 2 times daily AND daily PRN       ondansetron (ZOFRAN) 4 MG tablet Take 4 mg by mouth 3 times daily as needed for nausea       potassium chloride ER (K-DUR/KLOR-CON M) 20 MEQ CR tablet TAKE TWO TABLETS (40 MEQ) BY MOUTH ONCE DAILY NOTE DOSAGE/STRENGTH*** 56 tablet 97     predniSONE (DELTASONE) 5 MG tablet TAKE 1 TABLET BY MOUTH ONCE DAILY 31 tablet 98      senna-docusate (SENOKOT-S;PERICOLACE) 8.6-50 MG per tablet Take 1 tablet by mouth 2 times daily as needed for constipation       tamsulosin (FLOMAX) 0.4 MG capsule TAKE 1 CAPSULE BY MOUTH ONCE DAILY 28 capsule 98     VENTOLIN  (90 Base) MCG/ACT inhaler INHALE 2 PUFFS INTO THE LUNGS TWICE DAILY; & INHALE 2 PUFFS INTO THE LUNGS EVERY 4 HOURS AS NEEDED 18 g 98     AMLODIPINE BESYLATE PO Take 10 mg by mouth daily       potassium chloride (KLOR-CON) 20 MEQ Packet Take 40 mEq by mouth daily         Case Management:  I have reviewed the Assisted Living care plan, current immunizations and preventive care/cancer screening. .Future cancer screening is not clinically indicated secondary to age/goals of care Patient's desire to return to the community is present, but is not able due to care needs . Current Level of Care is appropriate.    Advance Directive Discussion:    I reviewed the current advanced directives as reflected in EPIC, the POLST and the facility chart, and verified the congruency of orders 4/9/19. I contacted the first party [facility]  and discussed the plan of Care.  I did review the advance directives with the resident.     Team Discussion:  I communicated with the appropriate disciplines involved with the Plan of Care:   Nursing    Patient's goal is pain control and comfort. Treat reversible conditions in place  Information reviewed:  Medications, vital signs, orders, and nursing notes.    ROS:  4 point ROS including Respiratory, CV, GI and , other than that noted in the HPI,  is negative    Vitals:  /60   Pulse 63   Temp 98.5  F (36.9  C)   Resp 18   SpO2 96%    Exam:  GENERAL APPEARANCE:  Alert, in no distress sitting in WC  ENT:  Mouth and posterior oropharynx normal, moist mucous membranes, [place], history of bilateral cerumen impaction- wax in both ear canals viewed with otoscope  EYES:  EOM, conjunctivae, lids, pupils and irises normal-glasses  RESP:  respiratory effort and palpation of  chest increased, lungs diminished to auscultation posterior > anterior fields, expiratory wheezes greater in posterior fields, fine crackles less present today  CV:  Palpation and auscultation of heart done, bradycardic (not today) and regular rhythm, 3/6 murmur- not heard on previous exam,  peripheral edema 1+ in legs ankles and feet. Chang Hose on.  ABDOMEN:  normal bowel sounds, soft, nontender  M/S:   Gait and station using wheelchair for ambulation, generalized weakness, can walker with SBA of 1 for short distances   SKIN:  Inspection of skin and subcutaneous tissue baseline to visualized areas, skin Chondrodermatitis nodule on superior helix right ear  NEURO:   Examination of sensation by touch normal  PSYCH:  insight and judgement impaired, memory impaired    Lab/Diagnostic data:   Reviewed in Epic    ASSESSMENT/PLAN  (J84.10) Pulmonary fibrosis (H)  (primary encounter diagnosis)  (R06.09) GREENE (dyspnea on exertion)  Comment: Chronic   Plan:   -Duonebs TID and daily prn  -budesonide nebs BID if family agreeable   -prednisone 5 mg po daily  -mucinex 600 mg po daily  -Ventolin BID and q4H prn    (M06.9) Rheumatoid arthritis involving both hands, unspecified rheumatoid factor presence (H)  Comment: Chronic  Plan:   -monitor for flare    (I10) Essential hypertension  (R60.0) Lower extremity edema  Comment: Stable  Plan:   -Chang Hose on am and off HS  -ASA daily for secondary prevention with hx of TIA, no longer on statin  -Amlodipine 10 mg po daily  -metoprolol 25 mg po BID  -updated weight is needed  -lasix 40 mg po daily with K+ 40 mEq po daily  -BMP    (N18.3) CKD (chronic kidney disease) stage 3, GFR 30-59 ml/min (H)  Comment: Chronic  Plan:   -Follow BMP,CBC   -Renal dose medications and avoid nephrotoxins     (N40.0) Benign prostatic hyperplasia without lower urinary tract symptoms  Comment: No recent s/s  Plan:   -Flomax at HS    (E03.9) Hypothyroidism, unspecified type  Comment: Chronic last TSH within  limits  Plan:   -Annual TSH  -Levothyroxine 50 mcg po daily    (H61.23) Bilateral impacted cerumen  Comment: Removal with most visits  Plan:   --cerumen removal with lighted curette to both ear canals     Reviewed with family- not agreeable to any medication changes at this time. Would like labs mid April, not before.    BMP,CBC,TSH LFTs on 4/25/19      Electronically signed by:  SUNDAY Lee CNP               Sincerely,        SUNDAY Lee CNP

## 2019-04-09 NOTE — PROGRESS NOTES
[facility]  Chief Complaint   Patient presents with     Annual Comprehensive Exam Assisted Living     [facility] Medical Record Number:  [id number]  Place of Service where encounter took place:  [facility] ([facility] [[id number]]    HPI:    Jacob Easton  is a 89 year old  (5/16/1929), who is being seen today for an annual comprehensive visit. HPI information obtained from: facility chart records, patient report and [facility] chart review.  Today's concerns are:    Continues to have SOB on exertion. O2 sats within normal limits but is walking less per staff. Pulmonary chronic cystic changes, chronic lung infiltrates has responded well to prednisone burst/taper and he continues on low dose. BP is within limits, denies chest pain or lightheadedness. Lower extremity at baseline and continues to wear Chang Hose. Denies pain, Tylenol prn. Has history of RA with no recent flares. Mood ok considering recent loss of his wife.    Based on JNC-8 goals,  patients age of 89 year old, presence of diabetes or CKD, and goals of care goal BP is  <140/90 mm Hg. Patient is stable with current plan of care and routine assessment..    ALLERGIES: Advil [ibuprofen]; Influenza virus vaccine h5n1; and Orange juice [orange oil]  PAST MEDICAL HISTORY:  has a past medical history of Arthritis, Constipation, Hypertension, Hypocalcemia, Neuromuscular disorder (H), and Thyroid disease.  PAST SURGICAL HISTORY:  has a past surgical history that includes TOTAL HIP ARTHROPLASTY (9/24/11).  IMMUNIZATIONS: Refused all immunizations  There is no immunization history for the selected administration types on file for this patient.  Above immunizations pulled from [facility] [employer]. MIIC and facility records also reconciled. Outstanding information sent to  to update [facility]  Future immunizations are deferred as: refusal     Current Outpatient Medications   Medication Sig Dispense Refill     acetaminophen  (TYLENOL) 325 MG tablet Take 650 mg by mouth every 4 hours as needed for mild pain       amLODIPine (NORVASC) 10 MG tablet TAKE 1 TABLET BY MOUTH ONCE DAILY 28 tablet 98     ASPIRIN ADULT LOW STRENGTH 81 MG EC tablet TAKE 1 TABLET BY MOUTH ONCE DAILY 28 tablet 98     CALMOSEPTINE 0.44-20.6 % OINT ointment APPLY TO CLEANSED REDDENED CHARISSA SKIN FOLDS  AND RASH WITH BREAKDOWN DURING MORNING AND BEDTIME CARES AND INCONTINENT CHANGES OR MEDICATION P 113 g 10     FUROSEMIDE PO Take 40 mg by mouth daily       ipratropium - albuterol 0.5 mg/2.5 mg/3 mL (DUONEB) 0.5-2.5 (3) MG/3ML neb solution Take 1 vial by nebulization 3 times daily AND once daily PRN       lactobacillus rhamnosus, GG, (CULTURELL) capsule Take 1 capsule by mouth daily       latanoprost (XALATAN) 0.005 % ophthalmic solution INSTILL ONE DROP IN THE RIGHT EYE EVERY EVENING 2.5 mL 98     levothyroxine (SYNTHROID/LEVOTHROID) 50 MCG tablet TAKE 1 TABLET BY MOUTH ONCE DAILY 31 tablet 98     MELATONIN MAXIMUM STRENGTH 5 MG tablet TAKE 1 TABLET BY MOUTH ONCE DAILY 31 tablet 98     metoprolol tartrate (LOPRESSOR) 25 MG tablet TAKE 1 TABLET BY MOUTH TWICE DAILY 56 tablet 98     MUCINEX 600 MG 12 hr tablet TAKE 1 TABLET BY MOUTH ONCE DAILY 31 tablet 98     Multiple Vitamins-Minerals (CENTRUM SILVER ULTRA MENS) TABS Take 1 tablet by mouth daily       Multiple Vitamins-Minerals (EYE VITAMINS & MINERALS) TABS Take 1 tablet by mouth daily 60 tablet 11     nystatin (MYCOSTATIN) 026417 UNIT/GM POWD Apply topically 2 times daily AND daily PRN       ondansetron (ZOFRAN) 4 MG tablet Take 4 mg by mouth 3 times daily as needed for nausea       potassium chloride ER (K-DUR/KLOR-CON M) 20 MEQ CR tablet TAKE TWO TABLETS (40 MEQ) BY MOUTH ONCE DAILY NOTE DOSAGE/STRENGTH 56 tablet 97     predniSONE (DELTASONE) 5 MG tablet TAKE 1 TABLET BY MOUTH ONCE DAILY 31 tablet 98     senna-docusate (SENOKOT-S;PERICOLACE) 8.6-50 MG per tablet Take 1 tablet by mouth 2 times daily as needed for  constipation       tamsulosin (FLOMAX) 0.4 MG capsule TAKE 1 CAPSULE BY MOUTH ONCE DAILY 28 capsule 98     VENTOLIN  (90 Base) MCG/ACT inhaler INHALE 2 PUFFS INTO THE LUNGS TWICE DAILY; & INHALE 2 PUFFS INTO THE LUNGS EVERY 4 HOURS AS NEEDED 18 g 98     AMLODIPINE BESYLATE PO Take 10 mg by mouth daily       potassium chloride (KLOR-CON) 20 MEQ Packet Take 40 mEq by mouth daily         Case Management:  I have reviewed the Assisted Living care plan, current immunizations and preventive care/cancer screening. .Future cancer screening is not clinically indicated secondary to age/goals of care Patient's desire to return to the community is present, but is not able due to care needs . Current Level of Care is appropriate.    Advance Directive Discussion:    I reviewed the current advanced directives as reflected in EPIC, the POLST and the facility chart, and verified the congruency of orders 4/9/19. I contacted the first party [facility]  and discussed the plan of Care.  I did review the advance directives with the resident.     Team Discussion:  I communicated with the appropriate disciplines involved with the Plan of Care:   Nursing    Patient's goal is pain control and comfort. Treat reversible conditions in place  Information reviewed:  Medications, vital signs, orders, and nursing notes.    ROS:  4 point ROS including Respiratory, CV, GI and , other than that noted in the HPI,  is negative    Vitals:  /60   Pulse 63   Temp 98.5  F (36.9  C)   Resp 18   SpO2 96%    Exam:  GENERAL APPEARANCE:  Alert, in no distress sitting in WC  ENT:  Mouth and posterior oropharynx normal, moist mucous membranes, [place], history of bilateral cerumen impaction- wax in both ear canals viewed with otoscope  EYES:  EOM, conjunctivae, lids, pupils and irises normal-glasses  RESP:  respiratory effort and palpation of chest increased, lungs diminished to auscultation posterior > anterior fields, expiratory wheezes greater in  posterior fields, fine crackles less present today  CV:  Palpation and auscultation of heart done, bradycardic (not today) and regular rhythm, 3/6 murmur- not heard on previous exam,  peripheral edema 1+ in legs ankles and feet. Chang Hose on.  ABDOMEN:  normal bowel sounds, soft, nontender  M/S:   Gait and station using wheelchair for ambulation, generalized weakness, can walker with SBA of 1 for short distances   SKIN:  Inspection of skin and subcutaneous tissue baseline to visualized areas, skin Chondrodermatitis nodule on superior helix right ear  NEURO:   Examination of sensation by touch normal  PSYCH:  insight and judgement impaired, memory impaired    Lab/Diagnostic data:   Reviewed in Epic    ASSESSMENT/PLAN  (J84.10) Pulmonary fibrosis (H)  (primary encounter diagnosis)  (R06.09) GREENE (dyspnea on exertion)  Comment: Chronic   Plan:   -Duonebs TID and daily prn  -budesonide nebs BID if family agreeable   -prednisone 5 mg po daily  -mucinex 600 mg po daily  -Ventolin BID and q4H prn    (M06.9) Rheumatoid arthritis involving both hands, unspecified rheumatoid factor presence (H)  Comment: Chronic  Plan:   -monitor for flare    (I10) Essential hypertension  (R60.0) Lower extremity edema  Comment: Stable  Plan:   -Chang Hose on am and off HS  -ASA daily for secondary prevention with hx of TIA, no longer on statin  -Amlodipine 10 mg po daily  -metoprolol 25 mg po BID  -updated weight is needed  -lasix 40 mg po daily with K+ 40 mEq po daily  -BMP    (N18.3) CKD (chronic kidney disease) stage 3, GFR 30-59 ml/min (H)  Comment: Chronic  Plan:   -Follow BMP,CBC   -Renal dose medications and avoid nephrotoxins     (N40.0) Benign prostatic hyperplasia without lower urinary tract symptoms  Comment: No recent s/s  Plan:   -Flomax at HS    (E03.9) Hypothyroidism, unspecified type  Comment: Chronic last TSH within limits  Plan:   -Annual TSH  -Levothyroxine 50 mcg po daily    (H61.23) Bilateral impacted cerumen  Comment:  Removal with most visits  Plan:   --cerumen removal with lighted curette to both ear canals     Reviewed with family- not agreeable to any medication changes at this time. Would like labs mid April, not before.    BMP,CBC,TSH LFTs on 4/25/19      Electronically signed by:  SUNDAY Lee CNP

## 2019-04-12 DIAGNOSIS — K59.01 SLOW TRANSIT CONSTIPATION: Primary | ICD-10-CM

## 2019-04-12 RX ORDER — DOCUSATE SODIUM AND SENNOSIDES 50; 8.6 MG/1; MG/1
TABLET ORAL
Qty: 30 TABLET | Refills: 97 | Status: SHIPPED | OUTPATIENT
Start: 2019-04-12 | End: 2020-01-01

## 2019-04-14 DIAGNOSIS — M25.559 PAIN IN JOINT INVOLVING PELVIC REGION AND THIGH, UNSPECIFIED LATERALITY: Primary | ICD-10-CM

## 2019-04-14 RX ORDER — ACETAMINOPHEN 325 MG/1
TABLET ORAL
Qty: 60 TABLET | Refills: 97 | Status: SHIPPED | OUTPATIENT
Start: 2019-04-14 | End: 2021-01-01

## 2019-04-19 ENCOUNTER — TELEPHONE (OUTPATIENT)
Dept: GERIATRICS | Facility: CLINIC | Age: 84
End: 2019-04-19

## 2019-04-19 PROBLEM — Z76.89 HEALTH CARE HOME: Status: ACTIVE | Noted: 2019-04-19

## 2019-04-19 NOTE — TELEPHONE ENCOUNTER
CARDIOVASCULAR - ADULT    • Maintains optimal cardiac output and hemodynamic stability Progressing    • Absence of cardiac arrhythmias or at baseline Progressing        Impaired Activities of Daily Living    • Achieve highest/safest level of independence i Prior Authorization Retail Medication Request    Medication/Dose: PREDNISONE TAB 5MG  ICD code (if different than what is on RX):    Previously Tried and Failed:    Rationale:      Insurance Name:  [employer] [id number] OR [id number]  Insurance ID:  [id number]      Pharmacy Information (if different than what is on RX)  Name:  [facility]  Phone:  [phone]

## 2019-04-25 ENCOUNTER — TRANSFERRED RECORDS (OUTPATIENT)
Dept: HEALTH INFORMATION MANAGEMENT | Facility: CLINIC | Age: 84
End: 2019-04-25

## 2019-04-25 LAB
ALBUMIN SERPL-MCNC: 3.5 G/DL (ref 3.4–5)
ALP SERPL-CCNC: 76 U/L (ref 40–150)
ALT SERPL-CCNC: 20 U/L (ref 0–70)
ANION GAP SERPL CALCULATED.3IONS-SCNC: 8 MMOL/L (ref 3–14)
AST SERPL-CCNC: 11 U/L (ref 0–45)
BILIRUB SERPL-MCNC: 0.5 MG/DL (ref 0.2–1.3)
BILIRUBIN DIRECT: 0.2 MG/DL (ref 0–0.2)
BUN SERPL-MCNC: 28 MG/DL (ref 7–30)
CALCIUM SERPL-MCNC: 8.9 MG/DL (ref 8.5–10.1)
CHLORIDE SERPLBLD-SCNC: 103 MMOL/L (ref 94–109)
CO2 SERPL-SCNC: 27 MMOL/L (ref 20–32)
CREAT SERPL-MCNC: 1.52 MG/DL (ref 0.66–1.25)
ERYTHROCYTE [DISTWIDTH] IN BLOOD BY AUTOMATED COUNT: 14.4 % (ref 10–15)
GFR SERPL CREATININE-BSD FRML MDRD: 40 ML/MIN/1.73_M2
GLUCOSE SERPL-MCNC: 113 MG/DL (ref 70–99)
HCT VFR BLD AUTO: 43.5 % (ref 40–53)
HEMOGLOBIN: 14.1 G/DL (ref 13.3–17.7)
MCH RBC QN AUTO: 30.6 PG (ref 26.5–33)
MCHC RBC AUTO-ENTMCNC: 32.4 G/DL (ref 31.5–36.5)
MCV RBC AUTO: 94 FL (ref 78–100)
PLATELET # BLD AUTO: 272 10E9/L (ref 150–450)
POTASSIUM SERPL-SCNC: 4.1 MMOL/L (ref 3.4–5.3)
PROT SERPL-MCNC: 8 G/DL (ref 6.8–8.8)
RBC # BLD AUTO: 4.61 10E12/L (ref 4.4–5.9)
SODIUM SERPL-SCNC: 138 MMOL/L (ref 133–144)
TSH SERPL-ACNC: 6.93 MU/L (ref 0.4–4)
WBC # BLD AUTO: 9 10E9/L (ref 4–11)

## 2019-05-14 ENCOUNTER — ASSISTED LIVING VISIT (OUTPATIENT)
Dept: GERIATRICS | Facility: CLINIC | Age: 84
End: 2019-05-14
Payer: COMMERCIAL

## 2019-05-14 DIAGNOSIS — J84.10 PULMONARY FIBROSIS (H): ICD-10-CM

## 2019-05-14 DIAGNOSIS — H61.23 BILATERAL IMPACTED CERUMEN: ICD-10-CM

## 2019-05-14 DIAGNOSIS — R06.09 DYSPNEA ON EXERTION: Primary | ICD-10-CM

## 2019-05-14 DIAGNOSIS — R13.10 DYSPHAGIA, UNSPECIFIED TYPE: ICD-10-CM

## 2019-05-14 DIAGNOSIS — R09.89 CHEST CONGESTION: ICD-10-CM

## 2019-05-14 DIAGNOSIS — J47.9 ADULT BRONCHIECTASIS (H): ICD-10-CM

## 2019-05-14 PROCEDURE — 69210 REMOVE IMPACTED EAR WAX UNI: CPT | Performed by: NURSE PRACTITIONER

## 2019-05-14 PROCEDURE — 99358 PROLONG SERVICE W/O CONTACT: CPT | Performed by: NURSE PRACTITIONER

## 2019-05-14 NOTE — PROGRESS NOTES
[place] [facility]  [place] Medical Record Number:  [id number]  Place of Service where encounter took place:  [facility] [[id number]]  Chief Complaint   Patient presents with     RECHECK       HPI:    Jacob Easton  is a 89 year old (5/16/1929), who is being seen today for an episodic care visit.  HPI information obtained from: facility chart records, facility staff, patient report and [facility] chart review.     Seen today for recheck. Reports to feeling fine. Possible tooth extraction in near future but would not elaborate.Staff stating sleeping more, refusing some meals and overall functional decline-walking less and needing more assistance with ADLs, weaker.    Continues to have SOB on exertion. O2 sats within normal limits but is walking less per staff. Pulmonary chronic cystic changes, chronic lung infiltrates has responded well to prednisone burst/taper and he continues on low dose. Family has been reluctant to increase dose despite noticed improvement by staff on higher maintenance dose and his desire for comfort care focus.    Working with speech therapy 2/2 dysphagia. Chronic mucous production which has increased and is having more difficulty with clearance. Conference call with family scheduled for 5/15    Chronic bilateral cerumen viewed today with otoscope.       Past Medical and Surgical History reviewed in Epic today.    MEDICATIONS:  Current Outpatient Medications   Medication Sig Dispense Refill     acetaminophen (TYLENOL) 325 MG tablet TAKE TWO TABLETS (650MG) BY MOUTH EVERY 4 HOURS AS NEEDED 60 tablet 97     amLODIPine (NORVASC) 10 MG tablet TAKE 1 TABLET BY MOUTH ONCE DAILY 28 tablet 98     ASPIRIN ADULT LOW STRENGTH 81 MG EC tablet TAKE 1 TABLET BY MOUTH ONCE DAILY 28 tablet 98     CALMOSEPTINE 0.44-20.6 % OINT ointment APPLY TO CLEANSED REDDENED CHARISSA SKIN FOLDS  AND RASH WITH BREAKDOWN DURING MORNING AND BEDTIME CARES AND INCONTINENT CHANGES OR MEDICATION  P 113 g 10     FUROSEMIDE PO Take 40 mg by mouth daily       ipratropium - albuterol 0.5 mg/2.5 mg/3 mL (DUONEB) 0.5-2.5 (3) MG/3ML neb solution Take 1 vial by nebulization 3 times daily AND once daily PRN       lactobacillus rhamnosus, GG, (CULTURELL) capsule Take 1 capsule by mouth daily       latanoprost (XALATAN) 0.005 % ophthalmic solution INSTILL ONE DROP IN THE RIGHT EYE EVERY EVENING 2.5 mL 98     levothyroxine (SYNTHROID/LEVOTHROID) 50 MCG tablet TAKE 1 TABLET BY MOUTH ONCE DAILY 31 tablet 98     MELATONIN MAXIMUM STRENGTH 5 MG tablet TAKE 1 TABLET BY MOUTH ONCE DAILY 31 tablet 98     metoprolol tartrate (LOPRESSOR) 25 MG tablet TAKE 1 TABLET BY MOUTH TWICE DAILY 56 tablet 98     MUCINEX 600 MG 12 hr tablet TAKE 1 TABLET BY MOUTH ONCE DAILY 31 tablet 98     Multiple Vitamins-Minerals (CENTRUM SILVER ULTRA MENS) TABS Take 1 tablet by mouth daily       Multiple Vitamins-Minerals (EYE VITAMINS & MINERALS) TABS Take 1 tablet by mouth daily 60 tablet 11     nystatin (MYCOSTATIN) 837906 UNIT/GM POWD Apply topically 2 times daily AND daily PRN       ondansetron (ZOFRAN) 4 MG tablet Take 4 mg by mouth 3 times daily as needed for nausea       potassium chloride ER (K-DUR/KLOR-CON M) 20 MEQ CR tablet TAKE TWO TABLETS (40 MEQ) BY MOUTH ONCE DAILY  56 tablet 97     predniSONE (DELTASONE) 5 MG tablet TAKE 1 TABLET BY MOUTH ONCE DAILY 31 tablet 98     SM STOOL SOFTENER 8.6-50 MG tablet TAKE 1 TABLET BY MOUTH TWICE DAILY AS NEEDED FOR CONSTIPATION 30 tablet 97     tamsulosin (FLOMAX) 0.4 MG capsule TAKE 1 CAPSULE BY MOUTH ONCE DAILY 28 capsule 98     VENTOLIN  (90 Base) MCG/ACT inhaler INHALE 2 PUFFS INTO THE LUNGS TWICE DAILY; & INHALE 2 PUFFS INTO THE LUNGS EVERY 4 HOURS AS NEEDED 18 g 98     REVIEW OF SYSTEMS:  Limited secondary to cognitive impairment but today pt reports fine    Objective:  /72   Pulse 81   Temp 98.3  F (36.8  C)   Resp 16   Wt 83 kg (183 lb)   SpO2 97%   BMI 23.50 kg/m     Exam:  GENERAL APPEARANCE:  Alert, in no distress sitting in WC  ENT:  Mouth and posterior oropharynx normal, moist mucous membranes, [place], history of bilateral cerumen impaction- wax in both ear canals viewed with otoscope  EYES:  EOM, conjunctivae, lids, pupils and irises normal-glasses  RESP:  respiratory effort and palpation of chest increased, lungs diminished to auscultation posterior > anterior fields, expiratory wheezes greater in posterior fields, fine crackles not present today  CV:  Palpation and auscultation of heart done, bradycardic (not today) and regular rhythm, 3/6 murmur- not heard on previous exam,  peripheral edema 2+ in legs ankles and feet. Chang Hose on.  ABDOMEN:  normal bowel sounds, soft, nontender  M/S:   Gait and station using wheelchair for ambulation, generalized weakness increased, less walking with SBA of 1 for short distances   SKIN:  Inspection of skin and subcutaneous tissue baseline to visualized areas of skin. Chondrodermatitis nodule on superior helix right ear-refuses derm consult   NEURO:   Examination of sensation by touch normal  PSYCH:  insight and judgement impaired, memory impaired     Labs:   CBC RESULTS:   Recent Labs   Lab Test 04/25/19 01/17/19   WBC 9.0 16.4*   RBC 4.61 4.38*   HGB 14.1 12.8*   HCT 43.5 39.9*   MCV 94 91   MCH 30.6 29.2   MCHC 32.4 32.1   RDW 14.4 13.9    447       Last Basic Metabolic Panel:  Recent Labs   Lab Test 04/25/19 10/12/18    135   POTASSIUM 4.1 4.0   CHLORIDE 103 99   MERCEDES 8.9 8.9   CO2 27 31   BUN 28 24   CR 1.52* 1.41*   * 109*       Liver Function Studies -   Recent Labs   Lab Test 04/25/19 09/13/18 04/19/18   PROTTOTAL 8.0  --  7.9   ALBUMIN 3.5  --  3.4   BILITOTAL 0.5  --  0.6   ALKPHOS 76  --  78   AST 11 20 19   ALT 20 19 23       TSH   Date Value Ref Range Status   04/25/2019 6.93 (A) 0.40 - 4.00 mU/L Final   09/13/2018 3.45 0.40 - 4.00 mU/L Final       No results found for: A1C      ASSESSMENT/PLAN:  (R06.09)  Dyspnea on exertion  (primary encounter diagnosis)  (J84.10) Pulmonary fibrosis (H)  Comment: Ongoing  Plan:   -Duonebs TID and daily prn  -consider budesonide nebs BID to duonebs as alternate therapy   -prednisone burst and taper  -mucinex increase to  600 mg po BID  -Ventolin BID and q4H prn    (R13.10) Dysphagia, unspecified type  Comment: Worked with speech therapy  Plan:   -alternative food and fluids   -eliminating dense bread products    (H61.23) Bilateral impacted cerumen  Comment: Chronic  Plan:   -cerumen removal with lighted curette to both ear canals     [place] [facility]  NON-FACE TO FACE PROLONGED SERVICE    Jacob Mahmoodangela 5/16/1929    Date of Related Face to Face Service: 5/14/19    INTENT OF SERVICE  This is an extended evaluation of patient's specific problem.  The service relates to a recent or future E/M visit.  Documentation supports medical necessity, relates to ongoing patient management and documents start times and stop times.    Regarding the following diagnoses:    Telephone care conference regarding ongoing medical conditions and plan of care with his daughter LAUREN Vivar of [facility] [place].        Dyspnea on exertion  Pulmonary fibrosis (H)  Dysphagia, unspecified type  Bilateral impacted cerumen  Adult bronchiectasis (H)  Chest congestion    I discussed with Cb who is daughter of the patient.  (Start time 1010  Stop time 1045)    ASSESSMENT/PLAN: See above    TIME  Total time spent with Non-Face to Face prolonged service 35 minutes.     Electronically signed by:  SUNDAY Lee CNP

## 2019-05-14 NOTE — LETTER
5/14/2019        RE: Jacob Easton  [facility]  [address]  [place] [facility] [place] [address]        [place] [facility]  [place] Medical Record Number:  [id number]  Place of Service where encounter took place:  [facility] [[id number]]  Chief Complaint   Patient presents with     RECHECK       HPI:    Jacob Easton  is a 89 year old (5/16/1929), who is being seen today for an episodic care visit.  HPI information obtained from: facility chart records, facility staff, patient report and [facility] chart review.     Seen today for recheck. Reports to feeling fine. Possible tooth extraction in near future but would not elaborate.Staff stating sleeping more, refusing some meals and overall functional decline-walking less and needing more assistance with ADLs, weaker.    Continues to have SOB on exertion. O2 sats within normal limits but is walking less per staff. Pulmonary chronic cystic changes, chronic lung infiltrates has responded well to prednisone burst/taper and he continues on low dose. Family has been reluctant to increase dose despite noticed improvement by staff on higher maintenance dose and his desire for comfort care focus.    Working with speech therapy 2/2 dysphagia. Chronic mucous production which has increased and is having more difficulty with clearance. Conference call with family scheduled for 5/15    Chronic bilateral cerumen viewed today with otoscope.       Past Medical and Surgical History reviewed in Epic today.    MEDICATIONS:  Current Outpatient Medications   Medication Sig Dispense Refill     acetaminophen (TYLENOL) 325 MG tablet TAKE TWO TABLETS (650MG) BY MOUTH EVERY 4 HOURS AS NEEDED 60 tablet 97     amLODIPine (NORVASC) 10 MG tablet TAKE 1 TABLET BY MOUTH ONCE DAILY 28 tablet 98     ASPIRIN ADULT LOW STRENGTH 81 MG EC tablet TAKE 1 TABLET BY MOUTH ONCE DAILY 28 tablet 98     CALMOSEPTINE 0.44-20.6 % OINT ointment APPLY TO CLEANSED REDDENED  CHARISSA SKIN FOLDS  AND RASH WITH BREAKDOWN DURING MORNING AND BEDTIME CARES AND INCONTINENT CHANGES OR MEDICATION P 113 g 10     FUROSEMIDE PO Take 40 mg by mouth daily       ipratropium - albuterol 0.5 mg/2.5 mg/3 mL (DUONEB) 0.5-2.5 (3) MG/3ML neb solution Take 1 vial by nebulization 3 times daily AND once daily PRN       lactobacillus rhamnosus, GG, (CULTURELL) capsule Take 1 capsule by mouth daily       latanoprost (XALATAN) 0.005 % ophthalmic solution INSTILL ONE DROP IN THE RIGHT EYE EVERY EVENING 2.5 mL 98     levothyroxine (SYNTHROID/LEVOTHROID) 50 MCG tablet TAKE 1 TABLET BY MOUTH ONCE DAILY 31 tablet 98     MELATONIN MAXIMUM STRENGTH 5 MG tablet TAKE 1 TABLET BY MOUTH ONCE DAILY 31 tablet 98     metoprolol tartrate (LOPRESSOR) 25 MG tablet TAKE 1 TABLET BY MOUTH TWICE DAILY 56 tablet 98     MUCINEX 600 MG 12 hr tablet TAKE 1 TABLET BY MOUTH ONCE DAILY 31 tablet 98     Multiple Vitamins-Minerals (CENTRUM SILVER ULTRA MENS) TABS Take 1 tablet by mouth daily       Multiple Vitamins-Minerals (EYE VITAMINS & MINERALS) TABS Take 1 tablet by mouth daily 60 tablet 11     nystatin (MYCOSTATIN) 072886 UNIT/GM POWD Apply topically 2 times daily AND daily PRN       ondansetron (ZOFRAN) 4 MG tablet Take 4 mg by mouth 3 times daily as needed for nausea       potassium chloride ER (K-DUR/KLOR-CON M) 20 MEQ CR tablet TAKE TWO TABLETS (40 MEQ) BY MOUTH ONCE DAILY  56 tablet 97     predniSONE (DELTASONE) 5 MG tablet TAKE 1 TABLET BY MOUTH ONCE DAILY 31 tablet 98     SM STOOL SOFTENER 8.6-50 MG tablet TAKE 1 TABLET BY MOUTH TWICE DAILY AS NEEDED FOR CONSTIPATION 30 tablet 97     tamsulosin (FLOMAX) 0.4 MG capsule TAKE 1 CAPSULE BY MOUTH ONCE DAILY 28 capsule 98     VENTOLIN  (90 Base) MCG/ACT inhaler INHALE 2 PUFFS INTO THE LUNGS TWICE DAILY; & INHALE 2 PUFFS INTO THE LUNGS EVERY 4 HOURS AS NEEDED 18 g 98     REVIEW OF SYSTEMS:  Limited secondary to cognitive impairment but today pt reports fine    Objective:  /72    Pulse 81   Temp 98.3  F (36.8  C)   Resp 16   Wt 83 kg (183 lb)   SpO2 97%   BMI 23.50 kg/m     Exam:  GENERAL APPEARANCE:  Alert, in no distress sitting in WC  ENT:  Mouth and posterior oropharynx normal, moist mucous membranes, [place], history of bilateral cerumen impaction- wax in both ear canals viewed with otoscope  EYES:  EOM, conjunctivae, lids, pupils and irises normal-glasses  RESP:  respiratory effort and palpation of chest increased, lungs diminished to auscultation posterior > anterior fields, expiratory wheezes greater in posterior fields, fine crackles  not present today  CV:  Palpation and auscultation of heart done, bradycardic (not today) and regular rhythm, 3/6 murmur- not heard on previous exam,  peripheral edema 2+ in legs ankles and feet. Chang Hose on.  ABDOMEN:  normal bowel sounds, soft, nontender  M/S:   Gait and station using wheelchair for ambulation, generalized weakness increased, less walking with SBA of 1 for short distances   SKIN:  Inspection of skin and subcutaneous tissue baseline to visualized areas of skin. Chondrodermatitis nodule on superior helix right ear-refuses derm consult   NEURO:   Examination of sensation by touch normal  PSYCH:  insight and judgement impaired, memory impaired     Labs:   CBC RESULTS:   Recent Labs   Lab Test 04/25/19 01/17/19   WBC 9.0 16.4*   RBC 4.61 4.38*   HGB 14.1 12.8*   HCT 43.5 39.9*   MCV 94 91   MCH 30.6 29.2   MCHC 32.4 32.1   RDW 14.4 13.9    447       Last Basic Metabolic Panel:  Recent Labs   Lab Test 04/25/19 10/12/18    135   POTASSIUM 4.1 4.0   CHLORIDE 103 99   MERCEDES 8.9 8.9   CO2 27 31   BUN 28 24   CR 1.52* 1.41*   * 109*       Liver Function Studies -   Recent Labs   Lab Test 04/25/19 09/13/18 04/19/18   PROTTOTAL 8.0  --  7.9   ALBUMIN 3.5  --  3.4   BILITOTAL 0.5  --  0.6   ALKPHOS 76  --  78   AST 11 20 19   ALT 20 19 23       TSH   Date Value Ref Range Status   04/25/2019 6.93 (A) 0.40 - 4.00 mU/L Final    09/13/2018 3.45 0.40 - 4.00 mU/L Final       No results found for: A1C      ASSESSMENT/PLAN:  (R06.09) Dyspnea on exertion  (primary encounter diagnosis)  (J84.10) Pulmonary fibrosis (H)  Comment: Ongoing  Plan:   -Duonebs TID and daily prn  -consider budesonide nebs BID to duonebs as alternate therapy   -prednisone burst and taper  -mucinex increase to  600 mg po BID  -Ventolin BID and q4H prn    (R13.10) Dysphagia, unspecified type  Comment: Worked with speech therapy  Plan:   -alternative food and fluids   -eliminating dense bread products    (H61.23) Bilateral impacted cerumen  Comment: Chronic  Plan:   -cerumen removal with lighted curette to both ear canals     [place] [facility]  NON-FACE TO FACE PROLONGED SERVICE    Jacob Easton 5/16/1929    Date of Related Face to Face Service: 5/14/19    INTENT OF SERVICE  This is an extended evaluation of patient's specific problem.  The service relates to a recent or future E/M visit.  Documentation supports medical necessity, relates to ongoing patient management and documents start times and stop times.    Regarding the following diagnoses:    Telephone care conference regarding ongoing medical conditions and plan of care with his daughter LAUREN [facility].        Dyspnea on exertion  Pulmonary fibrosis (H)  Dysphagia, unspecified type  Bilateral impacted cerumen  Adult bronchiectasis (H)  Chest congestion    I discussed with Cb who is daughter of the patient.  (Start time 1010  Stop time 1045)    ASSESSMENT/PLAN: See above    TIME  Total time spent with Non-Face to Face prolonged service 35 minutes.     Electronically signed by:  SUNDAY Lee CNP                            Sincerely,        SUNDAY Lee CNP

## 2019-05-15 VITALS
SYSTOLIC BLOOD PRESSURE: 143 MMHG | TEMPERATURE: 98.3 F | BODY MASS INDEX: 23.5 KG/M2 | WEIGHT: 183 LBS | RESPIRATION RATE: 16 BRPM | HEART RATE: 81 BPM | OXYGEN SATURATION: 97 % | DIASTOLIC BLOOD PRESSURE: 72 MMHG

## 2019-05-15 DIAGNOSIS — R21 RASH AND NONSPECIFIC SKIN ERUPTION: ICD-10-CM

## 2019-05-15 DIAGNOSIS — Z78.9 TAKES DIETARY SUPPLEMENTS: Primary | ICD-10-CM

## 2019-05-15 RX ORDER — GUAIFENESIN 600 MG/1
1 TABLET, EXTENDED RELEASE ORAL 2 TIMES DAILY
Qty: 31 TABLET | Refills: 98 | Status: SHIPPED | OUTPATIENT
Start: 2019-05-15 | End: 2020-01-01

## 2019-05-15 RX ORDER — PREDNISONE 5 MG/1
TABLET ORAL
Qty: 31 TABLET | Refills: 98 | Status: SHIPPED | OUTPATIENT
Start: 2019-05-15 | End: 2019-06-18

## 2019-05-16 RX ORDER — ANORECTAL OINTMENT 15.7; .44; 24; 20.6 G/100G; G/100G; G/100G; G/100G
OINTMENT TOPICAL
Qty: 113 G | Refills: 97 | Status: SHIPPED | OUTPATIENT
Start: 2019-05-16 | End: 2020-01-01

## 2019-06-04 DIAGNOSIS — J67.9 PULMONARY ALVEOLITIS (H): ICD-10-CM

## 2019-06-04 DIAGNOSIS — M06.9 RHEUMATOID ARTHRITIS INVOLVING BOTH HANDS, UNSPECIFIED RHEUMATOID FACTOR PRESENCE: Primary | ICD-10-CM

## 2019-06-05 RX ORDER — PREDNISONE 10 MG/1
TABLET ORAL
Qty: 31 TABLET | Refills: 98 | Status: SHIPPED | OUTPATIENT
Start: 2019-06-05 | End: 2019-08-01

## 2019-06-16 ENCOUNTER — TRANSFERRED RECORDS (OUTPATIENT)
Dept: HEALTH INFORMATION MANAGEMENT | Facility: CLINIC | Age: 84
End: 2019-06-16

## 2019-06-16 ENCOUNTER — TELEPHONE (OUTPATIENT)
Dept: GERIATRICS | Facility: CLINIC | Age: 84
End: 2019-06-16

## 2019-06-16 LAB
ERYTHROCYTE [DISTWIDTH] IN BLOOD BY AUTOMATED COUNT: 13.9 % (ref 10–15)
HCT VFR BLD AUTO: 44.3 % (ref 40–53)
HEMOGLOBIN: 14.1 G/DL (ref 13.3–17.7)
MCH RBC QN AUTO: 30.2 PG (ref 26.5–33)
MCHC RBC AUTO-ENTMCNC: 31.8 G/DL (ref 31.5–36.5)
MCV RBC AUTO: 95 FL (ref 78–100)
PLATELET # BLD AUTO: 280 10E9/L (ref 150–450)
RBC # BLD AUTO: 4.67 10E12/L (ref 4.4–5.9)
WBC # BLD AUTO: 12.6 10E9/L (ref 4–11)

## 2019-06-16 NOTE — TELEPHONE ENCOUNTER
Patient with increase lethargy, SOB and fever. On regimen of chronic prednisone, Duonebs and Mucinex for SOB.    -2 view CXR to r/o PNA    Dr. Sheyla Lua, APRN, [facility]  Phone: [phone]  Fax: [phone]

## 2019-06-17 ENCOUNTER — TRANSFERRED RECORDS (OUTPATIENT)
Dept: HEALTH INFORMATION MANAGEMENT | Facility: CLINIC | Age: 84
End: 2019-06-17

## 2019-06-17 DIAGNOSIS — J67.9 PULMONARY ALVEOLITIS (H): Primary | ICD-10-CM

## 2019-06-17 LAB
ANION GAP SERPL CALCULATED.3IONS-SCNC: 4 MMOL/L (ref 3–14)
BUN SERPL-MCNC: 32 MG/DL (ref 7–30)
CALCIUM SERPL-MCNC: 9 MG/DL (ref 8.5–10.1)
CHLORIDE SERPLBLD-SCNC: 102 MMOL/L (ref 94–109)
CO2 SERPL-SCNC: 32 MMOL/L (ref 20–32)
CREAT SERPL-MCNC: 1.53 MG/DL (ref 0.66–1.25)
GFR SERPL CREATININE-BSD FRML MDRD: 39 ML/MIN/1.73_M2
GLUCOSE SERPL-MCNC: 160 MG/DL (ref 70–99)
POTASSIUM SERPL-SCNC: 4 MMOL/L (ref 3.4–5.3)
SODIUM SERPL-SCNC: 138 MMOL/L (ref 133–144)

## 2019-06-18 ENCOUNTER — ASSISTED LIVING VISIT (OUTPATIENT)
Dept: GERIATRICS | Facility: CLINIC | Age: 84
End: 2019-06-18
Payer: COMMERCIAL

## 2019-06-18 VITALS
TEMPERATURE: 98.4 F | OXYGEN SATURATION: 91 % | RESPIRATION RATE: 18 BRPM | HEART RATE: 93 BPM | SYSTOLIC BLOOD PRESSURE: 140 MMHG | DIASTOLIC BLOOD PRESSURE: 70 MMHG

## 2019-06-18 DIAGNOSIS — J84.10 PULMONARY FIBROSIS (H): Primary | ICD-10-CM

## 2019-06-18 DIAGNOSIS — H61.23 BILATERAL IMPACTED CERUMEN: ICD-10-CM

## 2019-06-18 RX ORDER — IPRATROPIUM BROMIDE AND ALBUTEROL SULFATE 2.5; .5 MG/3ML; MG/3ML
SOLUTION RESPIRATORY (INHALATION)
Qty: 180 ML | Refills: 97 | Status: SHIPPED | OUTPATIENT
Start: 2019-06-18 | End: 2019-08-01

## 2019-06-18 NOTE — LETTER
6/18/2019        RE: Jacob Tran  [id number]  [facility] [place] [facility] [address]        [place] [facility]  [place] Medical Record Number:  [id number]  Place of Service where encounter took place:  [facility] [[id number]]  Chief Complaint   Patient presents with     Nursing Home Acute       HPI:    Jacob Easton  is a 89 year old (5/16/1929), who is being seen today for an episodic care visit.  HPI information obtained from: facility chart records, facility staff, patient report and [facility] chart review. Today's concern is:    Seen today for follow up to increased SOB, fever and lethargy. Portable CXR 6/16 with impression of diffuse interstitial changes related to the lungs which may be acute or chronic. No pleural effusions, consolidating infiltrated or masses. Is with elevated WBC but on chronic prednisone for RA and pulmonary fibrosis, chronic mucous production.    Cerumen impaction in both ear canals seen today with otoscope.    Past Medical and Surgical History reviewed in Epic today.    MEDICATIONS:  Current Outpatient Medications   Medication Sig Dispense Refill     acetaminophen (TYLENOL) 325 MG tablet TAKE TWO TABLETS (650MG) BY MOUTH EVERY 4 HOURS AS NEEDED 60 tablet 97     amLODIPine (NORVASC) 10 MG tablet TAKE 1 TABLET BY MOUTH ONCE DAILY 28 tablet 98     ASPIRIN ADULT LOW STRENGTH 81 MG EC tablet TAKE 1 TABLET BY MOUTH ONCE DAILY 28 tablet 98     CALMOSEPTINE 0.44-20.6 % OINT ointment APPLY TO CLEANSED REDDENED CHARISSA SKIN FOLDS AND RASH WITH BREAKDOWN DURING MORNING AND BEDTIME CARES AND INCONTINENT CHANGES OR MEDICATION  g 97     FUROSEMIDE PO Take 40 mg by mouth daily       guaiFENesin (MUCINEX) 600 MG 12 hr tablet Take 1 tablet (600 mg) by mouth 2 times daily 31 tablet 98     ipratropium - albuterol 0.5 mg/2.5 mg/3 mL (DUONEB) 0.5-2.5 (3) MG/3ML neb solution NEBULIZE THE CONTENT OF 1 VIAL INTO THE LUNGS THREE TIMES DAILY;AND NEBULIZE  THE CONTENT OF 1 VIAL INTO THE LUNGS ONCE DAILY AS NEEDED 180 mL 97     lactobacillus rhamnosus, GG, (CULTURELL) capsule Take 1 capsule by mouth daily       latanoprost (XALATAN) 0.005 % ophthalmic solution INSTILL ONE DROP IN THE RIGHT EYE EVERY EVENING 2.5 mL 98     levothyroxine (SYNTHROID/LEVOTHROID) 50 MCG tablet TAKE 1 TABLET BY MOUTH ONCE DAILY 31 tablet 98     MELATONIN MAXIMUM STRENGTH 5 MG tablet TAKE 1 TABLET BY MOUTH ONCE DAILY 31 tablet 98     metoprolol tartrate (LOPRESSOR) 25 MG tablet TAKE 1 TABLET BY MOUTH TWICE DAILY 56 tablet 98     Multiple Vitamins-Minerals (CENTRUM SILVER ULTRA MENS) TABS TAKE 1 TABLET BY MOUTH ONCE DAILY 31 tablet 98     Multiple Vitamins-Minerals (EYE VITAMINS & MINERALS) TABS Take 1 tablet by mouth daily 60 tablet 11     nystatin (MYCOSTATIN) 875647 UNIT/GM POWD Apply topically 2 times daily AND daily PRN       ondansetron (ZOFRAN) 4 MG tablet Take 4 mg by mouth 3 times daily as needed for nausea       potassium chloride ER (K-DUR/KLOR-CON M) 20 MEQ CR tablet TAKE TWO TABLETS (40 MEQ) BY MOUTH ONCE DAILY ***NOTE DOSAGE/STRENGTH*** 56 tablet 97     predniSONE (DELTASONE) 10 MG tablet TAKE 1 TABLET BY MOUTH ONCE DAILY 31 tablet 98     SM STOOL SOFTENER 8.6-50 MG tablet TAKE 1 TABLET BY MOUTH TWICE DAILY AS NEEDED FOR CONSTIPATION 30 tablet 97     tamsulosin (FLOMAX) 0.4 MG capsule TAKE 1 CAPSULE BY MOUTH ONCE DAILY 28 capsule 98     VENTOLIN  (90 Base) MCG/ACT inhaler INHALE 2 PUFFS INTO THE LUNGS TWICE DAILY; & INHALE 2 PUFFS INTO THE LUNGS EVERY 4 HOURS AS NEEDED 18 g 98     REVIEW OF SYSTEMS:  4 point ROS including Respiratory, CV, GI and , other than that noted in the HPI,  is negative    Objective:  /70   Pulse 93   Temp 98.4  F (36.9  C)   Resp 18   SpO2 91%   Exam:  GENERAL APPEARANCE:  Alert, in no distress sitting in WC, oily skin  ENT:  Mouth and posterior oropharynx normal, moist mucous membranes, [place], history of bilateral cerumen impaction- wax  in both ear canals viewed with otoscope  EYES:  EOM, conjunctivae, lids, pupils and irises normal-glasses  RESP:  respiratory effort and palpation of chest increased, lungs diminished to auscultation posterior > anterior fields no crackles or wheezes   CV:  Palpation and auscultation of heart done, bradycardic (not today) and regular rhythm, 3/6 murmur- not heard on previous exam,  peripheral edema 1+ in legs ankles and feet. Chang Hose on.  ABDOMEN:  normal bowel sounds, soft, nontender  M/S:   Gait and station using wheelchair for ambulation, generalized weakness increased, less walking with SBA of 1 for short distances   SKIN:  Inspection of skin and subcutaneous tissue baseline to visualized areas of skin. Chondrodermatitis nodule on superior helix right ear-refuses derm consult   NEURO:   Examination of sensation by touch normal  PSYCH:  insight and judgement impaired, memory impaired    Labs:   CBC RESULTS:   Recent Labs   Lab Test 06/16/19 04/25/19   WBC 12.6* 9.0   RBC 4.67 4.61   HGB 14.1 14.1   HCT 44.3 43.5   MCV 95 94   MCH 30.2 30.6   MCHC 31.8 32.4   RDW 13.9 14.4    272       Last Basic Metabolic Panel:  Recent Labs   Lab Test 06/17/19 04/25/19    138   POTASSIUM 4.0 4.1   CHLORIDE 102 103   MERCEDES 9.0 8.9   CO2 32 27   BUN 32* 28   CR 1.53* 1.52*   * 113*       Liver Function Studies -   Recent Labs   Lab Test 04/25/19 09/13/18 04/19/18   PROTTOTAL 8.0  --  7.9   ALBUMIN 3.5  --  3.4   BILITOTAL 0.5  --  0.6   ALKPHOS 76  --  78   AST 11 20 19   ALT 20 19 23       TSH   Date Value Ref Range Status   04/25/2019 6.93 (A) 0.40 - 4.00 mU/L Final   09/13/2018 3.45 0.40 - 4.00 mU/L Final       No results found for: A1C      ASSESSMENT/PLAN:    (J84.10) Pulmonary fibrosis (H)  Comment: Ongoing -with lower O2 sats  Plan:   -Duonebs TID and daily prn  -consider budesonide nebs BID to duonebs as alternate therapy  (family not agreeable to at this time)  -prednisone  10 mg po daily  -mucinex 600  mg po BID  -Ventolin BID and q4H prn     (H61.23) Bilateral impacted cerumen  Comment: Chronic  Plan:   -cerumen removal with lighted curette to both ear canals         Electronically signed by:  SUNDAY Lee CNP             Sincerely,        SUNDAY Lee CNP

## 2019-06-18 NOTE — LETTER
6/18/2019        RE: Jacob Tran  [address]  [facility] [address]        [place] [facility]  [place] Medical Record Number:  [id number]  Place of Service where encounter took place:  [facility] [[id number]]  Chief Complaint   Patient presents with     Nursing Home Acute       HPI:    Jacob Easton  is a 89 year old (5/16/1929), who is being seen today for an episodic care visit.  HPI information obtained from: facility chart records, facility staff, patient report and [facility] chart review. Today's concern is:    Seen today for follow up to increased SOB, fever and lethargy. Portable CXR 6/16 with impression of diffuse interstitial changes related to the lungs which may be acute or chronic. No pleural effusions, consolidating infiltrated or masses. Is with elevated WBC but on chronic prednisone for RA and pulmonary fibrosis, chronic mucous production.    Cerumen impaction in both ear canals seen today with otoscope.    Past Medical and Surgical History reviewed in Epic today.    MEDICATIONS:  Current Outpatient Medications   Medication Sig Dispense Refill     acetaminophen (TYLENOL) 325 MG tablet TAKE TWO TABLETS (650MG) BY MOUTH EVERY 4 HOURS AS NEEDED 60 tablet 97     amLODIPine (NORVASC) 10 MG tablet TAKE 1 TABLET BY MOUTH ONCE DAILY 28 tablet 98     ASPIRIN ADULT LOW STRENGTH 81 MG EC tablet TAKE 1 TABLET BY MOUTH ONCE DAILY 28 tablet 98     CALMOSEPTINE 0.44-20.6 % OINT ointment APPLY TO CLEANSED REDDENED CHARISSA SKIN FOLDS AND RASH WITH BREAKDOWN DURING MORNING AND BEDTIME CARES AND INCONTINENT CHANGES OR MEDICATION  g 97     FUROSEMIDE PO Take 40 mg by mouth daily       guaiFENesin (MUCINEX) 600 MG 12 hr tablet Take 1 tablet (600 mg) by mouth 2 times daily 31 tablet 98     ipratropium - albuterol 0.5 mg/2.5 mg/3 mL (DUONEB) 0.5-2.5 (3) MG/3ML neb solution NEBULIZE THE CONTENT OF 1 VIAL INTO THE LUNGS THREE TIMES DAILY;AND NEBULIZE  THE CONTENT OF 1 VIAL INTO THE LUNGS ONCE DAILY AS NEEDED 180 mL 97     lactobacillus rhamnosus, GG, (CULTURELL) capsule Take 1 capsule by mouth daily       latanoprost (XALATAN) 0.005 % ophthalmic solution INSTILL ONE DROP IN THE RIGHT EYE EVERY EVENING 2.5 mL 98     levothyroxine (SYNTHROID/LEVOTHROID) 50 MCG tablet TAKE 1 TABLET BY MOUTH ONCE DAILY 31 tablet 98     MELATONIN MAXIMUM STRENGTH 5 MG tablet TAKE 1 TABLET BY MOUTH ONCE DAILY 31 tablet 98     metoprolol tartrate (LOPRESSOR) 25 MG tablet TAKE 1 TABLET BY MOUTH TWICE DAILY 56 tablet 98     Multiple Vitamins-Minerals (CENTRUM SILVER ULTRA MENS) TABS TAKE 1 TABLET BY MOUTH ONCE DAILY 31 tablet 98     Multiple Vitamins-Minerals (EYE VITAMINS & MINERALS) TABS Take 1 tablet by mouth daily 60 tablet 11     nystatin (MYCOSTATIN) 820123 UNIT/GM POWD Apply topically 2 times daily AND daily PRN       ondansetron (ZOFRAN) 4 MG tablet Take 4 mg by mouth 3 times daily as needed for nausea       potassium chloride ER (K-DUR/KLOR-CON M) 20 MEQ CR tablet TAKE TWO TABLETS (40 MEQ) BY MOUTH ONCE DAILY  56 tablet 97     predniSONE (DELTASONE) 10 MG tablet TAKE 1 TABLET BY MOUTH ONCE DAILY 31 tablet 98     SM STOOL SOFTENER 8.6-50 MG tablet TAKE 1 TABLET BY MOUTH TWICE DAILY AS NEEDED FOR CONSTIPATION 30 tablet 97     tamsulosin (FLOMAX) 0.4 MG capsule TAKE 1 CAPSULE BY MOUTH ONCE DAILY 28 capsule 98     VENTOLIN  (90 Base) MCG/ACT inhaler INHALE 2 PUFFS INTO THE LUNGS TWICE DAILY; & INHALE 2 PUFFS INTO THE LUNGS EVERY 4 HOURS AS NEEDED 18 g 98     REVIEW OF SYSTEMS:  4 point ROS including Respiratory, CV, GI and , other than that noted in the HPI,  is negative    Objective:  /70   Pulse 93   Temp 98.4  F (36.9  C)   Resp 18   SpO2 91%   Exam:  GENERAL APPEARANCE:  Alert, in no distress sitting in WC, oily skin  ENT:  Mouth and posterior oropharynx normal, moist mucous membranes, [place], history of bilateral cerumen impaction- wax in both ear canals viewed  with otoscope  EYES:  EOM, conjunctivae, lids, pupils and irises normal-glasses  RESP:  respiratory effort and palpation of chest increased, lungs diminished to auscultation posterior > anterior fields no crackles or wheezes   CV:  Palpation and auscultation of heart done, bradycardic (not today) and regular rhythm, 3/6 murmur- not heard on previous exam,  peripheral edema 1+ in legs ankles and feet. Chang Hose on.  ABDOMEN:  normal bowel sounds, soft, nontender  M/S:   Gait and station using wheelchair for ambulation, generalized weakness increased, less walking with SBA of 1 for short distances   SKIN:  Inspection of skin and subcutaneous tissue baseline to visualized areas of skin. Chondrodermatitis nodule on superior helix right ear-refuses derm consult   NEURO:   Examination of sensation by touch normal  PSYCH:  insight and judgement impaired, memory impaired    Labs:   CBC RESULTS:   Recent Labs   Lab Test 06/16/19 04/25/19   WBC 12.6* 9.0   RBC 4.67 4.61   HGB 14.1 14.1   HCT 44.3 43.5   MCV 95 94   MCH 30.2 30.6   MCHC 31.8 32.4   RDW 13.9 14.4    272       Last Basic Metabolic Panel:  Recent Labs   Lab Test 06/17/19 04/25/19    138   POTASSIUM 4.0 4.1   CHLORIDE 102 103   MERCEDES 9.0 8.9   CO2 32 27   BUN 32* 28   CR 1.53* 1.52*   * 113*       Liver Function Studies -   Recent Labs   Lab Test 04/25/19 09/13/18 04/19/18   PROTTOTAL 8.0  --  7.9   ALBUMIN 3.5  --  3.4   BILITOTAL 0.5  --  0.6   ALKPHOS 76  --  78   AST 11 20 19   ALT 20 19 23       TSH   Date Value Ref Range Status   04/25/2019 6.93 (A) 0.40 - 4.00 mU/L Final   09/13/2018 3.45 0.40 - 4.00 mU/L Final       No results found for: A1C      ASSESSMENT/PLAN:    (J84.10) Pulmonary fibrosis (H)  Comment: Ongoing -with lower O2 sats  Plan:   -Duonebs TID and daily prn  -consider budesonide nebs BID to duonebs as alternate therapy  (family not agreeable to at this time)  -prednisone  10 mg po daily  -mucinex 600 mg po BID  -Ventolin BID  and q4H prn     (H61.23) Bilateral impacted cerumen  Comment: Chronic  Plan:   -cerumen removal with lighted curette to both ear canals         Electronically signed by:  SUNDAY Lee CNP             Sincerely,        SUNDAY Lee CNP

## 2019-06-18 NOTE — PROGRESS NOTES
[place] [facility]  [place] Medical Record Number:  [id number]  Place of Service where encounter took place:  [facility] ([facility] [[id number]]  Chief Complaint   Patient presents with     Nursing Home Acute       HPI:    Jacob Easton  is a 89 year old (5/16/1929), who is being seen today for an episodic care visit.  HPI information obtained from: facility chart records, facility staff, patient report and [facility] chart review. Today's concern is:    Seen today for follow up to increased SOB, fever and lethargy. Portable CXR 6/16 with impression of diffuse interstitial changes related to the lungs which may be acute or chronic. No pleural effusions, consolidating infiltrated or masses. Is with elevated WBC but on chronic prednisone for RA and pulmonary fibrosis, chronic mucous production.    Cerumen impaction in both ear canals seen today with otoscope.    Past Medical and Surgical History reviewed in Epic today.    MEDICATIONS:  Current Outpatient Medications   Medication Sig Dispense Refill     acetaminophen (TYLENOL) 325 MG tablet TAKE TWO TABLETS (650MG) BY MOUTH EVERY 4 HOURS AS NEEDED 60 tablet 97     amLODIPine (NORVASC) 10 MG tablet TAKE 1 TABLET BY MOUTH ONCE DAILY 28 tablet 98     ASPIRIN ADULT LOW STRENGTH 81 MG EC tablet TAKE 1 TABLET BY MOUTH ONCE DAILY 28 tablet 98     CALMOSEPTINE 0.44-20.6 % OINT ointment APPLY TO CLEANSED REDDENED CHARISSA SKIN FOLDS AND RASH WITH BREAKDOWN DURING MORNING AND BEDTIME CARES AND INCONTINENT CHANGES OR MEDICATION  g 97     FUROSEMIDE PO Take 40 mg by mouth daily       guaiFENesin (MUCINEX) 600 MG 12 hr tablet Take 1 tablet (600 mg) by mouth 2 times daily 31 tablet 98     ipratropium - albuterol 0.5 mg/2.5 mg/3 mL (DUONEB) 0.5-2.5 (3) MG/3ML neb solution NEBULIZE THE CONTENT OF 1 VIAL INTO THE LUNGS THREE TIMES DAILY;AND NEBULIZE THE CONTENT OF 1 VIAL INTO THE LUNGS ONCE DAILY AS NEEDED 180 mL 97     lactobacillus rhamnosus, GG,  (CULTURELL) capsule Take 1 capsule by mouth daily       latanoprost (XALATAN) 0.005 % ophthalmic solution INSTILL ONE DROP IN THE RIGHT EYE EVERY EVENING 2.5 mL 98     levothyroxine (SYNTHROID/LEVOTHROID) 50 MCG tablet TAKE 1 TABLET BY MOUTH ONCE DAILY 31 tablet 98     MELATONIN MAXIMUM STRENGTH 5 MG tablet TAKE 1 TABLET BY MOUTH ONCE DAILY 31 tablet 98     metoprolol tartrate (LOPRESSOR) 25 MG tablet TAKE 1 TABLET BY MOUTH TWICE DAILY 56 tablet 98     Multiple Vitamins-Minerals (CENTRUM SILVER ULTRA MENS) TABS TAKE 1 TABLET BY MOUTH ONCE DAILY 31 tablet 98     Multiple Vitamins-Minerals (EYE VITAMINS & MINERALS) TABS Take 1 tablet by mouth daily 60 tablet 11     nystatin (MYCOSTATIN) 038023 UNIT/GM POWD Apply topically 2 times daily AND daily PRN       ondansetron (ZOFRAN) 4 MG tablet Take 4 mg by mouth 3 times daily as needed for nausea       potassium chloride ER (K-DUR/KLOR-CON M) 20 MEQ CR tablet TAKE TWO TABLETS (40 MEQ) BY MOUTH ONCE DAILY  56 tablet 97     predniSONE (DELTASONE) 10 MG tablet TAKE 1 TABLET BY MOUTH ONCE DAILY 31 tablet 98     SM STOOL SOFTENER 8.6-50 MG tablet TAKE 1 TABLET BY MOUTH TWICE DAILY AS NEEDED FOR CONSTIPATION 30 tablet 97     tamsulosin (FLOMAX) 0.4 MG capsule TAKE 1 CAPSULE BY MOUTH ONCE DAILY 28 capsule 98     VENTOLIN  (90 Base) MCG/ACT inhaler INHALE 2 PUFFS INTO THE LUNGS TWICE DAILY; & INHALE 2 PUFFS INTO THE LUNGS EVERY 4 HOURS AS NEEDED 18 g 98     REVIEW OF SYSTEMS:  4 point ROS including Respiratory, CV, GI and , other than that noted in the HPI,  is negative    Objective:  /70   Pulse 93   Temp 98.4  F (36.9  C)   Resp 18   SpO2 91%   Exam:  GENERAL APPEARANCE:  Alert, in no distress sitting in WC, oily skin  ENT:  Mouth and posterior oropharynx normal, moist mucous membranes, [place], history of bilateral cerumen impaction- wax in both ear canals viewed with otoscope  EYES:  EOM, conjunctivae, lids, pupils and irises normal-glasses  RESP:  respiratory  effort and palpation of chest increased, lungs diminished to auscultation posterior > anterior fields no crackles or wheezes   CV:  Palpation and auscultation of heart done, bradycardic (not today) and regular rhythm, 3/6 murmur- not heard on previous exam,  peripheral edema 1+ in legs ankles and feet. Chang Hose on.  ABDOMEN:  normal bowel sounds, soft, nontender  M/S:   Gait and station using wheelchair for ambulation, generalized weakness increased, less walking with SBA of 1 for short distances   SKIN:  Inspection of skin and subcutaneous tissue baseline to visualized areas of skin. Chondrodermatitis nodule on superior helix right ear-refuses derm consult   NEURO:   Examination of sensation by touch normal  PSYCH:  insight and judgement impaired, memory impaired    Labs:   CBC RESULTS:   Recent Labs   Lab Test 06/16/19 04/25/19   WBC 12.6* 9.0   RBC 4.67 4.61   HGB 14.1 14.1   HCT 44.3 43.5   MCV 95 94   MCH 30.2 30.6   MCHC 31.8 32.4   RDW 13.9 14.4    272       Last Basic Metabolic Panel:  Recent Labs   Lab Test 06/17/19 04/25/19    138   POTASSIUM 4.0 4.1   CHLORIDE 102 103   MERCEDES 9.0 8.9   CO2 32 27   BUN 32* 28   CR 1.53* 1.52*   * 113*       Liver Function Studies -   Recent Labs   Lab Test 04/25/19 09/13/18 04/19/18   PROTTOTAL 8.0  --  7.9   ALBUMIN 3.5  --  3.4   BILITOTAL 0.5  --  0.6   ALKPHOS 76  --  78   AST 11 20 19   ALT 20 19 23       TSH   Date Value Ref Range Status   04/25/2019 6.93 (A) 0.40 - 4.00 mU/L Final   09/13/2018 3.45 0.40 - 4.00 mU/L Final       No results found for: A1C      ASSESSMENT/PLAN:    (J84.10) Pulmonary fibrosis (H)  Comment: Ongoing-with lower O2 sats  Plan:   -Duonebs TID and daily prn  -consider budesonide nebs BID to duonebs as alternate therapy (family not agreeable to at this time)  -prednisone 10 mg po daily  -mucinex 600 mg po BID  -Ventolin BID and q4H prn     (H61.23) Bilateral impacted cerumen  Comment: Chronic  Plan:   -cerumen removal with  lighted curette to both ear canals         Electronically signed by:  SUNDAY Lee CNP

## 2019-06-19 ENCOUNTER — PATIENT OUTREACH (OUTPATIENT)
Dept: GERIATRIC MEDICINE | Facility: CLINIC | Age: 84
End: 2019-06-19

## 2019-06-19 PROBLEM — Z76.89 HEALTH CARE HOME: Status: RESOLVED | Noted: 2019-04-19 | Resolved: 2019-06-19

## 2019-06-19 NOTE — PROGRESS NOTES
[employer] Medicare enrollee with change of CM as of 4-1-19      Chelita Cuevas MA [facility] Coordinator   [phone]

## 2019-06-19 NOTE — PROGRESS NOTES
6-19-19   [employer] chart review. No hospital, ED or TCU admissions.    Member resides in Assisted Living and followed by onsite medical team for primary care.    No triggering events noted so CC will follow up as needed     Chelita Cuevas MA [facility] Care Coordinator   [phone]

## 2019-07-01 DIAGNOSIS — Z78.9 TAKES DIETARY SUPPLEMENTS: ICD-10-CM

## 2019-07-02 ENCOUNTER — DOCUMENTATION ONLY (OUTPATIENT)
Dept: OTHER | Facility: CLINIC | Age: 84
End: 2019-07-02

## 2019-07-02 RX ORDER — I-VITE, TAB 1000-60-2MG (60/BT) 300MCG-200
TAB ORAL
Qty: 31 TABLET | Refills: 98 | Status: SHIPPED | OUTPATIENT
Start: 2019-07-02 | End: 2020-01-01

## 2019-07-24 DIAGNOSIS — R60.0 LOWER EXTREMITY EDEMA: Primary | ICD-10-CM

## 2019-07-26 ENCOUNTER — TRANSFERRED RECORDS (OUTPATIENT)
Dept: HEALTH INFORMATION MANAGEMENT | Facility: CLINIC | Age: 84
End: 2019-07-26

## 2019-07-26 LAB
ANION GAP SERPL CALCULATED.3IONS-SCNC: 4 MMOL/L (ref 3–14)
BUN SERPL-MCNC: 30 MG/DL (ref 7–30)
CALCIUM SERPL-MCNC: 9.8 MG/DL (ref 8.5–10.1)
CHLORIDE SERPLBLD-SCNC: 102 MMOL/L (ref 94–109)
CO2 SERPL-SCNC: 32 MMOL/L (ref 20–32)
CREAT SERPL-MCNC: 1.4 MG/DL (ref 0.66–1.25)
DIFFERENTIAL: NORMAL
ERYTHROCYTE [DISTWIDTH] IN BLOOD BY AUTOMATED COUNT: 14.1 % (ref 10–15)
GFR SERPL CREATININE-BSD FRML MDRD: 44 ML/MIN/1.73_M2
GLUCOSE SERPL-MCNC: 173 MG/DL (ref 70–99)
HCT VFR BLD AUTO: 43.5 % (ref 40–53)
HEMOGLOBIN: 13.8 G/DL (ref 13.3–17.7)
MCH RBC QN AUTO: 30.1 PG (ref 26.5–33)
MCHC RBC AUTO-ENTMCNC: 31.7 G/DL (ref 31.5–36.5)
MCV RBC AUTO: 95 FL (ref 78–100)
PLATELET # BLD AUTO: 285 10E9/L (ref 150–450)
POTASSIUM SERPL-SCNC: 3.7 MMOL/L (ref 3.4–5.3)
RBC # BLD AUTO: 4.58 10E12/L (ref 4.4–5.9)
SODIUM SERPL-SCNC: 138 MMOL/L (ref 133–144)
TSH SERPL-ACNC: 4.12 MU/L (ref 0.4–4)
WBC # BLD AUTO: 9.8 10E9/L (ref 4–11)

## 2019-07-30 ENCOUNTER — ASSISTED LIVING VISIT (OUTPATIENT)
Dept: GERIATRICS | Facility: CLINIC | Age: 84
End: 2019-07-30
Payer: COMMERCIAL

## 2019-07-30 VITALS
SYSTOLIC BLOOD PRESSURE: 140 MMHG | HEART RATE: 79 BPM | OXYGEN SATURATION: 95 % | DIASTOLIC BLOOD PRESSURE: 80 MMHG | BODY MASS INDEX: 23.5 KG/M2 | WEIGHT: 183 LBS | RESPIRATION RATE: 20 BRPM | TEMPERATURE: 98.6 F

## 2019-07-30 DIAGNOSIS — M06.9 RHEUMATOID ARTHRITIS INVOLVING BOTH HANDS, UNSPECIFIED RHEUMATOID FACTOR PRESENCE: ICD-10-CM

## 2019-07-30 DIAGNOSIS — J84.10 PULMONARY FIBROSIS (H): Primary | ICD-10-CM

## 2019-07-30 DIAGNOSIS — J67.9 PULMONARY ALVEOLITIS (H): ICD-10-CM

## 2019-07-30 DIAGNOSIS — R91.8 INFILTRATE OF LOWER LOBE OF LEFT LUNG PRESENT ON IMAGING STUDY: ICD-10-CM

## 2019-07-30 DIAGNOSIS — R06.09 DYSPNEA ON EXERTION: ICD-10-CM

## 2019-07-30 RX ORDER — ALBUTEROL SULFATE 90 UG/1
2 AEROSOL, METERED RESPIRATORY (INHALATION) EVERY 4 HOURS PRN
Qty: 18 G | Refills: 98 | Status: SHIPPED | OUTPATIENT
Start: 2019-07-30

## 2019-07-30 NOTE — LETTER
7/30/2019        RE: Jacob Tran  [address]  [facility] [address]        [place] [facility]  [place] Medical Record Number:  [id number]  Place of Service where encounter took place:  [facility] [[id number]]  Chief Complaint   Patient presents with     RECHECK       HPI:    Jacob Easton  is a 89 year old (5/16/1929), who is being seen today for an episodic care visit.  HPI information obtained from: facility chart records, facility staff, patient report, [facility] chart review and family/first contact daughter Cb report.     Seen today for increased confusion, weakness, shortness of breath per staff report. In chart review looks to have started last week. Labs and imaging ordered prior to visit. History of UTI so UA/UC additionally. Today his cognition appears at baseline. He is conversational appropriate. He denies pain, chest pain, nausea, vomiting or SOB or dyspnea on exertion. He typically does not admit to any s/s. He is frustrated another urine sample must be obtained so I am happy he has good recall to the situation with invalid sample. He is afebrile, WBC is not elevated BMP with elevated glucose, creatinine (1.4) and GFR (44). He has had general decline and with slow weight loss. Chest x-ray impression shows chronic interstitial lung disease and asymmetric developing left lower lobe infiltrate. This was compared to previous CXR of 6/16/19. With exam there is auditory expiratory wheezing.     Past Medical and Surgical History reviewed in Epic today.    MEDICATIONS:  Current Outpatient Medications   Medication Sig Dispense Refill     acetaminophen (TYLENOL) 325 MG tablet TAKE TWO TABLETS (650MG) BY MOUTH EVERY 4 HOURS AS NEEDED 60 tablet 97     amLODIPine (NORVASC) 10 MG tablet TAKE 1 TABLET BY MOUTH ONCE DAILY 28 tablet 98     ASPIRIN ADULT LOW STRENGTH 81 MG EC tablet TAKE 1 TABLET BY MOUTH ONCE DAILY 28 tablet 98     CALMOSEPTINE  0.44-20.6 % OINT ointment APPLY TO CLEANSED REDDENED CHARISSA SKIN FOLDS AND RASH WITH BREAKDOWN DURING MORNING AND BEDTIME CARES AND INCONTINENT CHANGES OR MEDICATION  g 97     Elastic Bandages & Supports (T.E.D. KNEE LENGTH/M-LONG) MISC WEAR AS DIRECTED ON IN THE MORNING OFF AT BEDTIME (2 = 1 PAIR) 4 each 97     FUROSEMIDE PO Take 40 mg by mouth daily       guaiFENesin (MUCINEX) 600 MG 12 hr tablet Take 1 tablet (600 mg) by mouth 2 times daily 31 tablet 98     ipratropium - albuterol 0.5 mg/2.5 mg/3 mL (DUONEB) 0.5-2.5 (3) MG/3ML neb solution NEBULIZE THE CONTENT OF 1 VIAL INTO THE LUNGS THREE TIMES DAILY;AND NEBULIZE THE CONTENT OF 1 VIAL INTO THE LUNGS ONCE DAILY AS NEEDED 180 mL 97     lactobacillus rhamnosus, GG, (CULTURELL) capsule Take 1 capsule by mouth daily       latanoprost (XALATAN) 0.005 % ophthalmic solution INSTILL ONE DROP IN THE RIGHT EYE EVERY EVENING 2.5 mL 98     levothyroxine (SYNTHROID/LEVOTHROID) 50 MCG tablet TAKE 1 TABLET BY MOUTH ONCE DAILY 31 tablet 98     MELATONIN MAXIMUM STRENGTH 5 MG tablet TAKE 1 TABLET BY MOUTH ONCE DAILY 31 tablet 98     metoprolol tartrate (LOPRESSOR) 25 MG tablet TAKE 1 TABLET BY MOUTH TWICE DAILY 56 tablet 98     multivitamin (I-EDGAR) TABS per tablet TAKE 1 TABLET BY MOUTH ONCE DAILY 31 tablet 98     nystatin (MYCOSTATIN) 435396 UNIT/GM POWD Apply topically 2 times daily AND daily PRN       ondansetron (ZOFRAN) 4 MG tablet Take 4 mg by mouth 3 times daily as needed for nausea       potassium chloride ER (K-DUR/KLOR-CON M) 20 MEQ CR tablet TAKE TWO TABLETS (40 MEQ) BY MOUTH ONCE DAILY ***NOTE DOSAGE/STRENGTH*** 56 tablet 97     predniSONE (DELTASONE) 10 MG tablet TAKE 1 TABLET BY MOUTH ONCE DAILY 31 tablet 98     SM STOOL SOFTENER 8.6-50 MG tablet TAKE 1 TABLET BY MOUTH TWICE DAILY AS NEEDED FOR CONSTIPATION 30 tablet 97     tamsulosin (FLOMAX) 0.4 MG capsule TAKE 1 CAPSULE BY MOUTH ONCE DAILY 28 capsule 98     VENTOLIN  (90 Base) MCG/ACT inhaler INHALE  2 PUFFS INTO THE LUNGS TWICE DAILY; & INHALE 2 PUFFS INTO THE LUNGS EVERY 4 HOURS AS NEEDED 18 g 98     Multiple Vitamins-Minerals (CENTRUM SILVER ULTRA MENS) TABS TAKE 1 TABLET BY MOUTH ONCE DAILY 31 tablet 98     REVIEW OF SYSTEMS:  4 point ROS including Respiratory, CV, GI and , other than that noted in the HPI,  is negative    Objective:  BP (!) 140/80   Pulse 79   Temp 98.6  F (37  C)   Resp 20   Wt 83 kg (183 lb)   SpO2 95%   BMI 23.50 kg/m     Exam:  GENERAL APPEARANCE:  Alert, in no distress sitting in WC, oily skin  ENT:  Mouth and posterior oropharynx normal, moist mucous membranes, [place]  EYES:  EOM, conjunctivae, lids, pupils and irises normal-glasses  RESP:  respiratory effort and palpation of chest increased, lungs diminished to auscultation posterior > anterior fields  wheezes  present  CV:  Palpation and auscultation of heart done, bradycardic (not today) and regular rhythm, 3/6 murmur- not heard on previous exam,  peripheral edema 1+ in legs ankles and feet. Chang Hose off  ABDOMEN:  normal bowel sounds, soft, nontender  M/S:   Gait and station using wheelchair for ambulation, generalized weakness increased, less walking with SBA of 1 for short distances   SKIN:  Inspection of skin and subcutaneous tissue baseline to visualized areas of skin. Chondrodermatitis nodule on superior helix right ear-refuses derm consult   NEURO:   Examination of sensation by touch normal  PSYCH:  insight and judgement impaired, memory impaired    L[facility]:   [facility]     Creatinine Cr is 33.76    ASSESSMENT/PLAN:  (J84.10) Pulmonary fibrosis (H)  (primary encounter diagnosis)  (R91.8) Infiltrate of lower lobe of left lung present on imaging study  (R06.09) Dyspnea on exertion  Comment: acute on chronic finding  Plan:   -Duonebs TID and daily prn  -consider budesonide nebs BID to duonebs as alternate therapy (family previously not agreeable)  -prednisone 10 mg po  daily  -mucinex 600 mg po BID  -Ventolin q4H prn (may keep bedside)  -levofloxain 750 mg po q48 H x 7 days if family agrees. Would favor UA/UC results prior to antibiotic to cover both urine and respiratory if needed        Electronically signed by:  SUNDAY Lee CNP             Sincerely,        SUNDAY Lee CNP

## 2019-07-30 NOTE — LETTER
7/30/2019        RE: Jacob rTan  [address]  [facility] [address]        [facility]  [place] [facility] Record Number:  [id number]  Place of Service where encounter took place:  [facility] ([facility] [[id number]]  Chief Complaint   Patient presents with     RECHECK       HPI:    Jacob Easton  is a 89 year old (5/16/1929), who is being seen today for an episodic care visit.  HPI information obtained from: facility chart records, facility staff, patient report, [facility] chart review and family/first contact daughter Cb report.     Seen today for increased confusion, weakness, shortness of breath per staff report. In chart review looks to have started last week. Labs and imaging ordered prior to visit. History of UTI so UA/UC additionally. Today his cognition appears at baseline. He is conversational appropriate. He denies pain, chest pain, nausea, vomiting or SOB or dyspnea on exertion. He typically does not admit to any s/s. He is frustrated another urine sample must be obtained so I am happy he has good recall to the situation with invalid sample. He is afebrile, WBC is not elevated BMP with elevated glucose, creatinine (1.4) and GFR (44). He has had general decline and with slow weight loss. Chest x-ray impression shows chronic interstitial lung disease and asymmetric developing left lower lobe infiltrate. This was compared to previous CXR of 6/16/19. With exam there is auditory expiratory wheezing.     Past Medical and Surgical History reviewed in Epic today.    MEDICATIONS:  Current Outpatient Medications   Medication Sig Dispense Refill     acetaminophen (TYLENOL) 325 MG tablet TAKE TWO TABLETS (650MG) BY MOUTH EVERY 4 HOURS AS NEEDED 60 tablet 97     amLODIPine (NORVASC) 10 MG tablet TAKE 1 TABLET BY MOUTH ONCE DAILY 28 tablet 98     ASPIRIN ADULT LOW STRENGTH 81 MG EC tablet TAKE 1 TABLET BY MOUTH ONCE DAILY 28 tablet 98     CALMOSEPTINE  0.44-20.6 % OINT ointment APPLY TO CLEANSED REDDENED CHARISSA SKIN FOLDS AND RASH WITH BREAKDOWN DURING MORNING AND BEDTIME CARES AND INCONTINENT CHANGES OR MEDICATION  g 97     Elastic Bandages & Supports (T.E.D. KNEE LENGTH/M-LONG) MISC WEAR AS DIRECTED ON IN THE MORNING OFF AT BEDTIME (2 = 1 PAIR) 4 each 97     FUROSEMIDE PO Take 40 mg by mouth daily       guaiFENesin (MUCINEX) 600 MG 12 hr tablet Take 1 tablet (600 mg) by mouth 2 times daily 31 tablet 98     ipratropium - albuterol 0.5 mg/2.5 mg/3 mL (DUONEB) 0.5-2.5 (3) MG/3ML neb solution NEBULIZE THE CONTENT OF 1 VIAL INTO THE LUNGS THREE TIMES DAILY;AND NEBULIZE THE CONTENT OF 1 VIAL INTO THE LUNGS ONCE DAILY AS NEEDED 180 mL 97     lactobacillus rhamnosus, GG, (CULTURELL) capsule Take 1 capsule by mouth daily       latanoprost (XALATAN) 0.005 % ophthalmic solution INSTILL ONE DROP IN THE RIGHT EYE EVERY EVENING 2.5 mL 98     levothyroxine (SYNTHROID/LEVOTHROID) 50 MCG tablet TAKE 1 TABLET BY MOUTH ONCE DAILY 31 tablet 98     MELATONIN MAXIMUM STRENGTH 5 MG tablet TAKE 1 TABLET BY MOUTH ONCE DAILY 31 tablet 98     metoprolol tartrate (LOPRESSOR) 25 MG tablet TAKE 1 TABLET BY MOUTH TWICE DAILY 56 tablet 98     multivitamin (I-EDGAR) TABS per tablet TAKE 1 TABLET BY MOUTH ONCE DAILY 31 tablet 98     nystatin (MYCOSTATIN) 316761 UNIT/GM POWD Apply topically 2 times daily AND daily PRN       ondansetron (ZOFRAN) 4 MG tablet Take 4 mg by mouth 3 times daily as needed for nausea       potassium chloride ER (K-DUR/KLOR-CON M) 20 MEQ CR tablet TAKE TWO TABLETS (40 MEQ) BY MOUTH ONCE DAILY  56 tablet 97     predniSONE (DELTASONE) 10 MG tablet TAKE 1 TABLET BY MOUTH ONCE DAILY 31 tablet 98     SM STOOL SOFTENER 8.6-50 MG tablet TAKE 1 TABLET BY MOUTH TWICE DAILY AS NEEDED FOR CONSTIPATION 30 tablet 97     tamsulosin (FLOMAX) 0.4 MG capsule TAKE 1 CAPSULE BY MOUTH ONCE DAILY 28 capsule 98     VENTOLIN  (90 Base) MCG/ACT inhaler INHALE 2 PUFFS INTO THE LUNGS  TWICE DAILY; & INHALE 2 PUFFS INTO THE LUNGS EVERY 4 HOURS AS NEEDED 18 g 98     Multiple Vitamins-Minerals (CENTRUM SILVER ULTRA MENS) TABS TAKE 1 TABLET BY MOUTH ONCE DAILY 31 tablet 98     REVIEW OF SYSTEMS:  4 point ROS including Respiratory, CV, GI and , other than that noted in the HPI,  is negative    Objective:  BP (!) 140/80   Pulse 79   Temp 98.6  F (37  C)   Resp 20   Wt 83 kg (183 lb)   SpO2 95%   BMI 23.50 kg/m     Exam:  GENERAL APPEARANCE:  Alert, in no distress sitting in WC, oily skin  ENT:  Mouth and posterior oropharynx normal, moist mucous membranes, [place]  EYES:  EOM, conjunctivae, lids, pupils and irises normal-glasses  RESP:  respiratory effort and palpation of chest increased, lungs diminished to auscultation posterior > anterior fields  wheezes  present  CV:  Palpation and auscultation of heart done, bradycardic (not today) and regular rhythm, 3/6 murmur- not heard on previous exam,  peripheral edema 1+ in legs ankles and feet. Chang Hose off  ABDOMEN:  normal bowel sounds, soft, nontender  M/S:   Gait and station using wheelchair for ambulation, generalized weakness increased, less walking with SBA of 1 for short distances   SKIN:  Inspection of skin and subcutaneous tissue baseline to visualized areas of skin. Chondrodermatitis nodule on superior helix right ear-refuses derm consult   NEURO:   Examination of sensation by touch normal  PSYCH:  insight and judgement impaired, memory impaired    Labs:   labs done by [facility] onsite not uploaded at time of visit     Creatinine Cr is 33.76    ASSESSMENT/PLAN:  (J84.10) Pulmonary fibrosis (H)  (primary encounter diagnosis)  (R91.8) Infiltrate of lower lobe of left lung present on imaging study  (R06.09) Dyspnea on exertion  Comment: acute on chronic finding  Plan:   -Duonebs TID and daily prn  -consider budesonide nebs BID to duonebs as alternate therapy (family previously not agreeable)  -prednisone 10 mg po daily  -mucinex 600 mg po  BID  -Ventolin q4H prn (may keep bedside)  -levofloxain 750 mg po q48 H x 7 days if family agrees. Would favor UA/UC results prior to antibiotic to cover both urine and respiratory if needed        Electronically signed by:  SUNDAY Lee CNP             Sincerely,        SUNDAY Lee CNP

## 2019-07-30 NOTE — PROGRESS NOTES
[facility]  [facility] Record Number:  [id number]  Place of Service where encounter took place:  [facility] ([facility] [[id number]]  Chief Complaint   Patient presents with     RECHECK       HPI:    Jacob Easton  is a 89 year old (5/16/1929), who is being seen today for an episodic care visit.  HPI information obtained from: facility chart records, facility staff, patient report, [facility] chart review and family/first contact daughter Cb report.     Seen today for increased confusion, weakness, shortness of breath per staff report. In chart review looks to have started last week. Labs and imaging ordered prior to visit. History of UTI so UA/UC additionally. Today his cognition appears at baseline. He is conversational appropriate. He denies pain, chest pain, nausea, vomiting or SOB or dyspnea on exertion. He typically does not admit to any s/s. He is frustrated another urine sample must be obtained so I am happy he has good recall to the situation with invalid sample. He is afebrile, WBC is not elevated BMP with elevated glucose, creatinine (1.4) and GFR (44). He has had general decline and with slow weight loss. Chest x-ray impression shows chronic interstitial lung disease and asymmetric developing left lower lobe infiltrate. This was compared to previous CXR of 6/16/19. With exam there is auditory expiratory wheezing.     Past Medical and Surgical History reviewed in Epic today.    MEDICATIONS:  Current Outpatient Medications   Medication Sig Dispense Refill     acetaminophen (TYLENOL) 325 MG tablet TAKE TWO TABLETS (650MG) BY MOUTH EVERY 4 HOURS AS NEEDED 60 tablet 97     amLODIPine (NORVASC) 10 MG tablet TAKE 1 TABLET BY MOUTH ONCE DAILY 28 tablet 98     ASPIRIN ADULT LOW STRENGTH 81 MG EC tablet TAKE 1 TABLET BY MOUTH ONCE DAILY 28 tablet 98     CALMOSEPTINE 0.44-20.6 % OINT ointment APPLY TO CLEANSED REDDENED CHARISSA SKIN FOLDS AND RASH WITH BREAKDOWN DURING MORNING  AND BEDTIME CARES AND INCONTINENT CHANGES OR MEDICATION  g 97     Elastic Bandages & Supports (T.E.D. KNEE LENGTH/M-LONG) MISC WEAR AS DIRECTED ON IN THE MORNING OFF AT BEDTIME (2 = 1 PAIR) 4 each 97     FUROSEMIDE PO Take 40 mg by mouth daily       guaiFENesin (MUCINEX) 600 MG 12 hr tablet Take 1 tablet (600 mg) by mouth 2 times daily 31 tablet 98     ipratropium - albuterol 0.5 mg/2.5 mg/3 mL (DUONEB) 0.5-2.5 (3) MG/3ML neb solution NEBULIZE THE CONTENT OF 1 VIAL INTO THE LUNGS THREE TIMES DAILY;AND NEBULIZE THE CONTENT OF 1 VIAL INTO THE LUNGS ONCE DAILY AS NEEDED 180 mL 97     lactobacillus rhamnosus, GG, (CULTURELL) capsule Take 1 capsule by mouth daily       latanoprost (XALATAN) 0.005 % ophthalmic solution INSTILL ONE DROP IN THE RIGHT EYE EVERY EVENING 2.5 mL 98     levothyroxine (SYNTHROID/LEVOTHROID) 50 MCG tablet TAKE 1 TABLET BY MOUTH ONCE DAILY 31 tablet 98     MELATONIN MAXIMUM STRENGTH 5 MG tablet TAKE 1 TABLET BY MOUTH ONCE DAILY 31 tablet 98     metoprolol tartrate (LOPRESSOR) 25 MG tablet TAKE 1 TABLET BY MOUTH TWICE DAILY 56 tablet 98     multivitamin (I-EDGAR) TABS per tablet TAKE 1 TABLET BY MOUTH ONCE DAILY 31 tablet 98     nystatin (MYCOSTATIN) 395368 UNIT/GM POWD Apply topically 2 times daily AND daily PRN       ondansetron (ZOFRAN) 4 MG tablet Take 4 mg by mouth 3 times daily as needed for nausea       potassium chloride ER (K-DUR/KLOR-CON M) 20 MEQ CR tablet TAKE TWO TABLETS (40 MEQ) BY MOUTH ONCE DAILY  56 tablet 97     predniSONE (DELTASONE) 10 MG tablet TAKE 1 TABLET BY MOUTH ONCE DAILY 31 tablet 98     SM STOOL SOFTENER 8.6-50 MG tablet TAKE 1 TABLET BY MOUTH TWICE DAILY AS NEEDED FOR CONSTIPATION 30 tablet 97     tamsulosin (FLOMAX) 0.4 MG capsule TAKE 1 CAPSULE BY MOUTH ONCE DAILY 28 capsule 98     VENTOLIN  (90 Base) MCG/ACT inhaler INHALE 2 PUFFS INTO THE LUNGS TWICE DAILY; & INHALE 2 PUFFS INTO THE LUNGS EVERY 4 HOURS AS NEEDED 18 g 98     Multiple Vitamins-Minerals  (CENTRUM SILVER ULTRA MENS) TABS TAKE 1 TABLET BY MOUTH ONCE DAILY 31 tablet 98     REVIEW OF SYSTEMS:  4 point ROS including Respiratory, CV, GI and , other than that noted in the HPI,  is negative    Objective:  BP (!) 140/80   Pulse 79   Temp 98.6  F (37  C)   Resp 20   Wt 83 kg (183 lb)   SpO2 95%   BMI 23.50 kg/m    Exam:  GENERAL APPEARANCE:  Alert, in no distress sitting in WC, oily skin  ENT:  Mouth and posterior oropharynx normal, moist mucous membranes, [place]  EYES:  EOM, conjunctivae, lids, pupils and irises normal-glasses  RESP:  respiratory effort and palpation of chest increased, lungs diminished to auscultation posterior > anterior fields  wheezes present  CV:  Palpation and auscultation of heart done, bradycardic (not today) and regular rhythm, 3/6 murmur- not heard on previous exam,  peripheral edema 1+ in legs ankles and feet. Chang Hose off  ABDOMEN:  normal bowel sounds, soft, nontender  M/S:   Gait and station using wheelchair for ambulation, generalized weakness increased, less walking with SBA of 1 for short distances   SKIN:  Inspection of skin and subcutaneous tissue baseline to visualized areas of skin. Chondrodermatitis nodule on superior helix right ear-refuses derm consult   NEURO:   Examination of sensation by touch normal  PSYCH:  insight and judgement impaired, memory impaired    Labs:   [facility] not uploaded at time of visit     Creatinine Cr is 33.76    ASSESSMENT/PLAN:  (J84.10) Pulmonary fibrosis (H)  (primary encounter diagnosis)  (R91.8) Infiltrate of lower lobe of left lung present on imaging study  (R06.09) Dyspnea on exertion  Comment: acute on chronic finding  Plan:   -Duonebs to QID  -budesonide nebs BID to duonebs  -prednisone 5 mg po daily  -mucinex 600 mg po BID  -Ventolin q4H prn (may keep bedside)    UC pending         Electronically signed by:  SUNDAY Lee CNP

## 2019-07-31 ENCOUNTER — TRANSFERRED RECORDS (OUTPATIENT)
Dept: HEALTH INFORMATION MANAGEMENT | Facility: CLINIC | Age: 84
End: 2019-07-31

## 2019-08-01 RX ORDER — PREDNISONE 10 MG/1
5 TABLET ORAL DAILY
Qty: 31 TABLET | Refills: 98 | Status: SHIPPED | OUTPATIENT
Start: 2019-08-01 | End: 2019-12-19

## 2019-08-01 RX ORDER — IPRATROPIUM BROMIDE AND ALBUTEROL SULFATE 2.5; .5 MG/3ML; MG/3ML
1 SOLUTION RESPIRATORY (INHALATION) 4 TIMES DAILY
Qty: 180 ML | Refills: 97 | Status: SHIPPED | OUTPATIENT
Start: 2019-08-01 | End: 2020-01-01

## 2019-08-01 RX ORDER — BUDESONIDE 0.5 MG/2ML
0.5 INHALANT ORAL 2 TIMES DAILY
Qty: 2 ML | Refills: 11 | Status: SHIPPED | OUTPATIENT
Start: 2019-08-01 | End: 2020-03-23

## 2019-08-08 ENCOUNTER — OFFICE VISIT (OUTPATIENT)
Dept: PHARMACY | Facility: CLINIC | Age: 84
End: 2019-08-08
Payer: COMMERCIAL

## 2019-08-08 DIAGNOSIS — R35.0 BENIGN PROSTATIC HYPERPLASIA WITH URINARY FREQUENCY: ICD-10-CM

## 2019-08-08 DIAGNOSIS — N40.1 BENIGN PROSTATIC HYPERPLASIA WITH URINARY FREQUENCY: ICD-10-CM

## 2019-08-08 DIAGNOSIS — I10 BENIGN ESSENTIAL HYPERTENSION: ICD-10-CM

## 2019-08-08 DIAGNOSIS — G47.01 INSOMNIA DUE TO MEDICAL CONDITION: ICD-10-CM

## 2019-08-08 DIAGNOSIS — R60.9 EDEMA, UNSPECIFIED TYPE: ICD-10-CM

## 2019-08-08 DIAGNOSIS — I48.91 ATRIAL FIBRILLATION, UNSPECIFIED TYPE (H): ICD-10-CM

## 2019-08-08 DIAGNOSIS — F03.90 DEMENTIA WITHOUT BEHAVIORAL DISTURBANCE, UNSPECIFIED DEMENTIA TYPE: ICD-10-CM

## 2019-08-08 DIAGNOSIS — E03.9 HYPOTHYROIDISM, UNSPECIFIED TYPE: Primary | ICD-10-CM

## 2019-08-08 DIAGNOSIS — K59.00 CONSTIPATION, UNSPECIFIED CONSTIPATION TYPE: ICD-10-CM

## 2019-08-08 DIAGNOSIS — M06.09 RHEUMATOID ARTHRITIS OF MULTIPLE SITES WITHOUT RHEUMATOID FACTOR (H): ICD-10-CM

## 2019-08-08 DIAGNOSIS — Z78.9 TAKES DIETARY SUPPLEMENTS: ICD-10-CM

## 2019-08-08 DIAGNOSIS — J84.112 IPF (IDIOPATHIC PULMONARY FIBROSIS) (H): ICD-10-CM

## 2019-08-08 DIAGNOSIS — G45.9 TIA (TRANSIENT ISCHEMIC ATTACK): ICD-10-CM

## 2019-08-08 PROCEDURE — 99605 MTMS BY PHARM NP 15 MIN: CPT | Performed by: PHARMACIST

## 2019-08-08 PROCEDURE — 99607 MTMS BY PHARM ADDL 15 MIN: CPT | Performed by: PHARMACIST

## 2019-08-08 RX ORDER — LACTOBACILLUS RHAMNOSUS GG 10B CELL
2 CAPSULE ORAL DAILY
COMMUNITY
End: 2020-01-13

## 2019-08-08 NOTE — PROGRESS NOTES
"SUBJECTIVE/OBJECTIVE:                           Jacob Easton is a 89 year old male seen at their home for an initial visit for Medication Therapy Management.  He was referred to me from Orestes Clement, his nurse practitioner.    Chief Complaint: initial med review.      Allergies/ADRs: Reviewed in Epic  Tobacco: No tobacco use  Alcohol: not currently using  Caffeine: no caffeine  Activity: wheelchair for ambulation  PMH: Reviewed in Epic    Medication Adherence/Access:  no issues reported  Patient has assistance with medication administration: assisted living.    Dementia: Not currently on meds for cognitive impairment.  Lives in  Memory care.  Per 11/2018 NP note: \"Needing more assistance in AL. Becoming more forgetful and lacks awareness of cognitive decline. Recent SLUMS was 16/30.\"    Hypertension/edema/h/o TIA/Afib: Current medications include Amlodipine 10mg once daily in the evening, Metoprolol 25mg twice daily, Furosemide 40mg daily, ASA 81mg daily (not on warfarin d/t falls).   Patient reports the following potential medication side effects: excessive urination; wears incontinence briefs.  Denies chest pain, dizziness, recent falls.  Does have some edema; wears TEDS.  BP Readings from Last 3 Encounters:   07/30/19 (!) 140/80   06/18/19 140/70   05/14/19 143/72     Pulmonary Fibrosis: Current medications include Budesonide 0.5mg twice daily, Duoneb qid, prn Albuterol HFA inhaler, Mucinex 600mg twice daily.  Also on Prednisone 5mg daily due to RA.  Denies SOB, wheezing.  Notes occasional cough, and reports he will often have phlegm in the am when he awakes, will cough up green sputum, but otherwise not bothersome typically during the day. Of note, NP reports he frequently denies symptoms, even if not breathing well.    Rheumatoid Arthritis: Current medications include Prednisone 5mg daily, Tylenol 650mg every 4hrs as needed.  Denies pain.     BPH: Current medication includes Tamsulosin 0.4mg daily.  As noted " "above, wears briefs and reports urinary frequency.    Insomnia: Current medications include: melatonin 5mg. Pt reports no issues.     Hypothyroidism: Patient is taking levothyroxine 50 mcg daily. Patient is having the following symptoms: none.   TSH   Date Value Ref Range Status   04/25/2019 6.93 (A) 0.40 - 4.00 mU/L Final       Supplements: Current medications include MVI, IVite, Lactobacillus, KCl due to diuretic use.  Also takes a homeopathic med (daughter suggested-she's a homeopath) - Nux vomica 200CK - 2 tabs daily.  This is for \"heartburn or drowsiness due to excessive eating or drinking\" per label on bottle.  He feels this is effective, and wants to continue to take.  Notes his appetite is \"very good.\"    Constipation:  Current medication includes Senna-S 1 tab twice daily as needed.  Denies constipation.    Today's Vitals: There were no vitals taken for this visit.      ASSESSMENT:                             Current medications were reviewed today as discussed above.     Medication Adherence: good, no issues identified    Dementia: Stable.    Hypertension/edema/h/o TIA/Afib: Stable.  BPs at goal <150/90mmHg.  Per chart review, previous decision to avoid anticoagulation due to falls.    Pulmonary Fibrosis: Stable.    Rheumatoid Arthritis: Stable.    BPH: Stable.    Insomnia: Stable.    Hypothyroidism:  Goal TSH 4-6 for this elderly patient.  TSH was somewhat elevated in April, however, pt not having symptoms, so adjustment in dose not currently necessary, and wasn't made.  Continue to monitor TSH; and may consider rechecking in October to ensure TSH not >10.    Supplements: Lactobacillus likely not adding benefit and contributing to pill load.  Pt is agreeable to d'c, however when discussed with NP, homeopath daughter has become very upset when supplements dc'd in past, so prefers not to make a change at this time.    Constipation:  Stable.        PLAN:                            1.  Consider recheck TSH in " October.    I spent 60 minutes with this patient today. A copy of the visit note was provided to the patient's referring provider.    Will follow up in one year, sooner if necessary.    The patient was not given a summary of these recommendations as an after visit summary due to cognitive impairment.     Sujata Hayes, Pharm.D.,[facility] [place]  Board Certified Geriatric Pharmacist  Medication Therapy Management Pharmacist  [phone]

## 2019-08-09 DIAGNOSIS — R09.89 PULMONARY VASCULAR CONGESTION: Primary | ICD-10-CM

## 2019-08-09 DIAGNOSIS — E87.6 HYPOKALEMIA: ICD-10-CM

## 2019-08-09 RX ORDER — FUROSEMIDE 40 MG
40 TABLET ORAL DAILY
Qty: 31 TABLET | Refills: 98 | Status: SHIPPED | OUTPATIENT
Start: 2019-08-09 | End: 2020-01-01

## 2019-08-09 RX ORDER — POTASSIUM CHLORIDE 1500 MG/1
TABLET, EXTENDED RELEASE ORAL
Qty: 56 TABLET | Refills: 97 | Status: SHIPPED | OUTPATIENT
Start: 2019-08-09 | End: 2020-01-01

## 2019-08-30 NOTE — PROGRESS NOTES
[facility]  [facility] Record Number:  [id number]  Place of Service where encounter took place:  [facility] [[id number]]  Chief Complaint   Patient presents with     RECHECK       HPI:    Jacob Easton  is a 89 year old (5/16/1929), who is being seen today for an episodic care visit.  HPI information obtained from: facility chart records, facility staff and patient report.     Seen today for follow up to pulmonary fibrosis, RA, hypothyroidism and other chronic conditions. In his room, denies pain, SOB, dysuria. Asked if I would look in his ears- both impacted with cerumen (viewed with otoscope). Report he is walking daily, no concerns. Ankles appear with increased edema and he states this is from exercise class that was just finished. Lung sound wet improved with cough to clear.       Past Medical and Surgical History reviewed in Epic today.    MEDICATIONS:  Current Outpatient Medications   Medication Sig Dispense Refill     acetaminophen (TYLENOL) 325 MG tablet TAKE TWO TABLETS (650MG) BY MOUTH EVERY 4 HOURS AS NEEDED 60 tablet 97     albuterol (VENTOLIN HFA) 108 (90 Base) MCG/ACT inhaler Inhale 2 puffs into the lungs every 4 hours as needed for shortness of breath / dyspnea or wheezing (May keep bedside) 18 g 98     amLODIPine (NORVASC) 10 MG tablet TAKE 1 TABLET BY MOUTH ONCE DAILY 28 tablet 98     ASPIRIN ADULT LOW STRENGTH 81 MG EC tablet TAKE 1 TABLET BY MOUTH ONCE DAILY 28 tablet 98     budesonide (PULMICORT) 0.5 MG/2ML neb solution Take 2 mLs (0.5 mg) by nebulization 2 times daily Add to morning and HS nebs 2 mL 11     CALMOSEPTINE 0.44-20.6 % OINT ointment APPLY TO CLEANSED REDDENED CHARISSA SKIN FOLDS AND RASH WITH BREAKDOWN DURING MORNING AND BEDTIME CARES AND INCONTINENT CHANGES OR MEDICATION  g 97     Elastic Bandages & Supports (T.E.D. KNEE LENGTH/M-LONG) MISC WEAR AS DIRECTED ON IN THE MORNING OFF AT BEDTIME (2 = 1 PAIR) 4 each 97     furosemide  "(LASIX) 40 MG tablet Take 1 tablet (40 mg) by mouth daily 31 tablet 98     guaiFENesin (MUCINEX) 600 MG 12 hr tablet Take 1 tablet (600 mg) by mouth 2 times daily 31 tablet 98     HOMEOPATHIC PRODUCTS CO Take 2 tablets by mouth daily (Nux vomica 200ck) - \"relieves heartburn or drowsiness due to excessive eating or drinking\" per label       ipratropium - albuterol 0.5 mg/2.5 mg/3 mL (DUONEB) 0.5-2.5 (3) MG/3ML neb solution Take 1 vial (3 mLs) by nebulization 4 times daily 180 mL 97     Lactobacillus-Inulin ([facility] DIGESTIVE [facility]) CHEW Take 2 tablets by mouth daily       latanoprost (XALATAN) 0.005 % ophthalmic solution INSTILL ONE DROP IN THE RIGHT EYE EVERY EVENING 2.5 mL 98     levothyroxine (SYNTHROID/LEVOTHROID) 50 MCG tablet TAKE 1 TABLET BY MOUTH ONCE DAILY 31 tablet 98     MELATONIN MAXIMUM STRENGTH 5 MG tablet TAKE 1 TABLET BY MOUTH ONCE DAILY 31 tablet 98     metoprolol tartrate (LOPRESSOR) 25 MG tablet TAKE 1 TABLET BY MOUTH TWICE DAILY 56 tablet 98     Multiple Vitamins-Minerals (CENTRUM SILVER ULTRA MENS) TABS Take 1 tablet by mouth daily       multivitamin (I-EDGAR) TABS per tablet TAKE 1 TABLET BY MOUTH ONCE DAILY 31 tablet 98     nystatin (MYCOSTATIN) 343426 UNIT/GM POWD Apply topically 2 times daily AND as needed with incontinent changes       potassium chloride ER (K-DUR/KLOR-CON M) 20 MEQ CR tablet TAKE TWO TABLETS (40 MEQ) BY MOUTH ONCE DAILY 56 tablet 97     predniSONE (DELTASONE) 10 MG tablet Take 0.5 tablets (5 mg) by mouth daily 31 tablet 98     SM STOOL SOFTENER 8.6-50 MG tablet TAKE 1 TABLET BY MOUTH TWICE DAILY AS NEEDED FOR CONSTIPATION 30 tablet 97     tamsulosin (FLOMAX) 0.4 MG capsule TAKE 1 CAPSULE BY MOUTH ONCE DAILY 28 capsule 98     REVIEW OF SYSTEMS:  4 point ROS including Respiratory, CV, GI and , other than that noted in the HPI,  is negative    Objective:  /70   Pulse 68   Temp 96.2  F (35.7  C)   Resp 20   Wt 108.9 kg (240 lb)   SpO2 96%   BMI 30.81 kg/m  "   Exam:  GENERAL APPEARANCE:  Alert, in no distress sitting in WC, oily skin  ENT:  Mouth and posterior oropharynx normal, moist mucous membranes, [place]-see hpi  EYES:  EOM, conjunctivae, lids, pupils and irises normal-glasses  RESP:  respiratory effort and palpation of chest increased, lungs diminished to auscultation posterior > anterior fields  wheezes not  present  CV:  Palpation and auscultation of heart done, bradycardic (not today) and regular rhythm, 3/6 murmur- not heard on previous exam,  peripheral edema 1+ in legs +2 ankles.. Chang Hose on  ABDOMEN:  normal bowel sounds, soft, nontender  M/S:   Gait and station using wheelchair for ambulation, generalized weakness increased, less walking with SBA of 1 for short distances   SKIN:  Inspection of skin and subcutaneous tissue baseline to visualized areas of skin. Chondrodermatitis nodule on superior helix right ear-refuses derm consult   NEURO:   Examination of sensation by touch normal  PSYCH:  insight and judgement impaired, memory impaired    Labs:   CBC RESULTS:   Recent Labs   Lab Test 06/16/19 04/25/19   WBC 12.6* 9.0   RBC 4.67 4.61   HGB 14.1 14.1   HCT 44.3 43.5   MCV 95 94   MCH 30.2 30.6   MCHC 31.8 32.4   RDW 13.9 14.4    272       Last Basic Metabolic Panel:  Recent Labs   Lab Test 06/17/19 04/25/19    138   POTASSIUM 4.0 4.1   CHLORIDE 102 103   MERCEDES 9.0 8.9   CO2 32 27   BUN 32* 28   CR 1.53* 1.52*   * 113*       Liver Function Studies -   Recent Labs   Lab Test 04/25/19 09/13/18 04/19/18   PROTTOTAL 8.0  --  7.9   ALBUMIN 3.5  --  3.4   BILITOTAL 0.5  --  0.6   ALKPHOS 76  --  78   AST 11 20 19   ALT 20 19 23       TSH   Date Value Ref Range Status   04/25/2019 6.93 (A) 0.40 - 4.00 mU/L Final   09/13/2018 3.45 0.40 - 4.00 mU/L Final       No results found for: A1C    ASSESSMENT/PLAN:  (J84.10) Pulmonary fibrosis (H)  (primary encounter diagnosis)  Comment: Chronic   Plan:   -Duonebs to QID  -budesonide nebs BID to  duonebs  -prednisone 5 mg po daily  -mucinex 600 mg po BID  -Ventolin q4H prn (may keep bedside)     (M06.9) Rheumatoid arthritis involving both hands, unspecified rheumatoid factor presence (H)  Comment: no recent flares   Plan:   -prednisone 5 mg po daily  -Tylenol prn    (E03.9) Hypothyroidism, unspecified type  Comment: slight elevation to last TSH  Plan:   -recheck in 10/2019  -levothyroxine 50 mcg po daily    (N18.3) CKD (chronic kidney disease) stage 3, GFR 30-59 ml/min (H)  Comment: Chronic  Plan:   -follow with labs and renal dose medications     (L98.491) Ulcer of external ear, limited to breakdown of skin (H)  Comment: Chondrodermatitis nodule on superior helix  Plan:   -Continue to offer referral at [facility] but he refuses    (H61.23) Bilateral impacted cerumen  Comment: chronic  of wax  Plan:   -cerumen removal with lighted curette to both ear canals                 Electronically signed by:  SUNDAY Lee CNP

## 2019-09-05 ENCOUNTER — ASSISTED LIVING VISIT (OUTPATIENT)
Dept: GERIATRICS | Facility: CLINIC | Age: 84
End: 2019-09-05
Payer: COMMERCIAL

## 2019-09-05 VITALS
BODY MASS INDEX: 30.81 KG/M2 | RESPIRATION RATE: 20 BRPM | SYSTOLIC BLOOD PRESSURE: 103 MMHG | WEIGHT: 240 LBS | HEART RATE: 68 BPM | DIASTOLIC BLOOD PRESSURE: 70 MMHG | OXYGEN SATURATION: 96 % | TEMPERATURE: 96.2 F

## 2019-09-05 DIAGNOSIS — L98.491 ULCER OF EXTERNAL EAR, LIMITED TO BREAKDOWN OF SKIN (H): ICD-10-CM

## 2019-09-05 DIAGNOSIS — E03.9 HYPOTHYROIDISM, UNSPECIFIED TYPE: ICD-10-CM

## 2019-09-05 DIAGNOSIS — M06.9 RHEUMATOID ARTHRITIS INVOLVING BOTH HANDS, UNSPECIFIED RHEUMATOID FACTOR PRESENCE: ICD-10-CM

## 2019-09-05 DIAGNOSIS — N18.30 CKD (CHRONIC KIDNEY DISEASE) STAGE 3, GFR 30-59 ML/MIN (H): ICD-10-CM

## 2019-09-05 DIAGNOSIS — H61.23 BILATERAL IMPACTED CERUMEN: ICD-10-CM

## 2019-09-05 DIAGNOSIS — J84.10 PULMONARY FIBROSIS (H): Primary | ICD-10-CM

## 2019-09-05 PROCEDURE — 69210 REMOVE IMPACTED EAR WAX UNI: CPT | Performed by: NURSE PRACTITIONER

## 2019-09-05 NOTE — LETTER
9/5/2019        RE: Jacob Tran  [address]  [facility] [address]        [facility]  [facility] Record Number:  [id number]  Place of Service where encounter took place:  [facility] [[id number]]  Chief Complaint   Patient presents with     RECHECK       HPI:    Jacob Easton  is a 89 year old (5/16/1929), who is being seen today for an episodic care visit.  HPI information obtained from: facility chart records, facility staff and patient report.     Seen today for follow up to pulmonary fibrosis, RA, hypothyroidism and other chronic conditions. In his room, denies pain, SOB, dysuria. Asked if I would look in his ears- both impacted with cerumen (viewed with otoscope). Report he is walking daily, no concerns. Ankles appear with increased edema and he states this is from exercise class that was just finished. Lung sound wet improved with cough to clear.       Past Medical and Surgical History reviewed in Epic today.    MEDICATIONS:  Current Outpatient Medications   Medication Sig Dispense Refill     acetaminophen (TYLENOL) 325 MG tablet TAKE TWO TABLETS (650MG) BY MOUTH EVERY 4 HOURS AS NEEDED 60 tablet 97     albuterol (VENTOLIN HFA) 108 (90 Base) MCG/ACT inhaler Inhale 2 puffs into the lungs every 4 hours as needed for shortness of breath / dyspnea or wheezing (May keep bedside) 18 g 98     amLODIPine (NORVASC) 10 MG tablet TAKE 1 TABLET BY MOUTH ONCE DAILY 28 tablet 98     ASPIRIN ADULT LOW STRENGTH 81 MG EC tablet TAKE 1 TABLET BY MOUTH ONCE DAILY 28 tablet 98     budesonide (PULMICORT) 0.5 MG/2ML neb solution Take 2 mLs (0.5 mg) by nebulization 2 times daily Add to morning and HS nebs 2 mL 11     CALMOSEPTINE 0.44-20.6 % OINT ointment APPLY TO CLEANSED REDDENED CHARISSA SKIN FOLDS AND RASH WITH BREAKDOWN DURING MORNING AND BEDTIME CARES AND INCONTINENT CHANGES OR MEDICATION  g 97     Elastic Bandages & Supports (T.E.D. KNEE  "LENGTH/M-LONG) MISC WEAR AS DIRECTED ON IN THE MORNING OFF AT BEDTIME (2 = 1 PAIR) 4 each 97     furosemide (LASIX) 40 MG tablet Take 1 tablet (40 mg) by mouth daily 31 tablet 98     guaiFENesin (MUCINEX) 600 MG 12 hr tablet Take 1 tablet (600 mg) by mouth 2 times daily 31 tablet 98     HOMEOPATHIC PRODUCTS CO Take 2 tablets by mouth daily (Nux vomica 200ck) - \"relieves heartburn or drowsiness due to excessive eating or drinking\" per label       ipratropium - albuterol 0.5 mg/2.5 mg/3 mL (DUONEB) 0.5-2.5 (3) MG/3ML neb solution Take 1 vial (3 mLs) by nebulization 4 times daily 180 mL 97     Lactobacillus-Inulin (CULTURELLE DIGESTIVE HEALTH) CHEW Take 2 tablets by mouth daily       latanoprost (XALATAN) 0.005 % ophthalmic solution INSTILL ONE DROP IN THE RIGHT EYE EVERY EVENING 2.5 mL 98     levothyroxine (SYNTHROID/LEVOTHROID) 50 MCG tablet TAKE 1 TABLET BY MOUTH ONCE DAILY 31 tablet 98     MELATONIN MAXIMUM STRENGTH 5 MG tablet TAKE 1 TABLET BY MOUTH ONCE DAILY 31 tablet 98     metoprolol tartrate (LOPRESSOR) 25 MG tablet TAKE 1 TABLET BY MOUTH TWICE DAILY 56 tablet 98     Multiple Vitamins-Minerals (CENTRUM SILVER ULTRA MENS) TABS Take 1 tablet by mouth daily       multivitamin (I-EDGAR) TABS per tablet TAKE 1 TABLET BY MOUTH ONCE DAILY 31 tablet 98     nystatin (MYCOSTATIN) 166796 UNIT/GM POWD Apply topically 2 times daily AND as needed with incontinent changes       potassium chloride ER (K-DUR/KLOR-CON M) 20 MEQ CR tablet TAKE TWO TABLETS (40 MEQ) BY MOUTH ONCE DAILY 56 tablet 97     predniSONE (DELTASONE) 10 MG tablet Take 0.5 tablets (5 mg) by mouth daily 31 tablet 98     SM STOOL SOFTENER 8.6-50 MG tablet TAKE 1 TABLET BY MOUTH TWICE DAILY AS NEEDED FOR CONSTIPATION 30 tablet 97     tamsulosin (FLOMAX) 0.4 MG capsule TAKE 1 CAPSULE BY MOUTH ONCE DAILY 28 capsule 98     REVIEW OF SYSTEMS:  4 point ROS including Respiratory, CV, GI and , other than that noted in the HPI,  is negative    Objective:  /70  "  Pulse 68   Temp 96.2  F (35.7  C)   Resp 20   Wt 108.9 kg (240 lb)   SpO2 96%   BMI 30.81 kg/m     Exam:  GENERAL APPEARANCE:  Alert, in no distress sitting in WC, oily skin  ENT:  Mouth and posterior oropharynx normal, moist mucous membranes, [place]-see hpi  EYES:  EOM, conjunctivae, lids, pupils and irises normal-glasses  RESP:  respiratory effort and palpation of chest increased, lungs diminished to auscultation posterior > anterior fields  wheezes  not  present  CV:  Palpation and auscultation of heart done, bradycardic (not today) and regular rhythm, 3/6 murmur- not heard on previous exam,  peripheral edema 1+ in legs +2 ankles.. Chang Hose on  ABDOMEN:  normal bowel sounds, soft, nontender  M/S:   Gait and station using wheelchair for ambulation, generalized weakness increased, less walking with SBA of 1 for short distances   SKIN:  Inspection of skin and subcutaneous tissue baseline to visualized areas of skin. Chondrodermatitis nodule on superior helix right ear-refuses derm consult   NEURO:   Examination of sensation by touch normal  PSYCH:  insight and judgement impaired, memory impaired    Labs:   CBC RESULTS:   Recent Labs   Lab Test 06/16/19 04/25/19   WBC 12.6* 9.0   RBC 4.67 4.61   HGB 14.1 14.1   HCT 44.3 43.5   MCV 95 94   MCH 30.2 30.6   MCHC 31.8 32.4   RDW 13.9 14.4    272       Last Basic Metabolic Panel:  Recent Labs   Lab Test 06/17/19 04/25/19    138   POTASSIUM 4.0 4.1   CHLORIDE 102 103   MERCEDES 9.0 8.9   CO2 32 27   BUN 32* 28   CR 1.53* 1.52*   * 113*       Liver Function Studies -   Recent Labs   Lab Test 04/25/19 09/13/18 04/19/18   PROTTOTAL 8.0  --  7.9   ALBUMIN 3.5  --  3.4   BILITOTAL 0.5  --  0.6   ALKPHOS 76  --  78   AST 11 20 19   ALT 20 19 23       TSH   Date Value Ref Range Status   04/25/2019 6.93 (A) 0.40 - 4.00 mU/L Final   09/13/2018 3.45 0.40 - 4.00 mU/L Final       No results found for: A1C    ASSESSMENT/PLAN:  (J84.10) Pulmonary fibrosis (H)   (primary encounter diagnosis)  Comment: Chronic   Plan:   -Duonebs to QID  -budesonide nebs BID to duonebs  -prednisone 5 mg po daily  -mucinex 600 mg po BID  -Ventolin q4H prn (may keep bedside)     (M06.9) Rheumatoid arthritis involving both hands, unspecified rheumatoid factor presence (H)  Comment: no recent flares   Plan:   -prednisone 5 mg po daily  -Tylenol prn    (E03.9) Hypothyroidism, unspecified type  Comment: slight elevation to last TSH  Plan:   -recheck in 10/2019  -levothyroxine 50 mcg po daily    (N18.3) CKD (chronic kidney disease) stage 3, GFR 30-59 ml/min (H)  Comment: Chronic  Plan:   -follow with labs and renal dose medications     (L98.491) Ulcer of external ear, limited to breakdown of skin (H)  Comment: Chondrodermatitis nodule on superior helix  Plan:   -Continue to offer referral at [facility] but he refuses    (H61.23) Bilateral impacted cerumen  Comment: chronic  of wax  Plan:   -cerumen removal with lighted curette to both ear canals                 Electronically signed by:  SUNDAY Lee CNP             Sincerely,        SUNDAY Lee CNP

## 2019-09-10 ENCOUNTER — ASSISTED LIVING VISIT (OUTPATIENT)
Dept: GERIATRICS | Facility: CLINIC | Age: 84
End: 2019-09-10
Payer: COMMERCIAL

## 2019-09-10 VITALS
RESPIRATION RATE: 18 BRPM | DIASTOLIC BLOOD PRESSURE: 67 MMHG | HEART RATE: 64 BPM | BODY MASS INDEX: 30.04 KG/M2 | SYSTOLIC BLOOD PRESSURE: 117 MMHG | WEIGHT: 234 LBS

## 2019-09-10 DIAGNOSIS — I48.91 ATRIAL FIBRILLATION, UNSPECIFIED TYPE (H): ICD-10-CM

## 2019-09-10 DIAGNOSIS — J67.9 PULMONARY ALVEOLITIS (H): ICD-10-CM

## 2019-09-10 DIAGNOSIS — N18.30 BENIGN HYPERTENSION WITH CKD (CHRONIC KIDNEY DISEASE) STAGE III (H): ICD-10-CM

## 2019-09-10 DIAGNOSIS — E03.9 HYPOTHYROIDISM, UNSPECIFIED TYPE: ICD-10-CM

## 2019-09-10 DIAGNOSIS — I12.9 BENIGN HYPERTENSION WITH CKD (CHRONIC KIDNEY DISEASE) STAGE III (H): ICD-10-CM

## 2019-09-10 DIAGNOSIS — M06.9 RHEUMATOID ARTHRITIS INVOLVING BOTH HANDS, UNSPECIFIED RHEUMATOID FACTOR PRESENCE: ICD-10-CM

## 2019-09-10 DIAGNOSIS — H61.91 SKIN LESION OF RIGHT EAR: Primary | ICD-10-CM

## 2019-09-10 DIAGNOSIS — R60.0 LOWER EXTREMITY EDEMA: ICD-10-CM

## 2019-09-10 NOTE — PROGRESS NOTES
"Jacob Easton is a 89 year old male seen September 10, 2019 at [facility] where he has resided for 2 years (admit 5/2017) seen to follow up HTN and atrial fib   Patient is seen in his apartment, up to .   He states he is feeling well, back from exercise class today.   No current pain.   Reviewed growing lesion on his right ear, but has been reluctant to have it addressed, \"I don't want it cut off\"       He has longstanding RA, has been on MTX for 10 years.   It was stopped in last hospitalization because of respiratory changes with CT showing ground glass opacities c/w acute alveolitis and persistent cystic changes throughout both lungs.  MTX was restarted at some point, cannot find documentation related to that    Patient is no longer on supplemental O2, uses nebs just prn.       Patient had multiple hospitalizations and TCU stays over the past 3 years, with falls and weakness, unable to get up.   [place]oved to [place] secondary to higher care needs.        Past Medical History:   Diagnosis Date     Arthritis      Constipation      Hypertension      Hypocalcemia      Neuromuscular disorder (H)      Thyroid disease    Pulmonary alveolitis  CKD stage 3  RA  BPH  Atrial fib  LE weakness    SH:  Retired [employer] .    2019    His daughter Mary lives in [place], son Bernabe in [place].      Review Of Systems  :  LE weakness, walks in hallways daily with nursing staff assistance, min-mod assist with ADLs, and transfer to  which he uses for all destinations.     SLUMS 18/30   CPT 4.5   Wt Readings from Last 5 Encounters:   09/10/19 106.1 kg (234 lb)   09/02/19 108.9 kg (240 lb)   07/30/19 83 kg (183 lb)   05/14/19 83 kg (183 lb)   10/02/18 95.3 kg (210 lb)        EXAM:  NAD, rubicund  /67   Pulse 64   Resp 18   Wt 106.1 kg (234 lb)   BMI 30.04 kg/m     Right ear with 3cm tumor, some scabbing and heaped up borders, on upper pinna, c/w skin cancer     Neck supple without adenopathy  Lungs decreased BS, few basilar " rales  Heart RRR s1s2 @85, 2/6 early ELENA  Abd soft, NT, no distention, +BS  Ext 1+ ankle edema R>L     Neuro: generalized weakness, WC bound  Psych: affect flat.      Last Comprehensive Metabolic Panel:  Sodium   Date Value Ref Range Status   06/17/2019 138 133 - 144 mmol/L Final     Potassium   Date Value Ref Range Status   06/17/2019 4.0 3.4 - 5.3 mmol/L Final     Chloride   Date Value Ref Range Status   06/17/2019 102 94 - 109 mmol/L Final     Carbon Dioxide   Date Value Ref Range Status   06/17/2019 32 20 - 32 mmol/L Final     Anion Gap   Date Value Ref Range Status   06/17/2019 4 3 - 14 mmol/L Final     Glucose   Date Value Ref Range Status   06/17/2019 160 (A) 70 - 99 mg/dL Final     Urea Nitrogen   Date Value Ref Range Status   06/17/2019 32 (A) 7 - 30 mg/dL Final     Creatinine   Date Value Ref Range Status   06/17/2019 1.53 (A) 0.66 - 1.25 mg/dL Final     GFR Estimate   Date Value Ref Range Status   06/17/2019 39 (L) >60 ml/min/1.73_m2 Final     Calcium   Date Value Ref Range Status   06/17/2019 9.0 8.5 - 10.1 mg/dL Final     Lab Results   Component Value Date    WBC 12.6 06/16/2019      HGB 14.1 06/16/2019      MCV 95 06/16/2019       06/16/2019       IMP/PLAN:   (L98.9) Skin lesion of right ear  (primary encounter diagnosis)  Comment: enlarging, exam c/w skin cancer  Plan: recommend Dermatology appointment.     Pt's daughter is coming to visit in 2 weeks, he may agree to follow through when she is here.        (M06.09) 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 (H)  Comment: with very limited mobility     Plan: patient follows with Rheumatologist, but do not have access to those notes.   He is on MTX 10 mg/week; prednisone dose decreased to 5 mg/day in August.   Need to follow labs while he is on MTX; change folic acid to Monday-Thursday only.          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       (N18.3) CKD (chronic kidney disease) stage 3, GFR 30-59 ml/min  Comment: secondary to HTN    GFR 39  Plan: follow BMP, renal dosing of meds     (I12.9,  N18.3) Benign hypertension with CKD (chronic kidney disease) stage III (H)  Comment:   BP Readings from Last 3 Encounters:   09/10/19 117/67   09/02/19 103/70   07/30/19 (!) 140/80   Plan: remains on amlodipine and metoprolol    (N40.0) Benign prostatic hyperplasia, unspecified whether lower urinary tract symptoms present  Comment: longstanding  Plan: continue tamsulosin to avoid urinary retention      (E03.9) Hypothyroidism, unspecified type  Comment:   TSH   Date Value Ref Range Status   04/25/2019 6.93 (A) 0.40 - 4.00 mU/L Final    Plan: same dose levothyroxine, yearly TSH          (R60.0) Lower extremity edema  Comment: dependent  Plan: compression stockings, furosemide, elevate as able.       (I48.91) Atrial fibrillation, unspecified type (H)  Comment:   Pulse Readings from Last 4 Encounters:   09/10/19 64   09/02/19 68   07/30/19 79   06/18/19 93         Plan: daily ASA.   Not otherwise anticoagulated secondary to number of falls he has had     Metoprolol for VR control    AD: DNR/DNI    Tiffani Vigil MD

## 2019-09-10 NOTE — LETTER
"    9/10/2019        RE: Jacob Easton  [facility]  [address]  [facility] [address]        Jacob Easton is a 89 year old male seen September 10, 2019 at [facility] where he has resided for 2 years (admit 5/2017) seen to follow up HTN and atrial fib   Patient is seen in his apartment, up to .   He states he is feeling well, back from exercise class today.   No current pain.   Reviewed growing lesion on his right ear, but has been reluctant to have it addressed, \"I don't want it cut off\"       He has longstanding RA, has been on MTX for 10 years.   It was stopped in last hospitalization because of respiratory changes with CT showing ground glass opacities c/w acute alveolitis and persistent cystic changes throughout both lungs.  MTX was restarted at some point, cannot find documentation related to that    Patient is no longer on supplemental O2, uses nebs just prn.       Patient had multiple hospitalizations and TCU stays over the past 3 years, with falls and weakness, unable to get up.   [place]oved [place] secondary to higher care needs.        Past Medical History:   Diagnosis Date     Arthritis      Constipation      Hypertension      Hypocalcemia      Neuromuscular disorder (H)      Thyroid disease    Pulmonary alveolitis  CKD stage 3  RA  BPH  Atrial fib  LE weakness    SH:  Retired [employer] .    2019    His daughter Mary lives in [place], son Bernabe in [place].      Review Of Systems  :  LE weakness, walks in hallways daily with nursing staff assistance, min-mod assist with ADLs, and transfer to  which he uses for all destinations.     SLUMS 18/30   CPT 4.5   Wt Readings from Last 5 Encounters:   09/10/19 106.1 kg (234 lb)   09/02/19 108.9 kg (240 lb)   07/30/19 83 kg (183 lb)   05/14/19 83 kg (183 lb)   10/02/18 95.3 kg (210 lb)        EXAM:  NAD, rubicund  /67   Pulse 64   Resp 18   Wt 106.1 kg (234 lb)   BMI 30.04 kg/m      Right ear with 3cm tumor, some scabbing and heaped up " borders, on upper pinna, c/w skin cancer     Neck supple without adenopathy  Lungs decreased BS, few basilar rales  Heart RRR s1s2 @85, 2/6 early ELENA  Abd soft, NT, no distention, +BS  Ext 1+ ankle edema R>L     Neuro: generalized weakness, WC bound  Psych: affect flat.      Last Comprehensive Metabolic Panel:  Sodium   Date Value Ref Range Status   06/17/2019 138 133 - 144 mmol/L Final     Potassium   Date Value Ref Range Status   06/17/2019 4.0 3.4 - 5.3 mmol/L Final     Chloride   Date Value Ref Range Status   06/17/2019 102 94 - 109 mmol/L Final     Carbon Dioxide   Date Value Ref Range Status   06/17/2019 32 20 - 32 mmol/L Final     Anion Gap   Date Value Ref Range Status   06/17/2019 4 3 - 14 mmol/L Final     Glucose   Date Value Ref Range Status   06/17/2019 160 (A) 70 - 99 mg/dL Final     Urea Nitrogen   Date Value Ref Range Status   06/17/2019 32 (A) 7 - 30 mg/dL Final     Creatinine   Date Value Ref Range Status   06/17/2019 1.53 (A) 0.66 - 1.25 mg/dL Final     GFR Estimate   Date Value Ref Range Status   06/17/2019 39 (L) >60 ml/min/1.73_m2 Final     Calcium   Date Value Ref Range Status   06/17/2019 9.0 8.5 - 10.1 mg/dL Final     Lab Results   Component Value Date    WBC 12.6 06/16/2019      HGB 14.1 06/16/2019      MCV 95 06/16/2019       06/16/2019       IMP/PLAN:   (L98.9) Skin lesion of right ear  (primary encounter diagnosis)  Comment: enlarging, exam c/w skin cancer  Plan: recommend Dermatology appointment.     Pt's daughter is coming to visit in 2 weeks, he may agree to follow through when she is here.        (M06.09) 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 (H)  Comment: with very limited mobility     Plan: patient follows with Rheumatologist, but do not have access to those notes.   He is on MTX 10 mg/week; prednisone dose decreased to 5 mg/day in August.   Need to follow labs while he is on MTX; change folic acid to Monday-Thursday only.          (J67.9) Pulmonary  alveolitis (H)   Comment: and chronic cystic changes, dyspnea  Plan: budesonide nebs bid, Combivent qid, Mucinex and low dose prednisone.       (N18.3) CKD (chronic kidney disease) stage 3, GFR 30-59 ml/min  Comment: secondary to HTN    GFR 39  Plan: follow BMP, renal dosing of meds     (I12.9,  N18.3) Benign hypertension with CKD (chronic kidney disease) stage III (H)  Comment:   BP Readings from Last 3 Encounters:   09/10/19 117/67   09/02/19 103/70   07/30/19 (!) 140/80   Plan: remains on amlodipine and metoprolol    (N40.0) Benign prostatic hyperplasia, unspecified whether lower urinary tract symptoms present  Comment: longstanding  Plan: continue tamsulosin to avoid urinary retention      (E03.9) Hypothyroidism, unspecified type  Comment:   TSH   Date Value Ref Range Status   04/25/2019 6.93 (A) 0.40 - 4.00 mU/L Final    Plan: same dose levothyroxine, yearly TSH          (R60.0) Lower extremity edema  Comment: dependent  Plan: compression stockings, furosemide, elevate as able.       (I48.91) Atrial fibrillation, unspecified type (H)  Comment:   Pulse Readings from Last 4 Encounters:   09/10/19 64   09/02/19 68   07/30/19 79   06/18/19 93         Plan: daily ASA.   Not otherwise anticoagulated secondary to number of falls he has had     Metoprolol for VR control    AD: DNR/DNI    Tiffani Vigil MD         Sincerely,        Tiffani Vigil MD

## 2019-09-30 ENCOUNTER — TRANSFERRED RECORDS (OUTPATIENT)
Dept: HEALTH INFORMATION MANAGEMENT | Facility: CLINIC | Age: 84
End: 2019-09-30

## 2019-10-04 ENCOUNTER — TRANSFERRED RECORDS (OUTPATIENT)
Dept: HEALTH INFORMATION MANAGEMENT | Facility: CLINIC | Age: 84
End: 2019-10-04

## 2019-10-04 ENCOUNTER — APPOINTMENT (OUTPATIENT)
Age: 84
Setting detail: DERMATOLOGY
End: 2019-10-04

## 2019-10-04 VITALS — RESPIRATION RATE: 16 BRPM | WEIGHT: 235 LBS | HEIGHT: 72 IN

## 2019-10-04 DIAGNOSIS — Z71.89 OTHER SPECIFIED COUNSELING: ICD-10-CM

## 2019-10-04 DIAGNOSIS — L81.4 OTHER MELANIN HYPERPIGMENTATION: ICD-10-CM

## 2019-10-04 PROBLEM — D48.5 NEOPLASM OF UNCERTAIN BEHAVIOR OF SKIN: Status: ACTIVE | Noted: 2019-10-04

## 2019-10-04 PROCEDURE — 99202 OFFICE O/P NEW SF 15 MIN: CPT | Mod: 25

## 2019-10-04 PROCEDURE — OTHER COUNSELING: OTHER

## 2019-10-04 PROCEDURE — 69100 BIOPSY OF EXTERNAL EAR: CPT

## 2019-10-04 PROCEDURE — OTHER PATHOLOGY BILLING: OTHER

## 2019-10-04 PROCEDURE — 88305 TISSUE EXAM BY PATHOLOGIST: CPT

## 2019-10-04 PROCEDURE — OTHER BIOPSY BY SHAVE METHOD: OTHER

## 2019-10-04 ASSESSMENT — LOCATION DETAILED DESCRIPTION DERM: LOCATION DETAILED: LEFT MEDIAL MALAR CHEEK

## 2019-10-04 ASSESSMENT — LOCATION SIMPLE DESCRIPTION DERM: LOCATION SIMPLE: LEFT CHEEK

## 2019-10-04 ASSESSMENT — LOCATION ZONE DERM: LOCATION ZONE: FACE

## 2019-10-04 NOTE — HPI: SKIN LESION
What Type Of Note Output Would You Prefer (Optional)?: Standard Output
How Severe Is Your Skin Lesion?: severe
Has Your Skin Lesion Been Treated?: not been treated
Is This A New Presentation, Or A Follow-Up?: Growth
Additional History: The lesion has been there for 25 years, but changed significantly over the past 6 months.

## 2019-10-04 NOTE — PROCEDURE: PATHOLOGY BILLING
Alert-The patient is alert, awake and responds to voice. The patient is oriented to time, place, and person. The triage nurse is able to obtain subjective information. Detail Level: Detailed

## 2019-10-04 NOTE — PROCEDURE: PATHOLOGY BILLING
Immunohistochemistry ([id number] and [id number]) billing is not performed here. Please use the Immunohistochemistry Stain Billing plan to accomplish this. Immunohistochemistry ([id number] and [id number]) billing is not performed here. Please use the Immunohistochemistry Stain Billing plan to accomplish this.

## 2019-10-04 NOTE — PROCEDURE: BIOPSY BY SHAVE METHOD
Cryotherapy Text: The wound bed was treated with cryotherapy after the biopsy was performed.
Depth Of Biopsy: dermis
Electrodesiccation Text: The wound bed was treated with electrodesiccation after the biopsy was performed.
Biopsy Method: Dermablade
Render Post-Care Instructions In Note?: yes
X Size Of Lesion In Cm: 0
Wound Care: Petrolatum
Render Path Notes In Note?: No
Anesthesia Type: 2% lidocaine with epinephrine
Biopsy Type: H and E
Electrodesiccation And Curettage Text: The wound bed was treated with electrodesiccation and curettage after the biopsy was performed.
Silver Nitrate Text: The wound bed was treated with silver nitrate after the biopsy was performed.
Dressing: bandage
Consent: Written consent was obtained and risks were reviewed including but not limited to scarring, infection, bleeding, scabbing, incomplete removal, nerve damage and allergy to anesthesia.
Type Of Destruction Used: Curettage
Curettage Text: The wound bed was treated with curettage after the biopsy was performed.
Post-Care Instructions: I reviewed with the patient in detail post-care instructions. Patient is to keep the biopsy site moist with vaseline or aquaphor once daily with bandage until healed.
Notification Instructions: Patient will be notified of biopsy results. However, patient instructed to call the office if not contacted within 2 weeks.
Hemostasis: Electrocautery
Billing Type: Third-Party Bill
Detail Level: Detailed

## 2019-11-10 DIAGNOSIS — R21 RASH AND NONSPECIFIC SKIN ERUPTION: Primary | ICD-10-CM

## 2019-11-19 ENCOUNTER — ASSISTED LIVING VISIT (OUTPATIENT)
Dept: GERIATRICS | Facility: CLINIC | Age: 84
End: 2019-11-19
Payer: COMMERCIAL

## 2019-11-19 VITALS
SYSTOLIC BLOOD PRESSURE: 123 MMHG | HEART RATE: 84 BPM | DIASTOLIC BLOOD PRESSURE: 70 MMHG | BODY MASS INDEX: 30.43 KG/M2 | RESPIRATION RATE: 16 BRPM | TEMPERATURE: 98.6 F | OXYGEN SATURATION: 91 % | WEIGHT: 237 LBS

## 2019-11-19 DIAGNOSIS — N18.30 BENIGN HYPERTENSION WITH CKD (CHRONIC KIDNEY DISEASE) STAGE III (H): ICD-10-CM

## 2019-11-19 DIAGNOSIS — R60.0 LOWER EXTREMITY EDEMA: ICD-10-CM

## 2019-11-19 DIAGNOSIS — J84.10 PULMONARY FIBROSIS (H): ICD-10-CM

## 2019-11-19 DIAGNOSIS — M06.9 RHEUMATOID ARTHRITIS INVOLVING BOTH HANDS, UNSPECIFIED RHEUMATOID FACTOR PRESENCE: ICD-10-CM

## 2019-11-19 DIAGNOSIS — H61.91 SKIN LESION OF RIGHT EAR: ICD-10-CM

## 2019-11-19 DIAGNOSIS — H61.23 BILATERAL IMPACTED CERUMEN: Primary | ICD-10-CM

## 2019-11-19 DIAGNOSIS — I12.9 BENIGN HYPERTENSION WITH CKD (CHRONIC KIDNEY DISEASE) STAGE III (H): ICD-10-CM

## 2019-11-19 DIAGNOSIS — E03.9 HYPOTHYROIDISM, UNSPECIFIED TYPE: ICD-10-CM

## 2019-11-19 PROCEDURE — 69210 REMOVE IMPACTED EAR WAX UNI: CPT | Performed by: NURSE PRACTITIONER

## 2019-11-19 NOTE — PROGRESS NOTES
[place] [facility]  [place] Medical Record Number:  [id number]  Place of Service where encounter took place:  [facility] [[id number]]  Chief Complaint   Patient presents with     RECHECK       HPI:    Jacob Easton  is a 89 year old (5/16/1929), who is being seen today for an episodic care visit.  HPI information obtained from: facility chart records, facility staff and patient report. Today's concern is:    Seen today for follow up to chronic conditions. Had skin lesion removed on right ear. Will have follow up Mohs for BCC on 12/11/19. Denies pain, chest pain or SOB. Reports to walking daily (short distances) and WC for ambulation. PMH includes RA and prior use of methotrexate which was discontinued for respiratory changes on CT  showing ground glass opacities c/w acute alveolitis and persistent cystic changes throughout both lungs. Budesonide added BID to duonebs starting 10/2019 appear helpful.  History of hypothyroidism with somewhat elevated TSH with recheck on 7/26 of 4.12 (not uploaded at time of visit).       Past Medical and Surgical History reviewed in Epic today.    MEDICATIONS:  Current Outpatient Medications   Medication Sig Dispense Refill     acetaminophen (TYLENOL) 325 MG tablet TAKE TWO TABLETS (650MG) BY MOUTH EVERY 4 HOURS AS NEEDED 60 tablet 97     albuterol (VENTOLIN HFA) 108 (90 Base) MCG/ACT inhaler Inhale 2 puffs into the lungs every 4 hours as needed for shortness of breath / dyspnea or wheezing (May keep bedside) 18 g 98     amLODIPine (NORVASC) 10 MG tablet TAKE 1 TABLET BY MOUTH ONCE DAILY 28 tablet 98     ASPIRIN ADULT LOW STRENGTH 81 MG EC tablet TAKE 1 TABLET BY MOUTH ONCE DAILY 28 tablet 98     RADHA PROTECT (EUCERIN) external cream APPLY TOPICALLY TO AFFECTED AREA(S) TWICE DAILY & AS NEEDED WITH INCONTINENT CHANGES 142 g 97     budesonide (PULMICORT) 0.5 MG/2ML neb solution Take 2 mLs (0.5 mg) by nebulization 2 times daily Add to morning and HS  "nebs 2 mL 11     CALMOSEPTINE 0.44-20.6 % OINT ointment APPLY TO CLEANSED REDDENED CHARISSA SKIN FOLDS AND RASH WITH BREAKDOWN DURING MORNING AND BEDTIME CARES AND INCONTINENT CHANGES OR MEDICATION  g 97     Elastic Bandages & Supports (T.E.D. KNEE LENGTH/M-LONG) MISC WEAR AS DIRECTED ON IN THE MORNING OFF AT BEDTIME (2 = 1 PAIR) 4 each 97     furosemide (LASIX) 40 MG tablet Take 1 tablet (40 mg) by mouth daily 31 tablet 98     guaiFENesin (MUCINEX) 600 MG 12 hr tablet Take 1 tablet (600 mg) by mouth 2 times daily 31 tablet 98     HOMEOPATHIC PRODUCTS CO Take 2 tablets by mouth daily (Nux vomica 200ck) - \"relieves heartburn or drowsiness due to excessive eating or drinking\" per label       ipratropium - albuterol 0.5 mg/2.5 mg/3 mL (DUONEB) 0.5-2.5 (3) MG/3ML neb solution Take 1 vial (3 mLs) by nebulization 4 times daily 180 mL 97     Lactobacillus-Inulin ([facility] DIGESTIVE [facility]) CHEW Take 2 tablets by mouth daily       latanoprost (XALATAN) 0.005 % ophthalmic solution INSTILL ONE DROP IN THE RIGHT EYE EVERY EVENING 2.5 mL 98     levothyroxine (SYNTHROID/LEVOTHROID) 50 MCG tablet TAKE 1 TABLET BY MOUTH ONCE DAILY 31 tablet 98     MELATONIN MAXIMUM STRENGTH 5 MG tablet TAKE 1 TABLET BY MOUTH ONCE DAILY 31 tablet 98     metoprolol tartrate (LOPRESSOR) 25 MG tablet TAKE 1 TABLET BY MOUTH TWICE DAILY 56 tablet 98     Multiple Vitamins-Minerals (CENTRUM SILVER ULTRA MENS) TABS Take 1 tablet by mouth daily       multivitamin (I-EDGAR) TABS per tablet TAKE 1 TABLET BY MOUTH ONCE DAILY 31 tablet 98     nystatin (MYCOSTATIN) 057385 UNIT/GM POWD Apply topically 2 times daily AND as needed with incontinent changes       potassium chloride ER (K-DUR/KLOR-CON M) 20 MEQ CR tablet TAKE TWO TABLETS (40 MEQ) BY MOUTH ONCE DAILY 56 tablet 97     predniSONE (DELTASONE) 10 MG tablet Take 0.5 tablets (5 mg) by mouth daily 31 tablet 98     SM STOOL SOFTENER 8.6-50 MG tablet TAKE 1 TABLET BY MOUTH TWICE DAILY AS NEEDED FOR " CONSTIPATION 30 tablet 97     tamsulosin (FLOMAX) 0.4 MG capsule TAKE 1 CAPSULE BY MOUTH ONCE DAILY 28 capsule 98       REVIEW OF SYSTEMS:  4 point ROS including Respiratory, CV, GI and , other than that noted in the HPI,  is negative    Objective:  /70   Pulse 84   Temp 98.6  F (37  C)   Resp 16   Wt 107.5 kg (237 lb)   SpO2 91%   BMI 30.43 kg/m    Exam:  GENERAL APPEARANCE:  Alert, in no distress sitting in WC, oily skin  ENT:  Mouth and posterior oropharynx normal, moist mucous membranes, [place], skin lesion of right ear lesion healed   EYES:  EOM, conjunctivae, lids, pupils and irises normal-glasses  RESP:  respiratory effort and palpation of chest increased, lungs diminished to auscultation posterior > anterior fields no wheezes present  CV:  Palpation and auscultation of heart done, bradycardic (not today) and regular rhythm, 3/6 murmur- not heard on previous exam,  peripheral edema 2+ in legs ankles and feet. Chang Hose off  ABDOMEN:  normal bowel sounds, soft, nontender  M/S:   Gait and station using wheelchair for ambulation, generalized weakness increased, less walking with SBA of 1 for short distances   SKIN:  Inspection of skin and subcutaneous tissue baseline to visualized areas of skin. Chondrodermatitis nodule on superior helix right ear removed +BCC   NEURO:   Examination of sensation by touch normal  PSYCH:  insight and judgement impaired, memory impaired    Labs:   BMP and CBC of 7/26/19 not uploaded at time of visit    CBC RESULTS:   Recent Labs   Lab Test 06/16/19 04/25/19   WBC 12.6* 9.0   RBC 4.67 4.61   HGB 14.1 14.1   HCT 44.3 43.5   MCV 95 94   MCH 30.2 30.6   MCHC 31.8 32.4   RDW 13.9 14.4    272       Last Basic Metabolic Panel:  Recent Labs   Lab Test 06/17/19 04/25/19    138   POTASSIUM 4.0 4.1   CHLORIDE 102 103   MERCEDES 9.0 8.9   CO2 32 27   BUN 32* 28   CR 1.53* 1.52*   * 113*       Liver Function Studies -   Recent Labs   Lab Test 04/25/19 09/13/18 04/19/18    PROTTOTAL 8.0  --  7.9   ALBUMIN 3.5  --  3.4   BILITOTAL 0.5  --  0.6   ALKPHOS 76  --  78   AST 11 20 19   ALT 20 19 23       TSH   Date Value Ref Range Status   04/25/2019 6.93 (A) 0.40 - 4.00 mU/L Final   09/13/2018 3.45 0.40 - 4.00 mU/L Final     TSH of 7/26/9 4.12    No results found for: A1C      ASSESSMENT/PLAN:    (H61.23) Bilateral impacted cerumen  Comment: chronic  of wax see today with otoscope   Plan:   -cerumen removal with lighted curette to both ear canals      (L98.491) Ulcer of external ear, limited to breakdown of skin (H)  Comment: Chondrodermatitis nodule on superior helix removed  Plan:   -f/u Mohs 12/11/19     (E03.9) Hypothyroidism, unspecified type  Comment: WNL for geriatrics   Plan:   -levothyroxine 50 mcg po daily    (M06.9) Rheumatoid arthritis involving both hands, unspecified rheumatoid factor presence (H)  Comment: no recent flares   Plan:   -prednisone 5 mg po daily  -Tylenol prn    (J84.10) Pulmonary fibrosis (H)  (primary encounter diagnosis)  Comment: Chronic-improved with addition  Plan:   -Duonebs to QID  -budesonide nebs BID to duonebs  -prednisone 5 mg po daily  -mucinex 600 mg po BID  -Ventolin q4H prn (may keep bedside)    (R60.0) Lower extremity edema  Comment: ongoing  Plan:  -Chang Hose on am and off HS  -Lasix 40 mg po daily (potassium 40 mEq daily)    (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

## 2019-11-19 NOTE — LETTER
11/19/2019        RE: Jacob Tran  [address]  [place] [address]        [place] [facility]  [place] Medical Record Number:  [id number]  Place of Service where encounter took place:  [facility] [[id number]]  Chief Complaint   Patient presents with     RECHECK       HPI:    Jacob Easton  is a 89 year old (5/16/1929), who is being seen today for an episodic care visit.  HPI information obtained from: facility chart records, facility staff and patient report. Today's concern is:    Seen today for follow up to chronic conditions. Had skin lesion removed on right ear. Will have follow up Mohs for BCC on 12/11/19. Denies pain, chest pain or SOB. Reports to walking daily (short distances) and WC for ambulation. PMH includes RA and prior use of methotrexate which was discontinued for respiratory changes on CT  showing ground glass opacities c/w acute alveolitis and persistent cystic changes throughout both lungs. Budesonide added BID to duonebs starting 10/2019 appear helpful.  History of hypothyroidism with somewhat elevated TSH with recheck on 7/26 of 4.12 (not uploaded at time of visit).       Past Medical and Surgical History reviewed in Epic today.    MEDICATIONS:  Current Outpatient Medications   Medication Sig Dispense Refill     acetaminophen (TYLENOL) 325 MG tablet TAKE TWO TABLETS (650MG) BY MOUTH EVERY 4 HOURS AS NEEDED 60 tablet 97     albuterol (VENTOLIN HFA) 108 (90 Base) MCG/ACT inhaler Inhale 2 puffs into the lungs every 4 hours as needed for shortness of breath / dyspnea or wheezing (May keep bedside) 18 g 98     amLODIPine (NORVASC) 10 MG tablet TAKE 1 TABLET BY MOUTH ONCE DAILY 28 tablet 98     ASPIRIN ADULT LOW STRENGTH 81 MG EC tablet TAKE 1 TABLET BY MOUTH ONCE DAILY 28 tablet 98     RADHA PROTECT (EUCERIN) external cream APPLY TOPICALLY TO AFFECTED AREA(S) TWICE DAILY & AS NEEDED WITH INCONTINENT CHANGES 142 g 97     budesonide  "(PULMICORT) 0.5 MG/2ML neb solution Take 2 mLs (0.5 mg) by nebulization 2 times daily Add to morning and HS nebs 2 mL 11     CALMOSEPTINE 0.44-20.6 % OINT ointment APPLY TO CLEANSED REDDENED CHARISSA SKIN FOLDS AND RASH WITH BREAKDOWN DURING MORNING AND BEDTIME CARES AND INCONTINENT CHANGES OR MEDICATION  g 97     Elastic Bandages & Supports (T.E.D. KNEE LENGTH/M-LONG) MISC WEAR AS DIRECTED ON IN THE MORNING OFF AT BEDTIME (2 = 1 PAIR) 4 each 97     furosemide (LASIX) 40 MG tablet Take 1 tablet (40 mg) by mouth daily 31 tablet 98     guaiFENesin (MUCINEX) 600 MG 12 hr tablet Take 1 tablet (600 mg) by mouth 2 times daily 31 tablet 98     HOMEOPATHIC PRODUCTS CO Take 2 tablets by mouth daily (Nux vomica 200ck) - \"relieves heartburn or drowsiness due to excessive eating or drinking\" per label       ipratropium - albuterol 0.5 mg/2.5 mg/3 mL (DUONEB) 0.5-2.5 (3) MG/3ML neb solution Take 1 vial (3 mLs) by nebulization 4 times daily 180 mL 97     Lactobacillus-Inulin ([facility] DIGESTIVE [facility]) CHEW Take 2 tablets by mouth daily       latanoprost (XALATAN) 0.005 % ophthalmic solution INSTILL ONE DROP IN THE RIGHT EYE EVERY EVENING 2.5 mL 98     levothyroxine (SYNTHROID/LEVOTHROID) 50 MCG tablet TAKE 1 TABLET BY MOUTH ONCE DAILY 31 tablet 98     MELATONIN MAXIMUM STRENGTH 5 MG tablet TAKE 1 TABLET BY MOUTH ONCE DAILY 31 tablet 98     metoprolol tartrate (LOPRESSOR) 25 MG tablet TAKE 1 TABLET BY MOUTH TWICE DAILY 56 tablet 98     Multiple Vitamins-Minerals (CENTRUM SILVER ULTRA MENS) TABS Take 1 tablet by mouth daily       multivitamin (I-EDGAR) TABS per tablet TAKE 1 TABLET BY MOUTH ONCE DAILY 31 tablet 98     nystatin (MYCOSTATIN) 417222 UNIT/GM POWD Apply topically 2 times daily AND as needed with incontinent changes       potassium chloride ER (K-DUR/KLOR-CON M) 20 MEQ CR tablet TAKE TWO TABLETS (40 MEQ) BY MOUTH ONCE DAILY 56 tablet 97     predniSONE (DELTASONE) 10 MG tablet Take 0.5 tablets (5 mg) by mouth daily 31 " tablet 98     SM STOOL SOFTENER 8.6-50 MG tablet TAKE 1 TABLET BY MOUTH TWICE DAILY AS NEEDED FOR CONSTIPATION 30 tablet 97     tamsulosin (FLOMAX) 0.4 MG capsule TAKE 1 CAPSULE BY MOUTH ONCE DAILY 28 capsule 98       REVIEW OF SYSTEMS:  4 point ROS including Respiratory, CV, GI and , other than that noted in the HPI,  is negative    Objective:  /70   Pulse 84   Temp 98.6  F (37  C)   Resp 16   Wt 107.5 kg (237 lb)   SpO2 91%   BMI 30.43 kg/m     Exam:  GENERAL APPEARANCE:  Alert, in no distress sitting in WC, oily skin  ENT:  Mouth and posterior oropharynx normal, moist mucous membranes, [place], skin lesion of right ear lesion healed   EYES:  EOM, conjunctivae, lids, pupils and irises normal-glasses  RESP:  respiratory effort and palpation of chest increased, lungs diminished to auscultation posterior > anterior fields no wheezes present  CV:  Palpation and auscultation of heart done, bradycardic (not today) and regular rhythm, 3/6 murmur- not heard on previous exam,  peripheral edema 2+ in legs ankles and feet. Chang Hose off  ABDOMEN:  normal bowel sounds, soft, nontender  M/S:   Gait and station using wheelchair for ambulation, generalized weakness increased, less walking with SBA of 1 for short distances   SKIN:  Inspection of skin and subcutaneous tissue baseline to visualized areas of skin. Chondrodermatitis nodule on superior helix right ear removed +BCC   NEURO:   Examination of sensation by touch normal  PSYCH:  insight and judgement impaired, memory impaired    Labs:   BMP and CBC of 7/26/19 not uploaded at time of visit    CBC RESULTS:   Recent Labs   Lab Test 06/16/19 04/25/19   WBC 12.6* 9.0   RBC 4.67 4.61   HGB 14.1 14.1   HCT 44.3 43.5   MCV 95 94   MCH 30.2 30.6   MCHC 31.8 32.4   RDW 13.9 14.4    272       Last Basic Metabolic Panel:  Recent Labs   Lab Test 06/17/19 04/25/19    138   POTASSIUM 4.0 4.1   CHLORIDE 102 103   MERCEDES 9.0 8.9   CO2 32 27   BUN 32* 28   CR 1.53* 1.52*    * 113*       Liver Function Studies -   Recent Labs   Lab Test 04/25/19 09/13/18 04/19/18   PROTTOTAL 8.0  --  7.9   ALBUMIN 3.5  --  3.4   BILITOTAL 0.5  --  0.6   ALKPHOS 76  --  78   AST 11 20 19   ALT 20 19 23       TSH   Date Value Ref Range Status   04/25/2019 6.93 (A) 0.40 - 4.00 mU/L Final   09/13/2018 3.45 0.40 - 4.00 mU/L Final     TSH of 7/26/9 4.12    No results found for: A1C      ASSESSMENT/PLAN:    (H61.23) Bilateral impacted cerumen  Comment: chronic  of wax see today with otoscope   Plan:   -cerumen removal with lighted curette to both ear canals      (L98.491) Ulcer of external ear, limited to breakdown of skin (H)  Comment: Chondrodermatitis nodule on superior helix removed  Plan:   -f/u Mohs 12/11/19     (E03.9) Hypothyroidism, unspecified type  Comment:  WNL for geriatrics   Plan:   -levothyroxine 50 mcg po daily    (M06.9) Rheumatoid arthritis involving both hands, unspecified rheumatoid factor presence (H)  Comment: no recent flares   Plan:   -prednisone 5 mg po daily  -Tylenol prn    (J84.10) Pulmonary fibrosis (H)  (primary encounter diagnosis)  Comment: Chronic -improved with addition  Plan:   -Duonebs to QID  -budesonide nebs BID to duonebs  -prednisone 5 mg po daily  -mucinex 600 mg po BID  -Ventolin q4H prn (may keep bedside)    (R60.0) Lower extremity edema  Comment: ongoing  Plan:  -Chang Hose on am and off HS  -Lasix 40 mg po daily (potassium 40 mEq daily)    (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

## 2019-11-21 ENCOUNTER — TRANSFERRED RECORDS (OUTPATIENT)
Dept: HEALTH INFORMATION MANAGEMENT | Facility: CLINIC | Age: 84
End: 2019-11-21

## 2019-11-21 LAB
ANION GAP SERPL CALCULATED.3IONS-SCNC: 3 MMOL/L (ref 3–14)
BUN SERPL-MCNC: 26 MG/DL (ref 7–30)
CALCIUM SERPL-MCNC: 8.5 MG/DL (ref 8.5–10.1)
CHLORIDE SERPLBLD-SCNC: 103 MMOL/L (ref 94–109)
CO2 SERPL-SCNC: 29 MMOL/L (ref 20–32)
CREAT SERPL-MCNC: 1.47 MG/DL (ref 0.66–1.25)
ERYTHROCYTE [DISTWIDTH] IN BLOOD BY AUTOMATED COUNT: 14.2 % (ref 10–15)
GFR SERPL CREATININE-BSD FRML MDRD: 41 ML/MIN/1.73_M2
GLUCOSE SERPL-MCNC: 105 MG/DL (ref 70–99)
HBA1C MFR BLD: 6.7 % (ref 0–5.6)
HCT VFR BLD AUTO: 42.9 % (ref 40–53)
HEMOGLOBIN: 13.1 G/DL (ref 13.3–17.7)
MCH RBC QN AUTO: 29.4 PG (ref 26.5–33)
MCHC RBC AUTO-ENTMCNC: 30.5 G/DL (ref 31.5–36.5)
MCV RBC AUTO: 96 FL (ref 78–100)
PLATELET # BLD AUTO: 268 10E9/L (ref 150–450)
POTASSIUM SERPL-SCNC: 3.9 MMOL/L (ref 3.4–5.3)
RBC # BLD AUTO: 4.46 10E12/L (ref 4.4–5.9)
SODIUM SERPL-SCNC: 135 MMOL/L (ref 133–144)
WBC # BLD AUTO: 8.5 10E9/L (ref 4–11)

## 2019-12-03 DIAGNOSIS — H40.9 GLAUCOMA OF BOTH EYES, UNSPECIFIED GLAUCOMA TYPE: ICD-10-CM

## 2019-12-03 RX ORDER — LATANOPROST 50 UG/ML
SOLUTION/ DROPS OPHTHALMIC
Qty: 2.5 ML | Refills: 97 | Status: SHIPPED | OUTPATIENT
Start: 2019-12-03 | End: 2021-01-01

## 2019-12-11 ENCOUNTER — TRANSFERRED RECORDS (OUTPATIENT)
Dept: HEALTH INFORMATION MANAGEMENT | Facility: CLINIC | Age: 84
End: 2019-12-11

## 2019-12-11 ENCOUNTER — APPOINTMENT (OUTPATIENT)
Age: 84
Setting detail: DERMATOLOGY
End: 2019-12-11

## 2019-12-11 PROBLEM — C44.212 BASAL CELL CARCINOMA OF SKIN OF RIGHT EAR AND EXTERNAL AURICULAR CANAL: Status: ACTIVE | Noted: 2019-12-11

## 2019-12-11 PROCEDURE — 17311 MOHS 1 STAGE H/N/HF/G: CPT

## 2019-12-11 PROCEDURE — 13152 CMPLX RPR E/N/E/L 2.6-7.5 CM: CPT

## 2019-12-11 PROCEDURE — OTHER MOHS SURGERY: OTHER

## 2019-12-11 PROCEDURE — OTHER ADDITIONAL NOTES: OTHER

## 2019-12-11 NOTE — PROCEDURE: MOHS SURGERY
SEVERITY:- BORDERLINE ECG -

SINUS RHYTHM

BORDERLINE IVCD WITH LAD

:

Confirmed by: Nadeen Martell 01-May-2018 14:27:40 Mucosal Advancement Flap Text: Given the location of the defect, shape of the defect and the proximity to free margins a mucosal advancement flap was deemed most appropriate. Incisions were made with a 15 blade scalpel in the appropriate fashion along the cutaneous vermilion border and the mucosal lip. The remaining actinically damaged mucosal tissue was excised.  The mucosal advancement flap was then elevated to the gingival sulcus with care taken to preserve the neurovascular structures and advanced into the primary defect. Care was taken to ensure that precise realignment of the vermilion border was achieved.

## 2019-12-11 NOTE — PROCEDURE: ADDITIONAL NOTES
Additional Notes: Signed wound care sheet was provided for patient’s living facility.
Detail Level: Simple

## 2019-12-19 ENCOUNTER — ASSISTED LIVING VISIT (OUTPATIENT)
Dept: GERIATRICS | Facility: CLINIC | Age: 84
End: 2019-12-19
Payer: COMMERCIAL

## 2019-12-19 VITALS
TEMPERATURE: 97 F | OXYGEN SATURATION: 95 % | DIASTOLIC BLOOD PRESSURE: 55 MMHG | SYSTOLIC BLOOD PRESSURE: 140 MMHG | HEART RATE: 77 BPM | RESPIRATION RATE: 18 BRPM

## 2019-12-19 DIAGNOSIS — R68.89 SUSPECTED SOFT TISSUE INFECTION: ICD-10-CM

## 2019-12-19 DIAGNOSIS — Z98.890 S/P MOHS SURGERY FOR BASAL CELL CARCINOMA: Primary | ICD-10-CM

## 2019-12-19 DIAGNOSIS — C44.212 BASAL CELL CARCINOMA (BCC) OF HELIX OF RIGHT EAR: ICD-10-CM

## 2019-12-19 DIAGNOSIS — H61.22 IMPACTED CERUMEN OF LEFT EAR: ICD-10-CM

## 2019-12-19 DIAGNOSIS — M06.9 RHEUMATOID ARTHRITIS INVOLVING BOTH HANDS, UNSPECIFIED RHEUMATOID FACTOR PRESENCE: ICD-10-CM

## 2019-12-19 DIAGNOSIS — Z85.828 S/P MOHS SURGERY FOR BASAL CELL CARCINOMA: Primary | ICD-10-CM

## 2019-12-19 DIAGNOSIS — J67.9 PULMONARY ALVEOLITIS (H): ICD-10-CM

## 2019-12-19 DIAGNOSIS — Z48.02 VISIT FOR SUTURE REMOVAL: ICD-10-CM

## 2019-12-19 DIAGNOSIS — R53.81 PHYSICAL DECONDITIONING: ICD-10-CM

## 2019-12-19 PROCEDURE — 69210 REMOVE IMPACTED EAR WAX UNI: CPT | Performed by: NURSE PRACTITIONER

## 2019-12-19 PROCEDURE — 99207 ZZC NON-BILLABLE SERV PER CHARTING: CPT | Performed by: NURSE PRACTITIONER

## 2019-12-19 RX ORDER — CEPHALEXIN 500 MG/1
500 CAPSULE ORAL 2 TIMES DAILY
Qty: 14 CAPSULE | Refills: 0 | Status: SHIPPED | OUTPATIENT
Start: 2019-12-19 | End: 2020-02-04

## 2019-12-19 RX ORDER — PREDNISONE 10 MG/1
5 TABLET ORAL DAILY
Qty: 31 TABLET | Refills: 98 | Status: SHIPPED | OUTPATIENT
Start: 2019-12-19 | End: 2021-01-01

## 2019-12-19 NOTE — LETTER
12/19/2019        RE: Jacob Tran  [address]  [place] [facility] [place] [address]        [place] [facility]  [place] Medical Record Number:  [id number]  Place of Service where encounter took place:  [facility] [[id number]]  Chief Complaint   Patient presents with     Nursing Home Acute       HPI:    Jacob Easton  is a 89 year old (5/16/1929), who is being seen today for an episodic care visit.  HPI information obtained from: facility chart records, patient report and [facility] chart review. Today's concern is:    Follow up to Mohs surgery for BCC and removal of suture. Located on right superior helix suture length of about 6 cm and entire suture removed with assistance of RN.     Resident tolerated procedure well. The incision line is with minimal bloody drainage, scant area of yellowish slough is present in section of incision line, rest of tissue is pink/red in color and moist. Denies pain, itch. Minimal swelling/bleeding or drainage.     Additionally removed cerumen from left ear canal.    Also with generalized weakness above baseline and ill fitting wheelchair. No recent falls.    Past Medical and Surgical History reviewed in Epic today.    MEDICATIONS:  Current Outpatient Medications   Medication Sig Dispense Refill     acetaminophen (TYLENOL) 325 MG tablet TAKE TWO TABLETS (650MG) BY MOUTH EVERY 4 HOURS AS NEEDED 60 tablet 97     albuterol (VENTOLIN HFA) 108 (90 Base) MCG/ACT inhaler Inhale 2 puffs into the lungs every 4 hours as needed for shortness of breath / dyspnea or wheezing (May keep bedside) 18 g 98     amLODIPine (NORVASC) 10 MG tablet TAKE 1 TABLET BY MOUTH ONCE DAILY 28 tablet 98     ASPIRIN ADULT LOW STRENGTH 81 MG EC tablet TAKE 1 TABLET BY MOUTH ONCE DAILY 28 tablet 98     RADHA PROTECT (EUCERIN) external cream APPLY TOPICALLY TO AFFECTED AREA(S) TWICE DAILY & AS NEEDED WITH INCONTINENT CHANGES 142 g 97     budesonide (PULMICORT) 0.5  MG/2ML neb solution Take 2 mLs (0.5 mg) by nebulization 2 times daily Add to morning and HS nebs 2 mL 11     CALMOSEPTINE 0.44-20.6 % OINT ointment APPLY TO CLEANSED REDDENED CHARISSA SKIN FOLDS AND RASH WITH BREAKDOWN DURING MORNING AND BEDTIME CARES AND INCONTINENT CHANGES OR MEDICATION  g 97     Elastic Bandages & Supports (T.E.D. KNEE LENGTH/M-LONG) MISC WEAR AS DIRECTED ON IN THE MORNING OFF AT BEDTIME (2 = 1 PAIR) 4 each 97     furosemide (LASIX) 40 MG tablet Take 1 tablet (40 mg) by mouth daily 31 tablet 98     guaiFENesin (MUCINEX) 600 MG 12 hr tablet Take 1 tablet (600 mg) by mouth 2 times daily 31 tablet 98     ipratropium - albuterol 0.5 mg/2.5 mg/3 mL (DUONEB) 0.5-2.5 (3) MG/3ML neb solution Take 1 vial (3 mLs) by nebulization 4 times daily 180 mL 97     Lactobacillus-Inulin ([facility] DIGESTIVE [facility]) CHEW Take 2 tablets by mouth daily       latanoprost (XALATAN) 0.005 % ophthalmic solution INSTILL ONE DROP IN THE RIGHT EYE IN THE EVENING 2.5 mL 97     levothyroxine (SYNTHROID/LEVOTHROID) 50 MCG tablet TAKE 1 TABLET BY MOUTH ONCE DAILY 31 tablet 98     MELATONIN MAXIMUM STRENGTH 5 MG tablet TAKE 1 TABLET BY MOUTH ONCE DAILY 31 tablet 98     metoprolol tartrate (LOPRESSOR) 25 MG tablet TAKE 1 TABLET BY MOUTH TWICE DAILY 56 tablet 98     Multiple Vitamins-Minerals (CENTRUM SILVER ULTRA MENS) TABS Take 1 tablet by mouth daily       multivitamin (I-EDGAR) TABS per tablet TAKE 1 TABLET BY MOUTH ONCE DAILY 31 tablet 98     nystatin (MYCOSTATIN) 369919 UNIT/GM POWD Apply topically 2 times daily AND as needed with incontinent changes       potassium chloride ER (K-DUR/KLOR-CON M) 20 MEQ CR tablet TAKE TWO TABLETS (40 MEQ) BY MOUTH ONCE DAILY 56 tablet 97     predniSONE (DELTASONE) 10 MG tablet Take 0.5 tablets (5 mg) by mouth daily 31 tablet 98     SM STOOL SOFTENER 8.6-50 MG tablet TAKE 1 TABLET BY MOUTH TWICE DAILY AS NEEDED FOR CONSTIPATION 30 tablet 97     tamsulosin (FLOMAX) 0.4 MG capsule TAKE 1  "CAPSULE BY MOUTH ONCE DAILY 28 capsule 98     HOMEOPATHIC PRODUCTS CO Take 2 tablets by mouth daily (Nux vomica 200ck) - \"relieves heartburn or drowsiness due to excessive eating or drinking\" per label         REVIEW OF SYSTEMS:  Limited secondary to cognitive impairment but today pt reports ok    Objective:  BP (!) 140/55   Pulse 77   Temp 97  F (36.1  C)   Resp 18   SpO2 95%   Exam:  GENERAL APPEARANCE:  Alert, in no distress sitting in WC, oily skin  ENT:  Mouth and posterior oropharynx normal, moist mucous membranes, [place]-see hpi, left cerumen impaction viewed with otoscope, right see hpi  EYES:  EOM, conjunctivae, lids, pupils and irises normal-glasses  RESP:  respiratory effort and palpation of chest increased, lungs diminished to auscultation posterior > anterior fields  wheezes not  present  CV:  Palpation and auscultation of heart done, bradycardic (not today) and regular rhythm, 3/6 murmur- not heard on previous exam,  peripheral edema 1+ in legs +2 ankles.. Chang Hose on  ABDOMEN:  normal bowel sounds, soft, nontender  M/S:   Gait and station using wheelchair for ambulation, generalized weakness increased, less walking with SBA of 1 for short distances   SKIN:  Inspection of skin and subcutaneous tissue baseline to visualized areas of skin. incision on superior helix right ear  NEURO:   Examination of sensation by touch normal  PSYCH:  insight and judgement impaired, memory impaired    Labs:   CBC RESULTS:   Recent Labs   Lab Test 11/21/19 07/26/19   WBC 8.5 9.8   RBC 4.46 4.58   HGB 13.1* 13.8   HCT 42.9 43.5   MCV 96 95   MCH 29.4 30.1   MCHC 30.5* 31.7   RDW 14.2 14.1    285       Last Basic Metabolic Panel:  Recent Labs   Lab Test 11/21/19 07/26/19    138   POTASSIUM 3.9 3.7   CHLORIDE 103 102   MERCEDES 8.5 9.8   CO2 29 32   BUN 26 30   CR 1.47* 1.40*   * 173*       Liver Function Studies -   Recent Labs   Lab Test 04/25/19 09/13/18 04/19/18   PROTTOTAL 8.0  --  7.9   ALBUMIN 3.5  --  " 3.4   BILITOTAL 0.5  --  0.6   ALKPHOS 76  --  78   AST 11 20 19   ALT 20 19 23       TSH   Date Value Ref Range Status   07/26/2019 4.12 (A) 0.40 - 4.00 mU/L Final   04/25/2019 6.93 (A) 0.40 - 4.00 mU/L Final       Lab Results   Component Value Date    A1C 6.7 11/21/2019     Creatinine Clearance: 41.64    ASSESSMENT/PLAN:  (Z98.890,  Z85.828) S/P Mohs surgery for basal cell carcinoma  (primary encounter diagnosis)  (C44.212) Basal cell carcinoma (BCC) of helix of right ear  (Z48.02) Visit for suture removal  (R68.89) Suspected soft tissue infection   Comment: suture removed, tolerated well, denied pain, some slough in wound bed  Plan:   -[facility] wound care to follow and support healing  -Keflex   -wound care orders at facility  -Keflex started     (H61.22) Impacted cerumen of left ear  Comment: chronic  Plan:  -cerumen removal with lighted curette to both ear canals     (R53.81) Physical deconditioning   Comment: ongoing  Plan:   -PT/OT to eval and treat          Electronically signed by:  SUNDAY Lee CNP             Sincerely,        SUNDAY Lee CNP

## 2019-12-19 NOTE — PROGRESS NOTES
[place] [facility]  [place] Medical Record Number:  [id number]  Place of Service where encounter took place:  [facility] [place] ([facility]) [[id number]]  Chief Complaint   Patient presents with     Nursing Home Acute       HPI:    Jacob Easton  is a 89 year old (5/16/1929), who is being seen today for an episodic care visit.  HPI information obtained from: facility chart records, patient report and [facility] chart review. Today's concern is:    Follow up to Mohs surgery for BCC and removal of suture. Located on right superior helix suture length of about 6 cm and entire suture removed with assistance of RN.     Resident tolerated procedure well. The incision line is with minimal bloody drainage, scant area of yellowish slough is present in section of incision line, rest of tissue is pink/red in color and moist. Denies pain, itch. Minimal swelling/bleeding or drainage.     Additionally removed cerumen from left ear canal.    Also with generalized weakness above baseline and ill fitting wheelchair. No recent falls.    Past Medical and Surgical History reviewed in Epic today.    MEDICATIONS:  Current Outpatient Medications   Medication Sig Dispense Refill     acetaminophen (TYLENOL) 325 MG tablet TAKE TWO TABLETS (650MG) BY MOUTH EVERY 4 HOURS AS NEEDED 60 tablet 97     albuterol (VENTOLIN HFA) 108 (90 Base) MCG/ACT inhaler Inhale 2 puffs into the lungs every 4 hours as needed for shortness of breath / dyspnea or wheezing (May keep bedside) 18 g 98     amLODIPine (NORVASC) 10 MG tablet TAKE 1 TABLET BY MOUTH ONCE DAILY 28 tablet 98     ASPIRIN ADULT LOW STRENGTH 81 MG EC tablet TAKE 1 TABLET BY MOUTH ONCE DAILY 28 tablet 98     RADHA PROTECT (EUCERIN) external cream APPLY TOPICALLY TO AFFECTED AREA(S) TWICE DAILY & AS NEEDED WITH INCONTINENT CHANGES 142 g 97     budesonide (PULMICORT) 0.5 MG/2ML neb solution Take 2 mLs (0.5 mg) by nebulization 2 times daily Add to morning and HS nebs 2 mL 11      CALMOSEPTINE 0.44-20.6 % OINT ointment APPLY TO CLEANSED REDDENED CHARISSA SKIN FOLDS AND RASH WITH BREAKDOWN DURING MORNING AND BEDTIME CARES AND INCONTINENT CHANGES OR MEDICATION  g 97     Elastic Bandages & Supports (T.E.D. KNEE LENGTH/M-LONG) MISC WEAR AS DIRECTED ON IN THE MORNING OFF AT BEDTIME (2 = 1 PAIR) 4 each 97     furosemide (LASIX) 40 MG tablet Take 1 tablet (40 mg) by mouth daily 31 tablet 98     guaiFENesin (MUCINEX) 600 MG 12 hr tablet Take 1 tablet (600 mg) by mouth 2 times daily 31 tablet 98     ipratropium - albuterol 0.5 mg/2.5 mg/3 mL (DUONEB) 0.5-2.5 (3) MG/3ML neb solution Take 1 vial (3 mLs) by nebulization 4 times daily 180 mL 97     Lactobacillus-Inulin ([facility] DIGESTIVE [facility]) CHEW Take 2 tablets by mouth daily       latanoprost (XALATAN) 0.005 % ophthalmic solution INSTILL ONE DROP IN THE RIGHT EYE IN THE EVENING 2.5 mL 97     levothyroxine (SYNTHROID/LEVOTHROID) 50 MCG tablet TAKE 1 TABLET BY MOUTH ONCE DAILY 31 tablet 98     MELATONIN MAXIMUM STRENGTH 5 MG tablet TAKE 1 TABLET BY MOUTH ONCE DAILY 31 tablet 98     metoprolol tartrate (LOPRESSOR) 25 MG tablet TAKE 1 TABLET BY MOUTH TWICE DAILY 56 tablet 98     Multiple Vitamins-Minerals (CENTRUM SILVER ULTRA MENS) TABS Take 1 tablet by mouth daily       multivitamin (I-EDGAR) TABS per tablet TAKE 1 TABLET BY MOUTH ONCE DAILY 31 tablet 98     nystatin (MYCOSTATIN) 357358 UNIT/GM POWD Apply topically 2 times daily AND as needed with incontinent changes       potassium chloride ER (K-DUR/KLOR-CON M) 20 MEQ CR tablet TAKE TWO TABLETS (40 MEQ) BY MOUTH ONCE DAILY 56 tablet 97     predniSONE (DELTASONE) 10 MG tablet Take 0.5 tablets (5 mg) by mouth daily 31 tablet 98     SM STOOL SOFTENER 8.6-50 MG tablet TAKE 1 TABLET BY MOUTH TWICE DAILY AS NEEDED FOR CONSTIPATION 30 tablet 97     tamsulosin (FLOMAX) 0.4 MG capsule TAKE 1 CAPSULE BY MOUTH ONCE DAILY 28 capsule 98     HOMEOPATHIC PRODUCTS CO Take 2 tablets by mouth daily (Nux vomica  "200ck) - \"relieves heartburn or drowsiness due to excessive eating or drinking\" per label         REVIEW OF SYSTEMS:  Limited secondary to cognitive impairment but today pt reports ok    Objective:  BP (!) 140/55   Pulse 77   Temp 97  F (36.1  C)   Resp 18   SpO2 95%   Exam:  GENERAL APPEARANCE:  Alert, in no distress sitting in WC, oily skin  ENT:  Mouth and posterior oropharynx normal, moist mucous membranes, [place]-see hpi, left cerumen impaction viewed with otoscope, right see hpi  EYES:  EOM, conjunctivae, lids, pupils and irises normal-glasses  RESP:  respiratory effort and palpation of chest increased, lungs diminished to auscultation posterior > anterior fields  wheezes not  present  CV:  Palpation and auscultation of heart done, bradycardic (not today) and regular rhythm, 3/6 murmur- not heard on previous exam,  peripheral edema 1+ in legs +2 ankles.. Chang Hose on  ABDOMEN:  normal bowel sounds, soft, nontender  M/S:   Gait and station using wheelchair for ambulation, generalized weakness increased, less walking with SBA of 1 for short distances   SKIN:  Inspection of skin and subcutaneous tissue baseline to visualized areas of skin. incision on superior helix right ear  NEURO:   Examination of sensation by touch normal  PSYCH:  insight and judgement impaired, memory impaired    Labs:   CBC RESULTS:   Recent Labs   Lab Test 11/21/19 07/26/19   WBC 8.5 9.8   RBC 4.46 4.58   HGB 13.1* 13.8   HCT 42.9 43.5   MCV 96 95   MCH 29.4 30.1   MCHC 30.5* 31.7   RDW 14.2 14.1    285       Last Basic Metabolic Panel:  Recent Labs   Lab Test 11/21/19 07/26/19    138   POTASSIUM 3.9 3.7   CHLORIDE 103 102   MERCEDES 8.5 9.8   CO2 29 32   BUN 26 30   CR 1.47* 1.40*   * 173*       Liver Function Studies -   Recent Labs   Lab Test 04/25/19 09/13/18 04/19/18   PROTTOTAL 8.0  --  7.9   ALBUMIN 3.5  --  3.4   BILITOTAL 0.5  --  0.6   ALKPHOS 76  --  78   AST 11 20 19   ALT 20 19 23       TSH   Date Value Ref " Range Status   07/26/2019 4.12 (A) 0.40 - 4.00 mU/L Final   04/25/2019 6.93 (A) 0.40 - 4.00 mU/L Final       Lab Results   Component Value Date    A1C 6.7 11/21/2019     Creatinine Clearance: 41.64    ASSESSMENT/PLAN:  (Z98.890,  Z85.828) S/P Mohs surgery for basal cell carcinoma  (primary encounter diagnosis)  (C44.212) Basal cell carcinoma (BCC) of helix of right ear  (Z48.02) Visit for suture removal  (R68.89) Suspected soft tissue infection   Comment: suture removed, tolerated well, denied pain, some slough in wound bed  Plan:   -[facility] wound care to follow and support healing  -Keflex   -wound care orders at facility  -Keflex started     (H61.22) Impacted cerumen of left ear  Comment: chronic  Plan:  -cerumen removal with lighted curette to both ear canals     (R53.81) Physical deconditioning   Comment: ongoing  Plan:   -PT/OT to eval and treat          Electronically signed by:  SUNDAY Lee CNP

## 2020-01-01 ENCOUNTER — ASSISTED LIVING VISIT (OUTPATIENT)
Dept: GERIATRICS | Facility: CLINIC | Age: 85
End: 2020-01-01
Payer: COMMERCIAL

## 2020-01-01 ENCOUNTER — TRANSFERRED RECORDS (OUTPATIENT)
Dept: HEALTH INFORMATION MANAGEMENT | Facility: CLINIC | Age: 85
End: 2020-01-01

## 2020-01-01 ENCOUNTER — VIRTUAL VISIT (OUTPATIENT)
Dept: GERIATRICS | Facility: CLINIC | Age: 85
End: 2020-01-01
Payer: COMMERCIAL

## 2020-01-01 VITALS
HEART RATE: 76 BPM | DIASTOLIC BLOOD PRESSURE: 62 MMHG | BODY MASS INDEX: 29.66 KG/M2 | SYSTOLIC BLOOD PRESSURE: 115 MMHG | WEIGHT: 231 LBS | TEMPERATURE: 96.9 F

## 2020-01-01 VITALS
HEART RATE: 83 BPM | RESPIRATION RATE: 18 BRPM | TEMPERATURE: 97 F | BODY MASS INDEX: 30.58 KG/M2 | SYSTOLIC BLOOD PRESSURE: 113 MMHG | DIASTOLIC BLOOD PRESSURE: 61 MMHG | WEIGHT: 238.2 LBS

## 2020-01-01 VITALS
BODY MASS INDEX: 29.9 KG/M2 | HEART RATE: 71 BPM | HEIGHT: 74 IN | RESPIRATION RATE: 19 BRPM | WEIGHT: 233 LBS | SYSTOLIC BLOOD PRESSURE: 124 MMHG | DIASTOLIC BLOOD PRESSURE: 69 MMHG | OXYGEN SATURATION: 96 % | TEMPERATURE: 96 F

## 2020-01-01 VITALS
RESPIRATION RATE: 21 BRPM | OXYGEN SATURATION: 95 % | DIASTOLIC BLOOD PRESSURE: 84 MMHG | TEMPERATURE: 98.1 F | WEIGHT: 234 LBS | SYSTOLIC BLOOD PRESSURE: 137 MMHG | BODY MASS INDEX: 30.03 KG/M2 | HEART RATE: 68 BPM | HEIGHT: 74 IN

## 2020-01-01 VITALS
WEIGHT: 237 LBS | BODY MASS INDEX: 30.43 KG/M2 | DIASTOLIC BLOOD PRESSURE: 70 MMHG | RESPIRATION RATE: 20 BRPM | TEMPERATURE: 97 F | HEART RATE: 64 BPM | SYSTOLIC BLOOD PRESSURE: 125 MMHG

## 2020-01-01 VITALS
HEART RATE: 80 BPM | OXYGEN SATURATION: 95 % | BODY MASS INDEX: 30.34 KG/M2 | SYSTOLIC BLOOD PRESSURE: 140 MMHG | TEMPERATURE: 97.6 F | HEIGHT: 74 IN | WEIGHT: 236.4 LBS | RESPIRATION RATE: 18 BRPM | DIASTOLIC BLOOD PRESSURE: 83 MMHG

## 2020-01-01 DIAGNOSIS — J67.9 PULMONARY ALVEOLITIS (H): ICD-10-CM

## 2020-01-01 DIAGNOSIS — R60.0 LOWER EXTREMITY EDEMA: ICD-10-CM

## 2020-01-01 DIAGNOSIS — H61.23 BILATERAL IMPACTED CERUMEN: Primary | ICD-10-CM

## 2020-01-01 DIAGNOSIS — N40.1 BENIGN NODULAR PROSTATIC HYPERPLASIA WITH LOWER URINARY TRACT SYMPTOMS: ICD-10-CM

## 2020-01-01 DIAGNOSIS — N18.30 BENIGN HYPERTENSION WITH CKD (CHRONIC KIDNEY DISEASE) STAGE III (H): ICD-10-CM

## 2020-01-01 DIAGNOSIS — R06.09 DYSPNEA ON EXERTION: ICD-10-CM

## 2020-01-01 DIAGNOSIS — I48.91 ATRIAL FIBRILLATION, UNSPECIFIED TYPE (H): ICD-10-CM

## 2020-01-01 DIAGNOSIS — H61.23 BILATERAL IMPACTED CERUMEN: ICD-10-CM

## 2020-01-01 DIAGNOSIS — E03.9 HYPOTHYROIDISM, UNSPECIFIED TYPE: ICD-10-CM

## 2020-01-01 DIAGNOSIS — N18.30 CKD (CHRONIC KIDNEY DISEASE) STAGE 3, GFR 30-59 ML/MIN (H): ICD-10-CM

## 2020-01-01 DIAGNOSIS — J67.9 PULMONARY ALVEOLITIS (H): Primary | ICD-10-CM

## 2020-01-01 DIAGNOSIS — Z78.9 TAKES DIETARY SUPPLEMENTS: ICD-10-CM

## 2020-01-01 DIAGNOSIS — M06.09 RHEUMATOID ARTHRITIS OF MULTIPLE SITES WITHOUT RHEUMATOID FACTOR (H): ICD-10-CM

## 2020-01-01 DIAGNOSIS — N48.89 PENILE CYST: ICD-10-CM

## 2020-01-01 DIAGNOSIS — E87.6 HYPOKALEMIA: ICD-10-CM

## 2020-01-01 DIAGNOSIS — J84.10 PULMONARY FIBROSIS (H): Primary | ICD-10-CM

## 2020-01-01 DIAGNOSIS — F03.90 DEMENTIA WITHOUT BEHAVIORAL DISTURBANCE, UNSPECIFIED DEMENTIA TYPE: ICD-10-CM

## 2020-01-01 DIAGNOSIS — I10 ESSENTIAL HYPERTENSION: ICD-10-CM

## 2020-01-01 DIAGNOSIS — R21 RASH AND NONSPECIFIC SKIN ERUPTION: ICD-10-CM

## 2020-01-01 DIAGNOSIS — K59.01 SLOW TRANSIT CONSTIPATION: ICD-10-CM

## 2020-01-01 DIAGNOSIS — R09.89 PULMONARY VASCULAR CONGESTION: ICD-10-CM

## 2020-01-01 DIAGNOSIS — I12.9 BENIGN HYPERTENSION WITH CKD (CHRONIC KIDNEY DISEASE) STAGE III (H): ICD-10-CM

## 2020-01-01 DIAGNOSIS — M06.9 RHEUMATOID ARTHRITIS INVOLVING BOTH HANDS, UNSPECIFIED RHEUMATOID FACTOR PRESENCE: ICD-10-CM

## 2020-01-01 DIAGNOSIS — R21 RASH: Primary | ICD-10-CM

## 2020-01-01 DIAGNOSIS — R09.89 CHEST CONGESTION: ICD-10-CM

## 2020-01-01 DIAGNOSIS — N18.30 STAGE 3 CHRONIC KIDNEY DISEASE, UNSPECIFIED WHETHER STAGE 3A OR 3B CKD (H): Primary | ICD-10-CM

## 2020-01-01 DIAGNOSIS — R74.8 ELEVATED SERUM ALKALINE PHOSPHATASE LEVEL: ICD-10-CM

## 2020-01-01 DIAGNOSIS — I10 BENIGN ESSENTIAL HYPERTENSION: ICD-10-CM

## 2020-01-01 LAB
ALBUMIN SERPL-MCNC: 3.3 G/DL (ref 3.4–5)
ALP SERPL-CCNC: 171 U/L (ref 40–150)
ALT SERPL-CCNC: 107 U/L (ref 0–70)
ANION GAP SERPL CALCULATED.3IONS-SCNC: 9 MMOL/L (ref 3–14)
AST SERPL-CCNC: 27 U/L (ref 0–45)
BILIRUB SERPL-MCNC: 0.7 MG/DL (ref 0.2–1.3)
BILIRUBIN DIRECT: 0.2 MG/DL (ref 0–0.2)
BUN SERPL-MCNC: 30 MG/DL (ref 7–30)
CALCIUM SERPL-MCNC: 8.9 MG/DL (ref 8.5–10.1)
CHLORIDE SERPLBLD-SCNC: 102 MMOL/L (ref 94–109)
CO2 SERPL-SCNC: 28 MMOL/L (ref 20–32)
CREAT SERPL-MCNC: 1.53 MG/DL (ref 0.66–1.25)
ERYTHROCYTE [DISTWIDTH] IN BLOOD BY AUTOMATED COUNT: 14.1 % (ref 10–15)
GFR SERPL CREATININE-BSD FRML MDRD: 39 ML/MIN/1.73_M2
GLUCOSE SERPL-MCNC: 131 MG/DL (ref 70–99)
HCT VFR BLD AUTO: 47 % (ref 40–53)
HEMOGLOBIN: 14.4 G/DL (ref 13.3–17.7)
MCH RBC QN AUTO: 29.8 PG (ref 26.5–33)
MCHC RBC AUTO-ENTMCNC: 30.6 G/DL (ref 31.5–36.5)
MCV RBC AUTO: 97 FL (ref 78–100)
PLATELET # BLD AUTO: 297 10E9/L (ref 150–450)
POTASSIUM SERPL-SCNC: 3.8 MMOL/L (ref 3.4–5.3)
PROT SERPL-MCNC: 8.4 G/DL (ref 6.8–8.8)
RBC # BLD AUTO: 4.84 10E12/L (ref 4.4–5.9)
SODIUM SERPL-SCNC: 139 MMOL/L (ref 133–144)
TSH SERPL-ACNC: 6.87 MU/L (ref 0.4–4)
WBC # BLD AUTO: 10.4 10E9/L (ref 4–11)

## 2020-01-01 RX ORDER — FUROSEMIDE 40 MG
TABLET ORAL
Qty: 28 TABLET | Status: SHIPPED | OUTPATIENT
Start: 2020-01-01 | End: 2021-01-01

## 2020-01-01 RX ORDER — BUDESONIDE 0.5 MG/2ML
INHALANT ORAL
Qty: 60 ML | Refills: 97 | COMMUNITY
Start: 2020-01-01

## 2020-01-01 RX ORDER — POTASSIUM CHLORIDE 1500 MG/1
TABLET, EXTENDED RELEASE ORAL
Qty: 56 TABLET | Status: SHIPPED | OUTPATIENT
Start: 2020-01-01 | End: 2021-01-01

## 2020-01-01 RX ORDER — NYSTATIN 100000 [USP'U]/G
POWDER TOPICAL
Qty: 60 G | Refills: 97 | Status: ON HOLD | OUTPATIENT
Start: 2020-01-01 | End: 2021-01-01

## 2020-01-01 RX ORDER — ANORECTAL OINTMENT 15.7; .44; 24; 20.6 G/100G; G/100G; G/100G; G/100G
OINTMENT TOPICAL
Qty: 113 G | Refills: 10 | Status: SHIPPED | OUTPATIENT
Start: 2020-01-01 | End: 2020-01-01

## 2020-01-01 RX ORDER — LOPERAMIDE HYDROCHLORIDE 2 MG/1
TABLET ORAL
COMMUNITY
Start: 2020-01-01

## 2020-01-01 RX ORDER — IPRATROPIUM BROMIDE AND ALBUTEROL SULFATE 2.5; .5 MG/3ML; MG/3ML
SOLUTION RESPIRATORY (INHALATION)
Qty: 180 ML | Refills: 97 | Status: SHIPPED | OUTPATIENT
Start: 2020-01-01

## 2020-01-01 RX ORDER — AMOXICILLIN 250 MG
2 CAPSULE ORAL 2 TIMES DAILY PRN
Qty: 30 TABLET | Refills: 97 | COMMUNITY
Start: 2020-01-01

## 2020-01-01 RX ORDER — BISACODYL 10 MG
10 SUPPOSITORY, RECTAL RECTAL DAILY PRN
COMMUNITY
Start: 2020-01-01

## 2020-01-01 RX ORDER — MENTHOL AND ZINC OXIDE .44; 20.625 G/100G; G/100G
OINTMENT TOPICAL 4 TIMES DAILY PRN
COMMUNITY
Start: 2020-01-01

## 2020-01-01 RX ORDER — I-VITE, TAB 1000-60-2MG (60/BT) 300MCG-200
TAB ORAL
Qty: 31 TABLET | Refills: 97 | Status: ON HOLD | OUTPATIENT
Start: 2020-01-01 | End: 2021-01-01

## 2020-01-01 ASSESSMENT — MIFFLIN-ST. JEOR
SCORE: 1786.17
SCORE: 1797.05
SCORE: 1781.63

## 2020-01-02 DIAGNOSIS — R21 RASH AND NONSPECIFIC SKIN ERUPTION: Primary | ICD-10-CM

## 2020-01-02 RX ORDER — NYSTATIN 100000 U/G
CREAM TOPICAL
Qty: 30 G | Refills: 97 | Status: SHIPPED | OUTPATIENT
Start: 2020-01-02 | End: 2020-01-01

## 2020-01-13 DIAGNOSIS — Z78.9 TAKES DIETARY SUPPLEMENTS: Primary | ICD-10-CM

## 2020-01-13 RX ORDER — LACTOBACILLUS RHAMNOSUS GG 10B CELL
CAPSULE ORAL
Qty: 72 TABLET | Refills: 97 | Status: SHIPPED | OUTPATIENT
Start: 2020-01-13 | End: 2021-01-01

## 2020-02-04 ENCOUNTER — ASSISTED LIVING VISIT (OUTPATIENT)
Dept: GERIATRICS | Facility: CLINIC | Age: 85
End: 2020-02-04
Payer: COMMERCIAL

## 2020-02-04 VITALS
DIASTOLIC BLOOD PRESSURE: 69 MMHG | BODY MASS INDEX: 30.04 KG/M2 | SYSTOLIC BLOOD PRESSURE: 120 MMHG | OXYGEN SATURATION: 97 % | HEART RATE: 68 BPM | WEIGHT: 234 LBS | RESPIRATION RATE: 18 BRPM | TEMPERATURE: 98.5 F

## 2020-02-04 DIAGNOSIS — H61.23 BILATERAL IMPACTED CERUMEN: ICD-10-CM

## 2020-02-04 DIAGNOSIS — N18.30 BENIGN HYPERTENSION WITH CKD (CHRONIC KIDNEY DISEASE) STAGE III (H): ICD-10-CM

## 2020-02-04 DIAGNOSIS — J67.9 PULMONARY ALVEOLITIS (H): ICD-10-CM

## 2020-02-04 DIAGNOSIS — I48.91 ATRIAL FIBRILLATION, UNSPECIFIED TYPE (H): ICD-10-CM

## 2020-02-04 DIAGNOSIS — I12.9 BENIGN HYPERTENSION WITH CKD (CHRONIC KIDNEY DISEASE) STAGE III (H): ICD-10-CM

## 2020-02-04 DIAGNOSIS — R60.0 LOWER EXTREMITY EDEMA: ICD-10-CM

## 2020-02-04 DIAGNOSIS — M06.9 RHEUMATOID ARTHRITIS INVOLVING BOTH HANDS, UNSPECIFIED RHEUMATOID FACTOR PRESENCE: Primary | ICD-10-CM

## 2020-02-04 PROBLEM — L98.491: Status: RESOLVED | Noted: 2019-01-10 | Resolved: 2020-02-04

## 2020-02-04 NOTE — LETTER
"    2/4/2020        RE: Jacob Tran  [address]  [place] [address]        [place] [facility]  [place] [facility] Record Number:  [id number]  Place of Service where encounter took place:  [facility] [[id number]]  Chief Complaint   Patient presents with     Nursing Home Acute       HPI:    Jacob Easton  is a 89 year old (5/16/1929), who is being seen today for an episodic care visit.  HPI information obtained from: facility chart records, patient report and [facility] chart review. Today's concern is:    Seen today for recheck of chronic conditions. He is s/p Mohs surgery for BCC located on right superior helix and area is healed.    Chronic cerumen  of both ear canals seen today with otoscope. Denies ear pain but reports \"my ears feel plugged\".    With generalized weakness and ambulates mostly in WC. Work with therapy June 2019  but unable to make gains with mobility.    PMH includes RA and prior use of methotrexate which was discontinued for respiratory changes on CT showing ground glass opacities c/w acute alveolitis and persistent cystic changes throughout both lungs. Budesonide added BID to duonebs starting 10/2019 appear helpful. Sounding with wheezes posterior and anterior (was getting scheduled neb) and encouraged to keep mouth piece in place for treatment.     With LE edema at baseline, VSS, weight 234-237 lbs.     Past Medical and Surgical History reviewed in Epic today.    MEDICATIONS:  Current Outpatient Medications   Medication Sig Dispense Refill     acetaminophen (TYLENOL) 325 MG tablet TAKE TWO TABLETS (650MG) BY MOUTH EVERY 4 HOURS AS NEEDED 60 tablet 97     albuterol (VENTOLIN HFA) 108 (90 Base) MCG/ACT inhaler Inhale 2 puffs into the lungs every 4 hours as needed for shortness of breath / dyspnea or wheezing (May keep bedside) 18 g 98     amLODIPine (NORVASC) 10 MG tablet TAKE 1 TABLET BY MOUTH ONCE DAILY 28 tablet 98     " ASPIRIN ADULT LOW STRENGTH 81 MG EC tablet TAKE 1 TABLET BY MOUTH ONCE DAILY 28 tablet 98     RADHA PROTECT (EUCERIN) external cream APPLY TOPICALLY TO AFFECTED AREA(S) TWICE DAILY & AS NEEDED WITH INCONTINENT CHANGES 142 g 97     budesonide (PULMICORT) 0.5 MG/2ML neb solution Take 2 mLs (0.5 mg) by nebulization 2 times daily Add to morning and HS nebs 2 mL 11     CALMOSEPTINE 0.44-20.6 % OINT ointment APPLY TO CLEANSED REDDENED CHARISSA SKIN FOLDS AND RASH WITH BREAKDOWN DURING MORNING AND BEDTIME CARES AND INCONTINENT CHANGES OR MEDICATION  g 97     Elastic Bandages & Supports (T.E.D. KNEE LENGTH/M-LONG) MISC WEAR AS DIRECTED ON IN THE MORNING OFF AT BEDTIME (2 = 1 PAIR) 4 each 97     furosemide (LASIX) 40 MG tablet Take 1 tablet (40 mg) by mouth daily 31 tablet 98     guaiFENesin (MUCINEX) 600 MG 12 hr tablet Take 1 tablet (600 mg) by mouth 2 times daily 31 tablet 98     ipratropium - albuterol 0.5 mg/2.5 mg/3 mL (DUONEB) 0.5-2.5 (3) MG/3ML neb solution Take 1 vial (3 mLs) by nebulization 4 times daily 180 mL 97     Lactobacillus-Inulin ([facility] DIGESTIVE [facility]) CHEW CHEW 2 TABLET BY MOUTH ONCE DAILY 72 tablet 97     latanoprost (XALATAN) 0.005 % ophthalmic solution INSTILL ONE DROP IN THE RIGHT EYE IN THE EVENING 2.5 mL 97     levothyroxine (SYNTHROID/LEVOTHROID) 50 MCG tablet TAKE 1 TABLET BY MOUTH ONCE DAILY 31 tablet 98     MELATONIN MAXIMUM STRENGTH 5 MG tablet TAKE 1 TABLET BY MOUTH ONCE DAILY 31 tablet 98     metoprolol tartrate (LOPRESSOR) 25 MG tablet TAKE 1 TABLET BY MOUTH TWICE DAILY 56 tablet 98     Multiple Vitamins-Minerals (CENTRUM SILVER ULTRA MENS) TABS Take 1 tablet by mouth daily       multivitamin (I-EDGAR) TABS per tablet TAKE 1 TABLET BY MOUTH ONCE DAILY 31 tablet 98     nystatin (MYCOSTATIN) 236134 UNIT/GM external cream Apply to affected area as needed 30 g 97     nystatin (MYCOSTATIN) 213954 UNIT/GM POWD Apply topically 2 times daily AND as needed with incontinent changes        potassium chloride ER (K-DUR/KLOR-CON M) 20 MEQ CR tablet TAKE TWO TABLETS (40 MEQ) BY MOUTH ONCE DAILY 56 tablet 97     predniSONE (DELTASONE) 10 MG tablet Take 0.5 tablets (5 mg) by mouth daily 31 tablet 98     SM STOOL SOFTENER 8.6-50 MG tablet TAKE 1 TABLET BY MOUTH TWICE DAILY AS NEEDED FOR CONSTIPATION 30 tablet 97     tamsulosin (FLOMAX) 0.4 MG capsule TAKE 1 CAPSULE BY MOUTH ONCE DAILY 28 capsule 98     REVIEW OF SYSTEMS:  4 point ROS including Respiratory, CV, GI and , other than that noted in the HPI,  is negative    Objective:  /69   Pulse 68   Temp 98.5  F (36.9  C)   Resp 18   Wt 106.1 kg (234 lb)   SpO2 97%   BMI 30.04 kg/m     Exam:  GENERAL APPEARANCE:  Alert, in no distress sitting in WC, oily skin  ENT:  Mouth and posterior oropharynx normal, moist mucous membranes, [place], skin lesion of right ear lesion healed, cerumen in ears  EYES:  EOM, conjunctivae, lids, pupils and irises normal-glasses  RESP:  respiratory effort and palpation of chest increased, lungs diminished to auscultation posterior > anterior with wheezes today fields no wheezes present  CV:  Palpation and auscultation of heart done, bradycardic (not today) and regular rhythm, 3/6 murmur- not heard on previous exam,  peripheral edema 2+ in legs ankles and feet. Chang Hose off  ABDOMEN:  normal bowel sounds, soft, nontender  M/S:   Gait and station using wheelchair for ambulation, generalized weakness increased, less walking with SBA of 1 for short distances   SKIN:  Inspection of skin and subcutaneous tissue baseline to visualized areas of skin. Chondrodermatitis nodule on superior helix right ear removed +BCC   NEURO:   Examination of sensation by touch normal  PSYCH:  insight and judgement impaired, memory impaired    Labs:   CBC RESULTS:   Recent Labs   Lab Test 11/21/19 07/26/19   WBC 8.5 9.8   RBC 4.46 4.58   HGB 13.1* 13.8   HCT 42.9 43.5   MCV 96 95   MCH 29.4 30.1   MCHC 30.5* 31.7   RDW 14.2 14.1    285        Last Basic Metabolic Panel:  Recent Labs   Lab Test 11/21/19 07/26/19    138   POTASSIUM 3.9 3.7   CHLORIDE 103 102   MERCEDES 8.5 9.8   CO2 29 32   BUN 26 30   CR 1.47* 1.40*   * 173*       Liver Function Studies -   Recent Labs   Lab Test 04/25/19 09/13/18 04/19/18   PROTTOTAL 8.0  --  7.9   ALBUMIN 3.5  --  3.4   BILITOTAL 0.5  --  0.6   ALKPHOS 76  --  78   AST 11 20 19   ALT 20 19 23       TSH   Date Value Ref Range Status   07/26/2019 4.12 (A) 0.40 - 4.00 mU/L Final   04/25/2019 6.93 (A) 0.40 - 4.00 mU/L Final       Lab Results   Component Value Date    A1C 6.7 11/21/2019     ASSESSMENT/PLAN:  (M06.9) Rheumatoid arthritis involving both hands, unspecified rheumatoid factor presence (H)  (primary encounter diagnosis)  Comment: stable  Plan:   -Tylenol for pain prn  -prednisone 5 mg po daily    (I48.91) Atrial fibrillation, unspecified type (H)  Comment: stable  Plan:   -ASA daily for secondary prevention with hx of TIA    (I12.9,  N18.3) Benign hypertension with CKD (chronic kidney disease) stage III (H)  Comment: stable  Plan:   -Norvasc 10 mg po daily  -Metoprolol 25 mg po daily   -Lasix 40 mg po daily  -follow BMP    (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)  Comment: with chronic pulmonary fibrosis  Plan: monitor with increase wheeze noted today on exam  -Duonebs to QID  -budesonide nebs BID to duonebs  -prednisone 5 mg po daily  -mucinex 600 mg po BID  -Ventolin q4H prn (may keep bedside)    (R60.0) Lower extremity edema  Comment: ongoing  Plan:  -Chang Hose on am and off HS  -Lasix 40 mg po daily (potassium 40 mEq daily)     (H61.23) Bilateral impacted cerumen  Comment: tolerated removal, extracted large amounts both ears  Plan:   -cerumen removal with lighted curette to both ear canals           Electronically signed by:  SUNDAY Lee CNP               Sincerely,        SUNDAY Lee CNP

## 2020-02-04 NOTE — PROGRESS NOTES
"[place] [facility]  [place] Medical Record Number:  [id number]  Place of Service where encounter took place:  [facility] [[id number]]  Chief Complaint   Patient presents with     Nursing Home Acute       HPI:    Jacob Easton  is a 89 year old (5/16/1929), who is being seen today for an episodic care visit.  HPI information obtained from: facility chart records, patient report and [facility] chart review. Today's concern is:    Seen today for recheck of chronic conditions. He is s/p Mohs surgery for BCC located on right superior helix and area is healed.    Chronic cerumen  of both ear canals seen today with otoscope. Denies ear pain but reports \"my ears feel plugged\".    With generalized weakness and ambulates mostly in WC. Work with therapy June 2019  but unable to make gains with mobility.    PMH includes RA and prior use of methotrexate which was discontinued for respiratory changes on CT showing ground glass opacities c/w acute alveolitis and persistent cystic changes throughout both lungs. Budesonide added BID to duonebs starting 10/2019 appear helpful. Sounding with wheezes posterior and anterior (was getting scheduled neb) and encouraged to keep mouth piece in place for treatment.     With LE edema at baseline, VSS, weight 234-237 lbs.     Past Medical and Surgical History reviewed in Epic today.    MEDICATIONS:  Current Outpatient Medications   Medication Sig Dispense Refill     acetaminophen (TYLENOL) 325 MG tablet TAKE TWO TABLETS (650MG) BY MOUTH EVERY 4 HOURS AS NEEDED 60 tablet 97     albuterol (VENTOLIN HFA) 108 (90 Base) MCG/ACT inhaler Inhale 2 puffs into the lungs every 4 hours as needed for shortness of breath / dyspnea or wheezing (May keep bedside) 18 g 98     amLODIPine (NORVASC) 10 MG tablet TAKE 1 TABLET BY MOUTH ONCE DAILY 28 tablet 98     ASPIRIN ADULT LOW STRENGTH 81 MG EC tablet TAKE 1 TABLET BY MOUTH ONCE DAILY 28 tablet 98     RADHA PROTECT " (EUCERIN) external cream APPLY TOPICALLY TO AFFECTED AREA(S) TWICE DAILY & AS NEEDED WITH INCONTINENT CHANGES 142 g 97     budesonide (PULMICORT) 0.5 MG/2ML neb solution Take 2 mLs (0.5 mg) by nebulization 2 times daily Add to morning and HS nebs 2 mL 11     CALMOSEPTINE 0.44-20.6 % OINT ointment APPLY TO CLEANSED REDDENED CHARISSA SKIN FOLDS AND RASH WITH BREAKDOWN DURING MORNING AND BEDTIME CARES AND INCONTINENT CHANGES OR MEDICATION  g 97     Elastic Bandages & Supports (T.E.D. KNEE LENGTH/M-LONG) MISC WEAR AS DIRECTED ON IN THE MORNING OFF AT BEDTIME (2 = 1 PAIR) 4 each 97     furosemide (LASIX) 40 MG tablet Take 1 tablet (40 mg) by mouth daily 31 tablet 98     guaiFENesin (MUCINEX) 600 MG 12 hr tablet Take 1 tablet (600 mg) by mouth 2 times daily 31 tablet 98     ipratropium - albuterol 0.5 mg/2.5 mg/3 mL (DUONEB) 0.5-2.5 (3) MG/3ML neb solution Take 1 vial (3 mLs) by nebulization 4 times daily 180 mL 97     Lactobacillus-Inulin ([facility] DIGESTIVE [facility]) CHEW CHEW 2 TABLET BY MOUTH ONCE DAILY 72 tablet 97     latanoprost (XALATAN) 0.005 % ophthalmic solution INSTILL ONE DROP IN THE RIGHT EYE IN THE EVENING 2.5 mL 97     levothyroxine (SYNTHROID/LEVOTHROID) 50 MCG tablet TAKE 1 TABLET BY MOUTH ONCE DAILY 31 tablet 98     MELATONIN MAXIMUM STRENGTH 5 MG tablet TAKE 1 TABLET BY MOUTH ONCE DAILY 31 tablet 98     metoprolol tartrate (LOPRESSOR) 25 MG tablet TAKE 1 TABLET BY MOUTH TWICE DAILY 56 tablet 98     Multiple Vitamins-Minerals (CENTRUM SILVER ULTRA MENS) TABS Take 1 tablet by mouth daily       multivitamin (I-EDGAR) TABS per tablet TAKE 1 TABLET BY MOUTH ONCE DAILY 31 tablet 98     nystatin (MYCOSTATIN) 897967 UNIT/GM external cream Apply to affected area as needed 30 g 97     nystatin (MYCOSTATIN) 507086 UNIT/GM POWD Apply topically 2 times daily AND as needed with incontinent changes       potassium chloride ER (K-DUR/KLOR-CON M) 20 MEQ CR tablet TAKE TWO TABLETS (40 MEQ) BY MOUTH ONCE DAILY 56 tablet  97     predniSONE (DELTASONE) 10 MG tablet Take 0.5 tablets (5 mg) by mouth daily 31 tablet 98     SM STOOL SOFTENER 8.6-50 MG tablet TAKE 1 TABLET BY MOUTH TWICE DAILY AS NEEDED FOR CONSTIPATION 30 tablet 97     tamsulosin (FLOMAX) 0.4 MG capsule TAKE 1 CAPSULE BY MOUTH ONCE DAILY 28 capsule 98     REVIEW OF SYSTEMS:  4 point ROS including Respiratory, CV, GI and , other than that noted in the HPI,  is negative    Objective:  /69   Pulse 68   Temp 98.5  F (36.9  C)   Resp 18   Wt 106.1 kg (234 lb)   SpO2 97%   BMI 30.04 kg/m    Exam:  GENERAL APPEARANCE:  Alert, in no distress sitting in WC, oily skin  ENT:  Mouth and posterior oropharynx normal, moist mucous membranes, [place], skin lesion of right ear lesion healed, cerumen in ears  EYES:  EOM, conjunctivae, lids, pupils and irises normal-glasses  RESP:  respiratory effort and palpation of chest increased, lungs diminished to auscultation posterior > anterior with wheezes today fields no wheezes present  CV:  Palpation and auscultation of heart done, bradycardic (not today) and regular rhythm, 3/6 murmur- not heard on previous exam,  peripheral edema 2+ in legs ankles and feet. Chang Hose off  ABDOMEN:  normal bowel sounds, soft, nontender  M/S:   Gait and station using wheelchair for ambulation, generalized weakness increased, less walking with SBA of 1 for short distances   SKIN:  Inspection of skin and subcutaneous tissue baseline to visualized areas of skin. Chondrodermatitis nodule on superior helix right ear removed +BCC   NEURO:   Examination of sensation by touch normal  PSYCH:  insight and judgement impaired, memory impaired    Labs:   CBC RESULTS:   Recent Labs   Lab Test 11/21/19 07/26/19   WBC 8.5 9.8   RBC 4.46 4.58   HGB 13.1* 13.8   HCT 42.9 43.5   MCV 96 95   MCH 29.4 30.1   MCHC 30.5* 31.7   RDW 14.2 14.1    285       Last Basic Metabolic Panel:  Recent Labs   Lab Test 11/21/19 07/26/19    138   POTASSIUM 3.9 3.7   CHLORIDE  103 102   MERCEDES 8.5 9.8   CO2 29 32   BUN 26 30   CR 1.47* 1.40*   * 173*       Liver Function Studies -   Recent Labs   Lab Test 04/25/19 09/13/18 04/19/18   PROTTOTAL 8.0  --  7.9   ALBUMIN 3.5  --  3.4   BILITOTAL 0.5  --  0.6   ALKPHOS 76  --  78   AST 11 20 19   ALT 20 19 23       TSH   Date Value Ref Range Status   07/26/2019 4.12 (A) 0.40 - 4.00 mU/L Final   04/25/2019 6.93 (A) 0.40 - 4.00 mU/L Final       Lab Results   Component Value Date    A1C 6.7 11/21/2019     ASSESSMENT/PLAN:  (M06.9) Rheumatoid arthritis involving both hands, unspecified rheumatoid factor presence (H)  (primary encounter diagnosis)  Comment: stable  Plan:   -Tylenol for pain prn  -prednisone 5 mg po daily    (I48.91) Atrial fibrillation, unspecified type (H)  Comment: stable  Plan:   -ASA daily for secondary prevention with hx of TIA    (I12.9,  N18.3) Benign hypertension with CKD (chronic kidney disease) stage III (H)  Comment: stable  Plan:   -Norvasc 10 mg po daily  -Metoprolol 25 mg po daily   -Lasix 40 mg po daily  -follow BMP    (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)  Comment: with chronic pulmonary fibrosis  Plan: monitor with increase wheeze noted today on exam  -Duonebs to QID  -budesonide nebs BID to duonebs  -prednisone 5 mg po daily  -mucinex 600 mg po BID  -Ventolin q4H prn (may keep bedside)    (R60.0) Lower extremity edema  Comment: ongoing  Plan:  -Chang Hose on am and off HS  -Lasix 40 mg po daily (potassium 40 mEq daily)     (H61.23) Bilateral impacted cerumen  Comment: tolerated removal, extracted large amounts both ears  Plan:   -cerumen removal with lighted curette to both ear canals           Electronically signed by:  Orestes Clement, APRN CNP

## 2020-02-16 DIAGNOSIS — E03.8 OTHER SPECIFIED HYPOTHYROIDISM: ICD-10-CM

## 2020-02-16 DIAGNOSIS — G47.00 INSOMNIA, UNSPECIFIED TYPE: ICD-10-CM

## 2020-02-17 RX ORDER — LEVOTHYROXINE SODIUM 50 UG/1
TABLET ORAL
Qty: 31 TABLET | Refills: 99 | Status: SHIPPED | OUTPATIENT
Start: 2020-02-17 | End: 2021-01-01

## 2020-02-17 RX ORDER — PSYLLIUM HUSK 0.4 G
CAPSULE ORAL
Qty: 31 TABLET | Refills: 99 | Status: SHIPPED | OUTPATIENT
Start: 2020-02-17 | End: 2021-01-01

## 2020-02-27 ENCOUNTER — ASSISTED LIVING VISIT (OUTPATIENT)
Dept: GERIATRICS | Facility: CLINIC | Age: 85
End: 2020-02-27
Payer: COMMERCIAL

## 2020-02-27 VITALS
WEIGHT: 248.8 LBS | SYSTOLIC BLOOD PRESSURE: 131 MMHG | OXYGEN SATURATION: 96 % | HEART RATE: 87 BPM | DIASTOLIC BLOOD PRESSURE: 82 MMHG | TEMPERATURE: 96.3 F | RESPIRATION RATE: 18 BRPM | BODY MASS INDEX: 31.94 KG/M2

## 2020-02-27 DIAGNOSIS — R06.09 DYSPNEA ON EXERTION: ICD-10-CM

## 2020-02-27 DIAGNOSIS — N18.30 BENIGN HYPERTENSION WITH CKD (CHRONIC KIDNEY DISEASE) STAGE III (H): ICD-10-CM

## 2020-02-27 DIAGNOSIS — I48.91 ATRIAL FIBRILLATION, UNSPECIFIED TYPE (H): ICD-10-CM

## 2020-02-27 DIAGNOSIS — I12.9 BENIGN HYPERTENSION WITH CKD (CHRONIC KIDNEY DISEASE) STAGE III (H): ICD-10-CM

## 2020-02-27 DIAGNOSIS — H61.23 BILATERAL IMPACTED CERUMEN: ICD-10-CM

## 2020-02-27 DIAGNOSIS — E03.9 HYPOTHYROIDISM, UNSPECIFIED TYPE: ICD-10-CM

## 2020-02-27 DIAGNOSIS — J67.9 PULMONARY ALVEOLITIS (H): Primary | ICD-10-CM

## 2020-02-27 DIAGNOSIS — R60.0 LOWER EXTREMITY EDEMA: ICD-10-CM

## 2020-02-27 NOTE — LETTER
2/27/2020        RE: Jacob Easton  [address]  [facility] [address]        [place] [facility]  [place] [facility] Record Number:  [id number]  Place of Service where encounter took place:  No question data found.  Chief Complaint   Patient presents with     Nursing Home Acute       HPI:    Jacob Easton  is a 89 year old (5/16/1929), who is being seen today for an episodic care visit.  HPI information obtained from: facility chart records, patient report and [facility] chart review. Today's concern is:    Seen today for follow to cerumen impaction, RA, generalized weakness HTN, LE edema. Family in town, care conference today. Denies any acute conditions, chest pain, SOB above baseline, abdominal pain. Hx of never reporting any concerns. Staff reporting baseline is less walking with overall weakness. No longer walking in the halls. Just transfers and to bathroom. PMH includes RA and prior use of methotrexate which was discontinued for respiratory changes on CT showing ground glass opacities c/w acute alveolitis and persistent cystic changes throughout both lungs. Budesonide added BID to duonebs starting 10/2019 appear helpful. Sounding with wheezes posterior and anterior (was getting scheduled neb) and encouraged to keep mouth piece in place for treatment.Much improved on exam today. Weight stable but higher started in 240s 2/2020 now high 240s (246-248 lbs). Lower extremity edema at baseline. At baseline family and resident has refused vaccine.    Past Medical and Surgical History reviewed in Epic today.    MEDICATIONS:    Current Outpatient Medications   Medication Sig Dispense Refill     acetaminophen (TYLENOL) 325 MG tablet TAKE TWO TABLETS (650MG) BY MOUTH EVERY 4 HOURS AS NEEDED 60 tablet 97     albuterol (VENTOLIN HFA) 108 (90 Base) MCG/ACT inhaler Inhale 2 puffs into the lungs every 4 hours as needed for shortness of breath / dyspnea or wheezing (May keep bedside) 18  g 98     amLODIPine (NORVASC) 10 MG tablet TAKE 1 TABLET BY MOUTH ONCE DAILY 28 tablet 98     ASPIRIN ADULT LOW STRENGTH 81 MG EC tablet TAKE 1 TABLET BY MOUTH ONCE DAILY 28 tablet 98     RADHA PROTECT (EUCERIN) external cream APPLY TOPICALLY TO AFFECTED AREA(S) TWICE DAILY & AS NEEDED WITH INCONTINENT CHANGES 142 g 97     budesonide (PULMICORT) 0.5 MG/2ML neb solution Take 2 mLs (0.5 mg) by nebulization 2 times daily Add to morning and HS nebs 2 mL 11     CALMOSEPTINE 0.44-20.6 % OINT ointment APPLY TO CLEANSED REDDENED CHARISSA SKIN FOLDS AND RASH WITH BREAKDOWN DURING MORNING AND BEDTIME CARES AND INCONTINENT CHANGES OR MEDICATION  g 97     Elastic Bandages & Supports (T.E.D. KNEE LENGTH/M-LONG) MISC WEAR AS DIRECTED ON IN THE MORNING OFF AT BEDTIME (2 = 1 PAIR) 4 each 97     furosemide (LASIX) 40 MG tablet Take 1 tablet (40 mg) by mouth daily 31 tablet 98     guaiFENesin (MUCINEX) 600 MG 12 hr tablet Take 1 tablet (600 mg) by mouth 2 times daily 31 tablet 98     ipratropium - albuterol 0.5 mg/2.5 mg/3 mL (DUONEB) 0.5-2.5 (3) MG/3ML neb solution Take 1 vial (3 mLs) by nebulization 4 times daily 180 mL 97     Lactobacillus-Inulin ([facility] DIGESTIVE [facility]) CHEW CHEW 2 TABLET BY MOUTH ONCE DAILY 72 tablet 97     latanoprost (XALATAN) 0.005 % ophthalmic solution INSTILL ONE DROP IN THE RIGHT EYE IN THE EVENING 2.5 mL 97     levothyroxine (SYNTHROID/LEVOTHROID) 50 MCG tablet TAKE 1 TABLET BY MOUTH ONCE DAILY 31 tablet 99     MELATONIN MAXIMUM STRENGTH 5 MG tablet TAKE 1 TABLET BY MOUTH ONCE DAILY 31 tablet 99     metoprolol tartrate (LOPRESSOR) 25 MG tablet TAKE 1 TABLET BY MOUTH TWICE DAILY 56 tablet 98     Multiple Vitamins-Minerals (CENTRUM SILVER ULTRA MENS) TABS Take 1 tablet by mouth daily       multivitamin (I-EDGAR) TABS per tablet TAKE 1 TABLET BY MOUTH ONCE DAILY 31 tablet 98     nystatin (MYCOSTATIN) 379218 UNIT/GM external cream Apply to affected area as needed 30 g 97     nystatin (MYCOSTATIN) 310503  UNIT/GM POWD Apply topically 2 times daily AND as needed with incontinent changes       potassium chloride ER (K-DUR/KLOR-CON M) 20 MEQ CR tablet TAKE TWO TABLETS (40 MEQ) BY MOUTH ONCE DAILY 56 tablet 97     predniSONE (DELTASONE) 10 MG tablet Take 0.5 tablets (5 mg) by mouth daily 31 tablet 98     SM STOOL SOFTENER 8.6-50 MG tablet TAKE 1 TABLET BY MOUTH TWICE DAILY AS NEEDED FOR CONSTIPATION 30 tablet 97     tamsulosin (FLOMAX) 0.4 MG capsule TAKE 1 CAPSULE BY MOUTH ONCE DAILY 28 capsule 98     REVIEW OF SYSTEMS:  Limited secondary to cognitive impairment but today pt reports ok    Objective:  /82   Pulse 87   Temp 96.3  F (35.7  C)   Resp 18   Wt 112.9 kg (248 lb 12.8 oz)   SpO2 96%   BMI 31.94 kg/m     Exam:  GENERAL APPEARANCE:  Alert, in no distress sitting in WC, oily skin baseline  ENT:  Mouth and posterior oropharynx normal, moist mucous membranes, [place], skin lesion of right ear lesion healed,  not much cerumen in ears today  EYES:  EOM, conjunctivae, lids, pupils and irises normal-glasses  RESP:  respiratory effort and palpation of chest increased, lungs diminished but clear to anterior and posterior- no wheezes present  CV:  Palpation and auscultation of heart done, bradycardic (not today) and regular rhythm, 3/6 murmur- not heard on previous exam,  peripheral edema 2+ in legs ankles and feet. Chang Hose on  ABDOMEN:  normal bowel sounds, soft, nontender  M/S:   Gait and station using wheelchair for ambulation, generalized weakness increased, less walking with SBA of 1 for short distances   SKIN:  Inspection of skin and subcutaneous tissue baseline to visualized areas of skin. Chondrodermatitis nodule on superior helix right ear removed +BCC   NEURO:   Examination of sensation by touch normal  PSYCH:  insight and judgement impaired, memory impaired    BP average 135/80 HR 77    Labs:   CBC RESULTS:   Recent Labs   Lab Test 11/21/19 07/26/19   WBC 8.5 9.8   RBC 4.46 4.58   HGB 13.1* 13.8   HCT  42.9 43.5   MCV 96 95   MCH 29.4 30.1   MCHC 30.5* 31.7   RDW 14.2 14.1    285       Last Basic Metabolic Panel:  Recent Labs   Lab Test 11/21/19 07/26/19    138   POTASSIUM 3.9 3.7   CHLORIDE 103 102   MERCEDES 8.5 9.8   CO2 29 32   BUN 26 30   CR 1.47* 1.40*   * 173*       Liver Function Studies -   Recent Labs   Lab Test 04/25/19 09/13/18 04/19/18   PROTTOTAL 8.0  --  7.9   ALBUMIN 3.5  --  3.4   BILITOTAL 0.5  --  0.6   ALKPHOS 76  --  78   AST 11 20 19   ALT 20 19 23       TSH   Date Value Ref Range Status   07/26/2019 4.12 (A) 0.40 - 4.00 mU/L Final   04/25/2019 6.93 (A) 0.40 - 4.00 mU/L Final       Lab Results   Component Value Date    A1C 6.7 11/21/2019       ASSESSMENT/PLAN:  (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)  (primary encounter diagnosis)  (R06.09) Dyspnea on exertion  Comment: breath sounds improved today  Plan:   -Duonebs to QID  -budesonide nebs BID to duonebs  -prednisone 5 mg po daily  -mucinex 600 mg po BID  -Ventolin q4H prn (may keep bedside)   -no longer follows with pulmonology     (I12.9,  N18.3) Benign hypertension with CKD (chronic kidney disease) stage III (H)  Comment: stable  Plan:   -Norvasc 10 mg po daily  -Metoprolol 25 mg po daily   -Lasix 40 mg po daily  -follow BMP    (I48.91) Atrial fibrillation, unspecified type (H)  Comment: rate controlled and ASA  Plan:   -not on anticoagulation 2/2 number of previous falls    (R60.0) Lower extremity edema  Comment: ongoing   Plan:   -Chang Hose on am and off HS  -Lasix 40 mg po daily (potassium 40 mEq daily)    (H61.23) Bilateral impacted cerumen  Comment: not seen today with otoscope  Plan:   -monitor for impaction. Treat and remove prn    (E03.9) Hypothyroidism, unspecified type  Comment: stable- ok for geriatrics   Plan:   -annual TSH  -levothyroxine 50 mcg po daily           Electronically signed by:  SUNDAY Lee CNP               Sincerely,        SUNDAY Lee CNP

## 2020-02-27 NOTE — PROGRESS NOTES
[place] [facility]  [place] Medical Record Number:  [id number]  Place of Service where encounter took place:  [facility] [[id number]]  Chief Complaint   Patient presents with     Nursing Home Acute       HPI:    Jacob Easton  is a 89 year old (5/16/1929), who is being seen today for an episodic care visit.  HPI information obtained from: facility chart records, patient report and [facility] chart review. Today's concern is:    Seen today for follow to cerumen impaction, RA, generalized weakness HTN, LE edema. Family in town, care conference today. Denies any acute conditions, chest pain, SOB above baseline, abdominal pain. Hx of never reporting any concerns. Staff reporting baseline is less walking with overall weakness. No longer walking in the halls. Just transfers and to bathroom. PMH includes RA and prior use of methotrexate which was discontinued for respiratory changes on CT showing ground glass opacities c/w acute alveolitis and persistent cystic changes throughout both lungs. Budesonide added BID to duonebs starting 10/2019 appear helpful. Sounding with wheezes posterior and anterior (was getting scheduled neb) and encouraged to keep mouth piece in place for treatment.Much improved on exam today. Weight stable but higher started in 240s 2/2020 now high 240s (246-248 lbs). Lower extremity edema at baseline. At baseline family and resident has refused vaccine.    Past Medical and Surgical History reviewed in Epic today.    MEDICATIONS:    Current Outpatient Medications   Medication Sig Dispense Refill     acetaminophen (TYLENOL) 325 MG tablet TAKE TWO TABLETS (650MG) BY MOUTH EVERY 4 HOURS AS NEEDED 60 tablet 97     albuterol (VENTOLIN HFA) 108 (90 Base) MCG/ACT inhaler Inhale 2 puffs into the lungs every 4 hours as needed for shortness of breath / dyspnea or wheezing (May keep bedside) 18 g 98     amLODIPine (NORVASC) 10 MG tablet TAKE 1 TABLET BY MOUTH ONCE DAILY 28  tablet 98     ASPIRIN ADULT LOW STRENGTH 81 MG EC tablet TAKE 1 TABLET BY MOUTH ONCE DAILY 28 tablet 98     RADHA PROTECT (EUCERIN) external cream APPLY TOPICALLY TO AFFECTED AREA(S) TWICE DAILY & AS NEEDED WITH INCONTINENT CHANGES 142 g 97     budesonide (PULMICORT) 0.5 MG/2ML neb solution Take 2 mLs (0.5 mg) by nebulization 2 times daily Add to morning and HS nebs 2 mL 11     CALMOSEPTINE 0.44-20.6 % OINT ointment APPLY TO CLEANSED REDDENED CHARISSA SKIN FOLDS AND RASH WITH BREAKDOWN DURING MORNING AND BEDTIME CARES AND INCONTINENT CHANGES OR MEDICATION  g 97     Elastic Bandages & Supports (T.E.D. KNEE LENGTH/M-LONG) MISC WEAR AS DIRECTED ON IN THE MORNING OFF AT BEDTIME (2 = 1 PAIR) 4 each 97     furosemide (LASIX) 40 MG tablet Take 1 tablet (40 mg) by mouth daily 31 tablet 98     guaiFENesin (MUCINEX) 600 MG 12 hr tablet Take 1 tablet (600 mg) by mouth 2 times daily 31 tablet 98     ipratropium - albuterol 0.5 mg/2.5 mg/3 mL (DUONEB) 0.5-2.5 (3) MG/3ML neb solution Take 1 vial (3 mLs) by nebulization 4 times daily 180 mL 97     Lactobacillus-Inulin ([facility] DIGESTIVE [facility]) CHEW CHEW 2 TABLET BY MOUTH ONCE DAILY 72 tablet 97     latanoprost (XALATAN) 0.005 % ophthalmic solution INSTILL ONE DROP IN THE RIGHT EYE IN THE EVENING 2.5 mL 97     levothyroxine (SYNTHROID/LEVOTHROID) 50 MCG tablet TAKE 1 TABLET BY MOUTH ONCE DAILY 31 tablet 99     MELATONIN MAXIMUM STRENGTH 5 MG tablet TAKE 1 TABLET BY MOUTH ONCE DAILY 31 tablet 99     metoprolol tartrate (LOPRESSOR) 25 MG tablet TAKE 1 TABLET BY MOUTH TWICE DAILY 56 tablet 98     Multiple Vitamins-Minerals (CENTRUM SILVER ULTRA MENS) TABS Take 1 tablet by mouth daily       multivitamin (I-EDGAR) TABS per tablet TAKE 1 TABLET BY MOUTH ONCE DAILY 31 tablet 98     nystatin (MYCOSTATIN) 119759 UNIT/GM external cream Apply to affected area as needed 30 g 97     nystatin (MYCOSTATIN) 180550 UNIT/GM POWD Apply topically 2 times daily AND as needed with incontinent  changes       potassium chloride ER (K-DUR/KLOR-CON M) 20 MEQ CR tablet TAKE TWO TABLETS (40 MEQ) BY MOUTH ONCE DAILY 56 tablet 97     predniSONE (DELTASONE) 10 MG tablet Take 0.5 tablets (5 mg) by mouth daily 31 tablet 98     SM STOOL SOFTENER 8.6-50 MG tablet TAKE 1 TABLET BY MOUTH TWICE DAILY AS NEEDED FOR CONSTIPATION 30 tablet 97     tamsulosin (FLOMAX) 0.4 MG capsule TAKE 1 CAPSULE BY MOUTH ONCE DAILY 28 capsule 98     REVIEW OF SYSTEMS:  Limited secondary to cognitive impairment but today pt reports ok    Objective:  /82   Pulse 87   Temp 96.3  F (35.7  C)   Resp 18   Wt 112.9 kg (248 lb 12.8 oz)   SpO2 96%   BMI 31.94 kg/m    Exam:  GENERAL APPEARANCE:  Alert, in no distress sitting in WC, oily skin baseline  ENT:  Mouth and posterior oropharynx normal, moist mucous membranes, [place], skin lesion of right ear lesion healed,  not much cerumen in ears today  EYES:  EOM, conjunctivae, lids, pupils and irises normal-glasses  RESP:  respiratory effort and palpation of chest increased, lungs diminished but clear to anterior and posterior- no wheezes present  CV:  Palpation and auscultation of heart done, bradycardic (not today) and regular rhythm, 3/6 murmur- not heard on previous exam,  peripheral edema 2+ in legs ankles and feet. Chang Hose on  ABDOMEN:  normal bowel sounds, soft, nontender  M/S:   Gait and station using wheelchair for ambulation, generalized weakness increased, less walking with SBA of 1 for short distances   SKIN:  Inspection of skin and subcutaneous tissue baseline to visualized areas of skin. Chondrodermatitis nodule on superior helix right ear removed +BCC   NEURO:   Examination of sensation by touch normal  PSYCH:  insight and judgement impaired, memory impaired    BP average 135/80 HR 77    Labs:   CBC RESULTS:   Recent Labs   Lab Test 11/21/19 07/26/19   WBC 8.5 9.8   RBC 4.46 4.58   HGB 13.1* 13.8   HCT 42.9 43.5   MCV 96 95   MCH 29.4 30.1   MCHC 30.5* 31.7   RDW 14.2 14.1   PLT  268 285       Last Basic Metabolic Panel:  Recent Labs   Lab Test 11/21/19 07/26/19    138   POTASSIUM 3.9 3.7   CHLORIDE 103 102   MERCEDES 8.5 9.8   CO2 29 32   BUN 26 30   CR 1.47* 1.40*   * 173*       Liver Function Studies -   Recent Labs   Lab Test 04/25/19 09/13/18 04/19/18   PROTTOTAL 8.0  --  7.9   ALBUMIN 3.5  --  3.4   BILITOTAL 0.5  --  0.6   ALKPHOS 76  --  78   AST 11 20 19   ALT 20 19 23       TSH   Date Value Ref Range Status   07/26/2019 4.12 (A) 0.40 - 4.00 mU/L Final   04/25/2019 6.93 (A) 0.40 - 4.00 mU/L Final       Lab Results   Component Value Date    A1C 6.7 11/21/2019       ASSESSMENT/PLAN:  (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)  (primary encounter diagnosis)  (R06.09) Dyspnea on exertion  Comment: breath sounds improved today  Plan:   -Duonebs to QID  -budesonide nebs BID to duonebs  -prednisone 5 mg po daily  -mucinex 600 mg po BID  -Ventolin q4H prn (may keep bedside)   -no longer follows with pulmonology     (I12.9,  N18.3) Benign hypertension with CKD (chronic kidney disease) stage III (H)  Comment: stable  Plan:   -Norvasc 10 mg po daily  -Metoprolol 25 mg po daily   -Lasix 40 mg po daily  -follow BMP    (I48.91) Atrial fibrillation, unspecified type (H)  Comment: rate controlled and ASA  Plan:   -not on anticoagulation 2/2 number of previous falls    (R60.0) Lower extremity edema  Comment: ongoing   Plan:   -Chang Hose on am and off HS  -Lasix 40 mg po daily (potassium 40 mEq daily)    (H61.23) Bilateral impacted cerumen  Comment: not seen today with otoscope  Plan:   -monitor for impaction. Treat and remove prn    (E03.9) Hypothyroidism, unspecified type  Comment: stable- ok for geriatrics   Plan:   -annual TSH  -levothyroxine 50 mcg po daily           Electronically signed by:  Orestes Clement, SUNDAY CNP

## 2020-03-13 NOTE — PROCEDURE: MOHS SURGERY
no Intermediate Repair Preamble Text (Leave Blank If You Do Not Want): Undermining was performed with blunt dissection.

## 2020-03-16 DIAGNOSIS — I48.91 ATRIAL FIBRILLATION, UNSPECIFIED TYPE (H): ICD-10-CM

## 2020-03-16 DIAGNOSIS — I10 ESSENTIAL HYPERTENSION: ICD-10-CM

## 2020-03-16 DIAGNOSIS — N40.1 BENIGN NODULAR PROSTATIC HYPERPLASIA WITH LOWER URINARY TRACT SYMPTOMS: ICD-10-CM

## 2020-03-16 RX ORDER — ASPIRIN 81 MG/1
TABLET, COATED ORAL
Qty: 28 TABLET | Status: SHIPPED | OUTPATIENT
Start: 2020-03-16 | End: 2021-01-01

## 2020-03-16 RX ORDER — TAMSULOSIN HYDROCHLORIDE 0.4 MG/1
CAPSULE ORAL
Qty: 28 CAPSULE | Status: SHIPPED | OUTPATIENT
Start: 2020-03-16 | End: 2021-01-01

## 2020-03-16 RX ORDER — AMLODIPINE BESYLATE 10 MG/1
TABLET ORAL
Qty: 28 TABLET | Status: SHIPPED | OUTPATIENT
Start: 2020-03-16 | End: 2021-01-01

## 2020-03-16 RX ORDER — METOPROLOL TARTRATE 25 MG/1
TABLET, FILM COATED ORAL
Qty: 56 TABLET | Status: SHIPPED | OUTPATIENT
Start: 2020-03-16 | End: 2021-01-01

## 2020-03-18 DIAGNOSIS — H61.23 BILATERAL IMPACTED CERUMEN: Primary | ICD-10-CM

## 2020-03-23 DIAGNOSIS — R91.8 INFILTRATE OF LOWER LOBE OF LEFT LUNG PRESENT ON IMAGING STUDY: ICD-10-CM

## 2020-03-23 DIAGNOSIS — R06.09 DYSPNEA ON EXERTION: ICD-10-CM

## 2020-03-23 DIAGNOSIS — J67.9 PULMONARY ALVEOLITIS (H): ICD-10-CM

## 2020-03-23 DIAGNOSIS — J84.10 PULMONARY FIBROSIS (H): ICD-10-CM

## 2020-03-23 RX ORDER — BUDESONIDE 0.5 MG/2ML
INHALANT ORAL
Qty: 60 ML | Refills: 97 | Status: SHIPPED | OUTPATIENT
Start: 2020-03-23 | End: 2020-01-01 | Stop reason: CLARIF

## 2020-04-28 NOTE — PROGRESS NOTES
"[facility]   Jacob Easton is being evaluated via a billable video visit due to the restrictions of the Covid-19 pandemic.   The patient has been notified of following:  This video visit will be conducted via a call between you and your provider. We have found that certain health care needs can be provided without the need for an in-person physical exam.  This service lets us provide the care you need with a video conversation. If during the course of the call the provider feels a video visit is not appropriate, you will not be charged for this service.\"   The provider has received verbal consent for a Video Visit from the patient or first contact? Yes  Patient  or facility staff would like the video invitation sent by: N/A   Video Start Time: 9:51 AM    [facility] Medical Record Number:  [id number]  Place of Location at the time of visit: Health system Living   Chief Complaint   Patient presents with     Annual Visit     HPI:  Jacob Easton  is a 89 year old (5/16/1929), who is being seen today for a visit.  HPI information obtained from: facility chart records, facility staff, patient report and [facility] Epic chart review. Today's concern is:    Seen today for follow up to cerumen impaction, RA, generalized weakness HTN, LE edema. Episode of leg pain and received Bryonia from his daughter that resolved issue. Also use of nux vomica at times (keeps in his room). Wants his ears cleaned again and staff will assist post debrox use. Leg edema at baseline. No longer walking in the halls. Just transfers and to bathroom. PMH includes RA and prior use of methotrexate which was discontinued for respiratory changes on CT showing ground glass opacities c/w acute alveolitis and persistent cystic changes throughout both lungs. Budesonide added BID to duonebs starting 10/2019 appear helpful. Needs reminders to keep mouth piece in place. Loss of some weights from prior visit. Chart review and with " some constipation possible hemorrhoids with staining for stool. No record of bowel movements. He denies any issues today. Pleasant and reports to enjoying reading and face timing with family as able.    Past Medical and Surgical History reviewed in Epic today.  MEDICATIONS:    Current Outpatient Medications   Medication Sig Dispense Refill     acetaminophen (TYLENOL) 325 MG tablet TAKE TWO TABLETS (650MG) BY MOUTH EVERY 4 HOURS AS NEEDED 60 tablet 97     albuterol (VENTOLIN HFA) 108 (90 Base) MCG/ACT inhaler Inhale 2 puffs into the lungs every 4 hours as needed for shortness of breath / dyspnea or wheezing (May keep bedside) 18 g 98     amLODIPine (NORVASC) 10 MG tablet TAKE 1 TABLET BY MOUTH ONCE DAILY 28 tablet PRN     ASPIRIN LOW DOSE 81 MG EC tablet TAKE 1 TABLET BY MOUTH ONCE DAILY 28 tablet PRN     RADHA PROTECT (EUCERIN) external cream APPLY TOPICALLY TO AFFECTED AREA(S) TWICE DAILY & AS NEEDED WITH INCONTINENT CHANGES 142 g 97     budesonide (PULMICORT) 0.5 MG/2ML neb solution NEBULIZE THE CONTENT OF 1 VIAL TWICE DAILY (ADD TO IN THE MORNING AND AT BEDTIME DUONEBS) 60 mL 97     CALMOSEPTINE 0.44-20.6 % OINT ointment APPLY TO CLEANSED REDDENED CHARISSA SKIN FOLDS AND RASH WITH BREAKDOWN DURING MORNING AND BEDTIME CARES AND INCONTINENT CHANGES OR MEDICATION  g 97     Elastic Bandages & Supports (T.E.D. KNEE LENGTH/M-LONG) MISC WEAR AS DIRECTED ON IN THE MORNING OFF AT BEDTIME (2 = 1 PAIR) 4 each 97     furosemide (LASIX) 40 MG tablet Take 1 tablet (40 mg) by mouth daily 31 tablet 98     guaiFENesin (MUCINEX) 600 MG 12 hr tablet Take 1 tablet (600 mg) by mouth 2 times daily 31 tablet 98     ipratropium - albuterol 0.5 mg/2.5 mg/3 mL (DUONEB) 0.5-2.5 (3) MG/3ML neb solution Take 1 vial (3 mLs) by nebulization 4 times daily 180 mL 97     Lactobacillus-Inulin ([facility] DIGESTIVE [facility]) CHEW CHEW 2 TABLET BY MOUTH ONCE DAILY 72 tablet 97     latanoprost (XALATAN) 0.005 % ophthalmic solution INSTILL ONE DROP IN  THE RIGHT EYE IN THE EVENING 2.5 mL 97     levothyroxine (SYNTHROID/LEVOTHROID) 50 MCG tablet TAKE 1 TABLET BY MOUTH ONCE DAILY 31 tablet 99     MELATONIN MAXIMUM STRENGTH 5 MG tablet TAKE 1 TABLET BY MOUTH ONCE DAILY 31 tablet 99     metoprolol tartrate (LOPRESSOR) 25 MG tablet TAKE 1 TABLET BY MOUTH TWICE DAILY 56 tablet PRN     Multiple Vitamins-Minerals (CENTRUM SILVER ULTRA MENS) TABS Take 1 tablet by mouth daily       multivitamin (I-EDGAR) TABS per tablet TAKE 1 TABLET BY MOUTH ONCE DAILY 31 tablet 98     nystatin (MYCOSTATIN) 536067 UNIT/GM external cream Apply to affected area as needed 30 g 97     nystatin (MYCOSTATIN) 476467 UNIT/GM POWD Apply topically 2 times daily AND as needed with incontinent changes       potassium chloride ER (K-DUR/KLOR-CON M) 20 MEQ CR tablet TAKE TWO TABLETS (40 MEQ) BY MOUTH ONCE DAILY 56 tablet 97     predniSONE (DELTASONE) 10 MG tablet Take 0.5 tablets (5 mg) by mouth daily 31 tablet 98     SM STOOL SOFTENER 8.6-50 MG tablet TAKE 1 TABLET BY MOUTH TWICE DAILY AS NEEDED FOR CONSTIPATION 30 tablet 97     tamsulosin (FLOMAX) 0.4 MG capsule TAKE 1 CAPSULE BY MOUTH ONCE DAILY 28 capsule PRN     REVIEW OF SYSTEMS: 4 point ROS including Respiratory, CV, GI and , other than that noted in the HPI,  is negative  Objective: /61   Pulse 83   Temp 97  F (36.1  C)   Resp 18   Wt 108 kg (238 lb 3.2 oz)   BMI 30.58 kg/m    Limited visit exam done given COVID-19 precautions.   GENERAL APPEARANCE:  Alert, in no distress sitting in WC, oily skin baseline  ENT:  Mouth and posterior oropharynx normal, moist mucous membranes, [place]  EYES:  EOM, conjunctivae, lids, pupils and irises normal-glasses  RESP:  respiratory effort appears at baseline  CV:  Palpation and auscultation of heart done, bradycardic (not today) and regular rhythm, 3/6 murmur at baseline. Chang Palacios not on  ABDOMEN: DAVIS  M/S:   Gait and station using wheelchair for ambulation, generalized weakness increased, less  walking with SBA of 1 for short distances   SKIN:  Inspection of skin and subcutaneous tissue baseline to visualized areas of skin  NEURO:   Examination of sensation by touch normal  PSYCH:  insight and judgement impaired, memory impaired    Labs:   CBC RESULTS:   Recent Labs   Lab Test 11/21/19 07/26/19   WBC 8.5 9.8   RBC 4.46 4.58   HGB 13.1* 13.8   HCT 42.9 43.5   MCV 96 95   MCH 29.4 30.1   MCHC 30.5* 31.7   RDW 14.2 14.1    285       Last Basic Metabolic Panel:  Recent Labs   Lab Test 11/21/19 07/26/19    138   POTASSIUM 3.9 3.7   CHLORIDE 103 102   MERCEDES 8.5 9.8   CO2 29 32   BUN 26 30   CR 1.47* 1.40*   * 173*       Liver Function Studies -   Recent Labs   Lab Test 04/25/19 09/13/18 04/19/18   PROTTOTAL 8.0  --  7.9   ALBUMIN 3.5  --  3.4   BILITOTAL 0.5  --  0.6   ALKPHOS 76  --  78   AST 11 20 19   ALT 20 19 23       TSH   Date Value Ref Range Status   07/26/2019 4.12 (A) 0.40 - 4.00 mU/L Final   04/25/2019 6.93 (A) 0.40 - 4.00 mU/L Final       Lab Results   Component Value Date    A1C 6.7 11/21/2019     ASSESSMENT/PLAN:  (J84.10) Pulmonary fibrosis (H)  (primary encounter diagnosis)  (R06.09) Dyspnea on exertion  Comment: chronic and stable  Plan:  -Duonebs to QID  -budesonide nebs BID to duonebs  -prednisone 5 mg po daily  -mucinex 600 mg po BID  -Ventolin q4H prn (may keep bedside)   -no longer follows with pulmonology         (H61.23) Bilateral impacted cerumen  Comment: chronic   Plan:   -debrox treatment and removal     (I10) Essential hypertension  (I48.91) Atrial fibrillation, unspecified type (H)  (R60.0) Lower extremity edema  Comment: stable. Average is  /75  HR 83  Plan:   -Norvasc 10 mg po daily  -Metoprolol 25 mg po daily   -Lasix 40 mg po daily  -not on anticoagulation 2/2 number of previous falls  -follow BMP    (N18.3) CKD (chronic kidney disease) stage 3, GFR 30-59 ml/min (H)  Comment: chronic and stable  Plan:   -renal dose medications and avoid nephrotoxins      (K59.01) Slow Transit Constipation  Comment: hemorrhoid flare at times  Plan:  -Senna S 1 tablet BID prn (make need scheduled)     Case Management and Team Discussion:  I called and spoke with Nursing staff at the facility regarding the current Plan of Care. I have reviewed the facility/SNF care plan/MDS, including the falls risk, nutrition and pain screening. I also reviewed the current immunizations, and preventive care.Patient's desire to return to the community is present, but is not able due to care needs . Current Level of Care is appropriate at this time. The Plan of Care is appropriate at this time.     Advance Directive Discussion:    I reviewed the current advanced directives as reflected in EPIC, the POLST and the facility chart, and verified the congruency of orders. I contacted the first party Rhoni and discussed the plan of Care.  I did not due to cognitive impairment review the advance directives with the resident.     Electronically signed by:  SUNDAY Lee CNP     Video-Visit Details  Type of service:  Video Visit  Video End Time (time video stopped): 9:57 AM  Distant Location (provider location):  [facility]

## 2020-04-28 NOTE — LETTER
"    4/28/2020        RE: Jacob Easton  [facility]  [address]  [place] [address]        [facility]   Jacob Easton is being evaluated via a billable video visit due to the restrictions of the Covid-19 pandemic.   The patient has been notified of following:  This video visit will be conducted via a call between you and your provider. We have found that certain health care needs can be provided without the need for an in-person physical exam.  This service lets us provide the care you need with a video conversation. If during the course of the call the provider feels a video visit is not appropriate, you will not be charged for this service.\"   The provider has received verbal consent for a Video Visit from the patient or first contact? Yes  Patient  or facility staff would like the video invitation sent by: N/A   Video Start Time: 9:51 AM    [facility] Medical Record Number:  [id number]  Place of Location at the time of visit: [facility]   Chief Complaint   Patient presents with     Annual Visit     HPI:  Jacob Easton  is a 89 year old (5/16/1929), who is being seen today for a visit.  HPI information obtained from: facility chart records, facility staff, patient report and [facility] Epic chart review. Today's concern is:    Seen today for follow up to cerumen impaction, RA, generalized weakness HTN, LE edema. Episode of leg pain and received Bryonia from his daughter that resolved issue. Also use of nux vomica at times (keeps in his room). Wants his ears cleaned again and staff will assist post debrox use. Leg edema at baseline. No longer walking in the halls. Just transfers and to bathroom. PMH includes RA and prior use of methotrexate which was discontinued for respiratory changes on CT showing ground glass opacities c/w acute alveolitis and persistent cystic changes throughout both lungs. Budesonide added BID to duonebs starting 10/2019 appear helpful. " Needs reminders to keep mouth piece in place. Loss of some weights from prior visit. Chart review and with some constipation possible hemorrhoids with staining for stool. No record of bowel movements. He denies any issues today. Pleasant and reports to enjoying reading and face timing with family as able.    Past Medical and Surgical History reviewed in Epic today.  MEDICATIONS:    Current Outpatient Medications   Medication Sig Dispense Refill     acetaminophen (TYLENOL) 325 MG tablet TAKE TWO TABLETS (650MG) BY MOUTH EVERY 4 HOURS AS NEEDED 60 tablet 97     albuterol (VENTOLIN HFA) 108 (90 Base) MCG/ACT inhaler Inhale 2 puffs into the lungs every 4 hours as needed for shortness of breath / dyspnea or wheezing (May keep bedside) 18 g 98     amLODIPine (NORVASC) 10 MG tablet TAKE 1 TABLET BY MOUTH ONCE DAILY 28 tablet PRN     ASPIRIN LOW DOSE 81 MG EC tablet TAKE 1 TABLET BY MOUTH ONCE DAILY 28 tablet PRN     RADHA PROTECT (EUCERIN) external cream APPLY TOPICALLY TO AFFECTED AREA(S) TWICE DAILY & AS NEEDED WITH INCONTINENT CHANGES 142 g 97     budesonide (PULMICORT) 0.5 MG/2ML neb solution NEBULIZE THE CONTENT OF 1 VIAL TWICE DAILY (ADD TO IN THE MORNING AND AT BEDTIME DUONEBS) 60 mL 97     CALMOSEPTINE 0.44-20.6 % OINT ointment APPLY TO CLEANSED REDDENED CHARISSA SKIN FOLDS AND RASH WITH BREAKDOWN DURING MORNING AND BEDTIME CARES AND INCONTINENT CHANGES OR MEDICATION  g 97     Elastic Bandages & Supports (T.E.D. KNEE LENGTH/M-LONG) MISC WEAR AS DIRECTED ON IN THE MORNING OFF AT BEDTIME (2 = 1 PAIR) 4 each 97     furosemide (LASIX) 40 MG tablet Take 1 tablet (40 mg) by mouth daily 31 tablet 98     guaiFENesin (MUCINEX) 600 MG 12 hr tablet Take 1 tablet (600 mg) by mouth 2 times daily 31 tablet 98     ipratropium - albuterol 0.5 mg/2.5 mg/3 mL (DUONEB) 0.5-2.5 (3) MG/3ML neb solution Take 1 vial (3 mLs) by nebulization 4 times daily 180 mL 97     Lactobacillus-Inulin ([facility] DIGESTIVE [facility]) CHEW CHEW 2 TABLET  BY MOUTH ONCE DAILY 72 tablet 97     latanoprost (XALATAN) 0.005 % ophthalmic solution INSTILL ONE DROP IN THE RIGHT EYE IN THE EVENING 2.5 mL 97     levothyroxine (SYNTHROID/LEVOTHROID) 50 MCG tablet TAKE 1 TABLET BY MOUTH ONCE DAILY 31 tablet 99     MELATONIN MAXIMUM STRENGTH 5 MG tablet TAKE 1 TABLET BY MOUTH ONCE DAILY 31 tablet 99     metoprolol tartrate (LOPRESSOR) 25 MG tablet TAKE 1 TABLET BY MOUTH TWICE DAILY 56 tablet PRN     Multiple Vitamins-Minerals (CENTRUM SILVER ULTRA MENS) TABS Take 1 tablet by mouth daily       multivitamin (I-EDGRA) TABS per tablet TAKE 1 TABLET BY MOUTH ONCE DAILY 31 tablet 98     nystatin (MYCOSTATIN) 800777 UNIT/GM external cream Apply to affected area as needed 30 g 97     nystatin (MYCOSTATIN) 429799 UNIT/GM POWD Apply topically 2 times daily AND as needed with incontinent changes       potassium chloride ER (K-DUR/KLOR-CON M) 20 MEQ CR tablet TAKE TWO TABLETS (40 MEQ) BY MOUTH ONCE DAILY 56 tablet 97     predniSONE (DELTASONE) 10 MG tablet Take 0.5 tablets (5 mg) by mouth daily 31 tablet 98     SM STOOL SOFTENER 8.6-50 MG tablet TAKE 1 TABLET BY MOUTH TWICE DAILY AS NEEDED FOR CONSTIPATION 30 tablet 97     tamsulosin (FLOMAX) 0.4 MG capsule TAKE 1 CAPSULE BY MOUTH ONCE DAILY 28 capsule PRN     REVIEW OF SYSTEMS: 4 point ROS including Respiratory, CV, GI and , other than that noted in the HPI,  is negative  Objective: /61   Pulse 83   Temp 97  F (36.1  C)   Resp 18   Wt 108 kg (238 lb 3.2 oz)   BMI 30.58 kg/m    Limited visit exam done given COVID-19 precautions.   GENERAL APPEARANCE:  Alert, in no distress sitting in WC, oily skin baseline  ENT:  Mouth and posterior oropharynx normal, moist mucous membranes, [place]  EYES:  EOM, conjunctivae, lids, pupils and irises normal-glasses  RESP:  respiratory effort appears at baseline  CV:  Palpation and auscultation of heart done, bradycardic (not today) and regular rhythm, 3/6 murmur at baseline. Chang Hose not on  ABDOMEN:  DAVIS  M/S:   Gait and station using wheelchair for ambulation, generalized weakness increased, less walking with SBA of 1 for short distances   SKIN:  Inspection of skin and subcutaneous tissue baseline to visualized areas of skin  NEURO:   Examination of sensation by touch normal  PSYCH:  insight and judgement impaired, memory impaired    Labs:   CBC RESULTS:   Recent Labs   Lab Test 11/21/19 07/26/19   WBC 8.5 9.8   RBC 4.46 4.58   HGB 13.1* 13.8   HCT 42.9 43.5   MCV 96 95   MCH 29.4 30.1   MCHC 30.5* 31.7   RDW 14.2 14.1    285       Last Basic Metabolic Panel:  Recent Labs   Lab Test 11/21/19 07/26/19    138   POTASSIUM 3.9 3.7   CHLORIDE 103 102   MERCEDES 8.5 9.8   CO2 29 32   BUN 26 30   CR 1.47* 1.40*   * 173*       Liver Function Studies -   Recent Labs   Lab Test 04/25/19 09/13/18 04/19/18   PROTTOTAL 8.0  --  7.9   ALBUMIN 3.5  --  3.4   BILITOTAL 0.5  --  0.6   ALKPHOS 76  --  78   AST 11 20 19   ALT 20 19 23       TSH   Date Value Ref Range Status   07/26/2019 4.12 (A) 0.40 - 4.00 mU/L Final   04/25/2019 6.93 (A) 0.40 - 4.00 mU/L Final       Lab Results   Component Value Date    A1C 6.7 11/21/2019     ASSESSMENT/PLAN:  (J84.10) Pulmonary fibrosis (H)  (primary encounter diagnosis)  (R06.09) Dyspnea on exertion  Comment: chronic and stable  Plan:  -Duonebs to QID  -budesonide nebs BID to duonebs  -prednisone 5 mg po daily  -mucinex 600 mg po BID  -Ventolin q4H prn (may keep bedside)   -no longer follows with pulmonology         (H61.23) Bilateral impacted cerumen  Comment: chronic   Plan:   -debrox treatment and removal     (I10) Essential hypertension  (I48.91) Atrial fibrillation, unspecified type (H)  (R60.0) Lower extremity edema  Comment: stable. Average is  /75  HR 83  Plan:   -Norvasc 10 mg po daily  -Metoprolol 25 mg po daily   -Lasix 40 mg po daily  -not on anticoagulation 2/2 number of previous falls  -follow BMP    (N18.3) CKD (chronic kidney disease) stage 3, GFR 30-59  ml/min (H)  Comment: chronic and stable  Plan:   -renal dose medications and avoid nephrotoxins     (K59.01) Slow Transit Constipation  Comment: hemorrhoid flare at times  Plan:  -Senna S 1 tablet BID prn (make need scheduled)     Case Management and Team Discussion:  I called and spoke with Nursing staff at the facility regarding the current Plan of Care. I have reviewed the facility/SNF care plan/MDS, including the falls risk, nutrition and pain screening. I also reviewed the current immunizations, and preventive care.Patient's desire to return to the community is present, but is not able due to care needs . Current Level of Care is appropriate at this time. The Plan of Care is appropriate at this time.     Advance Directive Discussion:    I reviewed the current advanced directives as reflected in EPIC, the POLST and the facility chart, and verified the congruency of orders. I contacted the first party Rhoni and discussed the plan of Care.  I did not due to cognitive impairment review the advance directives with the resident.     Electronically signed by:  SUNDAY Lee CNP     Video-Visit Details  Type of service:  Video Visit  Video End Time (time video stopped): 9:57 AM  Distant Location (provider location):  [facility]           Sincerely,        SUNDAY Lee CNP

## 2020-06-11 NOTE — LETTER
6/11/2020        RE: Jacob Easton  [facility]  [address]  [facility] [address]        [place] [facility]  [place] Medical Record Number:  [id number]  Place of Service where encounter took place:  [facility] ([facility] [[id number]]  Chief Complaint   Patient presents with     Nursing Home Acute       HPI:    Jacob Easton  is a 89 year old (5/16/1929), who is being seen today for an episodic care visit.  HPI information obtained from: facility chart records, facility staff, patient report and [facility] chart review. Today's concern is:    Seen today for cerumen impaction both ears, RA, generalized weakness HTN, LE edema. Denies any acute conditions, chest pain, SOB above baseline, abdominal pain. Hx of never reporting any concerns. Staff reporting baseline is less walking with overall weakness. No longer walking in the halls. Just transfers and to bathroom. PMH includes RA and prior use of methotrexate which was discontinued for respiratory changes on CT showing ground glass opacities c/w acute alveolitis and persistent cystic changes throughout both lungs. Budesonide added BID to duonebs starting 10/2019 appear helpful. Sounding with wheezes posterior and anterior (was getting scheduled neb) and encouraged to keep mouth piece in place for treatment.Much improved on exam today. Weight stable but higher started in 240s 2/2020 now high 240s (246-248 lbs). Lower extremity edema at baseline. At baseline family and resident has refused vaccine.       Past Medical and Surgical History reviewed in Epic today.    MEDICATIONS:    Current Outpatient Medications   Medication Sig Dispense Refill     acetaminophen (TYLENOL) 325 MG tablet TAKE TWO TABLETS (650MG) BY MOUTH EVERY 4 HOURS AS NEEDED 60 tablet 97     albuterol (VENTOLIN HFA) 108 (90 Base) MCG/ACT inhaler Inhale 2 puffs into the lungs every 4 hours as needed for shortness of breath / dyspnea or wheezing (May keep  bedside) 18 g 98     Albuterol Sulfate (PROAIR HFA IN) Inhale 2 puffs into the lungs every 4 hours as needed       amLODIPine (NORVASC) 10 MG tablet TAKE 1 TABLET BY MOUTH ONCE DAILY 28 tablet PRN     ASPIRIN LOW DOSE 81 MG EC tablet TAKE 1 TABLET BY MOUTH ONCE DAILY 28 tablet PRN     RADHA PROTECT (EUCERIN) external cream APPLY TOPICALLY TO AFFECTED AREA(S) TWICE DAILY & AS NEEDED WITH INCONTINENT CHANGES 142 g 97     budesonide (PULMICORT) 0.5 MG/2ML neb solution NEBULIZE THE CONTENT OF 1 VIAL TWICE DAILY (ADD TO IN THE MORNING AND AT BEDTIME DUONEBS) 60 mL 97     CALMOSEPTINE 0.44-20.6 % OINT ointment APPLY TO CLEANSED REDDNED CHARISSA SKIN FOLDS & RASH WITH BREAKDOWN DURING MORNING & BEDTIME CARES & INCONTINENT CHANGES OR MEDICATION PASS 113 g 10     Elastic Bandages & Supports (T.E.D. KNEE LENGTH/M-LONG) MISC WEAR AS DIRECTED ON IN THE MORNING OFF AT BEDTIME (2 = 1 PAIR) 4 each 97     furosemide (LASIX) 40 MG tablet Take 1 tablet (40 mg) by mouth daily 31 tablet 98     ipratropium - albuterol 0.5 mg/2.5 mg/3 mL (DUONEB) 0.5-2.5 (3) MG/3ML neb solution Take 1 vial (3 mLs) by nebulization 4 times daily 180 mL 97     Lactobacillus-Inulin ([facility] DIGESTIVE [facility]) CHEW CHEW 2 TABLET BY MOUTH ONCE DAILY 72 tablet 97     latanoprost (XALATAN) 0.005 % ophthalmic solution INSTILL ONE DROP IN THE RIGHT EYE IN THE EVENING 2.5 mL 97     levothyroxine (SYNTHROID/LEVOTHROID) 50 MCG tablet TAKE 1 TABLET BY MOUTH ONCE DAILY 31 tablet 99     MELATONIN MAXIMUM STRENGTH 5 MG tablet TAKE 1 TABLET BY MOUTH ONCE DAILY 31 tablet 99     MENTHOL-ZINC OXIDE EX Apply topically to cleaned reddened charissa skin folds and rash with breakdown during morning and bedtime cares and incontinent changes or medication pass       metoprolol tartrate (LOPRESSOR) 25 MG tablet TAKE 1 TABLET BY MOUTH TWICE DAILY 56 tablet PRN     MUCINEX 600 MG 12 hr tablet TAKE 1 TABLET BY MOUTH TWICE DAILY 56 tablet 97     Multiple Vitamins-Minerals (CENTRUM SILVER  ULTRA MENS) TABS Take 1 tablet by mouth daily       multivitamin (I-EDGAR) TABS per tablet TAKE 1 TABLET BY MOUTH ONCE DAILY 31 tablet 98     nystatin (MYCOSTATIN) 856326 UNIT/GM external cream Apply to affected area as needed 30 g 97     potassium chloride ER (K-DUR/KLOR-CON M) 20 MEQ CR tablet TAKE TWO TABLETS (40 MEQ) BY MOUTH ONCE DAILY 56 tablet 97     predniSONE (DELTASONE) 10 MG tablet Take 0.5 tablets (5 mg) by mouth daily 31 tablet 98     SM STOOL SOFTENER 8.6-50 MG tablet TAKE 1 TABLET BY MOUTH TWICE DAILY AS NEEDED FOR CONSTIPATION 30 tablet 97     tamsulosin (FLOMAX) 0.4 MG capsule TAKE 1 CAPSULE BY MOUTH ONCE DAILY 28 capsule PRN     Multiple Vitamins-Minerals (CENTRUM SILVER 50+MEN) TABS TAKE 1 TABLET BY MOUTH ONCE DAILY (Patient not taking: Reported on 6/11/2020) 28 tablet 97     nystatin (MYCOSTATIN) 055044 UNIT/GM POWD Apply topically 2 times daily AND as needed with incontinent changes       REVIEW OF SYSTEMS:  4 point ROS including Respiratory, CV, GI and , other than that noted in the HPI,  is negative    Objective:  /70   Pulse 64   Temp 97  F (36.1  C)   Resp 20   Wt 107.5 kg (237 lb)   BMI 30.43 kg/m    Exam:  GENERAL APPEARANCE:  Alert, in no distress sitting in WC, oily skin baseline  ENT:  Mouth and posterior oropharynx normal, moist mucous membranes, [place], skin lesion of right ear lesion healed, ears blocked cerumen in ears today  EYES:  EOM, conjunctivae, lids, pupils and irises normal-glasses  RESP:  respiratory effort and palpation of chest increased, lungs diminished but clear to anterior and posterior- no wheezes present  CV:  Palpation and auscultation of heart done, bradycardic (not today) and regular rhythm, 3/6 murmur- not heard on previous exam,  peripheral edema 2+ in legs ankles and feet. Compression Hose on  ABDOMEN:  normal bowel sounds, soft, nontender  M/S:   Gait and station using wheelchair for ambulation, generalized weakness increased, less walking with SBA  of 1 for transfers  SKIN:  Inspection of skin and subcutaneous tissue baseline to visualized areas of skin. Chondrodermatitis nodule on superior helix right ear removed +BCC   NEURO:   Examination of sensation by touch normal  PSYCH:  insight and judgement impaired, memory impaired    Labs:   CBC RESULTS:   Recent Labs   Lab Test 11/21/19 07/26/19   WBC 8.5 9.8   RBC 4.46 4.58   HGB 13.1* 13.8   HCT 42.9 43.5   MCV 96 95   MCH 29.4 30.1   MCHC 30.5* 31.7   RDW 14.2 14.1    285       Last Basic Metabolic Panel:  Recent Labs   Lab Test 11/21/19 07/26/19    138   POTASSIUM 3.9 3.7   CHLORIDE 103 102   MERCEDES 8.5 9.8   CO2 29 32   BUN 26 30   CR 1.47* 1.40*   * 173*       Liver Function Studies -   Recent Labs   Lab Test 04/25/19 09/13/18 04/19/18   PROTTOTAL 8.0  --  7.9   ALBUMIN 3.5  --  3.4   BILITOTAL 0.5  --  0.6   ALKPHOS 76  --  78   AST 11 20 19   ALT 20 19 23       TSH   Date Value Ref Range Status   07/26/2019 4.12 (A) 0.40 - 4.00 mU/L Final   04/25/2019 6.93 (A) 0.40 - 4.00 mU/L Final       Lab Results   Component Value Date    A1C 6.7 11/21/2019       ASSESSMENT/PLAN:  (H61.23) Bilateral impacted cerumen (primary encounter diagnosis)  Comment: seen today with otoscope  Plan:   -cerumen removal with lighted curette to both ear canals     (E03.9) Hypothyroidism, unspecified type  Comment: stable- ok for geriatrics   Plan:   -annual TSH  -levothyroxine 50 mcg po daily     (R60.0) Lower extremity edema  Comment: ongoing   Plan:   -Compression Hose on am and off HS  -Lasix 40 mg po daily (potassium 40 mEq daily)    (I48.91) Atrial fibrillation, unspecified type (H)  Comment: rate controlled and ASA  Plan:   -not on anticoagulation 2/2 number of previous falls    (I12.9,  N18.3) Benign hypertension with CKD (chronic kidney disease) stage III (H)  Comment: stable  Plan:   -Norvasc 10 mg po daily  -Metoprolol 25 mg po daily   -Lasix 40 mg po daily  -follow BMP    (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 Is appropriate for AL setting         Electronically signed by:  SUNDAY Lee CNP             Sincerely,        SUNDAY Lee CNP

## 2020-06-11 NOTE — PROGRESS NOTES
[place] [facility]  [place] Medical Record Number:  [id number]  Place of Service where encounter took place:  [facility] [[id number]]  Chief Complaint   Patient presents with     Nursing Home Acute       HPI:    Jacob Easton  is a 89 year old (5/16/1929), who is being seen today for an episodic care visit.  HPI information obtained from: facility chart records, facility staff, patient report and [facility] chart review. Today's concern is:    Seen today for cerumen impaction both ears, RA, generalized weakness HTN, LE edema. Denies any acute conditions, chest pain, SOB above baseline, abdominal pain. Hx of never reporting any concerns. Staff reporting baseline is less walking with overall weakness. No longer walking in the halls. Just transfers and to bathroom. PMH includes RA and prior use of methotrexate which was discontinued for respiratory changes on CT showing ground glass opacities c/w acute alveolitis and persistent cystic changes throughout both lungs. Budesonide added BID to duonebs starting 10/2019 appear helpful. Sounding with wheezes posterior and anterior (was getting scheduled neb) and encouraged to keep mouth piece in place for treatment.Much improved on exam today. Weight stable but higher started in 240s 2/2020 now high 240s (246-248 lbs). Lower extremity edema at baseline. At baseline family and resident has refused vaccine.       Past Medical and Surgical History reviewed in Epic today.    MEDICATIONS:    Current Outpatient Medications   Medication Sig Dispense Refill     acetaminophen (TYLENOL) 325 MG tablet TAKE TWO TABLETS (650MG) BY MOUTH EVERY 4 HOURS AS NEEDED 60 tablet 97     albuterol (VENTOLIN HFA) 108 (90 Base) MCG/ACT inhaler Inhale 2 puffs into the lungs every 4 hours as needed for shortness of breath / dyspnea or wheezing (May keep bedside) 18 g 98     Albuterol Sulfate (PROAIR HFA IN) Inhale 2 puffs into the lungs every 4 hours as needed        amLODIPine (NORVASC) 10 MG tablet TAKE 1 TABLET BY MOUTH ONCE DAILY 28 tablet PRN     ASPIRIN LOW DOSE 81 MG EC tablet TAKE 1 TABLET BY MOUTH ONCE DAILY 28 tablet PRN     RADHA PROTECT (EUCERIN) external cream APPLY TOPICALLY TO AFFECTED AREA(S) TWICE DAILY & AS NEEDED WITH INCONTINENT CHANGES 142 g 97     budesonide (PULMICORT) 0.5 MG/2ML neb solution NEBULIZE THE CONTENT OF 1 VIAL TWICE DAILY (ADD TO IN THE MORNING AND AT BEDTIME DUONEBS) 60 mL 97     CALMOSEPTINE 0.44-20.6 % OINT ointment APPLY TO CLEANSED REDDNED CHARISSA SKIN FOLDS & RASH WITH BREAKDOWN DURING MORNING & BEDTIME CARES & INCONTINENT CHANGES OR MEDICATION PASS 113 g 10     Elastic Bandages & Supports (T.E.D. KNEE LENGTH/M-LONG) MISC WEAR AS DIRECTED ON IN THE MORNING OFF AT BEDTIME (2 = 1 PAIR) 4 each 97     furosemide (LASIX) 40 MG tablet Take 1 tablet (40 mg) by mouth daily 31 tablet 98     ipratropium - albuterol 0.5 mg/2.5 mg/3 mL (DUONEB) 0.5-2.5 (3) MG/3ML neb solution Take 1 vial (3 mLs) by nebulization 4 times daily 180 mL 97     Lactobacillus-Inulin ([facility] DIGESTIVE [facility]) CHEW CHEW 2 TABLET BY MOUTH ONCE DAILY 72 tablet 97     latanoprost (XALATAN) 0.005 % ophthalmic solution INSTILL ONE DROP IN THE RIGHT EYE IN THE EVENING 2.5 mL 97     levothyroxine (SYNTHROID/LEVOTHROID) 50 MCG tablet TAKE 1 TABLET BY MOUTH ONCE DAILY 31 tablet 99     MELATONIN MAXIMUM STRENGTH 5 MG tablet TAKE 1 TABLET BY MOUTH ONCE DAILY 31 tablet 99     MENTHOL-ZINC OXIDE EX Apply topically to cleaned reddened charissa skin folds and rash with breakdown during morning and bedtime cares and incontinent changes or medication pass       metoprolol tartrate (LOPRESSOR) 25 MG tablet TAKE 1 TABLET BY MOUTH TWICE DAILY 56 tablet PRN     MUCINEX 600 MG 12 hr tablet TAKE 1 TABLET BY MOUTH TWICE DAILY 56 tablet 97     Multiple Vitamins-Minerals (CENTRUM SILVER ULTRA MENS) TABS Take 1 tablet by mouth daily       multivitamin (I-EDGAR) TABS per tablet TAKE 1 TABLET BY MOUTH ONCE  DAILY 31 tablet 98     nystatin (MYCOSTATIN) 626828 UNIT/GM external cream Apply to affected area as needed 30 g 97     potassium chloride ER (K-DUR/KLOR-CON M) 20 MEQ CR tablet TAKE TWO TABLETS (40 MEQ) BY MOUTH ONCE DAILY 56 tablet 97     predniSONE (DELTASONE) 10 MG tablet Take 0.5 tablets (5 mg) by mouth daily 31 tablet 98     SM STOOL SOFTENER 8.6-50 MG tablet TAKE 1 TABLET BY MOUTH TWICE DAILY AS NEEDED FOR CONSTIPATION 30 tablet 97     tamsulosin (FLOMAX) 0.4 MG capsule TAKE 1 CAPSULE BY MOUTH ONCE DAILY 28 capsule PRN     Multiple Vitamins-Minerals (CENTRUM SILVER 50+MEN) TABS TAKE 1 TABLET BY MOUTH ONCE DAILY (Patient not taking: Reported on 6/11/2020) 28 tablet 97     nystatin (MYCOSTATIN) 981880 UNIT/GM POWD Apply topically 2 times daily AND as needed with incontinent changes       REVIEW OF SYSTEMS:  4 point ROS including Respiratory, CV, GI and , other than that noted in the HPI,  is negative    Objective:  /70   Pulse 64   Temp 97  F (36.1  C)   Resp 20   Wt 107.5 kg (237 lb)   BMI 30.43 kg/m    Exam:  GENERAL APPEARANCE:  Alert, in no distress sitting in WC, oily skin baseline  ENT:  Mouth and posterior oropharynx normal, moist mucous membranes, [place], skin lesion of right ear lesion healed, ears blocked cerumen in ears today  EYES:  EOM, conjunctivae, lids, pupils and irises normal-glasses  RESP:  respiratory effort and palpation of chest increased, lungs diminished but clear to anterior and posterior- no wheezes present  CV:  Palpation and auscultation of heart done, bradycardic (not today) and regular rhythm, 3/6 murmur- not heard on previous exam,  peripheral edema 2+ in legs ankles and feet. Compression Hose on  ABDOMEN:  normal bowel sounds, soft, nontender  M/S:   Gait and station using wheelchair for ambulation, generalized weakness increased, less walking with SBA of 1 for transfers  SKIN:  Inspection of skin and subcutaneous tissue baseline to visualized areas  of skin. Chondrodermatitis nodule on superior helix right ear removed +BCC   NEURO:   Examination of sensation by touch normal  PSYCH:  insight and judgement impaired, memory impaired    Labs:   CBC RESULTS:   Recent Labs   Lab Test 11/21/19 07/26/19   WBC 8.5 9.8   RBC 4.46 4.58   HGB 13.1* 13.8   HCT 42.9 43.5   MCV 96 95   MCH 29.4 30.1   MCHC 30.5* 31.7   RDW 14.2 14.1    285       Last Basic Metabolic Panel:  Recent Labs   Lab Test 11/21/19 07/26/19    138   POTASSIUM 3.9 3.7   CHLORIDE 103 102   MERCEDES 8.5 9.8   CO2 29 32   BUN 26 30   CR 1.47* 1.40*   * 173*       Liver Function Studies -   Recent Labs   Lab Test 04/25/19 09/13/18 04/19/18   PROTTOTAL 8.0  --  7.9   ALBUMIN 3.5  --  3.4   BILITOTAL 0.5  --  0.6   ALKPHOS 76  --  78   AST 11 20 19   ALT 20 19 23       TSH   Date Value Ref Range Status   07/26/2019 4.12 (A) 0.40 - 4.00 mU/L Final   04/25/2019 6.93 (A) 0.40 - 4.00 mU/L Final       Lab Results   Component Value Date    A1C 6.7 11/21/2019       ASSESSMENT/PLAN:  (H61.23) Bilateral impacted cerumen (primary encounter diagnosis)  Comment: seen today with otoscope  Plan:   -cerumen removal with lighted curette to both ear canals     (E03.9) Hypothyroidism, unspecified type  Comment: stable- ok for geriatrics   Plan:   -annual TSH  -levothyroxine 50 mcg po daily     (R60.0) Lower extremity edema  Comment: ongoing   Plan:   -Compression Hose on am and off HS  -Lasix 40 mg po daily (potassium 40 mEq daily)    (I48.91) Atrial fibrillation, unspecified type (H)  Comment: rate controlled and ASA  Plan:   -not on anticoagulation 2/2 number of previous falls    (I12.9,  N18.3) Benign hypertension with CKD (chronic kidney disease) stage III (H)  Comment: stable  Plan:   -Norvasc 10 mg po daily  -Metoprolol 25 mg po daily   -Lasix 40 mg po daily  -follow BMP    (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 Is appropriate for AL setting         Electronically signed by:  SUNDAY Lee CNP

## 2020-06-12 PROBLEM — F03.90 DEMENTIA WITHOUT BEHAVIORAL DISTURBANCE, UNSPECIFIED DEMENTIA TYPE: Status: ACTIVE | Noted: 2017-05-20

## 2020-07-14 NOTE — LETTER
7/14/2020        RE: Jacob Tran  [address]  [facility] [address]        [place]  [place] Medical Record Number:  [id number]  Place of Service where encounter took place:  [facility] [[id number]]  Chief Complaint   Patient presents with     Nursing Home Acute       HPI:    Jacob Easton  is a 89 year old (5/16/1929), who is being seen today for an episodic care visit.  HPI information obtained from: patient report. Today's concern is:    Seen today at his request for cerumen removal and examine of cyst noticed by staff on penis. Denies pain, itch, dysuria. He is unable to see it but does not remember any cyst in past. Staff reported pocket of fluid present on underside of penis. Area soft, non-tender, and no s/s of infection present. Area measures approx. 2cm x 1cm x 0.3cm.      Past Medical and Surgical History reviewed in Epic today.    MEDICATIONS:    Current Outpatient Medications   Medication Sig Dispense Refill     acetaminophen (TYLENOL) 325 MG tablet TAKE TWO TABLETS (650MG) BY MOUTH EVERY 4 HOURS AS NEEDED 60 tablet 97     albuterol (VENTOLIN HFA) 108 (90 Base) MCG/ACT inhaler Inhale 2 puffs into the lungs every 4 hours as needed for shortness of breath / dyspnea or wheezing (May keep bedside) 18 g 98     Albuterol Sulfate (PROAIR HFA IN) Inhale 2 puffs into the lungs every 4 hours as needed       amLODIPine (NORVASC) 10 MG tablet TAKE 1 TABLET BY MOUTH ONCE DAILY 28 tablet PRN     ASPIRIN LOW DOSE 81 MG EC tablet TAKE 1 TABLET BY MOUTH ONCE DAILY 28 tablet PRN     RADHA PROTECT (EUCERIN) external cream APPLY TOPICALLY TO AFFECTED AREA(S) TWICE DAILY & AS NEEDED WITH INCONTINENT CHANGES 142 g 97     budesonide (PULMICORT) 0.5 MG/2ML neb solution NEBULIZE THE CONTENT OF 1 VIAL TWICE DAILY (ADD TO IN THE MORNING AND AT BEDTIME DUONEBS) 60 mL 97     CALMOSEPTINE 0.44-20.6 % OINT ointment APPLY TO CLEANSED REDDNED CHARISSA SKIN FOLDS &  RASH WITH BREAKDOWN DURING MORNING & BEDTIME CARES & INCONTINENT CHANGES OR MEDICATION PASS 113 g 10     Elastic Bandages & Supports (T.E.D. KNEE LENGTH/M-LONG) MISC WEAR AS DIRECTED ON IN THE MORNING OFF AT BEDTIME (2 = 1 PAIR) 4 each 97     furosemide (LASIX) 40 MG tablet Take 1 tablet (40 mg) by mouth daily 31 tablet 98     ipratropium - albuterol 0.5 mg/2.5 mg/3 mL (DUONEB) 0.5-2.5 (3) MG/3ML neb solution Take 1 vial (3 mLs) by nebulization 4 times daily 180 mL 97     Lactobacillus-Inulin ([facility] DIGESTIVE [facility]) CHEW CHEW 2 TABLET BY MOUTH ONCE DAILY 72 tablet 97     latanoprost (XALATAN) 0.005 % ophthalmic solution INSTILL ONE DROP IN THE RIGHT EYE IN THE EVENING 2.5 mL 97     levothyroxine (SYNTHROID/LEVOTHROID) 50 MCG tablet TAKE 1 TABLET BY MOUTH ONCE DAILY 31 tablet 99     MELATONIN MAXIMUM STRENGTH 5 MG tablet TAKE 1 TABLET BY MOUTH ONCE DAILY 31 tablet 99     MENTHOL-ZINC OXIDE EX Apply topically to cleaned reddened [place] skin folds and rash with breakdown during morning and bedtime cares and incontinent changes or medication pass       metoprolol tartrate (LOPRESSOR) 25 MG tablet TAKE 1 TABLET BY MOUTH TWICE DAILY 56 tablet PRN     MUCINEX 600 MG 12 hr tablet TAKE 1 TABLET BY MOUTH TWICE DAILY 56 tablet 97     Multiple Vitamins-Minerals (CENTRUM SILVER ULTRA MENS) TABS Take 1 tablet by mouth daily       multivitamin (I-EDGAR) TABS per tablet TAKE 1 TABLET BY MOUTH ONCE DAILY 31 tablet 97     nystatin (MYCOSTATIN) 113215 UNIT/GM external cream Apply to affected area as needed 30 g 97     potassium chloride ER (K-DUR/KLOR-CON M) 20 MEQ CR tablet TAKE TWO TABLETS (40 MEQ) BY MOUTH ONCE DAILY 56 tablet 97     predniSONE (DELTASONE) 10 MG tablet Take 0.5 tablets (5 mg) by mouth daily 31 tablet 98     SM STOOL SOFTENER 8.6-50 MG tablet TAKE 1 TABLET BY MOUTH TWICE DAILY AS NEEDED FOR CONSTIPATION 30 tablet 97     tamsulosin (FLOMAX) 0.4 MG capsule TAKE 1 CAPSULE BY MOUTH ONCE DAILY 28 capsule PRN      REVIEW OF SYSTEMS:  4 point ROS including Respiratory, CV, GI and , other than that noted in the HPI,  is negative    Objective:  /62   Pulse 76   Temp 96.9  F (36.1  C)   Wt 104.8 kg (231 lb)   BMI 29.66 kg/m    Exam:  GENERAL APPEARANCE:  Alert, in no distress sitting in WC, oily skin baseline  ENT:  Mouth and posterior oropharynx normal, moist mucous membranes, [place], skin lesion of right ear lesion healed, ears blocked with some cerumen in ears   EYES:  EOM, conjunctivae, lids, pupils and irises normal-glasses  RESP:  respiratory effort and palpation of chest increased, lungs diminished but clear to anterior and posterior- no wheezes present  CV:  Palpation and auscultation of heart done, bradycardic (not today) and regular rhythm, 3/6 murmur- not heard on previous exam,  peripheral edema 2+ in legs ankles and feet. Compression Hose on  ABDOMEN:  normal bowel sounds, soft, nontender  : see hpi  M/S:   Gait and station using wheelchair for ambulation, generalized weakness increased, less walking with SBA of 1 for transfers  SKIN:  Inspection of skin and subcutaneous tissue baseline to visualized areas of skin- nodule on superior helix right ear removed +BCC   NEURO:   Examination of sensation by touch normal  PSYCH:  insight and judgement impaired, memory impaired    Labs:   CBC RESULTS:   Recent Labs   Lab Test 11/21/19 07/26/19   WBC 8.5 9.8   RBC 4.46 4.58   HGB 13.1* 13.8   HCT 42.9 43.5   MCV 96 95   MCH 29.4 30.1   MCHC 30.5* 31.7   RDW 14.2 14.1    285       Last Basic Metabolic Panel:  Recent Labs   Lab Test 11/21/19 07/26/19    138   POTASSIUM 3.9 3.7   CHLORIDE 103 102   MERCEDES 8.5 9.8   CO2 29 32   BUN 26 30   CR 1.47* 1.40*   * 173*       Liver Function Studies -   Recent Labs   Lab Test 04/25/19 09/13/18 04/19/18   PROTTOTAL 8.0  --  7.9   ALBUMIN 3.5  --  3.4   BILITOTAL 0.5  --  0.6   ALKPHOS 76  --  78   AST 11 20 19   ALT 20 19 23       TSH   Date Value Ref Range  Status   07/26/2019 4.12 (A) 0.40 - 4.00 mU/L Final   04/25/2019 6.93 (A) 0.40 - 4.00 mU/L Final       Lab Results   Component Value Date    A1C 6.7 11/21/2019     ASSESSMENT/PLAN:  (H61.23) Bilateral impacted cerumen  (primary encounter diagnosis)  Comment: ongoing build-up viewed today with otoscope   Plan:   -cerumen removal with lighted curette to both ear canals     (N48.89) Penile cyst  Comment: MD and RA assist with examination-appears reduced/absorbed    Plan:   -continue to monitor for s/s      Electronically signed by:  SUNDAY Lee CNP             Sincerely,        SUNDAY Lee CNP

## 2020-07-14 NOTE — PROGRESS NOTES
[facility]  [place] Medical Record Number:  [id number]  Place of Service where encounter took place:  [facility] ([facility] [[id number]]  Chief Complaint   Patient presents with     Nursing Home Acute       HPI:    Jacob Easton  is a 89 year old (5/16/1929), who is being seen today for an episodic care visit.  HPI information obtained from: patient report. Today's concern is:    Seen today at his request for cerumen removal and examine of cyst noticed by staff on penis. Denies pain, itch, dysuria. He is unable to see it but does not remember any cyst in past. Staff reported pocket of fluid present on underside of penis. Area soft, non-tender, and no s/s of infection present. Area measures approx. 2cm x 1cm x 0.3cm.      Past Medical and Surgical History reviewed in Epic today.    MEDICATIONS:    Current Outpatient Medications   Medication Sig Dispense Refill     acetaminophen (TYLENOL) 325 MG tablet TAKE TWO TABLETS (650MG) BY MOUTH EVERY 4 HOURS AS NEEDED 60 tablet 97     albuterol (VENTOLIN HFA) 108 (90 Base) MCG/ACT inhaler Inhale 2 puffs into the lungs every 4 hours as needed for shortness of breath / dyspnea or wheezing (May keep bedside) 18 g 98     Albuterol Sulfate (PROAIR HFA IN) Inhale 2 puffs into the lungs every 4 hours as needed       amLODIPine (NORVASC) 10 MG tablet TAKE 1 TABLET BY MOUTH ONCE DAILY 28 tablet PRN     ASPIRIN LOW DOSE 81 MG EC tablet TAKE 1 TABLET BY MOUTH ONCE DAILY 28 tablet PRN     RADHA PROTECT (EUCERIN) external cream APPLY TOPICALLY TO AFFECTED AREA(S) TWICE DAILY & AS NEEDED WITH INCONTINENT CHANGES 142 g 97     budesonide (PULMICORT) 0.5 MG/2ML neb solution NEBULIZE THE CONTENT OF 1 VIAL TWICE DAILY (ADD TO IN THE MORNING AND AT BEDTIME DUONEBS) 60 mL 97     CALMOSEPTINE 0.44-20.6 % OINT ointment APPLY TO CLEANSED REDDNED CHARISSA SKIN FOLDS & RASH WITH BREAKDOWN DURING MORNING & BEDTIME CARES & INCONTINENT CHANGES OR MEDICATION PASS 113 g 10      Elastic Bandages & Supports (T.E.D. KNEE LENGTH/M-LONG) MISC WEAR AS DIRECTED ON IN THE MORNING OFF AT BEDTIME (2 = 1 PAIR) 4 each 97     furosemide (LASIX) 40 MG tablet Take 1 tablet (40 mg) by mouth daily 31 tablet 98     ipratropium - albuterol 0.5 mg/2.5 mg/3 mL (DUONEB) 0.5-2.5 (3) MG/3ML neb solution Take 1 vial (3 mLs) by nebulization 4 times daily 180 mL 97     Lactobacillus-Inulin ([facility] DIGESTIVE [facility]) CHEW CHEW 2 TABLET BY MOUTH ONCE DAILY 72 tablet 97     latanoprost (XALATAN) 0.005 % ophthalmic solution INSTILL ONE DROP IN THE RIGHT EYE IN THE EVENING 2.5 mL 97     levothyroxine (SYNTHROID/LEVOTHROID) 50 MCG tablet TAKE 1 TABLET BY MOUTH ONCE DAILY 31 tablet 99     MELATONIN MAXIMUM STRENGTH 5 MG tablet TAKE 1 TABLET BY MOUTH ONCE DAILY 31 tablet 99     MENTHOL-ZINC OXIDE EX Apply topically to cleaned reddened brittanie skin folds and rash with breakdown during morning and bedtime cares and incontinent changes or medication pass       metoprolol tartrate (LOPRESSOR) 25 MG tablet TAKE 1 TABLET BY MOUTH TWICE DAILY 56 tablet PRN     MUCINEX 600 MG 12 hr tablet TAKE 1 TABLET BY MOUTH TWICE DAILY 56 tablet 97     Multiple Vitamins-Minerals (CENTRUM SILVER ULTRA MENS) TABS Take 1 tablet by mouth daily       multivitamin (I-EDGAR) TABS per tablet TAKE 1 TABLET BY MOUTH ONCE DAILY 31 tablet 97     nystatin (MYCOSTATIN) 037978 UNIT/GM external cream Apply to affected area as needed 30 g 97     potassium chloride ER (K-DUR/KLOR-CON M) 20 MEQ CR tablet TAKE TWO TABLETS (40 MEQ) BY MOUTH ONCE DAILY 56 tablet 97     predniSONE (DELTASONE) 10 MG tablet Take 0.5 tablets (5 mg) by mouth daily 31 tablet 98     SM STOOL SOFTENER 8.6-50 MG tablet TAKE 1 TABLET BY MOUTH TWICE DAILY AS NEEDED FOR CONSTIPATION 30 tablet 97     tamsulosin (FLOMAX) 0.4 MG capsule TAKE 1 CAPSULE BY MOUTH ONCE DAILY 28 capsule PRN     REVIEW OF SYSTEMS:  4 point ROS including Respiratory, CV, GI and , other than that noted in the HPI,  is  negative    Objective:  /62   Pulse 76   Temp 96.9  F (36.1  C)   Wt 104.8 kg (231 lb)   BMI 29.66 kg/m    Exam:  GENERAL APPEARANCE:  Alert, in no distress sitting in WC, oily skin baseline  ENT:  Mouth and posterior oropharynx normal, moist mucous membranes, [place], skin lesion of right ear lesion healed, ears blocked with some cerumen in ears   EYES:  EOM, conjunctivae, lids, pupils and irises normal-glasses  RESP:  respiratory effort and palpation of chest increased, lungs diminished but clear to anterior and posterior- no wheezes present  CV:  Palpation and auscultation of heart done, bradycardic (not today) and regular rhythm, 3/6 murmur- not heard on previous exam,  peripheral edema 2+ in legs ankles and feet. Compression Hose on  ABDOMEN:  normal bowel sounds, soft, nontender  : see hpi  M/S:   Gait and station using wheelchair for ambulation, generalized weakness increased, less walking with SBA of 1 for transfers  SKIN:  Inspection of skin and subcutaneous tissue baseline to visualized areas of skin- nodule on superior helix right ear removed +BCC   NEURO:   Examination of sensation by touch normal  PSYCH:  insight and judgement impaired, memory impaired    Labs:   CBC RESULTS:   Recent Labs   Lab Test 11/21/19 07/26/19   WBC 8.5 9.8   RBC 4.46 4.58   HGB 13.1* 13.8   HCT 42.9 43.5   MCV 96 95   MCH 29.4 30.1   MCHC 30.5* 31.7   RDW 14.2 14.1    285       Last Basic Metabolic Panel:  Recent Labs   Lab Test 11/21/19 07/26/19    138   POTASSIUM 3.9 3.7   CHLORIDE 103 102   MERCEDES 8.5 9.8   CO2 29 32   BUN 26 30   CR 1.47* 1.40*   * 173*       Liver Function Studies -   Recent Labs   Lab Test 04/25/19 09/13/18 04/19/18   PROTTOTAL 8.0  --  7.9   ALBUMIN 3.5  --  3.4   BILITOTAL 0.5  --  0.6   ALKPHOS 76  --  78   AST 11 20 19   ALT 20 19 23       TSH   Date Value Ref Range Status   07/26/2019 4.12 (A) 0.40 - 4.00 mU/L Final   04/25/2019 6.93 (A) 0.40 - 4.00 mU/L Final       Lab  Results   Component Value Date    A1C 6.7 11/21/2019     ASSESSMENT/PLAN:  (H61.23) Bilateral impacted cerumen  (primary encounter diagnosis)  Comment: ongoing build-up viewed today with otoscope   Plan:   -cerumen removal with lighted curette to both ear canals     (N48.89) Penile cyst  Comment: MD and RA assist with examination-appears reduced/absorbed    Plan:   -continue to monitor for s/s      Electronically signed by:  SUNDAY Lee CNP

## 2020-09-15 NOTE — LETTER
"    9/15/2020        RE: Jacob Easton  [facility]  [address]  [address]  [facility] [address]        Jacob Easton is a 89 year old male seen September 15, 2020 at [facility] where he has resided for 3 years (admit 5/2017) seen to follow up RA, HTN and atrial fib   Patient is seen in his apartment, up to  working at his desk.   He states \"I'm fine\"   No current pain, takes Tylenol \"once in a while for leg pain.\"   Able to propel his WC with his hands       He has longstanding RA, has been on MTX for 10 years.   It was stopped in 2017 because of respiratory changes with CT showing ground glass opacities c/w acute alveolitis and persistent cystic changes throughout both lungs.   Patient is no longer on supplemental O2, uses nebs just prn.       Patient had multiple hospitalizations and TCU stays over the past 3 years, with falls and weakness, unable to get up.   [place] secondary to higher care needs and has not had any further hospitalizations since.        Past Medical History:   Diagnosis Date     Arthritis      Constipation      Hypertension      Hypocalcemia      Neuromuscular disorder (H)      Thyroid disease    Pulmonary alveolitis  CKD stage 3  RA  BPH  Atrial fib  LE weakness  BCC right ear, s/p MOHS    SH:  Retired [employer] .    2019    His daughter Mary lives in [place], son Bernabe in [place].      Review Of Systems  :  LE weakness, walks in hallways daily with nursing staff assistance, min-mod assist with ADLs, and transfer to  which he uses for all destinations.     [facility] 18/30   CPT 4.5   Wt Readings from Last 5 Encounters:   09/09/20 107.2 kg (236 lb 6.4 oz)   07/14/20 104.8 kg (231 lb)   06/11/20 107.5 kg (237 lb)   04/28/20 108 kg (238 lb 3.2 oz)   02/27/20 112.9 kg (248 lb 12.8 oz)        EXAM:  NAD, rubicund  BP (!) 140/83   Pulse 80   Temp 97.6  F (36.4  C)   Resp 18   Ht 1.88 m (6' 2\")   Wt 107.2 kg (236 lb 6.4 oz)   SpO2 95%   BMI 30.35 kg/m     Neck supple " without adenopathy  Lungs decreased BS, few coarse crackles diffusely  Heart RRR s1s2 @85, 2/6 early ELENA  Abd soft, NT, no distention, +BS  Ext 1+ ankle edema R>L     Neuro: generalized weakness, WC bound  Psych: affect okay, bright    Labs reviewed, has had several swabs for COVID19, all negative       IMP/PLAN:  (M06.09) 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 (H)  Comment: with limited mobility     Plan: patient follows with Rheumatologist, but do not have access to those notes.   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       (N18.3) CKD (chronic kidney disease) stage 3, GFR 30-59 ml/min  Comment: secondary to HTN    GFR 39  Plan: follow BMP, renal dosing of meds     (I12.9,  N18.3) Benign hypertension with CKD (chronic kidney disease) stage III (H)  Comment:   BP Readings from Last 3 Encounters:   09/09/20 (!) 140/83   07/14/20 115/62   06/11/20 125/70   Plan: remains on amlodipine 10 mg/day and metoprolol 25 m bid    (N40.0) Benign prostatic hyperplasia, unspecified whether lower urinary tract symptoms present  Comment: longstanding  Plan: continue tamsulosin to avoid urinary retention      (E03.9) Hypothyroidism, unspecified type  Comment:   TSH   Date Value Ref Range Status   07/26/2019 4.12 (A) 0.40 - 4.00 mU/L Final    Plan: same dose levothyroxine, yearly TSH          (R60.0) Lower extremity edema  Comment: dependent  Plan: compression stockings, furosemide, elevate as able.       (I48.91) Atrial fibrillation, unspecified type (H)  Comment:   Pulse Readings from Last 4 Encounters:   09/09/20 80   07/14/20 76   06/11/20 64   04/28/20 83         Plan: daily ASA.   Not otherwise anticoagulated secondary to number of falls he has had     Metoprolol for VR control    AD:  DNR/DNI    Tiffani Vigil MD         Sincerely,        Tiffani Vigil MD

## 2020-09-15 NOTE — PROGRESS NOTES
"Jacob Easton is a 89 year old male seen September 15, 2020 at [facility] where he has resided for 3 years (admit 5/2017) seen to follow up RA, HTN and atrial fib   Patient is seen in his apartment, up to  working at his desk.   He states \"I'm fine\"   No current pain, takes Tylenol \"once in a while for leg pain.\"   Able to propel his WC with his hands       He has longstanding RA, has been on MTX for 10 years.   It was stopped in 2017 because of respiratory changes with CT showing ground glass opacities c/w acute alveolitis and persistent cystic changes throughout both lungs.   Patient is no longer on supplemental O2, uses nebs just prn.       Patient had multiple hospitalizations and TCU stays over the past 3 years, with falls and weakness, unable to get up.   [place] secondary to higher care needs and has not had any further hospitalizations since.        Past Medical History:   Diagnosis Date     Arthritis      Constipation      Hypertension      Hypocalcemia      Neuromuscular disorder (H)      Thyroid disease    Pulmonary alveolitis  CKD stage 3  RA  BPH  Atrial fib  LE weakness  BCC right ear, s/p MOHS    SH:  Retired [employer] .    2019    His daughter Mary lives in [place], son Bernabe in [place].      Review Of Systems  :  LE weakness, walks in hallways daily with nursing staff assistance, min-mod assist with ADLs, and transfer to  which he uses for all destinations.     [facility] 18/30   CPT 4.5   Wt Readings from Last 5 Encounters:   09/09/20 107.2 kg (236 lb 6.4 oz)   07/14/20 104.8 kg (231 lb)   06/11/20 107.5 kg (237 lb)   04/28/20 108 kg (238 lb 3.2 oz)   02/27/20 112.9 kg (248 lb 12.8 oz)        EXAM:  NAD, rubicund  BP (!) 140/83   Pulse 80   Temp 97.6  F (36.4  C)   Resp 18   Ht 1.88 m (6' 2\")   Wt 107.2 kg (236 lb 6.4 oz)   SpO2 95%   BMI 30.35 kg/m     Neck supple without adenopathy  Lungs decreased BS, few coarse crackles diffusely  Heart RRR s1s2 @85, 2/6 early ELENA  Abd soft, " NT, no distention, +BS  Ext 1+ ankle edema R>L     Neuro: generalized weakness, WC bound  Psych: affect okay, bright    Labs reviewed, has had several swabs for COVID19, all negative       IMP/PLAN:  (M06.09) 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 (H)  Comment: with limited mobility     Plan: patient follows with Rheumatologist, but do not have access to those notes.   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       (N18.3) CKD (chronic kidney disease) stage 3, GFR 30-59 ml/min  Comment: secondary to HTN    GFR 39  Plan: follow BMP, renal dosing of meds     (I12.9,  N18.3) Benign hypertension with CKD (chronic kidney disease) stage III (H)  Comment:   BP Readings from Last 3 Encounters:   09/09/20 (!) 140/83   07/14/20 115/62   06/11/20 125/70   Plan: remains on amlodipine 10 mg/day and metoprolol 25 m bid    (N40.0) Benign prostatic hyperplasia, unspecified whether lower urinary tract symptoms present  Comment: longstanding  Plan: continue tamsulosin to avoid urinary retention      (E03.9) Hypothyroidism, unspecified type  Comment:   TSH   Date Value Ref Range Status   07/26/2019 4.12 (A) 0.40 - 4.00 mU/L Final    Plan: same dose levothyroxine, yearly TSH          (R60.0) Lower extremity edema  Comment: dependent  Plan: compression stockings, furosemide, elevate as able.       (I48.91) Atrial fibrillation, unspecified type (H)  Comment:   Pulse Readings from Last 4 Encounters:   09/09/20 80   07/14/20 76   06/11/20 64   04/28/20 83         Plan: daily ASA.   Not otherwise anticoagulated secondary to number of falls he has had     Metoprolol for VR control    AD: DNR/DNI    Tiffani Vigil MD

## 2020-10-06 NOTE — PROGRESS NOTES
"[facility]  [place] Medical Record Number: [id number]  Place of Service where encounter took place: [facility] ([facility] [[id number]]  Chief Complaint   Patient presents with     RECHECK     Wellness Visit       HPI:    Jacob Easton is a 89 year old (5/16/1929), who is being seen today for an episodic care visit. HPI information obtained from: facility chart records, facility staff, patient report and [facility] chart review. Today's concern is:    Seen today for follow up to chronic conditions and wellness visit. No acute concerns. Has ongoing wax buildup bilateral and viewed today with otoscope. Denies chest pain, abdominal pain, SOB. No longer walking much and decline PT says \"I am 89, this chair is fine\". He is able to propel with hands.    History includes longstanding RA, had been on MTX for 10 years.   It was stopped in 2017 because of respiratory changes with CT showing ground glass opacities c/w acute alveolitis and persistent cystic changes throughout both lungs.  Patient is no longer on supplemental O2, uses nebs.      Annual Wellness Visit    Are you in the first 12 months of your Medicare Part B coverage?  No    Physical Health:    In general, how would you rate your overall physical health? good    Outside of work, how many days during the week do you exercise? Walk to the bathroom daily    Outside of work, approximately how many minutes a day do you exercise?not applicable    If you drink alcohol do you typically have >3 drinks per day or >7 drinks per week? No    Do you usually eat at least 4 servings of fruit and vegetables a day, include whole grains & fiber and avoid regularly eating high fat or \"junk\" foods? Yes    Do you have any problems taking medications regularly? No    Do you have any side effects from medications? none    Needs assistance for the following daily activities: transportation, preparing meals, housework, bathing, laundry, money management " and taking medicine    Which of the following safety concerns are present in your home?  none identified     Hearing impairment: No    In the past 6 months, have you been bothered by leaking of urine? incontinent at baseline    Mental Health:    In general, how would you rate your overall mental or emotional health? good      PHQ-2 Score: 0      PHQ-2 ( 1999 [employer]) 10/8/2020   Q1: Little interest or pleasure in doing things 0   Q2: Feeling down, depressed or hopeless 0   PHQ-2 Score 0          Do you feel safe in your environment? Yes    Have you ever done Advance Care Planning? (For example, a Health Directive, POLST, or a discussion with a medical provider or your loved ones about your wishes)? Yes, advance care planning is on file.    Fall risk: No falls in last 6 months    Cognitive Screening: SLUMS 23/30 at facility on  5/4/20    Do you have sleep apnea, excessive snoring or daytime drowsiness?: no    Current providers sharing in care for this patient include:   Patient Care Team:  Orestes Clement APRN CNP as PCP - General (Nurse Practitioner - Adult Health)  Orestes Clement APRN CNP as Assigned PCP  Tiffani Vigil MD as MD (Internal Medicine)  Chelita Cuevas LSW as  (Primary Care - CC)  Jocelynn Renteria as Case Management Specialist (Primary Care - CC)  Cindy Pollard as Case Management Specialist (Primary Care - CC)  Sujata Hayes RPH as Pharmacist (Pharmacist)  Blanca Nicole as Nursing Assistant (Gerontology)    Past Medical and Surgical History reviewed in Epic today.    MEDICATIONS:  Current Outpatient Medications   Medication Sig Dispense Refill     acetaminophen (TYLENOL) 325 MG tablet TAKE TWO TABLETS (650MG) BY MOUTH EVERY 4 HOURS AS NEEDED 60 tablet 97     albuterol (VENTOLIN HFA) 108 (90 Base) MCG/ACT inhaler Inhale 2 puffs into the lungs every 4 hours as needed for shortness of breath / dyspnea or wheezing (May keep bedside) 18 g 98     amLODIPine (NORVASC) 10 MG tablet TAKE  1 TABLET BY MOUTH ONCE DAILY 28 tablet PRN     ASPIRIN LOW DOSE 81 MG EC tablet TAKE 1 TABLET BY MOUTH ONCE DAILY 28 tablet PRN     budesonide (PULMICORT) 0.5 MG/2ML neb solution NEBULIZE THE CONTENT OF 1 VIAL TWICE DAILY (ADD TO IN THE MORNING AND AT BEDTIME DUONEBS) 60 mL 97     CALMOSEPTINE 0.44-20.6 % OINT ointment APPLY TO CLEANSED REDDNED CHARISSA SKIN FOLDS & RASH WITH BREAKDOWN DURING MORNING & BEDTIME CARES & INCONTINENT CHANGES OR MEDICATION PASS 113 g 10     Elastic Bandages & Supports (T.E.D. KNEE LENGTH/M-LONG) MISC WEAR AS DIRECTED ON IN THE MORNING OFF AT BEDTIME (2 = 1 PAIR) 4 each 97     furosemide (LASIX) 40 MG tablet TAKE 1 TABLET BY MOUTH ONCE DAILY 28 tablet PRN     ipratropium - albuterol 0.5 mg/2.5 mg/3 mL (DUONEB) 0.5-2.5 (3) MG/3ML neb solution NEBULIZE THE CONTENT OF 1 VIAL INTO THE LUNGS FOUR TIMES A  mL 97     Lactobacillus-Inulin ([facility] DIGESTIVE [facility]) CHEW CHEW 2 TABLET BY MOUTH ONCE DAILY 72 tablet 97     latanoprost (XALATAN) 0.005 % ophthalmic solution INSTILL ONE DROP IN THE RIGHT EYE IN THE EVENING 2.5 mL 97     levothyroxine (SYNTHROID/LEVOTHROID) 50 MCG tablet TAKE 1 TABLET BY MOUTH ONCE DAILY 31 tablet 99     MELATONIN MAXIMUM STRENGTH 5 MG tablet TAKE 1 TABLET BY MOUTH ONCE DAILY 31 tablet 99     metoprolol tartrate (LOPRESSOR) 25 MG tablet TAKE 1 TABLET BY MOUTH TWICE DAILY 56 tablet PRN     MUCINEX 600 MG 12 hr tablet TAKE 1 TABLET BY MOUTH TWICE DAILY 56 tablet 97     Multiple Vitamins-Minerals (CENTRUM SILVER ULTRA MENS) TABS Take 1 tablet by mouth daily       multivitamin (I-EDGAR) TABS per tablet TAKE 1 TABLET BY MOUTH ONCE DAILY 31 tablet 97     nystatin (MYCOSTATIN) 552357 UNIT/GM external cream Apply to affected area as needed 30 g 97     potassium chloride ER (KLOR-CON M) 20 MEQ CR tablet TAKE TWO TABLETS (40MEQ) BY MOUTH ONCE DAILY 56 tablet PRN     predniSONE (DELTASONE) 10 MG tablet Take 0.5 tablets (5 mg) by mouth daily 31 tablet 98     SM STOOL SOFTENER  "8.6-50 MG tablet TAKE 1 TABLET BY MOUTH TWICE DAILY AS NEEDED FOR CONSTIPATION 30 tablet 97     tamsulosin (FLOMAX) 0.4 MG capsule TAKE 1 CAPSULE BY MOUTH ONCE DAILY 28 capsule PRN     REVIEW OF SYSTEMS:  4 point ROS including Respiratory, CV, GI and , other than that noted in the HPI,  is negative    Objective:  /84   Pulse 68   Temp 98.1  F (36.7  C)   Resp 21   Ht 1.88 m (6' 2\")   Wt 106.1 kg (234 lb)   SpO2 95%   BMI 30.04 kg/m    Exam:  GENERAL APPEARANCE:  Alert, in no distress sitting in WC, oily skin baseline  ENT:  Mouth and posterior oropharynx normal, moist mucous membranes, [place], skin lesion of right ear lesion healed, ears blocked with some cerumen in ears   EYES:  EOM, conjunctivae, lids, pupils and irises normal-glasses  RESP:  respiratory effort and palpation of chest increased, lungs diminished but clear to anterior and posterior- no wheezes present  CV:  Palpation and auscultation of heart done, bradycardic (not today) and regular rhythm, 3/6 murmur- not heard on previous exam,  peripheral edema 2+ in legs ankles and feet. Compression Hose on  ABDOMEN:  normal bowel sounds, soft, nontender  M/S:   Gait and station using wheelchair for ambulation, generalized weakness increased, less walking with SBA of 1 for transfers  SKIN:  Inspection of skin and subcutaneous tissue baseline to visualized areas of skin  NEURO:   Examination of sensation by touch normal  PSYCH:  insight and judgement impaired, memory impaired    Labs:   CBC RESULTS:   Recent Labs   Lab Test 11/21/19 07/26/19   WBC 8.5 9.8   RBC 4.46 4.58   HGB 13.1* 13.8   HCT 42.9 43.5   MCV 96 95   MCH 29.4 30.1   MCHC 30.5* 31.7   RDW 14.2 14.1    285       Last Basic Metabolic Panel:  Recent Labs   Lab Test 11/21/19 07/26/19    138   POTASSIUM 3.9 3.7   CHLORIDE 103 102   MERCEDES 8.5 9.8   CO2 29 32   BUN 26 30   CR 1.47* 1.40*   * 173*       Liver Function Studies -   Recent Labs   Lab Test 04/25/19 09/13/18 " 04/19/18   PROTTOTAL 8.0  --  7.9   ALBUMIN 3.5  --  3.4   BILITOTAL 0.5  --  0.6   ALKPHOS 76  --  78   AST 11 20 19   ALT 20 19 23       TSH   Date Value Ref Range Status   07/26/2019 4.12 (A) 0.40 - 4.00 mU/L Final   04/25/2019 6.93 (A) 0.40 - 4.00 mU/L Final       Lab Results   Component Value Date    A1C 6.7 11/21/2019       ASSESSMENT/PLAN:  (N18.30) Stage 3 chronic kidney disease, unspecified whether stage 3a or 3b CKD  (primary encounter diagnosis)  Comment: stable   Plan:   -updated CBC and BMP  -renal dose medications     (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)  Comment: stable  Plan:   -Ventolin 2 puffs q4H prn (bedside)  -duonebs with Pulmicort   -mucinex 600 mg po BID  -prednisone 5 mg po daily     (I48.91) Atrial fibrillation, unspecified type (H)  (I10) Benign essential hypertension  (R60.0) Lower extremity edema  Comment: stable  Plan:   -ASA daily for secondary prevention with hx of TIA  -off anticoagulation 2/2 falls  -metoprolol 25 mg po daily   -lasix 40 mg po daily (potassium)  -norvasc 10 mg po daily   -BMP periodically   -VS and weights from facility    (E03.9) Hypothyroidism, unspecified type  Comment: stable  Plan:   -annual TSH  -levothyroxine 50 mcg po daily     (N40.1) Benign nodular prostatic hyperplasia with lower urinary tract symptoms  Comment: incontinent at baseline   Plan:   -flomax daily     (M06.09) 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 (H)  Comment: stable off methotrexate   Plan:   -prednisone 5 mg po daily     (H61.23) Bilateral impacted cerumen  Comment: chronic   Plan:   -cerumen removal with lighted curette to both ear canals     -ordered updated labs CBC, CMP,LFTs, TSH for next lab day          Electronically signed by:  Orestes Clement, SUNDAY CNP

## 2020-10-08 NOTE — LETTER
"    10/8/2020        RE: Jacob Tran  [address] [address]  [address]  [facility] [address]        [facility]  [facility] Record Number: [id number]  Place of Service where encounter took place: [facility] [[id number]]  Chief Complaint   Patient presents with     RECHECK     Wellness Visit       HPI:    Jacob Easton is a 89 year old (5/16/1929), who is being seen today for an episodic care visit. HPI information obtained from: facility chart records, facility staff, patient report and [facility] chart review. Today's concern is:    Seen today for follow up to chronic conditions and wellness visit. No acute concerns. Has ongoing wax buildup bilateral and viewed today with otoscope. Denies chest pain, abdominal pain, SOB. No longer walking much and decline PT says \"I am 89, this chair is fine\". He is able to propel with hands.    History includes longstanding RA, had been on MTX for 10 years.   It was stopped in 2017 because of respiratory changes with CT showing ground glass opacities c/w acute alveolitis and persistent cystic changes throughout both lungs.  Patient is no longer on supplemental O2, uses nebs.      Annual Wellness Visit    Are you in the first 12 months of your Medicare Part B coverage?  No    Physical Health:    In general, how would you rate your overall physical health? good    Outside of work, how many days during the week do you exercise? Walk to the bathroom daily    Outside of work, approximately how many minutes a day do you exercise?not applicable    If you drink alcohol do you typically have >3 drinks per day or >7 drinks per week? No    Do you usually eat at least 4 servings of fruit and vegetables a day, include whole grains & fiber and avoid regularly eating high fat or \"junk\" foods? Yes    Do you have any problems taking medications regularly? No    Do you have any side effects from medications? none    Needs assistance for " the following daily activities: transportation, preparing meals, housework, bathing, laundry, money management and taking medicine    Which of the following safety concerns are present in your home?  none identified     Hearing impairment: No    In the past 6 months, have you been bothered by leaking of urine? incontinent at baseline    Mental Health:    In general, how would you rate your overall mental or emotional health? good      PHQ-2 Score: 0      PHQ-2 ( 1999 [employer]) 10/8/2020   Q1: Little interest or pleasure in doing things 0   Q2: Feeling down, depressed or hopeless 0   PHQ-2 Score 0          Do you feel safe in your environment? Yes    Have you ever done Advance Care Planning? (For example, a Health Directive, POLST, or a discussion with a medical provider or your loved ones about your wishes)? Yes, advance care planning is on file.    Fall risk: No falls in last 6 months    Cognitive Screening: SLUMS 23/30 at facility on  5/4/20    Do you have sleep apnea, excessive snoring or daytime drowsiness?: no    Current providers sharing in care for this patient include:   Patient Care Team:  Orestes Clement APRN CNP as PCP - General (Nurse Practitioner - Adult Health)  Orestes Clement APRN CNP as Assigned PCP  Tiffani Vigil MD as MD (Internal Medicine)  Chelita Cuevas LSW as  (Primary Care - CC)  Jocelynn Renteria as Case Management Specialist (Primary Care - CC)  Cindy Pollard as Case Management Specialist (Primary Care - CC)  Sujata Hayes RPH as Pharmacist (Pharmacist)  Blanca Nicole as Nursing Assistant (Gerontology)    Past Medical and Surgical History reviewed in Epic today.    MEDICATIONS:  Current Outpatient Medications   Medication Sig Dispense Refill     acetaminophen (TYLENOL) 325 MG tablet TAKE TWO TABLETS (650MG) BY MOUTH EVERY 4 HOURS AS NEEDED 60 tablet 97     albuterol (VENTOLIN HFA) 108 (90 Base) MCG/ACT inhaler Inhale 2 puffs into the lungs every 4 hours as needed for  shortness of breath / dyspnea or wheezing (May keep bedside) 18 g 98     amLODIPine (NORVASC) 10 MG tablet TAKE 1 TABLET BY MOUTH ONCE DAILY 28 tablet PRN     ASPIRIN LOW DOSE 81 MG EC tablet TAKE 1 TABLET BY MOUTH ONCE DAILY 28 tablet PRN     budesonide (PULMICORT) 0.5 MG/2ML neb solution NEBULIZE THE CONTENT OF 1 VIAL TWICE DAILY (ADD TO IN THE MORNING AND AT BEDTIME DUONEBS) 60 mL 97     CALMOSEPTINE 0.44-20.6 % OINT ointment APPLY TO CLEANSED REDDNED CHARISSA SKIN FOLDS & RASH WITH BREAKDOWN DURING MORNING & BEDTIME CARES & INCONTINENT CHANGES OR MEDICATION PASS 113 g 10     Elastic Bandages & Supports (T.E.D. KNEE LENGTH/M-LONG) MISC WEAR AS DIRECTED ON IN THE MORNING OFF AT BEDTIME (2 = 1 PAIR) 4 each 97     furosemide (LASIX) 40 MG tablet TAKE 1 TABLET BY MOUTH ONCE DAILY 28 tablet PRN     ipratropium - albuterol 0.5 mg/2.5 mg/3 mL (DUONEB) 0.5-2.5 (3) MG/3ML neb solution NEBULIZE THE CONTENT OF 1 VIAL INTO THE LUNGS FOUR TIMES A  mL 97     Lactobacillus-Inulin ([facility] DIGESTIVE HE[facility]) CHEW CHEW 2 TABLET BY MOUTH ONCE DAILY 72 tablet 97     latanoprost (XALATAN) 0.005 % ophthalmic solution INSTILL ONE DROP IN THE RIGHT EYE IN THE EVENING 2.5 mL 97     levothyroxine (SYNTHROID/LEVOTHROID) 50 MCG tablet TAKE 1 TABLET BY MOUTH ONCE DAILY 31 tablet 99     MELATONIN MAXIMUM STRENGTH 5 MG tablet TAKE 1 TABLET BY MOUTH ONCE DAILY 31 tablet 99     metoprolol tartrate (LOPRESSOR) 25 MG tablet TAKE 1 TABLET BY MOUTH TWICE DAILY 56 tablet PRN     MUCINEX 600 MG 12 hr tablet TAKE 1 TABLET BY MOUTH TWICE DAILY 56 tablet 97     Multiple Vitamins-Minerals (CENTRUM SILVER ULTRA MENS) TABS Take 1 tablet by mouth daily       multivitamin (I-EDGAR) TABS per tablet TAKE 1 TABLET BY MOUTH ONCE DAILY 31 tablet 97     nystatin (MYCOSTATIN) 990462 UNIT/GM external cream Apply to affected area as needed 30 g 97     potassium chloride ER (KLOR-CON M) 20 MEQ CR tablet TAKE TWO TABLETS (40MEQ) BY MOUTH ONCE DAILY 56 tablet PRN  "    predniSONE (DELTASONE) 10 MG tablet Take 0.5 tablets (5 mg) by mouth daily 31 tablet 98     SM STOOL SOFTENER 8.6-50 MG tablet TAKE 1 TABLET BY MOUTH TWICE DAILY AS NEEDED FOR CONSTIPATION 30 tablet 97     tamsulosin (FLOMAX) 0.4 MG capsule TAKE 1 CAPSULE BY MOUTH ONCE DAILY 28 capsule PRN     REVIEW OF SYSTEMS:  4 point ROS including Respiratory, CV, GI and , other than that noted in the HPI,  is negative    Objective:  /84   Pulse 68   Temp 98.1  F (36.7  C)   Resp 21   Ht 1.88 m (6' 2\")   Wt 106.1 kg (234 lb)   SpO2 95%   BMI 30.04 kg/m    Exam:  GENERAL APPEARANCE:  Alert, in no distress sitting in WC, oily skin baseline  ENT:  Mouth and posterior oropharynx normal, moist mucous membranes, [place], skin lesion of right ear lesion healed, ears blocked with some cerumen in ears   EYES:  EOM, conjunctivae, lids, pupils and irises normal-glasses  RESP:  respiratory effort and palpation of chest increased, lungs diminished but clear to anterior and posterior- no wheezes present  CV:  Palpation and auscultation of heart done, bradycardic (not today) and regular rhythm, 3/6 murmur- not heard on previous exam,  peripheral edema 2+ in legs ankles and feet. Compression Hose on  ABDOMEN:  normal bowel sounds, soft, nontender  M/S:   Gait and station using wheelchair for ambulation, generalized weakness increased, less walking with SBA of 1 for transfers  SKIN:  Inspection of skin and subcutaneous tissue baseline to visualized areas of skin  NEURO:   Examination of sensation by touch normal  PSYCH:  insight and judgement impaired, memory impaired    Labs:   CBC RESULTS:   Recent Labs   Lab Test 11/21/19 07/26/19   WBC 8.5 9.8   RBC 4.46 4.58   HGB 13.1* 13.8   HCT 42.9 43.5   MCV 96 95   MCH 29.4 30.1   MCHC 30.5* 31.7   RDW 14.2 14.1    285       Last Basic Metabolic Panel:  Recent Labs   Lab Test 11/21/19 07/26/19    138   POTASSIUM 3.9 3.7   CHLORIDE 103 102   MERCEDES 8.5 9.8   CO2 29 32   BUN 26 " 30   CR 1.47* 1.40*   * 173*       Liver Function Studies -   Recent Labs   Lab Test 04/25/19 09/13/18 04/19/18   PROTTOTAL 8.0  --  7.9   ALBUMIN 3.5  --  3.4   BILITOTAL 0.5  --  0.6   ALKPHOS 76  --  78   AST 11 20 19   ALT 20 19 23       TSH   Date Value Ref Range Status   07/26/2019 4.12 (A) 0.40 - 4.00 mU/L Final   04/25/2019 6.93 (A) 0.40 - 4.00 mU/L Final       Lab Results   Component Value Date    A1C 6.7 11/21/2019       ASSESSMENT/PLAN:  (N18.30) Stage 3 chronic kidney disease, unspecified whether stage 3a or 3b CKD  (primary encounter diagnosis)  Comment: stable   Plan:   -updated CBC and BMP  -renal dose medications     (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)  Comment: stable  Plan:   -Ventolin 2 puffs q4H prn (bedside)  -duonebs with Pulmicort   -mucinex 600 mg po BID  -prednisone 5 mg po daily     (I48.91) Atrial fibrillation, unspecified type (H)  (I10) Benign essential hypertension  (R60.0) Lower extremity edema  Comment: stable  Plan:   -ASA daily for secondary prevention with hx of TIA  -off anticoagulation 2/2 falls  -metoprolol 25 mg po daily   -lasix 40 mg po daily (potassium)  -norvasc 10 mg po daily   -BMP periodically   -VS and weights from facility    (E03.9) Hypothyroidism, unspecified type  Comment: stable  Plan:   -annual TSH  -levothyroxine 50 mcg po daily     (N40.1) Benign nodular prostatic hyperplasia with lower urinary tract symptoms  Comment: incontinent at baseline   Plan:   -flomax daily     (M06.09) 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 (H)  Comment: stable off methotrexate   Plan:   -prednisone 5 mg po daily     (H61.23) Bilateral impacted cerumen  Comment: chronic   Plan:   -cerumen removal with lighted curette to both ear canals     -ordered updated labs CBC, CMP,LFTs, TSH for next lab day          Electronically signed by:  SUNDAY Lee CNP           Sincerely,        SUNDAY Lee CNP

## 2020-11-30 NOTE — PROGRESS NOTES
"[facility]  [facility] Record Number: [id number]  Place of Service where encounter took place: [facility] [[id number]]  Chief Complaint   Patient presents with     RECHECK       HPI:    Jacob Easton is a 89 year old (5/16/1929), who is being seen today for an episodic care visit. HPI information obtained from: facility chart records, facility staff, patient report and [facility] chart review. Today's concern is:    Seen today for follow up and says \"I feel good\". Got emily gifts from his family and already opened them. Would like me to clean his ears. History of cerumen impaction and narrow ear canal of left ear. Denies pain, chest pain or SOB. Did have an episode of left leg weakness but states \"that has fixed itself\". Staff says he participates in transfers but is no longer ambulating. Family reporting he \"is slower with words\". Conversational today, no word searching.     History includes longstanding RA, had been on MTX for 10 years.   It was stopped in 2017 because of respiratory changes with CT showing ground glass opacities c/w acute alveolitis and persistent cystic changes throughout both lungs.  Patient is no longer on supplemental O2, uses nebs.      Past Medical and Surgical History reviewed in Epic today.    MEDICATIONS:    Current Outpatient Medications   Medication Sig Dispense Refill     menthol-zinc oxide (CALMOSEPTINE) 0.44-20.625 % OINT ointment Apply topically 4 times daily as needed for skin protection       acetaminophen (TYLENOL) 325 MG tablet TAKE TWO TABLETS (650MG) BY MOUTH EVERY 4 HOURS AS NEEDED 60 tablet 97     albuterol (VENTOLIN HFA) 108 (90 Base) MCG/ACT inhaler Inhale 2 puffs into the lungs every 4 hours as needed for shortness of breath / dyspnea or wheezing (May keep bedside) 18 g 98     amLODIPine (NORVASC) 10 MG tablet TAKE 1 TABLET BY MOUTH ONCE DAILY 28 tablet PRN     ASPIRIN LOW DOSE 81 MG EC tablet TAKE 1 TABLET BY MOUTH " ONCE DAILY 28 tablet PRN     bisacodyl (DULCOLAX) 10 MG suppository Place 1 suppository (10 mg) rectally daily as needed for constipation       budesonide (PULMICORT) 0.5 MG/2ML neb solution NEBULIZE THE CONTENT OF 1 VIAL TWICE DAILY (ADD TO IN THE MORNING AND AT BEDTIME DUONEBS) 60 mL 97     Elastic Bandages & Supports (T.E.D. KNEE LENGTH/M-LONG) MISC WEAR AS DIRECTED ON IN THE MORNING OFF AT BEDTIME (2 = 1 PAIR) 4 each 97     furosemide (LASIX) 40 MG tablet TAKE 1 TABLET BY MOUTH ONCE DAILY 28 tablet PRN     ipratropium - albuterol 0.5 mg/2.5 mg/3 mL (DUONEB) 0.5-2.5 (3) MG/3ML neb solution NEBULIZE THE CONTENT OF 1 VIAL INTO THE LUNGS FOUR TIMES A  mL 97     Lactobacillus-Inulin ([facility]) CHEW CHEW 2 TABLET BY MOUTH ONCE DAILY 72 tablet 97     latanoprost (XALATAN) 0.005 % ophthalmic solution INSTILL ONE DROP IN THE RIGHT EYE IN THE EVENING 2.5 mL 97     levothyroxine (SYNTHROID/LEVOTHROID) 50 MCG tablet TAKE 1 TABLET BY MOUTH ONCE DAILY 31 tablet 99     loperamide (IMODIUM A-D) 2 MG tablet May take 1 tablet (2 mg) by mouth daily as needed for diarrhea. May also take 2 tablets (4 mg) daily as needed for diarrhea. 4 mg po prn daily after 1st loose stool then 2 mg po after each additional loose stool. Max 16 mg /day       MELATONIN MAXIMUM STRENGTH 5 MG tablet TAKE 1 TABLET BY MOUTH ONCE DAILY 31 tablet 99     metoprolol tartrate (LOPRESSOR) 25 MG tablet TAKE 1 TABLET BY MOUTH TWICE DAILY 56 tablet PRN     MUCINEX 600 MG 12 hr tablet TAKE 1 TABLET BY MOUTH TWICE DAILY 56 tablet 97     Multiple Vitamins-Minerals (CENTRUM SILVER ULTRA MENS) TABS Take 1 tablet by mouth daily       multivitamin (I-EDGAR) TABS per tablet TAKE 1 TABLET BY MOUTH ONCE DAILY 31 tablet 97     NYAMYC 040051 UNIT/GM external powder APPLY TOPICALLY TO AFFECTED AREA(S) TWICE DAILY 60 g 97     potassium chloride ER (KLOR-CON M) 20 MEQ CR tablet TAKE TWO TABLETS (40MEQ) BY MOUTH ONCE DAILY 56 tablet PRN     predniSONE  "(DELTASONE) 10 MG tablet Take 0.5 tablets (5 mg) by mouth daily 31 tablet 98     senna-docusate (SM STOOL SOFTENER) 8.6-50 MG tablet Take 2 tablets by mouth 2 times daily as needed for constipation 30 tablet 97     tamsulosin (FLOMAX) 0.4 MG capsule TAKE 1 CAPSULE BY MOUTH ONCE DAILY 28 capsule PRN     REVIEW OF SYSTEMS:  4 point ROS including Respiratory, CV, GI and , other than that noted in the HPI,  is negative    Objective:  /69   Pulse 71   Temp 96  F (35.6  C)   Resp 19   Ht 1.88 m (6' 2\")   Wt 105.7 kg (233 lb)   SpO2 96%   BMI 29.92 kg/m    Exam:  GENERAL APPEARANCE:  Alert, in no distress sitting in WC, oily skin baseline  ENT:  Mouth and posterior oropharynx normal, moist mucous membranes, [place], blocked with some cerumen in ears   EYES:  EOM, conjunctivae, lids, pupils and irises normal-glasses  RESP:  respiratory effort and palpation of chest increased, lungs diminished but clear to anterior and posterior- no wheezes present  CV:  Palpation and auscultation of heart done, bradycardic at times and regular rhythm, 3/6 murmur peripheral edema 2+ in legs ankles and feet. Compression Hose off  ABDOMEN:  normal bowel sounds, soft, nontender  M/S:   Gait and station using wheelchair for ambulation, generalized weakness increased, no walking with SBA of 1 for transfers  SKIN:  Inspection of skin and subcutaneous tissue baseline to visualized areas of skin  NEURO:   Examination of sensation by touch normal  PSYCH:  insight and judgement impaired, memory impaired    Labs:   CBC RESULTS:   Recent Labs   Lab Test 10/15/20 11/21/19   WBC 10.4 8.5   RBC 4.84 4.46   HGB 14.4 13.1*   HCT 47.0 42.9   MCV 97 96   MCH 29.8 29.4   MCHC 30.6* 30.5*   RDW 14.1 14.2    268       Last Basic Metabolic Panel:  Recent Labs   Lab Test 10/15/20 11/21/19    135   POTASSIUM 3.8 3.9   CHLORIDE 102 103   MERCEDES 8.9 8.5   CO2 28 29   BUN 30 26   CR 1.53* 1.47*   * 105*       Liver Function Studies - "   Recent Labs   Lab Test 10/15/20 04/25/19   PROTTOTAL 8.4 8.0   ALBUMIN 3.3* 3.5   BILITOTAL 0.7 0.5   ALKPHOS 171* 76   AST 27 11   * 20       TSH   Date Value Ref Range Status   10/15/2020 6.87 (A) 0.40 - 4.00 mU/L Final   07/26/2019 4.12 (A) 0.40 - 4.00 mU/L Final       Lab Results   Component Value Date    A1C 6.7 11/21/2019     ASSESSMENT/PLAN:  (J84.10) Pulmonary fibrosis (H)  (primary encounter diagnosis)  Comment: stable  Plan:   -Ventolin 2 puffs q4H prn (bedside)  -duonebs with Pulmicort   -mucinex 600 mg po BID  -prednisone 5 mg po daily     (I10) Essential hypertension  (I48.91) Atrial fibrillation, unspecified type (H)  (R60.0) Lower extremity edema  Comment: stable  Plan:   -ASA daily for secondary prevention with hx of TIA  -off anticoagulation 2/2 falls  -metoprolol 25 mg po daily   -lasix 40 mg po daily (potassium)  -norvasc 10 mg po daily   -BMP periodically   -VS and weights from facility    (H61.23) Bilateral impacted cerumen  Comment: chronic  Plan:   -cerumen removal with lighted curette to both ear canals     (R21) Rash and nonspecific skin eruption  Comment: flares in brittanie area and between toes  Plan:   -nystatin prn  -Calmoseptine prn   -calendula cream-family will send and would like to use for dry/redness     (R74.8) Elevated serum alkaline phosphatase level  Comment: with elevated ALT  Plan:   -unclear etiology. Is on prednisone, infrequent use of prn Tylenol, no hx of gallbladder issues?     -recheck in few months           Electronically signed by:  SUNDAY Lee CNP

## 2020-12-01 NOTE — LETTER
"    12/1/2020        RE: Jacob Tran  [address] [address]  [address]  [facility] [address]        [place] [facility]  [place] [facility] Record Number: [id number]  Place of Service where encounter took place: [facility] [[id number]]  Chief Complaint   Patient presents with     RECHECK       HPI:    Jacob Easton is a 89 year old (5/16/1929), who is being seen today for an episodic care visit. HPI information obtained from: facility chart records, facility staff, patient report and [facility] chart review. Today's concern is:    Seen today for follow up and says \"I feel good\". Got emily gifts from his family and already opened them. Would like me to clean his ears. History of cerumen impaction and narrow ear canal of left ear. Denies pain, chest pain or SOB. Did have an episode of left leg weakness but states \"that has fixed itself\". Staff says he participates in transfers but is no longer ambulating. Family reporting he \"is slower with words\". Conversational today, no word searching.     History includes longstanding RA, had been on MTX for 10 years.   It was stopped in 2017 because of respiratory changes with CT showing ground glass opacities c/w acute alveolitis and persistent cystic changes throughout both lungs.  Patient is no longer on supplemental O2, uses nebs.      Past Medical and Surgical History reviewed in Epic today.    MEDICATIONS:    Current Outpatient Medications   Medication Sig Dispense Refill     menthol-zinc oxide (CALMOSEPTINE) 0.44-20.625 % OINT ointment Apply topically 4 times daily as needed for skin protection       acetaminophen (TYLENOL) 325 MG tablet TAKE TWO TABLETS (650MG) BY MOUTH EVERY 4 HOURS AS NEEDED 60 tablet 97     albuterol (VENTOLIN HFA) 108 (90 Base) MCG/ACT inhaler Inhale 2 puffs into the lungs every 4 hours as needed for shortness of breath / dyspnea or wheezing (May keep bedside) 18 g 98     amLODIPine (NORVASC) 10 MG " tablet TAKE 1 TABLET BY MOUTH ONCE DAILY 28 tablet PRN     ASPIRIN LOW DOSE 81 MG EC tablet TAKE 1 TABLET BY MOUTH ONCE DAILY 28 tablet PRN     bisacodyl (DULCOLAX) 10 MG suppository Place 1 suppository (10 mg) rectally daily as needed for constipation       budesonide (PULMICORT) 0.5 MG/2ML neb solution NEBULIZE THE CONTENT OF 1 VIAL TWICE DAILY (ADD TO IN THE MORNING AND AT BEDTIME DUONEBS) 60 mL 97     Elastic Bandages & Supports (T.E.D. KNEE LENGTH/M-LONG) MISC WEAR AS DIRECTED ON IN THE MORNING OFF AT BEDTIME (2 = 1 PAIR) 4 each 97     furosemide (LASIX) 40 MG tablet TAKE 1 TABLET BY MOUTH ONCE DAILY 28 tablet PRN     ipratropium - albuterol 0.5 mg/2.5 mg/3 mL (DUONEB) 0.5-2.5 (3) MG/3ML neb solution NEBULIZE THE CONTENT OF 1 VIAL INTO THE LUNGS FOUR TIMES A  mL 97     Lactobacillus-Inulin ([facility] DIGESTIVE [facility]) CHEW CHEW 2 TABLET BY MOUTH ONCE DAILY 72 tablet 97     latanoprost (XALATAN) 0.005 % ophthalmic solution INSTILL ONE DROP IN THE RIGHT EYE IN THE EVENING 2.5 mL 97     levothyroxine (SYNTHROID/LEVOTHROID) 50 MCG tablet TAKE 1 TABLET BY MOUTH ONCE DAILY 31 tablet 99     loperamide (IMODIUM A-D) 2 MG tablet May take 1 tablet (2 mg) by mouth daily as needed for diarrhea. May also take 2 tablets (4 mg) daily as needed for diarrhea. 4 mg po prn daily after 1st loose stool then 2 mg po after each additional loose stool. Max 16 mg /day       MELATONIN MAXIMUM STRENGTH 5 MG tablet TAKE 1 TABLET BY MOUTH ONCE DAILY 31 tablet 99     metoprolol tartrate (LOPRESSOR) 25 MG tablet TAKE 1 TABLET BY MOUTH TWICE DAILY 56 tablet PRN     MUCINEX 600 MG 12 hr tablet TAKE 1 TABLET BY MOUTH TWICE DAILY 56 tablet 97     Multiple Vitamins-Minerals (CENTRUM SILVER ULTRA MENS) TABS Take 1 tablet by mouth daily       multivitamin (I-EDGAR) TABS per tablet TAKE 1 TABLET BY MOUTH ONCE DAILY 31 tablet 97     NYAMYC [id number] UNIT/GM external powder APPLY TOPICALLY TO AFFECTED AREA(S) TWICE DAILY 60 g 97     potassium  "chloride ER (KLOR-CON M) 20 MEQ CR tablet TAKE TWO TABLETS (40MEQ) BY MOUTH ONCE DAILY 56 tablet PRN     predniSONE (DELTASONE) 10 MG tablet Take 0.5 tablets (5 mg) by mouth daily 31 tablet 98     senna-docusate (SM STOOL SOFTENER) 8.6-50 MG tablet Take 2 tablets by mouth 2 times daily as needed for constipation 30 tablet 97     tamsulosin (FLOMAX) 0.4 MG capsule TAKE 1 CAPSULE BY MOUTH ONCE DAILY 28 capsule PRN     REVIEW OF SYSTEMS:  4 point ROS including Respiratory, CV, GI and , other than that noted in the HPI,  is negative    Objective:  /69   Pulse 71   Temp 96  F (35.6  C)   Resp 19   Ht 1.88 m (6' 2\")   Wt 105.7 kg (233 lb)   SpO2 96%   BMI 29.92 kg/m    Exam:  GENERAL APPEARANCE:  Alert, in no distress sitting in WC, oily skin baseline  ENT:  Mouth and posterior oropharynx normal, moist mucous membranes, [place], blocked with some cerumen in ears   EYES:  EOM, conjunctivae, lids, pupils and irises normal-glasses  RESP:  respiratory effort and palpation of chest increased, lungs diminished but clear to anterior and posterior- no wheezes present  CV:  Palpation and auscultation of heart done, bradycardic at times and regular rhythm, 3/6 murmur peripheral edema 2+ in legs ankles and feet. Compression Hose off  ABDOMEN:  normal bowel sounds, soft, nontender  M/S:   Gait and station using wheelchair for ambulation, generalized weakness increased, no walking with SBA of 1 for transfers  SKIN:  Inspection of skin and subcutaneous tissue baseline to visualized areas of skin  NEURO:   Examination of sensation by touch normal  PSYCH:  insight and judgement impaired, memory impaired    Labs:   CBC RESULTS:   Recent Labs   Lab Test 10/15/20 11/21/19   WBC 10.4 8.5   RBC 4.84 4.46   HGB 14.4 13.1*   HCT 47.0 42.9   MCV 97 96   MCH 29.8 29.4   MCHC 30.6* 30.5*   RDW 14.1 14.2    268       Last Basic Metabolic Panel:  Recent Labs   Lab Test 10/15/20 11/21/19    135   POTASSIUM 3.8 3.9   CHLORIDE " 102 103   MERCEDES 8.9 8.5   CO2 28 29   BUN 30 26   CR 1.53* 1.47*   * 105*       Liver Function Studies -   Recent Labs   Lab Test 10/15/20 04/25/19   PROTTOTAL 8.4 8.0   ALBUMIN 3.3* 3.5   BILITOTAL 0.7 0.5   ALKPHOS 171* 76   AST 27 11   * 20       TSH   Date Value Ref Range Status   10/15/2020 6.87 (A) 0.40 - 4.00 mU/L Final   07/26/2019 4.12 (A) 0.40 - 4.00 mU/L Final       Lab Results   Component Value Date    A1C 6.7 11/21/2019     ASSESSMENT/PLAN:  (J84.10) Pulmonary fibrosis (H)  (primary encounter diagnosis)  Comment: stable  Plan:   -Ventolin 2 puffs q4H prn (bedside)  -duonebs with Pulmicort   -mucinex 600 mg po BID  -prednisone 5 mg po daily     (I10) Essential hypertension  (I48.91) Atrial fibrillation, unspecified type (H)  (R60.0) Lower extremity edema  Comment: stable  Plan:   -ASA daily for secondary prevention with hx of TIA  -off anticoagulation 2/2 falls  -metoprolol 25 mg po daily   -lasix 40 mg po daily (potassium)  -norvasc 10 mg po daily   -BMP periodically   -VS and weights from facility    (H61.23) Bilateral impacted cerumen  Comment: chronic  Plan:   -cerumen removal with lighted curette to both ear canals     (R21) Rash and nonspecific skin eruption  Comment: flares in brittanie area and between toes  Plan:   -nystatin prn  -Calmoseptine prn     (R74.8) Elevated serum alkaline phosphatase level  Comment: with elevated ALT  Plan:   -unclear etiology. Is on prednisone, infrequent use of prn Tylenol    -recheck in few months           Electronically signed by:  SUNDAY Lee CNP               Sincerely,        SUNDAY Lee CNP

## 2020-12-29 NOTE — PROCEDURE: MOHS SURGERY
Unknown Ear Star Wedge Flap Text: The defect edges were debeveled with a #15 blade scalpel.  Given the location of the defect and the proximity to free margins (helical rim) an ear star wedge flap was deemed most appropriate.  Using a sterile surgical marker, the appropriate flap was drawn incorporating the defect and placing the expected incisions between the helical rim and antihelix where possible.  The area thus outlined was incised through and through with a #15 scalpel blade.

## 2021-01-01 ENCOUNTER — APPOINTMENT (OUTPATIENT)
Dept: SPEECH THERAPY | Facility: CLINIC | Age: 86
DRG: 417 | End: 2021-01-01
Attending: INTERNAL MEDICINE
Payer: COMMERCIAL

## 2021-01-01 ENCOUNTER — ANESTHESIA EVENT (OUTPATIENT)
Dept: SURGERY | Facility: CLINIC | Age: 86
DRG: 417 | End: 2021-01-01
Payer: COMMERCIAL

## 2021-01-01 ENCOUNTER — ANESTHESIA (OUTPATIENT)
Dept: SURGERY | Facility: CLINIC | Age: 86
DRG: 417 | End: 2021-01-01
Payer: COMMERCIAL

## 2021-01-01 ENCOUNTER — APPOINTMENT (OUTPATIENT)
Dept: GENERAL RADIOLOGY | Facility: CLINIC | Age: 86
DRG: 291 | End: 2021-01-01
Attending: HOSPITALIST
Payer: COMMERCIAL

## 2021-01-01 ENCOUNTER — APPOINTMENT (OUTPATIENT)
Dept: GENERAL RADIOLOGY | Facility: CLINIC | Age: 86
DRG: 417 | End: 2021-01-01
Attending: INTERNAL MEDICINE
Payer: COMMERCIAL

## 2021-01-01 ENCOUNTER — APPOINTMENT (OUTPATIENT)
Dept: SPEECH THERAPY | Facility: CLINIC | Age: 86
DRG: 417 | End: 2021-01-01
Payer: COMMERCIAL

## 2021-01-01 ENCOUNTER — APPOINTMENT (OUTPATIENT)
Dept: PHYSICAL THERAPY | Facility: CLINIC | Age: 86
DRG: 417 | End: 2021-01-01
Attending: INTERNAL MEDICINE
Payer: COMMERCIAL

## 2021-01-01 ENCOUNTER — HOSPITAL ENCOUNTER (INPATIENT)
Facility: CLINIC | Age: 86
LOS: 8 days | Discharge: NURSING, REHAB OR LONG-TERM CARE | DRG: 417 | End: 2021-04-24
Attending: EMERGENCY MEDICINE | Admitting: INTERNAL MEDICINE
Payer: COMMERCIAL

## 2021-01-01 ENCOUNTER — TELEPHONE (OUTPATIENT)
Dept: GERIATRICS | Facility: CLINIC | Age: 86
End: 2021-01-01

## 2021-01-01 ENCOUNTER — TRANSFERRED RECORDS (OUTPATIENT)
Dept: HEALTH INFORMATION MANAGEMENT | Facility: CLINIC | Age: 86
End: 2021-01-01

## 2021-01-01 ENCOUNTER — APPOINTMENT (OUTPATIENT)
Dept: GENERAL RADIOLOGY | Facility: CLINIC | Age: 86
DRG: 291 | End: 2021-01-01
Attending: INTERNAL MEDICINE
Payer: COMMERCIAL

## 2021-01-01 ENCOUNTER — APPOINTMENT (OUTPATIENT)
Dept: CT IMAGING | Facility: CLINIC | Age: 86
DRG: 291 | End: 2021-01-01
Attending: HOSPITALIST
Payer: COMMERCIAL

## 2021-01-01 ENCOUNTER — APPOINTMENT (OUTPATIENT)
Dept: ULTRASOUND IMAGING | Facility: CLINIC | Age: 86
DRG: 417 | End: 2021-01-01
Attending: INTERNAL MEDICINE
Payer: COMMERCIAL

## 2021-01-01 ENCOUNTER — PATIENT OUTREACH (OUTPATIENT)
Dept: GERIATRIC MEDICINE | Facility: CLINIC | Age: 86
End: 2021-01-01

## 2021-01-01 ENCOUNTER — SURGERY (OUTPATIENT)
Age: 86
End: 2021-01-01
Payer: COMMERCIAL

## 2021-01-01 ENCOUNTER — ASSISTED LIVING VISIT (OUTPATIENT)
Dept: GERIATRICS | Facility: CLINIC | Age: 86
End: 2021-01-01
Payer: COMMERCIAL

## 2021-01-01 ENCOUNTER — APPOINTMENT (OUTPATIENT)
Dept: CT IMAGING | Facility: CLINIC | Age: 86
DRG: 417 | End: 2021-01-01
Attending: INTERNAL MEDICINE
Payer: COMMERCIAL

## 2021-01-01 ENCOUNTER — APPOINTMENT (OUTPATIENT)
Dept: GENERAL RADIOLOGY | Facility: CLINIC | Age: 86
DRG: 291 | End: 2021-01-01
Attending: EMERGENCY MEDICINE
Payer: COMMERCIAL

## 2021-01-01 ENCOUNTER — APPOINTMENT (OUTPATIENT)
Dept: ULTRASOUND IMAGING | Facility: CLINIC | Age: 86
DRG: 291 | End: 2021-01-01
Attending: INTERNAL MEDICINE
Payer: COMMERCIAL

## 2021-01-01 ENCOUNTER — APPOINTMENT (OUTPATIENT)
Dept: CT IMAGING | Facility: CLINIC | Age: 86
DRG: 417 | End: 2021-01-01
Attending: EMERGENCY MEDICINE
Payer: COMMERCIAL

## 2021-01-01 ENCOUNTER — HOSPITAL ENCOUNTER (INPATIENT)
Facility: CLINIC | Age: 86
LOS: 4 days | DRG: 291 | End: 2021-04-29
Attending: EMERGENCY MEDICINE | Admitting: INTERNAL MEDICINE
Payer: COMMERCIAL

## 2021-01-01 ENCOUNTER — MEDICAL CORRESPONDENCE (OUTPATIENT)
Dept: HEALTH INFORMATION MANAGEMENT | Facility: CLINIC | Age: 86
End: 2021-01-01

## 2021-01-01 ENCOUNTER — APPOINTMENT (OUTPATIENT)
Dept: CARDIOLOGY | Facility: CLINIC | Age: 86
DRG: 417 | End: 2021-01-01
Attending: INTERNAL MEDICINE
Payer: COMMERCIAL

## 2021-01-01 ENCOUNTER — APPOINTMENT (OUTPATIENT)
Dept: PHYSICAL THERAPY | Facility: CLINIC | Age: 86
DRG: 417 | End: 2021-01-01
Payer: COMMERCIAL

## 2021-01-01 VITALS
DIASTOLIC BLOOD PRESSURE: 62 MMHG | TEMPERATURE: 97.5 F | OXYGEN SATURATION: 94 % | RESPIRATION RATE: 22 BRPM | WEIGHT: 231.6 LBS | BODY MASS INDEX: 29.72 KG/M2 | HEART RATE: 91 BPM | SYSTOLIC BLOOD PRESSURE: 145 MMHG | HEIGHT: 74 IN

## 2021-01-01 VITALS
HEART RATE: 111 BPM | OXYGEN SATURATION: 93 % | DIASTOLIC BLOOD PRESSURE: 80 MMHG | RESPIRATION RATE: 30 BRPM | SYSTOLIC BLOOD PRESSURE: 150 MMHG | TEMPERATURE: 98.1 F | BODY MASS INDEX: 30.48 KG/M2 | WEIGHT: 234.2 LBS

## 2021-01-01 VITALS
RESPIRATION RATE: 18 BRPM | WEIGHT: 231 LBS | HEART RATE: 77 BPM | SYSTOLIC BLOOD PRESSURE: 139 MMHG | BODY MASS INDEX: 29.65 KG/M2 | DIASTOLIC BLOOD PRESSURE: 66 MMHG | TEMPERATURE: 98 F | OXYGEN SATURATION: 95 % | HEIGHT: 74 IN

## 2021-01-01 VITALS
HEART RATE: 61 BPM | SYSTOLIC BLOOD PRESSURE: 121 MMHG | BODY MASS INDEX: 28.95 KG/M2 | DIASTOLIC BLOOD PRESSURE: 72 MMHG | TEMPERATURE: 98.2 F | HEIGHT: 74 IN | WEIGHT: 225.6 LBS | RESPIRATION RATE: 16 BRPM | OXYGEN SATURATION: 95 %

## 2021-01-01 VITALS
HEIGHT: 74 IN | RESPIRATION RATE: 20 BRPM | SYSTOLIC BLOOD PRESSURE: 126 MMHG | DIASTOLIC BLOOD PRESSURE: 86 MMHG | TEMPERATURE: 97.4 F | HEART RATE: 69 BPM | WEIGHT: 232 LBS | BODY MASS INDEX: 29.77 KG/M2 | OXYGEN SATURATION: 93 %

## 2021-01-01 VITALS
HEART RATE: 77 BPM | DIASTOLIC BLOOD PRESSURE: 85 MMHG | OXYGEN SATURATION: 94 % | SYSTOLIC BLOOD PRESSURE: 139 MMHG | HEIGHT: 74 IN | TEMPERATURE: 98.2 F | WEIGHT: 233 LBS | BODY MASS INDEX: 29.9 KG/M2 | RESPIRATION RATE: 16 BRPM

## 2021-01-01 DIAGNOSIS — M25.559 PAIN IN JOINT INVOLVING PELVIC REGION AND THIGH, UNSPECIFIED LATERALITY: ICD-10-CM

## 2021-01-01 DIAGNOSIS — R09.89 PULMONARY VASCULAR CONGESTION: ICD-10-CM

## 2021-01-01 DIAGNOSIS — I10 ESSENTIAL HYPERTENSION: ICD-10-CM

## 2021-01-01 DIAGNOSIS — H61.23 BILATERAL IMPACTED CERUMEN: ICD-10-CM

## 2021-01-01 DIAGNOSIS — H61.23 BILATERAL IMPACTED CERUMEN: Primary | ICD-10-CM

## 2021-01-01 DIAGNOSIS — I48.91 ATRIAL FIBRILLATION, UNSPECIFIED TYPE (H): ICD-10-CM

## 2021-01-01 DIAGNOSIS — J18.9 PNEUMONIA OF BOTH LUNGS DUE TO INFECTIOUS ORGANISM, UNSPECIFIED PART OF LUNG: ICD-10-CM

## 2021-01-01 DIAGNOSIS — Z78.9 TAKES DIETARY SUPPLEMENTS: ICD-10-CM

## 2021-01-01 DIAGNOSIS — R41.0 CONFUSION: Primary | ICD-10-CM

## 2021-01-01 DIAGNOSIS — B37.2 YEAST INFECTION OF THE SKIN: Primary | ICD-10-CM

## 2021-01-01 DIAGNOSIS — R60.0 LOWER EXTREMITY EDEMA: ICD-10-CM

## 2021-01-01 DIAGNOSIS — Z78.9 TAKES DIETARY SUPPLEMENTS: Primary | ICD-10-CM

## 2021-01-01 DIAGNOSIS — K08.9 POOR DENTITION: ICD-10-CM

## 2021-01-01 DIAGNOSIS — E87.6 HYPOKALEMIA: ICD-10-CM

## 2021-01-01 DIAGNOSIS — R09.89 CHEST CONGESTION: ICD-10-CM

## 2021-01-01 DIAGNOSIS — L98.9 SKIN LESION: ICD-10-CM

## 2021-01-01 DIAGNOSIS — H65.00 ACUTE SEROUS OTITIS MEDIA, RECURRENCE NOT SPECIFIED, UNSPECIFIED LATERALITY: Primary | ICD-10-CM

## 2021-01-01 DIAGNOSIS — R60.0 LOWER EXTREMITY EDEMA: Primary | ICD-10-CM

## 2021-01-01 DIAGNOSIS — I48.91 ATRIAL FIBRILLATION, UNSPECIFIED TYPE (H): Primary | ICD-10-CM

## 2021-01-01 DIAGNOSIS — K81.0 ACUTE CHOLECYSTITIS: ICD-10-CM

## 2021-01-01 DIAGNOSIS — J96.02 ACUTE RESPIRATORY FAILURE WITH HYPOXIA AND HYPERCAPNIA (H): ICD-10-CM

## 2021-01-01 DIAGNOSIS — N40.1 BENIGN NODULAR PROSTATIC HYPERPLASIA WITH LOWER URINARY TRACT SYMPTOMS: ICD-10-CM

## 2021-01-01 DIAGNOSIS — R09.02 HYPOXIA: ICD-10-CM

## 2021-01-01 DIAGNOSIS — M25.561 ACUTE PAIN OF RIGHT KNEE: ICD-10-CM

## 2021-01-01 DIAGNOSIS — N18.30 STAGE 3 CHRONIC KIDNEY DISEASE, UNSPECIFIED WHETHER STAGE 3A OR 3B CKD (H): ICD-10-CM

## 2021-01-01 DIAGNOSIS — J96.01 ACUTE RESPIRATORY FAILURE WITH HYPOXIA AND HYPERCAPNIA (H): ICD-10-CM

## 2021-01-01 DIAGNOSIS — H40.9 GLAUCOMA OF BOTH EYES, UNSPECIFIED GLAUCOMA TYPE: ICD-10-CM

## 2021-01-01 DIAGNOSIS — K05.10 GINGIVITIS, CHRONIC: ICD-10-CM

## 2021-01-01 DIAGNOSIS — F03.90 DEMENTIA WITHOUT BEHAVIORAL DISTURBANCE, UNSPECIFIED DEMENTIA TYPE: ICD-10-CM

## 2021-01-01 DIAGNOSIS — J84.10 PULMONARY FIBROSIS (H): ICD-10-CM

## 2021-01-01 DIAGNOSIS — G47.00 INSOMNIA, UNSPECIFIED TYPE: ICD-10-CM

## 2021-01-01 DIAGNOSIS — E03.8 OTHER SPECIFIED HYPOTHYROIDISM: ICD-10-CM

## 2021-01-01 DIAGNOSIS — M06.09 RHEUMATOID ARTHRITIS OF MULTIPLE SITES WITHOUT RHEUMATOID FACTOR (H): ICD-10-CM

## 2021-01-01 LAB
ALBUMIN SERPL-MCNC: 2.6 G/DL (ref 3.4–5)
ALBUMIN SERPL-MCNC: 2.9 G/DL (ref 3.4–5)
ALBUMIN SERPL-MCNC: 2.9 G/DL (ref 3.4–5)
ALBUMIN SERPL-MCNC: 3.3 G/DL (ref 3.4–5)
ALBUMIN UR-MCNC: 70 MG/DL
ALBUMIN UR-MCNC: NEGATIVE MG/DL
ALBUMIN UR-MCNC: NEGATIVE MG/DL
ALP SERPL-CCNC: 68 U/L (ref 40–150)
ALP SERPL-CCNC: 78 U/L (ref 40–150)
ALP SERPL-CCNC: 79 U/L (ref 40–150)
ALP SERPL-CCNC: 86 U/L (ref 40–150)
ALT SERPL W P-5'-P-CCNC: 22 U/L (ref 0–70)
ALT SERPL W P-5'-P-CCNC: 26 U/L (ref 0–70)
ALT SERPL W P-5'-P-CCNC: 79 U/L (ref 0–70)
ALT SERPL-CCNC: 28 U/L (ref 0–70)
ANION GAP SERPL CALCULATED.3IONS-SCNC: 1 MMOL/L (ref 3–14)
ANION GAP SERPL CALCULATED.3IONS-SCNC: 1 MMOL/L (ref 3–14)
ANION GAP SERPL CALCULATED.3IONS-SCNC: 2 MMOL/L (ref 3–14)
ANION GAP SERPL CALCULATED.3IONS-SCNC: 3 MMOL/L (ref 3–14)
ANION GAP SERPL CALCULATED.3IONS-SCNC: 3 MMOL/L (ref 3–14)
ANION GAP SERPL CALCULATED.3IONS-SCNC: 4 MMOL/L (ref 3–14)
ANION GAP SERPL CALCULATED.3IONS-SCNC: 5 MMOL/L (ref 3–14)
ANION GAP SERPL CALCULATED.3IONS-SCNC: 6 MMOL/L (ref 3–14)
ANION GAP SERPL CALCULATED.3IONS-SCNC: <1 MMOL/L (ref 3–14)
ANION GAP SERPL CALCULATED.3IONS-SCNC: <1 MMOL/L (ref 3–14)
APPEARANCE FLD: CLEAR
APPEARANCE UR: ABNORMAL
APPEARANCE UR: CLEAR
APPEARANCE UR: CLEAR
APTT PPP: 42 SEC (ref 22–37)
AST SERPL W P-5'-P-CCNC: 25 U/L (ref 0–45)
AST SERPL W P-5'-P-CCNC: 25 U/L (ref 0–45)
AST SERPL W P-5'-P-CCNC: 51 U/L (ref 0–45)
AST SERPL-CCNC: 17 U/L (ref 0–45)
BACTERIA #/AREA URNS HPF: ABNORMAL /HPF
BACTERIA #/AREA URNS HPF: ABNORMAL /HPF
BACTERIA SPEC CULT: NO GROWTH
BACTERIA SPEC CULT: NORMAL
BASE DEFICIT BLDV-SCNC: 5.2 MMOL/L
BASE EXCESS BLDA CALC-SCNC: 3.9 MMOL/L
BASE EXCESS BLDV CALC-SCNC: 0.4 MMOL/L
BASE EXCESS BLDV CALC-SCNC: 1.7 MMOL/L
BASE EXCESS BLDV CALC-SCNC: 1.8 MMOL/L
BASE EXCESS BLDV CALC-SCNC: 11.5 MMOL/L
BASE EXCESS BLDV CALC-SCNC: 13.8 MMOL/L
BASE EXCESS BLDV CALC-SCNC: 2.4 MMOL/L
BASE EXCESS BLDV CALC-SCNC: 4 MMOL/L
BASE EXCESS BLDV CALC-SCNC: 4.1 MMOL/L
BASE EXCESS BLDV CALC-SCNC: 6.9 MMOL/L
BASE EXCESS BLDV CALC-SCNC: 7.2 MMOL/L
BASE EXCESS BLDV CALC-SCNC: 7.7 MMOL/L
BASOPHILS # BLD AUTO: 0 10E9/L (ref 0–0.2)
BASOPHILS # BLD AUTO: 0.1 10E9/L (ref 0–0.2)
BASOPHILS NFR BLD AUTO: 0.1 %
BASOPHILS NFR BLD AUTO: 0.3 %
BASOPHILS NFR BLD AUTO: 0.3 %
BASOPHILS NFR BLD AUTO: 0.4 %
BILIRUB SERPL-MCNC: 0.5 MG/DL (ref 0.2–1.3)
BILIRUB SERPL-MCNC: 0.8 MG/DL (ref 0.2–1.3)
BILIRUB SERPL-MCNC: 1.2 MG/DL (ref 0.2–1.3)
BILIRUB SERPL-MCNC: 1.4 MG/DL (ref 0.2–1.3)
BILIRUB UR QL STRIP: NEGATIVE
BILIRUBIN DIRECT: 0.2 MG/DL (ref 0–0.2)
BUN SERPL-MCNC: 21 MG/DL (ref 7–30)
BUN SERPL-MCNC: 21 MG/DL (ref 7–30)
BUN SERPL-MCNC: 24 MG/DL (ref 7–30)
BUN SERPL-MCNC: 25 MG/DL (ref 7–30)
BUN SERPL-MCNC: 26 MG/DL (ref 7–30)
BUN SERPL-MCNC: 26 MG/DL (ref 7–30)
BUN SERPL-MCNC: 29 MG/DL (ref 7–30)
BUN SERPL-MCNC: 32 MG/DL (ref 7–30)
BUN SERPL-MCNC: 34 MG/DL (ref 7–30)
BUN SERPL-MCNC: 38 MG/DL (ref 7–30)
C DIFF TOX B STL QL: NEGATIVE
CALCIUM SERPL-MCNC: 7.8 MG/DL (ref 8.5–10.1)
CALCIUM SERPL-MCNC: 8.1 MG/DL (ref 8.5–10.1)
CALCIUM SERPL-MCNC: 8.2 MG/DL (ref 8.5–10.1)
CALCIUM SERPL-MCNC: 8.2 MG/DL (ref 8.5–10.1)
CALCIUM SERPL-MCNC: 8.3 MG/DL (ref 8.5–10.1)
CALCIUM SERPL-MCNC: 8.4 MG/DL (ref 8.5–10.1)
CALCIUM SERPL-MCNC: 8.5 MG/DL (ref 8.5–10.1)
CALCIUM SERPL-MCNC: 8.6 MG/DL (ref 8.5–10.1)
CALCIUM SERPL-MCNC: 8.7 MG/DL (ref 8.5–10.1)
CALCIUM SERPL-MCNC: 8.7 MG/DL (ref 8.5–10.1)
CALCIUM SERPL-MCNC: 8.9 MG/DL (ref 8.5–10.1)
CALCIUM SERPL-MCNC: 9.2 MG/DL (ref 8.5–10.1)
CHLORIDE SERPL-SCNC: 100 MMOL/L (ref 94–109)
CHLORIDE SERPL-SCNC: 103 MMOL/L (ref 94–109)
CHLORIDE SERPL-SCNC: 105 MMOL/L (ref 94–109)
CHLORIDE SERPL-SCNC: 107 MMOL/L (ref 94–109)
CHLORIDE SERPL-SCNC: 107 MMOL/L (ref 94–109)
CHLORIDE SERPL-SCNC: 108 MMOL/L (ref 94–109)
CHLORIDE SERPL-SCNC: 108 MMOL/L (ref 94–109)
CHLORIDE SERPL-SCNC: 109 MMOL/L (ref 94–109)
CHLORIDE SERPL-SCNC: 109 MMOL/L (ref 94–109)
CHLORIDE SERPLBLD-SCNC: 107 MMOL/L (ref 94–109)
CO2 SERPL-SCNC: 29 MMOL/L (ref 20–32)
CO2 SERPL-SCNC: 29 MMOL/L (ref 20–32)
CO2 SERPL-SCNC: 30 MMOL/L (ref 20–32)
CO2 SERPL-SCNC: 31 MMOL/L (ref 20–32)
CO2 SERPL-SCNC: 34 MMOL/L (ref 20–32)
CO2 SERPL-SCNC: 35 MMOL/L (ref 20–32)
CO2 SERPL-SCNC: 36 MMOL/L (ref 20–32)
CO2 SERPL-SCNC: 37 MMOL/L (ref 20–32)
CO2 SERPL-SCNC: 38 MMOL/L (ref 20–32)
CO2 SERPL-SCNC: 38 MMOL/L (ref 20–32)
COLOR FLD: YELLOW
COLOR UR AUTO: ABNORMAL
COLOR UR AUTO: ABNORMAL
COLOR UR AUTO: YELLOW
COPATH REPORT: NORMAL
CREAT SERPL-MCNC: 1.31 MG/DL (ref 0.66–1.25)
CREAT SERPL-MCNC: 1.43 MG/DL (ref 0.66–1.25)
CREAT SERPL-MCNC: 1.47 MG/DL (ref 0.66–1.25)
CREAT SERPL-MCNC: 1.49 MG/DL (ref 0.66–1.25)
CREAT SERPL-MCNC: 1.55 MG/DL (ref 0.66–1.25)
CREAT SERPL-MCNC: 1.57 MG/DL (ref 0.66–1.25)
CREAT SERPL-MCNC: 1.58 MG/DL (ref 0.66–1.25)
CREAT SERPL-MCNC: 1.62 MG/DL (ref 0.66–1.25)
CREAT SERPL-MCNC: 1.65 MG/DL (ref 0.66–1.25)
CREAT SERPL-MCNC: 1.67 MG/DL (ref 0.66–1.25)
CREAT SERPL-MCNC: 1.71 MG/DL (ref 0.66–1.25)
CREAT SERPL-MCNC: 1.8 MG/DL (ref 0.66–1.25)
DIFFERENTIAL METHOD BLD: ABNORMAL
DIFFERENTIAL: ABNORMAL
EOSINOPHIL # BLD AUTO: 0 10E9/L (ref 0–0.7)
EOSINOPHIL # BLD AUTO: 0.1 10E9/L (ref 0–0.7)
EOSINOPHIL NFR BLD AUTO: 0 %
EOSINOPHIL NFR BLD AUTO: 0.1 %
EOSINOPHIL NFR BLD AUTO: 0.1 %
EOSINOPHIL NFR BLD AUTO: 1.2 %
ERYTHROCYTE [DISTWIDTH] IN BLOOD BY AUTOMATED COUNT: 13.4 % (ref 10–15)
ERYTHROCYTE [DISTWIDTH] IN BLOOD BY AUTOMATED COUNT: 13.5 % (ref 10–15)
ERYTHROCYTE [DISTWIDTH] IN BLOOD BY AUTOMATED COUNT: 13.5 % (ref 10–15)
ERYTHROCYTE [DISTWIDTH] IN BLOOD BY AUTOMATED COUNT: 13.6 % (ref 10–15)
ERYTHROCYTE [DISTWIDTH] IN BLOOD BY AUTOMATED COUNT: 13.7 % (ref 10–15)
ERYTHROCYTE [DISTWIDTH] IN BLOOD BY AUTOMATED COUNT: 13.7 % (ref 10–15)
ERYTHROCYTE [DISTWIDTH] IN BLOOD BY AUTOMATED COUNT: 13.8 % (ref 10–15)
FLUAV RNA RESP QL NAA+PROBE: NEGATIVE
FLUBV RNA RESP QL NAA+PROBE: NEGATIVE
GFR SERPL CREATININE-BSD FRML MDRD: 32 ML/MIN/{1.73_M2}
GFR SERPL CREATININE-BSD FRML MDRD: 34 ML/MIN/{1.73_M2}
GFR SERPL CREATININE-BSD FRML MDRD: 35 ML/MIN/1.73M2
GFR SERPL CREATININE-BSD FRML MDRD: 36 ML/MIN/{1.73_M2}
GFR SERPL CREATININE-BSD FRML MDRD: 36 ML/MIN/{1.73_M2}
GFR SERPL CREATININE-BSD FRML MDRD: 37 ML/MIN/{1.73_M2}
GFR SERPL CREATININE-BSD FRML MDRD: 38 ML/MIN/{1.73_M2}
GFR SERPL CREATININE-BSD FRML MDRD: 38 ML/MIN/{1.73_M2}
GFR SERPL CREATININE-BSD FRML MDRD: 40 ML/MIN/{1.73_M2}
GFR SERPL CREATININE-BSD FRML MDRD: 41 ML/MIN/{1.73_M2}
GFR SERPL CREATININE-BSD FRML MDRD: 42 ML/MIN/{1.73_M2}
GFR SERPL CREATININE-BSD FRML MDRD: 47 ML/MIN/{1.73_M2}
GLUCOSE BLDC GLUCOMTR-MCNC: 119 MG/DL (ref 70–99)
GLUCOSE FLD-MCNC: 89 MG/DL
GLUCOSE SERPL-MCNC: 100 MG/DL (ref 70–99)
GLUCOSE SERPL-MCNC: 101 MG/DL (ref 70–99)
GLUCOSE SERPL-MCNC: 106 MG/DL (ref 70–99)
GLUCOSE SERPL-MCNC: 113 MG/DL (ref 70–99)
GLUCOSE SERPL-MCNC: 117 MG/DL (ref 70–99)
GLUCOSE SERPL-MCNC: 135 MG/DL (ref 70–99)
GLUCOSE SERPL-MCNC: 167 MG/DL (ref 70–99)
GLUCOSE SERPL-MCNC: 178 MG/DL (ref 70–99)
GLUCOSE SERPL-MCNC: 86 MG/DL (ref 70–99)
GLUCOSE SERPL-MCNC: 94 MG/DL (ref 70–99)
GLUCOSE SERPL-MCNC: 96 MG/DL (ref 70–99)
GLUCOSE SERPL-MCNC: 97 MG/DL (ref 70–99)
GLUCOSE UR STRIP-MCNC: NEGATIVE MG/DL
GRAM STN SPEC: NORMAL
HBA1C MFR BLD: 6.2 % (ref 0–5.6)
HCO3 BLD-SCNC: 35 MMOL/L (ref 21–28)
HCO3 BLDV-SCNC: 24 MMOL/L (ref 21–28)
HCO3 BLDV-SCNC: 29 MMOL/L (ref 21–28)
HCO3 BLDV-SCNC: 29 MMOL/L (ref 21–28)
HCO3 BLDV-SCNC: 30 MMOL/L (ref 21–28)
HCO3 BLDV-SCNC: 30 MMOL/L (ref 21–28)
HCO3 BLDV-SCNC: 33 MMOL/L (ref 21–28)
HCO3 BLDV-SCNC: 37 MMOL/L (ref 21–28)
HCO3 BLDV-SCNC: 38 MMOL/L (ref 21–28)
HCO3 BLDV-SCNC: 38 MMOL/L (ref 21–28)
HCO3 BLDV-SCNC: 39 MMOL/L (ref 21–28)
HCT VFR BLD AUTO: 38.4 % (ref 40–53)
HCT VFR BLD AUTO: 40 % (ref 40–53)
HCT VFR BLD AUTO: 41.3 % (ref 40–53)
HCT VFR BLD AUTO: 42.3 % (ref 40–53)
HCT VFR BLD AUTO: 42.4 % (ref 40–53)
HCT VFR BLD AUTO: 43 % (ref 40–53)
HCT VFR BLD AUTO: 43.3 % (ref 40–53)
HCT VFR BLD AUTO: 43.3 % (ref 40–53)
HCT VFR BLD AUTO: 43.4 % (ref 40–53)
HCT VFR BLD AUTO: 45.4 % (ref 40–53)
HCT VFR BLD AUTO: 45.8 % (ref 40–53)
HCT VFR BLD AUTO: 46 % (ref 40–53)
HCT VFR BLD AUTO: 48.8 % (ref 40–53)
HEMOGLOBIN: 13 G/DL (ref 13.3–17.7)
HGB BLD-MCNC: 12 G/DL (ref 13.3–17.7)
HGB BLD-MCNC: 12 G/DL (ref 13.3–17.7)
HGB BLD-MCNC: 12.6 G/DL (ref 13.3–17.7)
HGB BLD-MCNC: 12.7 G/DL (ref 13.3–17.7)
HGB BLD-MCNC: 12.8 G/DL (ref 13.3–17.7)
HGB BLD-MCNC: 12.9 G/DL (ref 13.3–17.7)
HGB BLD-MCNC: 13.2 G/DL (ref 13.3–17.7)
HGB BLD-MCNC: 13.3 G/DL (ref 13.3–17.7)
HGB BLD-MCNC: 13.4 G/DL (ref 13.3–17.7)
HGB BLD-MCNC: 13.5 G/DL (ref 13.3–17.7)
HGB BLD-MCNC: 13.9 G/DL (ref 13.3–17.7)
HGB BLD-MCNC: 14.6 G/DL (ref 13.3–17.7)
HGB UR QL STRIP: ABNORMAL
HGB UR QL STRIP: ABNORMAL
HGB UR QL STRIP: NEGATIVE
HYALINE CASTS #/AREA URNS LPF: 13 /LPF (ref 0–2)
IMM GRANULOCYTES # BLD: 0.1 10E9/L (ref 0–0.4)
IMM GRANULOCYTES # BLD: 0.3 10E9/L (ref 0–0.4)
IMM GRANULOCYTES NFR BLD: 0.6 %
IMM GRANULOCYTES NFR BLD: 0.7 %
IMM GRANULOCYTES NFR BLD: 0.7 %
IMM GRANULOCYTES NFR BLD: 2.1 %
INR PPP: 1.07 (ref 0.86–1.14)
INR PPP: 1.21 (ref 0.86–1.14)
INR PPP: 1.24 (ref 0.86–1.14)
INTERPRETATION ECG - MUSE: NORMAL
KETONES UR STRIP-MCNC: 20 MG/DL
KETONES UR STRIP-MCNC: NEGATIVE MG/DL
KETONES UR STRIP-MCNC: NEGATIVE MG/DL
LABORATORY COMMENT REPORT: NORMAL
LACTATE BLD-SCNC: 0.6 MMOL/L (ref 0.7–2)
LACTATE BLD-SCNC: 0.8 MMOL/L (ref 0.7–2)
LACTATE BLD-SCNC: 1.2 MMOL/L (ref 0.7–2)
LACTATE BLD-SCNC: 1.2 MMOL/L (ref 0.7–2)
LACTATE BLD-SCNC: 2.1 MMOL/L (ref 0.7–2)
LDH FLD L TO P-CCNC: 106 U/L
LDH SERPL L TO P-CCNC: 227 U/L (ref 85–227)
LDH SERPL L TO P-CCNC: 237 U/L (ref 85–227)
LEUKOCYTE ESTERASE UR QL STRIP: NEGATIVE
LIPASE SERPL-CCNC: 86 U/L (ref 73–393)
LYMPHOCYTES # BLD AUTO: 0.8 10E9/L (ref 0.8–5.3)
LYMPHOCYTES # BLD AUTO: 1.1 10E9/L (ref 0.8–5.3)
LYMPHOCYTES # BLD AUTO: 1.7 10E9/L (ref 0.8–5.3)
LYMPHOCYTES # BLD AUTO: 2 10E9/L (ref 0.8–5.3)
LYMPHOCYTES NFR BLD AUTO: 11 %
LYMPHOCYTES NFR BLD AUTO: 21.5 %
LYMPHOCYTES NFR BLD AUTO: 6 %
LYMPHOCYTES NFR BLD AUTO: 6.8 %
LYMPHOCYTES NFR FLD MANUAL: 33 %
Lab: NORMAL
MAGNESIUM SERPL-MCNC: 2.3 MG/DL (ref 1.6–2.3)
MCH RBC QN AUTO: 29.5 PG (ref 26.5–33)
MCH RBC QN AUTO: 29.5 PG (ref 26.5–33)
MCH RBC QN AUTO: 29.7 PG (ref 26.5–33)
MCH RBC QN AUTO: 29.8 PG (ref 26.5–33)
MCH RBC QN AUTO: 30 PG (ref 26.5–33)
MCH RBC QN AUTO: 30.1 PG (ref 26.5–33)
MCH RBC QN AUTO: 30.2 PG (ref 26.5–33)
MCH RBC QN AUTO: 30.3 PG (ref 26.5–33)
MCH RBC QN AUTO: 30.4 PG (ref 26.5–33)
MCHC RBC AUTO-ENTMCNC: 28.5 G/DL (ref 31.5–36.5)
MCHC RBC AUTO-ENTMCNC: 29.3 G/DL (ref 31.5–36.5)
MCHC RBC AUTO-ENTMCNC: 29.3 G/DL (ref 31.5–36.5)
MCHC RBC AUTO-ENTMCNC: 29.6 G/DL (ref 31.5–36.5)
MCHC RBC AUTO-ENTMCNC: 29.7 G/DL (ref 31.5–36.5)
MCHC RBC AUTO-ENTMCNC: 30 G/DL (ref 31.5–36.5)
MCHC RBC AUTO-ENTMCNC: 30 G/DL (ref 31.5–36.5)
MCHC RBC AUTO-ENTMCNC: 30.5 G/DL (ref 31.5–36.5)
MCHC RBC AUTO-ENTMCNC: 30.7 G/DL (ref 31.5–36.5)
MCHC RBC AUTO-ENTMCNC: 31.3 G/DL (ref 31.5–36.5)
MCHC RBC AUTO-ENTMCNC: 32.2 G/DL (ref 31.5–36.5)
MCV RBC AUTO: 101 FL (ref 78–100)
MCV RBC AUTO: 104 FL (ref 78–100)
MCV RBC AUTO: 94 FL (ref 78–100)
MCV RBC AUTO: 97 FL (ref 78–100)
MCV RBC AUTO: 97 FL (ref 78–100)
MCV RBC AUTO: 98 FL (ref 78–100)
MCV RBC AUTO: 98 FL (ref 78–100)
MCV RBC AUTO: 99 FL (ref 78–100)
MONOCYTES # BLD AUTO: 0.7 10E9/L (ref 0–1.3)
MONOCYTES # BLD AUTO: 0.9 10E9/L (ref 0–1.3)
MONOCYTES # BLD AUTO: 0.9 10E9/L (ref 0–1.3)
MONOCYTES # BLD AUTO: 1.3 10E9/L (ref 0–1.3)
MONOCYTES NFR BLD AUTO: 5.7 %
MONOCYTES NFR BLD AUTO: 6.5 %
MONOCYTES NFR BLD AUTO: 7.7 %
MONOCYTES NFR BLD AUTO: 8.7 %
MONOS+MACROS NFR FLD MANUAL: 49 %
MUCOUS THREADS #/AREA URNS LPF: PRESENT /LPF
MUCOUS THREADS #/AREA URNS LPF: PRESENT /LPF
NEUTROPHILS # BLD AUTO: 11.6 10E9/L (ref 1.6–8.3)
NEUTROPHILS # BLD AUTO: 11.9 10E9/L (ref 1.6–8.3)
NEUTROPHILS # BLD AUTO: 14.1 10E9/L (ref 1.6–8.3)
NEUTROPHILS # BLD AUTO: 6.2 10E9/L (ref 1.6–8.3)
NEUTROPHILS NFR BLD AUTO: 68.6 %
NEUTROPHILS NFR BLD AUTO: 79.2 %
NEUTROPHILS NFR BLD AUTO: 84.9 %
NEUTROPHILS NFR BLD AUTO: 86.8 %
NEUTS BAND NFR FLD MANUAL: 13 %
NITRATE UR QL: NEGATIVE
NRBC # BLD AUTO: 0 10*3/UL
NRBC BLD AUTO-RTO: 0 /100
NT-PROBNP SERPL-MCNC: 9562 PG/ML (ref 0–1800)
O2/TOTAL GAS SETTING VFR VENT: 15 %
O2/TOTAL GAS SETTING VFR VENT: ABNORMAL %
OTHER CELLS FLD MANUAL: 5 %
OXYHGB MFR BLD: 98 % (ref 92–100)
OXYHGB MFR BLDV: 41 %
OXYHGB MFR BLDV: 49 %
OXYHGB MFR BLDV: 56 %
OXYHGB MFR BLDV: 58 %
OXYHGB MFR BLDV: 60 %
OXYHGB MFR BLDV: 67 %
OXYHGB MFR BLDV: 73 %
OXYHGB MFR BLDV: 76 %
OXYHGB MFR BLDV: 77 %
OXYHGB MFR BLDV: 84 %
OXYHGB MFR BLDV: 91 %
OXYHGB MFR BLDV: 92 %
PCO2 BLD: 86 MM HG (ref 35–45)
PCO2 BLDV: 100 MM HG (ref 40–50)
PCO2 BLDV: 103 MM HG (ref 40–50)
PCO2 BLDV: 49 MM HG (ref 40–50)
PCO2 BLDV: 52 MM HG (ref 40–50)
PCO2 BLDV: 55 MM HG (ref 40–50)
PCO2 BLDV: 56 MM HG (ref 40–50)
PCO2 BLDV: 58 MM HG (ref 40–50)
PCO2 BLDV: 59 MM HG (ref 40–50)
PCO2 BLDV: 64 MM HG (ref 40–50)
PCO2 BLDV: 72 MM HG (ref 40–50)
PCO2 BLDV: 72 MM HG (ref 40–50)
PCO2 BLDV: 75 MM HG (ref 40–50)
PH BLD: 7.22 PH (ref 7.35–7.45)
PH BLDV: 7.17 PH (ref 7.32–7.43)
PH BLDV: 7.17 PH (ref 7.32–7.43)
PH BLDV: 7.18 PH (ref 7.32–7.43)
PH BLDV: 7.23 PH (ref 7.32–7.43)
PH BLDV: 7.3 PH (ref 7.32–7.43)
PH BLDV: 7.31 PH (ref 7.32–7.43)
PH BLDV: 7.31 PH (ref 7.32–7.43)
PH BLDV: 7.32 PH (ref 7.32–7.43)
PH BLDV: 7.33 PH (ref 7.32–7.43)
PH BLDV: 7.43 PH (ref 7.32–7.43)
PH BLDV: 7.44 PH (ref 7.32–7.43)
PH BLDV: 7.49 PH (ref 7.32–7.43)
PH UR STRIP: 5.5 PH (ref 5–7)
PH UR STRIP: 5.5 PH (ref 5–7)
PH UR STRIP: 7.5 PH (ref 5–7)
PHOSPHATE SERPL-MCNC: 3.5 MG/DL (ref 2.5–4.5)
PLATELET # BLD AUTO: 234 10E9/L (ref 150–450)
PLATELET # BLD AUTO: 239 10E9/L (ref 150–450)
PLATELET # BLD AUTO: 246 10E9/L (ref 150–450)
PLATELET # BLD AUTO: 262 10E9/L (ref 150–450)
PLATELET # BLD AUTO: 281 10^9/L (ref 150–450)
PLATELET # BLD AUTO: 282 10E9/L (ref 150–450)
PLATELET # BLD AUTO: 302 10E9/L (ref 150–450)
PLATELET # BLD AUTO: 307 10E9/L (ref 150–450)
PLATELET # BLD AUTO: 311 10E9/L (ref 150–450)
PLATELET # BLD AUTO: 320 10E9/L (ref 150–450)
PLATELET # BLD AUTO: 325 10E9/L (ref 150–450)
PLATELET # BLD AUTO: 327 10E9/L (ref 150–450)
PLATELET # BLD AUTO: 363 10E9/L (ref 150–450)
PLATELET # BLD AUTO: 380 10E9/L (ref 150–450)
PO2 BLD: 136 MM HG (ref 80–105)
PO2 BLDV: 24 MM HG (ref 25–47)
PO2 BLDV: 27 MM HG (ref 25–47)
PO2 BLDV: 30 MM HG (ref 25–47)
PO2 BLDV: 33 MM HG (ref 25–47)
PO2 BLDV: 35 MM HG (ref 25–47)
PO2 BLDV: 36 MM HG (ref 25–47)
PO2 BLDV: 40 MM HG (ref 25–47)
PO2 BLDV: 46 MM HG (ref 25–47)
PO2 BLDV: 48 MM HG (ref 25–47)
PO2 BLDV: 50 MM HG (ref 25–47)
PO2 BLDV: 67 MM HG (ref 25–47)
PO2 BLDV: 74 MM HG (ref 25–47)
POTASSIUM SERPL-SCNC: 2.8 MMOL/L (ref 3.4–5.3)
POTASSIUM SERPL-SCNC: 3.1 MMOL/L (ref 3.4–5.3)
POTASSIUM SERPL-SCNC: 3.2 MMOL/L (ref 3.4–5.3)
POTASSIUM SERPL-SCNC: 3.3 MMOL/L (ref 3.4–5.3)
POTASSIUM SERPL-SCNC: 3.4 MMOL/L (ref 3.4–5.3)
POTASSIUM SERPL-SCNC: 3.5 MMOL/L (ref 3.4–5.3)
POTASSIUM SERPL-SCNC: 3.6 MMOL/L (ref 3.4–5.3)
POTASSIUM SERPL-SCNC: 3.8 MMOL/L (ref 3.4–5.3)
POTASSIUM SERPL-SCNC: 3.8 MMOL/L (ref 3.4–5.3)
POTASSIUM SERPL-SCNC: 3.9 MMOL/L (ref 3.4–5.3)
POTASSIUM SERPL-SCNC: 4 MMOL/L (ref 3.4–5.3)
POTASSIUM SERPL-SCNC: 4.1 MMOL/L (ref 3.4–5.3)
POTASSIUM SERPL-SCNC: 4.2 MMOL/L (ref 3.4–5.3)
POTASSIUM SERPL-SCNC: 4.7 MMOL/L (ref 3.4–5.3)
PROCALCITONIN SERPL-MCNC: 0.22 NG/ML
PROCALCITONIN SERPL-MCNC: 0.29 NG/ML
PROT FLD-MCNC: 2.7 G/DL
PROT SERPL-MCNC: 6.9 G/DL (ref 6.8–8.8)
PROT SERPL-MCNC: 7 G/DL (ref 6.8–8.8)
PROT SERPL-MCNC: 7 G/DL (ref 6.8–8.8)
PROT SERPL-MCNC: 7.4 G/DL (ref 6.8–8.8)
PROT SERPL-MCNC: 7.8 G/DL (ref 6.8–8.8)
PROT SERPL-MCNC: 7.9 G/DL (ref 6.8–8.8)
RBC # BLD AUTO: 3.96 10E12/L (ref 4.4–5.9)
RBC # BLD AUTO: 4.04 10E12/L (ref 4.4–5.9)
RBC # BLD AUTO: 4.2 10E12/L (ref 4.4–5.9)
RBC # BLD AUTO: 4.28 10E12/L (ref 4.4–5.9)
RBC # BLD AUTO: 4.31 10E12/L (ref 4.4–5.9)
RBC # BLD AUTO: 4.36 10^12/L (ref 4.4–5.9)
RBC # BLD AUTO: 4.37 10E12/L (ref 4.4–5.9)
RBC # BLD AUTO: 4.38 10E12/L (ref 4.4–5.9)
RBC # BLD AUTO: 4.39 10E12/L (ref 4.4–5.9)
RBC # BLD AUTO: 4.55 10E12/L (ref 4.4–5.9)
RBC # BLD AUTO: 4.55 10E12/L (ref 4.4–5.9)
RBC # BLD AUTO: 4.68 10E12/L (ref 4.4–5.9)
RBC # BLD AUTO: 4.83 10E12/L (ref 4.4–5.9)
RBC #/AREA URNS AUTO: 1 /HPF (ref 0–2)
RBC #/AREA URNS AUTO: 19 /HPF (ref 0–2)
RBC #/AREA URNS AUTO: 8 /HPF (ref 0–2)
RSV RNA SPEC QL NAA+PROBE: NORMAL
SARS-COV-2 RNA RESP QL NAA+PROBE: NEGATIVE
SODIUM SERPL-SCNC: 135 MMOL/L (ref 133–144)
SODIUM SERPL-SCNC: 135 MMOL/L (ref 133–144)
SODIUM SERPL-SCNC: 138 MMOL/L (ref 133–144)
SODIUM SERPL-SCNC: 138 MMOL/L (ref 133–144)
SODIUM SERPL-SCNC: 140 MMOL/L (ref 133–144)
SODIUM SERPL-SCNC: 140 MMOL/L (ref 133–144)
SODIUM SERPL-SCNC: 141 MMOL/L (ref 133–144)
SODIUM SERPL-SCNC: 141 MMOL/L (ref 133–144)
SODIUM SERPL-SCNC: 145 MMOL/L (ref 133–144)
SODIUM SERPL-SCNC: 146 MMOL/L (ref 133–144)
SODIUM SERPL-SCNC: 147 MMOL/L (ref 133–144)
SODIUM SERPL-SCNC: 147 MMOL/L (ref 133–144)
SOURCE: ABNORMAL
SP GR UR STRIP: 1.01 (ref 1–1.03)
SP GR UR STRIP: 1.02 (ref 1–1.03)
SP GR UR STRIP: 1.03 (ref 1–1.03)
SPECIMEN SOURCE FLD: NORMAL
SPECIMEN SOURCE: NORMAL
SQUAMOUS #/AREA URNS AUTO: <1 /HPF (ref 0–1)
TROPONIN I SERPL-MCNC: 0.03 UG/L (ref 0–0.04)
TROPONIN I SERPL-MCNC: 0.04 UG/L (ref 0–0.04)
TROPONIN I SERPL-MCNC: 0.11 UG/L (ref 0–0.04)
TROPONIN I SERPL-MCNC: 0.12 UG/L (ref 0–0.04)
TROPONIN I SERPL-MCNC: 0.14 UG/L (ref 0–0.04)
TROPONIN I SERPL-MCNC: 0.16 UG/L (ref 0–0.04)
TROPONIN I SERPL-MCNC: <0.015 UG/L (ref 0–0.04)
TSH SERPL-ACNC: 5.2 MU/L (ref 0.4–4)
UFH PPP CHRO-ACNC: 0.2 IU/ML
UFH PPP CHRO-ACNC: 0.23 IU/ML
UFH PPP CHRO-ACNC: 0.29 IU/ML
UFH PPP CHRO-ACNC: 0.41 IU/ML
UFH PPP CHRO-ACNC: 0.43 IU/ML
UFH PPP CHRO-ACNC: 0.67 IU/ML
UFH PPP CHRO-ACNC: 0.76 IU/ML
UROBILINOGEN UR STRIP-MCNC: NORMAL MG/DL (ref 0–2)
WBC # BLD AUTO: 10.4 10E9/L (ref 4–11)
WBC # BLD AUTO: 10.4 10E9/L (ref 4–11)
WBC # BLD AUTO: 10.9 10E9/L (ref 4–11)
WBC # BLD AUTO: 11.9 10E9/L (ref 4–11)
WBC # BLD AUTO: 11.9 10E9/L (ref 4–11)
WBC # BLD AUTO: 12.5 10E9/L (ref 4–11)
WBC # BLD AUTO: 13.6 10E9/L (ref 4–11)
WBC # BLD AUTO: 14.2 10E9/L (ref 4–11)
WBC # BLD AUTO: 15 10E9/L (ref 4–11)
WBC # BLD AUTO: 16.3 10E9/L (ref 4–11)
WBC # BLD AUTO: 17.1 10E9/L (ref 4–11)
WBC # BLD AUTO: 8.8 10^9/L (ref 4–11)
WBC # BLD AUTO: 9.1 10E9/L (ref 4–11)
WBC # FLD AUTO: 223 /UL
WBC #/AREA URNS AUTO: 1 /HPF (ref 0–5)
WBC #/AREA URNS AUTO: 1 /HPF (ref 0–5)
WBC #/AREA URNS AUTO: <1 /HPF (ref 0–5)

## 2021-01-01 PROCEDURE — 120N000013 HC R&B IMCU

## 2021-01-01 PROCEDURE — 99222 1ST HOSP IP/OBS MODERATE 55: CPT | Mod: AI | Performed by: INTERNAL MEDICINE

## 2021-01-01 PROCEDURE — 71045 X-RAY EXAM CHEST 1 VIEW: CPT

## 2021-01-01 PROCEDURE — 82805 BLOOD GASES W/O2 SATURATION: CPT | Performed by: INTERNAL MEDICINE

## 2021-01-01 PROCEDURE — 250N000009 HC RX 250: Performed by: ANESTHESIOLOGY

## 2021-01-01 PROCEDURE — 250N000013 HC RX MED GY IP 250 OP 250 PS 637: Performed by: SURGERY

## 2021-01-01 PROCEDURE — 94640 AIRWAY INHALATION TREATMENT: CPT | Mod: 76

## 2021-01-01 PROCEDURE — 250N000012 HC RX MED GY IP 250 OP 636 PS 637: Performed by: SURGERY

## 2021-01-01 PROCEDURE — 87636 SARSCOV2 & INF A&B AMP PRB: CPT | Performed by: INTERNAL MEDICINE

## 2021-01-01 PROCEDURE — 258N000003 HC RX IP 258 OP 636: Performed by: INTERNAL MEDICINE

## 2021-01-01 PROCEDURE — 250N000011 HC RX IP 250 OP 636: Performed by: PHYSICIAN ASSISTANT

## 2021-01-01 PROCEDURE — 120N000001 HC R&B MED SURG/OB

## 2021-01-01 PROCEDURE — 94660 CPAP INITIATION&MGMT: CPT

## 2021-01-01 PROCEDURE — 80048 BASIC METABOLIC PNL TOTAL CA: CPT | Performed by: INTERNAL MEDICINE

## 2021-01-01 PROCEDURE — 88304 TISSUE EXAM BY PATHOLOGIST: CPT | Mod: 26 | Performed by: PATHOLOGY

## 2021-01-01 PROCEDURE — 0W993ZZ DRAINAGE OF RIGHT PLEURAL CAVITY, PERCUTANEOUS APPROACH: ICD-10-PCS | Performed by: RADIOLOGY

## 2021-01-01 PROCEDURE — 97530 THERAPEUTIC ACTIVITIES: CPT | Mod: GP | Performed by: PHYSICAL THERAPIST

## 2021-01-01 PROCEDURE — 999N000157 HC STATISTIC RCP TIME EA 10 MIN

## 2021-01-01 PROCEDURE — 84145 PROCALCITONIN (PCT): CPT | Performed by: EMERGENCY MEDICINE

## 2021-01-01 PROCEDURE — 5A09457 ASSISTANCE WITH RESPIRATORY VENTILATION, 24-96 CONSECUTIVE HOURS, CONTINUOUS POSITIVE AIRWAY PRESSURE: ICD-10-PCS | Performed by: INTERNAL MEDICINE

## 2021-01-01 PROCEDURE — 83605 ASSAY OF LACTIC ACID: CPT | Performed by: HOSPITALIST

## 2021-01-01 PROCEDURE — 84145 PROCALCITONIN (PCT): CPT | Performed by: INTERNAL MEDICINE

## 2021-01-01 PROCEDURE — 99233 SBSQ HOSP IP/OBS HIGH 50: CPT | Performed by: INTERNAL MEDICINE

## 2021-01-01 PROCEDURE — 36415 COLL VENOUS BLD VENIPUNCTURE: CPT | Performed by: INTERNAL MEDICINE

## 2021-01-01 PROCEDURE — 360N000083 HC SURGERY LEVEL 3 W/ FLUORO, PER MIN: Performed by: SURGERY

## 2021-01-01 PROCEDURE — 82945 GLUCOSE OTHER FLUID: CPT | Performed by: INTERNAL MEDICINE

## 2021-01-01 PROCEDURE — 250N000011 HC RX IP 250 OP 636: Performed by: INTERNAL MEDICINE

## 2021-01-01 PROCEDURE — 71275 CT ANGIOGRAPHY CHEST: CPT

## 2021-01-01 PROCEDURE — 250N000013 HC RX MED GY IP 250 OP 250 PS 637: Performed by: INTERNAL MEDICINE

## 2021-01-01 PROCEDURE — 250N000009 HC RX 250: Performed by: NURSE ANESTHETIST, CERTIFIED REGISTERED

## 2021-01-01 PROCEDURE — 94640 AIRWAY INHALATION TREATMENT: CPT

## 2021-01-01 PROCEDURE — 71045 X-RAY EXAM CHEST 1 VIEW: CPT | Mod: 77

## 2021-01-01 PROCEDURE — 84100 ASSAY OF PHOSPHORUS: CPT | Performed by: INTERNAL MEDICINE

## 2021-01-01 PROCEDURE — 250N000009 HC RX 250: Performed by: SURGERY

## 2021-01-01 PROCEDURE — 82805 BLOOD GASES W/O2 SATURATION: CPT | Performed by: EMERGENCY MEDICINE

## 2021-01-01 PROCEDURE — 99291 CRITICAL CARE FIRST HOUR: CPT | Performed by: INTERNAL MEDICINE

## 2021-01-01 PROCEDURE — 93005 ELECTROCARDIOGRAM TRACING: CPT

## 2021-01-01 PROCEDURE — 999N000105 HC STATISTIC NO DOCUMENTATION TO SUPPORT CHARGE

## 2021-01-01 PROCEDURE — 258N000003 HC RX IP 258 OP 636: Performed by: NURSE ANESTHETIST, CERTIFIED REGISTERED

## 2021-01-01 PROCEDURE — 255N000002 HC RX 255 OP 636: Performed by: INTERNAL MEDICINE

## 2021-01-01 PROCEDURE — 87040 BLOOD CULTURE FOR BACTERIA: CPT | Performed by: EMERGENCY MEDICINE

## 2021-01-01 PROCEDURE — 96366 THER/PROPH/DIAG IV INF ADDON: CPT

## 2021-01-01 PROCEDURE — 250N000009 HC RX 250: Performed by: INTERNAL MEDICINE

## 2021-01-01 PROCEDURE — 36415 COLL VENOUS BLD VENIPUNCTURE: CPT | Performed by: HOSPITALIST

## 2021-01-01 PROCEDURE — 85027 COMPLETE CBC AUTOMATED: CPT | Performed by: INTERNAL MEDICINE

## 2021-01-01 PROCEDURE — 84484 ASSAY OF TROPONIN QUANT: CPT | Performed by: EMERGENCY MEDICINE

## 2021-01-01 PROCEDURE — 83605 ASSAY OF LACTIC ACID: CPT | Performed by: EMERGENCY MEDICINE

## 2021-01-01 PROCEDURE — 83605 ASSAY OF LACTIC ACID: CPT | Performed by: INTERNAL MEDICINE

## 2021-01-01 PROCEDURE — 84132 ASSAY OF SERUM POTASSIUM: CPT | Performed by: INTERNAL MEDICINE

## 2021-01-01 PROCEDURE — 99233 SBSQ HOSP IP/OBS HIGH 50: CPT | Performed by: CLINICAL NURSE SPECIALIST

## 2021-01-01 PROCEDURE — 0FT44ZZ RESECTION OF GALLBLADDER, PERCUTANEOUS ENDOSCOPIC APPROACH: ICD-10-PCS | Performed by: SURGERY

## 2021-01-01 PROCEDURE — 99232 SBSQ HOSP IP/OBS MODERATE 35: CPT | Performed by: INTERNAL MEDICINE

## 2021-01-01 PROCEDURE — 99207 PR NO BILLABLE SERVICE THIS VISIT: CPT | Performed by: INTERNAL MEDICINE

## 2021-01-01 PROCEDURE — 99221 1ST HOSP IP/OBS SF/LOW 40: CPT | Performed by: INTERNAL MEDICINE

## 2021-01-01 PROCEDURE — 81001 URINALYSIS AUTO W/SCOPE: CPT | Performed by: INTERNAL MEDICINE

## 2021-01-01 PROCEDURE — 85049 AUTOMATED PLATELET COUNT: CPT | Performed by: INTERNAL MEDICINE

## 2021-01-01 PROCEDURE — G0508 CRIT CARE TELEHEA CONSULT 60: HCPCS | Mod: GT | Performed by: PSYCHIATRY & NEUROLOGY

## 2021-01-01 PROCEDURE — 99231 SBSQ HOSP IP/OBS SF/LOW 25: CPT | Performed by: CLINICAL NURSE SPECIALIST

## 2021-01-01 PROCEDURE — 87070 CULTURE OTHR SPECIMN AEROBIC: CPT | Performed by: INTERNAL MEDICINE

## 2021-01-01 PROCEDURE — 250N000009 HC RX 250: Performed by: RADIOLOGY

## 2021-01-01 PROCEDURE — 84484 ASSAY OF TROPONIN QUANT: CPT | Performed by: INTERNAL MEDICINE

## 2021-01-01 PROCEDURE — 258N000003 HC RX IP 258 OP 636: Performed by: EMERGENCY MEDICINE

## 2021-01-01 PROCEDURE — 47562 LAPAROSCOPIC CHOLECYSTECTOMY: CPT | Mod: AS | Performed by: PHYSICIAN ASSISTANT

## 2021-01-01 PROCEDURE — 47562 LAPAROSCOPIC CHOLECYSTECTOMY: CPT | Performed by: SURGERY

## 2021-01-01 PROCEDURE — 96365 THER/PROPH/DIAG IV INF INIT: CPT

## 2021-01-01 PROCEDURE — 85025 COMPLETE CBC W/AUTO DIFF WBC: CPT | Performed by: INTERNAL MEDICINE

## 2021-01-01 PROCEDURE — 87075 CULTR BACTERIA EXCEPT BLOOD: CPT | Performed by: SURGERY

## 2021-01-01 PROCEDURE — 36415 COLL VENOUS BLD VENIPUNCTURE: CPT | Performed by: EMERGENCY MEDICINE

## 2021-01-01 PROCEDURE — 80053 COMPREHEN METABOLIC PANEL: CPT | Performed by: EMERGENCY MEDICINE

## 2021-01-01 PROCEDURE — 84484 ASSAY OF TROPONIN QUANT: CPT | Performed by: HOSPITALIST

## 2021-01-01 PROCEDURE — 70450 CT HEAD/BRAIN W/O DYE: CPT

## 2021-01-01 PROCEDURE — 85027 COMPLETE CBC AUTOMATED: CPT | Performed by: HOSPITALIST

## 2021-01-01 PROCEDURE — 250N000011 HC RX IP 250 OP 636: Performed by: EMERGENCY MEDICINE

## 2021-01-01 PROCEDURE — 250N000011 HC RX IP 250 OP 636: Performed by: SURGERY

## 2021-01-01 PROCEDURE — 83615 LACTATE (LD) (LDH) ENZYME: CPT | Performed by: INTERNAL MEDICINE

## 2021-01-01 PROCEDURE — 99285 EMERGENCY DEPT VISIT HI MDM: CPT | Mod: 25

## 2021-01-01 PROCEDURE — 97161 PT EVAL LOW COMPLEX 20 MIN: CPT | Mod: GP | Performed by: PHYSICAL THERAPIST

## 2021-01-01 PROCEDURE — C9803 HOPD COVID-19 SPEC COLLECT: HCPCS

## 2021-01-01 PROCEDURE — 999N000141 HC STATISTIC PRE-PROCEDURE NURSING ASSESSMENT: Performed by: SURGERY

## 2021-01-01 PROCEDURE — 85520 HEPARIN ASSAY: CPT | Performed by: INTERNAL MEDICINE

## 2021-01-01 PROCEDURE — 87205 SMEAR GRAM STAIN: CPT | Performed by: INTERNAL MEDICINE

## 2021-01-01 PROCEDURE — 92610 EVALUATE SWALLOWING FUNCTION: CPT | Mod: GN | Performed by: SPEECH-LANGUAGE PATHOLOGIST

## 2021-01-01 PROCEDURE — 99358 PROLONG SERVICE W/O CONTACT: CPT | Performed by: CLINICAL NURSE SPECIALIST

## 2021-01-01 PROCEDURE — 83735 ASSAY OF MAGNESIUM: CPT | Performed by: INTERNAL MEDICINE

## 2021-01-01 PROCEDURE — 370N000017 HC ANESTHESIA TECHNICAL FEE, PER MIN: Performed by: SURGERY

## 2021-01-01 PROCEDURE — 96365 THER/PROPH/DIAG IV INF INIT: CPT | Mod: 59

## 2021-01-01 PROCEDURE — 84155 ASSAY OF PROTEIN SERUM: CPT | Performed by: INTERNAL MEDICINE

## 2021-01-01 PROCEDURE — 99223 1ST HOSP IP/OBS HIGH 75: CPT | Performed by: CLINICAL NURSE SPECIALIST

## 2021-01-01 PROCEDURE — 250N000011 HC RX IP 250 OP 636: Performed by: HOSPITALIST

## 2021-01-01 PROCEDURE — 87636 SARSCOV2 & INF A&B AMP PRB: CPT | Performed by: EMERGENCY MEDICINE

## 2021-01-01 PROCEDURE — 92526 ORAL FUNCTION THERAPY: CPT | Mod: GN

## 2021-01-01 PROCEDURE — 76705 ECHO EXAM OF ABDOMEN: CPT

## 2021-01-01 PROCEDURE — 250N000012 HC RX MED GY IP 250 OP 636 PS 637: Performed by: INTERNAL MEDICINE

## 2021-01-01 PROCEDURE — 85610 PROTHROMBIN TIME: CPT | Performed by: INTERNAL MEDICINE

## 2021-01-01 PROCEDURE — 710N000011 HC RECOVERY PHASE 1, LEVEL 3, PER MIN: Performed by: SURGERY

## 2021-01-01 PROCEDURE — 999N001017 HC STATISTIC GLUCOSE BY METER IP

## 2021-01-01 PROCEDURE — 83880 ASSAY OF NATRIURETIC PEPTIDE: CPT | Performed by: EMERGENCY MEDICINE

## 2021-01-01 PROCEDURE — 99292 CRITICAL CARE ADDL 30 MIN: CPT

## 2021-01-01 PROCEDURE — 96375 TX/PRO/DX INJ NEW DRUG ADDON: CPT

## 2021-01-01 PROCEDURE — 96368 THER/DIAG CONCURRENT INF: CPT

## 2021-01-01 PROCEDURE — 85520 HEPARIN ASSAY: CPT | Performed by: HOSPITALIST

## 2021-01-01 PROCEDURE — 74177 CT ABD & PELVIS W/CONTRAST: CPT

## 2021-01-01 PROCEDURE — 272N000706 US THORACENTESIS

## 2021-01-01 PROCEDURE — 85025 COMPLETE CBC W/AUTO DIFF WBC: CPT | Performed by: EMERGENCY MEDICINE

## 2021-01-01 PROCEDURE — 89051 BODY FLUID CELL COUNT: CPT | Performed by: INTERNAL MEDICINE

## 2021-01-01 PROCEDURE — 250N000009 HC RX 250: Performed by: EMERGENCY MEDICINE

## 2021-01-01 PROCEDURE — 70498 CT ANGIOGRAPHY NECK: CPT

## 2021-01-01 PROCEDURE — 84157 ASSAY OF PROTEIN OTHER: CPT | Performed by: INTERNAL MEDICINE

## 2021-01-01 PROCEDURE — 93306 TTE W/DOPPLER COMPLETE: CPT | Mod: 26 | Performed by: INTERNAL MEDICINE

## 2021-01-01 PROCEDURE — 85610 PROTHROMBIN TIME: CPT | Performed by: EMERGENCY MEDICINE

## 2021-01-01 PROCEDURE — 81001 URINALYSIS AUTO W/SCOPE: CPT | Performed by: EMERGENCY MEDICINE

## 2021-01-01 PROCEDURE — 999N000208 ECHOCARDIOGRAM COMPLETE

## 2021-01-01 PROCEDURE — 250N000011 HC RX IP 250 OP 636: Performed by: NURSE ANESTHETIST, CERTIFIED REGISTERED

## 2021-01-01 PROCEDURE — 80053 COMPREHEN METABOLIC PANEL: CPT | Performed by: INTERNAL MEDICINE

## 2021-01-01 PROCEDURE — 83690 ASSAY OF LIPASE: CPT | Performed by: EMERGENCY MEDICINE

## 2021-01-01 PROCEDURE — 85730 THROMBOPLASTIN TIME PARTIAL: CPT | Performed by: INTERNAL MEDICINE

## 2021-01-01 PROCEDURE — 99291 CRITICAL CARE FIRST HOUR: CPT | Mod: 25

## 2021-01-01 PROCEDURE — 71250 CT THORAX DX C-: CPT

## 2021-01-01 PROCEDURE — 99207 PR CDG-CODE CATEGORY CHANGED: CPT | Performed by: INTERNAL MEDICINE

## 2021-01-01 PROCEDURE — 99204 OFFICE O/P NEW MOD 45 MIN: CPT | Mod: 57 | Performed by: SURGERY

## 2021-01-01 PROCEDURE — 87493 C DIFF AMPLIFIED PROBE: CPT | Performed by: INTERNAL MEDICINE

## 2021-01-01 PROCEDURE — 88304 TISSUE EXAM BY PATHOLOGIST: CPT | Mod: TC | Performed by: SURGERY

## 2021-01-01 PROCEDURE — 250N000011 HC RX IP 250 OP 636: Performed by: CLINICAL NURSE SPECIALIST

## 2021-01-01 PROCEDURE — 272N000001 HC OR GENERAL SUPPLY STERILE: Performed by: SURGERY

## 2021-01-01 PROCEDURE — 87205 SMEAR GRAM STAIN: CPT | Performed by: SURGERY

## 2021-01-01 PROCEDURE — 87070 CULTURE OTHR SPECIMN AEROBIC: CPT | Performed by: SURGERY

## 2021-01-01 PROCEDURE — 80048 BASIC METABOLIC PNL TOTAL CA: CPT | Performed by: HOSPITALIST

## 2021-01-01 RX ORDER — ALBUTEROL SULFATE 90 UG/1
2 AEROSOL, METERED RESPIRATORY (INHALATION) EVERY 4 HOURS PRN
Status: DISCONTINUED | OUTPATIENT
Start: 2021-01-01 | End: 2021-04-29 | Stop reason: HOSPADM

## 2021-01-01 RX ORDER — DEXTROSE MONOHYDRATE, SODIUM CHLORIDE, AND POTASSIUM CHLORIDE 50; 1.49; 4.5 G/1000ML; G/1000ML; G/1000ML
INJECTION, SOLUTION INTRAVENOUS CONTINUOUS
Status: DISCONTINUED | OUTPATIENT
Start: 2021-01-01 | End: 2021-01-01

## 2021-01-01 RX ORDER — LIDOCAINE 40 MG/G
CREAM TOPICAL
Status: DISCONTINUED | OUTPATIENT
Start: 2021-01-01 | End: 2021-01-01

## 2021-01-01 RX ORDER — NEOSTIGMINE METHYLSULFATE 1 MG/ML
VIAL (ML) INJECTION PRN
Status: DISCONTINUED | OUTPATIENT
Start: 2021-01-01 | End: 2021-01-01

## 2021-01-01 RX ORDER — ASPIRIN 81 MG/1
TABLET, COATED ORAL
Qty: 28 TABLET | Status: SHIPPED | OUTPATIENT
Start: 2021-01-01

## 2021-01-01 RX ORDER — FUROSEMIDE 10 MG/ML
20 INJECTION INTRAMUSCULAR; INTRAVENOUS ONCE
Status: COMPLETED | OUTPATIENT
Start: 2021-01-01 | End: 2021-01-01

## 2021-01-01 RX ORDER — METOCLOPRAMIDE 5 MG/1
5 TABLET ORAL EVERY 6 HOURS PRN
Status: DISCONTINUED | OUTPATIENT
Start: 2021-01-01 | End: 2021-01-01 | Stop reason: HOSPADM

## 2021-01-01 RX ORDER — LORAZEPAM 2 MG/ML
0.5 INJECTION INTRAMUSCULAR EVERY 4 HOURS PRN
Status: DISCONTINUED | OUTPATIENT
Start: 2021-01-01 | End: 2021-04-29 | Stop reason: HOSPADM

## 2021-01-01 RX ORDER — HYDROMORPHONE HYDROCHLORIDE 1 MG/ML
.3-.5 INJECTION, SOLUTION INTRAMUSCULAR; INTRAVENOUS; SUBCUTANEOUS
Status: DISCONTINUED | OUTPATIENT
Start: 2021-01-01 | End: 2021-01-01 | Stop reason: HOSPADM

## 2021-01-01 RX ORDER — NALOXONE HYDROCHLORIDE 0.4 MG/ML
0.4 INJECTION, SOLUTION INTRAMUSCULAR; INTRAVENOUS; SUBCUTANEOUS
Status: DISCONTINUED | OUTPATIENT
Start: 2021-01-01 | End: 2021-01-01 | Stop reason: HOSPADM

## 2021-01-01 RX ORDER — DEXAMETHASONE SODIUM PHOSPHATE 4 MG/ML
INJECTION, SOLUTION INTRA-ARTICULAR; INTRALESIONAL; INTRAMUSCULAR; INTRAVENOUS; SOFT TISSUE PRN
Status: DISCONTINUED | OUTPATIENT
Start: 2021-01-01 | End: 2021-01-01

## 2021-01-01 RX ORDER — LATANOPROST 50 UG/ML
1 SOLUTION/ DROPS OPHTHALMIC EVERY EVENING
Status: DISCONTINUED | OUTPATIENT
Start: 2021-01-01 | End: 2021-01-01 | Stop reason: HOSPADM

## 2021-01-01 RX ORDER — IOPAMIDOL 755 MG/ML
500 INJECTION, SOLUTION INTRAVASCULAR ONCE
Status: COMPLETED | OUTPATIENT
Start: 2021-01-01 | End: 2021-01-01

## 2021-01-01 RX ORDER — IPRATROPIUM BROMIDE AND ALBUTEROL SULFATE 2.5; .5 MG/3ML; MG/3ML
3 SOLUTION RESPIRATORY (INHALATION)
Status: DISCONTINUED | OUTPATIENT
Start: 2021-01-01 | End: 2021-01-01 | Stop reason: HOSPADM

## 2021-01-01 RX ORDER — CLOTRIMAZOLE 1 %
CREAM (GRAM) TOPICAL 2 TIMES DAILY
Qty: 15 G | Refills: 0 | Status: SHIPPED | OUTPATIENT
Start: 2021-01-01 | End: 2021-01-01

## 2021-01-01 RX ORDER — QUETIAPINE FUMARATE 25 MG/1
25 TABLET, FILM COATED ORAL 2 TIMES DAILY PRN
Status: DISCONTINUED | OUTPATIENT
Start: 2021-01-01 | End: 2021-04-29 | Stop reason: HOSPADM

## 2021-01-01 RX ORDER — FUROSEMIDE 20 MG
30 TABLET ORAL DAILY
Qty: 30 TABLET | Refills: 11 | Status: SHIPPED | OUTPATIENT
Start: 2021-01-01

## 2021-01-01 RX ORDER — LATANOPROST 50 UG/ML
1 SOLUTION/ DROPS OPHTHALMIC AT BEDTIME
Status: DISCONTINUED | OUTPATIENT
Start: 2021-01-01 | End: 2021-04-29 | Stop reason: HOSPADM

## 2021-01-01 RX ORDER — FUROSEMIDE 10 MG/ML
40 INJECTION INTRAMUSCULAR; INTRAVENOUS ONCE
Status: COMPLETED | OUTPATIENT
Start: 2021-01-01 | End: 2021-01-01

## 2021-01-01 RX ORDER — ONDANSETRON 2 MG/ML
4 INJECTION INTRAMUSCULAR; INTRAVENOUS EVERY 30 MIN PRN
Status: DISCONTINUED | OUTPATIENT
Start: 2021-01-01 | End: 2021-01-01 | Stop reason: HOSPADM

## 2021-01-01 RX ORDER — BUPIVACAINE HYDROCHLORIDE 5 MG/ML
INJECTION, SOLUTION EPIDURAL; INTRACAUDAL PRN
Status: DISCONTINUED | OUTPATIENT
Start: 2021-01-01 | End: 2021-01-01 | Stop reason: HOSPADM

## 2021-01-01 RX ORDER — POTASSIUM CHLORIDE 29.8 MG/ML
20 INJECTION INTRAVENOUS ONCE
Status: DISCONTINUED | OUTPATIENT
Start: 2021-01-01 | End: 2021-01-01

## 2021-01-01 RX ORDER — POTASSIUM CHLORIDE 7.45 MG/ML
10 INJECTION INTRAVENOUS
Status: COMPLETED | OUTPATIENT
Start: 2021-01-01 | End: 2021-01-01

## 2021-01-01 RX ORDER — METOPROLOL TARTRATE 25 MG/1
25 TABLET, FILM COATED ORAL 2 TIMES DAILY
Status: DISCONTINUED | OUTPATIENT
Start: 2021-01-01 | End: 2021-01-01 | Stop reason: HOSPADM

## 2021-01-01 RX ORDER — LACTOBACILLUS RHAMNOSUS GG 10B CELL
CAPSULE ORAL
Qty: 72 TABLET | Refills: 97 | Status: SHIPPED | OUTPATIENT
Start: 2021-01-01

## 2021-01-01 RX ORDER — HYDRALAZINE HYDROCHLORIDE 20 MG/ML
2.5-5 INJECTION INTRAMUSCULAR; INTRAVENOUS EVERY 10 MIN PRN
Status: DISCONTINUED | OUTPATIENT
Start: 2021-01-01 | End: 2021-01-01 | Stop reason: HOSPADM

## 2021-01-01 RX ORDER — FENTANYL CITRATE 50 UG/ML
25-50 INJECTION, SOLUTION INTRAMUSCULAR; INTRAVENOUS
Status: DISCONTINUED | OUTPATIENT
Start: 2021-01-01 | End: 2021-01-01 | Stop reason: HOSPADM

## 2021-01-01 RX ORDER — ACETAMINOPHEN 325 MG/1
650 TABLET ORAL EVERY 4 HOURS PRN
Status: DISCONTINUED | OUTPATIENT
Start: 2021-01-01 | End: 2021-04-29 | Stop reason: HOSPADM

## 2021-01-01 RX ORDER — LATANOPROST 50 UG/ML
SOLUTION/ DROPS OPHTHALMIC
Qty: 2.5 ML | Refills: 97 | Status: SHIPPED | OUTPATIENT
Start: 2021-01-01

## 2021-01-01 RX ORDER — AMOXICILLIN 875 MG
875 TABLET ORAL 2 TIMES DAILY
Qty: 10 TABLET | Refills: 0 | Status: SHIPPED | OUTPATIENT
Start: 2021-01-01 | End: 2021-01-01

## 2021-01-01 RX ORDER — CEFAZOLIN SODIUM 1 G/50ML
2000 SOLUTION INTRAVENOUS ONCE
Status: COMPLETED | OUTPATIENT
Start: 2021-01-01 | End: 2021-01-01

## 2021-01-01 RX ORDER — MULTIVIT-MIN/FA/LYCOPEN/LUTEIN .4-300-25
TABLET ORAL
Qty: 28 TABLET | Refills: 97 | Status: SHIPPED | OUTPATIENT
Start: 2021-01-01

## 2021-01-01 RX ORDER — LIDOCAINE HYDROCHLORIDE 10 MG/ML
INJECTION, SOLUTION INFILTRATION; PERINEURAL PRN
Status: DISCONTINUED | OUTPATIENT
Start: 2021-01-01 | End: 2021-01-01

## 2021-01-01 RX ORDER — HYDROMORPHONE HYDROCHLORIDE 1 MG/ML
0.5 INJECTION, SOLUTION INTRAMUSCULAR; INTRAVENOUS; SUBCUTANEOUS
Status: DISCONTINUED | OUTPATIENT
Start: 2021-01-01 | End: 2021-01-01

## 2021-01-01 RX ORDER — LEVOTHYROXINE SODIUM 50 UG/1
50 TABLET ORAL DAILY
Status: DISCONTINUED | OUTPATIENT
Start: 2021-01-01 | End: 2021-01-01 | Stop reason: HOSPADM

## 2021-01-01 RX ORDER — TAMSULOSIN HYDROCHLORIDE 0.4 MG/1
CAPSULE ORAL
Qty: 28 CAPSULE | Status: SHIPPED | OUTPATIENT
Start: 2021-01-01

## 2021-01-01 RX ORDER — DIMENHYDRINATE 50 MG/ML
25 INJECTION, SOLUTION INTRAMUSCULAR; INTRAVENOUS
Status: DISCONTINUED | OUTPATIENT
Start: 2021-01-01 | End: 2021-01-01 | Stop reason: HOSPADM

## 2021-01-01 RX ORDER — METOPROLOL TARTRATE 25 MG/1
TABLET, FILM COATED ORAL
Qty: 56 TABLET | Status: SHIPPED | OUTPATIENT
Start: 2021-01-01

## 2021-01-01 RX ORDER — ONDANSETRON 4 MG/1
4 TABLET, ORALLY DISINTEGRATING ORAL EVERY 30 MIN PRN
Status: DISCONTINUED | OUTPATIENT
Start: 2021-01-01 | End: 2021-01-01 | Stop reason: HOSPADM

## 2021-01-01 RX ORDER — IOPAMIDOL 755 MG/ML
100 INJECTION, SOLUTION INTRAVASCULAR ONCE
Status: COMPLETED | OUTPATIENT
Start: 2021-01-01 | End: 2021-01-01

## 2021-01-01 RX ORDER — METOPROLOL TARTRATE 25 MG/1
25 TABLET, FILM COATED ORAL 2 TIMES DAILY
Status: DISCONTINUED | OUTPATIENT
Start: 2021-01-01 | End: 2021-04-29 | Stop reason: HOSPADM

## 2021-01-01 RX ORDER — BUDESONIDE 0.5 MG/2ML
0.5 INHALANT ORAL 2 TIMES DAILY
Status: DISCONTINUED | OUTPATIENT
Start: 2021-01-01 | End: 2021-04-29 | Stop reason: HOSPADM

## 2021-01-01 RX ORDER — NITROGLYCERIN 0.4 MG/1
0.4 TABLET SUBLINGUAL EVERY 5 MIN PRN
Status: DISCONTINUED | OUTPATIENT
Start: 2021-01-01 | End: 2021-04-29 | Stop reason: HOSPADM

## 2021-01-01 RX ORDER — AMLODIPINE BESYLATE 10 MG/1
10 TABLET ORAL DAILY
Status: DISCONTINUED | OUTPATIENT
Start: 2021-01-01 | End: 2021-04-29 | Stop reason: HOSPADM

## 2021-01-01 RX ORDER — TAMSULOSIN HYDROCHLORIDE 0.4 MG/1
0.4 CAPSULE ORAL DAILY
Status: DISCONTINUED | OUTPATIENT
Start: 2021-01-01 | End: 2021-04-29 | Stop reason: HOSPADM

## 2021-01-01 RX ORDER — ACETAMINOPHEN 650 MG/1
650 SUPPOSITORY RECTAL EVERY 4 HOURS PRN
Status: DISCONTINUED | OUTPATIENT
Start: 2021-01-01 | End: 2021-04-29 | Stop reason: HOSPADM

## 2021-01-01 RX ORDER — FUROSEMIDE 10 MG/ML
40 INJECTION INTRAMUSCULAR; INTRAVENOUS DAILY
Status: DISCONTINUED | OUTPATIENT
Start: 2021-01-01 | End: 2021-01-01

## 2021-01-01 RX ORDER — METOPROLOL TARTRATE 1 MG/ML
1-2 INJECTION, SOLUTION INTRAVENOUS EVERY 5 MIN PRN
Status: DISCONTINUED | OUTPATIENT
Start: 2021-01-01 | End: 2021-01-01 | Stop reason: HOSPADM

## 2021-01-01 RX ORDER — BUDESONIDE 0.5 MG/2ML
0.5 INHALANT ORAL 2 TIMES DAILY
Status: DISCONTINUED | OUTPATIENT
Start: 2021-01-01 | End: 2021-01-01 | Stop reason: HOSPADM

## 2021-01-01 RX ORDER — METOPROLOL TARTRATE 1 MG/ML
INJECTION, SOLUTION INTRAVENOUS PRN
Status: DISCONTINUED | OUTPATIENT
Start: 2021-01-01 | End: 2021-01-01

## 2021-01-01 RX ORDER — LORAZEPAM 2 MG/ML
.5-1 INJECTION INTRAMUSCULAR
Status: DISCONTINUED | OUTPATIENT
Start: 2021-01-01 | End: 2021-01-01

## 2021-01-01 RX ORDER — POTASSIUM CHLORIDE 1125 MG/1
30 TABLET, EXTENDED RELEASE ORAL DAILY
Qty: 60 TABLET | Refills: 11 | Status: ON HOLD | OUTPATIENT
Start: 2021-01-01 | End: 2021-01-01

## 2021-01-01 RX ORDER — ACETAMINOPHEN 325 MG/1
650 TABLET ORAL EVERY 4 HOURS PRN
Status: DISCONTINUED | OUTPATIENT
Start: 2021-01-01 | End: 2021-01-01

## 2021-01-01 RX ORDER — CARBOXYMETHYLCELLULOSE SODIUM 5 MG/ML
2 SOLUTION/ DROPS OPHTHALMIC
Status: DISCONTINUED | OUTPATIENT
Start: 2021-01-01 | End: 2021-04-29 | Stop reason: HOSPADM

## 2021-01-01 RX ORDER — POTASSIUM CHLORIDE 1125 MG/1
30 TABLET, EXTENDED RELEASE ORAL DAILY
Qty: 60 TABLET | Refills: 11 | Status: SHIPPED | OUTPATIENT
Start: 2021-01-01

## 2021-01-01 RX ORDER — PROPOFOL 10 MG/ML
INJECTION, EMULSION INTRAVENOUS PRN
Status: DISCONTINUED | OUTPATIENT
Start: 2021-01-01 | End: 2021-01-01

## 2021-01-01 RX ORDER — BISACODYL 10 MG
10 SUPPOSITORY, RECTAL RECTAL DAILY PRN
Status: DISCONTINUED | OUTPATIENT
Start: 2021-01-01 | End: 2021-04-29 | Stop reason: HOSPADM

## 2021-01-01 RX ORDER — HYDROMORPHONE HYDROCHLORIDE 1 MG/ML
.3-.5 INJECTION, SOLUTION INTRAMUSCULAR; INTRAVENOUS; SUBCUTANEOUS EVERY 5 MIN PRN
Status: DISCONTINUED | OUTPATIENT
Start: 2021-01-01 | End: 2021-01-01 | Stop reason: HOSPADM

## 2021-01-01 RX ORDER — SODIUM CHLORIDE, SODIUM LACTATE, POTASSIUM CHLORIDE, CALCIUM CHLORIDE 600; 310; 30; 20 MG/100ML; MG/100ML; MG/100ML; MG/100ML
INJECTION, SOLUTION INTRAVENOUS CONTINUOUS
Status: DISCONTINUED | OUTPATIENT
Start: 2021-01-01 | End: 2021-01-01 | Stop reason: HOSPADM

## 2021-01-01 RX ORDER — IPRATROPIUM BROMIDE AND ALBUTEROL SULFATE 2.5; .5 MG/3ML; MG/3ML
3 SOLUTION RESPIRATORY (INHALATION)
Status: DISCONTINUED | OUTPATIENT
Start: 2021-01-01 | End: 2021-04-29 | Stop reason: HOSPADM

## 2021-01-01 RX ORDER — MULTIPLE VITAMINS W/ MINERALS TAB 9MG-400MCG
1 TAB ORAL DAILY
Status: DISCONTINUED | OUTPATIENT
Start: 2021-01-01 | End: 2021-01-01 | Stop reason: HOSPADM

## 2021-01-01 RX ORDER — FUROSEMIDE 20 MG
30 TABLET ORAL DAILY
Qty: 30 TABLET | Refills: 11 | Status: ON HOLD | OUTPATIENT
Start: 2021-01-01 | End: 2021-01-01

## 2021-01-01 RX ORDER — PREDNISONE 5 MG/1
5 TABLET ORAL DAILY
Status: DISCONTINUED | OUTPATIENT
Start: 2021-01-01 | End: 2021-01-01 | Stop reason: HOSPADM

## 2021-01-01 RX ORDER — LANOLIN ALCOHOL/MO/W.PET/CERES
3 CREAM (GRAM) TOPICAL
Status: DISCONTINUED | OUTPATIENT
Start: 2021-01-01 | End: 2021-01-01 | Stop reason: HOSPADM

## 2021-01-01 RX ORDER — VANCOMYCIN HYDROCHLORIDE 1 G/200ML
1000 INJECTION, SOLUTION INTRAVENOUS ONCE
Status: DISCONTINUED | OUTPATIENT
Start: 2021-01-01 | End: 2021-01-01

## 2021-01-01 RX ORDER — ALBUTEROL SULFATE 0.83 MG/ML
2.5 SOLUTION RESPIRATORY (INHALATION) EVERY 4 HOURS PRN
Status: DISCONTINUED | OUTPATIENT
Start: 2021-01-01 | End: 2021-01-01 | Stop reason: HOSPADM

## 2021-01-01 RX ORDER — OLANZAPINE 5 MG/1
5 TABLET, ORALLY DISINTEGRATING ORAL AT BEDTIME
Status: DISCONTINUED | OUTPATIENT
Start: 2021-01-01 | End: 2021-04-29 | Stop reason: HOSPADM

## 2021-01-01 RX ORDER — NITROGLYCERIN 0.4 MG/1
0.4 TABLET SUBLINGUAL EVERY 5 MIN PRN
Status: DISCONTINUED | OUTPATIENT
Start: 2021-01-01 | End: 2021-01-01

## 2021-01-01 RX ORDER — PREDNISONE 5 MG/1
5 TABLET ORAL DAILY
Status: DISCONTINUED | OUTPATIENT
Start: 2021-01-01 | End: 2021-04-29 | Stop reason: HOSPADM

## 2021-01-01 RX ORDER — NALOXONE HYDROCHLORIDE 0.4 MG/ML
0.2 INJECTION, SOLUTION INTRAMUSCULAR; INTRAVENOUS; SUBCUTANEOUS
Status: DISCONTINUED | OUTPATIENT
Start: 2021-01-01 | End: 2021-01-01 | Stop reason: HOSPADM

## 2021-01-01 RX ORDER — ATROPINE SULFATE 10 MG/ML
2 SOLUTION/ DROPS OPHTHALMIC
Status: DISCONTINUED | OUTPATIENT
Start: 2021-01-01 | End: 2021-04-29 | Stop reason: HOSPADM

## 2021-01-01 RX ORDER — AMLODIPINE BESYLATE 10 MG/1
10 TABLET ORAL EVERY EVENING
Status: DISCONTINUED | OUTPATIENT
Start: 2021-01-01 | End: 2021-01-01 | Stop reason: HOSPADM

## 2021-01-01 RX ORDER — AMLODIPINE BESYLATE 10 MG/1
TABLET ORAL
Qty: 28 TABLET | Status: SHIPPED | OUTPATIENT
Start: 2021-01-01

## 2021-01-01 RX ORDER — ONDANSETRON 2 MG/ML
4 INJECTION INTRAMUSCULAR; INTRAVENOUS EVERY 6 HOURS PRN
Status: DISCONTINUED | OUTPATIENT
Start: 2021-01-01 | End: 2021-01-01 | Stop reason: HOSPADM

## 2021-01-01 RX ORDER — METOLAZONE 5 MG/1
5 TABLET ORAL ONCE
Status: DISCONTINUED | OUTPATIENT
Start: 2021-01-01 | End: 2021-01-01

## 2021-01-01 RX ORDER — MEPERIDINE HYDROCHLORIDE 25 MG/ML
12.5 INJECTION INTRAMUSCULAR; INTRAVENOUS; SUBCUTANEOUS EVERY 5 MIN PRN
Status: DISCONTINUED | OUTPATIENT
Start: 2021-01-01 | End: 2021-01-01 | Stop reason: HOSPADM

## 2021-01-01 RX ORDER — IPRATROPIUM BROMIDE AND ALBUTEROL SULFATE 2.5; .5 MG/3ML; MG/3ML
3 SOLUTION RESPIRATORY (INHALATION) ONCE
Status: COMPLETED | OUTPATIENT
Start: 2021-01-01 | End: 2021-01-01

## 2021-01-01 RX ORDER — ACETAMINOPHEN 325 MG/1
975 TABLET ORAL EVERY 6 HOURS PRN
Status: DISCONTINUED | OUTPATIENT
Start: 2021-01-01 | End: 2021-01-01 | Stop reason: HOSPADM

## 2021-01-01 RX ORDER — CEFAZOLIN SODIUM 2 G/100ML
INJECTION, SOLUTION INTRAVENOUS PRN
Status: DISCONTINUED | OUTPATIENT
Start: 2021-01-01 | End: 2021-01-01

## 2021-01-01 RX ORDER — PSYLLIUM HUSK 0.4 G
CAPSULE ORAL
Qty: 28 TABLET | Status: SHIPPED | OUTPATIENT
Start: 2021-01-01

## 2021-01-01 RX ORDER — HYDRALAZINE HYDROCHLORIDE 20 MG/ML
10 INJECTION INTRAMUSCULAR; INTRAVENOUS EVERY 6 HOURS PRN
Status: DISCONTINUED | OUTPATIENT
Start: 2021-01-01 | End: 2021-01-01 | Stop reason: HOSPADM

## 2021-01-01 RX ORDER — LIDOCAINE 40 MG/G
CREAM TOPICAL
Status: DISCONTINUED | OUTPATIENT
Start: 2021-01-01 | End: 2021-01-01 | Stop reason: HOSPADM

## 2021-01-01 RX ORDER — NALOXONE HYDROCHLORIDE 0.4 MG/ML
0.4 INJECTION, SOLUTION INTRAMUSCULAR; INTRAVENOUS; SUBCUTANEOUS
Status: DISCONTINUED | OUTPATIENT
Start: 2021-01-01 | End: 2021-04-29 | Stop reason: HOSPADM

## 2021-01-01 RX ORDER — ONDANSETRON 4 MG/1
4 TABLET, ORALLY DISINTEGRATING ORAL EVERY 6 HOURS PRN
Status: DISCONTINUED | OUTPATIENT
Start: 2021-01-01 | End: 2021-01-01 | Stop reason: HOSPADM

## 2021-01-01 RX ORDER — ZINC OXIDE 216 MG/ML
1 LOTION TOPICAL
COMMUNITY

## 2021-01-01 RX ORDER — MULTIPLE VITAMINS W/ MINERALS TAB 9MG-400MCG
1 TAB ORAL DAILY
Status: DISCONTINUED | OUTPATIENT
Start: 2021-01-01 | End: 2021-01-01

## 2021-01-01 RX ORDER — ONDANSETRON 2 MG/ML
4 INJECTION INTRAMUSCULAR; INTRAVENOUS EVERY 6 HOURS PRN
Status: DISCONTINUED | OUTPATIENT
Start: 2021-01-01 | End: 2021-04-29 | Stop reason: HOSPADM

## 2021-01-01 RX ORDER — GLYCOPYRROLATE 0.2 MG/ML
0.3 INJECTION, SOLUTION INTRAMUSCULAR; INTRAVENOUS EVERY 4 HOURS PRN
Status: DISCONTINUED | OUTPATIENT
Start: 2021-01-01 | End: 2021-04-29 | Stop reason: HOSPADM

## 2021-01-01 RX ORDER — NALOXONE HYDROCHLORIDE 0.4 MG/ML
0.2 INJECTION, SOLUTION INTRAMUSCULAR; INTRAVENOUS; SUBCUTANEOUS
Status: DISCONTINUED | OUTPATIENT
Start: 2021-01-01 | End: 2021-04-29 | Stop reason: HOSPADM

## 2021-01-01 RX ORDER — HALOPERIDOL 5 MG/ML
1 INJECTION INTRAMUSCULAR EVERY 6 HOURS PRN
Status: DISCONTINUED | OUTPATIENT
Start: 2021-01-01 | End: 2021-01-01

## 2021-01-01 RX ORDER — ASPIRIN 81 MG/1
81 TABLET ORAL DAILY
Status: DISCONTINUED | OUTPATIENT
Start: 2021-01-01 | End: 2021-04-29 | Stop reason: HOSPADM

## 2021-01-01 RX ORDER — ONDANSETRON 2 MG/ML
INJECTION INTRAMUSCULAR; INTRAVENOUS PRN
Status: DISCONTINUED | OUTPATIENT
Start: 2021-01-01 | End: 2021-01-01

## 2021-01-01 RX ORDER — GLYCOPYRROLATE 0.2 MG/ML
INJECTION, SOLUTION INTRAMUSCULAR; INTRAVENOUS PRN
Status: DISCONTINUED | OUTPATIENT
Start: 2021-01-01 | End: 2021-01-01

## 2021-01-01 RX ORDER — LEVOTHYROXINE SODIUM 50 UG/1
TABLET ORAL
Qty: 28 TABLET | Status: SHIPPED | OUTPATIENT
Start: 2021-01-01

## 2021-01-01 RX ORDER — ONDANSETRON 4 MG/1
4 TABLET, ORALLY DISINTEGRATING ORAL EVERY 6 HOURS PRN
Status: DISCONTINUED | OUTPATIENT
Start: 2021-01-01 | End: 2021-04-29 | Stop reason: HOSPADM

## 2021-01-01 RX ORDER — SALIVA STIMULANT COMB. NO.3
2 SPRAY, NON-AEROSOL (ML) MUCOUS MEMBRANE
Status: DISCONTINUED | OUTPATIENT
Start: 2021-01-01 | End: 2021-04-29 | Stop reason: HOSPADM

## 2021-01-01 RX ORDER — HEPARIN SODIUM 10000 [USP'U]/100ML
0-5000 INJECTION, SOLUTION INTRAVENOUS CONTINUOUS
Status: DISCONTINUED | OUTPATIENT
Start: 2021-01-01 | End: 2021-01-01

## 2021-01-01 RX ORDER — FUROSEMIDE 10 MG/ML
40 INJECTION INTRAMUSCULAR; INTRAVENOUS EVERY 12 HOURS
Status: DISCONTINUED | OUTPATIENT
Start: 2021-01-01 | End: 2021-04-29 | Stop reason: HOSPADM

## 2021-01-01 RX ORDER — NALOXONE HYDROCHLORIDE 0.4 MG/ML
INJECTION, SOLUTION INTRAMUSCULAR; INTRAVENOUS; SUBCUTANEOUS PRN
Status: DISCONTINUED | OUTPATIENT
Start: 2021-01-01 | End: 2021-01-01

## 2021-01-01 RX ORDER — CEFTRIAXONE 2 G/1
2 INJECTION, POWDER, FOR SOLUTION INTRAMUSCULAR; INTRAVENOUS ONCE
Status: COMPLETED | OUTPATIENT
Start: 2021-01-01 | End: 2021-01-01

## 2021-01-01 RX ORDER — ACETAMINOPHEN 325 MG/1
TABLET ORAL
Qty: 60 TABLET | Refills: 97 | Status: SHIPPED | OUTPATIENT
Start: 2021-01-01

## 2021-01-01 RX ORDER — SODIUM CHLORIDE, SODIUM LACTATE, POTASSIUM CHLORIDE, CALCIUM CHLORIDE 600; 310; 30; 20 MG/100ML; MG/100ML; MG/100ML; MG/100ML
INJECTION, SOLUTION INTRAVENOUS CONTINUOUS
Status: DISCONTINUED | OUTPATIENT
Start: 2021-01-01 | End: 2021-01-01

## 2021-01-01 RX ORDER — HYDROMORPHONE HYDROCHLORIDE 1 MG/ML
0.5 INJECTION, SOLUTION INTRAMUSCULAR; INTRAVENOUS; SUBCUTANEOUS
Status: DISCONTINUED | OUTPATIENT
Start: 2021-01-01 | End: 2021-04-29 | Stop reason: HOSPADM

## 2021-01-01 RX ORDER — METOCLOPRAMIDE HYDROCHLORIDE 5 MG/ML
5 INJECTION INTRAMUSCULAR; INTRAVENOUS EVERY 6 HOURS PRN
Status: DISCONTINUED | OUTPATIENT
Start: 2021-01-01 | End: 2021-01-01 | Stop reason: HOSPADM

## 2021-01-01 RX ORDER — LORAZEPAM 0.5 MG/1
0.25 TABLET ORAL EVERY 4 HOURS PRN
Status: DISCONTINUED | OUTPATIENT
Start: 2021-01-01 | End: 2021-04-29 | Stop reason: HOSPADM

## 2021-01-01 RX ORDER — TAMSULOSIN HYDROCHLORIDE 0.4 MG/1
0.4 CAPSULE ORAL EVERY EVENING
Status: DISCONTINUED | OUTPATIENT
Start: 2021-01-01 | End: 2021-01-01 | Stop reason: HOSPADM

## 2021-01-01 RX ORDER — LEVOTHYROXINE SODIUM 50 UG/1
50 TABLET ORAL
Status: DISCONTINUED | OUTPATIENT
Start: 2021-01-01 | End: 2021-04-29 | Stop reason: HOSPADM

## 2021-01-01 RX ORDER — GUAIFENESIN 600 MG/1
600 TABLET, EXTENDED RELEASE ORAL 2 TIMES DAILY
Status: DISCONTINUED | OUTPATIENT
Start: 2021-01-01 | End: 2021-01-01 | Stop reason: HOSPADM

## 2021-01-01 RX ORDER — NITROGLYCERIN 0.4 MG/1
0.4 TABLET SUBLINGUAL EVERY 5 MIN PRN
Status: DISCONTINUED | OUTPATIENT
Start: 2021-01-01 | End: 2021-01-01 | Stop reason: HOSPADM

## 2021-01-01 RX ORDER — FENTANYL CITRATE 50 UG/ML
INJECTION, SOLUTION INTRAMUSCULAR; INTRAVENOUS PRN
Status: DISCONTINUED | OUTPATIENT
Start: 2021-01-01 | End: 2021-01-01

## 2021-01-01 RX ORDER — SODIUM CHLORIDE, SODIUM LACTATE, POTASSIUM CHLORIDE, CALCIUM CHLORIDE 600; 310; 30; 20 MG/100ML; MG/100ML; MG/100ML; MG/100ML
INJECTION, SOLUTION INTRAVENOUS CONTINUOUS PRN
Status: DISCONTINUED | OUTPATIENT
Start: 2021-01-01 | End: 2021-01-01

## 2021-01-01 RX ADMIN — BUDESONIDE 0.5 MG: 0.5 INHALANT RESPIRATORY (INHALATION) at 22:25

## 2021-01-01 RX ADMIN — PHENYLEPHRINE HYDROCHLORIDE 100 MCG: 10 INJECTION INTRAVENOUS at 10:41

## 2021-01-01 RX ADMIN — GUAIFENESIN 600 MG: 600 TABLET, EXTENDED RELEASE ORAL at 08:33

## 2021-01-01 RX ADMIN — BUDESONIDE 0.5 MG: 0.5 INHALANT RESPIRATORY (INHALATION) at 07:41

## 2021-01-01 RX ADMIN — GUAIFENESIN 600 MG: 600 TABLET, EXTENDED RELEASE ORAL at 21:24

## 2021-01-01 RX ADMIN — TAZOBACTAM SODIUM AND PIPERACILLIN SODIUM 3.38 G: 375; 3 INJECTION, SOLUTION INTRAVENOUS at 16:11

## 2021-01-01 RX ADMIN — TAZOBACTAM SODIUM AND PIPERACILLIN SODIUM 3.38 G: 375; 3 INJECTION, SOLUTION INTRAVENOUS at 21:34

## 2021-01-01 RX ADMIN — IPRATROPIUM BROMIDE AND ALBUTEROL SULFATE 3 ML: .5; 3 SOLUTION RESPIRATORY (INHALATION) at 20:31

## 2021-01-01 RX ADMIN — TAZOBACTAM SODIUM AND PIPERACILLIN SODIUM 3.38 G: 375; 3 INJECTION, SOLUTION INTRAVENOUS at 20:49

## 2021-01-01 RX ADMIN — IPRATROPIUM BROMIDE AND ALBUTEROL SULFATE 3 ML: .5; 3 SOLUTION RESPIRATORY (INHALATION) at 07:24

## 2021-01-01 RX ADMIN — QUETIAPINE FUMARATE 25 MG: 25 TABLET ORAL at 10:29

## 2021-01-01 RX ADMIN — MULTIPLE VITAMINS W/ MINERALS TAB 1 TABLET: TAB at 08:27

## 2021-01-01 RX ADMIN — TAZOBACTAM SODIUM AND PIPERACILLIN SODIUM 2.25 G: 250; 2 INJECTION, SOLUTION INTRAVENOUS at 11:56

## 2021-01-01 RX ADMIN — GUAIFENESIN 600 MG: 600 TABLET, EXTENDED RELEASE ORAL at 21:25

## 2021-01-01 RX ADMIN — AMLODIPINE BESYLATE 10 MG: 10 TABLET ORAL at 10:28

## 2021-01-01 RX ADMIN — BUDESONIDE 0.5 MG: 0.5 INHALANT RESPIRATORY (INHALATION) at 20:28

## 2021-01-01 RX ADMIN — METOPROLOL TARTRATE 3 MG: 5 INJECTION INTRAVENOUS at 12:04

## 2021-01-01 RX ADMIN — TAZOBACTAM SODIUM AND PIPERACILLIN SODIUM 3.38 G: 375; 3 INJECTION, SOLUTION INTRAVENOUS at 03:34

## 2021-01-01 RX ADMIN — FUROSEMIDE 40 MG: 10 INJECTION, SOLUTION INTRAMUSCULAR; INTRAVENOUS at 16:46

## 2021-01-01 RX ADMIN — LATANOPROST 1 DROP: 50 SOLUTION OPHTHALMIC at 20:43

## 2021-01-01 RX ADMIN — GUAIFENESIN 600 MG: 600 TABLET, EXTENDED RELEASE ORAL at 08:05

## 2021-01-01 RX ADMIN — METOPROLOL TARTRATE 2 MG: 5 INJECTION INTRAVENOUS at 12:23

## 2021-01-01 RX ADMIN — IPRATROPIUM BROMIDE AND ALBUTEROL SULFATE 3 ML: .5; 3 SOLUTION RESPIRATORY (INHALATION) at 12:09

## 2021-01-01 RX ADMIN — POTASSIUM CHLORIDE 10 MEQ: 7.46 INJECTION, SOLUTION INTRAVENOUS at 10:28

## 2021-01-01 RX ADMIN — HYDROMORPHONE HYDROCHLORIDE 0.5 MG: 1 INJECTION, SOLUTION INTRAMUSCULAR; INTRAVENOUS; SUBCUTANEOUS at 10:46

## 2021-01-01 RX ADMIN — POTASSIUM CHLORIDE 10 MEQ: 7.46 INJECTION, SOLUTION INTRAVENOUS at 11:39

## 2021-01-01 RX ADMIN — TAZOBACTAM SODIUM AND PIPERACILLIN SODIUM 2.25 G: 250; 2 INJECTION, SOLUTION INTRAVENOUS at 17:48

## 2021-01-01 RX ADMIN — TAZOBACTAM SODIUM AND PIPERACILLIN SODIUM 3.38 G: 375; 3 INJECTION, SOLUTION INTRAVENOUS at 09:11

## 2021-01-01 RX ADMIN — IPRATROPIUM BROMIDE AND ALBUTEROL SULFATE 3 ML: .5; 3 SOLUTION RESPIRATORY (INHALATION) at 08:59

## 2021-01-01 RX ADMIN — ACETAMINOPHEN 975 MG: 325 TABLET, FILM COATED ORAL at 11:53

## 2021-01-01 RX ADMIN — HEPARIN SODIUM 9 UNITS/HR: 10000 INJECTION, SOLUTION INTRAVENOUS at 16:59

## 2021-01-01 RX ADMIN — IPRATROPIUM BROMIDE AND ALBUTEROL SULFATE 3 ML: .5; 3 SOLUTION RESPIRATORY (INHALATION) at 06:15

## 2021-01-01 RX ADMIN — TAZOBACTAM SODIUM AND PIPERACILLIN SODIUM 2.25 G: 250; 2 INJECTION, SOLUTION INTRAVENOUS at 05:45

## 2021-01-01 RX ADMIN — SODIUM CHLORIDE, POTASSIUM CHLORIDE, SODIUM LACTATE AND CALCIUM CHLORIDE: 600; 310; 30; 20 INJECTION, SOLUTION INTRAVENOUS at 19:44

## 2021-01-01 RX ADMIN — METOPROLOL TARTRATE 25 MG: 25 TABLET, FILM COATED ORAL at 20:29

## 2021-01-01 RX ADMIN — IPRATROPIUM BROMIDE AND ALBUTEROL SULFATE 3 ML: .5; 3 SOLUTION RESPIRATORY (INHALATION) at 20:16

## 2021-01-01 RX ADMIN — TAMSULOSIN HYDROCHLORIDE 0.4 MG: 0.4 CAPSULE ORAL at 10:28

## 2021-01-01 RX ADMIN — IPRATROPIUM BROMIDE AND ALBUTEROL SULFATE 3 ML: .5; 3 SOLUTION RESPIRATORY (INHALATION) at 07:28

## 2021-01-01 RX ADMIN — METOPROLOL TARTRATE 25 MG: 25 TABLET, FILM COATED ORAL at 09:57

## 2021-01-01 RX ADMIN — BUDESONIDE 0.5 MG: 0.5 INHALANT RESPIRATORY (INHALATION) at 20:16

## 2021-01-01 RX ADMIN — BUDESONIDE 0.5 MG: 0.5 INHALANT RESPIRATORY (INHALATION) at 20:31

## 2021-01-01 RX ADMIN — LEVOTHYROXINE SODIUM 50 MCG: 50 TABLET ORAL at 08:33

## 2021-01-01 RX ADMIN — SODIUM CHLORIDE, POTASSIUM CHLORIDE, SODIUM LACTATE AND CALCIUM CHLORIDE: 600; 310; 30; 20 INJECTION, SOLUTION INTRAVENOUS at 10:23

## 2021-01-01 RX ADMIN — LORAZEPAM 0.5 MG: 2 INJECTION INTRAMUSCULAR; INTRAVENOUS at 20:33

## 2021-01-01 RX ADMIN — IPRATROPIUM BROMIDE AND ALBUTEROL SULFATE 3 ML: .5; 3 SOLUTION RESPIRATORY (INHALATION) at 02:13

## 2021-01-01 RX ADMIN — AMLODIPINE BESYLATE 10 MG: 10 TABLET ORAL at 19:14

## 2021-01-01 RX ADMIN — GUAIFENESIN 600 MG: 600 TABLET, EXTENDED RELEASE ORAL at 09:57

## 2021-01-01 RX ADMIN — HYDROMORPHONE HYDROCHLORIDE 0.5 MG: 1 INJECTION, SOLUTION INTRAMUSCULAR; INTRAVENOUS; SUBCUTANEOUS at 13:11

## 2021-01-01 RX ADMIN — LATANOPROST 1 DROP: 50 SOLUTION OPHTHALMIC at 19:17

## 2021-01-01 RX ADMIN — LATANOPROST 1 DROP: 50 SOLUTION OPHTHALMIC at 22:37

## 2021-01-01 RX ADMIN — MULTIPLE VITAMINS W/ MINERALS TAB 1 TABLET: TAB at 08:10

## 2021-01-01 RX ADMIN — MULTIPLE VITAMINS W/ MINERALS TAB 1 TABLET: TAB at 09:57

## 2021-01-01 RX ADMIN — HYDROMORPHONE HYDROCHLORIDE 0.3 MG: 1 INJECTION, SOLUTION INTRAMUSCULAR; INTRAVENOUS; SUBCUTANEOUS at 22:24

## 2021-01-01 RX ADMIN — BUDESONIDE 0.5 MG: 0.5 INHALANT RESPIRATORY (INHALATION) at 07:28

## 2021-01-01 RX ADMIN — DEXAMETHASONE SODIUM PHOSPHATE 4 MG: 4 INJECTION, SOLUTION INTRA-ARTICULAR; INTRALESIONAL; INTRAMUSCULAR; INTRAVENOUS; SOFT TISSUE at 10:34

## 2021-01-01 RX ADMIN — BUDESONIDE 0.5 MG: 0.5 INHALANT RESPIRATORY (INHALATION) at 07:24

## 2021-01-01 RX ADMIN — GUAIFENESIN 600 MG: 600 TABLET, EXTENDED RELEASE ORAL at 20:48

## 2021-01-01 RX ADMIN — LEVOTHYROXINE SODIUM 50 MCG: 50 TABLET ORAL at 06:43

## 2021-01-01 RX ADMIN — IPRATROPIUM BROMIDE AND ALBUTEROL SULFATE 3 ML: .5; 3 SOLUTION RESPIRATORY (INHALATION) at 15:24

## 2021-01-01 RX ADMIN — TAZOBACTAM SODIUM AND PIPERACILLIN SODIUM 3.38 G: 375; 3 INJECTION, SOLUTION INTRAVENOUS at 12:38

## 2021-01-01 RX ADMIN — GUAIFENESIN 600 MG: 600 TABLET, EXTENDED RELEASE ORAL at 08:10

## 2021-01-01 RX ADMIN — SODIUM CHLORIDE, POTASSIUM CHLORIDE, SODIUM LACTATE AND CALCIUM CHLORIDE: 600; 310; 30; 20 INJECTION, SOLUTION INTRAVENOUS at 09:28

## 2021-01-01 RX ADMIN — LATANOPROST 1 DROP: 50 SOLUTION OPHTHALMIC at 19:58

## 2021-01-01 RX ADMIN — TAZOBACTAM SODIUM AND PIPERACILLIN SODIUM 2.25 G: 250; 2 INJECTION, SOLUTION INTRAVENOUS at 00:09

## 2021-01-01 RX ADMIN — HUMAN ALBUMIN MICROSPHERES AND PERFLUTREN 7 ML: 10; .22 INJECTION, SOLUTION INTRAVENOUS at 14:28

## 2021-01-01 RX ADMIN — PREDNISONE 5 MG: 5 TABLET ORAL at 10:35

## 2021-01-01 RX ADMIN — LIDOCAINE HYDROCHLORIDE 50 MG: 10 INJECTION, SOLUTION INFILTRATION; PERINEURAL at 10:34

## 2021-01-01 RX ADMIN — IPRATROPIUM BROMIDE AND ALBUTEROL SULFATE 3 ML: .5; 3 SOLUTION RESPIRATORY (INHALATION) at 23:36

## 2021-01-01 RX ADMIN — TAZOBACTAM SODIUM AND PIPERACILLIN SODIUM 3.38 G: 375; 3 INJECTION, SOLUTION INTRAVENOUS at 20:38

## 2021-01-01 RX ADMIN — LATANOPROST 1 DROP: 50 SOLUTION OPHTHALMIC at 22:19

## 2021-01-01 RX ADMIN — IPRATROPIUM BROMIDE AND ALBUTEROL SULFATE 3 ML: .5; 3 SOLUTION RESPIRATORY (INHALATION) at 14:20

## 2021-01-01 RX ADMIN — BUDESONIDE 0.5 MG: 0.5 INHALANT RESPIRATORY (INHALATION) at 08:08

## 2021-01-01 RX ADMIN — IPRATROPIUM BROMIDE AND ALBUTEROL SULFATE 3 ML: .5; 3 SOLUTION RESPIRATORY (INHALATION) at 05:51

## 2021-01-01 RX ADMIN — PHENYLEPHRINE HYDROCHLORIDE 200 MCG: 10 INJECTION INTRAVENOUS at 12:20

## 2021-01-01 RX ADMIN — IPRATROPIUM BROMIDE AND ALBUTEROL SULFATE 3 ML: .5; 3 SOLUTION RESPIRATORY (INHALATION) at 21:05

## 2021-01-01 RX ADMIN — TAMSULOSIN HYDROCHLORIDE 0.4 MG: 0.4 CAPSULE ORAL at 20:15

## 2021-01-01 RX ADMIN — METOPROLOL TARTRATE 25 MG: 25 TABLET, FILM COATED ORAL at 08:05

## 2021-01-01 RX ADMIN — IPRATROPIUM BROMIDE AND ALBUTEROL SULFATE 3 ML: .5; 3 SOLUTION RESPIRATORY (INHALATION) at 15:15

## 2021-01-01 RX ADMIN — METOPROLOL TARTRATE 25 MG: 25 TABLET, FILM COATED ORAL at 21:25

## 2021-01-01 RX ADMIN — Medication 1 MG: at 00:19

## 2021-01-01 RX ADMIN — PREDNISONE 5 MG: 5 TABLET ORAL at 08:33

## 2021-01-01 RX ADMIN — IPRATROPIUM BROMIDE AND ALBUTEROL SULFATE 3 ML: .5; 3 SOLUTION RESPIRATORY (INHALATION) at 12:11

## 2021-01-01 RX ADMIN — POTASSIUM CHLORIDE 10 MEQ: 7.46 INJECTION, SOLUTION INTRAVENOUS at 05:47

## 2021-01-01 RX ADMIN — QUETIAPINE FUMARATE 25 MG: 25 TABLET ORAL at 16:30

## 2021-01-01 RX ADMIN — ENOXAPARIN SODIUM 40 MG: 40 INJECTION SUBCUTANEOUS at 10:46

## 2021-01-01 RX ADMIN — GUAIFENESIN 600 MG: 600 TABLET, EXTENDED RELEASE ORAL at 19:50

## 2021-01-01 RX ADMIN — IOPAMIDOL 100 ML: 755 INJECTION, SOLUTION INTRAVENOUS at 14:16

## 2021-01-01 RX ADMIN — METOPROLOL TARTRATE 25 MG: 25 TABLET, FILM COATED ORAL at 10:29

## 2021-01-01 RX ADMIN — FENTANYL CITRATE 100 MCG: 50 INJECTION, SOLUTION INTRAMUSCULAR; INTRAVENOUS at 10:34

## 2021-01-01 RX ADMIN — GLYCOPYRROLATE 0.6 MG: 0.2 INJECTION, SOLUTION INTRAMUSCULAR; INTRAVENOUS at 12:33

## 2021-01-01 RX ADMIN — METOPROLOL TARTRATE 25 MG: 25 TABLET, FILM COATED ORAL at 08:27

## 2021-01-01 RX ADMIN — VANCOMYCIN HYDROCHLORIDE 2000 MG: 10 INJECTION, POWDER, LYOPHILIZED, FOR SOLUTION INTRAVENOUS at 06:48

## 2021-01-01 RX ADMIN — TAZOBACTAM SODIUM AND PIPERACILLIN SODIUM 2.25 G: 250; 2 INJECTION, SOLUTION INTRAVENOUS at 08:11

## 2021-01-01 RX ADMIN — MELATONIN TAB 3 MG 3 MG: 3 TAB at 22:31

## 2021-01-01 RX ADMIN — IPRATROPIUM BROMIDE AND ALBUTEROL SULFATE 3 ML: .5; 3 SOLUTION RESPIRATORY (INHALATION) at 12:10

## 2021-01-01 RX ADMIN — IPRATROPIUM BROMIDE AND ALBUTEROL SULFATE 3 ML: .5; 3 SOLUTION RESPIRATORY (INHALATION) at 19:10

## 2021-01-01 RX ADMIN — TAZOBACTAM SODIUM AND PIPERACILLIN SODIUM 3.38 G: 375; 3 INJECTION, SOLUTION INTRAVENOUS at 13:11

## 2021-01-01 RX ADMIN — SODIUM CHLORIDE 85 ML: 9 INJECTION, SOLUTION INTRAVENOUS at 01:23

## 2021-01-01 RX ADMIN — NALOXONE HYDROCHLORIDE 0.08 MG: 0.4 INJECTION, SOLUTION INTRAMUSCULAR; INTRAVENOUS; SUBCUTANEOUS at 12:53

## 2021-01-01 RX ADMIN — LEVOTHYROXINE SODIUM 50 MCG: 50 TABLET ORAL at 08:27

## 2021-01-01 RX ADMIN — IPRATROPIUM BROMIDE AND ALBUTEROL SULFATE 3 ML: .5; 3 SOLUTION RESPIRATORY (INHALATION) at 19:49

## 2021-01-01 RX ADMIN — POTASSIUM CHLORIDE, DEXTROSE MONOHYDRATE AND SODIUM CHLORIDE: 150; 5; 450 INJECTION, SOLUTION INTRAVENOUS at 18:31

## 2021-01-01 RX ADMIN — AMLODIPINE BESYLATE 10 MG: 10 TABLET ORAL at 20:15

## 2021-01-01 RX ADMIN — TAMSULOSIN HYDROCHLORIDE 0.4 MG: 0.4 CAPSULE ORAL at 19:14

## 2021-01-01 RX ADMIN — BUDESONIDE 0.5 MG: 0.5 INHALANT RESPIRATORY (INHALATION) at 06:15

## 2021-01-01 RX ADMIN — SODIUM CHLORIDE 80 ML: 9 INJECTION, SOLUTION INTRAVENOUS at 01:10

## 2021-01-01 RX ADMIN — IPRATROPIUM BROMIDE AND ALBUTEROL SULFATE 3 ML: .5; 3 SOLUTION RESPIRATORY (INHALATION) at 07:52

## 2021-01-01 RX ADMIN — ROCURONIUM BROMIDE 50 MG: 10 INJECTION INTRAVENOUS at 10:34

## 2021-01-01 RX ADMIN — FUROSEMIDE 40 MG: 10 INJECTION, SOLUTION INTRAMUSCULAR; INTRAVENOUS at 10:37

## 2021-01-01 RX ADMIN — BUDESONIDE 0.5 MG: 0.5 INHALANT RESPIRATORY (INHALATION) at 07:48

## 2021-01-01 RX ADMIN — LATANOPROST 1 DROP: 50 SOLUTION OPHTHALMIC at 21:41

## 2021-01-01 RX ADMIN — GUAIFENESIN 600 MG: 600 TABLET, EXTENDED RELEASE ORAL at 10:36

## 2021-01-01 RX ADMIN — HEPARIN SODIUM 750 UNITS/HR: 10000 INJECTION, SOLUTION INTRAVENOUS at 16:36

## 2021-01-01 RX ADMIN — IPRATROPIUM BROMIDE AND ALBUTEROL SULFATE 3 ML: .5; 3 SOLUTION RESPIRATORY (INHALATION) at 03:10

## 2021-01-01 RX ADMIN — METOPROLOL TARTRATE 25 MG: 25 TABLET, FILM COATED ORAL at 08:11

## 2021-01-01 RX ADMIN — POTASSIUM CHLORIDE 10 MEQ: 7.46 INJECTION, SOLUTION INTRAVENOUS at 00:41

## 2021-01-01 RX ADMIN — POTASSIUM CHLORIDE, DEXTROSE MONOHYDRATE AND SODIUM CHLORIDE: 150; 5; 450 INJECTION, SOLUTION INTRAVENOUS at 06:43

## 2021-01-01 RX ADMIN — ENOXAPARIN SODIUM 30 MG: 30 INJECTION SUBCUTANEOUS at 12:21

## 2021-01-01 RX ADMIN — METOPROLOL TARTRATE 25 MG: 25 TABLET, FILM COATED ORAL at 19:49

## 2021-01-01 RX ADMIN — LATANOPROST 1 DROP: 50 SOLUTION OPHTHALMIC at 21:33

## 2021-01-01 RX ADMIN — PREDNISONE 5 MG: 5 TABLET ORAL at 08:05

## 2021-01-01 RX ADMIN — FUROSEMIDE 40 MG: 10 INJECTION, SOLUTION INTRAMUSCULAR; INTRAVENOUS at 05:12

## 2021-01-01 RX ADMIN — TAMSULOSIN HYDROCHLORIDE 0.4 MG: 0.4 CAPSULE ORAL at 20:43

## 2021-01-01 RX ADMIN — IPRATROPIUM BROMIDE AND ALBUTEROL SULFATE 3 ML: .5; 3 SOLUTION RESPIRATORY (INHALATION) at 16:26

## 2021-01-01 RX ADMIN — BUDESONIDE 0.5 MG: 0.5 INHALANT RESPIRATORY (INHALATION) at 22:36

## 2021-01-01 RX ADMIN — IPRATROPIUM BROMIDE AND ALBUTEROL SULFATE 3 ML: .5; 3 SOLUTION RESPIRATORY (INHALATION) at 10:14

## 2021-01-01 RX ADMIN — FUROSEMIDE 40 MG: 10 INJECTION, SOLUTION INTRAMUSCULAR; INTRAVENOUS at 08:49

## 2021-01-01 RX ADMIN — ENOXAPARIN SODIUM 30 MG: 30 INJECTION SUBCUTANEOUS at 13:30

## 2021-01-01 RX ADMIN — BUDESONIDE 0.5 MG: 0.5 INHALANT RESPIRATORY (INHALATION) at 19:49

## 2021-01-01 RX ADMIN — AMLODIPINE BESYLATE 10 MG: 10 TABLET ORAL at 20:42

## 2021-01-01 RX ADMIN — SODIUM CHLORIDE, POTASSIUM CHLORIDE, SODIUM LACTATE AND CALCIUM CHLORIDE: 600; 310; 30; 20 INJECTION, SOLUTION INTRAVENOUS at 12:01

## 2021-01-01 RX ADMIN — PREDNISONE 5 MG: 5 TABLET ORAL at 08:10

## 2021-01-01 RX ADMIN — PROPOFOL 200 MG: 10 INJECTION, EMULSION INTRAVENOUS at 10:34

## 2021-01-01 RX ADMIN — TAMSULOSIN HYDROCHLORIDE 0.4 MG: 0.4 CAPSULE ORAL at 19:49

## 2021-01-01 RX ADMIN — ACETAMINOPHEN 975 MG: 325 TABLET, FILM COATED ORAL at 21:49

## 2021-01-01 RX ADMIN — HEPARIN SODIUM 12 UNITS/HR: 10000 INJECTION, SOLUTION INTRAVENOUS at 22:45

## 2021-01-01 RX ADMIN — IPRATROPIUM BROMIDE AND ALBUTEROL SULFATE 3 ML: .5; 3 SOLUTION RESPIRATORY (INHALATION) at 04:48

## 2021-01-01 RX ADMIN — ENOXAPARIN SODIUM 40 MG: 40 INJECTION SUBCUTANEOUS at 16:46

## 2021-01-01 RX ADMIN — TAZOBACTAM SODIUM AND PIPERACILLIN SODIUM 2.25 G: 250; 2 INJECTION, SOLUTION INTRAVENOUS at 15:10

## 2021-01-01 RX ADMIN — PHENYLEPHRINE HYDROCHLORIDE 100 MCG: 10 INJECTION INTRAVENOUS at 10:36

## 2021-01-01 RX ADMIN — TAZOBACTAM SODIUM AND PIPERACILLIN SODIUM 2.25 G: 250; 2 INJECTION, SOLUTION INTRAVENOUS at 17:42

## 2021-01-01 RX ADMIN — BUDESONIDE 0.5 MG: 0.5 INHALANT RESPIRATORY (INHALATION) at 07:52

## 2021-01-01 RX ADMIN — IPRATROPIUM BROMIDE AND ALBUTEROL SULFATE 3 ML: .5; 3 SOLUTION RESPIRATORY (INHALATION) at 19:45

## 2021-01-01 RX ADMIN — SODIUM CHLORIDE, POTASSIUM CHLORIDE, SODIUM LACTATE AND CALCIUM CHLORIDE: 600; 310; 30; 20 INJECTION, SOLUTION INTRAVENOUS at 16:23

## 2021-01-01 RX ADMIN — TAZOBACTAM SODIUM AND PIPERACILLIN SODIUM 3.38 G: 375; 3 INJECTION, SOLUTION INTRAVENOUS at 16:30

## 2021-01-01 RX ADMIN — PREDNISONE 5 MG: 5 TABLET ORAL at 09:57

## 2021-01-01 RX ADMIN — METOPROLOL TARTRATE 25 MG: 25 TABLET, FILM COATED ORAL at 21:23

## 2021-01-01 RX ADMIN — CEFTRIAXONE 2 G: 2 INJECTION, POWDER, FOR SOLUTION INTRAMUSCULAR; INTRAVENOUS at 15:16

## 2021-01-01 RX ADMIN — LEVOTHYROXINE SODIUM 50 MCG: 50 TABLET ORAL at 09:57

## 2021-01-01 RX ADMIN — TAZOBACTAM SODIUM AND PIPERACILLIN SODIUM 2.25 G: 250; 2 INJECTION, SOLUTION INTRAVENOUS at 23:56

## 2021-01-01 RX ADMIN — HALOPERIDOL LACTATE 1 MG: 5 INJECTION, SOLUTION INTRAMUSCULAR at 00:36

## 2021-01-01 RX ADMIN — MULTIPLE VITAMINS W/ MINERALS TAB 1 TABLET: TAB at 08:05

## 2021-01-01 RX ADMIN — IPRATROPIUM BROMIDE AND ALBUTEROL SULFATE 3 ML: .5; 3 SOLUTION RESPIRATORY (INHALATION) at 03:28

## 2021-01-01 RX ADMIN — LATANOPROST 1 DROP: 50 SOLUTION OPHTHALMIC at 20:40

## 2021-01-01 RX ADMIN — TAZOBACTAM SODIUM AND PIPERACILLIN SODIUM 3.38 G: 375; 3 INJECTION, SOLUTION INTRAVENOUS at 01:27

## 2021-01-01 RX ADMIN — GUAIFENESIN 600 MG: 600 TABLET, EXTENDED RELEASE ORAL at 20:42

## 2021-01-01 RX ADMIN — GUAIFENESIN 600 MG: 600 TABLET, EXTENDED RELEASE ORAL at 19:14

## 2021-01-01 RX ADMIN — TAZOBACTAM SODIUM AND PIPERACILLIN SODIUM 2.25 G: 250; 2 INJECTION, SOLUTION INTRAVENOUS at 19:43

## 2021-01-01 RX ADMIN — TAZOBACTAM SODIUM AND PIPERACILLIN SODIUM 2.25 G: 250; 2 INJECTION, SOLUTION INTRAVENOUS at 07:19

## 2021-01-01 RX ADMIN — METOPROLOL TARTRATE 25 MG: 25 TABLET, FILM COATED ORAL at 10:36

## 2021-01-01 RX ADMIN — IPRATROPIUM BROMIDE AND ALBUTEROL SULFATE 3 ML: .5; 3 SOLUTION RESPIRATORY (INHALATION) at 01:20

## 2021-01-01 RX ADMIN — METOPROLOL TARTRATE 25 MG: 25 TABLET, FILM COATED ORAL at 20:15

## 2021-01-01 RX ADMIN — TAZOBACTAM SODIUM AND PIPERACILLIN SODIUM 2.25 G: 250; 2 INJECTION, SOLUTION INTRAVENOUS at 18:09

## 2021-01-01 RX ADMIN — BUDESONIDE 0.5 MG: 0.5 INHALANT RESPIRATORY (INHALATION) at 09:00

## 2021-01-01 RX ADMIN — BUDESONIDE 0.5 MG: 0.5 INHALANT RESPIRATORY (INHALATION) at 19:10

## 2021-01-01 RX ADMIN — IPRATROPIUM BROMIDE AND ALBUTEROL SULFATE 3 ML: .5; 3 SOLUTION RESPIRATORY (INHALATION) at 08:08

## 2021-01-01 RX ADMIN — METOPROLOL TARTRATE 25 MG: 25 TABLET, FILM COATED ORAL at 19:14

## 2021-01-01 RX ADMIN — PHENYLEPHRINE HYDROCHLORIDE 50 MCG: 10 INJECTION INTRAVENOUS at 11:38

## 2021-01-01 RX ADMIN — IPRATROPIUM BROMIDE AND ALBUTEROL SULFATE 3 ML: .5; 3 SOLUTION RESPIRATORY (INHALATION) at 16:02

## 2021-01-01 RX ADMIN — TAMSULOSIN HYDROCHLORIDE 0.4 MG: 0.4 CAPSULE ORAL at 21:24

## 2021-01-01 RX ADMIN — AMLODIPINE BESYLATE 10 MG: 10 TABLET ORAL at 21:22

## 2021-01-01 RX ADMIN — IPRATROPIUM BROMIDE AND ALBUTEROL SULFATE 3 ML: .5; 3 SOLUTION RESPIRATORY (INHALATION) at 16:12

## 2021-01-01 RX ADMIN — IPRATROPIUM BROMIDE AND ALBUTEROL SULFATE 3 ML: .5; 3 SOLUTION RESPIRATORY (INHALATION) at 11:47

## 2021-01-01 RX ADMIN — PREDNISONE 5 MG: 5 TABLET ORAL at 10:29

## 2021-01-01 RX ADMIN — SODIUM CHLORIDE, POTASSIUM CHLORIDE, SODIUM LACTATE AND CALCIUM CHLORIDE: 600; 310; 30; 20 INJECTION, SOLUTION INTRAVENOUS at 10:20

## 2021-01-01 RX ADMIN — LEVOTHYROXINE SODIUM 50 MCG: 50 TABLET ORAL at 10:35

## 2021-01-01 RX ADMIN — FENTANYL CITRATE 50 MCG: 50 INJECTION, SOLUTION INTRAMUSCULAR; INTRAVENOUS at 11:07

## 2021-01-01 RX ADMIN — LIDOCAINE HYDROCHLORIDE 10 ML: 10 INJECTION, SOLUTION EPIDURAL; INFILTRATION; INTRACAUDAL; PERINEURAL at 11:46

## 2021-01-01 RX ADMIN — TAZOBACTAM SODIUM AND PIPERACILLIN SODIUM 2.25 G: 250; 2 INJECTION, SOLUTION INTRAVENOUS at 12:30

## 2021-01-01 RX ADMIN — IPRATROPIUM BROMIDE AND ALBUTEROL SULFATE 3 ML: .5; 3 SOLUTION RESPIRATORY (INHALATION) at 15:43

## 2021-01-01 RX ADMIN — NEOSTIGMINE METHYLSULFATE 4 MG: 1 INJECTION, SOLUTION INTRAVENOUS at 12:33

## 2021-01-01 RX ADMIN — POTASSIUM CHLORIDE 10 MEQ: 7.46 INJECTION, SOLUTION INTRAVENOUS at 08:31

## 2021-01-01 RX ADMIN — ACETAMINOPHEN 975 MG: 325 TABLET, FILM COATED ORAL at 05:09

## 2021-01-01 RX ADMIN — TAZOBACTAM SODIUM AND PIPERACILLIN SODIUM 2.25 G: 250; 2 INJECTION, SOLUTION INTRAVENOUS at 00:10

## 2021-01-01 RX ADMIN — GLYCOPYRROLATE 0.2 MG: 0.2 INJECTION, SOLUTION INTRAMUSCULAR; INTRAVENOUS at 10:34

## 2021-01-01 RX ADMIN — LEVOTHYROXINE SODIUM 50 MCG: 50 TABLET ORAL at 08:05

## 2021-01-01 RX ADMIN — BUDESONIDE 0.5 MG: 0.5 INHALANT RESPIRATORY (INHALATION) at 21:10

## 2021-01-01 RX ADMIN — IOPAMIDOL 70 ML: 755 INJECTION, SOLUTION INTRAVENOUS at 01:09

## 2021-01-01 RX ADMIN — LATANOPROST 1 DROP: 50 SOLUTION OPHTHALMIC at 22:23

## 2021-01-01 RX ADMIN — ALBUTEROL SULFATE 2.5 MG: 2.5 SOLUTION RESPIRATORY (INHALATION) at 19:39

## 2021-01-01 RX ADMIN — SODIUM CHLORIDE, POTASSIUM CHLORIDE, SODIUM LACTATE AND CALCIUM CHLORIDE: 600; 310; 30; 20 INJECTION, SOLUTION INTRAVENOUS at 00:09

## 2021-01-01 RX ADMIN — FENTANYL CITRATE 50 MCG: 50 INJECTION, SOLUTION INTRAMUSCULAR; INTRAVENOUS at 10:54

## 2021-01-01 RX ADMIN — IPRATROPIUM BROMIDE AND ALBUTEROL SULFATE 3 ML: .5; 3 SOLUTION RESPIRATORY (INHALATION) at 12:43

## 2021-01-01 RX ADMIN — HEPARIN SODIUM 1050 UNITS/HR: 10000 INJECTION, SOLUTION INTRAVENOUS at 08:12

## 2021-01-01 RX ADMIN — BUDESONIDE 0.5 MG: 0.5 INHALANT RESPIRATORY (INHALATION) at 19:41

## 2021-01-01 RX ADMIN — GUAIFENESIN 600 MG: 600 TABLET, EXTENDED RELEASE ORAL at 08:27

## 2021-01-01 RX ADMIN — CEFAZOLIN SODIUM 2 G: 2 INJECTION, SOLUTION INTRAVENOUS at 10:40

## 2021-01-01 RX ADMIN — LEVOTHYROXINE SODIUM 50 MCG: 50 TABLET ORAL at 10:28

## 2021-01-01 RX ADMIN — HALOPERIDOL LACTATE 1 MG: 5 INJECTION, SOLUTION INTRAMUSCULAR at 06:45

## 2021-01-01 RX ADMIN — TAZOBACTAM SODIUM AND PIPERACILLIN SODIUM 3.38 G: 375; 3 INJECTION, SOLUTION INTRAVENOUS at 16:03

## 2021-01-01 RX ADMIN — TAMSULOSIN HYDROCHLORIDE 0.4 MG: 0.4 CAPSULE ORAL at 21:25

## 2021-01-01 RX ADMIN — TAZOBACTAM SODIUM AND PIPERACILLIN SODIUM 3.38 G: 375; 3 INJECTION, SOLUTION INTRAVENOUS at 10:47

## 2021-01-01 RX ADMIN — IPRATROPIUM BROMIDE AND ALBUTEROL SULFATE 3 ML: .5; 3 SOLUTION RESPIRATORY (INHALATION) at 22:24

## 2021-01-01 RX ADMIN — TAZOBACTAM SODIUM AND PIPERACILLIN SODIUM 2.25 G: 250; 2 INJECTION, SOLUTION INTRAVENOUS at 02:15

## 2021-01-01 RX ADMIN — ENOXAPARIN SODIUM 30 MG: 30 INJECTION SUBCUTANEOUS at 12:30

## 2021-01-01 RX ADMIN — IPRATROPIUM BROMIDE AND ALBUTEROL SULFATE 3 ML: .5; 3 SOLUTION RESPIRATORY (INHALATION) at 07:48

## 2021-01-01 RX ADMIN — IPRATROPIUM BROMIDE AND ALBUTEROL SULFATE 3 ML: .5; 3 SOLUTION RESPIRATORY (INHALATION) at 19:50

## 2021-01-01 RX ADMIN — METOPROLOL TARTRATE 25 MG: 25 TABLET, FILM COATED ORAL at 20:48

## 2021-01-01 RX ADMIN — ONDANSETRON HYDROCHLORIDE 4 MG: 2 INJECTION, SOLUTION INTRAVENOUS at 11:31

## 2021-01-01 RX ADMIN — ENOXAPARIN SODIUM 40 MG: 40 INJECTION SUBCUTANEOUS at 13:11

## 2021-01-01 RX ADMIN — LATANOPROST 1 DROP: 50 SOLUTION OPHTHALMIC at 20:48

## 2021-01-01 RX ADMIN — ENOXAPARIN SODIUM 30 MG: 30 INJECTION SUBCUTANEOUS at 12:20

## 2021-01-01 RX ADMIN — FAMOTIDINE 20 MG: 10 INJECTION, SOLUTION INTRAVENOUS at 21:34

## 2021-01-01 RX ADMIN — BUPIVACAINE HYDROCHLORIDE 30 ML: 5 INJECTION, SOLUTION EPIDURAL; INTRACAUDAL at 12:34

## 2021-01-01 RX ADMIN — PREDNISONE 5 MG: 5 TABLET ORAL at 08:27

## 2021-01-01 RX ADMIN — TAZOBACTAM SODIUM AND PIPERACILLIN SODIUM 2.25 G: 250; 2 INJECTION, SOLUTION INTRAVENOUS at 17:52

## 2021-01-01 RX ADMIN — FUROSEMIDE 40 MG: 10 INJECTION, SOLUTION INTRAMUSCULAR; INTRAVENOUS at 20:33

## 2021-01-01 RX ADMIN — ALBUTEROL SULFATE 2.5 MG: 2.5 SOLUTION RESPIRATORY (INHALATION) at 00:08

## 2021-01-01 RX ADMIN — TAZOBACTAM SODIUM AND PIPERACILLIN SODIUM 3.38 G: 375; 3 INJECTION, SOLUTION INTRAVENOUS at 01:53

## 2021-01-01 RX ADMIN — TAZOBACTAM SODIUM AND PIPERACILLIN SODIUM 2.25 G: 250; 2 INJECTION, SOLUTION INTRAVENOUS at 06:15

## 2021-01-01 RX ADMIN — POTASSIUM CHLORIDE 10 MEQ: 7.46 INJECTION, SOLUTION INTRAVENOUS at 04:35

## 2021-01-01 RX ADMIN — LORAZEPAM 0.5 MG: 2 INJECTION INTRAMUSCULAR; INTRAVENOUS at 11:23

## 2021-01-01 RX ADMIN — FUROSEMIDE 40 MG: 10 INJECTION, SOLUTION INTRAMUSCULAR; INTRAVENOUS at 22:35

## 2021-01-01 RX ADMIN — LATANOPROST 1 DROP: 50 SOLUTION OPHTHALMIC at 21:58

## 2021-01-01 RX ADMIN — TAZOBACTAM SODIUM AND PIPERACILLIN SODIUM 3.38 G: 375; 3 INJECTION, SOLUTION INTRAVENOUS at 09:53

## 2021-01-01 RX ADMIN — IPRATROPIUM BROMIDE AND ALBUTEROL SULFATE 3 ML: .5; 3 SOLUTION RESPIRATORY (INHALATION) at 08:58

## 2021-01-01 RX ADMIN — ASPIRIN 81 MG: 81 TABLET ORAL at 10:29

## 2021-01-01 RX ADMIN — POTASSIUM CHLORIDE 10 MEQ: 7.46 INJECTION, SOLUTION INTRAVENOUS at 06:25

## 2021-01-01 RX ADMIN — LIDOCAINE HYDROCHLORIDE 50 MG: 10 INJECTION, SOLUTION INFILTRATION; PERINEURAL at 12:02

## 2021-01-01 RX ADMIN — IPRATROPIUM BROMIDE AND ALBUTEROL SULFATE 3 ML: .5; 3 SOLUTION RESPIRATORY (INHALATION) at 16:00

## 2021-01-01 RX ADMIN — TAZOBACTAM SODIUM AND PIPERACILLIN SODIUM 3.38 G: 375; 3 INJECTION, SOLUTION INTRAVENOUS at 03:47

## 2021-01-01 RX ADMIN — ACETAMINOPHEN 975 MG: 325 TABLET, FILM COATED ORAL at 05:45

## 2021-01-01 RX ADMIN — AMOXICILLIN AND CLAVULANATE POTASSIUM 1 TABLET: 875; 125 TABLET, FILM COATED ORAL at 21:22

## 2021-01-01 RX ADMIN — AMLODIPINE BESYLATE 10 MG: 10 TABLET ORAL at 20:48

## 2021-01-01 RX ADMIN — TAZOBACTAM SODIUM AND PIPERACILLIN SODIUM 3.38 G: 375; 3 INJECTION, SOLUTION INTRAVENOUS at 20:57

## 2021-01-01 RX ADMIN — IPRATROPIUM BROMIDE AND ALBUTEROL SULFATE 3 ML: .5; 3 SOLUTION RESPIRATORY (INHALATION) at 20:26

## 2021-01-01 RX ADMIN — BUDESONIDE 0.5 MG: 0.5 INHALANT RESPIRATORY (INHALATION) at 08:58

## 2021-01-01 RX ADMIN — LEVOTHYROXINE SODIUM 50 MCG: 50 TABLET ORAL at 08:10

## 2021-01-01 RX ADMIN — FUROSEMIDE 20 MG: 10 INJECTION, SOLUTION INTRAMUSCULAR; INTRAVENOUS at 03:12

## 2021-01-01 RX ADMIN — IPRATROPIUM BROMIDE AND ALBUTEROL SULFATE 3 ML: .5; 3 SOLUTION RESPIRATORY (INHALATION) at 07:41

## 2021-01-01 RX ADMIN — TAZOBACTAM SODIUM AND PIPERACILLIN SODIUM 2.25 G: 250; 2 INJECTION, SOLUTION INTRAVENOUS at 06:22

## 2021-01-01 RX ADMIN — IPRATROPIUM BROMIDE AND ALBUTEROL SULFATE 3 ML: .5; 3 SOLUTION RESPIRATORY (INHALATION) at 16:38

## 2021-01-01 RX ADMIN — METOPROLOL TARTRATE 25 MG: 25 TABLET, FILM COATED ORAL at 08:33

## 2021-01-01 RX ADMIN — TAZOBACTAM SODIUM AND PIPERACILLIN SODIUM 3.38 G: 375; 3 INJECTION, SOLUTION INTRAVENOUS at 04:44

## 2021-01-01 RX ADMIN — IPRATROPIUM BROMIDE AND ALBUTEROL SULFATE 3 ML: .5; 3 SOLUTION RESPIRATORY (INHALATION) at 15:30

## 2021-01-01 RX ADMIN — TAMSULOSIN HYDROCHLORIDE 0.4 MG: 0.4 CAPSULE ORAL at 20:48

## 2021-01-01 RX ADMIN — POTASSIUM CHLORIDE 10 MEQ: 7.46 INJECTION, SOLUTION INTRAVENOUS at 21:19

## 2021-01-01 RX ADMIN — FAMOTIDINE 20 MG: 10 INJECTION, SOLUTION INTRAVENOUS at 09:00

## 2021-01-01 RX ADMIN — FAMOTIDINE 20 MG: 10 INJECTION, SOLUTION INTRAVENOUS at 08:07

## 2021-01-01 RX ADMIN — TAZOBACTAM SODIUM AND PIPERACILLIN SODIUM 3.38 G: 375; 3 INJECTION, SOLUTION INTRAVENOUS at 06:48

## 2021-01-01 RX ADMIN — HYDROMORPHONE HYDROCHLORIDE 0.5 MG: 1 INJECTION, SOLUTION INTRAMUSCULAR; INTRAVENOUS; SUBCUTANEOUS at 17:22

## 2021-01-01 RX ADMIN — IPRATROPIUM BROMIDE AND ALBUTEROL SULFATE 3 ML: .5; 3 SOLUTION RESPIRATORY (INHALATION) at 12:23

## 2021-01-01 RX ADMIN — POTASSIUM CHLORIDE 10 MEQ: 7.46 INJECTION, SOLUTION INTRAVENOUS at 22:28

## 2021-01-01 RX ADMIN — POTASSIUM CHLORIDE 10 MEQ: 7.46 INJECTION, SOLUTION INTRAVENOUS at 23:36

## 2021-01-01 RX ADMIN — TAZOBACTAM SODIUM AND PIPERACILLIN SODIUM 3.38 G: 375; 3 INJECTION, SOLUTION INTRAVENOUS at 22:16

## 2021-01-01 RX ADMIN — MULTIPLE VITAMINS W/ MINERALS TAB 1 TABLET: TAB at 08:33

## 2021-01-01 RX ADMIN — FUROSEMIDE 40 MG: 10 INJECTION, SOLUTION INTRAMUSCULAR; INTRAVENOUS at 09:40

## 2021-01-01 RX ADMIN — ACETAMINOPHEN 975 MG: 325 TABLET, FILM COATED ORAL at 03:19

## 2021-01-01 RX ADMIN — GUAIFENESIN 600 MG: 600 TABLET, EXTENDED RELEASE ORAL at 20:15

## 2021-01-01 RX ADMIN — FUROSEMIDE 40 MG: 10 INJECTION, SOLUTION INTRAMUSCULAR; INTRAVENOUS at 08:59

## 2021-01-01 RX ADMIN — METOPROLOL TARTRATE 25 MG: 25 TABLET, FILM COATED ORAL at 20:41

## 2021-01-01 RX ADMIN — IPRATROPIUM BROMIDE AND ALBUTEROL SULFATE 3 ML: .5; 3 SOLUTION RESPIRATORY (INHALATION) at 22:28

## 2021-01-01 RX ADMIN — IOPAMIDOL 75 ML: 755 INJECTION, SOLUTION INTRAVENOUS at 01:21

## 2021-01-01 RX ADMIN — BUDESONIDE 0.5 MG: 0.5 INHALANT RESPIRATORY (INHALATION) at 19:50

## 2021-01-01 RX ADMIN — TAZOBACTAM SODIUM AND PIPERACILLIN SODIUM 2.25 G: 250; 2 INJECTION, SOLUTION INTRAVENOUS at 12:20

## 2021-01-01 ASSESSMENT — ACTIVITIES OF DAILY LIVING (ADL)
ADLS_ACUITY_SCORE: 28
ADLS_ACUITY_SCORE: 25
ADLS_ACUITY_SCORE: 28
ADLS_ACUITY_SCORE: 29
ADLS_ACUITY_SCORE: 24
DIFFICULTY_EATING/SWALLOWING: YES
ADLS_ACUITY_SCORE: 23
ADLS_ACUITY_SCORE: 23
WALKING_OR_CLIMBING_STAIRS: AMBULATION DIFFICULTY, ASSISTANCE 1 PERSON
ADLS_ACUITY_SCORE: 22
ADLS_ACUITY_SCORE: 28
ADLS_ACUITY_SCORE: 26
ADLS_ACUITY_SCORE: 28
ADLS_ACUITY_SCORE: 27
ADLS_ACUITY_SCORE: 22
WEAR_GLASSES_OR_BLIND: YES
ADLS_ACUITY_SCORE: 28
ADLS_ACUITY_SCORE: 27
CONCENTRATING,_REMEMBERING_OR_MAKING_DECISIONS_DIFFICULTY: NO
ADLS_ACUITY_SCORE: 30
ADLS_ACUITY_SCORE: 27
ADLS_ACUITY_SCORE: 28
ADLS_ACUITY_SCORE: 22
WALKING_OR_CLIMBING_STAIRS_DIFFICULTY: YES
ADLS_ACUITY_SCORE: 22
ADLS_ACUITY_SCORE: 31
ADLS_ACUITY_SCORE: 22
ADLS_ACUITY_SCORE: 27
ADLS_ACUITY_SCORE: 27
TOILETING_ASSISTANCE: TOILETING DIFFICULTY, ASSISTANCE 1 PERSON
ADLS_ACUITY_SCORE: 23
EATING/SWALLOWING: SWALLOWING SOLID FOOD
ADLS_ACUITY_SCORE: 29
ADLS_ACUITY_SCORE: 27
ADLS_ACUITY_SCORE: 28
DRESSING/BATHING_DIFFICULTY: YES
ADLS_ACUITY_SCORE: 27
ADLS_ACUITY_SCORE: 22
DRESSING/BATHING: BATHING DIFFICULTY, ASSISTANCE 1 PERSON
ADLS_ACUITY_SCORE: 22
ADLS_ACUITY_SCORE: 30
FALL_HISTORY_WITHIN_LAST_SIX_MONTHS: NO
ADLS_ACUITY_SCORE: 22
ADLS_ACUITY_SCORE: 26
ADLS_ACUITY_SCORE: 27
ADLS_ACUITY_SCORE: 23
DIFFICULTY_COMMUNICATING: NO
ADLS_ACUITY_SCORE: 26
ADLS_ACUITY_SCORE: 27
EQUIPMENT_CURRENTLY_USED_AT_HOME: WHEELCHAIR, MANUAL
ADLS_ACUITY_SCORE: 23
ADLS_ACUITY_SCORE: 22
ADLS_ACUITY_SCORE: 26
ADLS_ACUITY_SCORE: 29
ADLS_ACUITY_SCORE: 27
ADLS_ACUITY_SCORE: 26
ADLS_ACUITY_SCORE: 27
ADLS_ACUITY_SCORE: 29
TOILETING_ISSUES: YES
ADLS_ACUITY_SCORE: 28
ADLS_ACUITY_SCORE: 22
DOING_ERRANDS_INDEPENDENTLY_DIFFICULTY: YES
ADLS_ACUITY_SCORE: 22
ADLS_ACUITY_SCORE: 22
WHICH_OF_THE_ABOVE_FUNCTIONAL_RISKS_HAD_A_RECENT_ONSET_OR_CHANGE?: COGNITION
ADLS_ACUITY_SCORE: 22
ADLS_ACUITY_SCORE: 27
ADLS_ACUITY_SCORE: 31
ADLS_ACUITY_SCORE: 27
ADLS_ACUITY_SCORE: 26
ADLS_ACUITY_SCORE: 28
ADLS_ACUITY_SCORE: 27
ADLS_ACUITY_SCORE: 22
ADLS_ACUITY_SCORE: 30

## 2021-01-01 ASSESSMENT — MIFFLIN-ST. JEOR
SCORE: 1784.12
SCORE: 1767.34
SCORE: 1781.63
SCORE: 1772.56
SCORE: 1760.08
SCORE: 1777.1
SCORE: 1748.06
SCORE: 1808.16

## 2021-01-01 ASSESSMENT — ENCOUNTER SYMPTOMS
ABDOMINAL PAIN: 1
VOMITING: 0
COUGH: 0
FEVER: 0
ARTHRALGIAS: 0
NAUSEA: 0
SHORTNESS OF BREATH: 0

## 2021-01-04 NOTE — PROGRESS NOTES
[place] [facility]  [place] Medical Record Number: [id number]  Place of Service where encounter took place: [facility] [[id number]]  Chief Complaint   Patient presents with     RECHECK       HPI:    Jacob Easton is a 89 year old (5/16/1929), who is being seen today for an episodic care visit. HPI information obtained from: facility chart records and patient report. Today's concern is:    Seen today for ongoing left ear pain. Hx of cerumen impaction bilateral. Ears viewed today with otoscope and the right ear canal has more wax then the left. Ear pain for past few days including headache relieved with tylenol. Slight redness is noted in left ear canal, not bulging..     Past Medical and Surgical History reviewed in Epic today.    MEDICATIONS:    Current Outpatient Medications   Medication Sig Dispense Refill     amoxicillin (AMOXIL) 875 MG tablet Take 1 tablet (875 mg) by mouth 2 times daily for 5 days 10 tablet 0     acetaminophen (TYLENOL) 325 MG tablet TAKE TWO TABLETS (650MG) BY MOUTH EVERY 4 HOURS AS NEEDED 60 tablet 97     albuterol (VENTOLIN HFA) 108 (90 Base) MCG/ACT inhaler Inhale 2 puffs into the lungs every 4 hours as needed for shortness of breath / dyspnea or wheezing (May keep bedside) 18 g 98     amLODIPine (NORVASC) 10 MG tablet TAKE 1 TABLET BY MOUTH ONCE DAILY 28 tablet PRN     ASPIRIN LOW DOSE 81 MG EC tablet TAKE 1 TABLET BY MOUTH ONCE DAILY 28 tablet PRN     bisacodyl (DULCOLAX) 10 MG suppository Place 1 suppository (10 mg) rectally daily as needed for constipation       budesonide (PULMICORT) 0.5 MG/2ML neb solution NEBULIZE THE CONTENT OF 1 VIAL TWICE DAILY (ADD TO IN THE MORNING AND AT BEDTIME DUONEBS) 60 mL 97     Elastic Bandages & Supports (T.E.D. KNEE LENGTH/M-LONG) MISC WEAR AS DIRECTED ON IN THE MORNING OFF AT BEDTIME (2 = 1 PAIR) 4 each 97     furosemide (LASIX) 40 MG tablet TAKE 1 TABLET BY MOUTH ONCE DAILY 28 tablet PRN     ipratropium - albuterol  0.5 mg/2.5 mg/3 mL (DUONEB) 0.5-2.5 (3) MG/3ML neb solution NEBULIZE THE CONTENT OF 1 VIAL INTO THE LUNGS FOUR TIMES A  mL 97     Lactobacillus-Inulin ([facility] DIGESTIVE [facility]) CHEW CHEW 2 TABLET BY MOUTH ONCE DAILY 72 tablet 97     latanoprost (XALATAN) 0.005 % ophthalmic solution INSTILL ONE DROP IN THE RIGHT EYE IN THE EVENING 2.5 mL 97     levothyroxine (SYNTHROID/LEVOTHROID) 50 MCG tablet TAKE 1 TABLET BY MOUTH ONCE DAILY 31 tablet 99     loperamide (IMODIUM A-D) 2 MG tablet May take 1 tablet (2 mg) by mouth daily as needed for diarrhea. May also take 2 tablets (4 mg) daily as needed for diarrhea. 4 mg po prn daily after 1st loose stool then 2 mg po after each additional loose stool. Max 16 mg /day       MELATONIN MAXIMUM STRENGTH 5 MG tablet TAKE 1 TABLET BY MOUTH ONCE DAILY 31 tablet 99     menthol-zinc oxide (CALMOSEPTINE) 0.44-20.625 % OINT ointment Apply topically 4 times daily as needed for skin protection       metoprolol tartrate (LOPRESSOR) 25 MG tablet TAKE 1 TABLET BY MOUTH TWICE DAILY 56 tablet PRN     MUCINEX 600 MG 12 hr tablet TAKE 1 TABLET BY MOUTH TWICE DAILY 56 tablet 97     Multiple Vitamins-Minerals (CENTRUM SILVER ULTRA MENS) TABS Take 1 tablet by mouth daily       multivitamin (I-EDGAR) TABS per tablet TAKE 1 TABLET BY MOUTH ONCE DAILY 31 tablet 97     NYAMYC [id number] UNIT/GM external powder APPLY TOPICALLY TO AFFECTED AREA(S) TWICE DAILY 60 g 97     potassium chloride ER (KLOR-CON M) 20 MEQ CR tablet TAKE TWO TABLETS (40MEQ) BY MOUTH ONCE DAILY 56 tablet PRN     predniSONE (DELTASONE) 10 MG tablet Take 0.5 tablets (5 mg) by mouth daily 31 tablet 98     senna-docusate (SM STOOL SOFTENER) 8.6-50 MG tablet Take 2 tablets by mouth 2 times daily as needed for constipation 30 tablet 97     tamsulosin (FLOMAX) 0.4 MG capsule TAKE 1 CAPSULE BY MOUTH ONCE DAILY 28 capsule PRN      REVIEW OF SYSTEMS:  4 point ROS including Respiratory, CV, GI and , other than that noted in the HPI,  is  "negative    Objective:  /86   Pulse 69   Temp 97.4  F (36.3  C)   Resp 20   Ht 1.88 m (6' 2\")   Wt 105.2 kg (232 lb)   SpO2 93%   BMI 29.79 kg/m    Exam:  GENERAL APPEARANCE:  Alert, in no distress sitting in WC, oily skin baseline  ENT:  Mouth and posterior oropharynx normal, moist mucous membranes, [place], blocked with some cerumen in ears-(see hpi)  EYES:  EOM, conjunctivae, lids, pupils and irises normal-glasses  RESP:  respiratory effort and palpation of chest increased, lungs diminished but clear to anterior and posterior- no wheezes present  CV:  Palpation and auscultation of heart done, bradycardic at times and regular rhythm, 3/6 murmur peripheral edema 2+ in legs ankles and feet. Compression Hose on  ABDOMEN:  normal bowel sounds, soft, nontender  M/S:   Gait and station using wheelchair for ambulation, generalized weakness increased, no walking with SBA of 1 for transfers  SKIN:  Inspection of skin and subcutaneous tissue baseline to visualized areas of skin  NEURO:   Examination of sensation by touch normal  PSYCH:  insight and judgement impaired, memory impaired     Labs:   CBC RESULTS:   Recent Labs   Lab Test 10/15/20 11/21/19   WBC 10.4 8.5   RBC 4.84 4.46   HGB 14.4 13.1*   HCT 47.0 42.9   MCV 97 96   MCH 29.8 29.4   MCHC 30.6* 30.5*   RDW 14.1 14.2    268       Last Basic Metabolic Panel:  Recent Labs   Lab Test 10/15/20 11/21/19    135   POTASSIUM 3.8 3.9   CHLORIDE 102 103   MERCEDES 8.9 8.5   CO2 28 29   BUN 30 26   CR 1.53* 1.47*   * 105*       Liver Function Studies -   Recent Labs   Lab Test 10/15/20 04/25/19   PROTTOTAL 8.4 8.0   ALBUMIN 3.3* 3.5   BILITOTAL 0.7 0.5   ALKPHOS 171* 76   AST 27 11   * 20       TSH   Date Value Ref Range Status   10/15/2020 6.87 (A) 0.40 - 4.00 mU/L Final   07/26/2019 4.12 (A) 0.40 - 4.00 mU/L Final       Lab Results   Component Value Date    A1C 6.7 11/21/2019       ASSESSMENT/PLAN:  (H65.00) Acute serous otitis media, " recurrence not specified, unspecified laterality  (primary encounter diagnosis)  Comment:   Plan:   -amoxicillin (AMOXIL) 875 MG tablet  -tylenol for pain  -debrox to keep ear canals clear          (M06.09) 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 (H)  Comment: no longer on methotrexate   Plan:   -Prednisone 5 mg po daily   -Tylenol prn      (F03.90) Dementia without behavioral disturbance, unspecified dementia type (H)  Comment: SLUMS 23/30  Plan:   -staff assist as needed for ADLs    (I10) Essential hypertension  (I48.91) Atrial fibrillation, unspecified type (H)  (R60.0) Lower extremity edema  Comment: stable  Plan:   -ASA daily for secondary prevention with hx of TIA  -off anticoagulation 2/2 falls  -metoprolol 25 mg po daily   -lasix 40 mg po daily (potassium)  -norvasc 10 mg po daily   -BMP periodically   -VS and weights from facility    (J84.10) Pulmonary fibrosis (H)  Comment: stable  Plan:   -Ventolin 2 puffs q4H prn (bedside)  -duonebs with Pulmicort   -mucinex 600 mg po BID  -prednisone 5 mg po daily     (N18.30) Stage 3 chronic kidney disease, unspecified whether stage 3a or 3b CKD  Comment: stable  Plan:   -avoid nephrotoxins and renal dose           Electronically signed by:  SUNDAY Lee CNP

## 2021-01-05 NOTE — LETTER
1/5/2021        RE: Jacob Tran  [address]  [address]  [facility] [address]        [place] [facility]  [place] Medical Record Number: [id number]  Place of Service where encounter took place: [facility] [[id number]]  Chief Complaint   Patient presents with     RECHECK       HPI:    Jacob Easton is a 89 year old (5/16/1929), who is being seen today for an episodic care visit. HPI information obtained from: facility chart records and patient report. Today's concern is:    Seen today for ongoing left ear pain. Hx of cerumen impaction bilateral. Ears viewed today with otoscope and the right ear canal has more wax then the left. Ear pain for past few days including headache relieved with tylenol. Slight redness is noted in left ear canal, not bulging..     Past Medical and Surgical History reviewed in Epic today.    MEDICATIONS:    Current Outpatient Medications   Medication Sig Dispense Refill     amoxicillin (AMOXIL) 875 MG tablet Take 1 tablet (875 mg) by mouth 2 times daily for 5 days 10 tablet 0     acetaminophen (TYLENOL) 325 MG tablet TAKE TWO TABLETS (650MG) BY MOUTH EVERY 4 HOURS AS NEEDED 60 tablet 97     albuterol (VENTOLIN HFA) 108 (90 Base) MCG/ACT inhaler Inhale 2 puffs into the lungs every 4 hours as needed for shortness of breath / dyspnea or wheezing (May keep bedside) 18 g 98     amLODIPine (NORVASC) 10 MG tablet TAKE 1 TABLET BY MOUTH ONCE DAILY 28 tablet PRN     ASPIRIN LOW DOSE 81 MG EC tablet TAKE 1 TABLET BY MOUTH ONCE DAILY 28 tablet PRN     bisacodyl (DULCOLAX) 10 MG suppository Place 1 suppository (10 mg) rectally daily as needed for constipation       budesonide (PULMICORT) 0.5 MG/2ML neb solution NEBULIZE THE CONTENT OF 1 VIAL TWICE DAILY (ADD TO IN THE MORNING AND AT BEDTIME DUONEBS) 60 mL 97     Elastic Bandages & Supports (T.E.D. KNEE LENGTH/M-LONG) MISC WEAR AS DIRECTED ON IN THE MORNING OFF AT BEDTIME (2 = 1 PAIR) 4 each 97      furosemide (LASIX) 40 MG tablet TAKE 1 TABLET BY MOUTH ONCE DAILY 28 tablet PRN     ipratropium - albuterol 0.5 mg/2.5 mg/3 mL (DUONEB) 0.5-2.5 (3) MG/3ML neb solution NEBULIZE THE CONTENT OF 1 VIAL INTO THE LUNGS FOUR TIMES A  mL 97     Lactobacillus-Inulin ([facility] DIGESTIVE [facility]) CHEW CHEW 2 TABLET BY MOUTH ONCE DAILY 72 tablet 97     latanoprost (XALATAN) 0.005 % ophthalmic solution INSTILL ONE DROP IN THE RIGHT EYE IN THE EVENING 2.5 mL 97     levothyroxine (SYNTHROID/LEVOTHROID) 50 MCG tablet TAKE 1 TABLET BY MOUTH ONCE DAILY 31 tablet 99     loperamide (IMODIUM A-D) 2 MG tablet May take 1 tablet (2 mg) by mouth daily as needed for diarrhea. May also take 2 tablets (4 mg) daily as needed for diarrhea. 4 mg po prn daily after 1st loose stool then 2 mg po after each additional loose stool. Max 16 mg /day       MELATONIN MAXIMUM STRENGTH 5 MG tablet TAKE 1 TABLET BY MOUTH ONCE DAILY 31 tablet 99     menthol-zinc oxide (CALMOSEPTINE) 0.44-20.625 % OINT ointment Apply topically 4 times daily as needed for skin protection       metoprolol tartrate (LOPRESSOR) 25 MG tablet TAKE 1 TABLET BY MOUTH TWICE DAILY 56 tablet PRN     MUCINEX 600 MG 12 hr tablet TAKE 1 TABLET BY MOUTH TWICE DAILY 56 tablet 97     Multiple Vitamins-Minerals (CENTRUM SILVER ULTRA MENS) TABS Take 1 tablet by mouth daily       multivitamin (I-EDGAR) TABS per tablet TAKE 1 TABLET BY MOUTH ONCE DAILY 31 tablet 97     NYAMYC [id number] UNIT/GM external powder APPLY TOPICALLY TO AFFECTED AREA(S) TWICE DAILY 60 g 97     potassium chloride ER (KLOR-CON M) 20 MEQ CR tablet TAKE TWO TABLETS (40MEQ) BY MOUTH ONCE DAILY 56 tablet PRN     predniSONE (DELTASONE) 10 MG tablet Take 0.5 tablets (5 mg) by mouth daily 31 tablet 98     senna-docusate (SM STOOL SOFTENER) 8.6-50 MG tablet Take 2 tablets by mouth 2 times daily as needed for constipation 30 tablet 97     tamsulosin (FLOMAX) 0.4 MG capsule TAKE 1 CAPSULE BY MOUTH ONCE DAILY 28 capsule PRN     "  REVIEW OF SYSTEMS:  4 point ROS including Respiratory, CV, GI and , other than that noted in the HPI,  is negative    Objective:  /86   Pulse 69   Temp 97.4  F (36.3  C)   Resp 20   Ht 1.88 m (6' 2\")   Wt 105.2 kg (232 lb)   SpO2 93%   BMI 29.79 kg/m    Exam:  GENERAL APPEARANCE:  Alert, in no distress sitting in WC, oily skin baseline  ENT:  Mouth and posterior oropharynx normal, moist mucous membranes, [place], blocked with some cerumen in ears-(see hpi)  EYES:  EOM, conjunctivae, lids, pupils and irises normal-glasses  RESP:  respiratory effort and palpation of chest increased, lungs diminished but clear to anterior and posterior- no wheezes present  CV:  Palpation and auscultation of heart done, bradycardic at times and regular rhythm, 3/6 murmur peripheral edema 2+ in legs ankles and feet. Compression Hose on  ABDOMEN:  normal bowel sounds, soft, nontender  M/S:   Gait and station using wheelchair for ambulation, generalized weakness increased, no walking with SBA of 1 for transfers  SKIN:  Inspection of skin and subcutaneous tissue baseline to visualized areas of skin  NEURO:   Examination of sensation by touch normal  PSYCH:  insight and judgement impaired, memory impaired     Labs:   CBC RESULTS:   Recent Labs   Lab Test 10/15/20 11/21/19   WBC 10.4 8.5   RBC 4.84 4.46   HGB 14.4 13.1*   HCT 47.0 42.9   MCV 97 96   MCH 29.8 29.4   MCHC 30.6* 30.5*   RDW 14.1 14.2    268       Last Basic Metabolic Panel:  Recent Labs   Lab Test 10/15/20 11/21/19    135   POTASSIUM 3.8 3.9   CHLORIDE 102 103   MERCEDES 8.9 8.5   CO2 28 29   BUN 30 26   CR 1.53* 1.47*   * 105*       Liver Function Studies -   Recent Labs   Lab Test 10/15/20 04/25/19   PROTTOTAL 8.4 8.0   ALBUMIN 3.3* 3.5   BILITOTAL 0.7 0.5   ALKPHOS 171* 76   AST 27 11   * 20       TSH   Date Value Ref Range Status   10/15/2020 6.87 (A) 0.40 - 4.00 mU/L Final   07/26/2019 4.12 (A) 0.40 - 4.00 mU/L Final       Lab Results "   Component Value Date    A1C 6.7 11/21/2019       ASSESSMENT/PLAN:  (H65.00) Acute serous otitis media, recurrence not specified, unspecified laterality  (primary encounter diagnosis)  Comment:   Plan:   -amoxicillin (AMOXIL) 875 MG tablet  -tylenol for pain  -debrox to keep ear canals clear          (M06.09) 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 (H)  Comment: no longer on methotrexate   Plan:   -Prednisone 5 mg po daily   -Tylenol prn      (F03.90) Dementia without behavioral disturbance, unspecified dementia type (H)  Comment: SLUMS 23/30  Plan:   -staff assist as needed for ADLs    (I10) Essential hypertension  (I48.91) Atrial fibrillation, unspecified type (H)  (R60.0) Lower extremity edema  Comment: stable  Plan:   -ASA daily for secondary prevention with hx of TIA  -off anticoagulation 2/2 falls  -metoprolol 25 mg po daily   -lasix 40 mg po daily (potassium)  -norvasc 10 mg po daily   -BMP periodically   -VS and weights from facility    (J84.10) Pulmonary fibrosis (H)  Comment: stable  Plan:   -Ventolin 2 puffs q4H prn (bedside)  -duonebs with Pulmicort   -mucinex 600 mg po BID  -prednisone 5 mg po daily     (N18.30) Stage 3 chronic kidney disease, unspecified whether stage 3a or 3b CKD  Comment: stable  Plan:   -avoid nephrotoxins and renal dose           Electronically signed by:  SUNDAY Lee CNP               Sincerely,        SUNDAY Lee CNP

## 2021-02-03 NOTE — PROGRESS NOTES
[facility]  [place] Medical Record Number: [id number]  Place of Service where encounter took place: [facility] ([facility] [[id number]]  Chief Complaint   Patient presents with     Nursing Home Acute       HPI:    Jacob Easton is a 89 year old (5/16/1929), who is being seen today for an episodic care visit. HPI information obtained from: facility chart records, facility staff, patient report and [facility] chart review. Today's concern is:    Some increased confusion and odd behaviors per staff. Workup onsite and CXR negative, labs ok. Staff attempting to obtain UA/UC. Today he is conversational appropriate, alert and oriented. Has some ongoing neck stiffness and thinks related to positioning in bed. Denies other pain, chest pain, abdominal pain, SOB, nausea. VVS, weight stable but in chart review did see some HS blood pressures are elevated?    Past Medical and Surgical History reviewed in Epic today.    MEDICATIONS:    Current Outpatient Medications   Medication Sig Dispense Refill     acetaminophen (TYLENOL) 325 MG tablet TAKE TWO TABLETS (650MG) BY MOUTH EVERY 4 HOURS AS NEEDED 60 tablet 97     albuterol (VENTOLIN HFA) 108 (90 Base) MCG/ACT inhaler Inhale 2 puffs into the lungs every 4 hours as needed for shortness of breath / dyspnea or wheezing (May keep bedside) 18 g 98     amLODIPine (NORVASC) 10 MG tablet TAKE 1 TABLET BY MOUTH ONCE DAILY 28 tablet PRN     ASPIRIN LOW DOSE 81 MG EC tablet TAKE 1 TABLET BY MOUTH ONCE DAILY 28 tablet PRN     bisacodyl (DULCOLAX) 10 MG suppository Place 1 suppository (10 mg) rectally daily as needed for constipation       budesonide (PULMICORT) 0.5 MG/2ML neb solution NEBULIZE THE CONTENT OF 1 VIAL TWICE DAILY (ADD TO IN THE MORNING AND AT BEDTIME DUONEBS) 60 mL 97     Elastic Bandages & Supports (T.E.D. KNEE LENGTH/M-LONG) MISC WEAR AS DIRECTED ON IN THE MORNING OFF AT BEDTIME (2 = 1 PAIR) 4 each 97     furosemide (LASIX) 40 MG tablet TAKE  1 TABLET BY MOUTH ONCE DAILY 28 tablet PRN     ipratropium - albuterol 0.5 mg/2.5 mg/3 mL (DUONEB) 0.5-2.5 (3) MG/3ML neb solution NEBULIZE THE CONTENT OF 1 VIAL INTO THE LUNGS FOUR TIMES A  mL 97     Lactobacillus-Inulin ([facility] DIGESTIVE [facility]) CHEW CHEW AND SWALLOW 2 TABLETS BY MOUTH ONCE DAILY 72 tablet 97     latanoprost (XALATAN) 0.005 % ophthalmic solution INSTILL ONE DROP IN THE RIGHT EYE IN THE EVENING 2.5 mL 97     levothyroxine (SYNTHROID/LEVOTHROID) 50 MCG tablet TAKE 1 TABLET BY MOUTH ONCE DAILY 31 tablet 99     loperamide (IMODIUM A-D) 2 MG tablet May take 1 tablet (2 mg) by mouth daily as needed for diarrhea. May also take 2 tablets (4 mg) daily as needed for diarrhea. 4 mg po prn daily after 1st loose stool then 2 mg po after each additional loose stool. Max 16 mg /day       MELATONIN MAXIMUM STRENGTH 5 MG tablet TAKE 1 TABLET BY MOUTH ONCE DAILY 31 tablet 99     menthol-zinc oxide (CALMOSEPTINE) 0.44-20.625 % OINT ointment Apply topically 4 times daily as needed for skin protection       metoprolol tartrate (LOPRESSOR) 25 MG tablet TAKE 1 TABLET BY MOUTH TWICE DAILY 56 tablet PRN     MUCINEX 600 MG 12 hr tablet TAKE 1 TABLET BY MOUTH TWICE DAILY 56 tablet 97     Multiple Vitamins-Minerals (CENTRUM SILVER ULTRA MENS) TABS Take 1 tablet by mouth daily       multivitamin (I-EDGAR) TABS per tablet TAKE 1 TABLET BY MOUTH ONCE DAILY 31 tablet 97     NYAMYC [id number] UNIT/GM external powder APPLY TOPICALLY TO AFFECTED AREA(S) TWICE DAILY 60 g 97     potassium chloride ER (KLOR-CON M) 20 MEQ CR tablet TAKE TWO TABLETS (40MEQ) BY MOUTH ONCE DAILY 56 tablet PRN     predniSONE (DELTASONE) 5 MG tablet TAKE 1 TABLET BY MOUTH ONCE DAILY  28 tablet PRN     senna-docusate (SM STOOL SOFTENER) 8.6-50 MG tablet Take 2 tablets by mouth 2 times daily as needed for constipation 30 tablet 97     tamsulosin (FLOMAX) 0.4 MG capsule TAKE 1 CAPSULE BY MOUTH ONCE DAILY 28 capsule PRN     REVIEW OF SYSTEMS:  Limited  "secondary to cognitive impairment but today pt reports ok    Objective:  /85   Pulse 77   Temp 98.2  F (36.8  C)   Resp 16   Ht 1.88 m (6' 2\")   Wt 105.7 kg (233 lb)   SpO2 94%   BMI 29.92 kg/m    Exam:  GENERAL APPEARANCE:  Alert, in no distress sitting in WC, oily skin baseline  ENT:  Mouth and posterior oropharynx normal, moist mucous membranes, [place], not blocked with some cerumen, no redness/swelling to throat   EYES:  EOM, conjunctivae, lids, pupils and irises normal-glasses  RESP:  respiratory effort and palpation of chest increased, lungs diminished but clear to anterior and posterior- no wheezes present  CV:  Palpation and auscultation of heart done, bradycardic at times and regular rhythm, 3/6 murmur peripheral edema 2+ in legs ankles and feet. Compression Hose on  ABDOMEN:  normal bowel sounds, soft, nontender  M/S:   Gait and station using wheelchair for ambulation, generalized weakness is new baseline no walking with SBA of 1 for transfers  SKIN:  Inspection of skin and subcutaneous tissue baseline to visualized areas of skin  NEURO:   Examination of sensation by touch normal  PSYCH:  insight and judgement impaired, memory impaired    Labs:   CBC RESULTS:   Recent Labs   Lab Test 02/04/21 10/15/20   WBC 8.8 10.4   RBC 4.36* 4.84   HGB 13.0* 14.4   HCT 42.3 47.0   MCV 97 97   MCH 29.8 29.8   MCHC 30.7* 30.6*   RDW 13.6 14.1    297       Last Basic Metabolic Panel:  Recent Labs   Lab Test 02/04/21 10/15/20    139   POTASSIUM 3.8 3.8   CHLORIDE 107 102   MERCEDES 8.7 8.9   CO2 29 28   BUN 34* 30   CR 1.67* 1.53*   GLC 97 131*       Liver Function Studies -   Recent Labs   Lab Test 02/04/21 10/15/20   PROTTOTAL 7.4 8.4   ALBUMIN 2.9* 3.3*   BILITOTAL 0.5 0.7   ALKPHOS 78 171*   AST 17 27   ALT 28 107*       TSH   Date Value Ref Range Status   02/04/2021 5.20 (H) 0.40 - 4.00 mU/L Final   10/15/2020 6.87 (A) 0.40 - 4.00 mU/L Final       Lab Results   Component Value Date    A1C 6.2 " 02/04/2021    A1C 6.7 11/21/2019     ASSESSMENT/PLAN:  (R41.0) Confusion  (primary encounter diagnosis)  Comment: acute versus slow onset?  Plan:   -initial workup onsite negative   -UA/UC pending     (I10) Essential hypertension  Comment: elevated at times-noted in facility chart review  Plan:  -HR/BP monitoring 3 times weekly for 1 week at various times  -adjust antihypertensives prn  -with decreasing kidney function will reduce lasix from 40 mg po daily to 30 mg po daily and follow           Electronically signed by:  SUNDAY Lee CNP

## 2021-02-04 NOTE — LETTER
2/4/2021        RE: Jacob Tran  [address]  [address]  [place] [address]        [facility]  [place] Medical Record Number: [id number]  Place of Service where encounter took place: [facility] [[id number]]  Chief Complaint   Patient presents with     Nursing Home Acute       HPI:    Jacob Easton is a 89 year old (5/16/1929), who is being seen today for an episodic care visit. HPI information obtained from: facility chart records, facility staff, patient report and [facility] chart review. Today's concern is:    Some increased confusion and odd behaviors per staff. Workup onsite and CXR negative, labs ok. Staff attempting to obtain UA/UC. Today he is conversational appropriate, alert and oriented. Has some ongoing neck stiffness and thinks related to positioning in bed. Denies other pain, chest pain, abdominal pain, SOB, nausea. VVS, weight stable but in chart review did see some HS blood pressures are elevated?    Past Medical and Surgical History reviewed in Epic today.    MEDICATIONS:    Current Outpatient Medications   Medication Sig Dispense Refill     acetaminophen (TYLENOL) 325 MG tablet TAKE TWO TABLETS (650MG) BY MOUTH EVERY 4 HOURS AS NEEDED 60 tablet 97     albuterol (VENTOLIN HFA) 108 (90 Base) MCG/ACT inhaler Inhale 2 puffs into the lungs every 4 hours as needed for shortness of breath / dyspnea or wheezing (May keep bedside) 18 g 98     amLODIPine (NORVASC) 10 MG tablet TAKE 1 TABLET BY MOUTH ONCE DAILY 28 tablet PRN     ASPIRIN LOW DOSE 81 MG EC tablet TAKE 1 TABLET BY MOUTH ONCE DAILY 28 tablet PRN     bisacodyl (DULCOLAX) 10 MG suppository Place 1 suppository (10 mg) rectally daily as needed for constipation       budesonide (PULMICORT) 0.5 MG/2ML neb solution NEBULIZE THE CONTENT OF 1 VIAL TWICE DAILY (ADD TO IN THE MORNING AND AT BEDTIME DUONEBS) 60 mL 97     Elastic Bandages & Supports (T.E.D. KNEE LENGTH/M-LONG) MISC  WEAR AS DIRECTED ON IN THE MORNING OFF AT BEDTIME (2 = 1 PAIR) 4 each 97     furosemide (LASIX) 40 MG tablet TAKE 1 TABLET BY MOUTH ONCE DAILY 28 tablet PRN     ipratropium - albuterol 0.5 mg/2.5 mg/3 mL (DUONEB) 0.5-2.5 (3) MG/3ML neb solution NEBULIZE THE CONTENT OF 1 VIAL INTO THE LUNGS FOUR TIMES A  mL 97     Lactobacillus-Inulin ([facility] DIGESTIVE [facility]) CHEW CHEW AND SWALLOW 2 TABLETS BY MOUTH ONCE DAILY 72 tablet 97     latanoprost (XALATAN) 0.005 % ophthalmic solution INSTILL ONE DROP IN THE RIGHT EYE IN THE EVENING 2.5 mL 97     levothyroxine (SYNTHROID/LEVOTHROID) 50 MCG tablet TAKE 1 TABLET BY MOUTH ONCE DAILY 31 tablet 99     loperamide (IMODIUM A-D) 2 MG tablet May take 1 tablet (2 mg) by mouth daily as needed for diarrhea. May also take 2 tablets (4 mg) daily as needed for diarrhea. 4 mg po prn daily after 1st loose stool then 2 mg po after each additional loose stool. Max 16 mg /day       MELATONIN MAXIMUM STRENGTH 5 MG tablet TAKE 1 TABLET BY MOUTH ONCE DAILY 31 tablet 99     menthol-zinc oxide (CALMOSEPTINE) 0.44-20.625 % OINT ointment Apply topically 4 times daily as needed for skin protection       metoprolol tartrate (LOPRESSOR) 25 MG tablet TAKE 1 TABLET BY MOUTH TWICE DAILY 56 tablet PRN     MUCINEX 600 MG 12 hr tablet TAKE 1 TABLET BY MOUTH TWICE DAILY 56 tablet 97     Multiple Vitamins-Minerals (CENTRUM SILVER ULTRA MENS) TABS Take 1 tablet by mouth daily       multivitamin (I-EDGAR) TABS per tablet TAKE 1 TABLET BY MOUTH ONCE DAILY 31 tablet 97     NYAMYC [id number] UNIT/GM external powder APPLY TOPICALLY TO AFFECTED AREA(S) TWICE DAILY 60 g 97     potassium chloride ER (KLOR-CON M) 20 MEQ CR tablet TAKE TWO TABLETS (40MEQ) BY MOUTH ONCE DAILY 56 tablet PRN     predniSONE (DELTASONE) 5 MG tablet TAKE 1 TABLET BY MOUTH ONCE DAILY ***NOTE DOSAGE/STRENGTH*** 28 tablet PRN     senna-docusate (SM STOOL SOFTENER) 8.6-50 MG tablet Take 2 tablets by mouth 2 times daily as needed for  "constipation 30 tablet 97     tamsulosin (FLOMAX) 0.4 MG capsule TAKE 1 CAPSULE BY MOUTH ONCE DAILY 28 capsule PRN     REVIEW OF SYSTEMS:  Limited secondary to cognitive impairment but today pt reports ok    Objective:  /85   Pulse 77   Temp 98.2  F (36.8  C)   Resp 16   Ht 1.88 m (6' 2\")   Wt 105.7 kg (233 lb)   SpO2 94%   BMI 29.92 kg/m    Exam:  GENERAL APPEARANCE:  Alert, in no distress sitting in WC, oily skin baseline  ENT:  Mouth and posterior oropharynx normal, moist mucous membranes, [place], not blocked with some cerumen, no redness/swelling to throat   EYES:  EOM, conjunctivae, lids, pupils and irises normal-glasses  RESP:  respiratory effort and palpation of chest increased, lungs diminished but clear to anterior and posterior- no wheezes present  CV:  Palpation and auscultation of heart done, bradycardic at times and regular rhythm, 3/6 murmur peripheral edema 2+ in legs ankles and feet. Compression Hose on  ABDOMEN:  normal bowel sounds, soft, nontender  M/S:   Gait and station using wheelchair for ambulation, generalized weakness is new baseline no walking with SBA of 1 for transfers  SKIN:  Inspection of skin and subcutaneous tissue baseline to visualized areas of skin  NEURO:   Examination of sensation by touch normal  PSYCH:  insight and judgement impaired, memory impaired    Labs:   CBC RESULTS:   Recent Labs   Lab Test 02/04/21 10/15/20   WBC 8.8 10.4   RBC 4.36* 4.84   HGB 13.0* 14.4   HCT 42.3 47.0   MCV 97 97   MCH 29.8 29.8   MCHC 30.7* 30.6*   RDW 13.6 14.1    297       Last Basic Metabolic Panel:  Recent Labs   Lab Test 02/04/21 10/15/20    139   POTASSIUM 3.8 3.8   CHLORIDE 107 102   MERCEDES 8.7 8.9   CO2 29 28   BUN 34* 30   CR 1.67* 1.53*   GLC 97 131*       Liver Function Studies -   Recent Labs   Lab Test 02/04/21 10/15/20   PROTTOTAL 7.4 8.4   ALBUMIN 2.9* 3.3*   BILITOTAL 0.5 0.7   ALKPHOS 78 171*   AST 17 27   ALT 28 107*       TSH   Date Value Ref Range Status "   02/04/2021 5.20 (H) 0.40 - 4.00 mU/L Final   10/15/2020 6.87 (A) 0.40 - 4.00 mU/L Final       Lab Results   Component Value Date    A1C 6.2 02/04/2021    A1C 6.7 11/21/2019     ASSESSMENT/PLAN:  (R41.0) Confusion  (primary encounter diagnosis)  Comment: acute versus slow onset?  Plan:   -initial workup onsite negative   -UA/UC pending     (I10) Essential hypertension  Comment: elevated at times-noted in facility chart review  Plan:  -HR/BP monitoring 3 times weekly for 1 week at various times  -adjust antihypertensives prn          Electronically signed by:  SUNDAY Lee CNP               Sincerely,        SUNDAY Lee CNP

## 2021-02-04 NOTE — LETTER
2/4/2021        RE: Jacob Tran  [address]  [address]  [place] [address]        [place] [facility]  [place] Medical Record Number: [id number]  Place of Service where encounter took place: [facility] [[id number]]  Chief Complaint   Patient presents with     Nursing Home Acute       HPI:    Jacob Easton is a 89 year old (5/16/1929), who is being seen today for an episodic care visit. HPI information obtained from: facility chart records, facility staff, patient report and [facility] chart review. Today's concern is:    Some increased confusion and odd behaviors per staff. Workup onsite and CXR negative, labs ok. Staff attempting to obtain UA/UC. Today he is conversational appropriate, alert and oriented. Has some ongoing neck stiffness and thinks related to positioning in bed. Denies other pain, chest pain, abdominal pain, SOB, nausea. VVS, weight stable but in chart review did see some HS blood pressures are elevated?    Past Medical and Surgical History reviewed in Epic today.    MEDICATIONS:    Current Outpatient Medications   Medication Sig Dispense Refill     acetaminophen (TYLENOL) 325 MG tablet TAKE TWO TABLETS (650MG) BY MOUTH EVERY 4 HOURS AS NEEDED 60 tablet 97     albuterol (VENTOLIN HFA) 108 (90 Base) MCG/ACT inhaler Inhale 2 puffs into the lungs every 4 hours as needed for shortness of breath / dyspnea or wheezing (May keep bedside) 18 g 98     amLODIPine (NORVASC) 10 MG tablet TAKE 1 TABLET BY MOUTH ONCE DAILY 28 tablet PRN     ASPIRIN LOW DOSE 81 MG EC tablet TAKE 1 TABLET BY MOUTH ONCE DAILY 28 tablet PRN     bisacodyl (DULCOLAX) 10 MG suppository Place 1 suppository (10 mg) rectally daily as needed for constipation       budesonide (PULMICORT) 0.5 MG/2ML neb solution NEBULIZE THE CONTENT OF 1 VIAL TWICE DAILY (ADD TO IN THE MORNING AND AT BEDTIME DUONEBS) 60 mL 97     Elastic Bandages & Supports (T.E.D. KNEE LENGTH/M-LONG) MISC  WEAR AS DIRECTED ON IN THE MORNING OFF AT BEDTIME (2 = 1 PAIR) 4 each 97     furosemide (LASIX) 40 MG tablet TAKE 1 TABLET BY MOUTH ONCE DAILY 28 tablet PRN     ipratropium - albuterol 0.5 mg/2.5 mg/3 mL (DUONEB) 0.5-2.5 (3) MG/3ML neb solution NEBULIZE THE CONTENT OF 1 VIAL INTO THE LUNGS FOUR TIMES A  mL 97     Lactobacillus-Inulin ([facility] DIGESTIVE [facility]) CHEW CHEW AND SWALLOW 2 TABLETS BY MOUTH ONCE DAILY 72 tablet 97     latanoprost (XALATAN) 0.005 % ophthalmic solution INSTILL ONE DROP IN THE RIGHT EYE IN THE EVENING 2.5 mL 97     levothyroxine (SYNTHROID/LEVOTHROID) 50 MCG tablet TAKE 1 TABLET BY MOUTH ONCE DAILY 31 tablet 99     loperamide (IMODIUM A-D) 2 MG tablet May take 1 tablet (2 mg) by mouth daily as needed for diarrhea. May also take 2 tablets (4 mg) daily as needed for diarrhea. 4 mg po prn daily after 1st loose stool then 2 mg po after each additional loose stool. Max 16 mg /day       MELATONIN MAXIMUM STRENGTH 5 MG tablet TAKE 1 TABLET BY MOUTH ONCE DAILY 31 tablet 99     menthol-zinc oxide (CALMOSEPTINE) 0.44-20.625 % OINT ointment Apply topically 4 times daily as needed for skin protection       metoprolol tartrate (LOPRESSOR) 25 MG tablet TAKE 1 TABLET BY MOUTH TWICE DAILY 56 tablet PRN     MUCINEX 600 MG 12 hr tablet TAKE 1 TABLET BY MOUTH TWICE DAILY 56 tablet 97     Multiple Vitamins-Minerals (CENTRUM SILVER ULTRA MENS) TABS Take 1 tablet by mouth daily       multivitamin (I-EDGAR) TABS per tablet TAKE 1 TABLET BY MOUTH ONCE DAILY 31 tablet 97     NYAMYC [id number] UNIT/GM external powder APPLY TOPICALLY TO AFFECTED AREA(S) TWICE DAILY 60 g 97     potassium chloride ER (KLOR-CON M) 20 MEQ CR tablet TAKE TWO TABLETS (40MEQ) BY MOUTH ONCE DAILY 56 tablet PRN     predniSONE (DELTASONE) 5 MG tablet TAKE 1 TABLET BY MOUTH ONCE DAILY  28 tablet PRN     senna-docusate (SM STOOL SOFTENER) 8.6-50 MG tablet Take 2 tablets by mouth 2 times daily as needed for constipation 30 tablet 97      "tamsulosin (FLOMAX) 0.4 MG capsule TAKE 1 CAPSULE BY MOUTH ONCE DAILY 28 capsule PRN     REVIEW OF SYSTEMS:  Limited secondary to cognitive impairment but today pt reports ok    Objective:  /85   Pulse 77   Temp 98.2  F (36.8  C)   Resp 16   Ht 1.88 m (6' 2\")   Wt 105.7 kg (233 lb)   SpO2 94%   BMI 29.92 kg/m    Exam:  GENERAL APPEARANCE:  Alert, in no distress sitting in WC, oily skin baseline  ENT:  Mouth and posterior oropharynx normal, moist mucous membranes, [place], not blocked with some cerumen, no redness/swelling to throat   EYES:  EOM, conjunctivae, lids, pupils and irises normal-glasses  RESP:  respiratory effort and palpation of chest increased, lungs diminished but clear to anterior and posterior- no wheezes present  CV:  Palpation and auscultation of heart done, bradycardic at times and regular rhythm, 3/6 murmur peripheral edema 2+ in legs ankles and feet. Compression Hose on  ABDOMEN:  normal bowel sounds, soft, nontender  M/S:   Gait and station using wheelchair for ambulation, generalized weakness is new baseline no walking with SBA of 1 for transfers  SKIN:  Inspection of skin and subcutaneous tissue baseline to visualized areas of skin  NEURO:   Examination of sensation by touch normal  PSYCH:  insight and judgement impaired, memory impaired    Labs:   CBC RESULTS:   Recent Labs   Lab Test 02/04/21 10/15/20   WBC 8.8 10.4   RBC 4.36* 4.84   HGB 13.0* 14.4   HCT 42.3 47.0   MCV 97 97   MCH 29.8 29.8   MCHC 30.7* 30.6*   RDW 13.6 14.1    297       Last Basic Metabolic Panel:  Recent Labs   Lab Test 02/04/21 10/15/20    139   POTASSIUM 3.8 3.8   CHLORIDE 107 102   MERCEDES 8.7 8.9   CO2 29 28   BUN 34* 30   CR 1.67* 1.53*   GLC 97 131*       Liver Function Studies -   Recent Labs   Lab Test 02/04/21 10/15/20   PROTTOTAL 7.4 8.4   ALBUMIN 2.9* 3.3*   BILITOTAL 0.5 0.7   ALKPHOS 78 171*   AST 17 27   ALT 28 107*       TSH   Date Value Ref Range Status   02/04/2021 5.20 (H) 0.40 - 4.00 " mU/L Final   10/15/2020 6.87 (A) 0.40 - 4.00 mU/L Final       Lab Results   Component Value Date    A1C 6.2 02/04/2021    A1C 6.7 11/21/2019     ASSESSMENT/PLAN:  (R41.0) Confusion  (primary encounter diagnosis)  Comment: acute versus slow onset?  Plan:   -initial workup onsite negative   -UA/UC pending     (I10) Essential hypertension  Comment: elevated at times-noted in facility chart review  Plan:  -HR/BP monitoring 3 times weekly for 1 week at various times  -adjust antihypertensives prn          Electronically signed by:  SUNDAY Lee CNP               Sincerely,        SUNDAY Lee CNP

## 2021-03-16 NOTE — LETTER
"    3/16/2021        RE: Jacob Tran  [address]  [facility] [address]        [place]  [place] Medical Record Number:  [id number]  Place of Service where encounter took place:  [facility] [[id number]]  Chief Complaint   Patient presents with     RECHECK       HPI:    Jacob Easton  is a 89 year old (5/16/1929), who is being seen today for an episodic care visit.  HPI information obtained from: facility chart records and patient report. Today's concern is:    Seen today for cerumen impaction and other chronic conditions. Ears viewed with otoscope and R>L with ear wax but he reports feeling more \"plugged\" on the left side.     Noted with right knee discomfort with knee and facility chart review with some increased weakness in the evenings needing staff assist of 2 versus 1. He says it happened once and was related to footwear.     Is planning on seeing a dentist for tooth extraction-lower front? Does appear with possible pus pocket on uppers left side adjacent to last molar. Reports to rinsing daily with month wash, no pain.       Past Medical and Surgical History reviewed in Epic today.    MEDICATIONS:    Current Outpatient Medications   Medication Sig Dispense Refill     acetaminophen (TYLENOL) 325 MG tablet TAKE TWO TABLETS (650MG) BY MOUTH EVERY 4 HOURS AS NEEDED 60 tablet 97     albuterol (VENTOLIN HFA) 108 (90 Base) MCG/ACT inhaler Inhale 2 puffs into the lungs every 4 hours as needed for shortness of breath / dyspnea or wheezing (May keep bedside) 18 g 98     amLODIPine (NORVASC) 10 MG tablet TAKE 1 TABLET BY MOUTH ONCE DAILY 28 tablet PRN     ASPIRIN LOW DOSE 81 MG EC tablet TAKE 1 TABLET BY MOUTH ONCE DAILY 28 tablet PRN     bisacodyl (DULCOLAX) 10 MG suppository Place 1 suppository (10 mg) rectally daily as needed for constipation       budesonide (PULMICORT) 0.5 MG/2ML neb solution NEBULIZE THE CONTENT OF 1 VIAL TWICE DAILY (ADD TO IN " THE MORNING AND AT BEDTIME DUONEBS) 60 mL 97     Elastic Bandages & Supports (T.E.D. KNEE LENGTH/M-LONG) MISC WEAR AS DIRECTED ON IN THE MORNING OFF AT BEDTIME (2 = 1 PAIR) 4 each 97     furosemide (LASIX) 20 MG tablet Take 1.5 tablets (30 mg) by mouth daily 30 tablet 11     ipratropium - albuterol 0.5 mg/2.5 mg/3 mL (DUONEB) 0.5-2.5 (3) MG/3ML neb solution NEBULIZE THE CONTENT OF 1 VIAL INTO THE LUNGS FOUR TIMES A  mL 97     Lactobacillus-Inulin ([employer]) CHEW CHEW AND SWALLOW 2 TABLETS BY MOUTH ONCE DAILY 72 tablet 97     latanoprost (XALATAN) 0.005 % ophthalmic solution INSTILL ONE DROP IN THE RIGHT EYE IN THE EVENING 2.5 mL 97     levothyroxine (SYNTHROID/LEVOTHROID) 50 MCG tablet TAKE 1 TABLET BY MOUTH ONCE DAILY 28 tablet PRN     loperamide (IMODIUM A-D) 2 MG tablet May take 1 tablet (2 mg) by mouth daily as needed for diarrhea. May also take 2 tablets (4 mg) daily as needed for diarrhea. 4 mg po prn daily after 1st loose stool then 2 mg po after each additional loose stool. Max 16 mg /day       MELATONIN MAXIMUM STRENGTH 5 MG tablet TAKE 1 TABLET BY MOUTH ONCE DAILY 28 tablet PRN     menthol-zinc oxide (CALMOSEPTINE) 0.44-20.625 % OINT ointment Apply topically 4 times daily as needed for skin protection       metoprolol tartrate (LOPRESSOR) 25 MG tablet TAKE 1 TABLET BY MOUTH TWICE DAILY 56 tablet PRN     MUCINEX 600 MG 12 hr tablet TAKE 1 TABLET BY MOUTH TWICE DAILY 56 tablet 97     Multiple Vitamins-Minerals (CENTRUM SILVER ULTRA MENS) TABS Take 1 tablet by mouth daily       Multiple Vitamins-Minerals (CEROVITE SENIOR) TABS TAKE 1 TABLET BY MOUTH ONCE DAILY 28 tablet 97     multivitamin (I-EDGAR) TABS per tablet TAKE 1 TABLET BY MOUTH ONCE DAILY 31 tablet 97     NYAMYC 662021 UNIT/GM external powder APPLY TOPICALLY TO AFFECTED AREA(S) TWICE DAILY 60 g 97     potassium chloride ER (KLOR-CON M15) 15 MEQ CR tablet Take 2 tablets (30 mEq) by mouth daily 60 tablet 11     predniSONE  "(DELTASONE) 5 MG tablet TAKE 1 TABLET BY MOUTH ONCE DAILY ***NOTE DOSAGE/STRENGTH*** 28 tablet PRN     senna-docusate (SM STOOL SOFTENER) 8.6-50 MG tablet Take 2 tablets by mouth 2 times daily as needed for constipation 30 tablet 97     tamsulosin (FLOMAX) 0.4 MG capsule TAKE 1 CAPSULE BY MOUTH ONCE DAILY 28 capsule PRN       REVIEW OF SYSTEMS:  4 point ROS including Respiratory, CV, GI and , other than that noted in the HPI,  is negative    Objective:  /72   Pulse 61   Temp 98.2  F (36.8  C)   Resp 16   Ht 1.88 m (6' 2\")   Wt 102.3 kg (225 lb 9.6 oz)   SpO2 95%   BMI 28.97 kg/m    Exam:  GENERAL APPEARANCE:  Alert, in no distress sitting in WC, oily skin baseline  ENT:  Mouth and posterior oropharynx normal, moist mucous membranes, [place],  blocked with some cerumen R>L  no redness/swelling to throat   EYES:  EOM, conjunctivae, lids, pupils and irises normal-glasses  RESP:  respiratory effort and palpation of chest increased, lungs diminished but clear to anterior and posterior- no wheezes present  CV:  Palpation and auscultation of heart done, bradycardic at times and regular rhythm, 3/6 murmur peripheral edema 2+ in legs ankles and feet. Compression Hose off  ABDOMEN:  normal bowel sounds, soft, nontender  M/S:   Gait and station using wheelchair for ambulation, generalized weakness is new baseline no walking with SBA of 1 for transfers  SKIN:  Inspection of skin and subcutaneous tissue baseline to visualized areas of skin  NEURO:   Examination of sensation by touch normal  PSYCH:  insight and judgement impaired, memory impaired    Labs:   CBC RESULTS:   Recent Labs   Lab Test 02/04/21 10/15/20   WBC 8.8 10.4   RBC 4.36* 4.84   HGB 13.0* 14.4   HCT 42.3 47.0   MCV 97 97   MCH 29.8 29.8   MCHC 30.7* 30.6*   RDW 13.6 14.1    297       Last Basic Metabolic Panel:  Recent Labs   Lab Test 02/04/21 10/15/20    139   POTASSIUM 3.8 3.8   CHLORIDE 107 102   MERCEDES 8.7 8.9   CO2 29 28   BUN 34* 30 "   CR 1.67* 1.53*   GLC 97 131*       Liver Function Studies -   Recent Labs   Lab Test 02/04/21 10/15/20   PROTTOTAL 7.4 8.4   ALBUMIN 2.9* 3.3*   BILITOTAL 0.5 0.7   ALKPHOS 78 171*   AST 17 27   ALT 28 107*       TSH   Date Value Ref Range Status   02/04/2021 5.20 (H) 0.40 - 4.00 mU/L Final   10/15/2020 6.87 (A) 0.40 - 4.00 mU/L Final       Lab Results   Component Value Date    A1C 6.2 02/04/2021    A1C 6.7 11/21/2019     ASSESSMENT/PLAN:  (H61.23) Bilateral impacted cerumen  (primary encounter diagnosis)  Comment: ongoing   Plan:   -cerumen removal with lighted curette to both ear canals     (M25.561) Acute pain of right knee  Comment: declined for injections   Plan:   Tylenol prn     (K05.10) Gingivitis, chronic  Comment: planning on dental follow up  Plan:   -continue current plan of care. Declined chlorhexidine 0.12%      Electronically signed by:  SUNDAY Lee CNP                 Sincerely,        SUNDAY Lee CNP

## 2021-03-16 NOTE — LETTER
"    3/16/2021        RE: Jacob Tran  [address] [place]  [place] [address]        [place] [facility]  [place] Medical Record Number:  [id number]  Place of Service where encounter took place:  [facility] [[id number]]  Chief Complaint   Patient presents with     RECHECK       HPI:    Jacob Easton  is a 89 year old (5/16/1929), who is being seen today for an episodic care visit.  HPI information obtained from: facility chart records and patient report. Today's concern is:    Seen today for cerumen impaction and other chronic conditions. Ears viewed with otoscope and R>L with ear wax but he reports feeling more \"plugged\" on the left side.     Noted with right knee discomfort with knee and facility chart review with some increased weakness in the evenings needing staff assist of 2 versus 1. He says it happened once and was related to footwear.     Is planning on seeing a dentist for tooth extraction-lower front? Does appear with possible pus pocket on uppers left side adjacent to last molar. Reports to rinsing daily with month wash, no pain.       Past Medical and Surgical History reviewed in Epic today.    MEDICATIONS:    Current Outpatient Medications   Medication Sig Dispense Refill     acetaminophen (TYLENOL) 325 MG tablet TAKE TWO TABLETS (650MG) BY MOUTH EVERY 4 HOURS AS NEEDED 60 tablet 97     albuterol (VENTOLIN HFA) 108 (90 Base) MCG/ACT inhaler Inhale 2 puffs into the lungs every 4 hours as needed for shortness of breath / dyspnea or wheezing (May keep bedside) 18 g 98     amLODIPine (NORVASC) 10 MG tablet TAKE 1 TABLET BY MOUTH ONCE DAILY 28 tablet PRN     ASPIRIN LOW DOSE 81 MG EC tablet TAKE 1 TABLET BY MOUTH ONCE DAILY 28 tablet PRN     bisacodyl (DULCOLAX) 10 MG suppository Place 1 suppository (10 mg) rectally daily as needed for constipation       budesonide (PULMICORT) 0.5 MG/2ML neb solution NEBULIZE THE CONTENT OF 1 VIAL TWICE DAILY (ADD TO IN " THE MORNING AND AT BEDTIME DUONEBS) 60 mL 97     Elastic Bandages & Supports (T.E.D. KNEE LENGTH/M-LONG) MISC WEAR AS DIRECTED ON IN THE MORNING OFF AT BEDTIME (2 = 1 PAIR) 4 each 97     furosemide (LASIX) 20 MG tablet Take 1.5 tablets (30 mg) by mouth daily 30 tablet 11     ipratropium - albuterol 0.5 mg/2.5 mg/3 mL (DUONEB) 0.5-2.5 (3) MG/3ML neb solution NEBULIZE THE CONTENT OF 1 VIAL INTO THE LUNGS FOUR TIMES A  mL 97     Lactobacillus-Inulin ([employer]) CHEW CHEW AND SWALLOW 2 TABLETS BY MOUTH ONCE DAILY 72 tablet 97     latanoprost (XALATAN) 0.005 % ophthalmic solution INSTILL ONE DROP IN THE RIGHT EYE IN THE EVENING 2.5 mL 97     levothyroxine (SYNTHROID/LEVOTHROID) 50 MCG tablet TAKE 1 TABLET BY MOUTH ONCE DAILY 28 tablet PRN     loperamide (IMODIUM A-D) 2 MG tablet May take 1 tablet (2 mg) by mouth daily as needed for diarrhea. May also take 2 tablets (4 mg) daily as needed for diarrhea. 4 mg po prn daily after 1st loose stool then 2 mg po after each additional loose stool. Max 16 mg /day       MELATONIN MAXIMUM STRENGTH 5 MG tablet TAKE 1 TABLET BY MOUTH ONCE DAILY 28 tablet PRN     menthol-zinc oxide (CALMOSEPTINE) 0.44-20.625 % OINT ointment Apply topically 4 times daily as needed for skin protection       metoprolol tartrate (LOPRESSOR) 25 MG tablet TAKE 1 TABLET BY MOUTH TWICE DAILY 56 tablet PRN     MUCINEX 600 MG 12 hr tablet TAKE 1 TABLET BY MOUTH TWICE DAILY 56 tablet 97     Multiple Vitamins-Minerals (CENTRUM SILVER ULTRA MENS) TABS Take 1 tablet by mouth daily       Multiple Vitamins-Minerals (CEROVITE SENIOR) TABS TAKE 1 TABLET BY MOUTH ONCE DAILY 28 tablet 97     multivitamin (I-EDGAR) TABS per tablet TAKE 1 TABLET BY MOUTH ONCE DAILY 31 tablet 97     NYAMYC [id number] UNIT/GM external powder APPLY TOPICALLY TO AFFECTED AREA(S) TWICE DAILY 60 g 97     potassium chloride ER (KLOR-CON M15) 15 MEQ CR tablet Take 2 tablets (30 mEq) by mouth daily 60 tablet 11     predniSONE  "(DELTASONE) 5 MG tablet TAKE 1 TABLET BY MOUTH ONCE DAILY  28 tablet PRN     senna-docusate (SM STOOL SOFTENER) 8.6-50 MG tablet Take 2 tablets by mouth 2 times daily as needed for constipation 30 tablet 97     tamsulosin (FLOMAX) 0.4 MG capsule TAKE 1 CAPSULE BY MOUTH ONCE DAILY 28 capsule PRN       REVIEW OF SYSTEMS:  4 point ROS including Respiratory, CV, GI and , other than that noted in the HPI,  is negative    Objective:  /72   Pulse 61   Temp 98.2  F (36.8  C)   Resp 16   Ht 1.88 m (6' 2\")   Wt 102.3 kg (225 lb 9.6 oz)   SpO2 95%   BMI 28.97 kg/m    Exam:  GENERAL APPEARANCE:  Alert, in no distress sitting in WC, oily skin baseline  ENT:  Mouth and posterior oropharynx normal, moist mucous membranes, [place],  blocked with some cerumen R>L  no redness/swelling to throat   EYES:  EOM, conjunctivae, lids, pupils and irises normal-glasses  RESP:  respiratory effort and palpation of chest increased, lungs diminished but clear to anterior and posterior- no wheezes present  CV:  Palpation and auscultation of heart done, bradycardic at times and regular rhythm, 3/6 murmur peripheral edema 2+ in legs ankles and feet. Compression Hose off  ABDOMEN:  normal bowel sounds, soft, nontender  M/S:   Gait and station using wheelchair for ambulation, generalized weakness is new baseline no walking with SBA of 1 for transfers  SKIN:  Inspection of skin and subcutaneous tissue baseline to visualized areas of skin  NEURO:   Examination of sensation by touch normal  PSYCH:  insight and judgement impaired, memory impaired    Labs:   CBC RESULTS:   Recent Labs   Lab Test 02/04/21 10/15/20   WBC 8.8 10.4   RBC 4.36* 4.84   HGB 13.0* 14.4   HCT 42.3 47.0   MCV 97 97   MCH 29.8 29.8   MCHC 30.7* 30.6*   RDW 13.6 14.1    297       Last Basic Metabolic Panel:  Recent Labs   Lab Test 02/04/21 10/15/20    139   POTASSIUM 3.8 3.8   CHLORIDE 107 102   MERCEDES 8.7 8.9   CO2 29 28   BUN 34* 30   CR 1.67* 1.53*   GLC 97 " 131*       Liver Function Studies -   Recent Labs   Lab Test 02/04/21 10/15/20   PROTTOTAL 7.4 8.4   ALBUMIN 2.9* 3.3*   BILITOTAL 0.5 0.7   ALKPHOS 78 171*   AST 17 27   ALT 28 107*       TSH   Date Value Ref Range Status   02/04/2021 5.20 (H) 0.40 - 4.00 mU/L Final   10/15/2020 6.87 (A) 0.40 - 4.00 mU/L Final       Lab Results   Component Value Date    A1C 6.2 02/04/2021    A1C 6.7 11/21/2019     ASSESSMENT/PLAN:  (H61.23) Bilateral impacted cerumen  (primary encounter diagnosis)  Comment: ongoing   Plan:   -cerumen removal with lighted curette to both ear canals     (M25.561) Acute pain of right knee  Comment: declined for injections   Plan:   Tylenol prn     (K05.10) Gingivitis, chronic  Comment: planning on dental follow up  Plan:   -continue current plan of care. Declined chlorhexidine 0.12%      Electronically signed by:  SUNDAY Lee CNP                 Sincerely,        SUNDAY Lee CNP

## 2021-03-16 NOTE — PROGRESS NOTES
"[facility]  [place] [facility] Record Number:  [id number]  Place of Service where encounter took place:  [facility] [[id number]]  Chief Complaint   Patient presents with     RECHECK       HPI:    Jacob Easton  is a 89 year old (5/16/1929), who is being seen today for an episodic care visit.  HPI information obtained from: facility chart records and patient report. Today's concern is:    Seen today for cerumen impaction and other chronic conditions. Ears viewed with otoscope and R>L with ear wax but he reports feeling more \"plugged\" on the left side.     Noted with right knee discomfort with knee and facility chart review with some increased weakness in the evenings needing staff assist of 2 versus 1. He says it happened once and was related to footwear.     Is planning on seeing a dentist for tooth extraction-lower front? Does appear with possible pus pocket on uppers left side adjacent to last molar. Reports to rinsing daily with month wash, no pain.       Past Medical and Surgical History reviewed in Epic today.    MEDICATIONS:    Current Outpatient Medications   Medication Sig Dispense Refill     acetaminophen (TYLENOL) 325 MG tablet TAKE TWO TABLETS (650MG) BY MOUTH EVERY 4 HOURS AS NEEDED 60 tablet 97     albuterol (VENTOLIN HFA) 108 (90 Base) MCG/ACT inhaler Inhale 2 puffs into the lungs every 4 hours as needed for shortness of breath / dyspnea or wheezing (May keep bedside) 18 g 98     amLODIPine (NORVASC) 10 MG tablet TAKE 1 TABLET BY MOUTH ONCE DAILY 28 tablet PRN     ASPIRIN LOW DOSE 81 MG EC tablet TAKE 1 TABLET BY MOUTH ONCE DAILY 28 tablet PRN     bisacodyl (DULCOLAX) 10 MG suppository Place 1 suppository (10 mg) rectally daily as needed for constipation       budesonide (PULMICORT) 0.5 MG/2ML neb solution NEBULIZE THE CONTENT OF 1 VIAL TWICE DAILY (ADD TO IN THE MORNING AND AT BEDTIME DUONEBS) 60 mL 97     Elastic Bandages & Supports (T.E.D. KNEE LENGTH/M-LONG) " MISC WEAR AS DIRECTED ON IN THE MORNING OFF AT BEDTIME (2 = 1 PAIR) 4 each 97     furosemide (LASIX) 20 MG tablet Take 1.5 tablets (30 mg) by mouth daily 30 tablet 11     ipratropium - albuterol 0.5 mg/2.5 mg/3 mL (DUONEB) 0.5-2.5 (3) MG/3ML neb solution NEBULIZE THE CONTENT OF 1 VIAL INTO THE LUNGS FOUR TIMES A  mL 97     Lactobacillus-Inulin ([employer]) CHEW CHEW AND SWALLOW 2 TABLETS BY MOUTH ONCE DAILY 72 tablet 97     latanoprost (XALATAN) 0.005 % ophthalmic solution INSTILL ONE DROP IN THE RIGHT EYE IN THE EVENING 2.5 mL 97     levothyroxine (SYNTHROID/LEVOTHROID) 50 MCG tablet TAKE 1 TABLET BY MOUTH ONCE DAILY 28 tablet PRN     loperamide (IMODIUM A-D) 2 MG tablet May take 1 tablet (2 mg) by mouth daily as needed for diarrhea. May also take 2 tablets (4 mg) daily as needed for diarrhea. 4 mg po prn daily after 1st loose stool then 2 mg po after each additional loose stool. Max 16 mg /day       MELATONIN MAXIMUM STRENGTH 5 MG tablet TAKE 1 TABLET BY MOUTH ONCE DAILY 28 tablet PRN     menthol-zinc oxide (CALMOSEPTINE) 0.44-20.625 % OINT ointment Apply topically 4 times daily as needed for skin protection       metoprolol tartrate (LOPRESSOR) 25 MG tablet TAKE 1 TABLET BY MOUTH TWICE DAILY 56 tablet PRN     MUCINEX 600 MG 12 hr tablet TAKE 1 TABLET BY MOUTH TWICE DAILY 56 tablet 97     Multiple Vitamins-Minerals (CENTRUM SILVER ULTRA MENS) TABS Take 1 tablet by mouth daily       Multiple Vitamins-Minerals (CEROVITE SENIOR) TABS TAKE 1 TABLET BY MOUTH ONCE DAILY 28 tablet 97     multivitamin (I-EDGAR) TABS per tablet TAKE 1 TABLET BY MOUTH ONCE DAILY 31 tablet 97     NYAMYC [id number] UNIT/GM external powder APPLY TOPICALLY TO AFFECTED AREA(S) TWICE DAILY 60 g 97     potassium chloride ER (KLOR-CON M15) 15 MEQ CR tablet Take 2 tablets (30 mEq) by mouth daily 60 tablet 11     predniSONE (DELTASONE) 5 MG tablet TAKE 1 TABLET BY MOUTH ONCE DAILY  28 tablet PRN     senna-docusate (SM STOOL  "SOFTENER) 8.6-50 MG tablet Take 2 tablets by mouth 2 times daily as needed for constipation 30 tablet 97     tamsulosin (FLOMAX) 0.4 MG capsule TAKE 1 CAPSULE BY MOUTH ONCE DAILY 28 capsule PRN       REVIEW OF SYSTEMS:  4 point ROS including Respiratory, CV, GI and , other than that noted in the HPI,  is negative    Objective:  /72   Pulse 61   Temp 98.2  F (36.8  C)   Resp 16   Ht 1.88 m (6' 2\")   Wt 102.3 kg (225 lb 9.6 oz)   SpO2 95%   BMI 28.97 kg/m    Exam:  GENERAL APPEARANCE:  Alert, in no distress sitting in WC, oily skin baseline  ENT:  Mouth and posterior oropharynx normal, moist mucous membranes, [place],  blocked with some cerumen R>L  no redness/swelling to throat   EYES:  EOM, conjunctivae, lids, pupils and irises normal-glasses  RESP:  respiratory effort and palpation of chest increased, lungs diminished but clear to anterior and posterior- no wheezes present  CV:  Palpation and auscultation of heart done, bradycardic at times and regular rhythm, 3/6 murmur peripheral edema 2+ in legs ankles and feet. Compression Hose off  ABDOMEN:  normal bowel sounds, soft, nontender  M/S:   Gait and station using wheelchair for ambulation, generalized weakness is new baseline no walking with SBA of 1 for transfers  SKIN:  Inspection of skin and subcutaneous tissue baseline to visualized areas of skin  NEURO:   Examination of sensation by touch normal  PSYCH:  insight and judgement impaired, memory impaired    Labs:   CBC RESULTS:   Recent Labs   Lab Test 02/04/21 10/15/20   WBC 8.8 10.4   RBC 4.36* 4.84   HGB 13.0* 14.4   HCT 42.3 47.0   MCV 97 97   MCH 29.8 29.8   MCHC 30.7* 30.6*   RDW 13.6 14.1    297       Last Basic Metabolic Panel:  Recent Labs   Lab Test 02/04/21 10/15/20    139   POTASSIUM 3.8 3.8   CHLORIDE 107 102   MERCEDES 8.7 8.9   CO2 29 28   BUN 34* 30   CR 1.67* 1.53*   GLC 97 131*       Liver Function Studies -   Recent Labs   Lab Test 02/04/21 10/15/20   PROTTOTAL 7.4 8.4 "   ALBUMIN 2.9* 3.3*   BILITOTAL 0.5 0.7   ALKPHOS 78 171*   AST 17 27   ALT 28 107*       TSH   Date Value Ref Range Status   02/04/2021 5.20 (H) 0.40 - 4.00 mU/L Final   10/15/2020 6.87 (A) 0.40 - 4.00 mU/L Final       Lab Results   Component Value Date    A1C 6.2 02/04/2021    A1C 6.7 11/21/2019     ASSESSMENT/PLAN:  (H61.23) Bilateral impacted cerumen  (primary encounter diagnosis)  Comment: ongoing   Plan:   -cerumen removal with lighted curette to both ear canals     (M25.561) Acute pain of right knee  Comment: declined for injections   Plan:   Tylenol prn     (K05.10) Gingivitis, chronic  Comment: planning on dental follow up  Plan:   -continue current plan of care. Declined chlorhexidine 0.12%      Electronically signed by:  SUNDAY Lee CNP

## 2021-03-16 NOTE — LETTER
"    3/16/2021        RE: Jacob Tran  [address] [place]  [facility] [address]        [place] [facility]  [place] Medical Record Number:  [id number]  Place of Service where encounter took place:  [facility] ([facility] [[id number]]  Chief Complaint   Patient presents with     RECHECK       HPI:    Jacob Easton  is a 89 year old (5/16/1929), who is being seen today for an episodic care visit.  HPI information obtained from: facility chart records and patient report. Today's concern is:    Seen today for cerumen impaction and other chronic conditions. Ears viewed with otoscope and R>L with ear wax but he reports feeling more \"plugged\" on the left side.     Noted with right knee discomfort with knee and facility chart review with some increased weakness in the evenings needing staff assist of 2 versus 1. He says it happened once and was related to footwear.     Is planning on seeing a dentist for tooth extraction-lower front? Does appear with possible pus pocket on uppers left side adjacent to last molar. Reports to rinsing daily with month wash, no pain.       Past Medical and Surgical History reviewed in Epic today.    MEDICATIONS:    Current Outpatient Medications   Medication Sig Dispense Refill     acetaminophen (TYLENOL) 325 MG tablet TAKE TWO TABLETS (650MG) BY MOUTH EVERY 4 HOURS AS NEEDED 60 tablet 97     albuterol (VENTOLIN HFA) 108 (90 Base) MCG/ACT inhaler Inhale 2 puffs into the lungs every 4 hours as needed for shortness of breath / dyspnea or wheezing (May keep bedside) 18 g 98     amLODIPine (NORVASC) 10 MG tablet TAKE 1 TABLET BY MOUTH ONCE DAILY 28 tablet PRN     ASPIRIN LOW DOSE 81 MG EC tablet TAKE 1 TABLET BY MOUTH ONCE DAILY 28 tablet PRN     bisacodyl (DULCOLAX) 10 MG suppository Place 1 suppository (10 mg) rectally daily as needed for constipation       budesonide (PULMICORT) 0.5 MG/2ML neb solution NEBULIZE THE CONTENT OF 1 VIAL TWICE DAILY (ADD TO IN " THE MORNING AND AT BEDTIME DUONEBS) 60 mL 97     Elastic Bandages & Supports (T.E.D. KNEE LENGTH/M-LONG) MISC WEAR AS DIRECTED ON IN THE MORNING OFF AT BEDTIME (2 = 1 PAIR) 4 each 97     furosemide (LASIX) 20 MG tablet Take 1.5 tablets (30 mg) by mouth daily 30 tablet 11     ipratropium - albuterol 0.5 mg/2.5 mg/3 mL (DUONEB) 0.5-2.5 (3) MG/3ML neb solution NEBULIZE THE CONTENT OF 1 VIAL INTO THE LUNGS FOUR TIMES A  mL 97     Lactobacillus-Inulin (SermoManifact) CHEW CHEW AND SWALLOW 2 TABLETS BY MOUTH ONCE DAILY 72 tablet 97     latanoprost (XALATAN) 0.005 % ophthalmic solution INSTILL ONE DROP IN THE RIGHT EYE IN THE EVENING 2.5 mL 97     levothyroxine (SYNTHROID/LEVOTHROID) 50 MCG tablet TAKE 1 TABLET BY MOUTH ONCE DAILY 28 tablet PRN     loperamide (IMODIUM A-D) 2 MG tablet May take 1 tablet (2 mg) by mouth daily as needed for diarrhea. May also take 2 tablets (4 mg) daily as needed for diarrhea. 4 mg po prn daily after 1st loose stool then 2 mg po after each additional loose stool. Max 16 mg /day       MELATONIN MAXIMUM STRENGTH 5 MG tablet TAKE 1 TABLET BY MOUTH ONCE DAILY 28 tablet PRN     menthol-zinc oxide (CALMOSEPTINE) 0.44-20.625 % OINT ointment Apply topically 4 times daily as needed for skin protection       metoprolol tartrate (LOPRESSOR) 25 MG tablet TAKE 1 TABLET BY MOUTH TWICE DAILY 56 tablet PRN     MUCINEX 600 MG 12 hr tablet TAKE 1 TABLET BY MOUTH TWICE DAILY 56 tablet 97     Multiple Vitamins-Minerals (CENTRUM SILVER ULTRA MENS) TABS Take 1 tablet by mouth daily       Multiple Vitamins-Minerals (CEROVITE SENIOR) TABS TAKE 1 TABLET BY MOUTH ONCE DAILY 28 tablet 97     multivitamin (I-EDGAR) TABS per tablet TAKE 1 TABLET BY MOUTH ONCE DAILY 31 tablet 97     NYAMYC [id number] UNIT/GM external powder APPLY TOPICALLY TO AFFECTED AREA(S) TWICE DAILY 60 g 97     potassium chloride ER (KLOR-CON M15) 15 MEQ CR tablet Take 2 tablets (30 mEq) by mouth daily 60 tablet 11     predniSONE  "(DELTASONE) 5 MG tablet TAKE 1 TABLET BY MOUTH ONCE DAILY ***NOTE DOSAGE/STRENGTH*** 28 tablet PRN     senna-docusate (SM STOOL SOFTENER) 8.6-50 MG tablet Take 2 tablets by mouth 2 times daily as needed for constipation 30 tablet 97     tamsulosin (FLOMAX) 0.4 MG capsule TAKE 1 CAPSULE BY MOUTH ONCE DAILY 28 capsule PRN       REVIEW OF SYSTEMS:  4 point ROS including Respiratory, CV, GI and , other than that noted in the HPI,  is negative    Objective:  /72   Pulse 61   Temp 98.2  F (36.8  C)   Resp 16   Ht 1.88 m (6' 2\")   Wt 102.3 kg (225 lb 9.6 oz)   SpO2 95%   BMI 28.97 kg/m    Exam:  GENERAL APPEARANCE:  Alert, in no distress sitting in WC, oily skin baseline  ENT:  Mouth and posterior oropharynx normal, moist mucous membranes, [place],  blocked with some cerumen R>L  no redness/swelling to throat   EYES:  EOM, conjunctivae, lids, pupils and irises normal-glasses  RESP:  respiratory effort and palpation of chest increased, lungs diminished but clear to anterior and posterior- no wheezes present  CV:  Palpation and auscultation of heart done, bradycardic at times and regular rhythm, 3/6 murmur peripheral edema 2+ in legs ankles and feet. Compression Hose off  ABDOMEN:  normal bowel sounds, soft, nontender  M/S:   Gait and station using wheelchair for ambulation, generalized weakness is new baseline no walking with SBA of 1 for transfers  SKIN:  Inspection of skin and subcutaneous tissue baseline to visualized areas of skin  NEURO:   Examination of sensation by touch normal  PSYCH:  insight and judgement impaired, memory impaired    Labs:   CBC RESULTS:   Recent Labs   Lab Test 02/04/21 10/15/20   WBC 8.8 10.4   RBC 4.36* 4.84   HGB 13.0* 14.4   HCT 42.3 47.0   MCV 97 97   MCH 29.8 29.8   MCHC 30.7* 30.6*   RDW 13.6 14.1    297       Last Basic Metabolic Panel:  Recent Labs   Lab Test 02/04/21 10/15/20    139   POTASSIUM 3.8 3.8   CHLORIDE 107 102   MERCEDES 8.7 8.9   CO2 29 28   BUN 34* 30 "   CR 1.67* 1.53*   GLC 97 131*       Liver Function Studies -   Recent Labs   Lab Test 02/04/21 10/15/20   PROTTOTAL 7.4 8.4   ALBUMIN 2.9* 3.3*   BILITOTAL 0.5 0.7   ALKPHOS 78 171*   AST 17 27   ALT 28 107*       TSH   Date Value Ref Range Status   02/04/2021 5.20 (H) 0.40 - 4.00 mU/L Final   10/15/2020 6.87 (A) 0.40 - 4.00 mU/L Final       Lab Results   Component Value Date    A1C 6.2 02/04/2021    A1C 6.7 11/21/2019     ASSESSMENT/PLAN:  (H61.23) Bilateral impacted cerumen  (primary encounter diagnosis)  Comment: ongoing   Plan:   -cerumen removal with lighted curette to both ear canals     (M25.561) Acute pain of right knee  Comment: declined for injections   Plan:   Tylenol prn     (K05.10) Gingivitis, chronic  Comment: planning on dental follow up  Plan:   -continue current plan of care. Declined chlorhexidine 0.12%      Electronically signed by:  SUNDAY Lee CNP                 Sincerely,        SUNDAY Lee CNP

## 2021-04-08 NOTE — TELEPHONE ENCOUNTER
"[place] [facility] TRIAGE BRIDGE COMMUNICATION DOCUMENTATION ENCOUNTER    Jacob Easton is a 89 year old  (5/16/1929) male.   Facility Nurse, Cindy messaged [facility] via the \"Bridge\" today to report:     04/07/2021 8:01:35 PM - By: Cindy Mclaughlin       S - Skin concerns   B - AL resident   A - Resident with mole at 8 o'clock from right nipple.  Mole is 1 cm in diameter, asymmetric, the outer broader is irregular and a lighter brown in color, inner bed of mole is rough and dark brown in color   R- Will add mole monitoring to services,  update NP     Disposition:  Message sent to the PCP as FYI    Requests:  No requests- FYI only    Electronically signed by:   Kristen Cochran RN  "

## 2021-04-08 NOTE — TELEPHONE ENCOUNTER
"[place] [facility] TRIAGE BRIDGE COMMUNICATION DOCUMENTATION ENCOUNTER    Jacob Easton is a 89 year old  (5/16/1929) male.   Facility Nurse, Cindy messaged [facility] via the \"Bridge\" today to report:     Date: 4/7/2021 10:00:56 PM From: Cindy Mclaughlin Message Id: [id number]     S -  hospice resident  B - Resident possibly transitioning to end of life  A -  resident recent episodes of apnea, minimal responsiveness, and fever.  Son stated he heard some wheezing but cleared after eating.  Resident does have a wet cough. Residents temp was 99.3 at 9 AM, last dose of Tylenol was at 1:45  PM.  Unable to due lung assessment due to dementia.   Resident was able to eat and drink dinner and is more alert, awake, and responsive.    R - Will continue to monitor add overnight services to check temperature and give Tylenol supp if above 100.4, Update NP    Disposition:  Message sent to the PCP as FYI    Requests:  No requests- FYI only    Electronically signed by:   Kristen Cochran RN  "

## 2021-04-08 NOTE — TELEPHONE ENCOUNTER
[place] [facility] TELEPHONE ENCOUNTER  Jacob Easton is a 89 year old  (5/16/1929),   nurses note reviewed     Plan:  Start jhon foot cares daily (HS)  Soak with warm soapy water, rinse/emmanuel dry, then start clotrimazole cream to jhon feet/inbetween toes BID x 14 days dx. Yeast.     Sent to [facility].     Electronically signed by:   SUNDAY Joseph CNP

## 2021-04-08 NOTE — TELEPHONE ENCOUNTER
"[place] [facility] TRIAGE BRIDGE COMMUNICATION DOCUMENTATION ENCOUNTER    Jacob Easton is a 89 year old  (5/16/1929) male.   Facility Nurse, Cindy messaged [facility] via the \"Bridge\" today to report:     Date: 4/7/2021 9:42:02 PM From: Cindy Mclaughlin Message Id: [id number]     S - Foot problem  B- AL Resident   A - In- between toes resident has odorous, tan, pasty, gunk bilaterally.  No redness or OA noted.  Easily removed with warm water and wash cloth  R - Will update NP, await for any new orders    Disposition:  Message sent to provider on call to address    Requests:  Please reply with Quicknote or open the encounter to document , Please route back to Geriatrics Triage Support pool and Please place any new orders in the Bridge    Electronically signed by:   Kristen Cochran RN  "

## 2021-04-14 NOTE — PROGRESS NOTES
[place] [facility]  [place] [facility] Record Number:  [id number]  Place of Service where encounter took place:  [facility] ([facility] [[id number]]  Chief Complaint   Patient presents with     RECHECK       HPI:    Jacob Easton  is a 89 year old (5/16/1929), who is being seen today for an episodic care visit.  HPI information obtained from: facility chart records, patient report, [facility] chart review and family/first contact daughter-Vasile report. Today's concern is:    Seen today for follow up to LE edema, fungal areas between toes and other chronic conditions. History of cerumen impaction bilateral and this was viewed today with lighted otoscope. Up in chair and reports to feeling great (which he always does). Denies pain, abdominal pain, cough, SOB. Weight is up 6 lbs from last month and could be contributor to increase LE, ankle,pedal and toe edema. Compression socks appears slightly constrictive and he is agreeable to lymphedema therapy. Will have several teeth pulled that have contributed to jaw pain next month (5/4/21). Family is working to possibly get the COVID-19 vaccine but there is some concern from some family member so not sure if he will obtain. History of not taking vaccines.     Past Medical and Surgical History reviewed in Epic today.    MEDICATIONS:    Current Outpatient Medications   Medication Sig Dispense Refill     acetaminophen (TYLENOL) 325 MG tablet TAKE TWO TABLETS (650MG) BY MOUTH EVERY 4 HOURS AS NEEDED 60 tablet 97     albuterol (VENTOLIN HFA) 108 (90 Base) MCG/ACT inhaler Inhale 2 puffs into the lungs every 4 hours as needed for shortness of breath / dyspnea or wheezing (May keep bedside) 18 g 98     amLODIPine (NORVASC) 10 MG tablet TAKE 1 TABLET BY MOUTH ONCE DAILY 28 tablet PRN     ASPIRIN LOW DOSE 81 MG EC tablet TAKE 1 TABLET BY MOUTH ONCE DAILY 28 tablet PRN     bisacodyl (DULCOLAX) 10 MG suppository Place 1 suppository (10 mg) rectally daily as needed  for constipation       budesonide (PULMICORT) 0.5 MG/2ML neb solution NEBULIZE THE CONTENT OF 1 VIAL TWICE DAILY (ADD TO IN THE MORNING AND AT BEDTIME DUONEBS) 60 mL 97     clotrimazole (LOTRIMIN) 1 % external cream Apply topically 2 times daily for 14 days 15 g 0     Elastic Bandages & Supports (T.E.D. KNEE LENGTH/M-LONG) MISC WEAR AS DIRECTED ON IN THE MORNING OFF AT BEDTIME (2 = 1 PAIR) 4 each 97     furosemide (LASIX) 20 MG tablet Take 1.5 tablets (30 mg) by mouth daily 30 tablet 11     ipratropium - albuterol 0.5 mg/2.5 mg/3 mL (DUONEB) 0.5-2.5 (3) MG/3ML neb solution NEBULIZE THE CONTENT OF 1 VIAL INTO THE LUNGS FOUR TIMES A  mL 97     Lactobacillus-Inulin ([facility] Glio) CHEW CHEW AND SWALLOW 2 TABLETS BY MOUTH ONCE DAILY 72 tablet 97     latanoprost (XALATAN) 0.005 % ophthalmic solution INSTILL ONE DROP IN THE RIGHT EYE IN THE EVENING 2.5 mL 97     levothyroxine (SYNTHROID/LEVOTHROID) 50 MCG tablet TAKE 1 TABLET BY MOUTH ONCE DAILY 28 tablet PRN     loperamide (IMODIUM A-D) 2 MG tablet May take 1 tablet (2 mg) by mouth daily as needed for diarrhea. May also take 2 tablets (4 mg) daily as needed for diarrhea. 4 mg po prn daily after 1st loose stool then 2 mg po after each additional loose stool. Max 16 mg /day       MELATONIN MAXIMUM STRENGTH 5 MG tablet TAKE 1 TABLET BY MOUTH ONCE DAILY 28 tablet PRN     menthol-zinc oxide (CALMOSEPTINE) 0.44-20.625 % OINT ointment Apply topically 4 times daily as needed for skin protection       metoprolol tartrate (LOPRESSOR) 25 MG tablet TAKE 1 TABLET BY MOUTH TWICE DAILY 56 tablet PRN     MUCINEX 600 MG 12 hr tablet TAKE 1 TABLET BY MOUTH TWICE DAILY 56 tablet 97     Multiple Vitamins-Minerals (CENTRUM SILVER ULTRA MENS) TABS Take 1 tablet by mouth daily       Multiple Vitamins-Minerals (CEROVITE SENIOR) TABS TAKE 1 TABLET BY MOUTH ONCE DAILY 28 tablet 97     multivitamin (I-EDGAR) TABS per tablet TAKE 1 TABLET BY MOUTH ONCE DAILY 31 tablet 97      "NYAMY [id number] UNIT/GM external powder APPLY TOPICALLY TO AFFECTED AREA(S) TWICE DAILY 60 g 97     potassium chloride ER (KLOR-CON M15) 15 MEQ CR tablet Take 2 tablets (30 mEq) by mouth daily 60 tablet 11     predniSONE (DELTASONE) 5 MG tablet TAKE 1 TABLET BY MOUTH ONCE DAILY  28 tablet PRN     senna-docusate (SM STOOL SOFTENER) 8.6-50 MG tablet Take 2 tablets by mouth 2 times daily as needed for constipation 30 tablet 97     tamsulosin (FLOMAX) 0.4 MG capsule TAKE 1 CAPSULE BY MOUTH ONCE DAILY 28 capsule PRN     REVIEW OF SYSTEMS:  4 point ROS including Respiratory, CV, GI and , other than that noted in the HPI,  is negative    Objective:  /66   Pulse 77   Temp 98  F (36.7  C)   Resp 18   Ht 1.88 m (6' 2\")   Wt 104.8 kg (231 lb)   SpO2 95%   BMI 29.66 kg/m    Exam:  GENERAL APPEARANCE:  Alert, in no distress sitting in WC, oily skin baseline  ENT:  Mouth and posterior oropharynx normal, moist mucous membranes, [place],  blocked with some cerumen R>L    EYES:  EOM, conjunctivae, lids, pupils and irises normal-glasses  RESP:  respiratory effort and palpation of chest increased, lungs diminished but clear to anterior and posterior- no wheezes present-diminished on right  CV:  Palpation and auscultation of heart done, bradycardic at times and regular rhythm, 3/6 murmur peripheral edema 2+ in legs, +3 ankles and feet, +2 digits of feet Compression Hose on  ABDOMEN:  normal bowel sounds, soft, nontender  M/S:   Gait and station using wheelchair for ambulation, generalized weakness is new baseline no walking with SBA of 1 for transfers  SKIN:  Inspection of skin and subcutaneous tissue baseline to visualized areas of skin-cream between toes  NEURO:   Examination of sensation by touch normal  PSYCH:  insight and judgement impaired, memory impaired, more forgetful     Labs:   CBC RESULTS:   Recent Labs   Lab Test 02/04/21 10/15/20   WBC 8.8 10.4   RBC 4.36* 4.84   HGB 13.0* 14.4   HCT 42.3 47.0   MCV 97 97   MCH " 29.8 29.8   MCHC 30.7* 30.6*   RDW 13.6 14.1    297       Last Basic Metabolic Panel:  Recent Labs   Lab Test 02/04/21 10/15/20    139   POTASSIUM 3.8 3.8   CHLORIDE 107 102   MERCEDES 8.7 8.9   CO2 29 28   BUN 34* 30   CR 1.67* 1.53*   GLC 97 131*       Liver Function Studies -   Recent Labs   Lab Test 02/04/21 10/15/20   PROTTOTAL 7.4 8.4   ALBUMIN 2.9* 3.3*   BILITOTAL 0.5 0.7   ALKPHOS 78 171*   AST 17 27   ALT 28 107*       TSH   Date Value Ref Range Status   02/04/2021 5.20 (H) 0.40 - 4.00 mU/L Final   10/15/2020 6.87 (A) 0.40 - 4.00 mU/L Final       Lab Results   Component Value Date    A1C 6.2 02/04/2021    A1C 6.7 11/21/2019     ASSESSMENT/PLAN:  (R60.0) Lower extremity edema  (primary encounter diagnosis)  Comment: worsening presentation  Plan:   -continue with current compression, elevation and lasix  -agreeable to lymphedema therapy     (H61.23) Bilateral impacted cerumen    Comment: ongoing   Plan:   -cerumen removal with lighted curette to both ear canals      (K08.9) Poor dentition  Comment: dental extraction follow up 5/4/21  Plan:   -continue current plan of care. Declined chlorhexidine 0.12%. Does report using his own rinse    (Z78.9) Takes dietary supplements  Comment: daughter work in homeopathic care  Plan:   -several herbal supplements in use-unclear if taken daily or prn?    (L98.9) Skin lesion  Comment: noted by nursing under right breast. Appears seborrheic keratosis. No pain, discomfort   Plan:   -monitor for presentation. History of BCC.           Electronically signed by:  SUNDAY Lee CNP

## 2021-04-15 NOTE — PROGRESS NOTES
CC received call from member sister Mary asking for help in scheduling Covid vaccine for member.     CC reached out to RN office at the AL and talked with Erma at [phone]; Cell [phone].    She stated that they are not administering vaccines at the AL at this time so member would have to go to the clinic.    CC will work with CMS on scheduling this for member.     CC then reached out to member sister and she said she would prefer for member to go to [facility] to have this done because it is close to the AL and the shuttle from the AL can take member there.  She stated that her brother would be able to go on the shuttle with member once scheduled.   If member is not able get into [facility] then [facility] is the second choice and CC will reach out to discuss transportation.   Chelita Cuevas MA [facility] Care Coordinator   [phone] - ejta cell phone   [phone] - [employer]fi fax

## 2021-04-15 NOTE — LETTER
4/15/2021        RE: aJcob Easton  [facility]  [address]  [facility] [address]        [place] [facility]  [place] [facility] Record Number:  [id number]  Place of Service where encounter took place:  [facility] ([facility] [[id number]]  Chief Complaint   Patient presents with     RECHECK       HPI:    Jacob Easton  is a 89 year old (5/16/1929), who is being seen today for an episodic care visit.  HPI information obtained from: facility chart records, patient report, [facility] chart review and family/first contact daughter-Vasile report. Today's concern is:    Seen today for follow up to LE edema, fungal areas between toes and other chronic conditions. History of cerumen impaction bilateral and this was viewed today with lighted otoscope. Up in chair and reports to feeling great (which he always does). Denies pain, abdominal pain, cough, SOB. Weight is up 6 lbs from last month and could be contributor to increase LE, ankle,pedal and toe edema. Compression socks appears slightly constrictive and he is agreeable to lymphedema therapy. Will have several teeth pulled that have contributed to jaw pain next month (5/4/21). Family is working to possibly get the COVID-19 vaccine but there is some concern from some family member so not sure if he will obtain. History of not taking vaccines.     Past Medical and Surgical History reviewed in Epic today.    MEDICATIONS:    Current Outpatient Medications   Medication Sig Dispense Refill     acetaminophen (TYLENOL) 325 MG tablet TAKE TWO TABLETS (650MG) BY MOUTH EVERY 4 HOURS AS NEEDED 60 tablet 97     albuterol (VENTOLIN HFA) 108 (90 Base) MCG/ACT inhaler Inhale 2 puffs into the lungs every 4 hours as needed for shortness of breath / dyspnea or wheezing (May keep bedside) 18 g 98     amLODIPine (NORVASC) 10 MG tablet TAKE 1 TABLET BY MOUTH ONCE DAILY 28 tablet PRN     ASPIRIN LOW DOSE 81 MG EC tablet TAKE 1 TABLET BY MOUTH ONCE DAILY 28  tablet PRN     bisacodyl (DULCOLAX) 10 MG suppository Place 1 suppository (10 mg) rectally daily as needed for constipation       budesonide (PULMICORT) 0.5 MG/2ML neb solution NEBULIZE THE CONTENT OF 1 VIAL TWICE DAILY (ADD TO IN THE MORNING AND AT BEDTIME DUONEBS) 60 mL 97     clotrimazole (LOTRIMIN) 1 % external cream Apply topically 2 times daily for 14 days 15 g 0     Elastic Bandages & Supports (T.E.D. KNEE LENGTH/M-LONG) MISC WEAR AS DIRECTED ON IN THE MORNING OFF AT BEDTIME (2 = 1 PAIR) 4 each 97     furosemide (LASIX) 20 MG tablet Take 1.5 tablets (30 mg) by mouth daily 30 tablet 11     ipratropium - albuterol 0.5 mg/2.5 mg/3 mL (DUONEB) 0.5-2.5 (3) MG/3ML neb solution NEBULIZE THE CONTENT OF 1 VIAL INTO THE LUNGS FOUR TIMES A  mL 97     Lactobacillus-Inulin ([facility] Jaco Solarsi) CHEW CHEW AND SWALLOW 2 TABLETS BY MOUTH ONCE DAILY 72 tablet 97     latanoprost (XALATAN) 0.005 % ophthalmic solution INSTILL ONE DROP IN THE RIGHT EYE IN THE EVENING 2.5 mL 97     levothyroxine (SYNTHROID/LEVOTHROID) 50 MCG tablet TAKE 1 TABLET BY MOUTH ONCE DAILY 28 tablet PRN     loperamide (IMODIUM A-D) 2 MG tablet May take 1 tablet (2 mg) by mouth daily as needed for diarrhea. May also take 2 tablets (4 mg) daily as needed for diarrhea. 4 mg po prn daily after 1st loose stool then 2 mg po after each additional loose stool. Max 16 mg /day       MELATONIN MAXIMUM STRENGTH 5 MG tablet TAKE 1 TABLET BY MOUTH ONCE DAILY 28 tablet PRN     menthol-zinc oxide (CALMOSEPTINE) 0.44-20.625 % OINT ointment Apply topically 4 times daily as needed for skin protection       metoprolol tartrate (LOPRESSOR) 25 MG tablet TAKE 1 TABLET BY MOUTH TWICE DAILY 56 tablet PRN     MUCINEX 600 MG 12 hr tablet TAKE 1 TABLET BY MOUTH TWICE DAILY 56 tablet 97     Multiple Vitamins-Minerals (CENTRUM SILVER ULTRA MENS) TABS Take 1 tablet by mouth daily       Multiple Vitamins-Minerals (CEROVITE SENIOR) TABS TAKE 1 TABLET BY MOUTH ONCE DAILY 28  "tablet 97     multivitamin (I-EDGAR) TABS per tablet TAKE 1 TABLET BY MOUTH ONCE DAILY 31 tablet 97     NYAMYC 226618 UNIT/GM external powder APPLY TOPICALLY TO AFFECTED AREA(S) TWICE DAILY 60 g 97     potassium chloride ER (KLOR-CON M15) 15 MEQ CR tablet Take 2 tablets (30 mEq) by mouth daily 60 tablet 11     predniSONE (DELTASONE) 5 MG tablet TAKE 1 TABLET BY MOUTH ONCE DAILY ***NOTE DOSAGE/STRENGTH*** 28 tablet PRN     senna-docusate (SM STOOL SOFTENER) 8.6-50 MG tablet Take 2 tablets by mouth 2 times daily as needed for constipation 30 tablet 97     tamsulosin (FLOMAX) 0.4 MG capsule TAKE 1 CAPSULE BY MOUTH ONCE DAILY 28 capsule PRN     REVIEW OF SYSTEMS:  4 point ROS including Respiratory, CV, GI and , other than that noted in the HPI,  is negative    Objective:  /66   Pulse 77   Temp 98  F (36.7  C)   Resp 18   Ht 1.88 m (6' 2\")   Wt 104.8 kg (231 lb)   SpO2 95%   BMI 29.66 kg/m    Exam:  GENERAL APPEARANCE:  Alert, in no distress sitting in WC, oily skin baseline  ENT:  Mouth and posterior oropharynx normal, moist mucous membranes, [place],  blocked with some cerumen R>L    EYES:  EOM, conjunctivae, lids, pupils and irises normal-glasses  RESP:  respiratory effort and palpation of chest increased, lungs diminished but clear to anterior and posterior- no wheezes present-diminished on right  CV:  Palpation and auscultation of heart done, bradycardic at times and regular rhythm, 3/6 murmur peripheral edema 2+ in legs, +3 ankles and feet, +2 digits of feet Compression Hose on  ABDOMEN:  normal bowel sounds, soft, nontender  M/S:   Gait and station using wheelchair for ambulation, generalized weakness is new baseline no walking with SBA of 1 for transfers  SKIN:  Inspection of skin and subcutaneous tissue baseline to visualized areas of skin-cream between toes  NEURO:   Examination of sensation by touch normal  PSYCH:  insight and judgement impaired, memory impaired, more forgetful     Labs:   CBC " RESULTS:   Recent Labs   Lab Test 02/04/21 10/15/20   WBC 8.8 10.4   RBC 4.36* 4.84   HGB 13.0* 14.4   HCT 42.3 47.0   MCV 97 97   MCH 29.8 29.8   MCHC 30.7* 30.6*   RDW 13.6 14.1    297       Last Basic Metabolic Panel:  Recent Labs   Lab Test 02/04/21 10/15/20    139   POTASSIUM 3.8 3.8   CHLORIDE 107 102   MERCEDES 8.7 8.9   CO2 29 28   BUN 34* 30   CR 1.67* 1.53*   GLC 97 131*       Liver Function Studies -   Recent Labs   Lab Test 02/04/21 10/15/20   PROTTOTAL 7.4 8.4   ALBUMIN 2.9* 3.3*   BILITOTAL 0.5 0.7   ALKPHOS 78 171*   AST 17 27   ALT 28 107*       TSH   Date Value Ref Range Status   02/04/2021 5.20 (H) 0.40 - 4.00 mU/L Final   10/15/2020 6.87 (A) 0.40 - 4.00 mU/L Final       Lab Results   Component Value Date    A1C 6.2 02/04/2021    A1C 6.7 11/21/2019     ASSESSMENT/PLAN:  (R60.0) Lower extremity edema  (primary encounter diagnosis)  Comment: worsening presentation  Plan:   -continue with current compression, elevation and lasix  -agreeable to lymphedema therapy     (H61.23) Bilateral impacted cerumen    Comment: ongoing   Plan:   -cerumen removal with lighted curette to both ear canals      (K08.9) Poor dentition  Comment: dental extraction follow up 5/4/21  Plan:   -continue current plan of care. Declined chlorhexidine 0.12%. Does report using his own rinse    (Z78.9) Takes dietary supplements  Comment: daughter work in homeopathic care  Plan:   -several herbal supplements in use-unclear if taken daily or prn?    (L98.9) Skin lesion  Comment: noted by nursing under right breast. Appears seborrheic keratosis. No pain, discomfort   Plan:   -monitor for presentation. History of BCC.           Electronically signed by:  SUNDAY Lee CNP                 Sincerely,        SUNDAY Lee CNP

## 2021-04-15 NOTE — LETTER
4/15/2021        RE: Jacob Easton  [facility]  [address]  [facility] [address]        [place] [facility]  [place] [facility] Record Number:  [id number]  Place of Service where encounter took place:  [facility] ([facility] [[id number]]  Chief Complaint   Patient presents with     RECHECK       HPI:    Jacob Easton  is a 89 year old (5/16/1929), who is being seen today for an episodic care visit.  HPI information obtained from: facility chart records, patient report, [facility] chart review and family/first contact daughter-Vasile report. Today's concern is:    Seen today for follow up to LE edema, fungal areas between toes and other chronic conditions. History of cerumen impaction bilateral and this was viewed today with lighted otoscope. Up in chair and reports to feeling great (which he always does). Denies pain, abdominal pain, cough, SOB. Weight is up 6 lbs from last month and could be contributor to increase LE, ankle,pedal and toe edema. Compression socks appears slightly constrictive and he is agreeable to lymphedema therapy. Will have several teeth pulled that have contributed to jaw pain next month (5/4/21). Family is working to possibly get the COVID-19 vaccine but there is some concern from some family member so not sure if he will obtain. History of not taking vaccines.     Past Medical and Surgical History reviewed in Epic today.    MEDICATIONS:    Current Outpatient Medications   Medication Sig Dispense Refill     acetaminophen (TYLENOL) 325 MG tablet TAKE TWO TABLETS (650MG) BY MOUTH EVERY 4 HOURS AS NEEDED 60 tablet 97     albuterol (VENTOLIN HFA) 108 (90 Base) MCG/ACT inhaler Inhale 2 puffs into the lungs every 4 hours as needed for shortness of breath / dyspnea or wheezing (May keep bedside) 18 g 98     amLODIPine (NORVASC) 10 MG tablet TAKE 1 TABLET BY MOUTH ONCE DAILY 28 tablet PRN     ASPIRIN LOW DOSE 81 MG EC tablet TAKE 1 TABLET BY MOUTH ONCE DAILY 28  tablet PRN     bisacodyl (DULCOLAX) 10 MG suppository Place 1 suppository (10 mg) rectally daily as needed for constipation       budesonide (PULMICORT) 0.5 MG/2ML neb solution NEBULIZE THE CONTENT OF 1 VIAL TWICE DAILY (ADD TO IN THE MORNING AND AT BEDTIME DUONEBS) 60 mL 97     clotrimazole (LOTRIMIN) 1 % external cream Apply topically 2 times daily for 14 days 15 g 0     Elastic Bandages & Supports (T.E.D. KNEE LENGTH/M-LONG) MISC WEAR AS DIRECTED ON IN THE MORNING OFF AT BEDTIME (2 = 1 PAIR) 4 each 97     furosemide (LASIX) 20 MG tablet Take 1.5 tablets (30 mg) by mouth daily 30 tablet 11     ipratropium - albuterol 0.5 mg/2.5 mg/3 mL (DUONEB) 0.5-2.5 (3) MG/3ML neb solution NEBULIZE THE CONTENT OF 1 VIAL INTO THE LUNGS FOUR TIMES A  mL 97     Lactobacillus-Inulin ([facility]) CHEW CHEW AND SWALLOW 2 TABLETS BY MOUTH ONCE DAILY 72 tablet 97     latanoprost (XALATAN) 0.005 % ophthalmic solution INSTILL ONE DROP IN THE RIGHT EYE IN THE EVENING 2.5 mL 97     levothyroxine (SYNTHROID/LEVOTHROID) 50 MCG tablet TAKE 1 TABLET BY MOUTH ONCE DAILY 28 tablet PRN     loperamide (IMODIUM A-D) 2 MG tablet May take 1 tablet (2 mg) by mouth daily as needed for diarrhea. May also take 2 tablets (4 mg) daily as needed for diarrhea. 4 mg po prn daily after 1st loose stool then 2 mg po after each additional loose stool. Max 16 mg /day       MELATONIN MAXIMUM STRENGTH 5 MG tablet TAKE 1 TABLET BY MOUTH ONCE DAILY 28 tablet PRN     menthol-zinc oxide (CALMOSEPTINE) 0.44-20.625 % OINT ointment Apply topically 4 times daily as needed for skin protection       metoprolol tartrate (LOPRESSOR) 25 MG tablet TAKE 1 TABLET BY MOUTH TWICE DAILY 56 tablet PRN     MUCINEX 600 MG 12 hr tablet TAKE 1 TABLET BY MOUTH TWICE DAILY 56 tablet 97     Multiple Vitamins-Minerals (CENTRUM SILVER ULTRA MENS) TABS Take 1 tablet by mouth daily       Multiple Vitamins-Minerals (CEROVITE SENIOR) TABS TAKE 1 TABLET BY MOUTH ONCE DAILY 28  "tablet 97     multivitamin (I-EDGAR) TABS per tablet TAKE 1 TABLET BY MOUTH ONCE DAILY 31 tablet 97     NYAMYC 467098 UNIT/GM external powder APPLY TOPICALLY TO AFFECTED AREA(S) TWICE DAILY 60 g 97     potassium chloride ER (KLOR-CON M15) 15 MEQ CR tablet Take 2 tablets (30 mEq) by mouth daily 60 tablet 11     predniSONE (DELTASONE) 5 MG tablet TAKE 1 TABLET BY MOUTH ONCE DAILY  28 tablet PRN     senna-docusate (SM STOOL SOFTENER) 8.6-50 MG tablet Take 2 tablets by mouth 2 times daily as needed for constipation 30 tablet 97     tamsulosin (FLOMAX) 0.4 MG capsule TAKE 1 CAPSULE BY MOUTH ONCE DAILY 28 capsule PRN     REVIEW OF SYSTEMS:  4 point ROS including Respiratory, CV, GI and , other than that noted in the HPI,  is negative    Objective:  /66   Pulse 77   Temp 98  F (36.7  C)   Resp 18   Ht 1.88 m (6' 2\")   Wt 104.8 kg (231 lb)   SpO2 95%   BMI 29.66 kg/m    Exam:  GENERAL APPEARANCE:  Alert, in no distress sitting in WC, oily skin baseline  ENT:  Mouth and posterior oropharynx normal, moist mucous membranes, [place],  blocked with some cerumen R>L    EYES:  EOM, conjunctivae, lids, pupils and irises normal-glasses  RESP:  respiratory effort and palpation of chest increased, lungs diminished but clear to anterior and posterior- no wheezes present-diminished on right  CV:  Palpation and auscultation of heart done, bradycardic at times and regular rhythm, 3/6 murmur peripheral edema 2+ in legs, +3 ankles and feet, +2 digits of feet Compression Hose on  ABDOMEN:  normal bowel sounds, soft, nontender  M/S:   Gait and station using wheelchair for ambulation, generalized weakness is new baseline no walking with SBA of 1 for transfers  SKIN:  Inspection of skin and subcutaneous tissue baseline to visualized areas of skin-cream between toes  NEURO:   Examination of sensation by touch normal  PSYCH:  insight and judgement impaired, memory impaired, more forgetful     Labs:   CBC RESULTS:   Recent Labs   Lab " Test 02/04/21 10/15/20   WBC 8.8 10.4   RBC 4.36* 4.84   HGB 13.0* 14.4   HCT 42.3 47.0   MCV 97 97   MCH 29.8 29.8   MCHC 30.7* 30.6*   RDW 13.6 14.1    297       Last Basic Metabolic Panel:  Recent Labs   Lab Test 02/04/21 10/15/20    139   POTASSIUM 3.8 3.8   CHLORIDE 107 102   MERCEDES 8.7 8.9   CO2 29 28   BUN 34* 30   CR 1.67* 1.53*   GLC 97 131*       Liver Function Studies -   Recent Labs   Lab Test 02/04/21 10/15/20   PROTTOTAL 7.4 8.4   ALBUMIN 2.9* 3.3*   BILITOTAL 0.5 0.7   ALKPHOS 78 171*   AST 17 27   ALT 28 107*       TSH   Date Value Ref Range Status   02/04/2021 5.20 (H) 0.40 - 4.00 mU/L Final   10/15/2020 6.87 (A) 0.40 - 4.00 mU/L Final       Lab Results   Component Value Date    A1C 6.2 02/04/2021    A1C 6.7 11/21/2019     ASSESSMENT/PLAN:  (R60.0) Lower extremity edema  (primary encounter diagnosis)  Comment: worsening presentation  Plan:   -continue with current compression, elevation and lasix  -agreeable to lymphedema therapy     (H61.23) Bilateral impacted cerumen    Comment: ongoing   Plan:   -cerumen removal with lighted curette to both ear canals      (K08.9) Poor dentition  Comment: dental extraction follow up 5/4/21  Plan:   -continue current plan of care. Declined chlorhexidine 0.12%. Does report using his own rinse    (Z78.9) Takes dietary supplements  Comment: daughter work in homeopathic care  Plan:   -several herbal supplements in use-unclear if taken daily or prn?    (L98.9) Skin lesion  Comment: noted by nursing under right breast. Appears seborrheic keratosis. No pain, discomfort   Plan:   -monitor for presentation. History of BCC.           Electronically signed by:  SUNDAY Lee CNP                 Sincerely,        SUNDAY Lee CNP

## 2021-04-15 NOTE — PROGRESS NOTES
Bernarda Mantilla [employer] Chart review      chart   No triggering events at this time   CM will follow-up as needed     Chelita Cuevas MA [facility] Care Coordinator   [phone] - xowz cell phone   [phone] - work fax

## 2021-04-16 PROBLEM — K81.0 ACUTE CHOLECYSTITIS: Status: ACTIVE | Noted: 2021-01-01

## 2021-04-16 NOTE — H&P (VIEW-ONLY)
History   Chief Complaint:  Right-sided abdominal pain       HPI   Jacob Easton is a 89 year old male with history of dementia, CKD, neuromuscular disorder, and hypertension, with prior hip surgery who presents via EMS from [facility] with right-sided abdominal pain. Staff at the facility noted the patient to have a temperature of 100.1 and a blood sugar of 180. The patient is wheelchair bound. The patient denies any falls. Nursing reports right hip pain, but this appears to be in error.       Review of Systems   Constitutional: Negative for fever.   HENT: Negative for congestion.    Respiratory: Negative for cough and shortness of breath.    Cardiovascular: Negative for chest pain.   Gastrointestinal: Positive for abdominal pain. Negative for nausea and vomiting.   Musculoskeletal: Negative for arthralgias.       Allergies:  Advil [Ibuprofen]  Influenza Virus Vaccine H5n1  Orange Juice [Orange Oil]    Medications:  Flomax  Deltasone  Klor-con  Mucinex  Lopressor  Calmoeseptine  Imodium  Synthroid  Xalatan  Duoneb  Lasix  Lotrim  Pulmicort   Dulcolax  Aspirin  Norvasc   Albuterol     Past Medical History:    Thyroid disease  Neuromuscular disorder  Hypocalcemia  Hypertension   Constipation  Arthritis    Seborrhea  Rheumatoid arthritis  CKD  Atrial fibrillation  Dementia  Pulmonary alveolitis  Pulmonary vascular congestion  BPH       Past Surgical History:    Total hip arthroplasty      Social History:  Lives at [facility]  Nondrinker    Physical Exam     Patient Vitals for the past 24 hrs:   BP Temp Temp src Pulse Resp SpO2 Height Weight   04/16/21 1530 -- -- -- -- -- 94 % -- --   04/16/21 1515 (!) 141/74 -- -- -- -- 94 % -- --   04/16/21 1500 135/75 -- -- 92 -- 95 % -- --   04/16/21 1456 133/77 -- -- 95 20 94 % -- --   04/16/21 1445 133/77 -- -- -- -- 94 % -- --   04/16/21 1430 128/72 -- -- 92 -- 94 % -- --   04/16/21 1400 (!) 145/77 -- -- 90 -- 94 % -- --   04/16/21 1345 (!) 142/73 -- -- -- -- 94 % -- --  "  04/16/21 1330 -- -- -- -- -- 94 % -- --   04/16/21 1315 (!) 141/72 -- -- -- -- 94 % -- --   04/16/21 1300 (!) 148/72 -- -- 87 -- 94 % -- --   04/16/21 1257 -- -- -- -- -- 94 % -- --   04/16/21 1249 (!) 145/81 99  F (37.2  C) Oral 92 22 94 % 1.867 m (6' 1.5\") 104.3 kg (230 lb)       Physical Exam  Nursing note and vitals reviewed.  HENT:   Mouth/Throat: Moist mucous membranes.   Eyes: EOMI, nonicteric sclera  Cardiovascular: Normal rate, regular rhythm, no murmurs, rubs, or gallops  Pulmonary/Chest: Effort normal and breath sounds normal. No respiratory distress. No wheezes. No rales.   Abdominal: Soft. TTP, worse on right, nondistended, no guarding or rigidity.   Musculoskeletal: Normal range of motion.   Neurological: Alert. Moves all extremities spontaneously.   Skin: Skin is warm and dry. No rash noted.   Psychiatric: Normal mood and affect.     Emergency Department Course     Imaging:    Abd/Pelvis CT , IV contrast only Trauma/AAA:  IMPRESSION:   1.  Acute calculus cholecystitis.   2.  Asymmetric masslike wall thickening in the cecum/ascending colon   is indeterminate but could represent malignancy. Colonoscopic   correlation should be considered.   3.  Prostatomegaly and evidence of chronic bladder outlet obstruction   with urinary bladder wall thickening and cellule formation.   4.  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   5.  Multiple cysts in the visualized lung bases, stable.     Imaging independently reviewed and agree with radiologist interpretation.      Laboratory:    CBC: WBC: 15.0 (H), HGB: 14.6, PLT: 282  CMP: Glucose 135 (H), creatinine 1.47 (H), GFR 41 (L), bilirubin total 1.4 (H), albumin 3.3 (L) o/w WNL   Troponin (Collected 1309): <0.015  Lactic acid (Resulted 1334): 1.2  VBG and oxyhgb: pH 7.43 / PCO2 49 / PO2 30 / Bicarb 33 (H) / FlO2 room air / Oxyhemoglobin 56 / Base excess 6.9, on room air  Lipase: 86   Blood Culture x2: " Pending     UA with Microscopic: Blood Trace (A), pH 7.5 (H), RBC/HPF 8 (H), Mucous Present (A), o/w WNL    Symptomatic Influenza A/B & SARS-CoV2 (COVID-19) Virus PCR Multiplex: Negative    Emergency Department Course:    Reviewed:  I reviewed nursing notes, vitals, past medical history and care everywhere     Assessments:  1259 I performed an exam of the patient as documented above.   1522 I spoke with Mary, the patient's daughter, to update her on the patient's presentation, findings, and plan of care.   1535 I rechecked and updated the patient on findings and plan of care. He consents to surgery and admission at this time.   1556 I again spoke with Mary and updated her on the patient's wishes for procedure.     Consults:   1603 I spoke with Dr. Aguirre of the General Surgery service regarding patient's presentation, findings, and plan of care.   1606 I spoke with Marnie Moreira PA-C on behalf of Dr. Garcia of the hospitalist service regarding patient's presentation, findings, and plan of care. They are in agreement to accept the patient for admission to a bed for further monitoring, care, and treatment.      Interventions:  1516 Rocephin 2 g IV    Disposition:  The patient was admitted to the hospital under the care of Dr. Garcia.     Impression & Plan        Medical Decision Making:  Jacob Easton is a 89 year old male who presents to the emergency department today for evaluation of right-sided abdominal pain. Broad ddx considered. Pt tender on exam. Labs show leukocytosis. CT suggests acute calculous cholecystitis with dilated gall bladder, gall stones, and fat stranding, worse around the neck of the gall bladder. Rocephin given for leukocytosis and report of low-grade fever. Pt found to have some mass-like wall thickening in his ascending colon. This will need further evaluation. I discussed findings with the pt and his daughter. His daughter, Mary, asked me not to share the colon findings with pt, but  we did discuss his likely cholecystitis. There was initial discussion about pt wanting to go home, but upon further discussion, pt is consenting to admission and surgical consultation to discuss options. I did discuss with on-call general surgery. Plan is for consultation in the morning with decision-making at that time. Pt admitted in stable condition to our hospitalist service. Dr. Garcia accepts for admission.       Covid-19  Jacob Easton was evaluated during a global COVID-19 pandemic, which necessitated consideration that the patient might be at risk for infection with the SARS-CoV-2 virus that causes COVID-19.   Applicable protocols for evaluation were followed during the patient's care.   COVID-19 was considered as part of the patient's evaluation. The plan for testing is:  a test was obtained during this visit.     Diagnosis:    ICD-10-CM    1. Acute cholecystitis  K81.0 UA with Microscopic reflex to Culture       Scribe Disclosure:  I, Kyree Flores, am serving as a scribe at 12:46 PM on 4/16/2021 to document services personally performed by João Perkins MD; based on my observations and the provider's statements to me.          João Perkins MD  04/16/21 2123

## 2021-04-16 NOTE — H&P
[facility] History and Physical    Jacob Easton MRN# [id number]   Age: 89 year old YOB: 1929     Date of Admission:  4/16/2021    [facility], [facility]  Primary care provider: Orestes Clement          Assessment and Plan:   Assessment:   Jacob Easton is a 89-year-old patient who resides at [facility] and was directed to the emergency department today by staff after he complained of severe abdominal pain.    Past medical history includes rheumatoid arthritis for which patient was previously on methotrexate.  That medication was discontinued due to pulmonary infiltrates.  He continues at this time on prednisone 5 mg daily.  Over the past couple of years, he has become wheelchair-bound despite bilateral hip replacements.         He otherwise has remained remarkably well with obvious CKD stage IIIa (baseline creatinine about 1.5) but no history of CAD.  He has a history of aortic sclerosis without stenosis but also has recently developed some peripheral edema.  Its not known at this point whether his aortic valvular disease has progressed.  He also has pulmonary disease for which she is on inhalers but I do not see clear documentation that he has obstructive pathology.       He has reportedly tolerated prior surgeries without significant difficulty.    In the emergency department, Mr. Easton was noted to be alert and interactive but at least intermittently uncomfortable.  VS: /81, HR 92, RR 22.  Icterus 99  F.  Oxygen saturation is 94% breathing room air.  Examination is notable for age-appropriate, robust appearing gentleman.  Although dementia is documented, he interacts with me fully appropriately recognizing the situation.  His abdomen is not significantly distended and is soft but he has diffuse tenderness that is most prominent in the right upper quadrant.  Labs: Creatinine 1.47, electrolytes all in the normal range.   "Albumin 3.3, bilirubin 1.4 and other LFTs normal.  Troponin less than the measured threshold.  VBG pH 7.43/PCO2 49/PO2 30/HCO3 33.  WBC 15.0 with a left shift Hgb 14.6, .  INR 1.07.    CT abdomen pelvis:   \"1.  Acute calculus cholecystitis.  2.  Asymmetric masslike wall thickening in the cecum/ascending colon  is indeterminate but could represent malignancy. Colonoscopic  correlation should be considered.  3.  Prostatomegaly and evidence of chronic bladder outlet obstruction  with urinary bladder wall thickening and cellule formation.  4.  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  5.  Multiple cysts in the visualized lung bases, stable.\"    After I saw the patient, due to my concern for rather extensive abdominal discomfort that I did not think necessarily fit well with cholecystitis, I ordered a right upper quadrant ultrasound.  That does show mild gallbladder wall thickening probably consistent with cholecystitis despite the absence of positive Hyunh sign.    Diagnoses:  1.  Severe abdominal pain, relatively abrupt onset with leukocytosis and findings suggestive of acute cholecystitis by CT scan.  Patient's pain seems more diffuse than typical for acute cholecystitis.  2.  Possible ascending colon mass identified on CT scan.  Patient does not have reported change in his bowel movements.  3.  Cystic lung disease of unclear etiology.  This is managed with inhalers.    4.  CKD 3a.  5.  Aortic sclerosis without stenosis.  6.  Although Dementia is documented, but daughter indicates .       Primary contact and Health Care Decision-maker: Mary Narda: [phone].    Plan:   1.  Admit to Inpatient.   2.  POLST is completed and up to date.  Daughter, Mary should be contacted for consent.  3.  NPO after midnight.  OK for clears until that time.   4.  Surgical consultation.   5.  Zosyn empirically.   6.  IV support overnight.   7.  Further evaluation " "of the ascending colonic finding probably needs to be done as an outpatient.  But I suspect that an air-contrast would be the least invasive way of identifying whether there really is a mass present.  8.  Usual home medications are resumed.             Chief Complaint:   Abdominal pain and anorexia.     History is obtained from the patient, daughter by telephone, EMR and ED physician.      Jacob Easton indicates that he has been feeling ill for the past couple of days, though a note from an \"episodic care visit\" on 4/15/21 with Orestes Clement, geriatric NP, indicates that he was feeling well upon that visit. Mr. Easton had evidently been reluctant to get a Covid immunization and they were working on getting that scheduled.    Mr. Easton is able to give a fairly thorough history.  He denies fevers, sweats and chills.  Denies shortness of breath and chest pain.  He has never had major heart issues and recalls that he has done well with previous surgeries.  Evidently, the patient has not sought diagnostic evaluation for his lung disease.  He states that he had an normal bowel movement today.  There is been no comment about weight loss.  And although CT indicates some prostatic enlargement, and he is on medication for urinary retention, there has been no comment about urinary retention problems.        Past Medical History:     Past Medical History:   Diagnosis Date     Arthritis      Constipation      Hypertension      Hypocalcemia      Neuromuscular disorder (H)      Thyroid disease              Past Surgical History:      Past Surgical History:   Procedure Laterality Date     C TOTAL HIP ARTHROPLASTY  11    RT             Social History:     Social History     Tobacco Use     Smoking status: Never Smoker     Smokeless tobacco: Never Used   Substance Use Topics     Alcohol use: No     Alcohol/week: 0.0 standard drinks      The patient's wife  a couple of years ago.  He resides in [facility] skilled " nursing.  He apparently has 8 children of whom Mary is in the designated healthcare power of .   Mr. Easton is a retired [employer] .         Family History:   Family history reviewed but considered noncontributory.         Allergies:     Allergies   Allergen Reactions     Advil [Ibuprofen] Other (See Comments)     PCP told him not to take     Influenza Virus Vaccine H5n1 Other (See Comments)     Does not ever want to have a flu shot - also doesn't want pneumovax     Orange Juice [Orange Oil] Nausea             Medications:     Medications Prior to Admission   Medication Sig Dispense Refill Last Dose     amLODIPine (NORVASC) 10 MG tablet TAKE 1 TABLET BY MOUTH ONCE DAILY 28 tablet PRN 4/15/2021 at pm     ASPIRIN LOW DOSE 81 MG EC tablet TAKE 1 TABLET BY MOUTH ONCE DAILY 28 tablet PRN 4/16/2021 at am     budesonide (PULMICORT) 0.5 MG/2ML neb solution NEBULIZE THE CONTENT OF 1 VIAL TWICE DAILY (ADD TO IN THE MORNING AND AT BEDTIME DUONEBS) 60 mL 97 4/16/2021 at am     furosemide (LASIX) 20 MG tablet Take 1.5 tablets (30 mg) by mouth daily 30 tablet 11 4/16/2021 at am     ipratropium - albuterol 0.5 mg/2.5 mg/3 mL (DUONEB) 0.5-2.5 (3) MG/3ML neb solution NEBULIZE THE CONTENT OF 1 VIAL INTO THE LUNGS FOUR TIMES A  mL 97 4/16/2021 at am x1     Lactobacillus-Inulin ([facility] DIGESTIVE [facility]) CHEW CHEW AND SWALLOW 2 TABLETS BY MOUTH ONCE DAILY 72 tablet 97 4/16/2021 at am     latanoprost (XALATAN) 0.005 % ophthalmic solution INSTILL ONE DROP IN THE RIGHT EYE IN THE EVENING 2.5 mL 97 4/15/2021 at pm     levothyroxine (SYNTHROID/LEVOTHROID) 50 MCG tablet TAKE 1 TABLET BY MOUTH ONCE DAILY 28 tablet PRN 4/16/2021 at am     MELATONIN MAXIMUM STRENGTH 5 MG tablet TAKE 1 TABLET BY MOUTH ONCE DAILY 28 tablet PRN 4/15/2021 at pm     metoprolol tartrate (LOPRESSOR) 25 MG tablet TAKE 1 TABLET BY MOUTH TWICE DAILY 56 tablet PRN 4/16/2021 at am     MUCINEX 600 MG 12 hr tablet TAKE 1 TABLET BY MOUTH TWICE DAILY 56  tablet 97 4/16/2021 at am     Multiple Vitamins-Minerals (CEROVITE SENIOR) TABS TAKE 1 TABLET BY MOUTH ONCE DAILY 28 tablet 97 4/16/2021 at am     multivitamin (I-EDGAR) TABS per tablet TAKE 1 TABLET BY MOUTH ONCE DAILY 31 tablet 97 4/15/2021 at pm     [facility] 168148 UNIT/GM external powder APPLY TOPICALLY TO AFFECTED AREA(S) TWICE DAILY 60 g 97 4/16/2021 at am     potassium chloride ER (KLOR-CON M15) 15 MEQ CR tablet Take 2 tablets (30 mEq) by mouth daily 60 tablet 11 4/16/2021 at am     predniSONE (DELTASONE) 5 MG tablet TAKE 1 TABLET BY MOUTH ONCE DAILY NOTE DOSAGE/STRENGTH 28 tablet PRN 4/16/2021 at am     tamsulosin (FLOMAX) 0.4 MG capsule TAKE 1 CAPSULE BY MOUTH ONCE DAILY 28 capsule PRN 4/15/2021 at pm     acetaminophen (TYLENOL) 325 MG tablet TAKE TWO TABLETS (650MG) BY MOUTH EVERY 4 HOURS AS NEEDED 60 tablet 97 Unknown at Unknown time     albuterol (VENTOLIN HFA) 108 (90 Base) MCG/ACT inhaler Inhale 2 puffs into the lungs every 4 hours as needed for shortness of breath / dyspnea or wheezing (May keep bedside) 18 g 98 Unknown at Unknown time     bisacodyl (DULCOLAX) 10 MG suppository Place 1 suppository (10 mg) rectally daily as needed for constipation   Unknown at Unknown time     Elastic Bandages & Supports (T.E.D. KNEE LENGTH/M-LONG) MISC WEAR AS DIRECTED ON IN THE MORNING OFF AT BEDTIME (2 = 1 PAIR) 4 each 97 Unknown at Unknown time     loperamide (IMODIUM A-D) 2 MG tablet May take 1 tablet (2 mg) by mouth daily as needed for diarrhea. May also take 2 tablets (4 mg) daily as needed for diarrhea. 4 mg po prn daily after 1st loose stool then 2 mg po after each additional loose stool. Max 16 mg /day   Unknown at Unknown time     menthol-zinc oxide (CALMOSEPTINE) 0.44-20.625 % OINT ointment Apply topically 4 times daily as needed for skin protection   Unknown at Unknown time     senna-docusate (SM STOOL SOFTENER) 8.6-50 MG tablet Take 2 tablets by mouth 2 times daily as needed for constipation 30 tablet 97  Unknown at Unknown time             Review of Systems:   A comprehensive review of systems was performed and found to be negative except as described in this note           Physical Exam:   Vitals were reviewed  Temp: 97.6  F (36.4  C) Temp src: Temporal BP: (!) 146/67 Pulse: 87   Resp: 18 SpO2: 94 % O2 Device: Nasal cannula Oxygen Delivery: 2 LPM  Constitutional: Awake, alert, cooperative, no apparent distress, and appears stated age.  Eyes: Lids and lashes normal, pupils equal, round, extra ocular muscles intact, sclera clear, conjunctiva normal.  ENT: Normocephalic, without obvious abnormality, atraumatic.  Oropharynx is free of obvious lesions.  Uvula is in the midline.  Neck: Supple, symmetrical, trachea midline, no adenopathy, thyroid symmetric, not enlarged and no tenderness, skin normal.  Hematologic / Lymphatic: No cervical lymphadenopathy and no supraclavicular lymphadenopathy.  Lungs: No increased work of breathing, good air exchange, clear to auscultation bilaterally, no crackles or wheezing.  Cardiovascular: Regular rate and rhythm, normal S1 and S2, no S3 or S4, and no murmur noted.  Abdomen: Normal bowel sounds, soft, non-distended.  Diffusely tender most prominently in the right upper quadrant.  No true guarding.  Musculoskeletal: No redness, warmth, or swelling of the joints.  Tone is normal.  Wearing compression stockings.  Neurologic: Awake, alert, fully appropriate to the situation.  Cranial nerves II-XII are grossly intact.  Neuropsychiatric: Normal affect, mood.    Skin: No rashes, erythema, pallor, petechia or purpura.          Data:     Results for orders placed or performed during the hospital encounter of 04/16/21 (from the past 24 hour(s))   CBC with platelets differential   Result Value Ref Range    WBC 15.0 (H) 4.0 - 11.0 10e9/L    RBC Count 4.83 4.4 - 5.9 10e12/L    Hemoglobin 14.6 13.3 - 17.7 g/dL    Hematocrit 45.4 40.0 - 53.0 %    MCV 94 78 - 100 fl    MCH 30.2 26.5 - 33.0 pg    MCHC  32.2 31.5 - 36.5 g/dL    RDW 13.4 10.0 - 15.0 %    Platelet Count 282 150 - 450 10e9/L    Diff Method Automated Method     % Neutrophils 79.2 %    % Lymphocytes 11.0 %    % Monocytes 8.7 %    % Eosinophils 0.1 %    % Basophils 0.3 %    % Immature Granulocytes 0.7 %    Nucleated RBCs 0 0 /100    Absolute Neutrophil 11.9 (H) 1.6 - 8.3 10e9/L    Absolute Lymphocytes 1.7 0.8 - 5.3 10e9/L    Absolute Monocytes 1.3 0.0 - 1.3 10e9/L    Absolute Eosinophils 0.0 0.0 - 0.7 10e9/L    Absolute Basophils 0.0 0.0 - 0.2 10e9/L    Abs Immature Granulocytes 0.1 0 - 0.4 10e9/L    Absolute Nucleated RBC 0.0    Comprehensive metabolic panel   Result Value Ref Range    Sodium 135 133 - 144 mmol/L    Potassium 4.2 3.4 - 5.3 mmol/L    Chloride 100 94 - 109 mmol/L    Carbon Dioxide 30 20 - 32 mmol/L    Anion Gap 5 3 - 14 mmol/L    Glucose 135 (H) 70 - 99 mg/dL    Urea Nitrogen 26 7 - 30 mg/dL    Creatinine 1.47 (H) 0.66 - 1.25 mg/dL    GFR Estimate 41 (L) >60 mL/min/[1.73_m2]    GFR Estimate If Black 47 (L) >60 mL/min/[1.73_m2]    Calcium 9.2 8.5 - 10.1 mg/dL    Bilirubin Total 1.4 (H) 0.2 - 1.3 mg/dL    Albumin 3.3 (L) 3.4 - 5.0 g/dL    Protein Total 7.9 6.8 - 8.8 g/dL    Alkaline Phosphatase 79 40 - 150 U/L    ALT 22 0 - 70 U/L    AST 25 0 - 45 U/L   Blood gas venous and oxyhgb   Result Value Ref Range    Ph Venous 7.43 7.32 - 7.43 pH    PCO2 Venous 49 40 - 50 mm Hg    PO2 Venous 30 25 - 47 mm Hg    Bicarbonate Venous 33 (H) 21 - 28 mmol/L    FIO2 room air     Oxyhemoglobin Venous 56 %    Base Excess Venous 6.9 mmol/L   Lactic acid whole blood   Result Value Ref Range    Lactic Acid 1.2 0.7 - 2.0 mmol/L   Troponin I   Result Value Ref Range    Troponin I ES <0.015 0.000 - 0.045 ug/L   Lipase   Result Value Ref Range    Lipase 86 73 - 393 U/L   INR   Result Value Ref Range    INR 1.07 0.86 - 1.14   Symptomatic Influenza A/B & SARS-CoV2 (COVID-19) Virus PCR Multiplex    Specimen: Nasopharyngeal   Result Value Ref Range    Flu A/B &  SARS-COV-2 PCR Source Nasopharyngeal     SARS-CoV-2 PCR Result NEGATIVE     Influenza A PCR Negative NEG^Negative    Influenza B PCR Negative NEG^Negative    Respiratory Syncytial Virus PCR (Note)     Flu A/B & SARS-CoV-2 PCR Comment (Note)    Blood culture    Specimen: Blood   Result Value Ref Range    Specimen Description Blood     Special Requests Left Arm     Culture Micro No growth after 3 hours    Blood culture    Specimen: Blood   Result Value Ref Range    Specimen Description Blood     Special Requests Left Arm     Culture Micro No growth after 3 hours    Abd/pelvis CT,  IV  contrast only TRAUMA / AAA    Narrative    CT ABDOMEN PELVIS W CONTRAST 4/16/2021 2:26 PM    CLINICAL HISTORY: Right lower quadrant pain    TECHNIQUE: CT scan of the abdomen and pelvis was performed following  injection of IV contrast. Multiplanar reformats were obtained. Dose  reduction techniques were used.  CONTRAST: 100mL Isovue-370    COMPARISON: 11/16/2015    FINDINGS:   LOWER CHEST: Multiple cysts in the visualized lung bases.    HEPATOBILIARY: Acute calculus cholecystitis with a few calcified  gallstones in the gallbladder, pericholecystic fluid and inflammatory  changes. No biliary ductal dilatation. No focal lesions of the liver.    PANCREAS: Age-related pancreatic parenchymal atrophy. No  peripancreatic inflammatory changes.    SPLEEN: Normal.    ADRENAL GLANDS: Normal.    KIDNEYS/BLADDER: Bilateral renal simple cyst requiring no follow-up.  Nonobstructing right renal calculus measuring 3 mm. No  hydroureteronephrosis. Evaluation of the urinary bladder is limited by  streak artifact from bilateral total hip arthroplasty. Bladder wall  trabeculation and cellule formation.    BOWEL:  Asymmetric wall thickening in the cecum/low ascending colon  measuring 2.7 cm in length (axial series 4, image 129 and coronal  series 5, image 58) is indeterminate but could represent malignancy.  Small hiatal hernia. Normal caliber loops of small  bowel and colon.  Colonic diverticulosis. Redundant sigmoid colon. Normal appendix    PELVIC ORGANS: Evaluation limited by streak artifact from bilateral  KANE. Prostatomegaly.    ADDITIONAL FINDINGS: No significant free fluid. No lymphadenopathy. No  abdominal aortic aneurysm. Aortobiiliac atherosclerotic  calcifications. 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 No extraluminal  air. Fat-containing umbilical hernia.    MUSCULOSKELETAL: Bilateral total hip arthroplasties. No suspicious  lesions in the bones. Degenerative changes in the spine.      Impression    IMPRESSION:   1.  Acute calculus cholecystitis.  2.  Asymmetric masslike wall thickening in the cecum/ascending colon  is indeterminate but could represent malignancy. Colonoscopic  correlation should be considered.  3.  Prostatomegaly and evidence of chronic bladder outlet obstruction  with urinary bladder wall thickening and cellule formation.  4.  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  5.  Multiple cysts in the visualized lung bases, stable.    EBENEZER BRYANT MD   UA with Microscopic reflex to Culture    Specimen: Midstream Urine   Result Value Ref Range    Color Urine Light Yellow     Appearance Urine Clear     Glucose Urine Negative NEG^Negative mg/dL    Bilirubin Urine Negative NEG^Negative    Ketones Urine Negative NEG^Negative mg/dL    Specific Gravity Urine 1.023 1.003 - 1.035    Blood Urine Trace (A) NEG^Negative    pH Urine 7.5 (H) 5.0 - 7.0 pH    Protein Albumin Urine Negative NEG^Negative mg/dL    Urobilinogen mg/dL Normal 0.0 - 2.0 mg/dL    Nitrite Urine Negative NEG^Negative    Leukocyte Esterase Urine Negative NEG^Negative    Source Midstream Urine     WBC Urine 1 0 - 5 /HPF    RBC Urine 8 (H) 0 - 2 /HPF    Mucous Urine Present (A) NEG^Negative /LPF   US Abdomen Limited    Narrative    EXAM: US ABDOMEN  LIMITED  LOCATION: [facility]  DATE/TIME: 4/16/2021 5:42 PM    INDICATION: Diffuse abdominal pain.  COMPARISON: CT from today.  TECHNIQUE: Limited abdominal ultrasound.    FINDINGS:    GALLBLADDER: Biliary sludge and small stones are present. There is mild gallbladder wall thickening but no definite tenderness over the gallbladder with transducer pressure.    BILE DUCTS: No biliary dilatation. The common duct measures 6 mm.    LIVER: Normal parenchyma with smooth contour. No focal mass.    RIGHT KIDNEY: No hydronephrosis. Diffuse cortical thinning.    PANCREAS: The visualized portions are normal.    No ascites.      Impression    IMPRESSION:  1.  Gallstones and biliary sludge. There is mild gallbladder wall thickening.  2.  No definite tenderness over the gallbladder with transducer pressure. Bile ducts normal in caliber.               All imaging studies reviewed by me.      Attestation:  I have reviewed today's vital signs, notes, medications, labs and imaging.  Total time: 35 minutes     Espinoza Garcia MD

## 2021-04-16 NOTE — ED NOTES
Pt's O2 dropped to 89% at room air after given pain medication. He was places on 2L nasal canula and maintains o2 at 94%

## 2021-04-16 NOTE — ED NOTES
[facility]  ED Nurse Handoff Report    Jacob Easton is a 89 year old male   ED Chief complaint: right hip pain  . ED Diagnosis:   Final diagnoses:   Acute cholecystitis     Allergies:   Allergies   Allergen Reactions     Advil [Ibuprofen] Other (See Comments)     PCP told him not to take     Influenza Virus Vaccine H5n1 Other (See Comments)     Does not ever want to have a flu shot - also doesn't want pneumovax     Orange Juice [Orange Oil] Nausea       Code Status: DNR / DNI  Activity level - Baseline/Home:  Wheelchair Bound. Activity Level - Current:   Assist X 2. Lift room needed: Yes. Bariatric: No   Needed: No   Isolation: No. Infection: Not Applicable.     Vital Signs:   Vitals:    04/16/21 1456 04/16/21 1500 04/16/21 1515 04/16/21 1530   BP: 133/77 135/75 (!) 141/74    Pulse: 95 92     Resp: 20      Temp:       TempSrc:       SpO2: 94% 95% 94% 94%   Weight:       Height:           Cardiac Rhythm:  ,      Pain level:    Patient confused: No. Patient Falls Risk: Yes.   Elimination Status: Has voided   Patient Report - Initial Complaint: Pain. Focused Assessment: Jacob Easton is a 89 year old male with history of dementia, CKD, neuromuscular disorder, and hypertension, with prior hip surgery who presents via EMS from [facility] with right hip pain. Per EMS, the patient has been complaining of right hip pain. Staff at the facility noted the patient to have a temperature of 100.1 and a blood sugar of 180. The patient is wheelchair bound. The patient denies any falls.  Tests Performed: Labs, CT. Abnormal Results:   Abd/pelvis CT,  IV  contrast only TRAUMA / AAA   Final Result   IMPRESSION:    1.  Acute calculus cholecystitis.   2.  Asymmetric masslike wall thickening in the cecum/ascending colon   is indeterminate but could represent malignancy. Colonoscopic   correlation should be considered.   3.  Prostatomegaly and evidence of chronic bladder outlet obstruction   with urinary bladder  wall thickening and cellule formation.   4.  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   5.  Multiple cysts in the visualized lung bases, stable.      EBENEZER BRYANT MD        Labs Ordered and Resulted from Time of ED Arrival Up to the Time of Departure from the ED   ROUTINE UA WITH MICROSCOPIC REFLEX TO CULTURE - Abnormal; Notable for the following components:       Result Value    Blood Urine Trace (*)     pH Urine 7.5 (*)     RBC Urine 8 (*)     Mucous Urine Present (*)     All other components within normal limits   CBC WITH PLATELETS DIFFERENTIAL - Abnormal; Notable for the following components:    WBC 15.0 (*)     Absolute Neutrophil 11.9 (*)     All other components within normal limits   COMPREHENSIVE METABOLIC PANEL - Abnormal; Notable for the following components:    Glucose 135 (*)     Creatinine 1.47 (*)     GFR Estimate 41 (*)     GFR Estimate If Black 47 (*)     Bilirubin Total 1.4 (*)     Albumin 3.3 (*)     All other components within normal limits   BLOOD GAS VENOUS AND OXYHGB - Abnormal; Notable for the following components:    Bicarbonate Venous 33 (*)     All other components within normal limits   INFLUENZA A/B & SARS-COV2 PCR MULTIPLEX   LACTIC ACID WHOLE BLOOD   TROPONIN I   LIPASE   PERIPHERAL IV CATHETER   BLOOD CULTURE   BLOOD CULTURE       Treatments provided: see EMAR  Family Comments: DaughterMary POA  OBS brochure/video discussed/provided to patient:  N/A  ED Medications:   Medications   HYDROmorphone (PF) (DILAUDID) injection 0.5 mg (has no administration in time range)   cefTRIAXone (ROCEPHIN) 2 g vial to attach to  ml bag for ADULTS or NS 50 ml bag for PEDS (2 g Intravenous New Bag 4/16/21 1516)   iopamidol (ISOVUE-370) solution 100 mL (100 mLs Intravenous Given 4/16/21 1416)   sodium chloride (PF) 0.9% PF flush 65 mL (65 mLs Intravenous Given 4/16/21 1416)     Drips infusing:  No  For the majority of the  shift, the patient's behavior Green. Interventions performed were NA.    Sepsis treatment initiated: No     Patient tested for COVID 19 prior to admission: YES    ED Nurse Name/Phone Number: Janna Drew RN,   3:39 PM  RECEIVING UNIT ED HANDOFF REVIEW    Above ED Nurse Handoff Report was reviewed: Yes  Reviewed by: Marley Magana RN on April 16, 2021 at 5:45 PM

## 2021-04-16 NOTE — PHARMACY-ADMISSION MEDICATION HISTORY
Admission medication history interview status for this patient is complete. See [facility] admission navigator for allergy information, prior to admission medications and immunization status.     Medication history interview done, indicate source(s): Patient resides at [facility] (ph [phone])  Medication history resources (including written lists, pill bottles, clinic record): Med list from [facility], spoke w/ RN about last doses  Pharmacy: n/a    Changes made to PTA medication list:  Added: none  Deleted: clotrimazole, duplicate multivit  Changed: none    Actions taken by pharmacist (provider contacted, etc):None     Additional medication history information:None    Medication reconciliation/reorder completed by provider prior to medication history?  N   (Y/N)         Prior to Admission medications    Medication Sig Last Dose Taking? Auth Provider   amLODIPine (NORVASC) 10 MG tablet TAKE 1 TABLET BY MOUTH ONCE DAILY 4/15/2021 at pm Yes Jessica Hernandez APRN CNP   ASPIRIN LOW DOSE 81 MG EC tablet TAKE 1 TABLET BY MOUTH ONCE DAILY 4/16/2021 at am Yes Jessica Hernandez APRN CNP   budesonide (PULMICORT) 0.5 MG/2ML neb solution NEBULIZE THE CONTENT OF 1 VIAL TWICE DAILY (ADD TO IN THE MORNING AND AT BEDTIME DUONEBS) 4/16/2021 at am Yes Orestes Clement APRN CNP   furosemide (LASIX) 20 MG tablet Take 1.5 tablets (30 mg) by mouth daily 4/16/2021 at am Yes Orestes Clement APRN CNP   ipratropium - albuterol 0.5 mg/2.5 mg/3 mL (DUONEB) 0.5-2.5 (3) MG/3ML neb solution NEBULIZE THE CONTENT OF 1 VIAL INTO THE LUNGS FOUR TIMES A DAY 4/16/2021 at am x1 Yes Orestes Clement APRN CNP   Lactobacillus-Inulin ([facility] [employer]) CHEW CHEW AND SWALLOW 2 TABLETS BY MOUTH ONCE DAILY 4/16/2021 at am Yes Orestes Clement APRN CNP   latanoprost (XALATAN) 0.005 % ophthalmic solution INSTILL ONE DROP IN THE RIGHT EYE IN THE EVENING 4/15/2021 at pm Yes Orestes Clement APRN CNP   levothyroxine  (SYNTHROID/LEVOTHROID) 50 MCG tablet TAKE 1 TABLET BY MOUTH ONCE DAILY 4/16/2021 at am Yes Orestes Clement APRN CNP   MELATONIN MAXIMUM STRENGTH 5 MG tablet TAKE 1 TABLET BY MOUTH ONCE DAILY 4/15/2021 at pm Yes Orestes Clement APRN CNP   metoprolol tartrate (LOPRESSOR) 25 MG tablet TAKE 1 TABLET BY MOUTH TWICE DAILY 4/16/2021 at am Yes Jessica Hernandez APRN CNP   MUCINEX 600 MG 12 hr tablet TAKE 1 TABLET BY MOUTH TWICE DAILY 4/16/2021 at am Yes Anamaria Mays APRN CNP   Multiple Vitamins-Minerals (CEROVITE SENIOR) TABS TAKE 1 TABLET BY MOUTH ONCE DAILY 4/16/2021 at am Yes Orestes Clement APRN CNP   multivitamin (I-EDGAR) TABS per tablet TAKE 1 TABLET BY MOUTH ONCE DAILY 4/15/2021 at pm Yes Anamaria Mays APRN CNP   NYAMYC [id number] UNIT/GM external powder APPLY TOPICALLY TO AFFECTED AREA(S) TWICE DAILY 4/16/2021 at am Yes Orestes Clement APRN CNP   potassium chloride ER (KLOR-CON M15) 15 MEQ CR tablet Take 2 tablets (30 mEq) by mouth daily 4/16/2021 at am Yes Orestes Clement APRN CNP   predniSONE (DELTASONE) 5 MG tablet TAKE 1 TABLET BY MOUTH ONCE DAILY NOTE DOSAGE/STRENGTH 4/16/2021 at am Yes Orestes Clement APRN CNP   tamsulosin (FLOMAX) 0.4 MG capsule TAKE 1 CAPSULE BY MOUTH ONCE DAILY 4/15/2021 at pm Yes Jessica Hernandez APRN CNP   acetaminophen (TYLENOL) 325 MG tablet TAKE TWO TABLETS (650MG) BY MOUTH EVERY 4 HOURS AS NEEDED Unknown at Unknown time  Jessica Hernandez APRN CNP   albuterol (VENTOLIN HFA) 108 (90 Base) MCG/ACT inhaler Inhale 2 puffs into the lungs every 4 hours as needed for shortness of breath / dyspnea or wheezing (May keep bedside) Unknown at Unknown time  Orestes Clement APRN CNP   bisacodyl (DULCOLAX) 10 MG suppository Place 1 suppository (10 mg) rectally daily as needed for constipation Unknown at Unknown time  Orestes Clement, APRN CNP   Elastic Bandages & Supports (T.E.D. KNEE LENGTH/M-LONG) MISC WEAR AS DIRECTED ON IN THE MORNING OFF  AT BEDTIME (2 = 1 PAIR) Unknown at Unknown time  Orestes Clement APRN CNP   loperamide (IMODIUM A-D) 2 MG tablet May take 1 tablet (2 mg) by mouth daily as needed for diarrhea. May also take 2 tablets (4 mg) daily as needed for diarrhea. 4 mg po prn daily after 1st loose stool then 2 mg po after each additional loose stool. Max 16 mg /day Unknown at Unknown time  Orestes Clement APRN CNP   menthol-zinc oxide (CALMOSEPTINE) 0.44-20.625 % OINT ointment Apply topically 4 times daily as needed for skin protection Unknown at Unknown time  Orestes Clement APRN CNP   senna-docusate (SM STOOL SOFTENER) 8.6-50 MG tablet Take 2 tablets by mouth 2 times daily as needed for constipation Unknown at Unknown time  Orestes Clement APRN CNP

## 2021-04-16 NOTE — ED PROVIDER NOTES
History   Chief Complaint:  Right-sided abdominal pain       HPI   Jacob Easton is a 89 year old male with history of dementia, CKD, neuromuscular disorder, and hypertension, with prior hip surgery who presents via EMS from [facility] with right-sided abdominal pain. Staff at the facility noted the patient to have a temperature of 100.1 and a blood sugar of 180. The patient is wheelchair bound. The patient denies any falls. Nursing reports right hip pain, but this appears to be in error.       Review of Systems   Constitutional: Negative for fever.   HENT: Negative for congestion.    Respiratory: Negative for cough and shortness of breath.    Cardiovascular: Negative for chest pain.   Gastrointestinal: Positive for abdominal pain. Negative for nausea and vomiting.   Musculoskeletal: Negative for arthralgias.       Allergies:  Advil [Ibuprofen]  Influenza Virus Vaccine H5n1  Orange Juice [Orange Oil]    Medications:  Flomax  Deltasone  Klor-con  Mucinex  Lopressor  Calmoeseptine  Imodium  Synthroid  Xalatan  Duoneb  Lasix  Lotrim  Pulmicort   Dulcolax  Aspirin  Norvasc   Albuterol     Past Medical History:    Thyroid disease  Neuromuscular disorder  Hypocalcemia  Hypertension   Constipation  Arthritis    Seborrhea  Rheumatoid arthritis  CKD  Atrial fibrillation  Dementia  Pulmonary alveolitis  Pulmonary vascular congestion  BPH       Past Surgical History:    Total hip arthroplasty      Social History:  Lives at [facility]  Nondrinker    Physical Exam     Patient Vitals for the past 24 hrs:   BP Temp Temp src Pulse Resp SpO2 Height Weight   04/16/21 1530 -- -- -- -- -- 94 % -- --   04/16/21 1515 (!) 141/74 -- -- -- -- 94 % -- --   04/16/21 1500 135/75 -- -- 92 -- 95 % -- --   04/16/21 1456 133/77 -- -- 95 20 94 % -- --   04/16/21 1445 133/77 -- -- -- -- 94 % -- --   04/16/21 1430 128/72 -- -- 92 -- 94 % -- --   04/16/21 1400 (!) 145/77 -- -- 90 -- 94 % -- --   04/16/21 1345 (!) 142/73 -- -- -- -- 94 % -- --  "  04/16/21 1330 -- -- -- -- -- 94 % -- --   04/16/21 1315 (!) 141/72 -- -- -- -- 94 % -- --   04/16/21 1300 (!) 148/72 -- -- 87 -- 94 % -- --   04/16/21 1257 -- -- -- -- -- 94 % -- --   04/16/21 1249 (!) 145/81 99  F (37.2  C) Oral 92 22 94 % 1.867 m (6' 1.5\") 104.3 kg (230 lb)       Physical Exam  Nursing note and vitals reviewed.  HENT:   Mouth/Throat: Moist mucous membranes.   Eyes: EOMI, nonicteric sclera  Cardiovascular: Normal rate, regular rhythm, no murmurs, rubs, or gallops  Pulmonary/Chest: Effort normal and breath sounds normal. No respiratory distress. No wheezes. No rales.   Abdominal: Soft. TTP, worse on right, nondistended, no guarding or rigidity.   Musculoskeletal: Normal range of motion.   Neurological: Alert. Moves all extremities spontaneously.   Skin: Skin is warm and dry. No rash noted.   Psychiatric: Normal mood and affect.     Emergency Department Course     Imaging:    Abd/Pelvis CT , IV contrast only Trauma/AAA:  IMPRESSION:   1.  Acute calculus cholecystitis.   2.  Asymmetric masslike wall thickening in the cecum/ascending colon   is indeterminate but could represent malignancy. Colonoscopic   correlation should be considered.   3.  Prostatomegaly and evidence of chronic bladder outlet obstruction   with urinary bladder wall thickening and cellule formation.   4.  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   5.  Multiple cysts in the visualized lung bases, stable.     Imaging independently reviewed and agree with radiologist interpretation.      Laboratory:    CBC: WBC: 15.0 (H), HGB: 14.6, PLT: 282  CMP: Glucose 135 (H), creatinine 1.47 (H), GFR 41 (L), bilirubin total 1.4 (H), albumin 3.3 (L) o/w WNL   Troponin (Collected 1309): <0.015  Lactic acid (Resulted 1334): 1.2  VBG and oxyhgb: pH 7.43 / PCO2 49 / PO2 30 / Bicarb 33 (H) / FlO2 room air / Oxyhemoglobin 56 / Base excess 6.9, on room air  Lipase: 86   Blood Culture x2: " Pending     UA with Microscopic: Blood Trace (A), pH 7.5 (H), RBC/HPF 8 (H), Mucous Present (A), o/w WNL    Symptomatic Influenza A/B & SARS-CoV2 (COVID-19) Virus PCR Multiplex: Negative    Emergency Department Course:    Reviewed:  I reviewed nursing notes, vitals, past medical history and care everywhere     Assessments:  1259 I performed an exam of the patient as documented above.   1522 I spoke with Mary, the patient's daughter, to update her on the patient's presentation, findings, and plan of care.   1535 I rechecked and updated the patient on findings and plan of care. He consents to surgery and admission at this time.   1556 I again spoke with Mary and updated her on the patient's wishes for procedure.     Consults:   1603 I spoke with Dr. Aguirre of the General Surgery service regarding patient's presentation, findings, and plan of care.   1606 I spoke with Marnie Moreira PA-C on behalf of Dr. Garcia of the hospitalist service regarding patient's presentation, findings, and plan of care. They are in agreement to accept the patient for admission to a bed for further monitoring, care, and treatment.      Interventions:  1516 Rocephin 2 g IV    Disposition:  The patient was admitted to the hospital under the care of Dr. Garcia.     Impression & Plan        Medical Decision Making:  Jacob Easton is a 89 year old male who presents to the emergency department today for evaluation of right-sided abdominal pain. Broad ddx considered. Pt tender on exam. Labs show leukocytosis. CT suggests acute calculous cholecystitis with dilated gall bladder, gall stones, and fat stranding, worse around the neck of the gall bladder. Rocephin given for leukocytosis and report of low-grade fever. Pt found to have some mass-like wall thickening in his ascending colon. This will need further evaluation. I discussed findings with the pt and his daughter. His daughter, Mary, asked me not to share the colon findings with pt, but  we did discuss his likely cholecystitis. There was initial discussion about pt wanting to go home, but upon further discussion, pt is consenting to admission and surgical consultation to discuss options. I did discuss with on-call general surgery. Plan is for consultation in the morning with decision-making at that time. Pt admitted in stable condition to our hospitalist service. Dr. Garcia accepts for admission.       Covid-19  Jacob Easton was evaluated during a global COVID-19 pandemic, which necessitated consideration that the patient might be at risk for infection with the SARS-CoV-2 virus that causes COVID-19.   Applicable protocols for evaluation were followed during the patient's care.   COVID-19 was considered as part of the patient's evaluation. The plan for testing is:  a test was obtained during this visit.     Diagnosis:    ICD-10-CM    1. Acute cholecystitis  K81.0 UA with Microscopic reflex to Culture       Scribe Disclosure:  I, Kyree Flores, am serving as a scribe at 12:46 PM on 4/16/2021 to document services personally performed by João Perkins MD; based on my observations and the provider's statements to me.          João Perkins MD  04/16/21 2123

## 2021-04-16 NOTE — ED NOTES
Quick cath complete, UA obtained, assist with brittanie care, scrotum and buttocks redness noted, no open sores, RN present

## 2021-04-16 NOTE — PLAN OF CARE
Pt arrived to floor around 1810 from ER, transferred to bed via SLIPP sheet. 2L NC. Incontinent of urine. IVF. Bed extender on. Alert and oriented but lethargic- fell asleep mid sentence. C/o of nausea after transferring but then denied shortly after. States he is here because his hips hurt from a fall he had 7 years ago. Made comfortable in bed. Call light in hand. NPO. Will let pt rest right now.

## 2021-04-16 NOTE — ED TRIAGE NOTES
Arrives via EMS from [facility] with right sided body pain.  Didn't sleep last night.  Stroke scale negative by EMS.  IG=915 en route. Reported temp of 100.1T from [facility] staff. Patient c/o right hip pain.  Denies any falls. States is wheelchair bound. ABCD's intact; A/O x 4.

## 2021-04-17 NOTE — OP NOTE
General Surgery Operative Note    PREOPERATIVE DIAGNOSIS:  Acute cholecystitis [K81.0]    POSTOPERATIVE DIAGNOSIS:  Same, gangrenous cholecystitis    PROCEDURE:  Laparoscopic Cholecystectomy    ANESTHESIA:  General    PREOPERATIVE MEDICATIONS:  Ancef IV.    SURGEON:  Lesvia Stallings MD    ASSISTANT:  Paulette Damon PA-C    ESTIMATED BLOOD LOSS:  300 ml    INDICATIONS:  Jacob Easton is a 89 year old male who has been experiencing episodes of RUQ abdominal pain for the past 1-2 weeks associated with nausea.  Abdominal imaging has revealed gallstones and gallbladder wall thickening.  He now presents for laparoscopic cholecystectomy after having risks and benefits reviewed in detail.    PROCEDURE:   A supraumbilical incision was made and the abdomen was entered using a Chu trocar technique.  The patient's fascia was noted to be markedly thin and was easily entered just with a blunt carmalt clamp.   Secondary trocars were placed under laparoscopic guidance.  The gallbladder was identified in the RUQ and had extensive omental inflammatory adhesions. There was bilious ascites lateral to the liver. Omental adhesions were swept down bluntly with a suction  and minimal electrocautery.  The gallbladder was taut and could not be grasped, so a needle aspirator was used to extract 60 ml of dark green bile. This was sent for culture. The inflammatory rind around the gallbladder was thick and rubbery. This was carefully incised at the level of the infundibulum ans swept down. A short cystic duct was identified and isolated. We attempted to achieve a critical view of safety, but due to thick, woody induration of the tissues, could not identify a cystic artery or adequately clear the lower aspect of the cystic plate. Most notably, the lateral back wall of the gallbladder was very thickened to the point that we were concerned about a neoplasm. In order to protect the common bile duct, we opted to perform a top-down  approach to the dissection. The gallbladder was  off the cystic plate of the liver from the fundus downward. Dissection was continued right along the back wall of the gallbladder. At the postero-medial aspect of the gallbladder, the cystic artery was identified and clipped. The short cystic duct was doubly clipped on the proximal aspect and divided close to the gallbladder.  The clips barely traversed the cystic duct, so I additionally placed an 0 PDS endoloop proximal to the two clips to ensure that the cystic stump was well controlled.  Once free, the gallbladder was extracted from the supraumbilical site in an EndoCatch bag.  The liver bed was inspected for hemostasis, which was ensured.  We irrigated with a total of 3 liters of saline throughout the case and after the gallbladder was removed. Returns ran clear without any bile or blood.  A 15 Fr. Drain was placed through the lateral trocar and placed into the liver bed. Trocar sites were then infiltrated with 0.5% Marcaine plain. The supraumbilical fascial opening was closed with multiple interrupted 0 Vicryl. The skin was closed using 4-0 subcuticular vicryl. Steri-Strips were placed on the incisions. The patient was transferred to recovery. Anesthesia noted that the patient was in atrial fibrillation during the operation.        INTRAOPERATIVE FINDINGS: Severely inflamed gallbladder with areas of thin gangrenous gallbladder wall and markedly thick, indurated tissue near the infundibulum.     Specimens:   ID Type Source Tests Collected by Time Destination   1 : GALLBLADDER BILE Fluid Gallbladder and Contents ANAEROBIC BACTERIAL CULTURE, FLUID CULTURE AEROBIC BACTERIAL, GRAM STAIN Lesvia Stallings MD 4/17/2021 10:59 AM    A : GALLBLADDER AND CONTENTS Tissue Gallbladder and Contents SURGICAL PATHOLOGY EXAM Lesvia Stallings MD 4/17/2021 11:16 AM        Lesvia Stallings MD

## 2021-04-17 NOTE — OR NURSING
At 1335,having trouble maintaining  oxygen saturation above 90% on 10 liters simple mask.. Pt not responding to verbal stimuli. Dr. Jones at bedside,ABU breathing rescue breathing initiated.Saturation was still low. Stat 12 lead EKG was also completed at the bedside and shows SR with 1st AVB and BBB. RT placed pt on BIPAP at 1340 after the EKG. BIPAP settings R=12,Fio2 at 70% PEEP at Oxygen saturation is now in the 90's to 100%. Dr. Stallings updated,[place] [facility] [place]. House Sup made aware.   At 1445,pt is awake,moving all extremities. Following commands and still on BIPAP,tolerating well.  At 1455, here,draw blood for ABG.  At 1515,Duo neb treatment given. Pt is saturating on 3 liters nasal cannula. More awake now,drinking water without any issue.

## 2021-04-17 NOTE — ANESTHESIA PREPROCEDURE EVALUATION
Anesthesia Pre-Procedure Evaluation    Patient: Jacob Easton   MRN: [id number] : 1929        Preoperative Diagnosis: Acute cholecystitis [K81.0]   Procedure : Procedure(s):  CHOLECYSTECTOMY, LAPAROSCOPIC  CHOLECYSTECTOMY, LAPAROSCOPIC, WITH CHOLANGIOGRAM     Past Medical History:   Diagnosis Date     Arthritis      Constipation      Hypertension      Hypocalcemia      Neuromuscular disorder (H)      Thyroid disease       Past Surgical History:   Procedure Laterality Date     C TOTAL HIP ARTHROPLASTY  11    RT      Allergies   Allergen Reactions     Advil [Ibuprofen] Other (See Comments)     PCP told him not to take     Influenza Virus Vaccine H5n1 Other (See Comments)     Does not ever want to have a flu shot - also doesn't want pneumovax     Orange Juice [Orange Oil] Nausea      Social History     Tobacco Use     Smoking status: Never Smoker     Smokeless tobacco: Never Used   Substance Use Topics     Alcohol use: No     Alcohol/week: 0.0 standard drinks      Wt Readings from Last 1 Encounters:   21 104.3 kg (230 lb)        Anesthesia Evaluation   Pt has had prior anesthetic. Type: General.        ROS/MED HX  ENT/Pulmonary:  - neg pulmonary ROS     Neurologic:     (+) dementia,     Cardiovascular:     (+) hypertension-----    METS/Exercise Tolerance:     Hematologic: Comments: Lab Test        21                       0620          1309                            WBC          17.1*        15.0*        8.8           HGB          13.2*        14.6         13.0*         MCV          98           94           97            PLT          246          282          281           INR           --          1.07          --            Lab Test        21                       0620          1309                            NA           135          135          140           POTASSIUM    4.0          4.2          3.8           CHLORIDE     103           100          107           CO2          30           30           29            BUN          25           26           34*           CR           1.49*        1.47*        1.67*         ANIONGAP     2*           5            4             MERCEDES          8.3*         9.2          8.7           GLC          113*         135*         97                (+) anemia,     Musculoskeletal:  - neg musculoskeletal ROS     GI/Hepatic:     (+) cholecystitis/cholelithiasis,     Renal/Genitourinary:     (+) renal disease,     Endo:     (+) thyroid problem,  Thyroid disease - Other,     Psychiatric/Substance Use:  - neg psychiatric ROS     Infectious Disease:  - neg infectious disease ROS     Malignancy:  - neg malignancy ROS     Other:  - neg other ROS          Physical Exam    Airway  airway exam normal      Mallampati: II   TM distance: > 3 FB   Neck ROM: full   Mouth opening: > 3 cm    Respiratory Devices and Support         Dental  no notable dental history         Cardiovascular   cardiovascular exam normal          Pulmonary   pulmonary exam normal                OUTSIDE LABS:  CBC:   Lab Results   Component Value Date    WBC 17.1 (H) 04/17/2021    WBC 15.0 (H) 04/16/2021    HGB 13.2 (L) 04/17/2021    HGB 14.6 04/16/2021    HCT 43.0 04/17/2021    HCT 45.4 04/16/2021     04/17/2021     04/16/2021     BMP:   Lab Results   Component Value Date     04/17/2021     04/16/2021    POTASSIUM 4.0 04/17/2021    POTASSIUM 4.2 04/16/2021    CHLORIDE 103 04/17/2021    CHLORIDE 100 04/16/2021    CO2 30 04/17/2021    CO2 30 04/16/2021    BUN 25 04/17/2021    BUN 26 04/16/2021    CR 1.49 (H) 04/17/2021    CR 1.47 (H) 04/16/2021     (H) 04/17/2021     (H) 04/16/2021     COAGS:   Lab Results   Component Value Date    INR 1.07 04/16/2021     POC: No results found for: BGM, HCG, HCGS  HEPATIC:   Lab Results   Component Value Date    ALBUMIN 2.6 (L) 04/17/2021    PROTTOTAL 6.9 04/17/2021    ALT 26  04/17/2021    AST 25 04/17/2021    ALKPHOS 68 04/17/2021    BILITOTAL 1.2 04/17/2021     OTHER:   Lab Results   Component Value Date    LACT 0.8 04/16/2021    A1C 6.2 (H) 02/04/2021    MERCEDES 8.3 (L) 04/17/2021    PHOS 1.8 (L) 11/20/2015    MAG 2.2 04/05/2017    LIPASE 86 04/16/2021    TSH 5.20 (H) 02/04/2021    T4 1.09 04/02/2017       Anesthesia Plan    ASA Status:  3      Anesthesia Type: General.     - Airway: ETT   Induction: Propofol, Intravenous.   Maintenance: Balanced.        Consents    Anesthesia Plan(s) and associated risks, benefits, and realistic alternatives discussed. Questions answered and patient/representative(s) expressed understanding.     - Discussed with:  Patient      - Extended Intubation/Ventilatory Support Discussed: No.      - Patient is DNR/DNI Status: No    Use of blood products discussed: Yes.     - Discussed with: Patient.     - Consented: consented to blood products     Postoperative Care    Pain management: IV analgesics.   PONV prophylaxis: Ondansetron (or other 5HT-3), Dexamethasone or Solumedrol     Comments:                Luís Jones MD

## 2021-04-17 NOTE — PROVIDER NOTIFICATION
DATE:  4/17/2021   TIME OF RECEIPT FROM LAB:  1625  LAB TEST:  Ph and PCO2  LAB VALUE:  Ph 7.17, PCO2 103  RESULTS GIVEN WITH READ-BACK TO (PROVIDER): Admitting Hospitalist  TIME LAB VALUE REPORTED TO PROVIDER:   5718

## 2021-04-17 NOTE — PLAN OF CARE
Pt is alert orient x3, with periodic forgetfulness. Complaining throughout the shift of R hip pain that radiates down the leg. Pt states that he has had this pain for 6 months. Daughter Mary confirm via phone call. Pt is NPO. IV fluids maintained with IV zosyn. Incontinent of bladder. No BM.

## 2021-04-17 NOTE — CONSULTS
General Surgery Consultation    Jacob Easton MRN# [id number]   Age: 89 year old YOB: 1929     Date of Admission:  4/16/2021    Reason for consult:            Cholecystitis       Requesting physician:            Espinoza Garcia MD                Assessment and Plan:   Assessment:   Jacob Easton is a 89 year old male with acute cholecystitis.         Plan:   I have offered the patient a laparoscopic cholecystectomy with possible intraoperative cholangiogram.      We have discussed the indication, alternatives, risks and expected recovery.  Specifically we have discussed incisions, scarring, postoperative infections, anesthesia, bleeding, blood transfusion, open conversion, common bile duct injury, injury to intra-abdominal organs, adhesions leading to bowel obstruction, retained common bile duct stone, bile leak, DVT, PE, hernia, post cholecystectomy diarrhea, recovery, postoperative dietary restrictions and physical limitations.  We have discussed the recommended interventions and treatments for these complications.  All questions have been answered to the best of my ability.    He elects to proceed with surgery.      Lesvia Stallings MD          Chief Complaint:   Abdominal pain, right upper quadrant     History is obtained from the patient.         History of Present Illness:   Jacob Easton is a 89 year old male who presents with right upper quadrant abdominal pain for the past 2 days (difficult historian).  The pain is constant.  He has had similar pain in the past, on and off for the past 6 months.  There is an association with eating any type of food and he has been avoiding food for the past day.  Positive for associated symptoms of nausea.  He  does not have a history of jaundice or dark urine.  He  has not had pancreatitis in the past.              Past Medical History:    has a past medical history of Arthritis, Constipation, Hypertension, Hypocalcemia, Neuromuscular disorder (H), and Thyroid  "disease.          Past Surgical History:     Past Surgical History:   Procedure Laterality Date     C TOTAL HIP ARTHROPLASTY  9/24/11    RT             Social History:     Social History     Tobacco Use     Smoking status: Never Smoker     Smokeless tobacco: Never Used   Substance Use Topics     Alcohol use: No     Alcohol/week: 0.0 standard drinks             Family History:   Family history reviewed and is not pertinent.         Allergies:     Allergies   Allergen Reactions     Advil [Ibuprofen] Other (See Comments)     PCP told him not to take     Influenza Virus Vaccine H5n1 Other (See Comments)     Does not ever want to have a flu shot - also doesn't want pneumovax     Orange Juice [Orange Oil] Nausea             Medications:       amLODIPine  10 mg Oral QPM     budesonide  0.5 mg Nebulization BID     guaiFENesin  600 mg Oral BID     ipratropium - albuterol 0.5 mg/2.5 mg/3 mL  3 mL Nebulization Q4H While awake     latanoprost  1 drop Right Eye QPM     levothyroxine  50 mcg Oral Daily     metoprolol tartrate  25 mg Oral BID     multivitamin w/minerals  1 tablet Oral Daily     piperacillin-tazobactam  3.375 g Intravenous Q6H     predniSONE  5 mg Oral Daily     sodium chloride (PF)  3 mL Intracatheter Q8H     tamsulosin  0.4 mg Oral QPM            Review of Systems:   The 10 point review of systems is negative other than noted in the HPI.          Physical Exam:   /69 (BP Location: Left arm)   Pulse 68   Temp 98.8  F (37.1  C) (Temporal)   Resp 16   Ht 1.867 m (6' 1.5\")   Wt 104.3 kg (230 lb)   SpO2 94%   BMI 29.93 kg/m    General - Well developed, well nourished elderly male, mildly uncomfortable appearing  HEENT:  Head normocephalic and atraumatic, pupils equal and round, conjunctivae clear, no scleral icterus, mucous membranes moist, external ears and nose normal  Neck: Supple without thyromegaly or masses  Lymphatic: No cervical, or supraclavicular lymphadenopathy  Lungs: Audible expiratory " wheeze  Heart: regular rate and rhythm, no murmurs  Abdomen:   soft, rounded, distended with severe tenderness noted in the right upper quadrant . no masses palpated. Prominent fat-containing umbilical hernia  Extremities: Warm without edema  Neurologic: nonfocal  Psychiatric: Mood and affect appropriate  Skin: Without lesions, rashes, or juandice         Data:     WBC -   Lab Results   Component Value Date    WBC 17.1 (H) 04/17/2021       HgB -   Lab Results   Component Value Date    HGB 13.2 (L) 04/17/2021       Plt-   Lab Results   Component Value Date     04/17/2021       Liver Function Studies -   Recent Labs   Lab Test 04/17/21  0620   PROTTOTAL 6.9   ALBUMIN 2.6*   BILITOTAL 1.2   ALKPHOS 68   AST 25   ALT 26       Lipase-   Lab Results   Component Value Date    LIPASE 86 04/16/2021         Imaging:  All imaging studies reviewed by me.    Results for orders placed or performed during the hospital encounter of 04/16/21   Abd/pelvis CT,  IV  contrast only TRAUMA / AAA    Narrative    CT ABDOMEN PELVIS W CONTRAST 4/16/2021 2:26 PM    CLINICAL HISTORY: Right lower quadrant pain    TECHNIQUE: CT scan of the abdomen and pelvis was performed following  injection of IV contrast. Multiplanar reformats were obtained. Dose  reduction techniques were used.  CONTRAST: 100mL Isovue-370    COMPARISON: 11/16/2015    FINDINGS:   LOWER CHEST: Multiple cysts in the visualized lung bases.    HEPATOBILIARY: Acute calculus cholecystitis with a few calcified  gallstones in the gallbladder, pericholecystic fluid and inflammatory  changes. No biliary ductal dilatation. No focal lesions of the liver.    PANCREAS: Age-related pancreatic parenchymal atrophy. No  peripancreatic inflammatory changes.    SPLEEN: Normal.    ADRENAL GLANDS: Normal.    KIDNEYS/BLADDER: Bilateral renal simple cyst requiring no follow-up.  Nonobstructing right renal calculus measuring 3 mm. No  hydroureteronephrosis. Evaluation of the urinary bladder is  limited by  streak artifact from bilateral total hip arthroplasty. Bladder wall  trabeculation and cellule formation.    BOWEL:  Asymmetric wall thickening in the cecum/low ascending colon  measuring 2.7 cm in length (axial series 4, image 129 and coronal  series 5, image 58) is indeterminate but could represent malignancy.  Small hiatal hernia. Normal caliber loops of small bowel and colon.  Colonic diverticulosis. Redundant sigmoid colon. Normal appendix    PELVIC ORGANS: Evaluation limited by streak artifact from bilateral  KANE. Prostatomegaly.    ADDITIONAL FINDINGS: No significant free fluid. No lymphadenopathy. No  abdominal aortic aneurysm. Aortobiiliac atherosclerotic  calcifications. 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 No extraluminal  air. Fat-containing umbilical hernia.    MUSCULOSKELETAL: Bilateral total hip arthroplasties. No suspicious  lesions in the bones. Degenerative changes in the spine.      Impression    IMPRESSION:   1.  Acute calculus cholecystitis.  2.  Asymmetric masslike wall thickening in the cecum/ascending colon  is indeterminate but could represent malignancy. Colonoscopic  correlation should be considered.  3.  Prostatomegaly and evidence of chronic bladder outlet obstruction  with urinary bladder wall thickening and cellule formation.  4.  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  5.  Multiple cysts in the visualized lung bases, stable.    EBENEZER BRYANT MD   US Abdomen Limited    Narrative    EXAM: US ABDOMEN LIMITED  LOCATION: [facility]  DATE/TIME: 4/16/2021 5:42 PM    INDICATION: Diffuse abdominal pain.  COMPARISON: CT from today.  TECHNIQUE: Limited abdominal ultrasound.    FINDINGS:    GALLBLADDER: Biliary sludge and small stones are present. There is mild gallbladder wall thickening but no definite tenderness over the gallbladder  with transducer pressure.    BILE DUCTS: No biliary dilatation. The common duct measures 6 mm.    LIVER: Normal parenchyma with smooth contour. No focal mass.    RIGHT KIDNEY: No hydronephrosis. Diffuse cortical thinning.    PANCREAS: The visualized portions are normal.    No ascites.      Impression    IMPRESSION:  1.  Gallstones and biliary sludge. There is mild gallbladder wall thickening.  2.  No definite tenderness over the gallbladder with transducer pressure. Bile ducts normal in caliber.           This note was created using voice recognition software. Undetected word substitutions or other errors may have occurred.     Time spent with the patient, reviewing the EMR, reviewing laboratory and imaging studies, more than 50% of which was counseling and coordinating care:  30 minutes.     Lesvia Stallings MD

## 2021-04-17 NOTE — ANESTHESIA POSTPROCEDURE EVALUATION
Patient: Jacob Easton    Procedure(s):  CHOLECYSTECTOMY, LAPAROSCOPIC    Diagnosis:Acute cholecystitis [K81.0]  Diagnosis Additional Information: No value filed.    Anesthesia Type:  General    Note:     Postop Pain Control: Uneventful            Sign Out: Well controlled pain   PONV: No   Neuro/Psych: Uneventful            Sign Out: Acceptable/Baseline neuro status   Airway/Respiratory: Uneventful            Sign Out: Acceptable/Baseline resp. status   CV/Hemodynamics: Uneventful            Sign Out: Acceptable CV status   Other NRE: NONE   DID A NON-ROUTINE EVENT OCCUR? No         Last vitals:  Vitals:    04/17/21 1335 04/17/21 1340 04/17/21 1345   BP: (!) 127/108 121/69 129/77   Pulse: 88 88 79   Resp: 21 18 22   Temp:      SpO2: (!) 89% 95% 94%       Last vitals prior to Anesthesia Care Transfer:  CRNA VITALS  4/17/2021 1228 - 4/17/2021 1328      4/17/2021             Pulse:  87    SpO2:  98 %          Electronically Signed By: Luís Jones MD  April 17, 2021  2:28 PM

## 2021-04-17 NOTE — PROGRESS NOTES
Cross coverage.    I was paged about a VBG showing significant hypercapnia and respiratory acidosis.    Patient was seen at bedside.  He was quite sleepy when I was in the ER.  His tidal volume was between 1  while on the BiPAP.  But after waking up, his tidal volume promptly increased.  Respiratory therapist is also in the room.  He is easily arousable.  Currently complaining of abdominal pain after waking up.    Inte rventions:  IPAP increased, respiratory rate increased to 16.  Tidal volume with a higher IPAP of 14 is ranging between 600-800 and quite satisfactory.  There is also some leak which was fixed by the respiratory therapist.  Repeat VBG in 1 hour.  In the meanwhile, continue neurological monitoring.  Oxygen saturation is satisfactory, 98 to 100%.

## 2021-04-17 NOTE — ANESTHESIA PROCEDURE NOTES
Airway       Patient location during procedure: OR  Staff -        Anesthesiologist:  Alexa Morin APRN CRNA       Performed By: CRNA  Consent for Airway        Urgency: elective  Indications and Patient Condition       Indications for airway management: brittanie-procedural       Induction type:intravenous       Mask difficulty assessment: 1 - vent by mask    Final Airway Details       Final airway type: endotracheal airway       Successful airway: ETT - single and Oral  Endotracheal Airway Details        ETT size (mm): 8.0       Cuffed: yes       Successful intubation technique: direct laryngoscopy       DL Blade Type: Stafnord 3       Grade View of Cords: 1       Adjucts: stylet       Position: Right       Bite block used: Soft    Post intubation assessment        Placement verified by: capnometry, equal breath sounds and chest rise        Number of attempts at approach: 1       Ease of procedure: easy       Dentition: Intact    Medication(s) Administered   Medication Administration Time: 4/17/2021 10:30 AM

## 2021-04-17 NOTE — PROGRESS NOTES
RT note: pt transported from PACU without bipap, pt was having periods of apnea once in room. Bipap placed back on patient 14/7 70% until pt becomes more awake and responsive.     Patricia Nuñez, RRT

## 2021-04-17 NOTE — ANESTHESIA CARE TRANSFER NOTE
Patient: Jacob Easton    Procedure(s):  CHOLECYSTECTOMY, LAPAROSCOPIC    Diagnosis: Acute cholecystitis [K81.0]  Diagnosis Additional Information: No value filed.    Anesthesia Type:   General     Note:    Oropharynx: oropharynx clear of all foreign objects and spontaneously breathing    Oxygen Supplementation: face mask    Independent Airway: airway patency satisfactory and stable  Dentition: dentition unchanged  Vital Signs Stable: post-procedure vital signs reviewed and stable  Report to RN Given: handoff report given  Patient transferred to: PACU  Comments: Report and signed off to RN in PACU.  Good Resps, skin pink, VSS, O2 via face tent.  Handoff Report: Identifed the Patient, Identified the Reponsible Provider, Reviewed the pertinent medical history, Discussed the surgical course, Reviewed Intra-OP anesthesia mangement and issues during anesthesia, Set expectations for post-procedure period and Allowed opportunity for questions and acknowledgement of understanding      Vitals: (Last set prior to Anesthesia Care Transfer)  CRNA VITALS  4/17/2021 1233 - 4/17/2021 1303      4/17/2021             Pulse:  87    SpO2:  98 %        Electronically Signed By: SUNDAY Perkins CRNA  April 17, 2021  1:03 PM

## 2021-04-17 NOTE — PROGRESS NOTES
[facility]  Hospitalist Progress Note  Arnol Raygoza MD 04/17/2021      Reason for Stay (Diagnosis): Acute calculus gangrenous cholecystitis         Assessment and Plan:      Summary of Stay: Jacob Easton is a 89 year old male with past medical history including RA who is now wheelchair-bound, CKD, A. fib, pulmonary alveolitis and BPH admitted on 4/16/2021 with right upper quadrant abdominal pain found to have leukocytosis and CT scan showing acute calculus cholecystitis in addition to an asymmetric masslike wall thickening in the cecum/ascending colon felt to potentially represent malignancy.  He was admitted and started on IV fluids, IV antibiotics with ceftriaxone and surgical consultation.  He was taken for cholecystectomy on 4/17/2021 and found to have a partially gangrenous gallbladder which was removed.  He remains somewhat somnolent after surgery and will remain admitted at least overnight while we await mental clearing and continue to treat with IV antibiotics.    Problem List/Assessment and Plan:   1. Acute calculus gangrenous cholecystitis: Status post cholecystectomy on 4/17.  Continue IV antibiotics in the form of Zosyn for the time being.  Will gradually advance his diet as tolerated.    2.   Acute metabolic encephalopathy, consider infectious encephalopathy as well: Remains somewhat encephalopathic following surgery.  Will await clearing from anesthesia.  Redirect as able, consider a sitter if needed.  --Check VBG  --consider low dose haldol if needed for agitation    3.   History of RA: Only on 5 mg oral daily so not given stress dose steroids.  If he were to become hypotensive would consider hydrocortisone.    4.   History of pulmonary alveolitis: Continue home inhalers.    5.   History of hypertension: Resume home amlodipine and metoprolol with hold parameters  6.  Acute hypoxemic respiratory failure: Appears to be due to poor respiratory drive in the setting of  "lingering anesthesia.  Wean oxygen as able.  Did require BiPAP in the PACU.  7.  History of BPH: Continue Flomax.  Is at risk for worsened urinary retention postop.  Monitor.  8.  Reported hx of dementia per chart, though his daughter firmly disputes this.  9.  Intra-op a-fib: likely provoked.  Will monitor.  Now in NSR.      DVT Prophylaxis: Pneumatic Compression Devices  Code Status: DNR / DNI  Discharge Dispo/Date: At least 1 to 2 days more here.        Interval History (Subjective):      Patient admitted last night, I assumed care today  discussed with general surgery, 2 OR today for cholecystectomy  Reportedly his gallbladder was at least partially gangrenous  In the PACU remained somewhat encephalopathic and hypoxic, will remain admitted overnight for clearing from anesthesia and supplemental oxygen  I did call his daughter, Mary, for an update this afternoon                  Physical Exam:      Last Vital Signs:  /77   Pulse 79   Temp 97.1  F (36.2  C) (Temporal)   Resp 22   Ht 1.867 m (6' 1.5\")   Wt 104.3 kg (230 lb)   SpO2 94%   BMI 29.93 kg/m        Intake/Output Summary (Last 24 hours) at 4/17/2021 1439  Last data filed at 4/17/2021 1300  Gross per 24 hour   Intake 1800 ml   Output 310 ml   Net 1490 ml       General: Encephalopathic man, seems confused and not significantly interactive with me.  HEENT: NC/AT, eyes anicteric  Cardiac: RRR, S1, S2.    Pulmonary: Normal chest rise, normal work of breathing.  Lungs CTA BL  Abdomen:  non-distended.  Bowel Sounds Present.  Extremities: no deformities.  Warm, well perfused.  Skin: no rashes or lesions noted.  Warm and Dry.  Neuro: No focal deficits noted.  Encephalopathic.         Medications:      All current medications were reviewed with changes reflected in problem list.         Data:      All new lab and imaging data was reviewed.   Labs:  Recent Labs   Lab 04/17/21  0620      POTASSIUM 4.0   CHLORIDE 103   CO2 30   ANIONGAP 2*   GLC " 113*   BUN 25   CR 1.49*   GFRESTIMATED 40*   GFRESTBLACK 47*   MERCEDES 8.3*     Recent Labs   Lab 04/17/21  0620   WBC 17.1*   HGB 13.2*   HCT 43.0   MCV 98         Imaging:   Results for orders placed or performed during the hospital encounter of 04/16/21   Abd/pelvis CT,  IV  contrast only TRAUMA / AAA    Narrative    CT ABDOMEN PELVIS W CONTRAST 4/16/2021 2:26 PM    CLINICAL HISTORY: Right lower quadrant pain    TECHNIQUE: CT scan of the abdomen and pelvis was performed following  injection of IV contrast. Multiplanar reformats were obtained. Dose  reduction techniques were used.  CONTRAST: 100mL Isovue-370    COMPARISON: 11/16/2015    FINDINGS:   LOWER CHEST: Multiple cysts in the visualized lung bases.    HEPATOBILIARY: Acute calculus cholecystitis with a few calcified  gallstones in the gallbladder, pericholecystic fluid and inflammatory  changes. No biliary ductal dilatation. No focal lesions of the liver.    PANCREAS: Age-related pancreatic parenchymal atrophy. No  peripancreatic inflammatory changes.    SPLEEN: Normal.    ADRENAL GLANDS: Normal.    KIDNEYS/BLADDER: Bilateral renal simple cyst requiring no follow-up.  Nonobstructing right renal calculus measuring 3 mm. No  hydroureteronephrosis. Evaluation of the urinary bladder is limited by  streak artifact from bilateral total hip arthroplasty. Bladder wall  trabeculation and cellule formation.    BOWEL:  Asymmetric wall thickening in the cecum/low ascending colon  measuring 2.7 cm in length (axial series 4, image 129 and coronal  series 5, image 58) is indeterminate but could represent malignancy.  Small hiatal hernia. Normal caliber loops of small bowel and colon.  Colonic diverticulosis. Redundant sigmoid colon. Normal appendix    PELVIC ORGANS: Evaluation limited by streak artifact from bilateral  KANE. Prostatomegaly.    ADDITIONAL FINDINGS: No significant free fluid. No lymphadenopathy. No  abdominal aortic aneurysm. Aortobiiliac  atherosclerotic  calcifications. 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 No extraluminal  air. Fat-containing umbilical hernia.    MUSCULOSKELETAL: Bilateral total hip arthroplasties. No suspicious  lesions in the bones. Degenerative changes in the spine.      Impression    IMPRESSION:   1.  Acute calculus cholecystitis.  2.  Asymmetric masslike wall thickening in the cecum/ascending colon  is indeterminate but could represent malignancy. Colonoscopic  correlation should be considered.  3.  Prostatomegaly and evidence of chronic bladder outlet obstruction  with urinary bladder wall thickening and cellule formation.  4.  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  5.  Multiple cysts in the visualized lung bases, stable.    EBENEZER BRYANT MD   US Abdomen Limited    Narrative    EXAM: US ABDOMEN LIMITED  LOCATION: [facility]  DATE/TIME: 4/16/2021 5:42 PM    INDICATION: Diffuse abdominal pain.  COMPARISON: CT from today.  TECHNIQUE: Limited abdominal ultrasound.    FINDINGS:    GALLBLADDER: Biliary sludge and small stones are present. There is mild gallbladder wall thickening but no definite tenderness over the gallbladder with transducer pressure.    BILE DUCTS: No biliary dilatation. The common duct measures 6 mm.    LIVER: Normal parenchyma with smooth contour. No focal mass.    RIGHT KIDNEY: No hydronephrosis. Diffuse cortical thinning.    PANCREAS: The visualized portions are normal.    No ascites.      Impression    IMPRESSION:  1.  Gallstones and biliary sludge. There is mild gallbladder wall thickening.  2.  No definite tenderness over the gallbladder with transducer pressure. Bile ducts normal in caliber.               Arnol Raygoza MD , MD.

## 2021-04-18 NOTE — PROGRESS NOTES
Pt placed on BIPAP for SOB and increased WOB.    Pt placed on BIPAP 14/6 on 45%.    Pt's BS have been diminished with sat's in the high 90's.    Will continue to follow and assess.    Jimena Bradley, RT on 4/18/2021 at 2:20 PM

## 2021-04-18 NOTE — PROGRESS NOTES
"[facility]   General Surgery Progress Note           Assessment and Plan:   Assessment:   Acute gangrenous cholecystitis  POD#1 s/p laparoscopic cholecystectomy with peritoneal drain placement  Path pending  Multiple comorbidities      Plan:   -ok to ADAT from surgical perspective  -continue abx: IV Zosyn  -Tylenol for pain, minimize narcotics  -VTE prophylaxis:  PCDs  -continue drain   -hospitalist managing medical issues         Interval History:   Afebrile. Pleasant, oxymask in place.  Feels much improved from preop, denies abd pain.  + appetite, wants cream of wheat or oatmeal.          Physical Exam:   Blood pressure 118/70, pulse 98, temperature 98.1  F (36.7  C), temperature source Temporal, resp. rate 20, height 1.867 m (6' 1.5\"), weight 104.3 kg (230 lb), SpO2 95 %.    I/O last 3 completed shifts:  In: 2720 [P.O.:50; I.V.:2670]  Out: 340 [Drains:40; Blood:300]    Abdomen:   soft, round/obese, non-tender and hypoactive bowel sounds   Inc(s) - clean, dry, intact        [place] - clear, brown. (color may be due to Surgicel hemostatic agent)          Data:     Recent Labs   Lab 04/18/21  0622 04/17/21  0620 04/16/21  1309   WBC 16.3* 17.1* 15.0*   HGB 12.7* 13.2* 14.6   HCT 42.4 43.0 45.4   MCV 99 98 94    246 282       Paulette Damon PA-C     Agree with above. No flatus or BM yet. Minimal pain and no nausea. Feels much better than pre-op.  Abdomen mildly distended, non-tender. Drain is red/brown, likely due to surgicel hemostatic agent in gallbladder bed. On nasal canula and breathing comfortably. Can start advancing diet slowly. Keep drain until confirmed non-bilious on regular diet. Pathology pending. Recommend outpatient colonoscopy to address incidental finding of ascending colon wall thickening/possible mass.     Lesvia Stallings MD  "

## 2021-04-18 NOTE — PROVIDER NOTIFICATION
Notified by tele tech that pt has converted back to a fib, controlled rates. Pt did briefly go into an a fib in PACU. Will page admitting pager with this and await any new orders.     [phone]) spoke by phone with Dr. Bellamy, no new orders at this time as pt is rate controlled in the 80's.

## 2021-04-18 NOTE — PROGRESS NOTES
SPIRITUAL HEALTH SERVICES Progress Note  [facility]    Met with Jacob per follow-up request. He is a retired [employer] , with a wealth of knowledge in regards to many aspects of the [employer]. Jacob lives at [facility] and feels supported and connected to the staff chaplains. Jacob also has 8 children and 18 grandchildren; his main sources of support. [facility] remains available as needed.      Coretta Pablo  Chaplain Resident

## 2021-04-18 NOTE — PROGRESS NOTES
Cross Cx:    Patient developed A. Fib with CVR. No prior diagnosis of atrial fibrillation but did have a brief episode post-operatively believed to be provoked. At this time patient is asymptomatic and HR is in the 80s. Continue to monitor. Day team to determine if anticoagulation appropriate given recurrent a. Fib

## 2021-04-18 NOTE — PLAN OF CARE
Pt A/O x 4, VSS, pt denies pain, headache, dizziness, & N/V. Pt is dyspneic upon exertion. Lung sounds diminished, coarse wheezes, 3L Oxymask. Tele: Afib, CVR. Pt Asst: 2 w/lift. LALI Drain in place. Post-op day 1. IV Zosyn. Will continue with plan of care.    Daughter Mary ([phone]) is family contact (and POA) would like daily updates from MD.    Pt had 2.0 second tele pause - pt not symptomatic, VSS - FYI page to MD

## 2021-04-18 NOTE — PROGRESS NOTES
SPIRITUAL HEALTH SERVICES Progress Note  RH     Visit with pt, per request of pt and staff.     Pt appeared sleepy during my visit, and was breathing with assistance of bi-pap mask.    He stated that he is a member of [employer] in [place], and asked me to let them know that he is here.  He also requested prayer, which I provided.  Pt shared no other needs with me at present.    SH team continues to be available for pt support, per need or request.  I contacted [facility], and will also let other chaplains know that pt is receptive to spiritual health visits.                                                                                                                                               Tabitha Drew M.A.  Staff   Phone [phone]

## 2021-04-18 NOTE — PLAN OF CARE
Pt is much more alert and oriented this am. Pt off bipap, on 3 liters oxygen by oxymask. Pt remains with a sitter. Pt voiding per usual, incontinent at baseline. Tele reading SR.

## 2021-04-18 NOTE — PROGRESS NOTES
A BiPAP of 14/6 @ 35% was applied to the pt via the mask for an increase in WOB and/or SOB. Skin Barrier in place. Bs diminished. Will continue to monitor and assess the pt's current respiratory status and needs.    Norris Tsang RT on 4/18/2021 at 5:02 AM

## 2021-04-18 NOTE — CONSULTS
Care Management Initial Consult    General Information  Assessment completed with: Spoke with pt daughterMary and Cindy Contreras  Type of CM/SW Visit: Initial Assessment    Primary Care Provider verified and updated as needed: Yes   Readmission within the last 30 days:        Reason for Consult: discharge planning  Advance Care Planning: Advance Care Planning Reviewed: present on chart          Communication Assessment  Patient's communication style: spoken language (English or Bilingual)             Cognitive  Cognitive/Neuro/Behavioral: WDL  Level of Consciousness: alert  Arousal Level: opens eyes spontaneously  Orientation: oriented x 4  Mood/Behavior: calm, cooperative  Best Language: 0 - No aphasia  Speech: clear, spontaneous, logical    Living Environment:   People in home: facility resident     Current living Arrangements: assisted living      Able to return to prior arrangements: yes       Family/Social Support:  Care provided by: self, other (see comments)  Provides care for: no one     Children          Description of Support System: Supportive, Involved    Support Assessment: Adequate family and caregiver support    Current Resources:   Patient receiving home care services: No     Community Resources: DME, Housekeeping/[employer], [employer]  Equipment currently used at home: shower chair, wheelchair, manual  Supplies currently used at home:      Employment/Financial:  Employment Status: retired        Financial Concerns: No concerns identified      Lifestyle & Psychosocial Needs:        Socioeconomic History     Marital status:      Spouse name: Not on file     Number of children: Not on file     Years of education: Not on file     Highest education level: Not on file     Tobacco Use     Smoking status: Never Smoker     Smokeless tobacco: Never Used   Substance and Sexual Activity     Alcohol use: No     Alcohol/week: 0.0 standard drinks       Functional Status:  Prior to admission  patient needed assistance:   Dependent ADLs:: Bathing, Transfers  Dependent IADLs:: Cooking, Cleaning, Laundry, Meal Preparation, Medication Management       Mental Health Status:  Mental Health Status: No Current Concerns       Chemical Dependency Status:  Chemical Dependency Status: No Current Concerns             Values/Beliefs:  Spiritual, Cultural Beliefs, Yazidi Practices, Values that affect care: no               Additional Information:  Patient admitted 4/16 with acute cholecystitis s/p cholecystectomy 4/17. Spoke with patient daughter, Mary. She verifies patient resides at the [facility]. Daughter anticipates patient can return to [facility] upon discharge. She would like [facility] transportation. Reviewed out of pocket [facility] transportation cost $75/ml and $5 per mile to destination.  Daughter agreeable to transport cost.     Spoke with [facility] Nurse, Cindy, ph#[phone], fax#[phone].  She states patient wheelchair bound at baseline, Ax1 with transfers, stands and pivots to shower chair. Facility provides assist with medications, meals, bathing, grooming, pendant, safety checks. Patient does not use oxygen or cpap. For returns, facility prefers early discharge or before 3pm. [facility]     [facility] transportation to be arranged for discharge.     Will continue to follow for discharge plan of care.     Talat Hagan RN Case Manager  Inpatient Care Coordination   [facility]   [phone]    Talat Hagan RN

## 2021-04-18 NOTE — PROGRESS NOTES
[facility]  Hospitalist Progress Note  Arnol Raygoza MD 04/18/2021    Reason for Stay (Diagnosis): Acute calculus gangrenous cholecystitis         Assessment and Plan:      Summary of Stay: aJcob Easton is a 89 year old male with past medical history including RA who is now wheelchair-bound, CKD, A. fib, pulmonary alveolitis and BPH admitted on 4/16/2021 with right upper quadrant abdominal pain found to have leukocytosis and CT scan showing acute calculus cholecystitis in addition to an asymmetric masslike wall thickening in the cecum/ascending colon felt to potentially represent malignancy.  He was admitted and started on IV fluids, IV antibiotics with ceftriaxone and surgical consultation.  He was taken for cholecystectomy on 4/17/2021 and found to have a partially gangrenous gallbladder which was removed.  He remains somewhat somnolent after surgery and remained admitted while we await mental clearing and continue to treat with IV antibiotics.  He did require BiPAP overnight following surgery due to hypercapnic respiratory failure which I suspect is due to depressed respiratory drive from sedation from anesthesia.  Respiratory acidosis has improved and encephalopathy seems resolved.      Problem List/Assessment and Plan:   1. Acute calculus gangrenous cholecystitis: Status post cholecystectomy on 4/17.  Continue IV antibiotics in the form of Zosyn for the time being.  Will gradually advance his diet as tolerated.    2.   Acute metabolic encephalopathy, combination from anesthesia and hypercapnic respiratory failure:  Seems resolved. Required BiPAP in the evening after surgery with improvement in PCO2 which peaked at 103.  Redirect as able, consider a sitter if needed.  --Check VBG if mental status worsens.  --consider low dose haldol if needed for agitation  --To new to treat supportively and monitor mental status    3.   History of RA: Only on 5 mg oral daily so not given stress dose  "steroids.  If he were to become hypotensive would consider hydrocortisone.    4.   History of pulmonary alveolitis: Continue home inhalers.    5.   History of hypertension: Resume home amlodipine and metoprolol with hold parameters  6.  Acute hypoxemic respiratory failure: Appears to be due to poor respiratory drive in the setting of lingering anesthesia.  Wean oxygen as able.  Did require BiPAP in the PACU.  7.  History of BPH: Continue Flomax.  Is at risk for worsened urinary retention postop.  Monitor.  8.  Reported hx of dementia per chart, though his daughter firmly disputes this.  9.  Paroxysmal A. fib: likely provoked by acute illness.  Will monitor.  Now in controlled rate A. fib.  Will monitor for now, we will not start anticoagulation in the setting of postop state and encephalopathy with increased fall risk.  We will continue to reassess.  10.  Wheelchair bound at baseline.    DVT Prophylaxis: Pneumatic Compression Devices  Code Status: DNR / DNI  Discharge Dispo/Date: At least 1 to 2 days more here to advance diet and continue empiric abx.  Likely back [facility] possibly with some extra services.          Interval History (Subjective):      Required ongoing BiPAP overnight due to hypercapnic respiratory failure  PCO2 improved this morning after peaking at 103 on ABG overnight  Much more awake/alert.  Wants to start with some clear liquids today  Controlled a-fib overnight, see above.  I updated his daughter, Mary.                    Physical Exam:      Last Vital Signs:  BP (!) 137/97 (BP Location: Left arm)   Pulse 99   Temp 98  F (36.7  C) (Temporal)   Resp 22   Ht 1.867 m (6' 1.5\")   Wt 104.3 kg (230 lb)   SpO2 98%   BMI 29.93 kg/m        Intake/Output Summary (Last 24 hours) at 4/17/2021 1439  Last data filed at 4/17/2021 1300  Gross per 24 hour   Intake 1800 ml   Output 310 ml   Net 1490 ml       General: awake, elderly man, pleasant and interactive though very mildly confused " about recent events.  HEENT: NC/AT, eyes anicteric  Cardiac: RRR, S1, S2.    Pulmonary: Normal chest rise, normal work of breathing.  Lungs CTA BL  Abdomen:  non-distended.  Bowel Sounds Present.  Extremities: no deformities.  Warm, well perfused.  Skin: no rashes or lesions noted.  Warm and Dry.  Neuro: No focal deficits noted.  Speech clear.  Happy affect.         Medications:      All current medications were reviewed with changes reflected in problem list.         Data:      All new lab and imaging data was reviewed.   Labs:  Recent Labs   Lab 04/18/21  0622      POTASSIUM 4.7   CHLORIDE 105   CO2 31   ANIONGAP 2*   *   BUN 29   CR 1.58*   GFRESTIMATED 37*   GFRESTBLACK 43*   MERCEDES 8.2*     Recent Labs   Lab 04/18/21  0622   WBC 16.3*   HGB 12.7*   HCT 42.4   MCV 99         Imaging:   Results for orders placed or performed during the hospital encounter of 04/16/21   Abd/pelvis CT,  IV  contrast only TRAUMA / AAA    Narrative    CT ABDOMEN PELVIS W CONTRAST 4/16/2021 2:26 PM    CLINICAL HISTORY: Right lower quadrant pain    TECHNIQUE: CT scan of the abdomen and pelvis was performed following  injection of IV contrast. Multiplanar reformats were obtained. Dose  reduction techniques were used.  CONTRAST: 100mL Isovue-370    COMPARISON: 11/16/2015    FINDINGS:   LOWER CHEST: Multiple cysts in the visualized lung bases.    HEPATOBILIARY: Acute calculus cholecystitis with a few calcified  gallstones in the gallbladder, pericholecystic fluid and inflammatory  changes. No biliary ductal dilatation. No focal lesions of the liver.    PANCREAS: Age-related pancreatic parenchymal atrophy. No  peripancreatic inflammatory changes.    SPLEEN: Normal.    ADRENAL GLANDS: Normal.    KIDNEYS/BLADDER: Bilateral renal simple cyst requiring no follow-up.  Nonobstructing right renal calculus measuring 3 mm. No  hydroureteronephrosis. Evaluation of the urinary bladder is limited by  streak artifact from bilateral  total hip arthroplasty. Bladder wall  trabeculation and cellule formation.    BOWEL:  Asymmetric wall thickening in the cecum/low ascending colon  measuring 2.7 cm in length (axial series 4, image 129 and coronal  series 5, image 58) is indeterminate but could represent malignancy.  Small hiatal hernia. Normal caliber loops of small bowel and colon.  Colonic diverticulosis. Redundant sigmoid colon. Normal appendix    PELVIC ORGANS: Evaluation limited by streak artifact from bilateral  KANE. Prostatomegaly.    ADDITIONAL FINDINGS: No significant free fluid. No lymphadenopathy. No  abdominal aortic aneurysm. Aortobiiliac atherosclerotic  calcifications. 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 No extraluminal  air. Fat-containing umbilical hernia.    MUSCULOSKELETAL: Bilateral total hip arthroplasties. No suspicious  lesions in the bones. Degenerative changes in the spine.      Impression    IMPRESSION:   1.  Acute calculus cholecystitis.  2.  Asymmetric masslike wall thickening in the cecum/ascending colon  is indeterminate but could represent malignancy. Colonoscopic  correlation should be considered.  3.  Prostatomegaly and evidence of chronic bladder outlet obstruction  with urinary bladder wall thickening and cellule formation.  4.  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  5.  Multiple cysts in the visualized lung bases, stable.    EBENEZER BRYANT MD   US Abdomen Limited    Narrative    EXAM: US ABDOMEN LIMITED  LOCATION: [facility]  DATE/TIME: 4/16/2021 5:42 PM    INDICATION: Diffuse abdominal pain.  COMPARISON: CT from today.  TECHNIQUE: Limited abdominal ultrasound.    FINDINGS:    GALLBLADDER: Biliary sludge and small stones are present. There is mild gallbladder wall thickening but no definite tenderness over the gallbladder with transducer pressure.    BILE DUCTS: No  biliary dilatation. The common duct measures 6 mm.    LIVER: Normal parenchyma with smooth contour. No focal mass.    RIGHT KIDNEY: No hydronephrosis. Diffuse cortical thinning.    PANCREAS: The visualized portions are normal.    No ascites.      Impression    IMPRESSION:  1.  Gallstones and biliary sludge. There is mild gallbladder wall thickening.  2.  No definite tenderness over the gallbladder with transducer pressure. Bile ducts normal in caliber.               Arnol Raygoza MD , MD.

## 2021-04-18 NOTE — PLAN OF CARE
"Patient had lap cholestectomy, lethargic, alert to self, lap sites CDI, LALI drain minimum amount of serous drainage, back from PACU with Oxygen @2LPM via n/c, was hypoxic, RT paged and was placed back on Bipap, MD updated with VBG result,  at bedside, NPO, updated daughter Mary through Telephone, recheck VBG AM, IVF LR@75ml/hr, will continue with POC.    22:00 Patient requested to talk to his  states \"he is dying\". Daughter spoke to patient to contact his , RN told daughter that we have in house .  Charge RN called  if they are available to come in tonight.  "

## 2021-04-19 NOTE — PROGRESS NOTES
Rapid Response:     CC: Code Stroke    HPI: Mr. Easton is a 89 yo male admitted 4/16/21 from [facility] fir acute calculus gangrenous cholecystitis s/p cholecystectomy 4/17/21. There is a report of dementia but family disputes this and reports he has no cognitive issues. Reports memory is intact without impairment. Nursing initial paged a rapid response for acute agitation and potential right-sided facial droop. On arrival patient is agitated and confused. Unwilling to participate in neuro exam. Attempting to get out of bed. Reports seeing a boat and people need to get on it. There is only room for one more person. Reports being in [facility]. Denies pain. However, ROS is limited given mentation change.     Vitals:  HR 89, /57, Pulse Ox 95% on 5 L NC.     Exam:   Constitutional: Awake, alert, agitated, confused, not will to cooperative, mild distress. Ill in appearance  HEENT: Atraumatic. Normocephalic. Conjunctiva non-injected. Sclera anicteric. MMM. Neuro: Alert. Not oriented to persona place or time. Moves upper and lower extremities and is able to provide resistance. Shakes head yes/no. Puffs checks. Unwilling to participate in exam. Unable to assess sensory function. No slurred speech. Slight droop of right-side of face (confirmed with nursing and family this is new)    Labs:   4/18 AM labs: sodium 138, potassium 4.7, chloride 105, Co2 31, BUN 29, creatinine 1.58 (baseline 1.4-1.6), glucose 101. lactic acid 1.2. VBG pH 7.31 pCO2 58 WBC 16.3, Hemoglobin 12.7, Plt 239.     Assessment/Plan  Suspicion for CVA vs TIA  Unable to obtain full neurological exam give agitation and lack of cooperation. Nursing reporting right-sided facial droop and increased confusion. Patient does not have a history of TIA/CVA confirmed by chart review and family.   -Code Stroke  -Stat CT head w/o contrast, CT head perfusion, CTA head/neck - perfusion scans added per Radiology   -Labs - CBC/BMP/PT/PTT/INR/Troponin  I/Glucose check  -EKG ordered  -CO2 retention post-procedure. Recheck VBF  -Vitals/Neuro checks  -Hold amlodipine and metoprolol for permissive HTN - likely resume in AM if neurological symptoms resolved and CT negative.   -Hold tele stroke consult until repeat neuro assessment/head CT. Lytics likely contraindicated given recent surgery and on Lovenox DVT prophylaxis    Active delirium  Patient has risk factors for delirium given acute illness and advanced age. Haldol 1 mg IV given to reduce agitation for procedure. Continue delirium prevention precautions. If he remains agitated will need to consider bedside sitter for safety.     Family update: yes, updated daughter (Mary Laboy)    Addendum: Discussed imaging with Radiology. Negative for imaging. Continue to monitor vitals and neurological symptoms overnight. Suspect active delirium contributing to symptoms. If neurological symptoms new or persistent can consider MRI in AM.     Addendum: Neurostroke did evaluate the patient. Recommended deescalating of code stroke cares. No change in care plan or recommendations at this time.

## 2021-04-19 NOTE — PLAN OF CARE
Safety check was completed in 2300, pt was asleep. This nurse walked in 0000 hour, pt is confused, not alert to self, pulling on gown, and pushing staff away when checking v/s. This nurse called charge nurse in 0020, called RRT. Hospitalist was in room, code stroke occurred. Haldol IV was given due to pt combative state. Pt was taken down to CT and for a CTA. When pt returned, pt is more calm and cooperative. Stroke neurologist evaluated pt,and pt is now alert to self but drowsy due to haldol dose. no acute therapies needed at this time. If continued alerted mental status then poss MRI. Pt is sleeping currently, refusing new v/s, pt is on 5L mask. Continuous pulse ox on.

## 2021-04-19 NOTE — PLAN OF CARE
"VSS. Pt disoriented to time/situation. Denies pain at rest but says \"ouch\" with turning in bed. Tylenol given prior to lifting patient to chair. Neuro check WDL. Pt lethargic this AM but more alert throughout the shift. LS w/ coarse crackles. Cough congested. Tele Afib CVR w/ BBB. LALI drain patent, dressing changed today. Pt inc of urine and BMx2. Barrier cream applied to coccyx. Discharge TBD. SLP and PT consulted.   "

## 2021-04-19 NOTE — PLAN OF CARE
VSS, denies pain, Tele Afib CVR, HR 90's, LALI drain with serous drainage, IVF LR@75ml/hr, incontinent of urine, BM this shift, w/c bound at baseline, up with lift transfer, tolerating clear liquid diet, daughter Mary updated via Telephone, will continue with POC.

## 2021-04-19 NOTE — PROGRESS NOTES
CLINICAL SWALLOW EVALUATION       04/19/21 1400   General Information   Onset of Illness/Injury or Date of Surgery 04/16/21   Patient/Family Therapy Goal Statement (SLP) Patient would like to eat some lunch.   Pertinent History of Current Problem Patient admitted with cholecystitis, s/p cholecystectomy on 4/17/21.  His PMH is significant for RA who is now wheelchair-bound, CKD, A. fib, pulmonary alveolitis and BPH.  His chart refers to dementia dx, which his family refutes.  Patient is assessed to be encephalopathic during this admission.   General Observations Patient is alert, fatigued, pleasant.   Past History of Dysphagia Hx of dysphagia during hospitalization in 2015, no swallowing concerns/difficulty reported by patient.    Type of Evaluation   Type of Evaluation Swallow Evaluation   Oral Motor   Oral Musculature generally intact   Structural Abnormalities none present   Dentition (Oral Motor)   Dentition (Oral Motor) natural dentition   Facial Symmetry (Oral Motor)   Facial Symmetry (Oral Motor) WNL   Tongue Function (Oral Motor)   Tongue ROM (Oral Motor) WNL   Cough/Swallow/Gag Reflex (Oral Motor)   Volitional Throat Clear/Cough (Oral Motor) impaired;reduced strength   Comment, Cough/Swallow/Gag Reflex (Oral Motor) Somewhat weak cough response   Vocal Quality/Secretion Management (Oral Motor)   Vocal Quality (Oral Motor) WNL   General Swallowing Observations   Current Diet/Method of Nutritional Intake (General Swallowing Observations, NIS) full liquid diet   Respiratory Support (General Swallowing Observations) none   Swallowing Evaluation Clinical swallow evaluation   Clinical Swallow Evaluation   Feeding Assistance frequent cues/help required   Additional evaluation(s) completed today No   Clinical Swallow Eval: Thin Liquid Texture Trial   Mode of Presentation, Thin Liquids cup;straw;self-fed;fed by clinician   Volume of Liquid or Food Presented 10 oz   Oral Phase of Swallow Poor AP movement   Pharyngeal  Phase of Swallow impaired;coughing/choking   Diagnostic Statement Patient with increased bolus holding behavior as he fatigued, exhibited somewhat weak cough response after bolus holding.    Clinical Swallow Evaluation: Nectar-Thick Liquid Texture Trial   Mode of Presentation, Nectar straw;self-fed;fed by clinician   Volume of Nectar Presented 3 oz   Oral Phase, [place] Poor AP movement   Pharyngeal Phase, [place] intact   Diagnostic Statement Bolus holding, no overt Sx of aspiration   Clinical Swallow Evaluation: Solid Food Texture Trial   Mode of Presentation, Solid self-fed   Volume of Solid Food Presented 2 crackers   Oral Phase, Solid Poor AP movement   Pharyngeal Phase, Solid intact   Diagnostic Statement Slow oral phase, requiring liquid wash to clear   Esophageal Phase of Swallow   Esophageal comments Belching after drinking   Swallowing Recommendations   Diet Consistency Recommendations dysphagia level 2 (mechanically altered);nectar-thick liquids   Supervision Level for Intake close supervision needed   Mode of Delivery Recommendations bolus size, small;slow rate of intake   Monitoring/Assistance Required (Eating/Swallowing) stop eating activities when fatigue is present;monitor for cough or change in vocal quality with intake   Recommended Feeding/Eating Techniques (Swallow Eval) maintain upright sitting position for eating;maintain upright posture during/after eating for 30 minutes;provide assist with feeding   SLP Therapy Assessment/Plan   Criteria for Skilled Therapeutic Interventions Met (SLP Eval) yes;treatment indicated   SLP Diagnosis Mild to moderate oropharyngeal dysphagia, complicated by fatigue and generalized weakness   Rehab Potential (SLP Eval) fair, will monitor progress closely   Therapy Frequency (SLP Eval) 5 times/wk   Predicted Duration of Therapy Intervention (SLP Eval) 1 week   Therapy Plan Review/Discharge Plan (SLP)   Therapy Plan Review (SLP) evaluation/treatment results  reviewed;patient   SLP Discharge Planning    SLP Discharge Recommendation (DC Rec) Transitional Care Facility   SLP Rationale for DC Rec Patient below baseline swallowing function, would likely benefit from swallowing rehab for safe return to least restrictive diet.    SLP Brief overview of current status  Clinical swallow eval completed: recommend dysphagia diet level 2 with nectar thick liquids when alert and up to chair, ensure clearance of food/liquid before next food presentation.     Total Evaluation Time   Total Evaluation Time (Minutes) 30

## 2021-04-19 NOTE — PROGRESS NOTES
[facility]  Hospitalist Progress Note  Arnol Raygoza MD 04/19/2021    Reason for Stay (Diagnosis): Acute calculus gangrenous cholecystitis         Assessment and Plan:      Summary of Stay: Jacob Easton is a 89 year old male with past medical history including RA who is now wheelchair-bound, CKD, A. fib, pulmonary alveolitis and BPH admitted on 4/16/2021 with right upper quadrant abdominal pain found to have leukocytosis and CT scan showing acute calculus cholecystitis in addition to an asymmetric masslike wall thickening in the cecum/ascending colon felt to potentially represent malignancy.  He was admitted and started on IV fluids, IV antibiotics with ceftriaxone and surgical consultation.  He was taken for cholecystectomy on 4/17/2021 and found to have a partially gangrenous gallbladder which was removed.  He remained somewhat somnolent after surgery and he did require BiPAP overnight following surgery due to hypercapnic respiratory failure which I suspect is due to depressed respiratory drive from sedation from anesthesia.  Respiratory acidosis has improved and encephalopathy is waxing and waning.    Furthermore, on 4/18 he was noted to have some recurrence of hypoxia as well as wet cough.  Chest x-ray 4/19 is showing evidence of pneumonia, clinical concern for aspiration potentially in the postop setting.    There was some question of facial droop during a period of agitation early 4/18.  CT and CTA head were negative for acute pathology and stroke neurology was consulted.  Higher clinical concern is for toxic metabolic encephalopathy in the setting of recent surgery, unfamiliar setting and pneumonia.    He remains admitted for IV antibiotics and close monitoring of his mental status.      Problem List/Assessment and Plan:   1. Acute calculus gangrenous cholecystitis: Status post cholecystectomy on 4/17.  Continue IV antibiotics in the form of Zosyn for the time being.  Will  gradually advance his diet as tolerated.    2.   Acute metabolic encephalopathy, combination from anesthesia and hypercapnic respiratory failure, and pneumonia:  Required BiPAP in the evening after surgery with improvement in PCO2 which peaked at 103.  Redirect as able, consider a sitter if needed.    --Continue to treat pneumonia with antibiotics.  --Check VBG if mental status worsens.  --consider low dose haldol or Zyprexa if needed for agitation.  I did discuss this with his daughter.    3.   Suspected aspiration pneumonia with acute hypoxemic respiratory failure: As above.  Continue IV Zosyn, sputum culture sent.    4.   History of pulmonary alveolitis: Continue home inhalers.    5.   History of hypertension: Resume home amlodipine and metoprolol with hold parameters  6.  Acute hypoxemic respiratory failure: Appears to be due to poor respiratory drive in the setting of lingering anesthesia.  Wean oxygen as able.  Did require BiPAP in the PACU.  7.  History of BPH: Continue Flomax.  Is at risk for worsened urinary retention postop.  Monitor.  8.  Reported hx of dementia per chart, though his daughter firmly disputes this.  9.  Paroxysmal A. fib: likely provoked by acute illness.  Will monitor.  Now in controlled rate A. fib.  Will monitor for now, we will not start anticoagulation in the setting of postop state and encephalopathy with increased fall risk.  We will continue to reassess.  We will start aspirin.  10.  Wheelchair bound at baseline: Normally can at least pivot on one leg during transfers.  Deconditioning is worsened now, will consult PT.    11.  History of RA: Only on 5 mg oral daily so not given stress dose steroids.  If he were to become hypotensive would consider hydrocortisone.    DVT Prophylaxis: Pneumatic Compression Devices  Code Status: DNR / DNI  Discharge Dispo/Date: At least 2-4 days more here to advance diet and continue empiric abx and pending mental status.  [facility]ik[facility] back to [facility] possibly with some extra services.          Interval History (Subjective):      Overnight events noted, code stroke called with negative imaging  More awake and appropriate today though feels exhausted  Has a wet cough and remains on 6 L supplemental oxygen,  Chest x-ray obtained and shows evidence of pneumonia, continuing Zosyn  SLP evaluation for possible aspiration    I updated his daughter at length, she is reportedly his medical power of  and she plans to travel here from [place] to see him in the coming days                      Physical Exam:      Last Vital Signs:  /54 (BP Location: Right arm)   Pulse 62   Temp 98.3  F (36.8  C) (Axillary)   Resp 24   Ht 1.867 m (6' 1.5\")   Wt 104.3 kg (230 lb)   SpO2 98%   BMI 29.93 kg/m        Intake/Output Summary (Last 24 hours) at 4/17/2021 1439  Last data filed at 4/17/2021 1300  Gross per 24 hour   Intake 1800 ml   Output 310 ml   Net 1490 ml       General: awake, elderly man, pleasant and interactive though looks exhausted and is somewhat confused about recent events.  HEENT: NC/AT, eyes anicteric  Cardiac: RRR, S1, S2.    Pulmonary: Normal chest rise, normal work of breathing.  Bilateral wet and coarse lung sounds most prominent in the upper lobes.  Intermittent wet cough.  Abdomen:  non-distended.  Bowel Sounds Present.  Extremities: no deformities.  Warm, well perfused.  Skin: no rashes or lesions noted.  Warm and Dry.  Neuro: No focal deficits noted.  Speech clear.  Happy affect.         Medications:      All current medications were reviewed with changes reflected in problem list.         Data:      All new lab and imaging data was reviewed.   Labs:  Recent Labs   Lab 04/19/21  0125      POTASSIUM 3.9   CHLORIDE 108   CO2 29   ANIONGAP 1*   *   BUN 38*   CR 1.62*   GFRESTIMATED 36*   GFRESTBLACK 42*   MERCEDES 7.8*     Recent Labs   Lab 04/19/21  0125   WBC 11.9*   HGB 12.0*   HCT 38.4*   MCV 97         Imaging: "   Results for orders placed or performed during the hospital encounter of 04/16/21   Abd/pelvis CT,  IV  contrast only TRAUMA / AAA    Narrative    CT ABDOMEN PELVIS W CONTRAST 4/16/2021 2:26 PM    CLINICAL HISTORY: Right lower quadrant pain    TECHNIQUE: CT scan of the abdomen and pelvis was performed following  injection of IV contrast. Multiplanar reformats were obtained. Dose  reduction techniques were used.  CONTRAST: 100mL Isovue-370    COMPARISON: 11/16/2015    FINDINGS:   LOWER CHEST: Multiple cysts in the visualized lung bases.    HEPATOBILIARY: Acute calculus cholecystitis with a few calcified  gallstones in the gallbladder, pericholecystic fluid and inflammatory  changes. No biliary ductal dilatation. No focal lesions of the liver.    PANCREAS: Age-related pancreatic parenchymal atrophy. No  peripancreatic inflammatory changes.    SPLEEN: Normal.    ADRENAL GLANDS: Normal.    KIDNEYS/BLADDER: Bilateral renal simple cyst requiring no follow-up.  Nonobstructing right renal calculus measuring 3 mm. No  hydroureteronephrosis. Evaluation of the urinary bladder is limited by  streak artifact from bilateral total hip arthroplasty. Bladder wall  trabeculation and cellule formation.    BOWEL:  Asymmetric wall thickening in the cecum/low ascending colon  measuring 2.7 cm in length (axial series 4, image 129 and coronal  series 5, image 58) is indeterminate but could represent malignancy.  Small hiatal hernia. Normal caliber loops of small bowel and colon.  Colonic diverticulosis. Redundant sigmoid colon. Normal appendix    PELVIC ORGANS: Evaluation limited by streak artifact from bilateral  KANE. Prostatomegaly.    ADDITIONAL FINDINGS: No significant free fluid. No lymphadenopathy. No  abdominal aortic aneurysm. Aortobiiliac atherosclerotic  calcifications. 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 No extraluminal  air. Fat-containing umbilical  hernia.    MUSCULOSKELETAL: Bilateral total hip arthroplasties. No suspicious  lesions in the bones. Degenerative changes in the spine.      Impression    IMPRESSION:   1.  Acute calculus cholecystitis.  2.  Asymmetric masslike wall thickening in the cecum/ascending colon  is indeterminate but could represent malignancy. Colonoscopic  correlation should be considered.  3.  Prostatomegaly and evidence of chronic bladder outlet obstruction  with urinary bladder wall thickening and cellule formation.  4.  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  5.  Multiple cysts in the visualized lung bases, stable.    EBENEZER BRYANT MD   US Abdomen Limited    Narrative    EXAM: US ABDOMEN LIMITED  LOCATION: [facility]  DATE/TIME: 4/16/2021 5:42 PM    INDICATION: Diffuse abdominal pain.  COMPARISON: CT from today.  TECHNIQUE: Limited abdominal ultrasound.    FINDINGS:    GALLBLADDER: Biliary sludge and small stones are present. There is mild gallbladder wall thickening but no definite tenderness over the gallbladder with transducer pressure.    BILE DUCTS: No biliary dilatation. The common duct measures 6 mm.    LIVER: Normal parenchyma with smooth contour. No focal mass.    RIGHT KIDNEY: No hydronephrosis. Diffuse cortical thinning.    PANCREAS: The visualized portions are normal.    No ascites.      Impression    IMPRESSION:  1.  Gallstones and biliary sludge. There is mild gallbladder wall thickening.  2.  No definite tenderness over the gallbladder with transducer pressure. Bile ducts normal in caliber.               Arnol Raygoza MD , MD.

## 2021-04-19 NOTE — CONSULTS
"[facility]    Stroke Consult Note    Reason for Consult: Stroke Code    Chief Complaint: altered mental state      SAM Bolaños is a 89 year old male who was admitted 4 days ago with RUQ pain. Found to have cholecystitis. Now S/p laparoscopic cholecystectomy on 4/17/21. Now on zosyn. Was obtunded after the surgery due to respiratory failure with hypercarbia. Was put on BiPAP with improvement of CO2 so BiPAP was stopped. He was on 5L oxygen through nasal canula before the stroke code activation. Last known well 11 pm. At that time he was alert and oriented and able to communicate clearly. Then at midnight the nurse checked on him and found to be in a state of restlessness, combative, disoriented, not following commands, no discernable verbal output. Stroke code was activated. Patient was given 1 mg haldol IV before CT. On the way to CT, RN noticed that he was hypoxic down to 88%. He was put on BiPAP again.     After he came back from CT RN noticed that he became less combative and more talkative.     At baseline he has known RA and is wheelchair bound, CKD, A FIB on aspirin only, pulmonary alveolitis, and BPH.     Vital signs last 24h: no fever, no significant fluctuations of BP and HR.   Most recent vitals:   BP (P) 117/82 (BP Location: Left arm)   Pulse 89   Temp 97.6  F (36.4  C) (Oral)   Resp 22   Ht 1.867 m (6' 1.5\")   Wt 104.3 kg (230 lb)   SpO2 95%   BMI 29.93 kg/m      Results for BENEDICTO BOLAÑOS (MRN [id number]) as of 4/19/2021 01:55   4/17/2021 19:42 4/18/2021 06:22 4/19/2021 01:25   FIO2 40% 3LMask 35% Bipap   Ph Venous 7.31 (L) 7.31 (L) 7.33   PCO2 Venous 59 (H) 58 (H) 55 (H)   PO2 Venous 40 36 67 (H)   Bicarbonate Venous 30 (H) 29 (H) 29 (H)   Base Excess Venous 2.4 1.8 1.7   Oxyhemoglobin Venous 73 67 92       Results for BENEDICTO BOLAÑOS (MRN [id number]) as of 4/19/2021 01:37   4/18/2021 06:22 4/19/2021 01:25   WBC 16.3 (H) 11.9 (H)       CT and CTA done and I " "personally reviewed.   No acute pathology on CT. No occlusion or significant stenosis on CTA.           Thrombolytic Treatment   Not given due to stroke mimic: encephalopathy.    Endovascular Treatment  Not initiated due to absence of proximal vessel occlusion    Impression  Acute encephalopathy. suspect metabolic encephalopathy from recent surgery, respiratory failure given increased work of breathing and oxygen demands. ABG does not show worsening respiratory failure but labs were drawn after he was put on BiPAP. Patient is looking better now. He is calmer and more conversant but he remains obtunded. Stroke is unlikely but can't be completely excluded. Patient just had laparoscopic cholecystectomy and is 89 years old, the risk of harm is more than the chance of benefit from IV thrombolysis, therefore, I don't think we should administer TNK. The patient does not have large vessel occlusion to qualify for mechanical thrombectomy.       Recommendations  No acute revascularization therapies indicated.   Management of airway and ventilation per hospitalist.   If the patient's mental state does not improve after correcting his underlying metabolic conditions, an MRI should be done to rule out stroke.     Vascular neurology will sign off. Please call with questions.     Usman Ken MD, Msc, LUCIANO, FAAN   of Neurology  [facility]     04/19/2021 2:16 AM  To page me or covering stroke neurology team member, click here: AMCOM  Choose \"On Call\" tab at top, then search dropdown box for \"Neurology Adult\" & press Enter, look for Neuro ICU/Stroke      ______________________________________________________    Past Medical History   Past Medical History:   Diagnosis Date     Arthritis      Constipation      Hypertension      Hypocalcemia      Neuromuscular disorder (H)      Thyroid disease      Past Surgical History   Past Surgical History:   Procedure Laterality Date     C TOTAL HIP " ARTHROPLASTY  9/24/11    RT     Medications   Home Meds  Prior to Admission medications    Medication Sig Start Date End Date Taking? Authorizing Provider   amLODIPine (NORVASC) 10 MG tablet TAKE 1 TABLET BY MOUTH ONCE DAILY 4/2/21  Yes Jessica Hernandez APRN CNP   ASPIRIN LOW DOSE 81 MG EC tablet TAKE 1 TABLET BY MOUTH ONCE DAILY 4/2/21  Yes Jessica Hernandez APRN CNP   budesonide (PULMICORT) 0.5 MG/2ML neb solution NEBULIZE THE CONTENT OF 1 VIAL TWICE DAILY (ADD TO IN THE MORNING AND AT BEDTIME DUONEBS) 4/28/20  Yes Orestes Clement APRN CNP   furosemide (LASIX) 20 MG tablet Take 1.5 tablets (30 mg) by mouth daily 2/5/21  Yes Orestes Clement APRN CNP   ipratropium - albuterol 0.5 mg/2.5 mg/3 mL (DUONEB) 0.5-2.5 (3) MG/3ML neb solution NEBULIZE THE CONTENT OF 1 VIAL INTO THE LUNGS FOUR TIMES A DAY 8/17/20  Yes Orestes Clement APRN CNP   Lactobacillus-Inulin ([facility] DIGESTIVE [facility]) CHEW CHEW AND SWALLOW 2 TABLETS BY MOUTH ONCE DAILY 1/21/21  Yes Orestes Clement APRN CNP   latanoprost (XALATAN) 0.005 % ophthalmic solution INSTILL ONE DROP IN THE RIGHT EYE IN THE EVENING 1/25/21  Yes Orestes Clement APRN CNP   levothyroxine (SYNTHROID/LEVOTHROID) 50 MCG tablet TAKE 1 TABLET BY MOUTH ONCE DAILY 3/8/21  Yes Orestes Clement APRN CNP   MELATONIN MAXIMUM STRENGTH 5 MG tablet TAKE 1 TABLET BY MOUTH ONCE DAILY 3/8/21  Yes Orestes Clement APRN CNP   metoprolol tartrate (LOPRESSOR) 25 MG tablet TAKE 1 TABLET BY MOUTH TWICE DAILY 4/2/21  Yes Jessica Hernandez APRN CNP   MUCINEX 600 MG 12 hr tablet TAKE 1 TABLET BY MOUTH TWICE DAILY 5/30/20  Yes Anamaria Mays APRN CNP   Multiple Vitamins-Minerals (CEROVITE SENIOR) TABS TAKE 1 TABLET BY MOUTH ONCE DAILY 2/8/21  Yes Orestes Clement APRN CNP   multivitamin (I-EDGAR) TABS per tablet TAKE 1 TABLET BY MOUTH ONCE DAILY 6/27/20  Yes Anamaria Mays APRN CNP   NYAMYC 517011 UNIT/GM external powder APPLY TOPICALLY TO AFFECTED  AREA(S) TWICE DAILY 11/2/20  Yes Orestes Clement APRN CNP   potassium chloride ER (KLOR-CON M15) 15 MEQ CR tablet Take 2 tablets (30 mEq) by mouth daily 2/5/21  Yes Orestes Clement APRN CNP   predniSONE (DELTASONE) 5 MG tablet TAKE 1 TABLET BY MOUTH ONCE DAILY *NOTE DOSAGE/STRENGTH 1/8/21  Yes Orestes Clmeent APRN CNP   tamsulosin (FLOMAX) 0.4 MG capsule TAKE 1 CAPSULE BY MOUTH ONCE DAILY 4/2/21  Yes Jessica Hernandez APRN CNP   acetaminophen (TYLENOL) 325 MG tablet TAKE TWO TABLETS (650MG) BY MOUTH EVERY 4 HOURS AS NEEDED 4/9/21   Jessica Hernandez APRN CNP   albuterol (VENTOLIN HFA) 108 (90 Base) MCG/ACT inhaler Inhale 2 puffs into the lungs every 4 hours as needed for shortness of breath / dyspnea or wheezing (May keep bedside) 7/30/19   Orestes Clement APRN CNP   bisacodyl (DULCOLAX) 10 MG suppository Place 1 suppository (10 mg) rectally daily as needed for constipation 10/8/20   Orestes Clement APRN CNP   Elastic Bandages & Supports (T.E.D. KNEE LENGTH/M-LONG) MISC WEAR AS DIRECTED ON IN THE MORNING OFF AT BEDTIME (2 = 1 PAIR) 7/24/19   Orestes Clement APRN CNP   loperamide (IMODIUM A-D) 2 MG tablet May take 1 tablet (2 mg) by mouth daily as needed for diarrhea. May also take 2 tablets (4 mg) daily as needed for diarrhea. 4 mg po prn daily after 1st loose stool then 2 mg po after each additional loose stool. Max 16 mg /day 10/8/20   Orestes Clement APRN CNP   menthol-zinc oxide (CALMOSEPTINE) 0.44-20.625 % OINT ointment Apply topically 4 times daily as needed for skin protection 12/1/20   Orestes Clement APRN CNP   senna-docusate (SM STOOL SOFTENER) 8.6-50 MG tablet Take 2 tablets by mouth 2 times daily as needed for constipation 10/8/20   Orestes Clement, SUNDAY CNP       Scheduled Meds    amLODIPine  10 mg Oral QPM     budesonide  0.5 mg Nebulization BID     enoxaparin ANTICOAGULANT  40 mg Subcutaneous Q24H     guaiFENesin  600 mg Oral BID     ipratropium -  albuterol 0.5 mg/2.5 mg/3 mL  3 mL Nebulization Q4H While awake     latanoprost  1 drop Right Eye QPM     levothyroxine  50 mcg Oral Daily     metoprolol tartrate  25 mg Oral BID     multivitamin w/minerals  1 tablet Oral Daily     piperacillin-tazobactam  2.25 g Intravenous Q6H     predniSONE  5 mg Oral Daily     sodium chloride (PF)  3 mL Intracatheter Q8H     tamsulosin  0.4 mg Oral QPM       Infusion Meds    lactated ringers 75 mL/hr at 04/18/21 1554     - MEDICATION INSTRUCTIONS -       - MEDICATION INSTRUCTIONS -         PRN Meds  acetaminophen, albuterol, haloperidol lactate, HYDROmorphone, lidocaine 4%, lidocaine (buffered or not buffered), LORazepam, melatonin, metoclopramide **OR** metoclopramide, naloxone **OR** naloxone **OR** naloxone **OR** naloxone, nitroGLYcerin, - MEDICATION INSTRUCTIONS -, ondansetron **OR** ondansetron, - MEDICATION INSTRUCTIONS -, sodium chloride (PF), sodium chloride (PF)    Allergies   Allergies   Allergen Reactions     Advil [Ibuprofen] Other (See Comments)     PCP told him not to take     Influenza Virus Vaccine H5n1 Other (See Comments)     Does not ever want to have a flu shot - also doesn't want pneumovax     Orange Juice [[place] Oil] Nausea     Family History   History reviewed. No pertinent family history.  Social History   Social History     Tobacco Use     Smoking status: Never Smoker     Smokeless tobacco: Never Used   Substance Use Topics     Alcohol use: No     Alcohol/week: 0.0 standard drinks     Drug use: None       Review of Systems   Review of systems not obtained due to patient factors - confusion       PHYSICAL EXAMINATION  Temp:  [97.6  F (36.4  C)-98.2  F (36.8  C)] 97.6  F (36.4  C)  Pulse:  [74-99] 89  Resp:  [18-22] 22  BP: (104-140)/(57-97) 117/82  SpO2:  [94 %-98 %] 95 %     Neuro exam:   MSE: Patient is sleepy. Wakes up to repeated verbal stimulation and mild tactile stimulation. Able to open his eyes and stay awake for a 10-15 seconds then falls back  asleep. Oriented to self, his own age, and being in a hospital. Not oriented to month. Able to name all objects shown to him. Able to follow simple commands consistently but needs the order repeated to attend.   CN: no field cut. EOMI. No facial weakness (does have subtle asymmetry with the left nasolabial fold slightly more flattened than the right but with activation there is no weakness). Moderate dysarthria (increased work of breathing contributing). No tongue deviation on protrusion. No tongue biting marks.   Motor: symmetric antigravity in both UEs. LEs are weaker, barely antigravity on the right and no antigravity on the left.   Sensory: no hemihypesthesia.   Cerebellar: no ataxia.   Involuntary movements: bilateral asterixis.       Stroke Scales    NIHSS  Interval baseline (04/19/21 0201)   Interval Comments     1a. Level of Consciousness 2-->Not alert, requires repeated stimulation to attend, or is obtunded and requires strong or painful stimulation to make movements (not stereotyped)   1b. LOC Questions 1-->Answers one question correctly   1c. LOC Commands 0-->Performs both tasks correctly   2.   Best Gaze 0-->Normal   3.   Visual 0-->No visual loss   4.   Facial Palsy 0-->Normal symmetrical movements   5a. Motor Arm, Left 1-->Drift, limb holds 90 (or 45) degrees, but drifts down before full 10 seconds, does not hit bed or other support   5b. Motor Arm, Right 1-->Drift, limb holds 90 (or 45) degrees, but drifts down before full 10 secs, does not hit bed or other support   6a. Motor Leg, Left 3-->No effort against gravity, leg falls to bed immediately   6b. Motor Leg, right 2-->Some effort against gravity, leg falls to bed by 5 secs, but has some effort against gravity   7.   Limb Ataxia 0-->Absent   8.   Sensory 0-->Normal, no sensory loss   9.   Best Language 1-->Mild-to-moderate aphasia, some obvious loss of fluency or facility of comprehension, without significant limitation on ideas expressed or form  of expression. Reduction of speech and/or comprehension, however, makes conversation. . . (see row details)   10. Dysarthria 1-->Mild-to-moderate dysarthria, patient slurs at least some words and, at worst, can be understood with some difficulty   11. Extinction and Inattention  0-->No abnormality   Total 12 (04/19/21 0201)       Imaging  I personally reviewed all imaging; relevant findings per HPI.     Lab Results Data   CBC  Recent Labs   Lab 04/19/21 0125 04/18/21  0622 04/17/21  0620   WBC 11.9* 16.3* 17.1*   RBC 3.96* 4.28* 4.39*   HGB 12.0* 12.7* 13.2*   HCT 38.4* 42.4 43.0    239 246     Basic Metabolic Panel    Recent Labs   Lab 04/19/21 0125 04/18/21  0622 04/17/21  0620    138 135   POTASSIUM 3.9 4.7 4.0   CHLORIDE 108 105 103   CO2 29 31 30   BUN 38* 29 25   CR 1.62* 1.58* 1.49*   * 101* 113*   MERCEDES 7.8* 8.2* 8.3*     Liver Panel  Recent Labs   Lab 04/17/21  0620 04/16/21  1309   PROTTOTAL 6.9 7.9   ALBUMIN 2.6* 3.3*   BILITOTAL 1.2 1.4*   ALKPHOS 68 79   AST 25 25   ALT 26 22     INR    Recent Labs   Lab Test 04/19/21 0125 04/16/21  1309   INR 1.21* 1.07      Lipid Profile    Recent Labs   Lab Test 12/21/15  0540   CHOL 100   HDL 39*   LDL 52   TRIG 47     A1C    Recent Labs   Lab Test 02/04/21 11/21/19   A1C 6.2* 6.7*     Troponin I    Recent Labs   Lab 04/19/21 0125 04/16/21  1309   TROPI 0.036 <0.015          Stroke Code / Stroke Consult Data Data   Stroke Code Data  (for stroke code with tele)  Stroke code activated 04/19/21   0036   First stroke provider response 04/19/21   0043   Video start time 04/19/21   0142   Video end time 04/19/21   0154   Last known normal 04/18/21   2300   Time of discovery  (or onset of symptoms)  04/19/21   0000   Head CT read by Stroke Neuro Dr/Provider 04/19/21   0110   Was stroke code de-escalated? Yes 04/19/21 0205  symptoms not likely caused by stroke        Telestroke Service Details  Type of service telemedicine diagnostic assessment of  acute neurological changes   Reason telemedicine is appropriate patient requires assessment with a specialist for diagnosis and treatment of neurological symptoms   Mode of transmission secure interactive audio and video communication per Kamala   Originating site (patient location) [facility]    Distant site (provider location) Provider remote site       I have personally spent a total of 70 minutes providing critical care services supervising this patient's stroke code activation.  I personally reviewed all lab values and radiology images.  I evaluated and directed care for the patient's critical condition.

## 2021-04-19 NOTE — PROGRESS NOTES
"[facility]   General Surgery Progress Note           Assessment and Plan:   Assessment:   Acute gangrenous cholecystitis  2 Days Post-Op s/p laparoscopic cholecystectomy with peritoneal drain placement  Path pending  Multiple comorbidities  RRT and code stroke called last night - Hospitalist and neurology following.      Plan:   -ok to ADAT from surgical perspective  -continue abx: IV Zosyn  -Tylenol for pain, minimize narcotics  -VTE prophylaxis:  PCDs  -continue drain   -hospitalist managing medical issues         Interval History:   Overnight events reviewed - Had rough night with confusion/agitation. Sleeping, minimally awakens to voice.  Replied \"no\" when asked if his belly hurt.  Discussed with RN - didn't eat much yesterday, did have 1 loose BM yesterday.           Physical Exam:   Blood pressure 118/83, pulse 69, temperature 98.4  F (36.9  C), temperature source Axillary, resp. rate 26, height 1.867 m (6' 1.5\"), weight 104.3 kg (230 lb), SpO2 94 %.    I/O last 3 completed shifts:  In: 1648.75 [P.O.:480; I.V.:1168.75]  Out: 70 [Drains:70]    Abdomen:   soft, round/obese, small easily reducible umbilical hernia  Inc(s) - clean, dry, intact        Benton - sero gutierrez, minimal output.  Outer dressing wet, drain stripped          Data:     Recent Labs   Lab 04/19/21  0125 04/18/21  0622 04/17/21  0620   WBC 11.9* 16.3* 17.1*   HGB 12.0* 12.7* 13.2*   HCT 38.4* 42.4 43.0   MCV 97 99 98    239 246       Malinda Vazquez PA-C       "

## 2021-04-20 NOTE — PLAN OF CARE
End of Shift Summary  For vital signs and complete assessments, please see documentation flowsheets.     Pertinent assessments: Pt transferred to floor this shift. Transferred with portable lift, w/c bound at baseline. 4L NC, lung sounds coarse. Duoneb given. Pt A&Ox3, forgetful at times. Lap sites intact,  kalani in place. Skin tear left hand. Dd2 nectar thick liquids. Incontinent of bowel and bladder, c-dif negative.     Major Shift Events: transferred to Ms5    Treatment Plan: Continue abx and nebs

## 2021-04-20 NOTE — PROGRESS NOTES
[facility]  Hospitalist Progress Note  Arnol Raygoza MD 04/20/2021    Reason for Stay (Diagnosis): Acute calculus gangrenous cholecystitis         Assessment and Plan:      Summary of Stay: Jacob Easton is a 89 year old male with past medical history including RA who is now wheelchair-bound, CKD, A. fib, pulmonary alveolitis and BPH admitted on 4/16/2021 with right upper quadrant abdominal pain found to have leukocytosis and CT scan showing acute calculus cholecystitis in addition to an asymmetric masslike wall thickening in the cecum/ascending colon felt to potentially represent malignancy.  He was admitted and started on IV fluids, IV antibiotics with ceftriaxone and surgical consultation.  He was taken for cholecystectomy on 4/17/2021 and found to have a partially gangrenous gallbladder which was removed.  He remained somewhat somnolent after surgery and he did require BiPAP overnight following surgery due to hypercapnic respiratory failure which I suspect is due to depressed respiratory drive from sedation from anesthesia.  Respiratory acidosis has improved and encephalopathy is waxing and waning.    Furthermore, on 4/18 he was noted to have some recurrence of hypoxia as well as wet cough.  Chest x-ray 4/19 is showing evidence of pneumonia, clinical concern for aspiration potentially in the postop setting.    There was some question of facial droop during a period of agitation early 4/18.  CT and CTA head were negative for acute pathology and stroke neurology was consulted.  Higher clinical concern is for toxic metabolic encephalopathy in the setting of recent surgery, unfamiliar setting and pneumonia.    He remains admitted for IV antibiotics and close monitoring of his mental status.      Problem List/Assessment and Plan:   1. Acute calculus gangrenous cholecystitis: Status post cholecystectomy on 4/17.  Continue IV antibiotics in the form of Zosyn for the time being.  Will  gradually advance his diet as tolerated.    2.   Acute metabolic encephalopathy, combination from anesthesia and hypercapnic respiratory failure, and pneumonia:  Required BiPAP in the evening after surgery with improvement in PCO2 which peaked at 103.  Redirect as able, consider a sitter if needed.    --Continue to treat pneumonia with antibiotics.  --Check VBG if mental status worsens.  --consider low dose haldol or Zyprexa if needed for agitation.  I did discuss this with his daughter.    3.   Suspected aspiration pneumonia with acute hypoxemic respiratory failure: As above.  Continue IV Zosyn, sputum culture sent.    4.   History of pulmonary alveolitis: Continue home inhalers.    5.   History of hypertension: Resume home amlodipine and metoprolol with hold parameters  6.  Acute hypoxemic respiratory failure: Appears to be due to poor respiratory drive in the setting of lingering anesthesia.  Wean oxygen as able.  Did require BiPAP in the PACU.  7.  History of BPH: Continue Flomax.  Is at risk for worsened urinary retention postop.  Monitor.  8.  Reported hx of dementia per chart, though his daughter firmly disputes this.  9.  Paroxysmal A. fib: likely provoked by acute illness.  Will monitor.  Now in controlled rate A. fib.  Will monitor for now, we will not start anticoagulation in the setting of postop state and encephalopathy with increased fall risk.  We will continue to reassess.  We will start aspirin.  10.  Wheelchair bound at baseline: Normally can at least pivot on one leg during transfers.  Deconditioning is worsened now, will consult PT.  11.  History of RA: Only on 5 mg oral daily so not given stress dose steroids.  If he were to become hypotensive would consider hydrocortisone.    DVT Prophylaxis: Pneumatic Compression Devices  Code Status: DNR / DNI  Discharge Dispo/Date: At least 2-4 days more here to advance diet and continue empiric abx and pending mental status.  [facility] to [facility] possibly with some extra services.          Interval History (Subjective):      Weaning O2, now on 4L from 6L  Having some loose stools, ruling out c. Dif.  None since last night, can discontinue enteric precautions if no further diarrhea today  Wbc better  Have been updating family daily                      Physical Exam:      Last Vital Signs:  /70 (BP Location: Left arm)   Pulse 74   Temp 97.7  F (36.5  C) (Oral)   Resp 20   Ht 1.867 m (6' 1.5\")   Wt 104.3 kg (230 lb)   SpO2 92%   BMI 29.93 kg/m        Intake/Output Summary (Last 24 hours) at 4/17/2021 1439  Last data filed at 4/17/2021 1300  Gross per 24 hour   Intake 1800 ml   Output 310 ml   Net 1490 ml       General: awake, elderly man, pleasant and interactive though looks fatigued and is somewhat confused about recent events but overall appropriate.  HEENT: NC/AT, eyes anicteric  Cardiac: RRR, S1, S2.    Pulmonary: Normal chest rise, normal work of breathing.  Bilateral wet and coarse lung sounds most prominent in the upper lobes.  Intermittent wet cough.  Abdomen:  non-distended.  Bowel Sounds Present.  Extremities: no deformities.  Warm, well perfused.  Skin: no rashes or lesions noted.  Warm and Dry.  Neuro: No focal deficits noted.  Speech clear.  Happy affect.         Medications:      All current medications were reviewed with changes reflected in problem list.         Data:      All new lab and imaging data was reviewed.   Labs:  Recent Labs   Lab 04/20/21  0612      POTASSIUM 3.8   CHLORIDE 109   CO2 30   ANIONGAP 2*   GLC 96   BUN 32*   CR 1.57*   GFRESTIMATED 38*   GFRESTBLACK 44*   MERCEDES 8.2*     Recent Labs   Lab 04/20/21  0612   WBC 9.1   HGB 12.0*   HCT 40.0   MCV 99         Imaging:   Results for orders placed or performed during the hospital encounter of 04/16/21   Abd/pelvis CT,  IV  contrast only TRAUMA / AAA    Narrative    CT ABDOMEN PELVIS W CONTRAST 4/16/2021 2:26 PM    CLINICAL HISTORY: Right lower " quadrant pain    TECHNIQUE: CT scan of the abdomen and pelvis was performed following  injection of IV contrast. Multiplanar reformats were obtained. Dose  reduction techniques were used.  CONTRAST: 100mL Isovue-370    COMPARISON: 11/16/2015    FINDINGS:   LOWER CHEST: Multiple cysts in the visualized lung bases.    HEPATOBILIARY: Acute calculus cholecystitis with a few calcified  gallstones in the gallbladder, pericholecystic fluid and inflammatory  changes. No biliary ductal dilatation. No focal lesions of the liver.    PANCREAS: Age-related pancreatic parenchymal atrophy. No  peripancreatic inflammatory changes.    SPLEEN: Normal.    ADRENAL GLANDS: Normal.    KIDNEYS/BLADDER: Bilateral renal simple cyst requiring no follow-up.  Nonobstructing right renal calculus measuring 3 mm. No  hydroureteronephrosis. Evaluation of the urinary bladder is limited by  streak artifact from bilateral total hip arthroplasty. Bladder wall  trabeculation and cellule formation.    BOWEL:  Asymmetric wall thickening in the cecum/low ascending colon  measuring 2.7 cm in length (axial series 4, image 129 and coronal  series 5, image 58) is indeterminate but could represent malignancy.  Small hiatal hernia. Normal caliber loops of small bowel and colon.  Colonic diverticulosis. Redundant sigmoid colon. Normal appendix    PELVIC ORGANS: Evaluation limited by streak artifact from bilateral  KANE. Prostatomegaly.    ADDITIONAL FINDINGS: No significant free fluid. No lymphadenopathy. No  abdominal aortic aneurysm. Aortobiiliac atherosclerotic  calcifications. 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 No extraluminal  air. Fat-containing umbilical hernia.    MUSCULOSKELETAL: Bilateral total hip arthroplasties. No suspicious  lesions in the bones. Degenerative changes in the spine.      Impression    IMPRESSION:   1.  Acute calculus cholecystitis.  2.  Asymmetric masslike wall  thickening in the cecum/ascending colon  is indeterminate but could represent malignancy. Colonoscopic  correlation should be considered.  3.  Prostatomegaly and evidence of chronic bladder outlet obstruction  with urinary bladder wall thickening and cellule formation.  4.  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  5.  Multiple cysts in the visualized lung bases, stable.    EBENEZER BRYANT MD   US Abdomen Limited    Narrative    EXAM: US ABDOMEN LIMITED  LOCATION: [facility]  DATE/TIME: 4/16/2021 5:42 PM    INDICATION: Diffuse abdominal pain.  COMPARISON: CT from today.  TECHNIQUE: Limited abdominal ultrasound.    FINDINGS:    GALLBLADDER: Biliary sludge and small stones are present. There is mild gallbladder wall thickening but no definite tenderness over the gallbladder with transducer pressure.    BILE DUCTS: No biliary dilatation. The common duct measures 6 mm.    LIVER: Normal parenchyma with smooth contour. No focal mass.    RIGHT KIDNEY: No hydronephrosis. Diffuse cortical thinning.    PANCREAS: The visualized portions are normal.    No ascites.      Impression    IMPRESSION:  1.  Gallstones and biliary sludge. There is mild gallbladder wall thickening.  2.  No definite tenderness over the gallbladder with transducer pressure. Bile ducts normal in caliber.               Arnol Raygoza MD , MD.

## 2021-04-20 NOTE — PROGRESS NOTES
CC had worked with CMS to scheduled Covid shot for member for 4-23 at [facility] but member is now hospitalized due to gall bladder surgery.  CC talked with member sister Mary and she informed CC that she did not feel member should have the shot now and will reach out to CC when member ready for this again.     CC then called and left VM on RN cell phone at the facility to let them know the vaccination will be canceled and rescheduled when member is ready again. CC will also inform NP of this as well   Chelita Cuevas MA [facility] Coordinator   [phone] - paap cell phone   [phone] - lwyr fax

## 2021-04-20 NOTE — PROGRESS NOTES
"[facility]   General Surgery Progress Note           Assessment and Plan:   Assessment:   Acute gangrenous cholecystitis  3 Days Post-Op s/p laparoscopic cholecystectomy with peritoneal drain placement  Path pending  Multiple comorbidities  RRT and code stroke called last night - Hospitalist and neurology following.  Blood culture and gallbladder fluid cultures negative      Plan:   -tolerating DD2 diet  -continue abx: IV Zosyn - no abx needed from surgical perspective  -Tylenol for pain, minimize narcotics  -VTE prophylaxis:  PCDs  -continue drain   -hospitalist managing medical issues         Interval History:   Sitting up in chair, eating breakfast.  Denies abdominal pain.  No difficulties with eating.          Physical Exam:   Blood pressure 123/72, pulse 84, temperature 97.7  F (36.5  C), temperature source Oral, resp. rate 24, height 1.867 m (6' 1.5\"), weight 104.3 kg (230 lb), SpO2 92 %.    I/O last 3 completed shifts:  In: 1079 [P.O.:360; I.V.:719]  Out: -     Abdomen:   soft, round/obese, small easily reducible umbilical hernia  Inc(s) - clean, dry, intact        Benton - sero gutierrez, minimal output.            Data:     Recent Labs   Lab 04/20/21  0612 04/19/21  0125 04/18/21  0622   WBC 9.1 11.9* 16.3*   HGB 12.0* 12.0* 12.7*   HCT 40.0 38.4* 42.4   MCV 99 97 99    234 239       Malinda Vazquez PA-C     Agree with above. No abdominal pain or nausea. Tolerating diet, will likely remove drain tomorrow as long as output remains low and non-bilious. Pathology back showing acute gangrenous cholecystitis without malignancy.     Lesvia Stallings MD  "

## 2021-04-20 NOTE — PLAN OF CARE
Pertinent assessments: Patient disoriented to time. Productive cough. Tele is A. Fib CVR with BBB. Incontinent of bowel and bladder.   Major Shift Events: Patient awake most of this shift. Tylenol given for pain.   Plan (Upcoming Events): Antibiotics. Monitor LALI drain. Need to collet stool sample.   Discharge/Transfer Needs: TBD

## 2021-04-20 NOTE — PROGRESS NOTES
TRANSITIONS OF CARE (JOESPH) LOG   JOESPH tasks should be completed by the CC within one (1) business day of notification of each transition. Follow up contact with member is required after return to their usual care setting.  Note:  If CC finds out about the transitions fifteen (15) days or more after the member has returned to their usual care setting, no JOESPH log is needed. However, the CC should check in with the member to discuss the transition process, any changes needed to the care plan and document it in a case note.    Member Name:  Jacob Easton MCO Name:  [facility] MCO/Health Plan Member ID#: [id number]   Product: [facility] Medicare  Care Coordinator Contact:  Chelita Cuevas MA, [facility] [place] [facility]Care System: [facility]   Transition Communication Actions from Care Management Contact   Transition #1   Notification Date: 4-20-21 Transition Date:   4-16-21 Transition From: [facility]      Is this the member s usual care setting?               yes Transition To: [facility]    Transition Type:  Unplanned  Reason for Admission/Comments:  Gall bladder surgery      Shared CC contact info, care plan/services with receiving setting--Date completed: 4-16-21    Notified PCP of transition--Date completed:  4-16-21     via  EMR   Transition #2   Transition #3  (if applicable)   Notification Date: 4-24-21         Transition To:  [facility]   Transition Date: 4-24-21     Transition Type:    Planned  Notified PCP -- Date completed: 4-24-21              Shared CC contact info, care plan/services with receiving setting or, if applicable, home care agency--Date completed:  4-24-21  *Complete additional tasks below, if this transition is a return to usual care setting.      Comments:  Communication as per process and EMR.  AL facility also in contact with family and D/C planner.       Notification Date:          Transition To:    Transition Date:              Transition Type:      Notified  PCP--Date completed:          Shared CC contact info, care plan/services with receiving setting or, if applicable, home care agency--Date completed:       *Complete additional tasks below, if this transition is a return to usual care setting.      Comments:       *Complete tasks below when the member is discharging TO their usual care setting within one (1) business day of notification.  For situations where the Care Coordinator is notified of the discharge prior to the date of discharge, the Care Coordinator must follow up with the member or designated representative to confirm that discharge actually occurred and discuss required JOESPH tasks as outlined in the JOESPH Instructions.  (This includes situations where it may be a  new  usual care setting for the member. (i.e., a community member who decides upon permanent nursing home placement following hospitalization and rehab).    Date completed: 4-24-21  Communicated with member or their designated representative about the following:  care transition process; about changes to the member s health status; plan of care updates; education about transitions and how to prevent unplanned transitions/readmissions  Four Pillars for Optimal Transition:    Check  Yes  - if the member, family member and/or SNF/facility staff manages the following:    If  No  provide explanation in the comments section.          [x]  Yes     []  No     Does the member have a follow-up appointment scheduled with primary care or specialist? (Mental health hospitalizations--the appt. should be w/in 7 days)   [x]  Yes     []  No     Can the member manage their medications or is there a system in place to manage medications (e.g. home care set-up)?         [x]  Yes     []  No     Can the member verbalize warning signs and symptoms to watch for and how to respond?         [x]  Yes     []  No     Does the member use a Personal Health Care Record?  Check  Yes  if visit summary, discharge summary, and/or  healthcare summary are being used as a PHR.                                                                                                                                                                                    [x] Yes      [] No      Have you updated the member s care plan?  If  No  provide explanation in comments.   Comments:  Lives in AL setting and followed by [facility] NP/MD team as appropriate.  Facility manages medications.

## 2021-04-20 NOTE — PROGRESS NOTES
"   04/20/21 0905   Quick Adds   Type of Visit Initial PT Evaluation   Living Environment   People in home alone   Current Living Arrangements assisted living   Home Accessibility no concerns   Transportation Anticipated   (uncertain at this time)   Living Environment Comments pt has bathroom, kitchen in apartment at [facility]   Self-Care   Usual Activity Tolerance moderate   Current Activity Tolerance fair   Equipment Currently Used at Home wheelchair, manual;walker, rolling;shower chair;raised toilet seat;grab bar, toilet;grab bar, tub/shower   Activity/Exercise/Self-Care Comment pt is w/c bound at baseline    Disability/Function   Hearing Difficulty or Deaf no   Wear Glasses or Blind yes   Vision Management glasses   Fall history within last six months no   General Information   Onset of Illness/Injury or Date of Surgery 04/16/21   Referring Physician Arnol Raygoza MD   Patient/Family Therapy Goals Statement (PT) increase mobility   Pertinent History of Current Problem (include personal factors and/or comorbidities that impact the POC) Per chart review: \"Pt is a 89 year old male with past medical history including RA who is now wheelchair-bound, CKD, A. fib, pulmonary alveolitis and BPH admitted on 4/16/2021 with right upper quadrant abdominal pain found to have leukocytosis and CT scan showing acute calculus cholecystitis in addition to an asymmetric masslike wall thickening in the cecum/ascending colon felt to potentially represent malignancy.  He was admitted and started on IV fluids, IV antibiotics with ceftriaxone and surgical consultation.  He was taken for cholecystectomy on 4/17/2021 and found to have a partially gangrenous gallbladder which was removed.  He remained somewhat somnolent after surgery and he did require BiPAP overnight following surgery due to hypercapnic respiratory failure which I suspect is due to depressed respiratory drive from sedation from anesthesia.  Respiratory acidosis " "has improved and encephalopathy is waxing and waning.\"   Existing Precautions/Restrictions fall   Weight-Bearing Status - LLE full weight-bearing   Weight-Bearing Status - RLE full weight-bearing   Cognition   Orientation Status (Cognition) oriented x 3   Affect/Mental Status (Cognition) WFL;confused   Follows Commands (Cognition) WFL;repetition of directions required   Cognitive Status Comments pt fixated on size of room   Pain Assessment   Patient Currently in Pain Yes, see Vital Sign flowsheet  (2/10)   Range of Motion (ROM)   ROM Comment WNL B LE   Strength   Strength Comments decreased strength B LE, 3+/5 hip flexion strength, unable to stand from arm chair   Bed Mobility   Comment (Bed Mobility) not assessed, pt in chair   Transfers   Transfer Safety Comments unable to perform pivot transfer from arm chair   Gait/Stairs (Locomotion)   Comment (Gait/Stairs) not applicable, pt w/c bound at baseline   Balance   Balance Comments good sitting balance   Clinical Impression   Criteria for Skilled Therapeutic Intervention yes, treatment indicated   PT Diagnosis (PT) decreased mobility   Influenced by the following impairments decreased B LE strength   Functional limitations due to impairments decreased transfers   Clinical Presentation Evolving/Changing   Clinical Presentation Rationale Pt medical condition is still evolving, pt appears to have below baseline level of cognition   Clinical Decision Making (Complexity) low complexity   Therapy Frequency (PT) 5x/week   Predicted Duration of Therapy Intervention (days/wks) 5 days   Planned Therapy Interventions (PT) balance training;bed mobility training;home exercise program;neuromuscular re-education;patient/family education;transfer training;strengthening;wheelchair management/propulsion training   Anticipated Equipment Needs at Discharge (PT) wheelchair;lift device   Risk & Benefits of therapy have been explained risks/benefits reviewed;care plan/treatment goals " reviewed;evaluation/treatment results reviewed;current/potential barriers reviewed;participants voiced agreement with care plan;participants included;patient   PT Discharge Planning    PT Discharge Recommendation (DC Rec) home with assist;home with home care physical therapy   PT Rationale for DC Rec Pt is below baseline level of mobility, pt would require a mechanical lift for transfers to return to [facility]. Pt would benefit from continued therapy in acute care and upon discharge to progress LE strengthening. It would take extraordinary measures for pt to attend outpatient PT.   PT Brief overview of current status  mechanical lift   Total Evaluation Time   Total Evaluation Time (Minutes) 10

## 2021-04-20 NOTE — PROGRESS NOTES
Patient with 3 loose stools worsening leukocytosis on antibiotic nonspecific abdominal discomfort we will check for C. difficile.

## 2021-04-20 NOTE — PLAN OF CARE
Orientation:A&ox3  Vss afebrile. 92-94% 4lnc  Tele:afib with bbb  LS:diminished. Weak congested cough  GI:bs+. 2 loose stools. Stool sample sent. Tolerating DD2 nectar thick diet  : incont of urine.  Skin: bruised, skin tear left hand. Lap sites d/I with steri strips. Wade intact with 40cc serosand drainage.   Activity: up to chair/w/c with ceiling lift.  Pain: denies pain  Plan:iv abx, ivf, DD2 diet. Surgery/PT/SP following.   Pt transferred to 5th floor. All belongings taken with pt.

## 2021-04-20 NOTE — PROGRESS NOTES
Report given to RADHIKA Dye on 5th floor. Will transfer up to 5th floor once pt is finished eating. Message left with daughter regarding transferring to 5th.

## 2021-04-20 NOTE — PROGRESS NOTES
"[facility] Open to Case Management Assessment - [facility] Medicare (Assisted Living)    Member identified and opened to case management per [facility] protocol.    PCP: Orestes Clement  PCC: [facility]  Living arrangement: Assisted Living: [facility]         Utilization History (last 12 months): ED Visits, inpatient,     Case Management Episode Diagnosis:   Past Medical History:   Diagnosis Date     Arthritis      Constipation      Hypertension      Hypocalcemia      Neuromuscular disorder (H)      Thyroid disease        Processes completed: Completed transitions of care log. Followed up with facilities as needed/requested. Care Plan initiated and will be open to case management for 3 to 6 months for follow up.     Follow Up: EPIC notes reviewed, call placed to AL facility for updates as needed.  UFS POC  initiated, goals reviewed and updated.    Care Coordination Initial Assessment    Identity verified    Utilization:   ED visits in last year: 1  Hospital Admits in last year: 1      Current Medical Health Concerns:   Acute calculus cholecystitis.  2.  Asymmetric masslike wall thickening in the cecum/ascending colon  is indeterminate but could represent malignancy. Colonoscopic  correlation should be considered.  3.  Prostatomegaly and evidence of chronic bladder outlet obstruction  with urinary bladder wall thickening and cellule formation.  4.  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  5.  Multiple cysts in the visualized lung bases, stable.\"       Plan:  CC will f/u as needed   Chelita GEEW   [facility] Care Coordinator   [phone] - work cell phone   [phone] - dqia fax          "

## 2021-04-21 NOTE — PROGRESS NOTES
Cross Cx:     Paged overnight due to increasing oxygen needs. Increased 2L to 3L. Increased RR and work of breathing. Audible wheezing and congestion unresponsive to duoneb/albuterol neb. CXR showed increase in alveolar infiltrates involving the right lung base.  Diffuse alveolar infiltrates bilaterally greatest in the left lung base and left perihilar region.  Moderate cardiac enlargement.  Moderate pulmonary vascular congestion. +5 L but inaccurate I/Os. Given CXR and exam findings with congestion. Hold IVF overnight. Lasix 40 mg IV once. Daily weights to better assess volume status. Continue to monitor pulmonary status. Discussed care plan with nursing.

## 2021-04-21 NOTE — PROGRESS NOTES
Care Management Follow Up    Length of Stay (days): 5    Expected Discharge Date: 04/23/21     Concerns to be Addressed: discharge planning     Patient plan of care discussed at interdisciplinary rounds: Yes    Anticipated Discharge Disposition: TCU     Anticipated Discharge Services: PT/OT  Anticipated Discharge DME:  tbd    Patient/family educated on Medicare website which has current facility and service quality ratings: YES  Education Provided on the Discharge Plan:  Pt and Dtg  Patient/Family in Agreement with the Plan: yes    Referrals Placed by CM/SW:  TCU  Private pay costs discussed: transportation costs    Additional Information:  PT is recommended TCU as Pt is a lift.  I called Cindy GARRIDO at [facility] [phone] and she said Pt must be a transfer of one pivot to return.  They do have a lift, but it's only for occasional use if someone would fall and they would need to use two staff, which is difficult to find due to staffing.  I talked with Pt and he was hopeful to return to his care home, but understands he needs to be an assist of one . Pt is agreeable that I send a referral to [facility].  He also gave me permission to call his Dtg Mary.  Mary was also hopeful that he could return to care home as they had used a lift on him in the past.  I explained to her the care home's rationale and she is agreeable to TCU as well.  She would like [facility].  We also discussed medicare.gov website.  Referral sent to [facility].  Pt wondering if his Son could transfer him.  Mary said he doesn't have the capability to do so.  She is agreeable to [facility] transport.  She is agreeable to private cost.      11:13.  [facility] has offered Pt a TCU bed. They would have a bed Friday AM for him.  They would like him there around 10AM if possible. Will mavis PAINTING to notify him.        11:20 I tentatively set up datatracker transport for Friday April 23rd at 10AM with 02    Lien Velarde RN BSN   Inpatient Care Coordination  [facility]  [facility]  [phone]        Sheyla Velarde RN

## 2021-04-21 NOTE — PLAN OF CARE
Pertinent assessments: AO x4 but forgetful, cooperative. Transferred with portable lift to the chair for dinner, w/c bound at baseline. On 2L via NC, lung sounds coarse and expiratory wheezes present. Duoneb given. Lap sites intact,  kalani in place.    Major Shift Events: None    Treatment Plan: Continue abx and nebs    Bedside Nurse: Lesvia Murdock RN

## 2021-04-21 NOTE — PROGRESS NOTES
"[facility]   General Surgery Progress Note         Assessment and Plan:   Assessment:   Acute gangrenous cholecystitis  4 Days Post-Op s/p laparoscopic cholecystectomy with peritoneal drain placement  Path showing acute gangrenous cholecystitis without malignancy  Multiple comorbidities  RRT and code stroke called 4/19/21 - Hospitalist and neurology following.  Blood culture and gallbladder fluid cultures negative      Plan:   -tolerating DD2 diet  -IV ABX continued (Zosyn) - no abx needed from surgical perspective  -Tylenol for pain, minimize narcotics  -VTE prophylaxis:  PCDs  -hospitalist managing medical issues  -Disposition: discharge when medically stable         Interval History:   Sitting up in bed. Denies pain. No complaints. Wants to get up into the chair. Tolerating diet and having BMs.         Physical Exam:   Blood pressure 126/80, pulse 102, temperature 97.5  F (36.4  C), temperature source Oral, resp. rate 26, height 1.867 m (6' 1.5\"), weight 109.1 kg (240 lb 9.6 oz), SpO2 96 %.    I/O last 3 completed shifts:  In: 480 [P.O.:480]  Out: 320 [Urine:200; Drains:120]    Abdomen:   soft, round/obese, small easily reducible umbilical hernia  Inc(s) - clean, dry, intact with steristrips        [place] - serosang, non-bilious, minimal output, removed without complications            Data:     Recent Labs   Lab 04/20/21  0612 04/19/21  0125 04/18/21  0622   WBC 9.1 11.9* 16.3*   HGB 12.0* 12.0* 12.7*   HCT 40.0 38.4* 42.4   MCV 99 97 99    234 239       Vince Hwang PA-C     Seen and agree, tolerating diet, drain non-bilious and removed. Drain site may leak serosanguinous output for a couple days, change gauze PRN. With negative bile cultures and normalized WBC, no longer needs antibiotics from a gallbladder perspective. Continues to need supplemental O2. Will follow peripherally, please call with questions.     Lesvia Stallings MD  "

## 2021-04-21 NOTE — PROGRESS NOTES
[facility]  Hospitalist Progress Note  Arnol Raygoza MD 04/21/2021    Reason for Stay (Diagnosis): Acute calculus gangrenous cholecystitis         Assessment and Plan:      Summary of Stay: Jacob Easton is a 89 year old male with past medical history including RA who is now wheelchair-bound, CKD, A. fib, pulmonary alveolitis and BPH admitted on 4/16/2021 with right upper quadrant abdominal pain found to have leukocytosis and CT scan showing acute calculus cholecystitis in addition to an asymmetric masslike wall thickening in the cecum/ascending colon felt to potentially represent malignancy.  He was admitted and started on IV fluids, IV antibiotics with ceftriaxone and surgical consultation.  He was taken for cholecystectomy on 4/17/2021 and found to have a partially gangrenous gallbladder which was removed.  He remained somewhat somnolent after surgery and he did require BiPAP overnight following surgery due to hypercapnic respiratory failure which I suspect is due to depressed respiratory drive from sedation from anesthesia.  Respiratory acidosis has improved and encephalopathy is waxing and waning.    Furthermore, on 4/18 he was noted to have some recurrence of hypoxia as well as wet cough.  Chest x-ray 4/19 is showing evidence of pneumonia, clinical concern for aspiration potentially in the postop setting.    There was some question of facial droop during a period of agitation early 4/18.  CT and CTA head were negative for acute pathology and stroke neurology was consulted.  Higher clinical concern is for toxic metabolic encephalopathy in the setting of recent surgery, unfamiliar setting and pneumonia.    He remains admitted for IV antibiotics and close monitoring of his mental status.    Problem List/Assessment and Plan:   1. Acute calculus gangrenous cholecystitis: Status post cholecystectomy on 4/17.  Continue IV antibiotics in the form of Zosyn for the time being.  Will gradually  advance his diet as tolerated.    2.   Acute metabolic encephalopathy (seems resolved), combination from anesthesia and hypercapnic respiratory failure, and pneumonia:  Required BiPAP in the evening after surgery with improvement in PCO2 which peaked at 103.  Redirect as able, consider a sitter if needed.    --Continue to treat pneumonia with antibiotics.  --Check VBG if mental status worsens.  --consider low dose haldol or Zyprexa if needed for agitation.  I did discuss this with his daughter.    3.   Aspiration pneumonia with acute hypoxemic respiratory failure: As above.  Continue IV Zosyn for now (day 6, actually started for gall bladder), sputum culture sent but negative  --?change to Augmentin soon to complete a 10 day course.  --no further need for abx for gall bladder indication    4.   History of pulmonary alveolitis: Continue home inhalers.    5.   History of hypertension: Resume home amlodipine and metoprolol with hold parameters  6.  History of BPH: Continue Flomax.  Is at risk for worsened urinary retention postop.  Monitor.  7.  Reported hx of dementia per chart, though his daughter firmly disputes this.  Seems to have at most mild cognitive impairment.  8.  Paroxysmal A. fib: likely provoked by acute illness.  Will monitor.  Now in controlled rate A. fib.  Will monitor for now, we will not start anticoagulation in the setting of postop state and intermittent encephalopathy with increased fall risk.  We will continue to reassess. Started aspirin.  9.  Wheelchair bound at baseline: Normally can at least pivot on one leg during transfers.  Deconditioning is worsened now, consulted PT.  Resides at [facility] per his report.  10.  History of RA: Only on 5 mg oral daily so not given stress dose steroids.     DVT Prophylaxis: Pneumatic Compression Devices  Code Status: DNR / DNI  Discharge Dispo/Date: At least 2 days, could be as long as 4-5.  ?TCU Friday if stable.          Interval History (Subjective):     "    Overnight was given a dose of lasix for suspected fluid overload, weaned from 4 to 2L   Stopped IVF  Have been updating family daily, including today (daughter, Yovanny)                    Physical Exam:      Last Vital Signs:  /80 (BP Location: Right arm)   Pulse 102   Temp 97.5  F (36.4  C) (Oral)   Resp 26   Ht 1.867 m (6' 1.5\")   Wt 109.1 kg (240 lb 9.6 oz)   SpO2 97%   BMI 31.31 kg/m        General: awake, elderly man, pleasant and interactive though looks fatigued and is somewhat confused about recent events but overall appropriate.  HEENT: NC/AT, eyes anicteric  Cardiac: RRR, S1, S2.    Pulmonary: Normal chest rise, normal work of breathing.  Bilateral wet and coarse lung sounds most prominent in the upper lobes.  Intermittent wet cough.  Abdomen:  non-distended.  Bowel Sounds Present.  Extremities: no deformities.  Warm, well perfused.  Skin: no rashes or lesions noted.  Warm and Dry.  Neuro: No focal deficits noted.  Speech clear.  Happy affect.         Medications:      All current medications were reviewed with changes reflected in problem list.         Data:      All new lab and imaging data was reviewed.   Labs:  Recent Labs   Lab 04/20/21  0612      POTASSIUM 3.8   CHLORIDE 109   CO2 30   ANIONGAP 2*   GLC 96   BUN 32*   CR 1.57*   GFRESTIMATED 38*   GFRESTBLACK 44*   MERCEDES 8.2*     Recent Labs   Lab 04/20/21  0612   WBC 9.1   HGB 12.0*   HCT 40.0   MCV 99         Imaging:     Recent Results (from the past 24 hour(s))   XR Chest Port 1 View    Narrative    EXAM: XR CHEST PORT 1 VIEW  LOCATION: [facility]  DATE/TIME: 4/21/2021 4:48 AM    INDICATION: DYSPNEA  COMPARISON: 04/19/2021      Impression    IMPRESSION: Interval increase in alveolar infiltrates involving the right lung base. There still remain diffuse alveolar infiltrates bilaterally greatest in the left lung base and left perihilar region. Moderate cardiac enlargement. Pulmonary vascular   congestion. " Atherosclerotic calcifications of the aortic arch.             Arnol Raygoza MD , MD.

## 2021-04-21 NOTE — PLAN OF CARE
Pertinent assessments: 4L NC. Orientation varies. GREENE. Expiratory wheeze. Lap sites intact. LALI site leaking, dressing changed. Incontinent of urine, external catheter placed. Repositioning q2hr.     Major Shift Events: Episode of increased work of breathing and congestion. Lasix given 1x. Refer to previous note.    Treatment Plan: Zosyn. Musinex and nebs. Monitor respiratory status.

## 2021-04-21 NOTE — PROVIDER NOTIFICATION
MD paged [phone]:     Pt with increased work of breathing. Respirations 32. Very congested and wheezing, gave duoneb. 3L oxymask.     Callback received:   Verbal order for chest xray and to stop IVF.   1x dose IV lasix ordered.

## 2021-04-21 NOTE — PLAN OF CARE
Pertinent assessments: Dis to situation. 2L O2 NC. GREENE. Expiratory wheeze. Lap sites intact. LALI removed, dressing CDI. Incontinent of bowel and urine, external catheter placed. Repositioning q2hr.     Major Shift Events: uneventful     Treatment Plan: Zosyn. Musinex and nebs. Monitor respiratory status.

## 2021-04-22 NOTE — PLAN OF CARE
SLP: Pt had just finished eating lunch when attempted dysphagia tx session. He adamantly refused any additional PO trials at this time saying he had too much already.     Will continue to follow per POC.

## 2021-04-22 NOTE — PROGRESS NOTES
Your information has been submitted on April 22nd, 2021 at 01:35:23 PM CDT. The confirmation number is [id number]    Chantell Juarez  Care Management Coordinator  [facility]  [phone]

## 2021-04-22 NOTE — PROGRESS NOTES
[facility]  Hospitalist Progress Note    Assessment & Plan   Summary of Stay: Jacob Easton is a 89 year old male with past medical history including RA who is now wheelchair-bound, CKD, A. fib, pulmonary alveolitis and BPH admitted on 4/16/2021 with right upper quadrant abdominal pain found to have leukocytosis and CT scan showing acute calculus cholecystitis in addition to an asymmetric masslike wall thickening in the cecum/ascending colon felt to potentially represent malignancy.  He was admitted and started on IV fluids, IV antibiotics with ceftriaxone and surgical consultation.  He was taken for cholecystectomy on 4/17/2021 and found to have a partially gangrenous gallbladder which was removed.  He remained somewhat somnolent after surgery and he did require BiPAP overnight following surgery due to hypercapnic respiratory failure which I suspect is due to depressed respiratory drive from sedation from anesthesia.  Respiratory acidosis has improved and encephalopathy is waxing and waning.     Furthermore, on 4/18 he was noted to have some recurrence of hypoxia as well as wet cough.  Chest x-ray 4/19 is showing evidence of pneumonia, clinical concern for aspiration potentially in the postop setting.     There was some question of facial droop during a period of agitation early 4/18.  CT and CTA head were negative for acute pathology and stroke neurology was consulted.  Higher clinical concern is for toxic metabolic encephalopathy in the setting of recent surgery, unfamiliar setting and pneumonia.     He remains admitted for IV antibiotics and close monitoring of his mental status.    Acute calculus gangrenous cholecystitis: Status post cholecystectomy on 4/17. IV zosyn abx completed (7 days). Tolerating diet.      Acute metabolic encephalopathy (seems resolved), combination from anesthesia and hypercapnic respiratory failure, and pneumonia:  Required BiPAP in the evening after surgery with  improvement in PCO2 which peaked at 103.  Redirect as able, consider a sitter if needed.    -Currently improving but overnight can consider low dose haldol or Zyprexa if needed for agitation.       Aspiration pneumonia with acute hypoxemic respiratory failure: As above.  Abx: IV zosyn discontinued 4/22 following 7 days of abx therapy. Abx course completed (hospital aspiration recommends 7 days of abx therapy). However, daughter is adamant he have additional oral abx despite ISDA guidelines. Augmentin BID for 2 days per family request. Sputum culture sent but negative. No further need for abx for gallbladder indication.      History of pulmonary alveolitis: Continue home inhalers.     History of hypertension: PTA amlodipine and metoprolol with hold parameters    History of BPH: Continue Flomax.  Monitor.    Reported hx of dementia per chart, though his daughter firmly disputes this. Seems to have at most mild cognitive impairment.    Active delirium: Post-operatively in the acute infectious setting patient had active delirium at night which improved.     Paroxysmal A. fib: likely provoked by acute illness.  Will monitor.  Now in controlled rate A. fib.  Will monitor for now, we will not start anticoagulation in the setting of postop state and intermittent encephalopathy with increased fall risk.  We will continue to reassess. ASA started.      Wheelchair bound at baseline: Normally can at least pivot on one leg during transfers.  Deconditioning is worsened now, consulted PT.  Resides at [facility] per his report.    History of RA: PTA 5 mg oral daily; Stress steroids not indicated    CKD: Baseline Cr 1.4-1.6. Stable.     FEN: Oral hydration; monitor; Dysphagia  Activity: TCU  DVT Prophylaxis: Pneumatic Compression Devices  Family update: Yes, updated daughter - Mary    Code Status: No CPR- Do NOT Intubate    Expected discharge: Tomorrow, recommended to transitional care unit once safe disposition plan/ TCU bed  available and oxygen stable.    Kelly Bellamy, DO    Text Page (7am - 6pm, M-F)    Goals of care >35 min including discussing care plan with family, nursing, and chart reviewed. Daughter had a number of questions and concerns about the care plan and possible discharge tomorrow. Answered my questions to the best of my ability.     Interval History   Doing well today. Oxygen needs are improving. Slightly out of it. Mentation waxes and wanes. Respiratory status improving. BP stable. Discussed with nursing.     -Data reviewed today: I reviewed all new labs and imaging results over the last 24 hours. I personally reviewed     Physical Exam   Temp: 98.1  F (36.7  C) Temp src: Axillary BP: 124/59 Pulse: 100   Resp: 24 SpO2: 93 % O2 Device: Nasal cannula Oxygen Delivery: 1 LPM  Vitals:    04/16/21 1249 04/21/21 0626 04/22/21 0708   Weight: 104.3 kg (230 lb) 109.1 kg (240 lb 9.6 oz) 106.7 kg (235 lb 4.8 oz)     Vital Signs with Ranges  Temp:  [97.3  F (36.3  C)-98.8  F (37.1  C)] 98.1  F (36.7  C)  Pulse:  [] 100  Resp:  [24-28] 24  BP: (123-140)/(55-64) 124/59  SpO2:  [93 %-97 %] 93 %  I/O last 3 completed shifts:  In: 360 [P.O.:360]  Out: 2600 [Urine:2600]    Constitutional: Awake, alert, cooperative, somewhat confused, no apparent distress. Non-toxic. Chronically ill in appearance. Appears stated age.   HEENT: Atraumatic. Normocephalic. Conjunctiva non-injected. Sclera anicteric. MMM.  Respiratory: Moves air bilaterally. Bilateral course wet lung sounds in upper lobes. No wheezing.   Cardiovascular: Regular rate and rhythm, normal S1 and S2, and no murmur noted  GI: Normal bowel sounds, soft, non-distended, non-tender  Skin/Integumen: No rashes, no cyanosis, no edema    Medications     - MEDICATION INSTRUCTIONS -       - MEDICATION INSTRUCTIONS -         amLODIPine  10 mg Oral QPM     amoxicillin-clavulanate  1 tablet Oral Q12H EDILSON     budesonide  0.5 mg Nebulization BID     enoxaparin ANTICOAGULANT  30 mg  Subcutaneous Q24H     guaiFENesin  600 mg Oral BID     ipratropium - albuterol 0.5 mg/2.5 mg/3 mL  3 mL Nebulization Q4H While awake     latanoprost  1 drop Right Eye QPM     levothyroxine  50 mcg Oral Daily     metoprolol tartrate  25 mg Oral BID     multivitamin w/minerals  1 tablet Oral Daily     predniSONE  5 mg Oral Daily     sodium chloride (PF)  3 mL Intracatheter Q8H     tamsulosin  0.4 mg Oral QPM     Data   Recent Labs   Lab 04/20/21  0612 04/19/21  0125 04/18/21  0622 04/17/21  0620 04/16/21  1309   WBC 9.1 11.9* 16.3* 17.1* 15.0*   HGB 12.0* 12.0* 12.7* 13.2* 14.6   MCV 99 97 99 98 94    234 239 246 282   INR  --  1.21*  --   --  1.07    138 138 135 135   POTASSIUM 3.8 3.9 4.7 4.0 4.2   CHLORIDE 109 108 105 103 100   CO2 30 29 31 30 30   BUN 32* 38* 29 25 26   CR 1.57* 1.62* 1.58* 1.49* 1.47*   ANIONGAP 2* 1* 2* 2* 5   MERCEDES 8.2* 7.8* 8.2* 8.3* 9.2   GLC 96 100* 101* 113* 135*   ALBUMIN  --   --   --  2.6* 3.3*   PROTTOTAL  --   --   --  6.9 7.9   BILITOTAL  --   --   --  1.2 1.4*   ALKPHOS  --   --   --  68 79   ALT  --   --   --  26 22   AST  --   --   --  25 25   LIPASE  --   --   --   --  86   TROPI  --  0.036  --   --  <0.015     No results found for this or any previous visit (from the past 24 hour(s)).

## 2021-04-22 NOTE — PLAN OF CARE
End of Shift Summary  For vital signs and complete assessments, please see documentation flowsheets.     Pertinent assessments: Tele: Afib controlled. Alert, intermittent disorientation to time, situation (at times). LS exp wheezes, infrequent productive cough. Coughs w/intake. O2 @ 2 lpm w/ sats in mid 90's.  Denies pain. Scrotal redness, maceration under foreskin, cleansed, pulled foreskin to normal position but only goes partially, there appears to be a skin bridge below the glans, pt states its normal for him. Loose stool x1.     Major Shift Events: Old LALI site copious drainage, dressing changed x2   Treatment Plan: Zosyn & supportive cares. Monitor respiratory status.

## 2021-04-22 NOTE — PLAN OF CARE
End of Shift Summary  For vital signs and complete assessments, please see documentation flowsheets.     Pertinent assessments: Alert, intermittent disorientation to time, situation. LS exp wheezes, infrequent productive cough. Coughs w/intake. O2 @ 2 lpm w/ sats in mid 90's.Denies pain. Scrotal redness, maceration under foreskin, cleansed, pulled foreskin to normal position but only goes partially, there appears to be a skin bridge below the glans, pt states its normal for him. Loose stool x1.     Major Shift Events: Old LALI site copious drainage, dressing changed x2   Treatment Plan: Zosyn & supportive cares. Monitor respiratory status.

## 2021-04-22 NOTE — PROGRESS NOTES
Care Management Follow Up    Length of Stay (days): 6    Expected Discharge Date: 04/23/21     Concerns to be Addressed: discharge planning     Patient plan of care discussed at interdisciplinary rounds: Yes    Anticipated Discharge Disposition: Assisted Living     Anticipated Discharge Services: PT/OT  Anticipated Discharge DME:  02    Patient/family educated on Medicare website which has current facility and service quality ratings: yes  Education Provided on the Discharge Plan: Dtg and Pt   Patient/Family in Agreement with the Plan: yes    Referrals Placed by CM/SW:tcu    Private pay costs discussed: transportation costs    Additional Information:  I tentatively set up [facility] transport for Friday April 23rd at 10AM with 02.  MD aware.  [facility] TCU has a bed for him.  I updated Dtg of the tentative plan.    Lien Velarde RN BSN   Inpatient Care Coordination  [facility]  [phone]        Sheyla Velarde, RN

## 2021-04-23 NOTE — PLAN OF CARE
PT: Attempted to see patient for physical therapy.  Patient is currently lift dependent, has been unable to transfer to standing with therapy over the last couple of days.  Plan for session was to trial Bethany Steady; however patient had just received food tray.  Discussed with patient and nurse, will reschedule for tomorrow.

## 2021-04-23 NOTE — CONSULTS
CLINICAL NUTRITION SERVICES  -  ASSESSMENT NOTE      MALNUTRITION:  % Weight Loss:  None noted  % Intake:  <75% for > 7 days (non-severe malnutrition)  Subcutaneous Fat Loss:  Upper arm region mild to moderate depletion --> not using as indicator with only 1 region present   Muscle Loss:  Temporal region mild to moderate depletion, Clavicle bone region mild to moderate depletion, Acromion bone region mild to moderate depletion, Patellar region mild to moderate depletion, Anterior thigh region mild to moderate depletion and Posterior calf region mild to moderate depletion  Fluid Retention: None documented    Malnutrition Diagnosis: Non-Severe malnutrition  In Context of:  Acute illness or injury with underlying chronic illness or disease        REASON FOR ASSESSMENT  Jacob Easton is a 89 year old male seen by Registered Dietitian for Admission Nutrition Risk Screen for positive and LOS.    PMH of: RA, CKD stage 3, dementia (per chart, family denies), lung disease of unclear etiology, wheelchair bound.    Admit 2/2: Abdominal pain, cholecystitis, encephalopathy.     NUTRITION HISTORY  - Information obtained from chart as lethargic today, increase in respiratory needs.  - Resides in [facility].  Plans for TCU at discharge.  - Allergies: NKFA.      CURRENT NUTRITION ORDERS  Diet Order:     DD2 + NTL    Current Intake/Tolerance:  Surgery following with lap cholecystectomy 4/17.  Clears 4/18, fulls 4/19 and above since that time as well.  SLP following during admit with decreased DANIELLE, weakness, noted to have dysphagia with previous hospitalization.  Was potentially set to discharge today though held with increase in O2 needs.  Overall variable PO intakes since admission, % meal consumption.  Noted at times may reflect single food items and suspect meeting <75% needs (or less) during admit.  Nursing staff completing oral cares while in room.      NUTRITION FOCUSED PHYSICAL ASSESSMENT FOR DIAGNOSING MALNUTRITION)  Yes  "    Obtained from Chart/Interdisciplinary Team:  - Currently requiring 5 L oxymask  - No documentation of PI  - Stooling patterns reviewed    ANTHROPOMETRICS  Height: 6' 1.5\"  Weight: 235 lbs 4.8 oz  Body mass index is 30.62 kg/m .  Weight Status:  Obesity Grade I BMI 30-34.9  Weight History:  Wt Readings from Last 10 Encounters:   04/22/21 106.7 kg (235 lb 4.8 oz)   04/14/21 104.8 kg (231 lb)   03/16/21 102.3 kg (225 lb 9.6 oz)   02/03/21 105.7 kg (233 lb)   01/05/21 105.2 kg (232 lb)   12/01/20 105.7 kg (233 lb)   09/30/20 106.1 kg (234 lb)   09/09/20 107.2 kg (236 lb 6.4 oz)   07/14/20 104.8 kg (231 lb)   06/11/20 107.5 kg (237 lb)     - No edema currently documented or noted.  Appears wt relatively stable when compared to above trends.    LABS  Labs reviewed:  Electrolytes  Potassium (mmol/L)   Date Value   04/23/2021 3.6   04/20/2021 3.8   04/19/2021 3.9     Phosphorus (mg/dL)   Date Value   04/23/2021 3.5   11/20/2015 1.8 (L)   11/17/2015 2.3 (L)   11/16/2015 3.3   11/15/2015 2.5    Blood Glucose  Glucose (mg/dL)   Date Value   04/23/2021 167 (H)   04/20/2021 96   04/19/2021 100 (H)   04/18/2021 101 (H)   04/17/2021 113 (H)     Hemoglobin A1C (%)   Date Value   02/04/2021 6.2 (H)   11/21/2019 6.7 (A)    Inflammatory Markers  WBC (10e9/L)   Date Value   04/23/2021 14.2 (H)   04/20/2021 9.1   04/19/2021 11.9 (H)     Albumin (g/dL)   Date Value   04/17/2021 2.6 (L)   04/16/2021 3.3 (L)   02/04/2021 2.9 (L)      Magnesium (mg/dL)   Date Value   04/23/2021 2.3   04/05/2017 2.2   04/03/2017 2.3     Sodium (mmol/L)   Date Value   04/23/2021 140   04/20/2021 141   04/19/2021 138    Renal  Urea Nitrogen (mg/dL)   Date Value   04/23/2021 21   04/20/2021 32 (H)   04/19/2021 38 (H)     Creatinine (mg/dL)   Date Value   04/23/2021 1.31 (H)   04/20/2021 1.57 (H)   04/19/2021 1.62 (H)     Additional  Triglycerides (mg/dL)   Date Value   12/21/2015 47     Ketones Urine (mg/dL)   Date Value   04/16/2021 Negative        B/P: " 139/71, T: 97, P: 72, R: 24      MEDICATIONS  Medications reviewed:    amLODIPine  10 mg Oral QPM     amoxicillin-clavulanate  1 tablet Oral Q12H EDILSON     budesonide  0.5 mg Nebulization BID     enoxaparin ANTICOAGULANT  30 mg Subcutaneous Q24H     guaiFENesin  600 mg Oral BID     ipratropium - albuterol 0.5 mg/2.5 mg/3 mL  3 mL Nebulization Q4H While awake     latanoprost  1 drop Right Eye QPM     levothyroxine  50 mcg Oral Daily     metoprolol tartrate  25 mg Oral BID     multivitamin w/minerals  1 tablet Oral Daily     predniSONE  5 mg Oral Daily     sodium chloride (PF)  3 mL Intracatheter Q8H     tamsulosin  0.4 mg Oral QPM             ASSESSED NUTRITION NEEDS PER APPROVED PRACTICE GUIDELINES:    Dosing Weight 107 kg   Estimated Energy Needs: 20-25 Kcal/Kg  Justification: maintenance  Estimated Protein Needs: 1-1.2 g pro/Kg  Justification: preservation of lean body mass  Estimated Fluid Needs: per MD      NUTRITION DIAGNOSIS:  Inadequate oral intake related to suspect decreased appetite, weakness, dysphagia, respiratory needs, mentation as evidenced by suspect meeting <75% needs during admit with malnutrition coding/combination muscle loss.    NUTRITION INTERVENTIONS  Recommendations / Nutrition Prescription  Diet per SLP/MD.    Alternate Magic Cup and Magic Shake offerings.  Add w/ meals so that offered only when appropriate for PO intakes.       Implementation  Nutrition education: Not appropriate at this time due to patient condition.    Medical Food Supplement: As above.     Collaboration and Referral of Nutrition care: Discussed POC with team during rounds.      Nutrition Goals  Patient to consistently consume at least 50-75% of meals or supplements TID.       MONITORING AND EVALUATION:  Progress towards goals will be monitored and evaluated per protocol and Practice Guidelines          Arabella Egan RDN, LD  Clinical Dietitian  3rd floor/ICU: [phone]  All other floors:  [phone]  Weekend/holiday: [phone]

## 2021-04-23 NOTE — PROVIDER NOTIFICATION
Dr. Bellamy paged- Preliminary CT results are in. Thanks!    Xochitl Walker RN on 4/23/2021 at 1:12 PM

## 2021-04-23 NOTE — PROGRESS NOTES
[facility]  Hospitalist Progress Note    Assessment & Plan   Summary of Stay: Jacob Easton is a 89 year old male with past medical history including RA who is now wheelchair-bound, CKD, A. fib, pulmonary alveolitis and BPH admitted on 4/16/2021 with right upper quadrant abdominal pain found to have leukocytosis and CT scan showing acute calculus cholecystitis in addition to an asymmetric masslike wall thickening in the cecum/ascending colon felt to potentially represent malignancy.  He was admitted and started on IV fluids, IV antibiotics with ceftriaxone and surgical consultation.  He was taken for cholecystectomy on 4/17/2021 and found to have a partially gangrenous gallbladder which was removed.  He remained somewhat somnolent after surgery and he did require BiPAP overnight following surgery due to hypercapnic respiratory failure which I suspect is due to depressed respiratory drive from sedation from anesthesia.  Respiratory acidosis has improved and encephalopathy is waxing and waning.     Furthermore, on 4/18 he was noted to have some recurrence of hypoxia as well as wet cough.  Chest x-ray 4/19 is showing evidence of pneumonia, clinical concern for aspiration potentially in the postop setting.     There was some question of facial droop during a period of agitation early 4/18.  CT and CTA head were negative for acute pathology and stroke neurology was consulted.  Higher clinical concern is for toxic metabolic encephalopathy in the setting of recent surgery, unfamiliar setting and pneumonia.     He remains admitted for IV antibiotics and close monitoring of his mental status.    Acute calculus gangrenous cholecystitis: Status post cholecystectomy on 4/17. IV zosyn abx completed (7 days). Tolerating diet. 4/23 CT abdomen/pelvis without acute pathology. Of note, incidental finding of possible ascending colon mass on CT - recommended outpatient Colonoscopy.      Acute metabolic  encephalopathy, combination from anesthesia and hypercapnic respiratory failure, and pneumonia:  Required BiPAP in the evening after surgery with improvement in PCO2 which peaked at 103.  Redirect as able, consider a sitter if needed.    -Currently waxes and wanes - intermittent lucency. Overnight can consider low dose haldol or Zyprexa if needed for agitation.       Aspiration pneumonia with acute hypoxemic respiratory failure: As above.  Abx: IV zosyn stopped after 7 days of abx therapy 4/22 and Augmentin started. However, with clinical worsening despite low procal (0.22) will resume IV zosyn to complete 10 day course of abx therapy. Sputum culture sent but negative. No further need for abx for gallbladder indication. CT chest consistent with aspiration pneumonia. Continue to titrate oxygen as able. Pulmonary hygiene. IV lasix 40 mg once. Monitor response. Given recurrent hypoxia Echo ordered - Echo from 2017 showed grade I early diastolic dysfunction. LVEF 55-60%. 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     History of hypertension: PTA amlodipine and metoprolol with hold parameters    History of BPH: Continue Flomax.  Monitor. Urinary retention requiring straight cath prn.     Reported hx of dementia per chart, though his daughter firmly disputes this. Seems to have at most mild cognitive impairment.    Active delirium: Post-operatively in the acute infectious setting patient had active delirium at night which improved.     Paroxysmal A. fib: likely provoked by acute illness.  Will monitor.  Now in controlled rate A. fib.  Will monitor for now, we will not start anticoagulation in the setting of postop state and intermittent encephalopathy with increased fall risk.  We will continue to reassess. ASA started.      Wheelchair bound at baseline: Normally can at least pivot on one leg during transfers.  Deconditioning is worsened now, consulted PT.  Resides at [facility]  his report.    History of RA: PTA 5 mg oral daily; Stress steroids not indicated    CKD: Baseline Cr 1.4-1.6. Stable.     FEN: Oral hydration; monitor; Dysphagia  Activity: TCU  DVT Prophylaxis: Pneumatic Compression Devices  Family update: Yes, updated daughter - Mary    Code Status: No CPR- Do NOT Intubate    Expected discharge: Tomorrow, recommended to transitional care unit once safe disposition plan/ TCU bed available and oxygen stable.    Kelly Bellamy, DO    Text Page (7am - 6pm, M-F)    >35 minutes of care coordination required. Urgent assessment of patient this AM given worsening oxygen needs and confusion. Discussed at bedside with nursing and respiratory therapy. Provided IV lasix and ordered imaging. Discussed holding discharge with social work. Extensive conversation with family on clinical status and ongoing care plan. Questions were answered to the best of my ability.     Interval History   Patient is confused and unable to make complete sentences today. He is not localizing an area of pain but does wince when his abdomen is touched and feels better laying down. SOB with use of accessory respiratory muscles. Wheezing. Course audible gurgling and unable to clear cough. Increased oxygen needs. Responsive to breathing treatment. Discussed with nursing.     -Data reviewed today: I reviewed all new labs and imaging results over the last 24 hours. I personally reviewed     Physical Exam   Temp: 97  F (36.1  C) Temp src: Axillary BP: 139/71 Pulse: 72   Resp: 24 SpO2: 95 % O2 Device: Oxymask Oxygen Delivery: 5 LPM  Vitals:    04/16/21 1249 04/21/21 0626 04/22/21 0708   Weight: 104.3 kg (230 lb) 109.1 kg (240 lb 9.6 oz) 106.7 kg (235 lb 4.8 oz)     Vital Signs with Ranges  Temp:  [96.9  F (36.1  C)-97.4  F (36.3  C)] 97  F (36.1  C)  Pulse:  [] 72  Resp:  [24-32] 24  BP: (139-155)/(71-81) 139/71  SpO2:  [61 %-95 %] 95 %  I/O last 3 completed shifts:  In: 560 [P.O.:560]  Out: 200  [Urine:200]    Constitutional: Awake,confussed, distressed, acutely and chronically ill in appearance. Appears stated age.   HEENT: Atraumatic. Normocephalic. Conjunctiva non-injected. Sclera anicteric. MMM.  Respiratory: Moves air bilaterally. Use of accessory respiratory muscles. Course lung sounds with diffuse crackles. No wheezing.  Cardiovascular: Irregularly irregular; rate controlled Normal S1 and S2, and no murmur noted  GI: Round, distended, firm, diffuse abdominal tenderness. Umbilical hernia reducible. Surgical incisions and dressings intact. Hypoactive BS+  Skin/Integumen: No rashes, no cyanosis, no edema    Medications       amLODIPine  10 mg Oral QPM     budesonide  0.5 mg Nebulization BID     enoxaparin ANTICOAGULANT  30 mg Subcutaneous Q24H     guaiFENesin  600 mg Oral BID     ipratropium - albuterol 0.5 mg/2.5 mg/3 mL  3 mL Nebulization Q4H While awake     latanoprost  1 drop Right Eye QPM     levothyroxine  50 mcg Oral Daily     metoprolol tartrate  25 mg Oral BID     multivitamin w/minerals  1 tablet Oral Daily     piperacillin-tazobactam  2.25 g Intravenous Q6H     predniSONE  5 mg Oral Daily     sodium chloride (PF)  3 mL Intracatheter Q8H     tamsulosin  0.4 mg Oral QPM     Data   Recent Labs   Lab 04/23/21  0706 04/20/21  0612 04/19/21  0125 04/17/21  0620 04/17/21  0620   WBC 14.2* 9.1 11.9*   < > 17.1*   HGB 13.5 12.0* 12.0*   < > 13.2*   * 99 97   < > 98    262 234   < > 246   INR  --   --  1.21*  --   --     141 138   < > 135   POTASSIUM 3.6 3.8 3.9   < > 4.0   CHLORIDE 108 109 108   < > 103   CO2 34* 30 29   < > 30   BUN 21 32* 38*   < > 25   CR 1.31* 1.57* 1.62*   < > 1.49*   ANIONGAP <1* 2* 1*   < > 2*   MERCEDES 8.7 8.2* 7.8*   < > 8.3*   * 96 100*   < > 113*   ALBUMIN  --   --   --   --  2.6*   PROTTOTAL  --   --   --   --  6.9   BILITOTAL  --   --   --   --  1.2   ALKPHOS  --   --   --   --  68   ALT  --   --   --   --  26   AST  --   --   --   --  25   TROPI   --   --  0.036  --   --     < > = values in this interval not displayed.     Recent Results (from the past 24 hour(s))   XR Chest Port 1 View    Narrative    EXAM: XR CHEST PORT 1 VIEW  LOCATION: [facility]  DATE/TIME: 4/23/2021 3:24 AM    INDICATION: Worsening hypoxia.    COMPARISON: 4/21/2021.    FINDINGS: The heart is enlarged. There is no pulmonary edema. The thoracic aorta is calcified. There are again bilateral pulmonary infiltrates which are not significantly changed from the previous exam. Probable right pleural effusion. No pneumothorax.      Impression    IMPRESSION: Persistent bilateral pulmonary infiltrates without significant change.   CT Chest Abdomen Pelvis w/o Contrast    Narrative    CT CHEST ABDOMEN PELVIS WITHOUT CONTRAST  4/23/2021 10:42 AM    HISTORY:  Sepsis.    TECHNIQUE: CT scan obtained of the chest, abdomen, and pelvis without  IV contrast. Radiation dose for this scan was reduced using automated  exposure control, adjustment of the mA and/or kV according to patient  size, or iterative reconstruction technique.    COMPARISON:  CT abdomen pelvis on 4/16/2021.    FINDINGS:  Chest/mediastinum: Mild cardiomegaly. No significant pericardial  effusion. Extensive atherosclerotic vascular calcification of the  coronary arteries and thoracic aorta. Multiple prominent mediastinal  and hilar lymph nodes, likely reactive.    Lung/pleura: Moderate bilateral pleural effusions and associated  basilar atelectasis/consolidation. Multiple bilateral thin-walled  pulmonary cysts, not definitely changed as compared to 4/5/2017 exam.  Bilateral upper lobe predominant patchy nodular pulmonary opacities,  worrisome for infection.    Abdomen/pelvis: Limited evaluation of the abdominal organs due to lack  of IV contrast demonstrates motion/respiratory artifact.  Cholelithiasis without CT evidence of acute cholecystitis. The  unenhanced liver, pancreas, spleen and adrenal glands are  grossly  unremarkable. No radiodense kidney stones. Atrophic kidneys. 7.2 cm  left renal cyst. Bilateral subcentimeter hypoattenuating foci in both  kidneys are too small to characterize.    No abnormally dilated bowel loops. Colonic diverticulosis without CT  evidence of acute diverticulitis. No significant free fluid in the  abdomen and pelvis. No free peritoneal or portal venous gas. Limited  evaluation of the pelvic organs due to significant streak artifacts  from adjacent hip prosthesis. Large number of bladder diverticula not  well seen due to the above-mentioned streak artifact. Moderate  atherosclerotic vascular opacification of the abdominal aorta and  iliac vessels.    Bones and soft tissue: Postsurgical changes of bilateral hip  arthroplasties. Multilevel degenerative changes of the spine. No  suspicious osseous lesion.      Impression    IMPRESSION: Exam is limited by motion/respiratory artifact and lack of  IV contrast, within these limitations, there is;  1. Bilateral, upper lobe predominant patchy and nodular pulmonary  opacities, worrisome for infection.  2. Moderate bilateral pleural effusions and associated basilar  atelectasis/consolidation.  3. Multiple bilateral thin-walled pulmonary cysts, not significantly  changed as compared to 4/5/2017 exam, etiology remains indeterminate,  can be seen with lymphangiomyomatosis, Langerhans' cell histiocytosis  and lymphocytic interstitial pneumonitis.  4. Cholelithiasis without CT evidence of acute cholecystitis.  5. Colonic diverticulosis without CT evidence of acute diverticulitis.  6. Large number of urinary bladder diverticula, not well seen due to  the presence of significant streak artifact from adjacent hip  prosthesis which precludes detailed evaluation of the pelvic organs.

## 2021-04-23 NOTE — PLAN OF CARE
Pertinent assessments: Alert, intermittent disorientation to time, situation. LS exp wheezes, infrequent congested cough. O2 weaned down to 1L. Denies pain. Old drain site CDI.   Major Shift Events: Zosyn discontinued, Augmentin started.   Treatment Plan: supportive cares. Monitor respiratory status.

## 2021-04-23 NOTE — PLAN OF CARE
To Do:  End of Shift Summary  For vital signs and complete assessments, please see documentation flowsheets.     Pertinent assessments: A&O x4. Forgetful, speech is garbled. Mucosa is dry, speech is hard to understand at times. C/O SOB, currently on 5 LPM O2 via oxymask. Sats are in mid 90's. Infrequent nonproductive congested wet cough. RUL LS exp wheezes, MARIO exp wheezes. Incontinent of B&B, external cath applied. BM smear x1. Abdominal dressing and incisions are CDI.     Major Shift Events: Abdominal CT complete. ECHO in process. Pt. straight cath'd for 650. UA- NEG. Refused PO intake. Oral cares provided. Repositioning for comfort and improved oxygenation.     Treatment Plan: Monitor respiratory status, nebs, IV zosyn, and supportive cares.     Bedside Nurse: Xochitl Walker RN

## 2021-04-23 NOTE — PROVIDER NOTIFICATION
During shift change safety check patient was noted to have removed oxymask- sats were at 61% on RA. Replaced oxymask and increased O2 to 5 LPM. Pt. sats are 94%. Monitoring continues.    Xochitl Walker RN on 4/23/2021 at 7:41 AM

## 2021-04-23 NOTE — PROVIDER NOTIFICATION
Dr. Bellamy paged- Pt. daughter called and requesting to speak with you. FYI- pt. is more alert now. Thanks!    Xochitl Walker RN on 4/23/2021 at 11:29 AM

## 2021-04-23 NOTE — PLAN OF CARE
End of Shift Summary  For vital signs and complete assessments, please see documentation flowsheets.     Pertinent assessments: Tele: Afib. Restless/fidgety. Denies pain. LS exp wheezes, MARIO fine crackles, GREENE, congested, wet cough. Coughs w/intake. BP's elevated. Afebrile. Incontinent of B&B, external cath applied. BM x1. Old LALI site small drainage, dressing changed.     Major Shift Events: Declined midnight neb, increased work of breathing, requiring more O2, now @ 5 lpm w/sats between 89%-92%, MD ramsey'd. See MD note. Lasix & neb given. O2 weaned to 3 lpm this AM w/sats in low 90's.     Treatment Plan: Augmentin. Supportive cares. Monitor respiratory status.

## 2021-04-23 NOTE — PROGRESS NOTES
Cross cover notified of patient with increased work of breathing wheezing and congested cough.  Oxygen up from 2 L now to 5 L saturation 89-93%.  He is here following cholecystectomy, but likely also has aspiration pneumonia and has received antibiotics for this.  Chest x-ray from 2 days ago showed increasing lower lobe infiltrates.  His weight does appear to be up since admission and he is net +3 L although ins and outs did not appear to be very accurate.  Creatinine is at his baseline.  Of 1.5-1.6.  -Repeat portable chest x-ray now  -Give 20 mg IV Lasix, last x-ray commented on pulmonary vascular congestion along with cardiomegaly  -Give neb  -Consider TTE tomorrow, defer to daytime rounder  -if worsening O2 status may need bipap trial

## 2021-04-23 NOTE — PLAN OF CARE
Speech-Language Pathology      Attempted to see for dysphagia therapy, however lethargic and declining PO trials. On 5L O2 via Oxymask, and RN reporting desaturation when removed. Will re-attempt as able/appropriate.      Addendum:    Per d/w RN at ~2:20, pt undergoing testing. Will hold services and re-attempt as able/appropriate.

## 2021-04-24 NOTE — PLAN OF CARE
End of Shift Summary  For vital signs and complete assessments, please see documentation flowsheets.     Pertinent assessments: VSS. A&O, forgetful at times. Abdominal dressing and incisions are CDI. LS: expiratory wheezes and coarse, given neb x1. Weaned to 2 L NC, maintaining low to mid 90s. W/C transport with oxygen set up for today at 11am. External catheter in place with adequate urine output.    Major Shift Events: uneventful    Treatment Plan: Monitor respiratory status, nebs, IV zosyn, and supportive cares.

## 2021-04-24 NOTE — PLAN OF CARE
Physical Therapy Discharge Summary    Reason for therapy discharge:    Discharged to transitional care facility.    Progress towards therapy goal(s). See goals on Care Plan in [facility] electronic health record for goal details.  Goals not met.  Barriers to achieving goals:   discharge from facility.    Therapy recommendation(s):    Continued therapy is recommended.  Rationale/Recommendations:  Continued therapy in TCU recommended to increase independence with all mobility and improve functional strength for a safe discharge home.

## 2021-04-24 NOTE — PLAN OF CARE
Speech Language Therapy Discharge Summary    Reason for therapy discharge:    Discharged to transitional care facility.    Progress towards therapy goal(s). See goals on Care Plan in [facility] electronic health record for goal details.  Goals not met.  Barriers to achieving goals:   discharge from facility.    Therapy recommendation(s):    Continued therapy is recommended.  Rationale/Recommendations:  Pt remains on modified diet versus baseline functional level and will continue to benefit from SLP services for diet tolerance/possible upgrade and training re: use of aspiration precautions.

## 2021-04-24 NOTE — PROGRESS NOTES
New hospitalist assigned to patient. Transport arranged w/O2 at 11AM to [facility]. Paged Dr. Estrada to request orders.     CM will continue to follow patient until discharge for any additional needs.     Claire Cordova RN, BSN, CPHN, CM  Inpatient Care Coordination - M/S, [facility]  [phone]    Addendum 9:17am - Discharge orders for SNF in place. Faxed to [facility] via [facility].   santa

## 2021-04-24 NOTE — PLAN OF CARE
To Do:  End of Shift Summary  For vital signs and complete assessments, please see documentation flowsheets.     Pertinent assessments: VSS. A&O x3, intermittent confusion. Abdominal dressing and incisions are CDI. Crackles and wheezes noted, some SoB noted with eating, switched to nasal cannula and weaned.     Major Shift Events: Weaned to 2.5L/nc, maintaining SpO2 >90%. Bladder scan done 290ml. Suctioning well with external; catheter    Treatment Plan: Monitor respiratory status, nebs, IV zosyn, and supportive cares.     Bedside Nurse: Jose Triplett RN

## 2021-04-25 PROBLEM — J18.9 PNEUMONIA OF BOTH LUNGS DUE TO INFECTIOUS ORGANISM, UNSPECIFIED PART OF LUNG: Status: ACTIVE | Noted: 2021-01-01

## 2021-04-25 PROBLEM — J96.01 ACUTE RESPIRATORY FAILURE WITH HYPOXIA AND HYPERCAPNIA (H): Status: ACTIVE | Noted: 2021-01-01

## 2021-04-25 PROBLEM — J96.02 ACUTE RESPIRATORY FAILURE WITH HYPOXIA AND HYPERCAPNIA (H): Status: ACTIVE | Noted: 2021-01-01

## 2021-04-25 NOTE — PROGRESS NOTES
DATE:  4/25/2021   TIME OF RECEIPT FROM LAB:  0844   LAB TEST:  PC02  LAB VALUE:  86  RESULTS GIVEN WITH READ-BACK TO (PROVIDER):  Data Unavailable  TIME LAB VALUE REPORTED TO PROVIDER:   0845

## 2021-04-25 NOTE — H&P
[facility]    History and Physical  Hospitalist       Date of Admission:  4/25/2021    Assessment & Plan   Jacob Easton is a 89 year old male who presents with altered mental status, hypoxia.    This is a 89-year-old gentleman with a past medical history significant for recent admission for acute gangrenous cholecystitis, postoperative aspiration pneumonia, and hypoxic and hypercapnic respiratory failure, acute multifactorial encephalopathy from 4/16/2021 -4/24/2021.  [facility] on 2 L of oxygen by nasal cannula in a stable condition.    Other past medical history includes hypertension, BPH, cognitive dysfunction, paroxysmal A. fib, rheumatoid arthritis on chronic prednisone 5 mg daily, CKD, ascending colon mass seen on CT scan during last admission.    Patient had a fairly complicated hospital stay.  He underwent laparoscopic cholecystectomy for gangrenous cholecystitis on 4/17/2021.  Postoperatively, patient developed altered mental status with hypercapnia.  Postoperatively he was noted to be very somnolent and VBG was suggestive of hypercapnia.  He was started on BiPAP which helped resolve the hypercapnia.  His mental status slowly improved.  He was also noted to be hypoxic.    During the hospital stay, he was started on IV Zosyn for gangrenous cholecystitis which was further continued for concern of development of aspiration pneumonia.  Patient has received IV Zosyn until the day of discharge and he was discharged on oral Augmentin.  He was requiring 2 L of oxygen by nasal cannula.    During the hospital stay, noted to have delirium as well.  At 1 point there was a concern of facial droop but imaging was unremarkable.  It was thought that his encephalopathy was multifactorial in the setting of hypoxia, medications, unfamiliar environment, aspiration pneumonia.  Overall his mentation improved.  When I discharged him yesterday, he appeared to be close to  baseline.  I asked him if he knew which TCU he was going to and he replied correctly.  He was discharged on a modified DD 2 diet with thickened liquids.    Currently, patient is on BiPAP and unable to provide me any meaningful history.  He also appears to be rather confused.  He is awake and denies any pain.    Patient was brought into the emergency room early this morning for hypoxia at the TCU.  Per ER physician, he was found to be unresponsive and was hypoxic in the 60s.  It is not clear if he was wearing oxygen or not.  He was discharged on 2 L of oxygen by nasal cannula.  At arrival to the emergency room, he was promptly started on BiPAP which helped resolve his hypoxia.  He was also noted to be hypercapnic.    Chest x-ray shows bilateral pleural effusions and bilateral infiltrates.  He received IV vancomycin and IV Zosyn in the ER.  Also received IV Lasix 40 mg.  He was admitted to internal medicine service as an [facility] status.    Problem List:    Acute hypoxic and hypercapnic respiratory failure.  -Chest x-ray suggestive of worsening pleural effusion and worsening pulmonary opacities.  There is also significant worsening of the BNP.  -He has been receiving IV Zosyn during his recent hospital admission and he received IV Zosyn even yesterday.  -For that reason low likelihood of worsening of pneumonia.  -More likely to be volume overload.  -Agree with IV Lasix.  Repeat IV Lasix later on today.  -Continue IV Zosyn for now.    Acute pulmonary edema.  CHF exacerbation.  -Recent echocardiogram showed an ejection fraction of 50 to 55%.  Also has a severe aortic stenosis.  -Continue IV Lasix.  Potentially repeat IV Lasix in the evening if the blood pressure is stable.  -Close blood pressure monitoring given severe aortic stenosis.    Recent episode of aspiration pneumonia.  -Continue IV Zosyn for now.  -Procalcitonin was checked and is equivocal, 0.29.  Clinically more likely to be due to volume  overload.    Encephalopathy.  -Once again multifactorial, in the setting of hypercapnia, hypoxia, pneumonia, CHF exacerbation, underlying cognitive dysfunction.  -Metabolic component.    Hypercapnic respiratory failure.  -Continue BiPAP.  Monitor VBG.  -Try to wean off the BiPAP if the mentation is improving and PCO2 also improves.    CKD  -Creatinine appears to be at baseline.  Closely monitor.    Recent cholecystectomy for gangrenous cholecystitis  -Abdomen is soft.  Patient denies any abdominal pain.    Atrial fibrillation.  -Continue metoprolol.  Rate appears to be reasonably controlled, between .  -Patient was not started on anticoagulation during recent hospital admission due to postoperative state, intermittent encephalopathy with increased fall risk.  Continue to reassess as condition improves.  On aspirin.    Hypertension  -On amlodipine and metoprolol at baseline, continue.    BPH  -On tamsulosin, continue.    Ascending colon mass seen on CT scan during recent admission.  -Outpatient colonoscopy recommended.    Rheumatoid arthritis  -Chronic prednisone 5 mg daily, continue.      DVT Prophylaxis: Enoxaparin (Lovenox) SQ  Code Status: DNR / DNI  GI prophylaxis: IV Pepcid.    Critical care time spent 60 minutes.    Shane Estrada MD    Primary Care Physician   Orestes Clement    Chief Complaint   Hypoxia and found down.      History of Present Illness   Jacob Easton is a 89 year old male presented with hypoxia and unresponsiveness.      Past Medical History    I have reviewed this patient's medical history and updated it with pertinent information if needed.   Past Medical History:   Diagnosis Date     Arthritis      Constipation      Hypertension      Hypocalcemia      Neuromuscular disorder (H)      Thyroid disease        Past Surgical History   I have reviewed this patient's surgical history and updated it with pertinent information if needed.  Past Surgical History:   Procedure Laterality Date      C TOTAL HIP ARTHROPLASTY  9/24/11    RT     LAPAROSCOPIC CHOLECYSTECTOMY N/A 4/17/2021    Procedure: CHOLECYSTECTOMY, LAPAROSCOPIC;  Surgeon: Lesvia Stallings MD;  Location:  OR       Prior to Admission Medications   Prior to Admission Medications   Prescriptions Last Dose Informant Patient Reported? Taking?   ASPIRIN LOW DOSE 81 MG EC tablet 4/24/2021 at am  No Yes   Sig: TAKE 1 TABLET BY MOUTH ONCE DAILY   Elastic Bandages & Supports (T.E.D. KNEE LENGTH/M-LONG) MISC   No No   Sig: WEAR AS DIRECTED ON IN THE MORNING OFF AT BEDTIME (2 = 1 PAIR)   Lactobacillus-Inulin ([employer] DIGESTIVE HEALTH) CHEW 4/24/2021 at am  No Yes   Sig: CHEW AND SWALLOW 2 TABLETS BY MOUTH ONCE DAILY   MELATONIN MAXIMUM STRENGTH 5 MG tablet 4/24/2021 at pm  No Yes   Sig: TAKE 1 TABLET BY MOUTH ONCE DAILY   Multiple Vitamins-Minerals (CEROVITE SENIOR) TABS 4/24/2021 at am  No Yes   Sig: TAKE 1 TABLET BY MOUTH ONCE DAILY   Nutritional Supplement LIQD 4/24/2021 at pm  Yes Yes   Sig: Take 1 Bottle by mouth 3 times daily (with meals)   acetaminophen (TYLENOL) 325 MG tablet Past Week at Unknown time  No Yes   Sig: TAKE TWO TABLETS (650MG) BY MOUTH EVERY 4 HOURS AS NEEDED   albuterol (VENTOLIN HFA) 108 (90 Base) MCG/ACT inhaler More than a month at Unknown time  No No   Sig: Inhale 2 puffs into the lungs every 4 hours as needed for shortness of breath / dyspnea or wheezing (May keep bedside)   amLODIPine (NORVASC) 10 MG tablet 4/24/2021 at am  No Yes   Sig: TAKE 1 TABLET BY MOUTH ONCE DAILY   amoxicillin-clavulanate (AUGMENTIN) 875-125 MG tablet 4/24/2021 at pm  No Yes   Sig: Take 1 tablet by mouth 2 times daily for 2 days   bisacodyl (DULCOLAX) 10 MG suppository More than a month at Unknown time  Yes No   Sig: Place 1 suppository (10 mg) rectally daily as needed for constipation   budesonide (PULMICORT) 0.5 MG/2ML neb solution 4/24/2021 at pm  Yes Yes   Sig: NEBULIZE THE CONTENT OF 1 VIAL TWICE DAILY (ADD TO IN THE MORNING AND AT BEDTIME  DUONEBS)   furosemide (LASIX) 20 MG tablet   No Yes   Sig: Take 1.5 tablets (30 mg) by mouth daily   guaiFENesin (MUCINEX) 600 MG 12 hr tablet Past Month at Unknown time  No Yes   Sig: Take 1 tablet (600 mg) by mouth 2 times daily as needed for congestion or cough   ipratropium - albuterol 0.5 mg/2.5 mg/3 mL (DUONEB) 0.5-2.5 (3) MG/3ML neb solution 4/24/2021 at pm  No Yes   Sig: NEBULIZE THE CONTENT OF 1 VIAL INTO THE LUNGS FOUR TIMES A DAY   latanoprost (XALATAN) 0.005 % ophthalmic solution 4/24/2021 at pm  No Yes   Sig: INSTILL ONE DROP IN THE RIGHT EYE IN THE EVENING   levothyroxine (SYNTHROID/LEVOTHROID) 50 MCG tablet 4/24/2021 at am  No Yes   Sig: TAKE 1 TABLET BY MOUTH ONCE DAILY   loperamide (IMODIUM A-D) 2 MG tablet More than a month at Unknown time  Yes No   Sig: May take 1 tablet (2 mg) by mouth daily as needed for diarrhea. May also take 2 tablets (4 mg) daily as needed for diarrhea. 4 mg po prn daily after 1st loose stool then 2 mg po after each additional loose stool. Max 16 mg /day   menthol-zinc oxide (CALMOSEPTINE) 0.44-20.625 % OINT ointment More than a month at Unknown time  Yes No   Sig: Apply topically 4 times daily as needed for skin protection   metoprolol tartrate (LOPRESSOR) 25 MG tablet 4/24/2021 at pm  No Yes   Sig: TAKE 1 TABLET BY MOUTH TWICE DAILY   potassium chloride ER (KLOR-CON M15) 15 MEQ CR tablet   No Yes   Sig: Take 2 tablets (30 mEq) by mouth daily   predniSONE (DELTASONE) 5 MG tablet 4/24/2021 at am  No Yes   Sig: TAKE 1 TABLET BY MOUTH ONCE DAILY NOTE DOSAGE/STRENGTH   senna-docusate (SM STOOL SOFTENER) 8.6-50 MG tablet More than a month at Unknown time  Yes No   Sig: Take 2 tablets by mouth 2 times daily as needed for constipation   tamsulosin (FLOMAX) 0.4 MG capsule 4/24/2021 at am  No Yes   Sig: TAKE 1 CAPSULE BY MOUTH ONCE DAILY      Facility-Administered Medications: None     Allergies   Allergies   Allergen Reactions     Advil [Ibuprofen] Other (See Comments)     PCP told  him not to take     Influenza Virus Vaccine H5n1 Other (See Comments)     Does not ever want to have a flu shot - also doesn't want pneumovax     Orange Juice [Orange Oil] Nausea       Social History   I have reviewed this patient's social history and updated it with pertinent information if needed. Jacob Easton  reports that he has never smoked. He has never used smokeless tobacco. He reports that he does not drink alcohol.    Family History   Unable to obtain.    Review of Systems   The 10 point Review of Systems is negative other than noted in the HPI or here.     Physical Exam   Temp: 98.2  F (36.8  C) Temp src: Axillary BP: 131/64 Pulse: 98   Resp: 24 SpO2: 95 % O2 Device: BiPAP/CPAP Oxygen Delivery: 15 LPM  Vital Signs with Ranges  Temp:  [97.6  F (36.4  C)-98.6  F (37  C)] 98.2  F (36.8  C)  Pulse:  [] 98  Resp:  [14-34] 24  BP: ()/(61-98) 131/64  FiO2 (%):  [90 %-100 %] 90 %  SpO2:  [77 %-99 %] 95 %  0 lbs 0 oz    Constitutional: Somnolent but arousable.  BiPAP in place.  Does not appear to be in distress.  Only able to follow limited commands.  Eyes: Conjunctiva and pupils examined and normal.  HEENT: Dry mucous membranes, poor dentition.  Respiratory: Coarse breath sounds.  No wheezing.  Cardiovascular: Irregular rate and rhythm, normal S1 and S2, and difficult to assess for any murmurs.  Well perfused and warm extremities.  GI: Soft, non-distended, non-tender  Skin: No rashes, no cyanosis, no edema.  Musculoskeletal: No joint swelling, erythema or tenderness.  Neurologic: No facial droop.  Appears to moving all extremities.  Opens eyes to voice.  Unable to follow all the commands.    Data     Recent Labs   Lab 04/25/21  0541 04/24/21  0629 04/23/21  0706 04/19/21  0125 04/19/21  0125   WBC 13.6* 10.4 14.2*   < > 11.9*   HGB 13.9 12.8* 13.5   < > 12.0*   * 101* 101*   < > 97    302 363   < > 234   INR  --   --   --   --  1.21*    145* 140   < > 138   POTASSIUM 4.1 3.4 3.6    < > 3.9   CHLORIDE 107 109 108   < > 108   CO2 38* 35* 34*   < > 29   BUN 25 21 21   < > 38*   CR 1.55* 1.43* 1.31*   < > 1.62*   ANIONGAP <1* 1* <1*   < > 1*   MERCEDES 8.9 8.4* 8.7   < > 7.8*   * 117* 167*   < > 100*   ALBUMIN 2.9*  --   --   --   --    PROTTOTAL 7.8  --   --   --   --    BILITOTAL 0.8  --   --   --   --    ALKPHOS 86  --   --   --   --    ALT 79*  --   --   --   --    AST 51*  --   --   --   --    TROPI 0.026  --   --   --  0.036    < > = values in this interval not displayed.       Recent Results (from the past 24 hour(s))   XR Chest Port 1 View    Narrative    EXAM: XR CHEST PORTABLE 1 VIEW  LOCATION: [facility]  DATE/TIME: 04/25/2021, 6:02 AM    INDICATION: Shortness of breath.  COMPARISON: 04/23/2021.    FINDINGS: The heart is at the upper limits of normal in size. There are bilateral pulmonary infiltrates which are overall increasing, particularly in the left upper lobe. Bilateral pleural effusions. Probable bullous disease at the right lung base.      Impression    IMPRESSION: Increasing bilateral pulmonary infiltrates.     '

## 2021-04-25 NOTE — PROVIDER NOTIFICATION
Significant lab value reported to this writer by lab- lactic acid 2.1. Dr. Estrada notified, Lactic acid 2.1 would you like any interventions?

## 2021-04-25 NOTE — DISCHARGE SUMMARY
[facility]    Discharge Summary  Hospitalist    Date of Admission:  4/16/2021  Date of Discharge:  4/24/2021 11:23 AM  Discharging Provider: Shane Estrada MD  Date of Service (when I saw the patient): 4/24/21    Discharge Diagnoses   Acute calculus gangrenous cholecystitis.  Status post cholecystectomy on 4/17/2021.  Acute encephalopathy, multifactorial.  Aspiration pneumonia.  Acute hypoxic respiratory failure.  Hypertension.  BPH.  History of dementia.  Paroxysmal A. fib.  Thought to be precipitated by acute illness.  Wheelchair-bound status at baseline.  CKD.  History of rheumatoid arthritis, on chronic prednisone.  Ascending colon mass on CT scan.  Outpatient colonoscopy recommended.    History of Present Illness   Jacob Easton is a 89 year old male with past medical history including RA who is now wheelchair-bound, CKD, A. fib, pulmonary alveolitis and BPH admitted on 4/16/2021 with right upper quadrant abdominal pain.    Hospital Course     Acute calculous gangrenous cholecystitis.  Status post cholecystectomy on 4/17/2021.  CT scan showing acute calculus cholecystitis in addition to an asymmetric masslike wall thickening in the cecum/ascending colon felt to potentially represent malignancy.  He was admitted and started on IV fluids, IV antibiotics with ceftriaxone and surgical consultation.  He was taken for cholecystectomy on 4/17/2021 and found to have a partially gangrenous gallbladder which was removed.  He remained somewhat somnolent after surgery and he did require BiPAP overnight following surgery due to hypercapnic respiratory failure which suspect is due to depressed respiratory drive from sedation from anesthesia.  Respiratory acidosis has improved and encephalopathy is waxing and waning.      Acute encephalopathy.  Multifactorial.  There was some question of facial droop during a period of agitation early 4/18.  CT and CTA head were negative for acute pathology and stroke  neurology was consulted.  Higher clinical concern is for toxic metabolic encephalopathy in the setting of recent surgery, unfamiliar setting and pneumonia.  Encephalopathy is thought to be multifactorial in the setting of recent surgery, pain medications, unfamiliar setting, infection (aspiration pneumonia), metabolic issues such as hypercapnia as above.  Encephalopathy continues to improve.  Overall patient feels well.  I spoke to him about his discharge plan.  I told him that he would be going to TCU and he was aware of that.  He was happy to hear that.  He told me that he would be going to the [facility]CU.  So he appears to be close to baseline now.    Aspiration pneumonia.  Hypoxia.  on 4/18 he was noted to have some recurrence of hypoxia as well as wet cough.  Chest x-ray 4/19 is showing evidence of pneumonia, clinical concern for aspiration potentially in the postop setting.  Patient is being treated with IV Zosyn.  He has been getting IV Zosyn while he is in the hospital.  He did receive a dose of IV Lasix as well for possibility of volume overload.  CT scan has shown evidence of pneumonia as well as possibility of effusion.  He has received about 8 days of IV Zosyn.  He is being discharged on 2 more days of oral Augmentin.  Oral Lasix is also being resumed, to be started from Monday as oral intake remains poor and still has dry mucous membranes.  Continues to require 2 L of oxygen by nasal cannula.    Hypertension  -Continue amlodipine and metoprolol.    BPH  -On Flomax, continue.    Cognitive dysfunction    Rheumatoid arthritis  On prednisone 5 mg daily    Chronic kidney disease  -Appears to be at baseline now.  Baseline creatinine thought to be between 1.4-1.6     Generalized deconditioning and weakness.  He will benefit from further rehab at a TCU.    CODE STATUS: DNR/DNI.    I personally evaluated and examined the patient on the day of discharge.    Shane Estrada MD      Pending Results   These results  will be followed up by PCP  Unresulted Labs Ordered in the Past 30 Days of this Admission     No orders found from 3/17/2021 to 4/17/2021.               Primary Care Physician   Orestes Clement        Discharge Disposition   Discharged to home  Condition at discharge: Stable    Consultations This Hospital Stay   SURGERY GENERAL IP CONSULT  SPIRITUAL HEALTH SERVICES IP CONSULT  CARE MANAGEMENT / SOCIAL WORK IP CONSULT  SPEECH LANGUAGE PATH ADULT IP CONSULT  PHYSICAL THERAPY ADULT IP CONSULT  SPIRITUAL HEALTH SERVICES IP CONSULT  PHYSICAL THERAPY ADULT IP CONSULT  OCCUPATIONAL THERAPY ADULT IP CONSULT  SPEECH LANGUAGE PATH ADULT IP CONSULT    Time Spent on this Encounter   Discharge time: greater than 30 minutes.    Discharge Orders      General info for SNF    Length of Stay Estimate: Short Term Care: Estimated # of Days <30  Condition at Discharge: Stable  Level of care:skilled   Rehabilitation Potential: Excellent  Admission H&P remains valid and up-to-date: Yes  Recent Chemotherapy: N/A  Use Nursing Home Standing Orders: Yes     Mantoux instructions    Give two-step Mantoux (PPD) Per Facility Policy Yes     Reason for your hospital stay    S/P CHOLECYSTITIS. PAROXYSMAL  A FIB. POSSIBLE ASPIRATION PNEUMONIA.     Follow Up and recommended labs and tests    Follow up with shelter physician.  The following labs/tests are recommended: BMP NEXT WEEK.  FOLLOW UP WITH GASTROENTEROLOGY FOR POSSIBLE  COLONOSCOPY FOR ASCENDING COLON MASS ON CT.     Activity - Up ad bryan     Physical Therapy Adult Consult    Evaluate and treat as clinically indicated.    Reason:  GENERALIZED WEAKNESS     Occupational Therapy Adult Consult    Evaluate and treat as clinically indicated.    Reason:  GENERALIZED WEAKNESS     Speech Language Path Adult Consult    Evaluate and treat as clinically indicated.    Reason:  DYSPHAGIA     Oxygen Adult/Peds     Advance Diet as Tolerated    Follow this diet upon discharge: Orders Placed This  Encounter      Snacks/Supplements Adult: Other; Alternate Magic Cup and Magic Shakes; With Meals      Combination Diet Dysphagia Diet Level 2: Mechan Altered; Nectar Thickened Liquids (pre-thickened or use instant food thickener); No Citrus     Discharge Medications   Discharge Medication List as of 4/24/2021 10:13 AM      START taking these medications    Details   amoxicillin-clavulanate (AUGMENTIN) 875-125 MG tablet Take 1 tablet by mouth 2 times daily for 2 days, Disp-4 tablet, R-0, Transitional         CONTINUE these medications which have CHANGED    Details   furosemide (LASIX) 20 MG tablet Take 1.5 tablets (30 mg) by mouth daily, Disp-30 tablet, R-11, E-PrescribeSTART ON Monday (4/26).      guaiFENesin (MUCINEX) 600 MG 12 hr tablet Take 1 tablet (600 mg) by mouth 2 times daily as needed for congestion or cough, Disp-56 tablet, R-97, Transitional      potassium chloride ER (KLOR-CON M15) 15 MEQ CR tablet Take 2 tablets (30 mEq) by mouth daily, Disp-60 tablet, R-11, E-PrescribeGIVE WITH LASIX (FUROSEMIDE). HOLD IF NOT GIVING LASIX.         CONTINUE these medications which have NOT CHANGED    Details   amLODIPine (NORVASC) 10 MG tablet TAKE 1 TABLET BY MOUTH ONCE DAILY, Disp-28 tablet, R-PRN, E-Prescribe      ASPIRIN LOW DOSE 81 MG EC tablet TAKE 1 TABLET BY MOUTH ONCE DAILY, Disp-28 tablet, R-PRN, E-Prescribe      budesonide (PULMICORT) 0.5 MG/2ML neb solution NEBULIZE THE CONTENT OF 1 VIAL TWICE DAILY (ADD TO IN THE MORNING AND AT BEDTIME DUONEBS), Disp-60 mL,R-97, Historical      ipratropium - albuterol 0.5 mg/2.5 mg/3 mL (DUONEB) 0.5-2.5 (3) MG/3ML neb solution NEBULIZE THE CONTENT OF 1 VIAL INTO THE LUNGS FOUR TIMES A DAY, Disp-180 mL,R-97, E-Prescribe      Lactobacillus-Inulin ([facility] DIGESTIVE [facility]) CHEW CHEW AND SWALLOW 2 TABLETS BY MOUTH ONCE DAILY, Disp-72 tablet, R-97, E-Prescribe      latanoprost (XALATAN) 0.005 % ophthalmic solution INSTILL ONE DROP IN THE RIGHT EYE IN THE EVENING, Disp-2.5  "mL, R-97, E-PrescribePLEASE AUTHORIZE FOR AN ASSISTED LIVING RESIDENT. THANK YOU.      levothyroxine (SYNTHROID/LEVOTHROID) 50 MCG tablet TAKE 1 TABLET BY MOUTH ONCE DAILY, Disp-28 tablet, R-PRN, E-Prescribe      MELATONIN MAXIMUM STRENGTH 5 MG tablet TAKE 1 TABLET BY MOUTH ONCE DAILY, Disp-28 tablet, R-PRN, E-Prescribe      metoprolol tartrate (LOPRESSOR) 25 MG tablet TAKE 1 TABLET BY MOUTH TWICE DAILY, Disp-56 tablet, R-PRN, E-Prescribe      Multiple Vitamins-Minerals (CEROVITE SENIOR) TABS TAKE 1 TABLET BY MOUTH ONCE DAILY, Disp-28 tablet, R-97, E-Prescribe\"Please authorize quantity 31 day supply with PRN refills for assisted living patient. Their cycle restarts next Thursday (03/4/21). Thank you!\"      predniSONE (DELTASONE) 5 MG tablet TAKE 1 TABLET BY MOUTH ONCE DAILY NOTE DOSAGE/STRENGTH, Disp-28 tablet, R-PRN, E-Prescribe      tamsulosin (FLOMAX) 0.4 MG capsule TAKE 1 CAPSULE BY MOUTH ONCE DAILY, Disp-28 capsule, R-PRN, E-Prescribe      acetaminophen (TYLENOL) 325 MG tablet TAKE TWO TABLETS (650MG) BY MOUTH EVERY 4 HOURS AS NEEDED, Disp-60 tablet, R-97, E-Prescribe      albuterol (VENTOLIN HFA) 108 (90 Base) MCG/ACT inhaler Inhale 2 puffs into the lungs every 4 hours as needed for shortness of breath / dyspnea or wheezing (May keep bedside), Disp-18 g, R-98, E-Prescribe      bisacodyl (DULCOLAX) 10 MG suppository Place 1 suppository (10 mg) rectally daily as needed for constipation, Historical      Elastic Bandages & Supports (T.E.D. KNEE LENGTH/M-LONG) MISC WEAR AS DIRECTED ON IN THE MORNING OFF AT BEDTIME (2 = 1 PAIR), Disp-4 each, R-97, E-Prescribe      loperamide (IMODIUM A-D) 2 MG tablet May take 1 tablet (2 mg) by mouth daily as needed for diarrhea. May also take 2 tablets (4 mg) daily as needed for diarrhea. 4 mg po prn daily after 1st loose stool then 2 mg po after each additional loose stool. Max 16 mg /day, Historical      menthol-zinc oxide (CALMOSEPTINE) 0.44-20.625 % OINT ointment Apply " topically 4 times daily as needed for skin protectionHistorical      senna-docusate (SM STOOL SOFTENER) 8.6-50 MG tablet Take 2 tablets by mouth 2 times daily as needed for constipation, Disp-30 tablet, R-97, Historical         STOP taking these medications       NYAMYC [id number] UNIT/GM external powder Comments:   Reason for Stopping:             Allergies   Allergies   Allergen Reactions     Advil [Ibuprofen] Other (See Comments)     PCP told him not to take     Influenza Virus Vaccine H5n1 Other (See Comments)     Does not ever want to have a flu shot - also doesn't want pneumovax     Orange Juice [Orange Oil] Nausea

## 2021-04-25 NOTE — PROGRESS NOTES
[facility]  ED Nurse Handoff Report    Jacob Easton is a 89 year old male   ED Chief complaint: Altered Mental Status  . ED Diagnosis:   Final diagnoses:   Pneumonia of both lungs due to infectious organism, unspecified part of lung   Acute respiratory failure with hypoxia and hypercapnia (H)     Allergies:   Allergies   Allergen Reactions     Advil [Ibuprofen] Other (See Comments)     PCP told him not to take     Influenza Virus Vaccine H5n1 Other (See Comments)     Does not ever want to have a flu shot - also doesn't want pneumovax     Orange Juice [Orange Oil] Nausea       Code Status: DNR / DNI  Activity level - Baseline/Home:  Assist X 1. Activity Level - Current:   Assist X 2. Lift room needed: No. Bariatric: No   Needed: No   Isolation: No. Infection: Not Applicable.     Vital Signs:   Vitals:    04/25/21 0800 04/25/21 0819 04/25/21 0830 04/25/21 0900   BP: 127/84  112/80 117/76   Pulse: 86  96 108   Resp: 22  26 24   Temp:       TempSrc:       SpO2: 99% 99% 97% 96%       Cardiac Rhythm:  ,      Pain level:    Patient confused: at times, . Patient Falls Risk: Yes.   Elimination Status: voided   Patient Report - Initial Complaint: SOB, hypoxia. Focused Assessment: hypoxia, chf, pna   Tests Performed:   Labs Ordered and Resulted from Time of ED Arrival Up to the Time of Departure from the ED   NT PROBNP INPATIENT - Abnormal; Notable for the following components:       Result Value    N-Terminal Pro BNP Inpatient 9,562 (*)     All other components within normal limits   CBC WITH PLATELETS DIFFERENTIAL - Abnormal; Notable for the following components:    WBC 13.6 (*)      (*)     MCHC 28.5 (*)     Absolute Neutrophil 11.6 (*)     All other components within normal limits   COMPREHENSIVE METABOLIC PANEL - Abnormal; Notable for the following components:    Carbon Dioxide 38 (*)     Anion Gap <1 (*)     Glucose 178 (*)     Creatinine 1.55 (*)     GFR Estimate 38 (*)     GFR Estimate  If Black 44 (*)     Albumin 2.9 (*)     ALT 79 (*)     AST 51 (*)     All other components within normal limits   BLOOD GAS VENOUS AND OXYHGB - Abnormal; Notable for the following components:    Ph Venous 7.17 (*)     PCO2 Venous 100 (*)     PO2 Venous 50 (*)     Bicarbonate Venous 37 (*)     All other components within normal limits   LACTIC ACID WHOLE BLOOD - Abnormal; Notable for the following components:    Lactic Acid 0.6 (*)     All other components within normal limits   BLOOD GAS VENOUS AND OXYHGB - Abnormal; Notable for the following components:    Ph Venous 7.18 (*)     PCO2 Venous 64 (*)     PO2 Venous 74 (*)     All other components within normal limits   BLOOD GAS ARTERIAL AND OXYHGB - Abnormal; Notable for the following components:    pH Arterial 7.22 (*)     pCO2 Arterial 86 (*)     pO2 Arterial 136 (*)     Bicarbonate Arterial 35 (*)     All other components within normal limits   TROPONIN I   INFLUENZA A/B & SARS-COV2 PCR MULTIPLEX   PROCALCITONIN   BLOOD CULTURE   BLOOD CULTURE     XR Chest Port 1 View   Final Result   IMPRESSION: Increasing bilateral pulmonary infiltrates.           . Abnormal Results: see above.   Treatments provided:   Medications   ipratropium - albuterol 0.5 mg/2.5 mg/3 mL (DUONEB) neb solution 3 mL (3 mLs Nebulization Given 4/25/21 0551)   piperacillin-tazobactam (ZOSYN) infusion 3.375 g (0 g Intravenous Stopped 4/25/21 0838)   vancomycin (VANCOCIN) 2,000 mg in sodium chloride 0.9 % 500 mL intermittent infusion (0 mg Intravenous Stopped 4/25/21 0928)   furosemide (LASIX) injection 40 mg (40 mg Intravenous Given 4/25/21 0849)       Family Comments: Enrico Hernandez notified   OBS brochure/video discussed/provided to patient:  N/A  ED Medications:   Medications   ipratropium - albuterol 0.5 mg/2.5 mg/3 mL (DUONEB) neb solution 3 mL (3 mLs Nebulization Given 4/25/21 0551)   piperacillin-tazobactam (ZOSYN) infusion 3.375 g (0 g Intravenous Stopped 4/25/21 0838)   vancomycin  (VANCOCIN) 2,000 mg in sodium chloride 0.9 % 500 mL intermittent infusion (0 mg Intravenous Stopped 4/25/21 0928)   furosemide (LASIX) injection 40 mg (40 mg Intravenous Given 4/25/21 0849)     Drips infusing:  No  For the majority of the shift, the patient's behavior Green. Interventions performed were Soft restraints applied. Pt attempting to remove Bi-pap.    Sepsis treatment initiated: No     Patient tested for COVID 19 prior to admission: YES    ED Nurse Name/Phone Number: Grace Rodríguez RN,   9:31 AM    RECEIVING UNIT ED HANDOFF REVIEW    Above ED Nurse Handoff Report was reviewed: Yes  Reviewed by: Kalli Mehta RN on April 25, 2021 at 9:35 AM      Well nourished

## 2021-04-25 NOTE — PHARMACY-ADMISSION MEDICATION HISTORY
Admission medication history interview status for this patient is complete. See EPIC admission navigator for allergy information, prior to admission medications and immunization status.     Medication history interview done, indicate source(s): Caregiver - [facility]  Medication history resources (including written lists, pill bottles, clinic record): MAR from [place], Care Everywhere  Pharmacy: [facility] for discharge    Changes made to PTA medication list:  Added: Nutritional supplement liquid (like Boost or Ensure)  Deleted: none  Changed: none    Actions taken by pharmacist (provider contacted, etc): Verified home med list per MAR from [place].     Additional medication history information:None    Medication reconciliation/reorder completed by provider prior to medication history?  Y   (Y/N)     Prior to Admission medications    Medication Sig Last Dose Taking? Auth Provider   acetaminophen (TYLENOL) 325 MG tablet TAKE TWO TABLETS (650MG) BY MOUTH EVERY 4 HOURS AS NEEDED Past Week at Unknown time Yes Jessica Hernandez APRN CNP   amLODIPine (NORVASC) 10 MG tablet TAKE 1 TABLET BY MOUTH ONCE DAILY 4/24/2021 at am Yes Jessica Hernandez APRN CNP   amoxicillin-clavulanate (AUGMENTIN) 875-125 MG tablet Take 1 tablet by mouth 2 times daily for 2 days 4/24/2021 at pm Yes Shane Estrada MD   ASPIRIN LOW DOSE 81 MG EC tablet TAKE 1 TABLET BY MOUTH ONCE DAILY 4/24/2021 at am Yes Jessica Hernandez APRN CNP   budesonide (PULMICORT) 0.5 MG/2ML neb solution NEBULIZE THE CONTENT OF 1 VIAL TWICE DAILY (ADD TO IN THE MORNING AND AT BEDTIME DUONEBS) 4/24/2021 at pm Yes Orestes Clement APRN CNP   furosemide (LASIX) 20 MG tablet Take 1.5 tablets (30 mg) by mouth daily  Yes Shane Estrada MD   guaiFENesin (MUCINEX) 600 MG 12 hr tablet Take 1 tablet (600 mg) by mouth 2 times daily as needed for congestion or cough Past Month at Unknown time Yes Shane Estrada MD   ipratropium - albuterol 0.5 mg/2.5 mg/3 mL  (DUONEB) 0.5-2.5 (3) MG/3ML neb solution NEBULIZE THE CONTENT OF 1 VIAL INTO THE LUNGS FOUR TIMES A DAY 4/24/2021 at pm Yes Orestes Clement APRN CNP   Lactobacillus-Inulin ([facility] DIGESTIVE [facility]) CHEW CHEW AND SWALLOW 2 TABLETS BY MOUTH ONCE DAILY 4/24/2021 at am Yes Orestes Clement APRN CNP   latanoprost (XALATAN) 0.005 % ophthalmic solution INSTILL ONE DROP IN THE RIGHT EYE IN THE EVENING 4/24/2021 at pm Yes Orestes Clement APRN CNP   levothyroxine (SYNTHROID/LEVOTHROID) 50 MCG tablet TAKE 1 TABLET BY MOUTH ONCE DAILY 4/24/2021 at am Yes Orestes Clement APRN CNP   MELATONIN MAXIMUM STRENGTH 5 MG tablet TAKE 1 TABLET BY MOUTH ONCE DAILY 4/24/2021 at pm Yes Orestes Clement APRN CNP   metoprolol tartrate (LOPRESSOR) 25 MG tablet TAKE 1 TABLET BY MOUTH TWICE DAILY 4/24/2021 at pm Yes Jessica Hernandez APRN CNP   Multiple Vitamins-Minerals (CEROVITE SENIOR) TABS TAKE 1 TABLET BY MOUTH ONCE DAILY 4/24/2021 at am Yes Orestes Clement APRN CNP   Nutritional Supplement LIQD Take 1 Bottle by mouth 3 times daily (with meals) 4/24/2021 at pm Yes Unknown, Entered By History   potassium chloride ER (KLOR-CON M15) 15 MEQ CR tablet Take 2 tablets (30 mEq) by mouth daily  Yes Shane Estrada MD   predniSONE (DELTASONE) 5 MG tablet TAKE 1 TABLET BY MOUTH ONCE DAILY  NOTE DOSAGE/STRENGTH  4/24/2021 at am Yes Orestes Clement APRN CNP   tamsulosin (FLOMAX) 0.4 MG capsule TAKE 1 CAPSULE BY MOUTH ONCE DAILY 4/24/2021 at am Yes Jessica Hernandez APRN CNP   albuterol (VENTOLIN HFA) 108 (90 Base) MCG/ACT inhaler Inhale 2 puffs into the lungs every 4 hours as needed for shortness of breath / dyspnea or wheezing (May keep bedside) More than a month at Unknown time  Orestes Clement APRN CNP   bisacodyl (DULCOLAX) 10 MG suppository Place 1 suppository (10 mg) rectally daily as needed for constipation More than a month at Unknown time  Orestes Clement APRN CNP   Elastic Bandages &  Supports (T.E.D. KNEE LENGTH/M-LONG) MISC WEAR AS DIRECTED ON IN THE MORNING OFF AT BEDTIME (2 = 1 PAIR)   Orestes Clement APRN CNP   loperamide (IMODIUM A-D) 2 MG tablet May take 1 tablet (2 mg) by mouth daily as needed for diarrhea. May also take 2 tablets (4 mg) daily as needed for diarrhea. 4 mg po prn daily after 1st loose stool then 2 mg po after each additional loose stool. Max 16 mg /day More than a month at Unknown time  Orestes Clement APRN CNP   menthol-zinc oxide (CALMOSEPTINE) 0.44-20.625 % OINT ointment Apply topically 4 times daily as needed for skin protection More than a month at Unknown time  Orestes Clement APRN CNP   senna-docusate (SM STOOL SOFTENER) 8.6-50 MG tablet Take 2 tablets by mouth 2 times daily as needed for constipation More than a month at Unknown time  Orestes Clement APRN CNP

## 2021-04-25 NOTE — ED PROVIDER NOTES
History     Chief Complaint:  Altered Mental Status      HPI   History is limited due to acuity of condition.    Jacob Easton is a 89 year old male with a history of dementia, HTN, CKD stage 3, and A fib who presents to the emergency department via EMS for evaluation of altered mental status. On chart review patient was recently admitted to the hospital here on 4/16/21 and underwent cholecystectomy on 4/17/21. Of note, patient had an event during his surgery where his O2 levels dropped below 90% on 10 liters but improved after being placed on BiPAP. He was discharged to TCU yesterday. Tonight patient had several episodes where he would become unresponsive, most recent episode an hour PTA. He then became hypoxic at 60% on room air, prompting staff at TCU to call EMS. Patient was unrousable per EMS. DNR/DNI per daughterRoberto [phone] who is the pt's healthcare proxy.    Review of Systems   Unable to perform ROS: Patient unresponsive     Allergies:  Advil [Ibuprofen]  Influenza Virus Vaccine H5n1    Medications:    Albuterol  Amlodipine  Augmentin  Lasix   Budesonide  Duoneb  Levothyroxine  Loperamide  Metoprolol  Potassium chloride  Prednisone  Flomax    Past Medical History:    Thyroid disease  Neuromuscular disorder  Hypocalcemia  Hypertension   Constipation  Arthritis    Seborrhea  Rheumatoid arthritis  CKD stage 3  Atrial fibrillation  Dementia  Pulmonary alveolitis  Pulmonary vascular congestion  BPH       Past Surgical History:    Right TKA  Cholecystectomy    Social History:  The patient was brought to the emergency department by EMS.    Physical Exam     Patient Vitals for the past 24 hrs:   BP Temp Temp src Pulse Resp SpO2   04/25/21 0645 (!) 136/98 -- -- 91 25 98 %   04/25/21 0630 127/85 -- -- 89 20 97 %   04/25/21 0615 109/71 -- -- 98 14 94 %   04/25/21 0600 91/61 -- -- 101 30 98 %   04/25/21 0551 -- -- -- -- -- 97 %   04/25/21 0545 113/79 -- -- 101 26 92 %   04/25/21 0540 118/75 -- -- 112 25 92 %    04/25/21 0539 116/62 97.6  F (36.4  C) Temporal 107 (!) 32 (!) 85 %   04/25/21 0535 137/77 -- -- 105 (!) 34 (!) 77 %         Physical Exam      Constitutional: Elderly male  Eyes: No discharge, symmetrical lids  ENT: Moist mucous membranes, no ear discharge  Neck: Full range of motion  Respiratory: Bilateral rhonchi and crackles, diminished aeration throughout  Cardiovascular: Tachycardic, no lower extremity edema  Chest: Equal rise  Gastrointestinal: Soft. Nondistended. NTTP. No rebound or guarding  Musculoskeletal: No gross deformities.   Skin: Warm and well perfused. No visible rash.  Neurologic: Moves all extremities, speech fluent without dysarthria  Psychiatric: Appropriate affect, alert and interactive      Emergency Department Course   ECG  ECG taken at 537, ECG read at 540  Atrial Fibrillation with RVR. Left axis deviation. LBBB. Abnormal ECG.  Rate 117 bpm. [place] interval * ms. QRS duration 142 ms. QT/QTc 378/527 ms. P-R-T axes * -46 93.     Imaging:  XR Chest 1 view, portable:   Increasing bilateral pulmonary infiltrates. as per radiology.    Laboratory:  CBC: WBC: 13.6 (H), HGB: 13.9, PLT: 380  CMP: Glucose 178 (H), Carbon Dioxide: 38 (H), Anion Gap: <1 (L), GFR: 38 (L), Albumin: 2.9 (L), ALT: 79 (H), AST: 51 (H), o/w WNL (Creatinine: 1.55 (H))  Troponin (Collected 541): 0.026  Lactic acid (Resulted 646): 0.6 (L)  VBG and oxyhgb: pH 7.17 (LL) / PCO2 100 (H) / PO2 50 (H) / Bicarb 37 (H) / FlO2 15 / Oxyhemoglobin 76 / Base excess 4.0  BNP: 9562 (H)  Blood Cultures x2: Pending  Symptomatic Influenza A/B antigen & COVID-19 PCR: Negative    Emergency Department Course:  Reviewed:  I reviewed the patient's nursing notes, vitals, past medical records, Care Everywhere.     Assessments:  534 I assessed the patient. Exam findings described above.    Consults:     Interventions:  551 Duoneb 3 mL   648 Zosyn 3.375 g IV   Vancomycin 2000 mg IV    Disposition:  Admitted to the hospital.    Impression & Plan    Medical  Decision MakinM history of recent admission for gangrenous cholecystitis, s/p cholecystectomy, presenting with hypoxia and altered mental status.  Differential diagnosis includes but is not limited to pneumonia, acute hypoxic respiratory failure, hypercapnia, acidemia  He is quite hypoxic upon arrival, despite high flow oxymask.  He was transitioned to BiPAP with significant improvement in oxygenation.  He was given a duoneb with improvement in aeration.  VBG notable for hypercapnia to 100, pH of 7.17.  CXR notable for diffuse bilateral patchy opacities on my read, worse from .  He was given zosyn and vancomycin.  COVID test is again thankfully negative.  DNR/DNI status confirmed with pt's daughter.  Results and plan discussed with pt's brother at bedside.  Plan to admit for further treatment.          Critical Care Note:    Organ systems at risk for life threatening failure: Constitutional and Respiratory  Associated problems: Hypercarbia/Hypercapnia, Hypoxia and Sepsis  Critical Interventions: IV antibiotics, BiPAP, duoneb    Total Time: 35 minutes (excluding separately billed procedures)     Includes: Bedside management, Case discussion related to critical care, Documentation, Multiple re-evaluations, Record review, and Test review. Additionally, this includes CXR Interpretation     Covid-19  Jacob Easton was evaluated during a global COVID-19 pandemic, which necessitated consideration that the patient might be at risk for infection with the SARS-CoV-2 virus that causes COVID-19.   Applicable protocols for evaluation were followed during the patient's care.   COVID-19 was considered as part of the patient's evaluation. The plan for testing is:  a test was obtained during this visit.    Diagnosis:    ICD-10-CM    1. Pneumonia of both lungs due to infectious organism, unspecified part of lung  J18.9    2. Acute respiratory failure with hypoxia and hypercapnia (H)  J96.01     J96.02        Murali  Hayden  4/25/2021   EMERGENCY DEPARTMENT  Scribe Disclosure:  I, Murali Blake, am serving as a scribe at 5:42 AM on 4/25/2021 to document services personally performed by Merritt Conroy MD based on my observations and the provider's statements to me.          Merritt Conroy MD  04/25/21 0736       Merritt Conroy MD  04/25/21 0743

## 2021-04-26 NOTE — PROVIDER NOTIFICATION
Critical lab Troponin 0.156.     Pt also unable to take PO metolazone d/t NPO on BIPAP. pls change order if needed. Thanks!    Lenora Mckee RN on 4/25/2021 at 8:30 PM

## 2021-04-26 NOTE — PLAN OF CARE
Pt A&O x4, with intermittent moments of confusion. Much more awake and coherent today. 95% on BIPAP 30%, RR mid 20's. Afebrile, all other VSS. Denies pain. On bedrest. External catheter in place with good output. Turn q2hrs. IV lasix received today. Cardiology consulted, ordered thoracentesis and palliative consult, family aware and on board. Writer spoke to daughter, Mary multiple times this shift. Answered questions and provided insight to POC. LS diminished throughout, crackles at bases. On COVID negative, plan to re-swab tomorrow given clinical presentation. Reddened open area to coccyx, mepilex in place. PSC at bedside for safety given BIPAP dependence. Hep gtt infusing at 900mL/ hr. Tele Afib CVR w/ BBB. K+ replaced, with recheck scheduled for this afternoon. Discharge TBD, will continue POC.

## 2021-04-26 NOTE — PROVIDER NOTIFICATION
This writer web paged Dr. Claros, patient on bipab and oxygen dropped to 79-80%. RT called and increased setting to highest levels and 02 sats increased to 91%. Asked Dr. Claros to come assess patient.       Dr. Claros up to room and chest x-ray ordered.

## 2021-04-26 NOTE — PROGRESS NOTES
RT BiPAP NOTE;      A BiPAP of  16/8 @ 40% was applied to the pt via the mask for an increase in WOB and/or SOB.    Skin integrity is intact.  Bridge of nose looks mildly red, .   Pt is tolerating it well.   Will continue to monitor and assess the pt's current respiratory status and needs.        Rebecca Feliz, RT

## 2021-04-26 NOTE — PLAN OF CARE
Oriented to self and place. Lethargic. VSS. BiPAP @ 40%. LS diminished/crackles. Chest xray and CT completed this shift. Per MD to keep isolation for continued COVID r/o. IV Lasix given x1. Troponin 0.156 and 0.135- Heparin gtt initiated @ 12 ml/hr. Tele Afib CVR w/ BBB. Incontinent. Blanchable redness to buttocks -mepilex in place. Repositioned q 2 hrs. Sitter at bedside.Treating w/ Zosyn.  Lenora Mckee RN on 4/26/2021 at 4:06 AM

## 2021-04-26 NOTE — CONSULTS
Cardiology Consultation     Jacob Easton MRN# [id number]   YOB: 1929 Age: 89 year old   Date of Admission: 4/25/2021     Reason for consult: Elevated troponin per request of Dr. Esparza           Assessment and Plan:     No CPR/ Do Not Intubate  Consider palliative care evaluation    Acute HFpEF  -BNP 9500 / bilateral pleural effusions with mod right  -in setting of pneumonia, severe aortic stenosis, and possible COVID-19 infection  -LVEF 50-55%  -requiring BiPAP for support  -still hypoxic despite IV diuresis  -weight up 3 lbs from last hospitalization  -will hold on further lasix given worsening RI  -consider thoracentesis    Severe Aortic Stenosis  -low flow state, stroke volume 23 ml/m2, mean AV gradient 31 mmHg  -not good candidate for SAVR or TAVR  -consider palliative care involvement    Troponin Elevation  -in setting of pneumonia and CHF exacerbation  -peak 0.156 (flat) thus far  -no hx of CAD, but advanced atherosclerosis noted on chest CT  -likely demand ischemia from pneumonia, severe aortic stenosis, acute heart failure    Paroxysmal Atrial Fibrillation  -persistent since (4/19/21)  -HRs 80-100s on Metoprolol 25 mg BID  -on Heparin drip    Pneumonia  -COVID-19 to be ruled out.  Recent aspiration pneumonia  -hemodynamically stable  -IV abx, nebs    S/p Cholecystectomy due to gangrenous cholecystitis (4/17/21)    Colon Mass  -recently noted.  -concern for possible malignancy    Rheumatoid Arthritis  -on chronic prednisone  -wheelchair bound               Chief Complaint:       History is obtained from the patient         History of Present Illness:   This patient is a 89 year old male with a past medical history significant for recent admission for acute gangrenous cholecystitis, postoperative aspiration pneumonia, and hypoxic and hypercapnic respiratory failure, acute multifactorial encephalopathy from 4/16/2021 -4/24/2021.  He was discharged to [facility]CU (4/24/21) on 2 L of oxygen by  nasal cannula in a stable condition.  Other past medical history includes hypertension, BPH, cognitive dysfunction, paroxysmal A. fib, chronic left bundle branch block, rheumatoid arthritis on chronic prednisone 5 mg daily, CKD, ascending colon mass seen on CT scan during last admission.    He underwent a laparoscopic cholecystectomy (4/17/21) and developed post-op altered mental status with hypercapnia and was started on BIPAP which resolved the hypercapnia.  His mental status slowly improved but was also hypoxic.  There was concern for aspiration pneumonia and was sent home on Augmentin, O2 support at 2L, and thickened liquids. He has a history of paroxysmal atrial fibrillation and developed A-fib (4/19/21) with fairly well controlled HRs  His ECHO (4/23/21) showed LVEF 50-55% without regional wall motion abnormalities, normal RV function, severe aortic stenosis, low flow state, stroke volume 23 ml/m2, mean AV gradient 31 mmHg, 1+ AI, 1+ MR, RVSP 32 mmHg, RA 3 mmHg, mild ascending aorta dilatation    He developed hypoxia at TCU and was unresponsive in the ED with O2 sats in the 60s  BIPAP was started which resolved his hypoxia.  CXR showed bilateral pleural effusion and infiltrates.  He received IV lasix 40 mg, and IV antibiotics . His EKG showed Afib with LBBB  -110 in the ED  BNP 9500 A chest CT (4/25/21) showed no evidence of pulmonary embolism. 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  Advanced atherosclerotic disease.    He developed worsening shortness of breath overnight despite BIPAP.  His CXR showed minimal change in his diffuse mixed interstitial and airspace opacities  He was given additional IV Lasix and a troponin was checked at  0.156, 0.109  His BMP today shows worsening Creatinine 1.7 (previously 1.4) potassium: 3.1  Hgb 12.6        Past Medical History:     Past Medical History:   Diagnosis Date     Arthritis      Constipation      Hypertension      Hypocalcemia      Neuromuscular disorder (H)      Thyroid disease    paroxysmal atrial fibrillation  Aortic stenosis  Chronic kidney disease  Pulmonary alveolitis  BPH  Rheumatoid arthritis and is wheelchair bound  Colon mass with possible malignancy          Past Surgical History:     Past Surgical History:   Procedure Laterality Date     C TOTAL HIP ARTHROPLASTY  9/24/11    RT     LAPAROSCOPIC CHOLECYSTECTOMY N/A 4/17/2021    Procedure: CHOLECYSTECTOMY, LAPAROSCOPIC;  Surgeon: Lesvia Stallings MD;  Location:  OR                Social History:     Social History     Tobacco Use     Smoking status: Never Smoker     Smokeless tobacco: Never Used   Substance Use Topics     Alcohol use: No     Alcohol/week: 0.0 standard drinks   retired           Family History:   No family history on file.          Immunizations:             Allergies:     Allergies   Allergen Reactions     Advil [Ibuprofen] Other (See Comments)     PCP told him not to take     Influenza Virus Vaccine H5n1 Other (See Comments)     Does not ever want to have a flu shot - also doesn't want pneumovax     Orange Juice [Orange Oil] Nausea             Medications:   Amlodipine 10 mg PO q day  Aspirin 81 mg PO q day  Pulmicort neb 0.5 mg BID  Pepcid 20 mg IV  q day  Lasix 40 mg IV daily  (was on 30 mg PO daily prior to admit)  DUONEB q 4 hrs  Xalatan 0.005% ophthalmic solution 1 drop right eye q HS  Levothyroxine 50 mcg PO q day  Metoprolol tartrate 25 mg PO BID  Zosyn 3.375 g IV q 6 hrs  Prednisone 5 mg PO q day  Flomax 0.4 mg PO q day  Vancomycin 2 G IV once (completed)  Heparin drip            Review of Systems:   The 10 point Review of Systems is negative other than noted in the HPI           Physical Exam:   Vitals  were reviewed  Temp: 98.9  F (37.2  C) Temp src: Axillary BP: 112/53 Pulse: 75   Resp: 18 SpO2: 97 % O2 Device: BiPAP/CPAP              Data:     Lab Results   Component Value Date    WBC 10.9 04/26/2021    HGB 12.6 (L) 04/26/2021    HCT 41.3 04/26/2021     04/26/2021     (H) 04/26/2021    POTASSIUM 3.1 (L) 04/26/2021    CHLORIDE 105 04/26/2021    CO2 38 (H) 04/26/2021    BUN 25 04/26/2021    CR 1.71 (H) 04/26/2021     (H) 04/26/2021    NTBNPI 9,562 (H) 04/25/2021    TROPONIN 0.06 04/02/2017    TROPI 0.109 (H) 04/26/2021    AST 51 (H) 04/25/2021    ALT 79 (H) 04/25/2021    ALKPHOS 86 04/25/2021    BILITOTAL 0.8 04/25/2021    INR 1.21 (H) 04/19/2021

## 2021-04-26 NOTE — PROGRESS NOTES
RT note: pt remains on bipap, 16/8 30% FiO2. BS diminished with crackles at times, receiving duoneb Q4 W/A and Pulmicort BID. Tachypneic at times in the mid to high 20s. Will continue to monitor and wean as tolerated.     Patricia Nuñez, RRT

## 2021-04-26 NOTE — CONSULTS
[facility]  Palliative Care Consultation   Text Page    Assessment & Plan   Jacob Easton is a 89 year old male who was admitted on 4/25/2021.   Consulted by Cardiologist Jamel Rush MD to assist goals of care, home hospice.      Recommendations:  1.  Goals of Care- No CPR- Do NOT Intubate  - Restorative care with plan for no rehospitalization.     Hospitalization goals discussed with daughter/healthcare agent, Mary Laboy.    Decisional Capacity- Unreliable due to dementia. Patient has an advance directive dated 12/04/2018.  Consents and decision making should be done by his daughter, Mary Laboy , the primary Health Care Agent.  Alternate is his son Bernabe Mazariegos.     POLST - will complete prior to dismissal.    2. Dyspnea - Admission for acute hypoxic and hypercapnic respiratory failure. Patient currently on BiPAP. Cardiology consulted regarding severe aortic stenosis and elevated troponin. Patient is not a SAVR or TAVR candidate. Daughter indicates hope for recovery and is not ready for a hospice plan of care.     3. Generalized weakness - Wheelchair bound at baseline. Daughter is hopeful he will make some strides with physical therapy.     4. Spiritual Care  Consultation placed for  to follow.  Spiritual Background: Naomi    5. Care Planning  Appreciate input of  Claire Cordova RN (patient does not have bed hold a [facility])   Medications for discharge - to be determined.     Medical Decision Making and Goals of Care:  Discussed on April 26, 2021 with Caroline BRAND, CNS:     Met the patient briefly. Jacob was resting on BiPAP, looked fatigued.     Mary explained that all family members met virtually on the computer tablet with her father, who was able to give insight that he would like to return to his apartment at [facility] where he has resided the past 3 years. He also does not want to return to the hospital for care. Mary acknowledged speaking with  "the cardiologist earlier today. \"He had a valve that's not operating with his heart.\" Mary explained that her mother passed away two years ago,while on [facility]. She offered some distress in having her mother on hospice and would like her father to attempt to rehabilitate if able before returning to his apartment. We discussed the hospice benefit, yet she is hopeful her father could make some recovery at his apartment or at TCU and have a plan not to return to the hospital. She is not ready to pursue the hospice benefit at this time.     Thank you for involving us in the patient's care.     Caroline Carr MS, RN, CNS, APRN, ACHPN, FAACVPR  Pain and Palliative Care  Pager [phone]  Office [phone]       Time Spent on this Encounter   Total unit/floor time 2:25 PM until 4 PM minutes, time consisted of the following, examination of the patient, reviewing the record and completing documentation. >50% of time spent in counseling and coordination of care, Bedside Nurse Elza Molina RN and Taryn Leon RN and Hospitalist Silvia Esparza MD.  Time spend counseling with family consisted of the following topics, goals of care, care planning for discharge and symptom management.    Understanding of disease process:   This has been discussed with the patient's daughter/healthcare agent Mary Laboy.     History of Present Illness   History is obtained from the electronic health record and patient's daughter    Jacob Easton is a 89 year old male who was admitted 4/25/20201 from [facility] as he was hypoxic and unresponsive. The patient has a history of dementia, hypertension, chronic kidney disease stage 3, atrial-fibrillation, BPH, rheumatoid arthritis (chronic daily prednisone) and a colon mass that was noted on 4/16/2021 abdominal CT during his recent admission . The patient had been at [facility] for less than 24 hours as he was hospitalized at [facility] April 16 through 24 with acute gangrenous cholecystitis with " cholecystectomy on 4/17/2021, with postoperative aspiration pneumonia and acute multifactorial encephalopathy.      Past Medical History   I have reviewed this patient's medical history and updated it with pertinent information if needed.   Past Medical History:   Diagnosis Date     Arthritis      Constipation      Hypertension      Hypocalcemia      Neuromuscular disorder (H)      Thyroid disease        Past Surgical History   I have reviewed this patient's surgical history and updated it with pertinent information if needed.  Past Surgical History:   Procedure Laterality Date     C TOTAL HIP ARTHROPLASTY  9/24/11    RT     LAPAROSCOPIC CHOLECYSTECTOMY N/A 4/17/2021    Procedure: CHOLECYSTECTOMY, LAPAROSCOPIC;  Surgeon: Lesvia Stallings MD;  Location: [facility] OR       Prior to Admission Medications    Reviewed     Allergies   Allergies   Allergen Reactions     Advil [Ibuprofen] Other (See Comments)     PCP told him not to take     Influenza Virus Vaccine H5n1 Other (See Comments)     Does not ever want to have a flu shot - also doesn't want pneumovax     Orange Juice [Orange Oil] Nausea       Social History   I have updated and reviewed the following Social History Narrative:   Social History     Social History Narrative    The patient is . He denies a history of smoking, drinking or drug use. He was a  for 46 years. Living at [facility] currently as he was suffering from falls at home.          Living situation:  Admission from [facility]       Support system:  Family support: Mary Laboy (Albert), of [place], [place]; Bernabe Mazariegos (Gabrielle), of [place], [place]; Fransico Easton (Christine), of [place], [place]; Taylor Nguyen), of [place], [place]; Beck Easton, of [place], [place]; Corey Easton (Miriam), of [place], [place]; Yovany Easton, of [place], [place], and Tonny Easton (Susan), of [place], [place].  19 grandchildren and one great-grandchild include: Han Myers  (Shantelle), Francisco (Laurie), Antonella, Ney, Faustino (Ashwin), Lesvia (Xavier), Samuel, Rebeca, Luke, Lenora, Satnam, Robert, Fermín, Faina (Partha) with Musa De La Rosa (Lizette), Arnol (ameena Harden), Jarrod and Nayla. His beloved wife, Aneta  in hospice 2019 with whom he enjoyed adventurous travels to [place] (twice); [place]; [place] and [place]; the [place], including [place] and [place]; [place]; [place] and [place], [place], [place]; [place], and [place].       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   History of substance use/abuse:  reports that he has never smoked. He has never used smokeless tobacco.  reports no history of alcohol use.    Family History   I have reviewed this patient's family history and updated it with pertinent information if needed.   No family history on file.    Review of Systems   Review of systems not obtained due to patient factors - confusion    Physical Exam   Temp:  [98.1  F (36.7  C)-99.2  F (37.3  C)] 99  F (37.2  C)  Pulse:  [] 86  Resp:  [18-24] 23  BP: (109-141)/(53-73) 121/64  FiO2 (%):  [30 %-100 %] 30 %  SpO2:  [92 %-100 %] 95 %  234 lbs 3.2 oz  Exam:  GEN:  Elderly  male who looks younger than documented age on BiPAP. Alert, oriented to self, appears fatigued, but otherwise comfortable.  HEENT:  Normocephalic/atraumatic, no scleral icterus, no nasal discharge, mouth dry.  CV:  RRR, S1, S2; no murmurs or other irregularities noted.  +3 DP/PT pulses bilaterally; no edema BLE.  RESP:  BiPAP with coarse bilateral anterior breath sounds.  Symmetric chest rise on inhalation noted.    ABD:  Rounded, soft, non-tender/non-distended.  +BS  EXT:  CMS intact x 4.     M/S:   Denies discomfort with palpation of abdomen and extremities.     SKIN:  Pale, warm and dry to touch, no exanthems noted in the visualized areas.    NEURO: Deferred.     Data    Results for orders placed or performed during the hospital encounter of 04/25/21   XR Chest Port 1 View     Status: None    Narrative    EXAM: XR CHEST PORTABLE 1 VIEW  LOCATION: [facility]  DATE/TIME: 04/25/2021, 6:02 AM    INDICATION: Shortness of breath.  COMPARISON: 04/23/2021.    FINDINGS: The heart is at the upper limits of normal in size. There are bilateral pulmonary infiltrates which are overall increasing, particularly in the left upper lobe. Bilateral pleural effusions. Probable bullous disease at the right lung base.      Impression    IMPRESSION: Increasing bilateral pulmonary infiltrates.     XR Chest Port 1 View     Status: None    Narrative    EXAM: XR CHEST PORT 1 VIEW  LOCATION: [facility]  DATE/TIME: 4/25/2021 7:49 PM    INDICATION: Shortness of breath  COMPARISON: 04/25/2021 at 0606 hours      Impression    IMPRESSION: Cardiomegaly. Pulmonary vascularity normal. Diffuse mixed interstitial and airspace opacities throughout both lungs with minimal change since earlier. Small bilateral effusions.   CT Chest Pulmonary Embolism w Contrast     Status: None    Narrative    EXAM: CT CHEST PULMONARY EMBOLISM W CONTRAST  LOCATION: [facility]  DATE/TIME: 4/26/2021 1:14 AM    INDICATION: PE suspected, low/intermediate prob, neg D-dimer  COMPARISON: None.  TECHNIQUE: CT chest pulmonary angiogram during arterial phase injection of IV contrast. Multiplanar reformats and MIP reconstructions were performed. Dose reduction techniques were used.   CONTRAST:  75mL Isovue-370    FINDINGS:  ANGIOGRAM CHEST: Pulmonary arteries are normal caliber and negative for pulmonary emboli. Thoracic aorta is negative for dissection. No CT evidence of right heart strain.    LUNGS AND PLEURA: Moderate size right pleural effusion and small left pleural effusion with atelectasis involving significant portions of both lower Groundglass and airspace opacities within both upper lobes.  Extensive cystic changes throughout both   lungs as seen previously.    MEDIASTINUM/AXILLAE: Enlarged mediastinal lymph nodes as seen previously. Atherosclerotic disease.    CORONARY ARTERY CALCIFICATION: Moderate.    UPPER ABDOMEN: Atherosclerotic disease abdominal aorta. Plaque at the origins of the superior mesenteric artery and both renal arteries. Mild cortical atrophy involving the visualized portions of both kidneys. Fluid and clips in the gallbladder fossa   region as well as a small amount of gas    MUSCULOSKELETAL: Edematous changes subcutaneous tissues      Impression    IMPRESSION:  1.  No evidence for pulmonary  2.  Moderate-sized right pleural effusion and smaller left pleural effusion with atelectasis involving significant portions of both lower lobes. Airspace and groundglass opacities within both upper lobes may represent pneumonia, including Covid 19.  3.  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  4.  Fluid, gas, and clips in the gallbladder fossa may be related to prior cholecystectomy. Clinical correlation recommended.  5.  Advanced atherosclerotic disease.   Troponin I (now)     Status: None   Result Value Ref Range    Troponin I ES 0.026 0.000 - 0.045 ug/L   BNP     Status: Abnormal   Result Value Ref Range    N-Terminal Pro BNP Inpatient 9,562 (H) 0 - 1,800 pg/mL   CBC with platelets differential     Status: Abnormal   Result Value Ref Range    WBC 13.6 (H) 4.0 - 11.0 10e9/L    RBC Count 4.68 4.4 - 5.9 10e12/L    Hemoglobin 13.9 13.3 - 17.7 g/dL    Hematocrit 48.8 40.0 - 53.0 %     (H) 78 - 100 fl    MCH 29.7 26.5 - 33.0 pg    MCHC 28.5 (L) 31.5 - 36.5 g/dL    RDW 13.8 10.0 - 15.0 %    Platelet Count 380 150 - 450 10e9/L    Diff Method Automated Method     % Neutrophils 84.9 %    % Lymphocytes 6.0 %    % Monocytes 6.5 %    % Eosinophils 0.1 %    % Basophils 0.4 %    % Immature Granulocytes 2.1 %    Nucleated RBCs 0 0 /100     Absolute Neutrophil 11.6 (H) 1.6 - 8.3 10e9/L    Absolute Lymphocytes 0.8 0.8 - 5.3 10e9/L    Absolute Monocytes 0.9 0.0 - 1.3 10e9/L    Absolute Eosinophils 0.0 0.0 - 0.7 10e9/L    Absolute Basophils 0.1 0.0 - 0.2 10e9/L    Abs Immature Granulocytes 0.3 0 - 0.4 10e9/L    Absolute Nucleated RBC 0.0    Comprehensive metabolic panel     Status: Abnormal   Result Value Ref Range    Sodium 141 133 - 144 mmol/L    Potassium 4.1 3.4 - 5.3 mmol/L    Chloride 107 94 - 109 mmol/L    Carbon Dioxide 38 (H) 20 - 32 mmol/L    Anion Gap <1 (L) 3 - 14 mmol/L    Glucose 178 (H) 70 - 99 mg/dL    Urea Nitrogen 25 7 - 30 mg/dL    Creatinine 1.55 (H) 0.66 - 1.25 mg/dL    GFR Estimate 38 (L) >60 mL/min/[1.73_m2]    GFR Estimate If Black 44 (L) >60 mL/min/[1.73_m2]    Calcium 8.9 8.5 - 10.1 mg/dL    Bilirubin Total 0.8 0.2 - 1.3 mg/dL    Albumin 2.9 (L) 3.4 - 5.0 g/dL    Protein Total 7.8 6.8 - 8.8 g/dL    Alkaline Phosphatase 86 40 - 150 U/L    ALT 79 (H) 0 - 70 U/L    AST 51 (H) 0 - 45 U/L   Symptomatic Influenza A/B & SARS-CoV2 (COVID-19) Virus PCR Multiplex     Status: None    Specimen: Nasopharyngeal   Result Value Ref Range    Flu A/B & SARS-COV-2 PCR Source Nasopharyngeal     SARS-CoV-2 PCR Result NEGATIVE     Influenza A PCR Negative NEG^Negative    Influenza B PCR Negative NEG^Negative    Respiratory Syncytial Virus PCR (Note)     Flu A/B & SARS-CoV-2 PCR Comment (Note)    Blood gas venous and oxyhgb     Status: Abnormal   Result Value Ref Range    Ph Venous 7.17 (LL) 7.32 - 7.43 pH    PCO2 Venous 100 (HH) 40 - 50 mm Hg    PO2 Venous 50 (H) 25 - 47 mm Hg    Bicarbonate Venous 37 (H) 21 - 28 mmol/L    FIO2 15     Oxyhemoglobin Venous 76 %    Base Excess Venous 4.0 mmol/L   Lactic acid whole blood     Status: Abnormal   Result Value Ref Range    Lactic Acid 0.6 (L) 0.7 - 2.0 mmol/L   Blood gas venous and oxyhgb     Status: Abnormal   Result Value Ref Range    Ph Venous 7.18 (LL) 7.32 - 7.43 pH    PCO2 Venous 64 (H) 40 - 50 mm  Hg    PO2 Venous 74 (H) 25 - 47 mm Hg    Bicarbonate Venous 24 21 - 28 mmol/L    FIO2 F102 90%     Oxyhemoglobin Venous 91 %    Base Deficit Venous 5.2 mmol/L   Blood gas arterial and oxyhgb     Status: Abnormal   Result Value Ref Range    pH Arterial 7.22 (L) 7.35 - 7.45 pH    pCO2 Arterial 86 (HH) 35 - 45 mm Hg    pO2 Arterial 136 (H) 80 - 105 mm Hg    Bicarbonate Arterial 35 (H) 21 - 28 mmol/L    FIO2 90%BIPAP     Oxyhemoglobin Arterial 98 92 - 100 %    Base Excess Art 3.9 mmol/L   Procalcitonin     Status: None   Result Value Ref Range    Procalcitonin 0.29 ng/ml   Blood gas venous with oxyhemoglobin     Status: Abnormal   Result Value Ref Range    Ph Venous 7.32 7.32 - 7.43 pH    PCO2 Venous 72 (H) 40 - 50 mm Hg    PO2 Venous 24 (L) 25 - 47 mm Hg    Bicarbonate Venous 37 (H) 21 - 28 mmol/L    FIO2 IPAP 30%     Oxyhemoglobin Venous 41 %    Base Excess Venous 7.7 mmol/L   Lactic acid level STAT     Status: Abnormal   Result Value Ref Range    Lactate for Sepsis Protocol 2.1 (H) 0.7 - 2.0 mmol/L   Troponin I     Status: Abnormal   Result Value Ref Range    Troponin I ES 0.156 (HH) 0.000 - 0.045 ug/L   Heparin Unfractionated Anti Xa Level     Status: None   Result Value Ref Range    Heparin Unfractionated Anti Xa Level 0.76 IU/mL   CBC with platelets     Status: Abnormal   Result Value Ref Range    WBC 11.9 (H) 4.0 - 11.0 10e9/L    RBC Count 4.37 (L) 4.4 - 5.9 10e12/L    Hemoglobin 13.3 13.3 - 17.7 g/dL    Hematocrit 43.3 40.0 - 53.0 %    MCV 99 78 - 100 fl    MCH 30.4 26.5 - 33.0 pg    MCHC 30.7 (L) 31.5 - 36.5 g/dL    RDW 13.6 10.0 - 15.0 %    Platelet Count 325 150 - 450 10e9/L   CBC with platelets     Status: Abnormal   Result Value Ref Range    WBC 10.9 4.0 - 11.0 10e9/L    RBC Count 4.20 (L) 4.4 - 5.9 10e12/L    Hemoglobin 12.6 (L) 13.3 - 17.7 g/dL    Hematocrit 41.3 40.0 - 53.0 %    MCV 98 78 - 100 fl    MCH 30.0 26.5 - 33.0 pg    MCHC 30.5 (L) 31.5 - 36.5 g/dL    RDW 13.8 10.0 - 15.0 %    Platelet Count 311  150 - 450 10e9/L   Troponin I     Status: Abnormal   Result Value Ref Range    Troponin I ES 0.135 (HH) 0.000 - 0.045 ug/L   Troponin I     Status: Abnormal   Result Value Ref Range    Troponin I ES 0.116 (H) 0.000 - 0.045 ug/L   Troponin I     Status: Abnormal   Result Value Ref Range    Troponin I ES 0.109 (H) 0.000 - 0.045 ug/L   Basic metabolic panel     Status: Abnormal   Result Value Ref Range    Sodium 146 (H) 133 - 144 mmol/L    Potassium 3.1 (L) 3.4 - 5.3 mmol/L    Chloride 105 94 - 109 mmol/L    Carbon Dioxide 38 (H) 20 - 32 mmol/L    Anion Gap 3 3 - 14 mmol/L    Glucose 106 (H) 70 - 99 mg/dL    Urea Nitrogen 25 7 - 30 mg/dL    Creatinine 1.71 (H) 0.66 - 1.25 mg/dL    GFR Estimate 34 (L) >60 mL/min/[1.73_m2]    GFR Estimate If Black 39 (L) >60 mL/min/[1.73_m2]    Calcium 8.1 (L) 8.5 - 10.1 mg/dL   Blood gas venous with oxyhemoglobin     Status: Abnormal   Result Value Ref Range    Ph Venous 7.49 (H) 7.32 - 7.43 pH    PCO2 Venous 52 (H) 40 - 50 mm Hg    PO2 Venous 46 25 - 47 mm Hg    Bicarbonate Venous 39 (H) 21 - 28 mmol/L    FIO2 30% bipap     Oxyhemoglobin Venous 84 %    Base Excess Venous 13.8 mmol/L   EKG 12-lead, tracing only     Status: None   Result Value Ref Range    Interpretation ECG Click View Image link to view waveform and result    EKG 12-lead, tracing only     Status: None   Result Value Ref Range    Interpretation ECG Click View Image link to view waveform and result    Cardiology IP Consult: Patient to be seen: Routine - within 24 hours; chf, elevated trop; Consultant may enter orders: Yes; Requesting provider? Hospitalist (if different from attending physician)     Status: None ()    Shantel Hyman, SUNDAY CNP     4/26/2021 10:20 AM  Cardiology Consultation     Jacob Easton MRN# [id number]   YOB: 1929 Age: 89 year old   Date of Admission: 4/25/2021     Reason for consult: Elevated troponin per request of Dr. Esparza           Assessment and Plan:     No CPR/ Do  Not Intubate  Consider palliative care evaluation    Acute HFpEF  -BNP 9500 / bilateral pleural effusions with mod right  -in setting of pneumonia, severe aortic stenosis, and possible   COVID-19 infection  -LVEF 50-55%  -requiring BiPAP for support  -still hypoxic despite IV diuresis  -weight up 3 lbs from last hospitalization  -will hold on further lasix given worsening RI  -consider thoracentesis    Severe Aortic Stenosis  -low flow state, stroke volume 23 ml/m2, mean AV gradient 31 mmHg  -not good candidate for SAVR or TAVR  -consider palliative care involvement    Troponin Elevation  -in setting of pneumonia and CHF exacerbation  -peak 0.156 (flat) thus far  -no hx of CAD, but advanced atherosclerosis noted on chest CT  -likely demand ischemia from pneumonia, severe aortic stenosis,   acute heart failure    Paroxysmal Atrial Fibrillation  -persistent since (4/19/21)  -HRs 80-100s on Metoprolol 25 mg BID  -on Heparin drip    Pneumonia  -COVID-19 to be ruled out.  Recent aspiration pneumonia  -hemodynamically stable  -IV abx, nebs    S/p Cholecystectomy due to gangrenous cholecystitis (4/17/21)    Colon Mass  -recently noted.  -concern for possible malignancy    Rheumatoid Arthritis  -on chronic prednisone  -wheelchair bound               Chief Complaint:       History is obtained from the patient         History of Present Illness:   This patient is a 89 year old male with a past medical history   significant for recent admission for acute gangrenous   cholecystitis, postoperative aspiration pneumonia, and hypoxic   and hypercapnic respiratory failure, acute multifactorial   encephalopathy from 4/16/2021 -4/24/2021.  He was discharged to   [facility] (4/24/21) on 2 L of oxygen by nasal cannula in a   stable condition.  Other past medical history includes   hypertension, BPH, cognitive dysfunction, paroxysmal A. fib,   chronic left bundle branch block, rheumatoid arthritis on chronic   prednisone 5 mg daily,  CKD, ascending colon mass seen on CT scan   during last admission.    He underwent a laparoscopic cholecystectomy (4/17/21) and   developed post-op altered mental status with hypercapnia and was   started on BIPAP which resolved the hypercapnia.  His mental   status slowly improved but was also hypoxic.  There was concern   for aspiration pneumonia and was sent home on Augmentin, O2   support at 2L, and thickened liquids. He has a history of   paroxysmal atrial fibrillation and developed A-fib (4/19/21) with   fairly well controlled HRs  His ECHO (4/23/21) showed LVEF 50-55%   without regional wall motion abnormalities, normal RV function,   severe aortic stenosis, low flow state, stroke volume 23 ml/m2,   mean AV gradient 31 mmHg, 1+ AI, 1+ MR, RVSP 32 mmHg, RA 3 mmHg,   mild ascending aorta dilatation    He developed hypoxia at TCU and was unresponsive in the ED with   O2 sats in the 60s  BIPAP was started which resolved his hypoxia.    CXR showed bilateral pleural effusion and infiltrates.  He   received IV lasix 40 mg, and IV antibiotics . His EKG showed Afib   with LBBB  -110 in the ED  BNP 9500 A chest CT (4/25/21) showed no evidence of pulmonary   embolism. 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  Advanced atherosclerotic disease.    He developed worsening shortness of breath overnight despite   BIPAP.  His CXR showed minimal change in his diffuse mixed   interstitial and airspace opacities  He was given additional IV   Lasix and a troponin was checked at 0.156, 0.109  His BMP today   shows worsening Creatinine 1.7 (previously 1.4) potassium: 3.1    Hgb 12.6        Past Medical History:     Past Medical History:   Diagnosis Date      Arthritis      Constipation      Hypertension      Hypocalcemia      Neuromuscular disorder (H)      Thyroid disease    paroxysmal atrial fibrillation  Aortic stenosis  Chronic kidney disease  Pulmonary alveolitis  BPH  Rheumatoid arthritis and is wheelchair bound  Colon mass with possible malignancy          Past Surgical History:     Past Surgical History:   Procedure Laterality Date     C TOTAL HIP ARTHROPLASTY  9/24/11    RT     LAPAROSCOPIC CHOLECYSTECTOMY N/A 4/17/2021    Procedure: CHOLECYSTECTOMY, LAPAROSCOPIC;  Surgeon: Lesvia Stallings MD;  Location:  OR                Social History:     Social History     Tobacco Use     Smoking status: Never Smoker     Smokeless tobacco: Never Used   Substance Use Topics     Alcohol use: No     Alcohol/week: 0.0 standard drinks   retired           Family History:   No family history on file.          Immunizations:             Allergies:     Allergies   Allergen Reactions     Advil [Ibuprofen] Other (See Comments)     PCP told him not to take     Influenza Virus Vaccine H5n1 Other (See Comments)     Does not ever want to have a flu shot - also doesn't want   pneumovax     Orange Juice [Orange Oil] Nausea             Medications:   Amlodipine 10 mg PO q day  Aspirin 81 mg PO q day  Pulmicort neb 0.5 mg BID  Pepcid 20 mg IV  q day  Lasix 40 mg IV daily  (was on 30 mg PO daily prior to admit)  DUONEB q 4 hrs  Xalatan 0.005% ophthalmic solution 1 drop right eye q HS  Levothyroxine 50 mcg PO q day  Metoprolol tartrate 25 mg PO BID  Zosyn 3.375 g IV q 6 hrs  Prednisone 5 mg PO q day  Flomax 0.4 mg PO q day  Vancomycin 2 G IV once (completed)  Heparin drip            Review of Systems:   The 10 point Review of Systems is negative other than noted in   the HPI           Physical Exam:   Vitals were reviewed  Temp: 98.9  F (37.2  C) Temp src: Axillary BP: 112/53 Pulse: 75     Resp: 18 SpO2: 97 % O2 Device: BiPAP/CPAP              Data:     Lab Results    Component Value Date    WBC 10.9 04/26/2021    HGB 12.6 (L) 04/26/2021    HCT 41.3 04/26/2021     04/26/2021     (H) 04/26/2021    POTASSIUM 3.1 (L) 04/26/2021    CHLORIDE 105 04/26/2021    CO2 38 (H) 04/26/2021    BUN 25 04/26/2021    CR 1.71 (H) 04/26/2021     (H) 04/26/2021    NTBNPI 9,562 (H) 04/25/2021    TROPONIN 0.06 04/02/2017    TROPI 0.109 (H) 04/26/2021    AST 51 (H) 04/25/2021    ALT 79 (H) 04/25/2021    ALKPHOS 86 04/25/2021    BILITOTAL 0.8 04/25/2021    INR 1.21 (H) 04/19/2021          Care Management / Social Work IP Consult     Status: None ()    Dee Hyman CM     4/26/2021 11:09 AM  CM aware of URR of 25%. He discharged on 4/24 to [facility] and   readmitted on 4/25 to r/o COVID. He has exacerbated CHF.   Cardiology following. Patient diagnosed with PNA, currently on   Zosyn; may need thoracentesis. CM assessed on 4/18. No change to   assessment.     CM contacted [facility] ([phone])  for return call re: bed   status. Await return call.     CM will continue to follow patient until discharge for any   additional needs.     Claire Cordova RN, BSN, CPHN, CM  Inpatient Care Coordination - M/S, [facility]  [phone]     Blood culture     Status: None (Preliminary result)    Specimen: Blood    Right Arm   Result Value Ref Range    Specimen Description Blood Right Arm     Culture Micro No growth after 20 hours    Blood culture     Status: None (Preliminary result)    Specimen: Blood    Right Hand   Result Value Ref Range    Specimen Description Blood Right Hand     Culture Micro No growth after 20 hours

## 2021-04-26 NOTE — CONSULTS
CM aware of URR of 25%. He discharged on 4/24 to [facility] and readmitted on 4/25 to r/o COVID. He has exacerbated CHF. Cardiology following. Patient diagnosed with PNA, currently on Zosyn; may need thoracentesis. CM assessed on 4/18. No change to assessment.     CM contacted [facility] ([phone])  for return call re: bed status. Await return call.     CM will continue to follow patient until discharge for any additional needs.     Claire Cordova RN, BSN, CPHN, CM  Inpatient Care Coordination - M/S, [facility]  [phone]    Addendum 12:15pm - CM received return call from [facility]. Patient does NOT have a bed hold. Updated ANIBAL.   santa

## 2021-04-26 NOTE — PLAN OF CARE
Pt lethargic, oriented to person, place and situation. Apical pulse irregular Tele A-fib CVR. Pt on continuous bipap- NPO. RN discontinued restraints upon arrival, sitter at bedside. Dr. Estrada updated. Pt lung sounds diminished throughout with crackles in upper bases. Hypoactive bowel sounds, pt had small BM this afternoon. Incontinent of urine. Sepsis protocol flagged lactic acid 2.1, MD notified of significant lab value. No further interventions at this time.   Plan: IV lasix, blood gases, nebulizer, Bipap, Zosyn    1900- Pt pulse ox alarming, Sp02 79-81% on Bipap Fi02 30%, respiratory called. Pt reports having hard time breathing. Increased Fi02 to 100%. Dr. Claros called to the bedside for evaluation. Family updated. Oncoming RN updated.

## 2021-04-26 NOTE — PROGRESS NOTES
[facility]    Hospitalist Progress Note  Name: Jacob Easton    MRN: [id number]  Provider: Silvia Esparza MD  Date of Service: 04/26/2021    Assessment & Plan   Summary of Stay: Jacob Easton is a 89 year old male who was admitted on 4/25/2021 for acute hypoxic and hypercapnic respiratory failure.  He presented with altered mental status and hypoxia.  His past medical history significant for recent admission for acute gangrenous cholecystitis postop aspiration pneumonia with hypoxia and hypercapnic respiratory failure, acute multifactorial encephalopathy from 4/16 to 4/24/2021 was discharged to [facility] a day before hospitalization on 2 L of supplemental O2.  His other comorbidities include hypertension, cognitive dysfunction, paroxysmal atrial fibrillation, rheumatoid arthritis on chronic prednisone 5 mg daily, chronic kidney disease, ascending colon mass seen on CT and BPH    On admission patient was placed on BiPAP due to his hypercapnic respiratory failure.  Chest x-ray showed bilateral pleural effusion and infiltrates.  Started on IV vancomycin and Zosyn and received Lasix.  He subsequently developed worsening shortness of breath required additional Lasix.  Lab work showed elevated troponin was started on IV heparin overnight.    Cardiology was consulted and recommended therapeutic thoracocentesis.  Patient does have severe aortic stenosis and not a good candidate for TAVR.  Palliative team was also consulted for goals of care.  Discussed with patient's daughter and son.    Acute hypoxic and hypercapnic respiratory failure.  -Chest x-ray suggestive of worsening pleural effusion and worsening pulmonary opacities.  There is also significant worsening of the BNP.  -He has been receiving IV Zosyn  -For that reason low likelihood of worsening of pneumonia.  -Improved with IV Lasixs  -Overnight started on heparin drip for elevated troponin  -Cardiology consult appreciated  -Continue IV Zosyn for  now.  -VBG showed decreased PCO2 in 50s.  -Thoracocentesis ordered  -Wean BiPAP as able     Acute pulmonary edema.  CHF exacerbation.  -Recent echocardiogram showed an ejection fraction of 50 to 55%.  Also has a severe aortic stenosis.  -On IV Lasix  -Check daily weights and I's and O's  -Thoracocentesis ordered      Recent episode of aspiration pneumonia.  -Continue IV Zosyn for now.  -Procalcitonin was checked and is equivocal, 0.29.  Clinically more likely to be due to volume overload.     Encephalopathy.  Improving  -Once again multifactorial, in the setting of hypercapnia, hypoxia, pneumonia, CHF exacerbation, underlying cognitive dysfunction.  -Metabolic component.     Hypercapnic respiratory failure.  Improving on BiPAP  -Continue BiPAP.  Monitor VBG.  -Try to wean off the BiPAP if the mentation is improving and PCO2 also improves.     CKD  -Creatinine appears to be at baseline.  Closely monitor.     Recent cholecystectomy for gangrenous cholecystitis  -Abdomen is soft.  Patient denies any abdominal pain.     Atrial fibrillation.  -Continue metoprolol.  Rate appears to be reasonably controlled, between .  -Patient was not started on anticoagulation during recent hospital admission due to postoperative state, intermittent encephalopathy with increased fall risk.  Continue to reassess as condition improves.  On aspirin.     Hypertension  -On amlodipine and metoprolol at baseline, continue.     BPH  -On tamsulosin, continue.     Ascending colon mass seen on CT scan during recent admission.  -Outpatient colonoscopy recommended.     Rheumatoid arthritis  -Chronic prednisone 5 mg daily, continue.        DVT Prophylaxis: Enoxaparin (Lovenox) SQ  Code Status: DNR / DNI  GI prophylaxis: IV Pepcid.     Critical care time spent 60 minutes.  Care plan discussed with patient's son and daughter.      DVT Prophylaxis: heparin drip  Code Status: No CPR- Do NOT Intubate    Disposition: Expected discharge to be  decided      Interval History   Assumedcare, reviewed chart.  Patient on BiPAP responding and following commands.  Communicating though limited.  10 point review of systems was attempted but limited due to encephalopathy.    Discussed with patient's son and daughter.      -Data reviewed today: I reviewed all new labs and imaging reports over the last 24 hours. I personally reviewed.  Chest x-ray shows pleural effusion    Physical Exam   Temp: 98.1  F (36.7  C) Temp src: Axillary BP: 119/57 Pulse: 87   Resp: 19 SpO2: 95 % O2 Device: BiPAP/CPAP    Vitals:    04/25/21 2153   Weight: 106.2 kg (234 lb 3.2 oz)     Vital Signs with Ranges  Temp:  [98.1  F (36.7  C)-99.2  F (37.3  C)] 98.1  F (36.7  C)  Pulse:  [] 87  Resp:  [18-30] 19  BP: (109-141)/(56-80) 119/57  FiO2 (%):  [30 %-100 %] 30 %  SpO2:  [92 %-100 %] 95 %  No intake/output data recorded.      Constitutional:  Awake, following commands BiPAP in place.  Does not appear to be in distress.  Only able to follow limited commands.  Eyes: Conjunctiva and pupils examined and normal.  HEENT: Dry mucous membranes, poor dentition.  Respiratory: Coarse breath sounds.  No wheezing.  Cardiovascular: Irregular rate and rhythm, normal S1 and S2, andsystolic murmur.  Well perfused and warm extremities.  GI: Soft, non-distended, non-tender  Skin: No rashes, no cyanosis, no edema.  Musculoskeletal: No joint swelling, erythema or tenderness.  Neurologic: No facial droop.  Appears to moving all extremities.       Medications     heparin 900 Units/hr (04/26/21 0557)       amLODIPine  10 mg Oral Daily     aspirin  81 mg Oral Daily     budesonide  0.5 mg Nebulization BID     famotidine  20 mg Intravenous Daily     furosemide  40 mg Intravenous Daily     ipratropium - albuterol 0.5 mg/2.5 mg/3 mL  3 mL Nebulization Q4H While awake     latanoprost  1 drop Right Eye At Bedtime     levothyroxine  50 mcg Oral QAM AC     metoprolol tartrate  25 mg Oral BID     piperacillin-tazobactam   3.375 g Intravenous Q6H     predniSONE  5 mg Oral Daily     sodium chloride (PF)  3 mL Intracatheter Q8H     tamsulosin  0.4 mg Oral Daily     Data     Recent Labs   Lab 04/25/21  1208 04/25/21  0819 04/25/21  0646 04/25/21  0542   PH  --  7.22*  --   --    PHV 7.32  --  7.18* 7.17*   PO2  --  136*  --   --    PO2V 24*  --  74* 50*   PCO2  --  86*  --   --    PCO2V 72*  --  64* 100*   HCO3  --  35*  --   --    HCO3V 37*  --  24 37*     Recent Labs   Lab 04/26/21  0755 04/25/21 1927 04/25/21  0541   WBC 10.9 11.9* 13.6*   HGB 12.6* 13.3 13.9   HCT 41.3 43.3 48.8   MCV 98 99 104*    325 380     Recent Labs   Lab 04/25/21  0541 04/24/21  0629 04/23/21  0706    145* 140   POTASSIUM 4.1 3.4 3.6   CHLORIDE 107 109 108   CO2 38* 35* 34*   ANIONGAP <1* 1* <1*   * 117* 167*   BUN 25 21 21   CR 1.55* 1.43* 1.31*   GFRESTIMATED 38* 42* 47*   GFRESTBLACK 44* 49* 54*   MERCEDES 8.9 8.4* 8.7     Recent Labs   Lab 04/25/21  0631 04/25/21  0541 04/19/21  1105   CULT No growth after 20 hours No growth after 20 hours No growth     Recent Labs   Lab 04/25/21  0541 04/24/21  0629 04/23/21  0706   CR 1.55* 1.43* 1.31*     Recent Labs   Lab 04/26/21  0755 04/25/21 1927 04/25/21  0541   HGB 12.6* 13.3 13.9     Recent Labs   Lab 04/25/21  0541   AST 51*   ALT 79*   ALKPHOS 86   BILITOTAL 0.8     No results for input(s): INR in the last 168 hours.  Recent Labs   Lab 04/25/21  0631   LACT 0.6*     No results for input(s): LIPASE in the last 168 hours.  No results for input(s): TSH in the last 168 hours.  Recent Labs   Lab 04/26/21  0423 04/26/21  0028 04/25/21  1927   TROPI 0.116* 0.135* 0.156*     Recent Labs   Lab 04/23/21  1340   COLOR Light Yellow   APPEARANCE Clear   URINEGLC Negative   URINEBILI Negative   URINEKETONE Negative   SG 1.010   UBLD Negative   URINEPH 5.5   PROTEIN Negative   NITRITE Negative   LEUKEST Negative   RBCU 1   WBCU <1       Recent Results (from the past 24 hour(s))   XR Chest Port 1 View     Narrative    EXAM: XR CHEST PORT 1 VIEW  LOCATION: [facility]  DATE/TIME: 4/25/2021 7:49 PM    INDICATION: Shortness of breath  COMPARISON: 04/25/2021 at 0606 hours      Impression    IMPRESSION: Cardiomegaly. Pulmonary vascularity normal. Diffuse mixed interstitial and airspace opacities throughout both lungs with minimal change since earlier. Small bilateral effusions.   CT Chest Pulmonary Embolism w Contrast    Narrative    EXAM: CT CHEST PULMONARY EMBOLISM W CONTRAST  LOCATION: [facility]  DATE/TIME: 4/26/2021 1:14 AM    INDICATION: PE suspected, low/intermediate prob, neg D-dimer  COMPARISON: None.  TECHNIQUE: CT chest pulmonary angiogram during arterial phase injection of IV contrast. Multiplanar reformats and MIP reconstructions were performed. Dose reduction techniques were used.   CONTRAST:  75mL Isovue-370    FINDINGS:  ANGIOGRAM CHEST: Pulmonary arteries are normal caliber and negative for pulmonary emboli. Thoracic aorta is negative for dissection. No CT evidence of right heart strain.    LUNGS AND PLEURA: Moderate size right pleural effusion and small left pleural effusion with atelectasis involving significant portions of both lower Groundglass and airspace opacities within both upper lobes. Extensive cystic changes throughout both   lungs as seen previously.    MEDIASTINUM/AXILLAE: Enlarged mediastinal lymph nodes as seen previously. Atherosclerotic disease.    CORONARY ARTERY CALCIFICATION: Moderate.    UPPER ABDOMEN: Atherosclerotic disease abdominal aorta. Plaque at the origins of the superior mesenteric artery and both renal arteries. Mild cortical atrophy involving the visualized portions of both kidneys. Fluid and clips in the gallbladder fossa   region as well as a small amount of gas    MUSCULOSKELETAL: Edematous changes subcutaneous tissues      Impression    IMPRESSION:  1.  No evidence for pulmonary  2.  Moderate-sized right pleural effusion and smaller left  pleural effusion with atelectasis involving significant portions of both lower lobes. Airspace and groundglass opacities within both upper lobes may represent pneumonia, including Covid 19.  3.  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  4.  Fluid, gas, and clips in the gallbladder fossa may be related to prior cholecystectomy. Clinical correlation recommended.  5.  Advanced atherosclerotic disease.

## 2021-04-27 NOTE — PLAN OF CARE
Oriented to self and place. VSS. Remains on BiPAP @ 30 %. LS diminished. Denies pain. Heparin gtt continues @ 900 units/hr. K+ replacement completed- recheck 3.3- replaced again per protocol. Tele Afib CVR w/ BBB. NPO. Sitter at bedside. External catheter in place. Blanchable redness to buttocks- mepilex in place. Repositioned q 2 hrs. Receiving Zosyn and IV Lasix.

## 2021-04-27 NOTE — PLAN OF CARE
"VSS on BIPAP for WOB. Pt confused, agitated and hallucinates at times seeing people in his room that are not in the room, frustrated that the sitter is unable to \"see\" them, pulls at BIPAP mask, PSC in place.Thoracentesis performed today with 575mL off on right. Seroquel given before procedure, see MAR. Tele: Afib CVR. Hep gtt currently paused and to resume at 1545 per protocol. Palliative following.  Lift for transfers. Will continue POC.   "

## 2021-04-27 NOTE — PLAN OF CARE
VSS, afeb, LS are dim, 95% on Bipap 30%.  Pt sleeping most all shift, turned and repositioned Q2H. Pt is NPO, bedside attendant in room. Pt daughter called and updated x2 this shift.Hep gtt @ 9ml/hr recheck in AM. K recheck was low at 2.8 , replacing with 4 10 Meq bags of K. Pt plan to have thoracentesis tomorrow. Tele is Afib, CVR.

## 2021-04-27 NOTE — PROGRESS NOTES
[facility]  Palliative Care Progress Note  Text Page     Recommendations:  1.  Goals of Care- No CPR- Do NOT Intubate  - Restorative care with plan for no rehospitalization.      Hospitalization goals discussed with daughter/healthcare agent, Mary Laboy.     Decisional Capacity- Unreliable due to dementia. Patient has an advance directive dated 12/04/2018.  Consents and decision making should be done by his daughter, Mary Laboy , the primary Health Care Agent.  Alternate is his son Bernabe Juarez-Kindem.      POLST - will complete prior to dismissal.     2. Dyspnea - Admission for acute hypoxic and hypercapnic respiratory failure. Patient currently on BiPAP. Cardiology consulted regarding severe aortic stenosis and elevated troponin. Patient is not a SAVR or TAVR candidate. Daughter continue to be hopeful for her father's recovery and is not ready for a hospice plan of care.      3. Generalized weakness - Wheelchair bound at baseline. Daughter is hopeful he will make some strides with physical therapy.      4. Spiritual Care  Appreciate input of  Panfilo Merritt MA.  Spiritual Background: Restorationist     5. Care Planning  Appreciate input of  Mitra Armas RN (patient does not have bed hold a [facility])   Medications for discharge - to be determined.      Medical Decision Making and Goals of Care:  Discussed on April 27, 2021 with [address]:     Phone discussion with the patient's daughter/healthcare agent Mary (barbara Haley) Narda at [phone]. Her father had just returned from IR after having 575 ml from right thoracentesis. Mary is hopeful he would continue to recover and participate in physical therapy. Will continue to follow.     Caroline Carr MS, RN, CNS, APRN, ACHPN, FAACVPR  Pain and Palliative Care  Pager [phone]  Office [phone]       Time Spent on this Encounter   Total unit/floor time 12:05 PM until 12:20 PM  minutes, time consisted of the  following, examination of the patient, reviewing the record and completing documentation. >50% of time spent in counseling and coordination of care.  Time spend counseling with family consisted of the following topics, goals of care, care planning for discharge and symptom management.  Time spent in coordination of care with  Mitra Armas RN; Bedside Nurse Trish Baez RN, Hospitalist Silvia Esparza MD and angelique Merritt MA.     Interval History   Chart reviewed - BiPAP with decreased O2 requirements    Palliative Symptom Review (0=no symptom/no concern, 1=mild, 2=moderate, 3=severe):      Pain: 0-none      Fatigue: 2-moderate      Nausea: 0-none      Constipation: 0-none      Diarrhea: 1-mild      Depressive Symptoms: 0-none      Anxiety: 0-none      Drowsiness: 2-moderate      Poor Appetite: 0-none      Shortness of Breath: 0-none      Insomnia: 0-none        Physical Exam   Temp:  [98.1  F (36.7  C)-99  F (37.2  C)] 98.1  F (36.7  C)  Pulse:  [] 81  Resp:  [19-26] 21  BP: (114-145)/(56-76) 142/75  FiO2 (%):  [30 %] 30 %  SpO2:  [93 %-98 %] 96 %  234 lbs 3.2 oz  GEN:  Alert, oriented to self and situation, appears comfortable, NAD.  HEENT:  Normocephalic/atraumatic, no scleral icterus, no nasal discharge, mouth moist.  RESP:  BiPAP with symmetric chest rise on inhalation noted.  Normal respiratory effort.  Psych:  Flat affect.  Calm, cooperative, conversant, but does not recall recent events.    Medications     heparin 1,050 Units/hr (04/27/21 0812)     - MEDICATION INSTRUCTIONS -         amLODIPine  10 mg Oral Daily     aspirin  81 mg Oral Daily     budesonide  0.5 mg Nebulization BID     famotidine  20 mg Intravenous Daily     furosemide  40 mg Intravenous Daily     ipratropium - albuterol 0.5 mg/2.5 mg/3 mL  3 mL Nebulization Q4H While awake     latanoprost  1 drop Right Eye At Bedtime     levothyroxine  50 mcg Oral QAM AC     metoprolol tartrate  25 mg Oral BID      piperacillin-tazobactam  3.375 g Intravenous Q6H     predniSONE  5 mg Oral Daily     sodium chloride (PF)  3 mL Intracatheter Q8H     sodium chloride (PF)  3 mL Intracatheter Q8H     tamsulosin  0.4 mg Oral Daily       Data   Results for orders placed or performed during the hospital encounter of 04/25/21 (from the past 24 hour(s))   Care Management / Social Work IP Consult    Dee Hyman CM     4/26/2021 12:57 PM  CM aware of URR of 25%. He discharged on 4/24 to [facility] and   readmitted on 4/25 to r/o COVID. He has exacerbated CHF.   Cardiology following. Patient diagnosed with PNA, currently on   Zosyn; may need thoracentesis. CM assessed on 4/18. No change to   assessment.     CM contacted [facility] ([phone])  for return call re: bed   status. Await return call.     CM will continue to follow patient until discharge for any   additional needs.     Claire Cordova RN, BSN, CPHN, CM  Inpatient Care Coordination - M/S, [facility]  [phone]    Addendum 12:15pm - CM received return call from [facility]. Patient   does NOT have a bed hold. Updated SW.   ds     Heparin Unfractionated Anti Xa Level   Result Value Ref Range    Heparin Unfractionated Anti Xa Level 0.43 IU/mL   Lactate Dehydrogenase   Result Value Ref Range    Lactate Dehydrogenase 237 (H) 85 - 227 U/L   Protein total   Result Value Ref Range    Protein Total 7.0 6.8 - 8.8 g/dL   Platelet count   Result Value Ref Range    Platelet Count 327 150 - 450 10e9/L   Heparin Unfractionated Anti Xa Level   Result Value Ref Range    Heparin Unfractionated Anti Xa Level 0.29 IU/mL   INR   Result Value Ref Range    INR 1.24 (H) 0.86 - 1.14   Potassium   Result Value Ref Range    Potassium 2.8 (L) 3.4 - 5.3 mmol/L   Potassium   Result Value Ref Range    Potassium 3.3 (L) 3.4 - 5.3 mmol/L   CBC with platelets   Result Value Ref Range    WBC 10.4 4.0 - 11.0 10e9/L    RBC Count 4.38 (L) 4.4 - 5.9 10e12/L    Hemoglobin 12.9 (L)  13.3 - 17.7 g/dL    Hematocrit 43.4 40.0 - 53.0 %    MCV 99 78 - 100 fl    MCH 29.5 26.5 - 33.0 pg    MCHC 29.7 (L) 31.5 - 36.5 g/dL    RDW 13.7 10.0 - 15.0 %    Platelet Count 307 150 - 450 10e9/L   Basic metabolic panel   Result Value Ref Range    Sodium 147 (H) 133 - 144 mmol/L    Potassium 3.5 3.4 - 5.3 mmol/L    Chloride 105 94 - 109 mmol/L    Carbon Dioxide 36 (H) 20 - 32 mmol/L    Anion Gap 6 3 - 14 mmol/L    Glucose 86 70 - 99 mg/dL    Urea Nitrogen 24 7 - 30 mg/dL    Creatinine 1.80 (H) 0.66 - 1.25 mg/dL    GFR Estimate 32 (L) >60 mL/min/[1.73_m2]    GFR Estimate If Black 37 (L) >60 mL/min/[1.73_m2]    Calcium 8.5 8.5 - 10.1 mg/dL   Heparin Unfractionated Anti Xa Level   Result Value Ref Range    Heparin Unfractionated Anti Xa Level 0.20 IU/mL   Blood gas venous with oxyhemoglobin   Result Value Ref Range    Ph Venous 7.44 (H) 7.32 - 7.43 pH    PCO2 Venous 56 (H) 40 - 50 mm Hg    PO2 Venous 27 25 - 47 mm Hg    Bicarbonate Venous 38 (H) 21 - 28 mmol/L    FIO2 Resp 82%     Oxyhemoglobin Venous 49 %    Base Excess Venous 11.5 mmol/L   Lactate Dehydrogenase   Result Value Ref Range    Lactate Dehydrogenase 227 85 - 227 U/L   Protein total   Result Value Ref Range    Protein Total 7.0 6.8 - 8.8 g/dL

## 2021-04-27 NOTE — PROGRESS NOTES
SPIRITUAL HEALTH SERVICES Progress Note  [facility] Med Surg [facility]    Spiritual Health visit per Palliative Care team consult order.  Accompanied pt while in transit for procedure. Pt welcomed a brief prayer.    Followed up with phone call to daughter, Mary.  She explained her father was a lifelong [employer]  and her mother was a  at [facility] many years ago.    Oriented to Spiritual Health Services for support during hospital stay via IPad or telephone until visit restriction is lifted.    SHS will continue to make daily visit as able for prayer and scripture reading as requested my dtr.    Plan: Will follow. Spiritual Health Services remains available for additional emotional/spiritual support.    Panfilo Merritt MA  Staff   Pager: [phone]  Phone: [phone]

## 2021-04-27 NOTE — PROCEDURES
[facility]    Procedure: Right thoracentesis.     Date/Time: 4/27/2021 12:05 PM  Performed by: Rebecca Austin DO  Authorized by: Rebecca Austin DO     UNIVERSAL PROTOCOL   Site Marked: Yes  Prior Images Obtained and Reviewed:  Yes  Required items: Required blood products, implants, devices and special equipment available    Patient identity confirmed:  Verbally with patient, arm band, provided demographic data and hospital-assigned identification number  Patient was reevaluated immediately before administering moderate or deep sedation or anesthesia  Confirmation Checklist:  Patient's identity using two indicators, relevant allergies, procedure was appropriate and matched the consent or emergent situation and correct equipment/implants were available  Time out: Immediately prior to the procedure a time out was called    Universal Protocol: the Joint Commission Universal Protocol was followed    Preparation: Patient was prepped and draped in usual sterile fashion           ANESTHESIA    Anesthesia: Local infiltration  Local Anesthetic:  Lidocaine 1% without epinephrine      SEDATION    Patient Sedated: No    See dictated procedure note for full details.  Findings: Right thoracentesis.     Specimens: none and fluid and/or tissue for laboratory analysis and culture    Complications: None    Condition: Stable    PROCEDURE   Patient Tolerance:  Patient tolerated the procedure well with no immediate complications    Length of time physician/provider present for 1:1 monitoring during sedation: 0

## 2021-04-27 NOTE — PROGRESS NOTES
RT BiPAP NOTE;      A BiPAP of  16/8 @ 30% was applied to the pt via the mask for an increase in WOB and/or SOB.    Skin integrity is intact.  Bridge of nose looks good.   Pt is tolerating it well.   Will continue to monitor and assess the pt's current respiratory status and needs.        Rebecca Feliz, RT

## 2021-04-27 NOTE — PROGRESS NOTES
[facility]    Hospitalist Progress Note  Name: Jacob Easton    MRN: [id number]  Provider: Silvia Esparza MD  Date of Service: 04/27/2021    Assessment & Plan   Summary of Stay: Jacob Easton is a 89 year old male who was admitted on 4/25/2021 for acute hypoxic and hypercapnic respiratory failure.  He presented with altered mental status and hypoxia.  His past medical history significant for recent admission for acute gangrenous cholecystitis postop aspiration pneumonia with hypoxia and hypercapnic respiratory failure, acute multifactorial encephalopathy from 4/16 to 4/24/2021 was discharged to [facility] a day before hospitalization on 2 L of supplemental O2.  His other comorbidities include hypertension, cognitive dysfunction, paroxysmal atrial fibrillation, rheumatoid arthritis on chronic prednisone 5 mg daily, chronic kidney disease, ascending colon mass seen on CT and BPH    On admission patient was placed on BiPAP due to his hypercapnic respiratory failure.  Chest x-ray showed bilateral pleural effusion and infiltrates.  Started on IV vancomycin and Zosyn and received Lasix.  He subsequently developed worsening shortness of breath required additional Lasix.  Lab work showed elevated troponin was started on IV heparin overnight.    Cardiology was consulted and recommended therapeutic thoracocentesis.  Patient does have severe aortic stenosis and not a good candidate for TAVR.  Patient remains on BiPAP this morning centesis was a bit more confused after the procedure.  Patient did require BiPAP after the procedure.    I Had detailed conversation with patient's daughter patient's current state of health and goals of care.  I clarified that we are not treating the underlying cause of patient's respiratory distress which is aortic stenosis given that he is a poor candidate for surgical intervention.  Short of treating that patient would probably have recurrent effusions and would need multiple  hospitalization and episodes of respiratory distress.  She understands that patient would not want to be rehospitalized or would like recurrent procedures.  She is aware that the fluid could recollect but cannot say for how long.  We would be observing patient today and readdress tomorrow with palliative team to see if hospice care would be appropriate      Acute hypoxic and hypercapnic respiratory failure.  -Chest x-ray suggestive of worsening pleural effusion and worsening pulmonary opacities.  There is also significant worsening of the BNP.  -He has been receiving IV Zosyn  -For that reason low likelihood of worsening of pneumonia.  -Improved with IV Lasixs initially  -started on heparin drip for elevated troponin  -Cardiology consult appreciated  -Continue IV Zosyn for now.  -VBG showed decreased PCO2 in 50s.  -Thoracocentesis completed  -Wean BiPAP as able     Acute pulmonary edema.  CHF exacerbation.  -Recent echocardiogram showed an ejection fraction of 50 to 55%.  Also has a severe aortic stenosis.  -holding lasixs today with worsening renal functions  -Check daily weights and I's and O's  -s/p Thoracocentesis       Recent episode of aspiration pneumonia.  -Continue IV Zosyn for now.  -Procalcitonin was checked and is equivocal, 0.29.  Clinically more likely to be due to volume overload.     Encephalopathy. Waxing and waning  -Once again multifactorial, in the setting of hypercapnia, hypoxia, pneumonia, CHF exacerbation, underlying cognitive dysfunction.  -Metabolic component.     Hypercapnic respiratory failure.  Improving on BiPAP  -Continue BiPAP.  Monitor VBG.  -Try to wean off the BiPAP if the mentation is improving and PCO2 also improves.     CKD with acute worsening  -Creatinine trending up  - Holding lasixs  - on exam pt is intravascularly depleted.     Recent cholecystectomy for gangrenous cholecystitis  -Abdomen is soft.  Patient denies any abdominal pain.     Atrial fibrillation.  -Continue  metoprolol.  Rate appears to be reasonably controlled, between .  -Patient was not started on anticoagulation during recent hospital admission due to postoperative state, intermittent encephalopathy with increased fall risk.  Continue to reassess as condition improves.  On aspirin.     Hypertension  -On amlodipine and metoprolol at baseline, continue.     BPH  -On tamsulosin, continue.     Ascending colon mass seen on CT scan during recent admission.  -Outpatient colonoscopy recommended.     Rheumatoid arthritis  -Chronic prednisone 5 mg daily, continue.        DVT Prophylaxis: Enoxaparin (Lovenox) SQ  Code Status: DNR / DNI  GI prophylaxis: IV Pepcid.     Critical care time spent 60 minutes.  Care plan discussed with patient's  daughter.      DVT Prophylaxis: heparin drip  Code Status: No CPR- Do NOT Intubate    Disposition: Expected discharge to be decided      Interval History    Patient on BiPAP responding and following commands.  Communicating though limited.  10 point review of systems was attempted but limited due to encephalopathy.    Discussed with patient's  daughter.  Discussed with palliative team.    -Data reviewed today: I reviewed all new labs and imaging reports over the last 24 hours. I personally reviewed.  Chest x-ray shows pleural effusion    Physical Exam   Temp: 98.1  F (36.7  C) Temp src: Axillary BP: (!) 142/75 Pulse: 81   Resp: 21 SpO2: 98 % O2 Device: BiPAP/CPAP    Vitals:    04/25/21 2153   Weight: 106.2 kg (234 lb 3.2 oz)     Vital Signs with Ranges  Temp:  [98.1  F (36.7  C)-99  F (37.2  C)] 98.1  F (36.7  C)  Pulse:  [] 81  Resp:  [18-26] 21  BP: (112-145)/(53-76) 142/75  FiO2 (%):  [30 %] 30 %  SpO2:  [93 %-98 %] 98 %  I/O last 3 completed shifts:  In: -   Out: 1300 [Urine:1300]      Constitutional:  Awake, following commands BiPAP in place.  Does not appear to be in distress.  Only able to follow limited commands.  Eyes: Conjunctiva and pupils examined and normal.  HEENT:  Dry mucous membranes, poor dentition.  Respiratory: Coarse breath sounds.  No wheezing.  Cardiovascular: Irregular rate and rhythm, normal S1 and S2, andsystolic murmur.  Well perfused and warm extremities.  GI: Soft, non-distended, non-tender  Skin: No rashes, no cyanosis, no edema.  Musculoskeletal: No joint swelling, erythema or tenderness.  Neurologic: No facial droop.  Appears to moving all extremities.       Medications     heparin 1,050 Units/hr (04/27/21 0812)     - MEDICATION INSTRUCTIONS -         amLODIPine  10 mg Oral Daily     aspirin  81 mg Oral Daily     budesonide  0.5 mg Nebulization BID     famotidine  20 mg Intravenous Daily     furosemide  40 mg Intravenous Daily     ipratropium - albuterol 0.5 mg/2.5 mg/3 mL  3 mL Nebulization Q4H While awake     latanoprost  1 drop Right Eye At Bedtime     levothyroxine  50 mcg Oral QAM AC     metoprolol tartrate  25 mg Oral BID     piperacillin-tazobactam  3.375 g Intravenous Q6H     predniSONE  5 mg Oral Daily     sodium chloride (PF)  3 mL Intracatheter Q8H     sodium chloride (PF)  3 mL Intracatheter Q8H     tamsulosin  0.4 mg Oral Daily     Data     Recent Labs   Lab 04/26/21  0856 04/25/21  1208 04/25/21  0819 04/25/21  0646   PH  --   --  7.22*  --    PHV 7.49* 7.32  --  7.18*   PO2  --   --  136*  --    PO2V 46 24*  --  74*   PCO2  --   --  86*  --    PCO2V 52* 72*  --  64*   HCO3  --   --  35*  --    HCO3V 39* 37*  --  24     Recent Labs   Lab 04/27/21  0706 04/26/21  1312 04/26/21  0755 04/25/21  1927   WBC 10.4  --  10.9 11.9*   HGB 12.9*  --  12.6* 13.3   HCT 43.4  --  41.3 43.3   MCV 99  --  98 99    327 311 325     Recent Labs   Lab 04/27/21  0706 04/27/21  0254 04/26/21  1801 04/26/21  0755 04/25/21  0541   *  --   --  146* 141   POTASSIUM 3.5 3.3* 2.8* 3.1* 4.1   CHLORIDE 105  --   --  105 107   CO2 36*  --   --  38* 38*   ANIONGAP 6  --   --  3 <1*   GLC 86  --   --  106* 178*   BUN 24  --   --  25 25   CR 1.80*  --   --  1.71*  1.55*   GFRESTIMATED 32*  --   --  34* 38*   GFRESTBLACK 37*  --   --  39* 44*   MERCEDES 8.5  --   --  8.1* 8.9     Recent Labs   Lab 04/25/21  0631 04/25/21  0541   CULT No growth after 2 days No growth after 2 days     Recent Labs   Lab 04/27/21  0706 04/26/21  0755 04/25/21  0541   CR 1.80* 1.71* 1.55*     Recent Labs   Lab 04/27/21  0706 04/26/21  0755 04/25/21  1927   HGB 12.9* 12.6* 13.3     Recent Labs   Lab 04/25/21  0541   AST 51*   ALT 79*   ALKPHOS 86   BILITOTAL 0.8     Recent Labs   Lab 04/26/21  1801   INR 1.24*     Recent Labs   Lab 04/25/21  0631   LACT 0.6*     No results for input(s): LIPASE in the last 168 hours.  No results for input(s): TSH in the last 168 hours.  Recent Labs   Lab 04/26/21  0755 04/26/21  0423 04/26/21  0028   TROPI 0.109* 0.116* 0.135*     Recent Labs   Lab 04/23/21  1340   COLOR Light Yellow   APPEARANCE Clear   URINEGLC Negative   URINEBILI Negative   URINEKETONE Negative   SG 1.010   UBLD Negative   URINEPH 5.5   PROTEIN Negative   NITRITE Negative   LEUKEST Negative   RBCU 1   WBCU <1       No results found for this or any previous visit (from the past 24 hour(s)).

## 2021-04-27 NOTE — PROGRESS NOTES
Right thoracentesis completed after consent from daughter.  Pt tolerated total 575 ml's clear yellow fluid removal well with no change in clinical status.  Report called to care RN who will address Heparin line on arrival back to IP room per cart. Specimen to lab as ordered. Transported back to room in clinically unchanged condition. Post procedure CXR deferred by Dr. Austin IR

## 2021-04-27 NOTE — PROGRESS NOTES
Care Management Initial Consult    General Information  Assessment completed with: Children, Caregiver, [facility] nurse Cindy [phone] and Yola Hernandez [phone]  Type of CM/SW Visit: Initial Assessment    Primary Care Provider verified and updated as needed: No   Readmission within the last 30 days: previous discharge plan unsuccessful      Reason for Consult: care coordination/care conference, discharge planning    Living Environment:   People in home: facility resident       Family/Social Support:  Care provided by:    Provides care for: no one, unable/limited ability to care for self  Marital Status:   Children          Description of Support System: Supportive, Involved    Support Assessment: Adequate family and caregiver support    Current Resources:   Patient receiving home care services: No  Community Resources: DME, House[employer], [employer]  Equipment currently used at home: wheelchair, manual     Lifestyle & Psychosocial Needs:        Socioeconomic History     Marital status:      Spouse name: Not on file     Number of children: Not on file     Years of education: Not on file     Highest education level: Not on file     Tobacco Use     Smoking status: Never Smoker     Smokeless tobacco: Never Used   Substance and Sexual Activity     Alcohol use: No     Alcohol/week: 0.0 standard drinks     Additional Information:  Per chart review, pt resides at [facility] at baseline. He had a recent admission from 4/16-4/24 and discharged to [facility] where he does not have a bedhold in place. Called and spoke with pt's dtr Mary [phone], she ultimately would like for pt to return to [facility] but understands he may need TCU prior to returning to [facility]. She has questions around avoiding rehospitalization after discharge and plans to discuss this further with Palliative care. Attempted to discuss this further and offered options including having hospice to help support pt in the event he  is worsening and doesn't want to be readmitted but dtr continues to decline this. She is requesting follow-up with Palliative, spoke with Caroline Carr CNP and updated, she will plan to follow-up w/ pt and dtr again.     Spoke with Cindy grossman at [facility] [phone], she reports that prior to pt's last admission he was mostly WC bound but able to pivot transfer with assist of 1 and stand on own. He received assistance with medications, showers, dressing and toileting, he did not have O2 or CPAP. She reports that they can accommodate assist of 1, they do have a EZ stand but pt has refused to use this in the past due to discomfort. If requiring more assistance than 1 person, return to [facility] would not be an option.     Paged Dr. Esparza and requested PT eval to help guide discharge planning.      Will continue to follow patient for any additional discharge needs.     Mitra Armas RN BSN   Inpatient Care Coordination  [facility]   Phone [phone]

## 2021-04-28 NOTE — PROGRESS NOTES
SPIRITUAL HEALTH SERVICES Progress Note  [facility]    Spiritual Health follow up visit per pt family request for daily prayer.Pt is not responsive and on comfort cares.  Offered blessing prayer and hymn at the bedside.      Plan: Will continue to follow. Spiritual Health Services remains available for additional emotional/spiritual support.    Panfilo Merritt MA  Staff   Pager: [phone]  Phone: [phone]

## 2021-04-28 NOTE — PLAN OF CARE
Please see flowsheets for detailed vital signs and assessments.   Neuro: alert, oriented to self  Vital Signs: stable  Pain: denies  O2: mid 90s 2L NC  Tele: A fib VVR  Respiratory: LS diminished  GI: WDL  : voiding w/o difficulty, incontinent  Skin: blanchable redness, bruising, incision sites CDI  Activity: assist 2 turn/reposition  IVF: heparin gtt  Notable labs: K 3.2, Cr 1.65, pCO2 56  Protocols: Standard K, replaced this AM  Plan: K replacement, anticoagulation, O2 support. Continue with plan of care.   Discharge: 2+ days

## 2021-04-28 NOTE — CONSULTS
Hospice referral received from Sara BARNETT.  Pt being worked up for eligibility and [facility] will establish contact with pt/family upon chart review.    Thank you for this referral.    Lola Galvez RN CHPN  [facility]  Referral Specialist  C: [phone]  24 hour line: [phone]

## 2021-04-28 NOTE — PROGRESS NOTES
"[facility]  Palliative Care Progress Note  Text Page     Assessment & Plan      Recommendations:  1.  Goals of Care- No CPR- Do NOT Intubate  - Comfort focused care.      Hospitalization goals discussed with daughter/healthcare agent, Mary Laboy.     Decisional Capacity- Unreliable due to dementia. Patient has an advance directive dated 12/04/2018.  Consents and decision making should be done by his daughter, Mary Laboy , the primary Health Care Agent.  Alternate is his son Bernabe Juarez-Elaem.      POLST - indicating patient preference for COMFORT-FOCUSED CARE.     2. Dyspnea - Daughter, Mary Laboy request a comfort focused plan with no further use of BiPAP. Continue 2 liters of oxygen as needed for comfort and use Dilaudid oral or IV for respiratory rate greater than 20.      3.  Delirium - Reasonable to use Zyprexa at bedtime given the patient's poor sleep and poor appetite. Reasonable to offer ativan for anxiety and air hunger.     4.  Generalized weakness - Wheelchair bound at baseline. Daughter is hopeful he will make some strides with physical therapy.      5. Spiritual Care  Appreciate input of  Panfilo Merritt MA.  Spiritual Background: Naomi     6. Care Planning  Appreciate input of  Claire Cordova RN.  Medications for discharge - to be determined.      Medical Decision Making and Goals of Care:  Discussed on April 28, 2021 with Caroline BRAND, CNS:     The patient's daughter/healthcare agent Mary Laboy, left a phone message to call her this morning. I phoned her at [phone]. Mary explained she had a discussion with Hospitalist Silvia Esparza MD regarding her father's care. \"She explained his confusion is normal at his age, but I know it means he's near the end. His brain is shutting down. Like a computer shutting down. I called this morning and he was more out of it.\" Mary explained that her mother was at the nursing home and had a blood test which told " "the nurse that she had two weeks to live. I inquired about her mother's passing, \"I don't want to talk about that!\" Mary request to have a hospice nurse give input on her father. I acknowledged that Jacob meets the criteria to begin hospice whenever a focus of  comfort is appropriate. We discussed what a comfort plan would look like for her father (no BiPAP, no lab draws and medications for comfort.) Mary confirmed that would be what her father would want, but she didn't want to begin the plan too early \"I don't want him to loose hope.\" I offered to complete a physical assessment of her father and call her back with an update. While on the unit I also discussed the patient's care with Hospitalist Silvia Esparza and bedside nurse Trish Baez, RN.    I phoned Shannan back and explained that her father opened his eyes and nodded in agreement when I asked if his breathing was difficult and if he would want medications to help his breathing. Mary agreed that her father would want a comfort plan. Mary expressed concern between a comfort plan and hospice and we discussed each at length. Mary perseverated on how to limit her families use of the computer table. I offered that she is in control of when the computer tablet would be appropriate and encouraged her to set up limited times for the family to be on the computer tablet. Mary also explained that Jacob has one sister who may want to see him on the computer tablet, and at age 80 she wondered if this would be helpful or a bad experience for her. I offered that it would be helpful for his sister to be aware of his physical and mental state and allow her to decide if it is appropriate.     I the afternoon I checked on the patient, who was sleeping. Discussed his care with bedside nurse Trish Baez, RN who is setting up the computer at 2 PM for the family to see Jacob. I phoned his daughter/healthcare agent Mary and gave an update. We discussed the plan to dismiss with " the support of hospice and I answered many of her questions.  POLST complete indicating preference for comfort focused care was e-mailed to his daughter at [email or web address].      Caroline Carr MS, RN, CNS, APRN, ACHPN, FAACVPR  Pain and Palliative Care  Pager [phone]  Office [phone]       Time Spent on this Encounter   Total unit/floor time 9 AM until 11:20 AM and 12:50 PM until 1:25 PM, time consisted of the following, examination of the patient, reviewing the record and completing documentation. >50% of time spent in counseling and coordination of care.  Time spend counseling with family consisted of the following topics, goals of care, care planning for discharge and symptom management.  Time spent in coordination of care with Bedside Nurse Trish Baez RN, Hospitalist Silvia Esparza MD,  Panfilo Merritt MA,  BRUCE Flores  and Care Coordinator Claire Cordova RN .     Interval History   Chart reviewed - patient weaned off BiPAP, but has been tachypnic on oxygen per NC.     Palliative Symptom Review (0=no symptom/no concern, 1=mild, 2=moderate, 3=severe):      Pain: 0-none      Fatigue: 3-severe      Nausea: 0-none      Constipation: 0-none      Diarrhea: 0-none      Depressive Symptoms: 0-none      Anxiety: 2-moderate      Drowsiness: 3-severe      Poor Appetite: 3-severe      Shortness of Breath: 1-mild      Insomnia: 1-mild        Physical Exam   Temp:  [97.4  F (36.3  C)-98.7  F (37.1  C)] 98.1  F (36.7  C)  Pulse:  [] 120  Resp:  [18-31] 28  BP: (130-154)/(69-80) 150/80  FiO2 (%):  [30 %] 30 %  SpO2:  [90 %-100 %] 94 %  234 lbs 3.2 oz  GEN: Lethargic, fatigued, opened eyes to command, and nodded yes or no to questions.   HEENT:  Normocephalic/atraumatic, no scleral icterus, no nasal discharge, mouth moist.  CV:  RRR, S1, S2; no murmurs or other irregularities noted.  +3 DP/PT pulses bilaterally; no edema BLE.  RESP:  Tachypnic at 26.Poor inspiratory effort. Anterior  bilateral breath sounds are coarse.  Symmetric chest rise on inhalation noted.    ABD:  Rounded, soft, non-tender/non-distended.  +BS      Medications     - MEDICATION INSTRUCTIONS -         amLODIPine  10 mg Oral Daily     aspirin  81 mg Oral Daily     budesonide  0.5 mg Nebulization BID     famotidine  20 mg Intravenous Daily     furosemide  40 mg Intravenous Q12H     ipratropium - albuterol 0.5 mg/2.5 mg/3 mL  3 mL Nebulization Q4H While awake     latanoprost  1 drop Right Eye At Bedtime     levothyroxine  50 mcg Oral QAM AC     metoprolol tartrate  25 mg Oral BID     OLANZapine zydis  5 mg Oral At Bedtime     piperacillin-tazobactam  3.375 g Intravenous Q6H     predniSONE  5 mg Oral Daily     sodium chloride (PF)  3 mL Intracatheter Q8H     tamsulosin  0.4 mg Oral Daily       Data   Results for orders placed or performed during the hospital encounter of 04/25/21 (from the past 24 hour(s))   Fluid Culture Aerobic Bacterial    Specimen: Right Pleural Fluid    Right   Result Value Ref Range    Specimen Description Pleural fluid Right     Special Requests TUBE 3     Culture Micro Culture negative monitoring continues    Gram stain    Specimen: Pleural fluid    Right   Result Value Ref Range    Specimen Description Pleural fluid Right     Special Requests TUBE 3     Gram Stain No organisms seen     Gram Stain       Moderate  WBC'S seen  predominantly mononuclear cells      Gram Stain       Quantification of host cells and microbiological organisms was done on a cytocentrifuged   preparation.     Cell count with differential fluid   Result Value Ref Range    Body Fluid Analysis Source Pleural fluid     % Neutrophils Fluid 13 %    % Lymphocytes Fluid 33 %    % Mono/Macro Fluid 49 %    % Other Cells Fluid 5 %    Color Fluid Yellow     Appearance Fluid Clear     WBC Fluid 223 /uL   Glucose fluid   Result Value Ref Range    Glucose Fluid Source Pleural fluid     Glucose Fluid 89 mg/dL   Lactate dehydrogenase fluid    Result Value Ref Range    LD Fluid Source Pleural fluid     Lactate Dehydrogenase Fluid 106 U/L   Protein fluid   Result Value Ref Range    Protein Total Fluid Source Pleural fluid     Protein Total Fluid 2.7 g/dL   right thoracentesis.     Narrative    Rebecca Austin DO     4/27/2021 12:06 PM  [facility]    Procedure: Right thoracentesis.     Date/Time: 4/27/2021 12:05 PM  Performed by: Rebecca Austin DO  Authorized by: Rebecca Austin DO     UNIVERSAL PROTOCOL   Site Marked: Yes  Prior Images Obtained and Reviewed:  Yes  Required items: Required blood products, implants, devices and special   equipment available    Patient identity confirmed:  Verbally with patient, arm band, provided   demographic data and hospital-assigned identification number  Patient was reevaluated immediately before administering moderate or deep   sedation or anesthesia  Confirmation Checklist:  Patient's identity using two indicators, relevant   allergies, procedure was appropriate and matched the consent or emergent   situation and correct equipment/implants were available  Time out: Immediately prior to the procedure a time out was called    Universal Protocol: the Joint Commission Universal Protocol was followed    Preparation: Patient was prepped and draped in usual sterile fashion           ANESTHESIA    Anesthesia: Local infiltration  Local Anesthetic:  Lidocaine 1% without epinephrine      SEDATION    Patient Sedated: No    See dictated procedure note for full details.  Findings: Right thoracentesis.     Specimens: none and fluid and/or tissue for laboratory analysis and   culture    Complications: None    Condition: Stable    PROCEDURE   Patient Tolerance:  Patient tolerated the procedure well with no immediate   complications    Length of time physician/provider present for 1:1 monitoring during   sedation: 0   US Thoracentesis    Narrative    ULTRASOUND GUIDED  THORACENTESIS  4/27/2021 12:10 PM     HISTORY: moderate effusion, severe AS,    FINDINGS: Limited ultrasound was performed to evaluate for the  presence and best approach for drainage of a pleural effusion. An  image is archived. Written and oral informed consent was obtained. A  pause for the cause procedure to verify the correct patient and  correct procedure.     The skin overlying the right chest posteriorly was prepped and draped  in the usual sterile fashion. The subcutaneous tissues were  anesthetized with 10 cc's 1% lido. Under direct ultrasound guidance a  catheter was advanced into the pleural space and 575 mL of  roscoe  colored fluid was drained. The catheter was removed and a sterile  dressing was applied.     Patient was monitored by nurse under my direct supervision throughout  the exam. Ultrasound images were permanently stored.  There were no  immediate complications. Patient left the ultrasound suite in  satisfactory condition.      Impression    IMPRESSION: Technically successful thoracentesis without immediate  complications.    RILEY AUSTIN DO   Heparin Unfractionated Anti Xa Level   Result Value Ref Range    Heparin Unfractionated Anti Xa Level 0.67 IU/mL   XR Chest Port 1 View    Narrative    CHEST PORTABLE ONE VIEW   4/27/2021 1:49 PM     HISTORY: Dyspnea post thora.    COMPARISON: 4/25/2021.      Impression    IMPRESSION: Tiny right apex pneumothorax identified. Bilateral  interstitial prominence and air cystic changes again noted. Bibasilar  atelectasis. Potential stable airspace disease at the left lung base.  No significant effusions currently seen.    I communicated preliminary result to Dr. Austin on 4/27/2021 at 1355  hours.    JEN SNELL MD   Heparin Unfractionated Anti Xa Level   Result Value Ref Range    Heparin Unfractionated Anti Xa Level 0.23 IU/mL   Heparin Unfractionated Anti Xa Level   Result Value Ref Range    Heparin Unfractionated Anti Xa Level 0.41 IU/mL   CBC with  platelets   Result Value Ref Range    WBC 12.5 (H) 4.0 - 11.0 10e9/L    RBC Count 4.55 4.4 - 5.9 10e12/L    Hemoglobin 13.4 13.3 - 17.7 g/dL    Hematocrit 45.8 40.0 - 53.0 %     (H) 78 - 100 fl    MCH 29.5 26.5 - 33.0 pg    MCHC 29.3 (L) 31.5 - 36.5 g/dL    RDW 13.8 10.0 - 15.0 %    Platelet Count 320 150 - 450 10e9/L   Basic metabolic panel   Result Value Ref Range    Sodium 147 (H) 133 - 144 mmol/L    Potassium 3.2 (L) 3.4 - 5.3 mmol/L    Chloride 107 94 - 109 mmol/L    Carbon Dioxide 37 (H) 20 - 32 mmol/L    Anion Gap 3 3 - 14 mmol/L    Glucose 94 70 - 99 mg/dL    Urea Nitrogen 26 7 - 30 mg/dL    Creatinine 1.65 (H) 0.66 - 1.25 mg/dL    GFR Estimate 36 (L) >60 mL/min/[1.73_m2]    GFR Estimate If Black 41 (L) >60 mL/min/[1.73_m2]    Calcium 8.6 8.5 - 10.1 mg/dL   Blood gas venous and oxyhgb   Result Value Ref Range    Ph Venous 7.30 (L) 7.32 - 7.43 pH    PCO2 Venous 75 (H) 40 - 50 mm Hg    PO2 Venous 48 (H) 25 - 47 mm Hg    Bicarbonate Venous 37 (H) 21 - 28 mmol/L    FIO2 5LMask     Oxyhemoglobin Venous 77 %    Base Excess Venous 7.2 mmol/L

## 2021-04-28 NOTE — CONSULTS
Spoke with dtr Mary via t/c to review hospice program.  Mary has many questions regarding the caregiving piece of things and if pt detention can accommodate needs.  Dtr asks about private caregiving and SNF and reports she will call Sara directly to get these questions answered.  Writer coordinated with Orestes Clement CNP attending and Sara who reports the plan is for pt to return to his apt for comfort focused care.  Writer will order O2 for delivery in the am of 4/29 between 9-1 and will clarify if pt owns bed and OBT or is renting.  Left VM for [facility] af detention and await call back.  Pt to tentatively discharge back to [place] tomorrow.    In anticipation of discharge please fill and send the following comfort meds with pt upon discharge.  Meds should be tablets, bubble packed and no ranges    -Hydromorphone 2 mg tab.  Take 1/2 tab (1 mg) PO Q2H prn pain/dsypnea  -Lorazepam 0.5 mg tab.  Take 1/2 tab (0.25 mg) PO Q4H prn anxiety/restlessness  -Haloperidol 0.5 mg solutab.  Take 1 tab (0.5 mg) PO/SL Q6H prn nausea/agitation  -senna 8.6 mg tab.  Take 1 tab PO BID PRN constipation  -Bisacodyl 10 mg supp.  Give supp NE every day prn constipation  -Tylenol 650 mg supp. Give supp NE Q4H prn pain/fever  -Atropine 1% drops.  Give 2 gtts SL Q4H prn oral secretions.    Thank you for this referral.  Please update [facility] when final discharge plans are in place.      Lola Galvez RN [facility]  Referral Specialist  C: [phone]  24 hour line: [phone]

## 2021-04-28 NOTE — PROGRESS NOTES
CM received VM from patient's daughter, Mary yesterday afternoon. Patient made comfort cares this morning. Per conversation with Pallative NP, daughter is up to date on plans for discharge back to [facility] with hospice. Hospice company to be [employer]. Daughter still very anxious about discharge planning. Pallative NP recommended CM not contact her re: discharge planning at this time.     CM will continue to follow patient until discharge for any additional needs.     Claire Cordova RN, BSN, CPHN, CM  Inpatient Care Coordination-M/S, [facility]  [phone]

## 2021-04-28 NOTE — CONSULTS
**Ady aware of consult ordered on 4/28 for discharge planning and hospice.  Please see the RN CC's note from 4/27 for initial assessment/consult note.    Care Management Follow Up    Length of Stay (days): 3    Expected Discharge Date: 04/29/21     Concerns to be Addressed: care coordination/care conferences, discharge planning     Patient plan of care discussed at interdisciplinary rounds: Yes    Anticipated Discharge Disposition: Assisted Living, Transitional Care     Anticipated Discharge Services: None  Anticipated Discharge DME: None    Patient/family educated on Medicare website which has current facility and service quality ratings:  No, pt's dtr wants to use [facility] because the pt's facility is affiliated with   Education Provided on the Discharge Plan:    Patient/Family in Agreement with the Plan: yes    Referrals Placed by CM/SW:  Hospice  Private pay costs discussed: Not discussed at this time    Additional Information:  Ady was updated by Palliative that the pt is transitioning to comfort cares.  The plan is for the pt to return to [facility] with [facility].  The dtr wants to use [facility] because the pt's facility is affiliated with .  Palliative said that they are going to follow-up with the pt's dtr this afternoon to further discuss the discharge plan with her.    Ady spoke with Kaylen, Director of Nursing at the [facility] P: [phone] F: [phone], to discuss the pt's return.  Kaylen said that they are unable to accept the pt back if he is an AX2 or a lift.  Ady told Kaylen that they talked with the bedside nurse and the pt is bed bound at this time.  Kaylen said that they would increase services for the pt and can accept him back on hospice.  Kaylen is aware that the pt has an external catheter in place and would like that to remain in place upon his return.    Ady spoke with Lola at [facility], regarding the referral for hospice.  Lola said  that she will submit the referral and assess for eligibility.  Ady requested that they do not talk with the pt's dtr until palliative has a chance to talk with the dtr again.  Sw will update Lola when it is appropriate to follow-up with the pt's dtr.    Sw will continue with discharge planning and will be available as needed until discharge.      KARYN Flores, [facility]  Inpatient Care Coordination  [facility]  [phone]    ADDENDUM 1615:   Ady spoke with the pt's dtr Mary, [phone], to update her that [facility] can meet the pt's current needs and that they can accept the pt back on hospice.  Mary was not sure if they would or not.  Sw confirmed that they are aware of his needs at this time.  Ady told Mary that they left a vm for Adelaida at the [facility] to determine the visitor policy, but have not heard back from her yet.  Mary said that some of the family has been vaccinated, but she isn't sure if they all have or not.  There are 2 sons who are not local that will fly in on Friday to see the pt.  Mary said that her other questions were addressed by hospice.  She has no additional sw questions or concerns at this time.  She requested stretcher transport and is aware of the costs.    Ady spoke with Lola who said that they will try and see if hospice can be at the facility by 1300 tomorrow to see the pt.  It was agreed that transport will not be arranged today due to needing to confirm discharge plans with the [facility] for tomorrow and making sure that the pt is stable.  Sw to follow-up tomorrow morning and arrange transport.

## 2021-04-28 NOTE — PLAN OF CARE
No c/o pain this shift.  Pt confused/forgetful.  He can be restless at times.  PSC at bedside to prevent putting legs over side rails and pulling at lines and tubing.  Assist of 2 to turn and reposition in bed.  Up to chair with mechanical lift and assist of 2.  Lungs diminished.  Sats mid 90's on 2L O2 via nasal cannula.  Tele a-fib cvr/rvr.  Was offered a low fat 2 gm NA supper and he only accepted a few bites.  Poor appetite. Incont bladder.  External catheter in place. Heparin drip infusing per orders. Palliative and spiritual health following.    Hep XA this evening was .23  Rate increased from 750 to 900.  Next Hep XA re-check at 4:30 AM

## 2021-04-28 NOTE — PROGRESS NOTES
[facility]    Hospitalist Progress Note  Name: Jacob Easton    MRN: [id number]  Provider: Silvia Esparza MD  Date of Service: 04/28/2021    Assessment & Plan   Summary of Stay: Jacob Easton is a 89 year old male who was admitted on 4/25/2021 for acute hypoxic and hypercapnic respiratory failure.  He presented with altered mental status and hypoxia.  His past medical history significant for recent admission for acute gangrenous cholecystitis postop aspiration pneumonia with hypoxia and hypercapnic respiratory failure, acute multifactorial encephalopathy from 4/16 to 4/24/2021 was discharged to [facility] a day before hospitalization on 2 L of supplemental O2.  His other comorbidities include hypertension, cognitive dysfunction, paroxysmal atrial fibrillation, rheumatoid arthritis on chronic prednisone 5 mg daily, chronic kidney disease, ascending colon mass seen on CT and BPH    On admission patient was placed on BiPAP due to his hypercapnic respiratory failure.  Chest x-ray showed bilateral pleural effusion and infiltrates.  Started on IV vancomycin and Zosyn and received Lasix.  He subsequently developed worsening shortness of breath required additional Lasix.  Lab work showed elevated troponin was started on IV heparin overnight.    Cardiology was consulted and recommended therapeutic thoracocentesis.  Patient does have severe aortic stenosis and not a good candidate for TAVR.  Patient remains on BiPAP this morning centesis was a bit more confused after the procedure.  Patient did require BiPAP after the procedure.CXR showed small apical pneumothorax    4/27 I Had detailed conversation with patient's daughter patient's current state of health and goals of care.  I clarified that we are not treating the underlying cause of patient's respiratory distress which is aortic stenosis given that he is a poor candidate for surgical intervention.  Short of treating that patient would probably have recurrent  effusions and would need multiple hospitalization and episodes of respiratory distress.  She understands that patient would not want to be rehospitalized or would like recurrent procedures.  She is aware that the fluid could recollect but cannot say for how long.  We would be observing patient today and readdress tomorrow with palliative team to see if hospice care would be appropriate    4/28 patient was breathing hard x-ray looks more congested this morning.  Spoke with palliative care and patient's daughter.  Patient will now be comfort care.  Comfort care measures and meds ordered by palliative team      Acute hypoxic and hypercapnic respiratory failure.  -Chest x-ray suggestive of worsening pleural effusion and worsening pulmonary opacities.  There is also significant worsening of the BNP.  -He has been receiving IV Zosyn  -For that reason low likelihood of worsening of pneumonia.  -Improved with IV Lasixs initially  -started on heparin drip for elevated troponin  -Cardiology consult appreciated  -Now on 5 L via facemask PCO2 is rising patient is somnolent and confused  -He is now on comfort care measures     Acute pulmonary edema.  CHF exacerbation.  -Recent echocardiogram showed an ejection fraction of 50 to 55%.  Also has a severe aortic stenosis.  -holding lasixs today with worsening renal functions  -Check daily weights and I's and O's  -s/p Thoracocentesis     Small apical pneumothorax s/p thoracentesis  -Repeat chest x-ray today shows improvement    Leukocytosis  - CXR viewed showed increased congestion  -UA.  Ordered  _Already on antibiotics.      Recent episode of aspiration pneumonia.  -Continue IV Zosyn for now.  -Procalcitonin was checked and is equivocal, 0.29.  Clinically more likely to be due to volume overload.     Encephalopathy. Waxing and waning  -Once again multifactorial, in the setting of hypercapnia, hypoxia, pneumonia, CHF exacerbation, underlying cognitive dysfunction.  -Metabolic  component.     Hypercapnic respiratory failure.  Improving on BiPAP  -Continue BiPAP.  Monitor VBG.  -Now on facemask with PCO2 creeping up     CKD Acute on Chronic worsening of renal functions  -Creatinine stable today 1.65  -Resume Lasix given increased congestion and work of breathing  -Patient is now on comfort care measures     Recent cholecystectomy for gangrenous cholecystitis  -Abdomen is soft.  Patient denies any abdominal pain.     Atrial fibrillation.  -Continue metoprolol.  Rate appears to be reasonably controlled, between .  -Patient was not started on anticoagulation during recent hospital admission due to postoperative state, intermittent encephalopathy with increased fall risk.  Continue to reassess as condition improves.  On aspirin.     Hypertension  -On amlodipine and metoprolol at baseline, continue.     BPH  -On tamsulosin, continue.     Ascending colon mass seen on CT scan during recent admission.  -Outpatient colonoscopy recommended.     Rheumatoid arthritis  -Chronic prednisone 5 mg daily, continue.    Hypokalemia   - replaced per protocol        DVT Prophylaxis: Enoxaparin (Lovenox) SQ  Code Status: DNR / DNI  GI prophylaxis: IV Pepcid.     Critical care time spent 60 minutes.  Care plan discussed with patient's  daughter.      DVT Prophylaxis: heparin drip  Code Status: No CPR- Do NOT Intubate    Disposition: Expected discharge to be decided      Interval History   Patient somnolent requiring 5 L via facemask.  PCO2 going up.  Requiring IV Lasix again today x-ray showed increased congestion.  Plan discussed with palliative team and patient's daughter.  Discussed prognosis and care plan with patient's daughter.  Agree with plan for comfort care.      Discussed with patient's  daughter.  Discussed with palliative team.    -Data reviewed today: I reviewed all new labs and imaging reports over the last 24 hours. I personally reviewed.  Chest x-ray shows pleural effusion    Physical Exam    Temp: 97.4  F (36.3  C) Temp src: Axillary BP: (!) 143/77 Pulse: 101   Resp: 24 SpO2: 94 % O2 Device: Oxymask Oxygen Delivery: 2.5 LPM  Vitals:    04/25/21 2153   Weight: 106.2 kg (234 lb 3.2 oz)     Vital Signs with Ranges  Temp:  [97.4  F (36.3  C)-98.7  F (37.1  C)] 97.4  F (36.3  C)  Pulse:  [] 101  Resp:  [18-31] 24  BP: (130-154)/(69-77) 143/77  FiO2 (%):  [30 %] 30 %  SpO2:  [90 %-100 %] 94 %  I/O last 3 completed shifts:  In: -   Out: 750 [Urine:750]      Constitutional:  Somnolent increased work of breathing breathing via facemask.  Only able to follow limited commands.  Eyes: Conjunctiva and pupils examined and normal.  HEENT: Dry mucous membranes, poor dentition.  Respiratory: Coarse breath sounds.  No wheezing.  Cardiovascular: Irregular rate and rhythm, normal S1 and S2, andsystolic murmur.  Well perfused and warm extremities.  GI: Soft, non-distended, non-tender  Skin: No rashes, no cyanosis, no edema.  Musculoskeletal: No joint swelling, erythema or tenderness.  Neurologic: No facial droop.  Appears to moving all extremities.       Medications     heparin 900 Units/hr (04/27/21 2228)     - MEDICATION INSTRUCTIONS -         amLODIPine  10 mg Oral Daily     aspirin  81 mg Oral Daily     budesonide  0.5 mg Nebulization BID     famotidine  20 mg Intravenous Daily     ipratropium - albuterol 0.5 mg/2.5 mg/3 mL  3 mL Nebulization Q4H While awake     latanoprost  1 drop Right Eye At Bedtime     levothyroxine  50 mcg Oral QAM AC     metoprolol tartrate  25 mg Oral BID     piperacillin-tazobactam  3.375 g Intravenous Q6H     potassium chloride  10 mEq Intravenous Q1H     predniSONE  5 mg Oral Daily     sodium chloride (PF)  3 mL Intracatheter Q8H     tamsulosin  0.4 mg Oral Daily     Data     Recent Labs   Lab 04/27/21  0908 04/26/21  0856 04/25/21  1208 04/25/21  0819   PH  --   --   --  7.22*   PHV 7.44* 7.49* 7.32  --    PO2  --   --   --  136*   PO2V 27 46 24*  --    PCO2  --   --   --  86*   PCO2V  56* 52* 72*  --    HCO3  --   --   --  35*   HCO3V 38* 39* 37*  --      Recent Labs   Lab 04/28/21  0500 04/27/21  0706 04/26/21  1312 04/26/21  0755   WBC 12.5* 10.4  --  10.9   HGB 13.4 12.9*  --  12.6*   HCT 45.8 43.4  --  41.3   * 99  --  98    307 327 311     Recent Labs   Lab 04/28/21  0500 04/27/21  0706 04/27/21  0254 04/26/21  0755 04/26/21  0755   * 147*  --   --  146*   POTASSIUM 3.2* 3.5 3.3*   < > 3.1*   CHLORIDE 107 105  --   --  105   CO2 37* 36*  --   --  38*   ANIONGAP 3 6  --   --  3   GLC 94 86  --   --  106*   BUN 26 24  --   --  25   CR 1.65* 1.80*  --   --  1.71*   GFRESTIMATED 36* 32*  --   --  34*   GFRESTBLACK 41* 37*  --   --  39*   MERCEDES 8.6 8.5  --   --  8.1*    < > = values in this interval not displayed.     Recent Labs   Lab 04/27/21  1150 04/25/21  0631 04/25/21  0541   CULT PENDING No growth after 3 days No growth after 3 days     Recent Labs   Lab 04/28/21  0500 04/27/21  0706 04/26/21  0755   CR 1.65* 1.80* 1.71*     Recent Labs   Lab 04/28/21  0500 04/27/21  0706 04/26/21  0755   HGB 13.4 12.9* 12.6*     Recent Labs   Lab 04/25/21  0541   AST 51*   ALT 79*   ALKPHOS 86   BILITOTAL 0.8     Recent Labs   Lab 04/26/21  1801   INR 1.24*     Recent Labs   Lab 04/25/21  0631   LACT 0.6*     No results for input(s): LIPASE in the last 168 hours.  No results for input(s): TSH in the last 168 hours.  Recent Labs   Lab 04/26/21  0755 04/26/21  0423 04/26/21  0028   TROPI 0.109* 0.116* 0.135*     Recent Labs   Lab 04/23/21  1340   COLOR Light Yellow   APPEARANCE Clear   URINEGLC Negative   URINEBILI Negative   URINEKETONE Negative   SG 1.010   UBLD Negative   URINEPH 5.5   PROTEIN Negative   NITRITE Negative   LEUKEST Negative   RBCU 1   WBCU <1       Recent Results (from the past 24 hour(s))   US Thoracentesis    Narrative    ULTRASOUND GUIDED THORACENTESIS  4/27/2021 12:10 PM     HISTORY: moderate effusion, severe AS,    FINDINGS: Limited ultrasound was performed to  evaluate for the  presence and best approach for drainage of a pleural effusion. An  image is archived. Written and oral informed consent was obtained. A  pause for the cause procedure to verify the correct patient and  correct procedure.     The skin overlying the right chest posteriorly was prepped and draped  in the usual sterile fashion. The subcutaneous tissues were  anesthetized with 10 cc's 1% lido. Under direct ultrasound guidance a  catheter was advanced into the pleural space and 575 mL of  roscoe  colored fluid was drained. The catheter was removed and a sterile  dressing was applied.     Patient was monitored by nurse under my direct supervision throughout  the exam. Ultrasound images were permanently stored.  There were no  immediate complications. Patient left the ultrasound suite in  satisfactory condition.      Impression    IMPRESSION: Technically successful thoracentesis without immediate  complications.    RILEY AUSTIN, DO   XR Chest Port 1 View    Narrative    CHEST PORTABLE ONE VIEW   4/27/2021 1:49 PM     HISTORY: Dyspnea post thora.    COMPARISON: 4/25/2021.      Impression    IMPRESSION: Tiny right apex pneumothorax identified. Bilateral  interstitial prominence and air cystic changes again noted. Bibasilar  atelectasis. Potential stable airspace disease at the left lung base.  No significant effusions currently seen.    I communicated preliminary result to Dr. Austin on 4/27/2021 at 1355  hours.    JEN SNELL MD

## 2021-04-29 ASSESSMENT — ACTIVITIES OF DAILY LIVING (ADL): ADLS_ACUITY_SCORE: 22

## 2021-04-29 NOTE — PROGRESS NOTES
MD DEATH PRONOUNCEMENT    Called to pronounce Jacob SANDY Kindem dead.    Physical Exam: Spontaneous respirations absent, Breath sounds absent, Carotid pulse absent and Heart sounds absent    Patient was pronounced dead at 21:45 PM, 2021.    Preliminary Cause of Death: critical aortic stenosis    Active Problems:    Acute respiratory failure with hypoxia and hypercapnia (H)    Pneumonia of both lungs due to infectious organism, unspecified part of lung       Final cause of death and discharge note to be completed by Dr. Esparza.      Infectious disease present?: YES    Communicable disease present? (examples: HIV, chicken pox, TB, Ebola, CJD) :  NO    Multi-drug resistant organism present? (example: MRSA): NO    Please consider an autopsy if any of the following exist:  NO Unexpected or unexplained death during or following any dental, medical, or surgical diagnostic treatment procedures.   NO Death of mother at or up to seven days after delivery.     NO All  and pediatric deaths.     NO Death where the cause is sufficiently obscure to delay completion of the death certificate.   NO Deaths in which autopsy would confirm a suspected illness/condition that would affect surviving family members or recipients of transplanted organs.     The following deaths must be reported to the 's Office:  NO A death that may be due entirely or in part to any factors other than natural disease (recent surgery, recent trauma, suspected abuse/neglect).   NO A death that may be an accident, suicide, or homicide.     NO Any sudden, unexpected death in which there is no prior history of significant heart disease or any other condition associated with sudden death.   NO A death under suspicious, unusual, or unexpected circumstances.    NO Any death which is apparently due to natural causes but in which the  does not have a personal physician familiar with the patient s medical history, social, or environmental  situation or the circumstances of the terminal event.   NO Any death apparently due to Sudden Infant Death Syndrome.     NO Deaths that occur during, in association with, or as consequences of a diagnostic, therapeutic, or anesthetic procedure.   NO Any death in which a fracture of a major bone has occurred within the past (6) six months.   NO A death of persons note seen by their physician within 120 days of demise.     NO Any death in which the  was an inmate of a public institution or was in the custody of Law Enforcement personnel.   NO  All unexpected deaths of children   NO Solid organ donors   NO Unidentified bodies   NO Deaths of persons whose bodies are to be cremated or otherwise disposed of so that the bodies will later be unavailable for examination;   NO Deaths unattended by a physician outside of a licensed healthcare facility or licensed residential hospice program   NO Deaths occurring within 24 hours of arrival to a health care facility if death is unexpected.    NO Deaths associated with the decedent s employment.   NO Deaths attributed to acts of terrorism.   NO Any death in which there is uncertainty as to whether it is a medical examiner s care should be discussed with the medical investigator.        Body disposition: Autopsy was discussed with family member:  Daughter by phone.  Permission for autopsy was declined.

## 2021-05-01 LAB
BACTERIA SPEC CULT: NO GROWTH
BACTERIA SPEC CULT: NO GROWTH
SPECIMEN SOURCE: NORMAL
SPECIMEN SOURCE: NORMAL

## 2021-05-02 LAB
BACTERIA SPEC CULT: NO GROWTH
Lab: NORMAL
SPECIMEN SOURCE: NORMAL

## 2021-05-05 ENCOUNTER — PATIENT OUTREACH (OUTPATIENT)
Dept: GERIATRIC MEDICINE | Facility: CLINIC | Age: 86
End: 2021-05-05

## 2021-05-06 NOTE — PROGRESS NOTES
[employer] Medicare Disenrollment    Member was disenrolled from [employer] Medicare effective: 4-28-21  Reason for disenrollment: Death   Chelita BARRERA   [facility] Care Coordinator   [phone] - work cell phone   [phone] - esuq fax

## 2021-05-15 NOTE — DISCHARGE SUMMARY
[facility]  Discharge Summary  Hospitalist      Date of Admission:  4/25/2021  Date of Discharge: 4/28/2021  Provider:  Silvia Esparza MD  Date of Service (when I last saw the patient): 05/15/21      Primary Provider: Orestes Clement          Discharge Diagnosis:     Discharge Diagnoses   Critical aortic stenosis  Respiratory failure with hypoxia  Hypokalemia replaced    Other medical issues:  Past Medical History:   Diagnosis Date    Arthritis     Constipation     Hypertension     Hypocalcemia     Neuromuscular disorder (H)     Thyroid disease           History of Present Illness   Jacob Easton is an 89 year old male who presented with shortness of breath please see the admission history and physical for full details.    Hospital Course     Jacob Easton was admitted on 4/25/2021. Jacob Easton was a 89 year old male who was admitted on 4/25/2021 for acute hypoxic and hypercapnic respiratory failure.  He presented with altered mental status and hypoxia.  His past medical history significant for recent admission for acute gangrenous cholecystitis postop aspiration pneumonia with hypoxia and hypercapnic respiratory failure, acute multifactorial encephalopathy from 4/16 to 4/24/2021 was discharged to [facility] a day before hospitalization on 2 L of supplemental O2.  His other comorbidities include hypertension, cognitive dysfunction, paroxysmal atrial fibrillation, rheumatoid arthritis on chronic prednisone 5 mg daily, chronic kidney disease, ascending colon mass seen on CT and BPH     On admission patient was placed on BiPAP due to his hypercapnic respiratory failure.  Chest x-ray showed bilateral pleural effusion and infiltrates.  Started on IV vancomycin and Zosyn and received Lasix.  He subsequently developed worsening shortness of breath required additional Lasix.  Lab work showed elevated troponin was started on IV heparin overnight.     Cardiology was consulted and recommended  therapeutic thoracocentesis.  Patient had severe aortic stenosis and not a good candidate for TAVR.  Patient remained on BiPAP this morning Received therapeutic thoracocentesis was a bit more confused after the procedure.  Patient did require BiPAP after the procedure.CXR showed small apical pneumothorax     4/27 I Had detailed conversation with patient's daughter patient's current state of health and goals of care.  I clarified that we were not treating the underlying cause of patient's respiratory distress which is aortic stenosis given that he is a poor candidate for surgical intervention.  Short of treating that patient would probably have recurrent effusions and would need multiple hospitalization and episodes of respiratory distress.  She understood that patient would not want to be rehospitalized or would like recurrent procedures.  She was aware that the fluid could recollect but cannot say for how long.   palliative team was consulted to see if hospice care would be appropriate     4/28 patient was breathing hard x-ray looked more congested this morning.  Spoke with palliative care and patient's daughter.  Patient was placed on comfort care.  Comfort care measures and meds ordered by palliative team    On 428 L in the evening patient was passed and was pronounced dead at around 21:45 PM    Significant Results and Procedures   As above         Primary Care Physician   Orestes Clement        Consultations This Hospital Stay   PHARMACY IP CONSULT  PHARMACY IP CONSULT  CARDIOLOGY IP CONSULT  CARE MANAGEMENT / SOCIAL WORK IP CONSULT  PALLIATIVE CARE ADULT IP CONSULT  SPIRITUAL HEALTH SERVICES IP CONSULT  CARE MANAGEMENT / SOCIAL WORK IP CONSULT    Time Spent on this Encounter   ISilvia MD, personally saw the patient today and spent greater than 30 minutes discharging this patient.    Discharge Orders     Discharge Medications     Allergies   Allergies   Allergen Reactions    Advil [Ibuprofen]  Other (See Comments)     PCP told him not to take    Influenza Virus Vaccine H5n1 Other (See Comments)     Does not ever want to have a flu shot - also doesn't want pneumovax    Orange Juice [Orange Oil] Nausea     Data           Disclaimer: This note consists of symbols derived from keyboarding, dictation and/or voice recognition software. As a result, there may be errors in the script that have gone undetected. Please consider this when interpreting information found in this chart.

## 2021-10-21 NOTE — PROGRESS NOTES
[facility]    Chief Complaint   Patient presents with     jail Acute       Story Medical Record Number:  [id number]    HPI:    Jacob Easton is a 89 year old  (5/16/1929), who is being seen today for an episodic care visit at [facility] .  HPI information obtained from: facility chart records.Today's concern is:    Dyspnea on exertion  With lower oxygen sats, febrile, SOB above baseline, increased weakness and increase need of staff and equipment for transfers.CXR order 9/14 with impression showing interstitial densities in the lung fields may indicate chronic lung changes. Increased opacities in the costophrenic angles may represent mild atelectasis or pneumonia. A 5.3 cm bulla in the left lung base.  Negative for leucocytosis and BMP mostly within limits. He endorses SOB above baseline, reports he is drinking more fluids and continuing to eat all meals.He is dressed today and participating in regular activities.       ALLERGIES: Advil [ibuprofen]; Influenza virus vaccine [id number]; and Orange juice [orange oil]  Past Medical, Surgical, Family and Social History reviewed and updated in [facility].    Current Outpatient Prescriptions   Medication Sig Dispense Refill     acetaminophen (TYLENOL) 500 MG tablet Take 650 mg by mouth 2 times daily as needed for mild pain        albuterol (PROAIR HFA/PROVENTIL HFA/VENTOLIN HFA) 108 (90 BASE) MCG/ACT Inhaler Inhale 2 puffs into the lungs every 4 hours as needed for shortness of breath / dyspnea or wheezing       AMLODIPINE BESYLATE PO Take 10 mg by mouth daily       aspirin EC 81 MG EC tablet Take 1 tablet (81 mg) by mouth daily 31 tablet PRN     folic acid (FOLVITE) 1 MG tablet Take 1 mg by mouth four times a week        gabapentin (NEURONTIN) 100 MG capsule Take 1 capsule (100 mg) by mouth daily 31 capsule PRN     HYDROCHLOROTHIAZIDE PO Take 50 mg by mouth daily       latanoprost (XALATAN) 0.005 % ophthalmic solution Place 1 drop  Spoke with Roxanne she received another bill.  I called her insurance and they said they received the fax for the appeal on 10/11/21 and it takes up to 30 business days to review.    Document control number [id number]    Called Roxanne back and updated her.    We will await a decision from insurance.     into the right eye At Bedtime        LEVOFLOXACIN PO Take 750 mg by mouth daily       levothyroxine (SYNTHROID/LEVOTHROID) 50 MCG tablet Take 1 tablet (50 mcg) by mouth daily 31 tablet PRN     MELATONIN PO Take 5 mg by mouth At Bedtime        METHOTREXATE PO Take 10 mg by mouth once a week on Fridays       metoprolol (LOPRESSOR) 25 MG tablet Take 1 tablet (25 mg) by mouth 2 times daily 62 tablet PRN     Multiple Vitamins-Minerals (CENTRUM SILVER ULTRA MENS) TABS Take 1 tablet by mouth daily       Multiple Vitamins-Minerals (EYE VITAMINS & MINERALS) TABS Take 1 tablet by mouth daily 60 tablet 11     nystatin (MYCOSTATIN) 248180 UNIT/GM POWD Apply topically 2 times daily as needed        potassium chloride SA (K-DUR/KLOR-CON M) 10 MEQ CR tablet TAKE THREE TABLETS (30 MEQ) BY MOUTH ONCE DAILY  180 tablet 11     senna-docusate (SENOKOT-S;PERICOLACE) 8.6-50 MG per tablet Take 1 tablet by mouth 2 times daily as needed for constipation       tamsulosin (FLOMAX) 0.4 MG capsule Take 1 capsule (0.4 mg) by mouth daily 31 capsule PRN     Patient Active Problem List   Diagnosis     Pain in joint, pelvic region and thigh     Hypokalemia due to hydrochlorothiazide     BPH (benign prostatic hyperplasia)     Physical deconditioning     Neuropathic pain of both feet     Pulmonary vascular congestion     Benign essential hypertension     Acquired hypothyroidism     Acute kidney failure (H)     Rheumatoid arthritis involving both hands (H)     Acute renal failure (ARF) (H)     Hypercalcemia     Dysphagia     Urinary tract infection without hematuria, site unspecified     Recurrent cold sores     TIA (transient ischemic attack)     Right wrist drop     Pulmonary alveolitis (H)     Atrial fibrillation, unspecified type (H)     CKD (chronic kidney disease) stage 3, GFR 30-59 ml/min     Advance Care Planning     Hypothyroidism, unspecified type     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 (H)       Medications  reviewed:  Medications reconciled to facility chart and changes were made to reflect current medications as identified as above med list. Below are the changes that were made:   Medications stopped since last EPIC medication reconciliation:   There are no discontinued medications.    Medications started since last Psychiatric medication reconciliation:  Orders Placed This Encounter   Medications     LEVOFLOXACIN PO     Sig: Take 750 mg by mouth daily       REVIEW OF SYSTEMS:  4 point ROS including Respiratory, CV, GI and , other than that noted in the HPI,  is negative     Physical Exam:  /70  Pulse 73  Temp 99.1  F (37.3  C)  Resp 18  Wt 211 lb (95.7 kg)  SpO2 91%  BMI 27.09 kg/m2  GENERAL APPEARANCE:  Alert, in no distress  ENT:  Mouth and posterior oropharynx normal, moist mucous membranes, [place], bilateral cerumen impaction ongoing  EYES:  EOM, conjunctivae, lids, pupils and irises normal-glasses  RESP:  respiratory effort and palpation of chest normal, lungs diminished to auscultation, no expiratory wheezes   CV:  Palpation and auscultation of heart done, bradycardic and irregular rhythm, no murmur, rub, or gallop, peripheral edema 1+ in ankles and feet. Chang Hose on.  ABDOMEN:  normal bowel sounds, soft, nontender, no hepatosplenomegaly   M/S:   Gait and station abnormal using wheelchair currently, generalized weakness  SKIN:  Inspection of skin and subcutaneous tissue baseline- hypertrophic toenail great toe of left foot  NEURO:   Examination of sensation by touch normal  PSYCH:  insight and judgement impaired, memory impaired     Recent Labs: Labs of 9/13/18 not uploaded for this note    CBC RESULTS:   Recent Labs   Lab Test 04/19/18 04/27/17   WBC  7.0  5.6   RBC  4.16*  4.13*   HGB  13.0*  12.7*   HCT  40.4  41.6   MCV  97  101*   MCH  31.3  30.8   MCHC  32.2  30.5*   RDW  14.4  14.5   PLT  226  262       Last Basic Metabolic Panel:  Recent Labs   Lab Test 04/19/18 06/07/17   NA  137  136   POTASSIUM   3.3*  3.3*   CHLORIDE  99  99   MERCEDES  8.9  9.1   CO2  32  29   BUN  28  19   CR  1.45*  1.23   GLC  93  111*       Liver Function Studies -   Recent Labs   Lab Test 04/19/18 04/03/17   0651   PROTTOTAL  7.9  6.6*   ALBUMIN  3.4  2.6*   BILITOTAL  0.6  0.8   ALKPHOS  78  53   AST  19  37   ALT  23  23       TSH   Date Value Ref Range Status   04/19/2018 5.05 (H) 0.40 - 4.00 mU/L Final   04/02/2017 4.62 (H) 0.40 - 4.00 mU/L Final       Assessment/Plan:  (R06.09) Dyspnea on exertion  (primary encounter diagnosis)  Comment: Acute Change in Condition  Plan:   -CXR STAT (AP & Lateral) only received 1 view  -CBC w/ diff, BMP STAT  -Levofloxacin 750 mg po qd x 5 days for pneumonia (delay in medication therapy)   -Encourage oral intake   - VS and O2 sats with change in condition or s/s of worsening.   - Use his prn Tylenol for fever  -OK to keep beside Ventolin  (90 Base) MCG/ACT AERS 2 puffs PO PRN for SOB      Electronically signed by  SUNDAY Lee CNP

## 2022-02-24 NOTE — PROCEDURE: MOHS SURGERY
Birth Control Pills Pregnancy And Lactation Text: This medication should be avoided if pregnant and for the first 30 days post-partum. Consent 2/Introductory Paragraph: Mohs surgery was explained to the patient and consent was obtained. The risks, benefits and alternatives to therapy were discussed in detail. Specifically, the risks of infection, scarring, bleeding, prolonged wound healing, incomplete removal, allergy to anesthesia, nerve injury and recurrence were addressed. Prior to the procedure, the treatment site was clearly identified and confirmed by the patient. All components of Universal Protocol/PAUSE Rule completed.

## 2022-04-25 NOTE — PROGRESS NOTES
[facility]  Paged for worsening hypoxia requiring 100% FiO2 on BiPAP therapy.  Patient at bedside appears comfortable but does complain he has shortness of breath.  Stat chest x-ray obtained which shows Diffuse mixed interstitial and airspace opacities throughout both lungs with minimal change since earlier.  I ordered an extra Lasix 40 mg,troponin was ordered which resulted elevated at 0.156.  I started patient on heparin drip for possible ACS.  CT chest angiogram has been ordered as well to evaluate for any other lung disease, suspicion he most likely has heart failure in the setting of severe aortic stenosis.  On reevaluation his O2 sats have improved to 80%, patient is resting comfortably.  Can consider cardiology evaluation tomorrow to help manage with heart failure.   no

## 2024-05-28 NOTE — PROCEDURE: MOHS SURGERY
----- Message from Kasie Wilkes MD sent at 5/25/2024  8:31 PM EDT -----  Please call patient and let them know results show slightly low thyroid, start levothyroxine 25 mcg p.o. daily -remind him to take this first thing in the morning- hour before any other medications or food.  Order TSH and free T4 to be drawn in 6 to 8 weeks.   Consent (Lip)/Introductory Paragraph: The rationale for Mohs was explained to the patient and consent was obtained. The risks, benefits and alternatives to therapy were discussed in detail. Specifically, the risks of lip deformity, changes in the oral aperture, infection, scarring, bleeding, prolonged wound healing, incomplete removal, allergy to anesthesia, nerve injury and recurrence were addressed. Prior to the procedure, the treatment site was clearly identified and confirmed by the patient. All components of Universal Protocol/PAUSE Rule completed.

## 2024-06-11 NOTE — PROVIDER NOTIFICATION
MD notified: Pt coughed up some thick secretions, do you want to place order for sample? Thank you!  Addendum: Sputum sample ordered and collected.     MD notified: Can you discontinue IMC orders? Also, daughter called [facility] and would love an update from you when you get a chance, thank you!  Addendum: IMC orders discontinued and per MD note, daughter updated.     MD notified: Pt has had 3 loose/watery stools this shift. WBCs elevated and has some abd pain, do you want a stool sample collected? Thanks!   [facility]

## 2025-01-02 NOTE — PROCEDURE: MOHS SURGERY
Pt placed back on 2L via NC   Mercedes Flap Text: The defect edges were debeveled with a #15 scalpel blade.  Given the location of the defect, shape of the defect and the proximity to free margins a Mercedes flap was deemed most appropriate.  Using a sterile surgical marker, an appropriate advancement flap was drawn incorporating the defect and placing the expected incisions within the relaxed skin tension lines where possible. The area thus outlined was incised deep to adipose tissue with a #15 scalpel blade.  The skin margins were undermined to an appropriate distance in all directions utilizing iris scissors.

## (undated) DEVICE — DRAIN JACKSON PRATT RESERVOIR 100ML SU130-1305

## (undated) DEVICE — SOL NACL 0.9% INJ 250ML BAG 2B1322Q

## (undated) DEVICE — ENDO TROCAR BLUNT TIP KII BALLOON 12X100MM C0R47

## (undated) DEVICE — SURGICEL HEMOSTAT 2X14" 1951

## (undated) DEVICE — LINEN FULL SHEET 5511

## (undated) DEVICE — Device

## (undated) DEVICE — DRSG GAUZE 4X4" 8044

## (undated) DEVICE — ENDO TROCAR FIRST ENTRY KII FIOS Z-THRD 05X100MM CTF03

## (undated) DEVICE — BLADE CLIPPER 3M 9670

## (undated) DEVICE — DECANTER VIAL 2006S

## (undated) DEVICE — LINEN POUCH DBL 5427

## (undated) DEVICE — GLOVE PROTEXIS W/NEU-THERA 6.5  2D73TE65

## (undated) DEVICE — ESU CORD MONOPOLAR 10'  E0510

## (undated) DEVICE — SUCTION IRR STRYKERFLOW II W/TIP 250-070-520

## (undated) DEVICE — SYR 30ML LL W/O NDL 302832

## (undated) DEVICE — SU PDS II 0 ENDOLOOP EZ10G

## (undated) DEVICE — CONNECTOR STOPCOCK 3 WAY MALE LL HI-FLO MX9311L

## (undated) DEVICE — ESU ELEC BLADE 2.75" COATED/INSULATED E1455

## (undated) DEVICE — CLIP APPLIER ENDO 5MM M/L LIGAMAX EL5ML

## (undated) DEVICE — SU VICRYL 4-0 PS-2 18" UND J496H

## (undated) DEVICE — ESU GROUND PAD ADULT W/CORD E7507

## (undated) DEVICE — SU VICRYL 0 UR-6 27" J603H

## (undated) DEVICE — SOL NACL 0.9% IRRIG 3000ML BAG 2B7477

## (undated) DEVICE — GLOVE PROTEXIS BLUE W/NEU-THERA 6.5  2D73EB65

## (undated) DEVICE — LINEN TOWEL PACK X10 5473

## (undated) DEVICE — SOL NACL 0.9% IRRIG 1000ML BOTTLE 07138-09

## (undated) DEVICE — RAD RX ISOVUE 300 (50ML) 61% IOPAMIDOL CHARGE PER ML

## (undated) DEVICE — SUCTION LAPAROSCOPIC SMOKE EVAC SYSTEM SEL7000A

## (undated) DEVICE — DRAPE C-ARM 60X42" 1013

## (undated) DEVICE — ESU PENCIL W/HOLSTER E2350H

## (undated) DEVICE — SUCTION CANISTER MEDIVAC LINER 3000ML W/LID 65651-530

## (undated) DEVICE — CATH CHOLANGIOGRAM KUMAR CC-019

## (undated) DEVICE — ENDO TROCAR SLEEVE KII Z-THREADED 05X100MM CTS02

## (undated) DEVICE — LINEN HALF SHEET 5512

## (undated) DEVICE — BAG CLEAR TRASH 1.3M 39X33" P4040C

## (undated) DEVICE — SYR 50ML LL W/O NDL 309653

## (undated) DEVICE — TUBING IV EXTENSION SET ANESTHESIA 34" MLL 2C6227

## (undated) DEVICE — ENDO POUCH UNIV RETRIEVAL SYSTEM INZII 10MM CD001

## (undated) RX ORDER — FENTANYL CITRATE 50 UG/ML
INJECTION, SOLUTION INTRAMUSCULAR; INTRAVENOUS
Status: DISPENSED
Start: 2021-01-01

## (undated) RX ORDER — FENTANYL CITRATE-0.9 % NACL/PF 10 MCG/ML
PLASTIC BAG, INJECTION (ML) INTRAVENOUS
Status: DISPENSED
Start: 2021-01-01

## (undated) RX ORDER — BUPIVACAINE HYDROCHLORIDE 5 MG/ML
INJECTION, SOLUTION EPIDURAL; INTRACAUDAL
Status: DISPENSED
Start: 2021-01-01

## (undated) RX ORDER — METOPROLOL TARTRATE 1 MG/ML
INJECTION, SOLUTION INTRAVENOUS
Status: DISPENSED
Start: 2021-01-01

## (undated) RX ORDER — LIDOCAINE HYDROCHLORIDE 10 MG/ML
INJECTION, SOLUTION EPIDURAL; INFILTRATION; INTRACAUDAL; PERINEURAL
Status: DISPENSED
Start: 2021-01-01

## (undated) RX ORDER — NALOXONE HYDROCHLORIDE 0.4 MG/ML
INJECTION, SOLUTION INTRAMUSCULAR; INTRAVENOUS; SUBCUTANEOUS
Status: DISPENSED
Start: 2021-01-01

## (undated) RX ORDER — GLYCOPYRROLATE 0.2 MG/ML
INJECTION INTRAMUSCULAR; INTRAVENOUS
Status: DISPENSED
Start: 2021-01-01

## (undated) RX ORDER — PROPOFOL 10 MG/ML
INJECTION, EMULSION INTRAVENOUS
Status: DISPENSED
Start: 2021-01-01

## (undated) RX ORDER — CEFAZOLIN SODIUM 1 G/3ML
INJECTION, POWDER, FOR SOLUTION INTRAMUSCULAR; INTRAVENOUS
Status: DISPENSED
Start: 2021-01-01

## (undated) RX ORDER — IPRATROPIUM BROMIDE AND ALBUTEROL SULFATE 2.5; .5 MG/3ML; MG/3ML
SOLUTION RESPIRATORY (INHALATION)
Status: DISPENSED
Start: 2021-01-01

## (undated) RX ORDER — ONDANSETRON 2 MG/ML
INJECTION INTRAMUSCULAR; INTRAVENOUS
Status: DISPENSED
Start: 2021-01-01